# Patient Record
Sex: FEMALE | Race: BLACK OR AFRICAN AMERICAN | NOT HISPANIC OR LATINO | Employment: OTHER | URBAN - METROPOLITAN AREA
[De-identification: names, ages, dates, MRNs, and addresses within clinical notes are randomized per-mention and may not be internally consistent; named-entity substitution may affect disease eponyms.]

---

## 2017-06-23 ENCOUNTER — HOSPITAL ENCOUNTER (EMERGENCY)
Facility: HOSPITAL | Age: 47
Discharge: HOME | End: 2017-06-23
Attending: EMERGENCY MEDICINE | Admitting: EMERGENCY MEDICINE

## 2017-06-23 ENCOUNTER — APPOINTMENT (OUTPATIENT)
Dept: GENERAL RADIOLOGY | Facility: HOSPITAL | Age: 47
End: 2017-06-23

## 2017-06-23 ENCOUNTER — APPOINTMENT (OUTPATIENT)
Dept: CT IMAGING | Facility: HOSPITAL | Age: 47
End: 2017-06-23

## 2017-06-23 VITALS
BODY MASS INDEX: 39.86 KG/M2 | WEIGHT: 248 LBS | HEART RATE: 87 BPM | TEMPERATURE: 98.6 F | SYSTOLIC BLOOD PRESSURE: 162 MMHG | DIASTOLIC BLOOD PRESSURE: 112 MMHG | OXYGEN SATURATION: 100 % | RESPIRATION RATE: 18 BRPM | HEIGHT: 66 IN

## 2017-06-23 DIAGNOSIS — I10 UNCONTROLLED HYPERTENSION: ICD-10-CM

## 2017-06-23 DIAGNOSIS — R10.9 ABDOMINAL CRAMPING: ICD-10-CM

## 2017-06-23 DIAGNOSIS — R13.10 DYSPHAGIA, UNSPECIFIED TYPE: Primary | ICD-10-CM

## 2017-06-23 DIAGNOSIS — K21.00 GASTROESOPHAGEAL REFLUX DISEASE WITH ESOPHAGITIS: ICD-10-CM

## 2017-06-23 LAB
ALBUMIN SERPL-MCNC: 4.4 G/DL (ref 3.2–4.8)
ALBUMIN/GLOB SERPL: 1.2 G/DL (ref 1.5–2.5)
ALP SERPL-CCNC: 162 U/L (ref 25–100)
ALT SERPL W P-5'-P-CCNC: 42 U/L (ref 7–40)
ANION GAP SERPL CALCULATED.3IONS-SCNC: 7 MMOL/L (ref 3–11)
AST SERPL-CCNC: 41 U/L (ref 0–33)
BASOPHILS # BLD AUTO: 0.03 10*3/MM3 (ref 0–0.2)
BASOPHILS NFR BLD AUTO: 0.4 % (ref 0–1)
BILIRUB SERPL-MCNC: 0.4 MG/DL (ref 0.3–1.2)
BNP SERPL-MCNC: 40 PG/ML (ref 0–100)
BUN BLD-MCNC: 9 MG/DL (ref 9–23)
BUN/CREAT SERPL: 15 (ref 7–25)
CALCIUM SPEC-SCNC: 9.5 MG/DL (ref 8.7–10.4)
CHLORIDE SERPL-SCNC: 104 MMOL/L (ref 99–109)
CO2 SERPL-SCNC: 24 MMOL/L (ref 20–31)
CREAT BLD-MCNC: 0.6 MG/DL (ref 0.6–1.3)
DEPRECATED RDW RBC AUTO: 47.6 FL (ref 37–54)
EOSINOPHIL # BLD AUTO: 0.15 10*3/MM3 (ref 0.1–0.3)
EOSINOPHIL NFR BLD AUTO: 2 % (ref 0–3)
ERYTHROCYTE [DISTWIDTH] IN BLOOD BY AUTOMATED COUNT: 14.3 % (ref 11.3–14.5)
GFR SERPL CREATININE-BSD FRML MDRD: 130 ML/MIN/1.73
GLOBULIN UR ELPH-MCNC: 3.7 GM/DL
GLUCOSE BLD-MCNC: 266 MG/DL (ref 70–100)
HCT VFR BLD AUTO: 45.6 % (ref 34.5–44)
HGB BLD-MCNC: 14.9 G/DL (ref 11.5–15.5)
HOLD SPECIMEN: NORMAL
HOLD SPECIMEN: NORMAL
IMM GRANULOCYTES # BLD: 0.02 10*3/MM3 (ref 0–0.03)
IMM GRANULOCYTES NFR BLD: 0.3 % (ref 0–0.6)
LIPASE SERPL-CCNC: 34 U/L (ref 6–51)
LYMPHOCYTES # BLD AUTO: 3.13 10*3/MM3 (ref 0.6–4.8)
LYMPHOCYTES NFR BLD AUTO: 41.6 % (ref 24–44)
MCH RBC QN AUTO: 29.9 PG (ref 27–31)
MCHC RBC AUTO-ENTMCNC: 32.7 G/DL (ref 32–36)
MCV RBC AUTO: 91.4 FL (ref 80–99)
MONOCYTES # BLD AUTO: 0.51 10*3/MM3 (ref 0–1)
MONOCYTES NFR BLD AUTO: 6.8 % (ref 0–12)
NEUTROPHILS # BLD AUTO: 3.68 10*3/MM3 (ref 1.5–8.3)
NEUTROPHILS NFR BLD AUTO: 48.9 % (ref 41–71)
PLATELET # BLD AUTO: 407 10*3/MM3 (ref 150–450)
PMV BLD AUTO: 9.8 FL (ref 6–12)
POTASSIUM BLD-SCNC: 4.5 MMOL/L (ref 3.5–5.5)
PROT SERPL-MCNC: 8.1 G/DL (ref 5.7–8.2)
RBC # BLD AUTO: 4.99 10*6/MM3 (ref 3.89–5.14)
SODIUM BLD-SCNC: 135 MMOL/L (ref 132–146)
TROPONIN I SERPL-MCNC: <0.006 NG/ML
WBC NRBC COR # BLD: 7.52 10*3/MM3 (ref 3.5–10.8)
WHOLE BLOOD HOLD SPECIMEN: NORMAL
WHOLE BLOOD HOLD SPECIMEN: NORMAL

## 2017-06-23 PROCEDURE — 80053 COMPREHEN METABOLIC PANEL: CPT | Performed by: EMERGENCY MEDICINE

## 2017-06-23 PROCEDURE — 0 IOPAMIDOL 61 % SOLUTION: Performed by: EMERGENCY MEDICINE

## 2017-06-23 PROCEDURE — 84484 ASSAY OF TROPONIN QUANT: CPT | Performed by: EMERGENCY MEDICINE

## 2017-06-23 PROCEDURE — 85025 COMPLETE CBC W/AUTO DIFF WBC: CPT | Performed by: EMERGENCY MEDICINE

## 2017-06-23 PROCEDURE — 96375 TX/PRO/DX INJ NEW DRUG ADDON: CPT

## 2017-06-23 PROCEDURE — 25010000002 HYDROMORPHONE PER 4 MG: Performed by: EMERGENCY MEDICINE

## 2017-06-23 PROCEDURE — 96374 THER/PROPH/DIAG INJ IV PUSH: CPT

## 2017-06-23 PROCEDURE — 71020 HC CHEST PA AND LATERAL: CPT

## 2017-06-23 PROCEDURE — 83690 ASSAY OF LIPASE: CPT | Performed by: EMERGENCY MEDICINE

## 2017-06-23 PROCEDURE — 25010000002 METOCLOPRAMIDE PER 10 MG: Performed by: EMERGENCY MEDICINE

## 2017-06-23 PROCEDURE — 74177 CT ABD & PELVIS W/CONTRAST: CPT

## 2017-06-23 PROCEDURE — 83880 ASSAY OF NATRIURETIC PEPTIDE: CPT | Performed by: EMERGENCY MEDICINE

## 2017-06-23 PROCEDURE — 99284 EMERGENCY DEPT VISIT MOD MDM: CPT

## 2017-06-23 PROCEDURE — 93005 ELECTROCARDIOGRAM TRACING: CPT

## 2017-06-23 RX ORDER — METOCLOPRAMIDE 10 MG/1
10 TABLET ORAL
Qty: 30 TABLET | Refills: 0 | Status: SHIPPED | OUTPATIENT
Start: 2017-06-23 | End: 2022-04-11 | Stop reason: HOSPADM

## 2017-06-23 RX ORDER — METOCLOPRAMIDE HYDROCHLORIDE 5 MG/ML
5 INJECTION INTRAMUSCULAR; INTRAVENOUS ONCE
Status: COMPLETED | OUTPATIENT
Start: 2017-06-23 | End: 2017-06-23

## 2017-06-23 RX ORDER — HYDROXYZINE HYDROCHLORIDE 10 MG/1
10 TABLET, FILM COATED ORAL 3 TIMES DAILY PRN
COMMUNITY
End: 2022-04-11 | Stop reason: HOSPADM

## 2017-06-23 RX ORDER — ASPIRIN 81 MG/1
324 TABLET, CHEWABLE ORAL ONCE
Status: COMPLETED | OUTPATIENT
Start: 2017-06-23 | End: 2017-06-23

## 2017-06-23 RX ORDER — SODIUM CHLORIDE 0.9 % (FLUSH) 0.9 %
10 SYRINGE (ML) INJECTION AS NEEDED
Status: DISCONTINUED | OUTPATIENT
Start: 2017-06-23 | End: 2017-06-24 | Stop reason: HOSPADM

## 2017-06-23 RX ORDER — PANTOPRAZOLE SODIUM 40 MG/1
40 TABLET, DELAYED RELEASE ORAL ONCE
Status: COMPLETED | OUTPATIENT
Start: 2017-06-23 | End: 2017-06-23

## 2017-06-23 RX ORDER — OMEPRAZOLE 40 MG/1
40 CAPSULE, DELAYED RELEASE ORAL DAILY
Qty: 30 CAPSULE | Refills: 0 | Status: SHIPPED | OUTPATIENT
Start: 2017-06-23 | End: 2022-04-11 | Stop reason: HOSPADM

## 2017-06-23 RX ADMIN — PANTOPRAZOLE SODIUM 40 MG: 40 TABLET, DELAYED RELEASE ORAL at 23:28

## 2017-06-23 RX ADMIN — IOPAMIDOL 100 ML: 612 INJECTION, SOLUTION INTRAVENOUS at 21:50

## 2017-06-23 RX ADMIN — HYDROMORPHONE HYDROCHLORIDE 1 MG: 1 INJECTION, SOLUTION INTRAMUSCULAR; INTRAVENOUS; SUBCUTANEOUS at 21:22

## 2017-06-23 RX ADMIN — ASPIRIN 81 MG 324 MG: 81 TABLET ORAL at 19:47

## 2017-06-23 RX ADMIN — METOCLOPRAMIDE 5 MG: 5 INJECTION, SOLUTION INTRAMUSCULAR; INTRAVENOUS at 21:19

## 2017-06-23 NOTE — ED PROVIDER NOTES
Subjective   HPI Comments: Bernadette Paris is a 47 y.o.female with a history of a hysterectomy, cholecystectomy, DM, and HTN who presents to the ED with c/o nausea and vomiting. She reports that she has been experiencing intermittent episodes of nausea and mucousy emesis for the last week. She also c/o recent weight loss, diarrhea, cough, mid-sternal chest pain with swallowing, and lower abdominal cramping that is worse with sitting up. She was seen by her PCP for this and given medication for pain management. She denies any other acute complaints at this time. She had a GI scope performed about 4 months ago but is unable to recall the results. She does smoke 1/2 a pack of cigarettes a day but denies alcohol use.     Patient is a 47 y.o. female presenting with vomiting.   History provided by:  Patient  Vomiting   The primary symptoms include abdominal pain (lower abdominal cramping), nausea, vomiting and diarrhea. The illness began 6 to 7 days ago. The onset was sudden. The problem has not changed since onset.  The abdominal pain began more than 2 days ago. The abdominal pain has been unchanged since its onset. The abdominal pain is located in the LLQ and RLQ. The abdominal pain does not radiate.   Nausea began 6 to 7 days ago.   The vomiting began more than 2 days ago. Emesis appearance: mucus.       Review of Systems   Constitutional: Positive for unexpected weight change (recent weight loss).   Respiratory: Positive for cough.    Cardiovascular: Positive for chest pain (mid-sternal).   Gastrointestinal: Positive for abdominal pain (lower abdominal cramping), diarrhea, nausea and vomiting.   All other systems reviewed and are negative.      Past Medical History:   Diagnosis Date   • Diabetes mellitus    • Hypertension        No Known Allergies    Past Surgical History:   Procedure Laterality Date   • CHOLECYSTECTOMY     • HYSTERECTOMY     • KNEE SURGERY         History reviewed. No pertinent family history.    Social  History     Social History   • Marital status: Single     Spouse name: N/A   • Number of children: N/A   • Years of education: N/A     Social History Main Topics   • Smoking status: Current Every Day Smoker     Packs/day: 1.00   • Smokeless tobacco: None   • Alcohol use No   • Drug use: No   • Sexual activity: Not Asked     Other Topics Concern   • None     Social History Narrative         Objective   Physical Exam   Constitutional: She is oriented to person, place, and time. She appears well-developed and well-nourished.   HENT:   Head: Normocephalic and atraumatic.   Nose: Nose normal.   Mouth/Throat: Oropharynx is clear and moist.   Airway patent, pharynx benign   Eyes: Conjunctivae are normal. No scleral icterus.   Neck: Normal range of motion. Neck supple. No JVD present.   Cardiovascular: Normal rate, regular rhythm and normal heart sounds.    No murmur heard.  Pulmonary/Chest: Effort normal and breath sounds normal. No respiratory distress.   Abdominal: Soft. Bowel sounds are normal. She exhibits no mass. There is tenderness (Tenderness to palpation of the epigastrium, more tender in the lower region). There is no rebound and no guarding.   Musculoskeletal: Normal range of motion.   Lymphadenopathy:     She has no cervical adenopathy.   Neurological: She is alert and oriented to person, place, and time.   Skin: Skin is warm and dry. She is not diaphoretic. No erythema.   Psychiatric: She has a normal mood and affect. Her behavior is normal.   Nursing note and vitals reviewed.      Procedures         ED Course  ED Course   Comment By Time   Note from 04/09/17 CT scan of abdomen at  shows lymph nodes consistent with acute GI illness Roberto Wadsworth MD 06/23 2110   Ms. Paris is feeling better after her medication.   Reviewed several medical records from  Gabriella Lugo 06/23 3930     Recent Results (from the past 24 hour(s))   Comprehensive Metabolic Panel    Collection Time: 06/23/17  7:45 PM   Result  Value Ref Range    Glucose 266 (H) 70 - 100 mg/dL    BUN 9 9 - 23 mg/dL    Creatinine 0.60 0.60 - 1.30 mg/dL    Sodium 135 132 - 146 mmol/L    Potassium 4.5 3.5 - 5.5 mmol/L    Chloride 104 99 - 109 mmol/L    CO2 24.0 20.0 - 31.0 mmol/L    Calcium 9.5 8.7 - 10.4 mg/dL    Total Protein 8.1 5.7 - 8.2 g/dL    Albumin 4.40 3.20 - 4.80 g/dL    ALT (SGPT) 42 (H) 7 - 40 U/L    AST (SGOT) 41 (H) 0 - 33 U/L    Alkaline Phosphatase 162 (H) 25 - 100 U/L    Total Bilirubin 0.4 0.3 - 1.2 mg/dL    eGFR  African Amer 130 >60 mL/min/1.73    Globulin 3.7 gm/dL    A/G Ratio 1.2 (L) 1.5 - 2.5 g/dL    BUN/Creatinine Ratio 15.0 7.0 - 25.0    Anion Gap 7.0 3.0 - 11.0 mmol/L   Lipase    Collection Time: 06/23/17  7:45 PM   Result Value Ref Range    Lipase 34 6 - 51 U/L   BNP    Collection Time: 06/23/17  7:45 PM   Result Value Ref Range    BNP 40.0 0.0 - 100.0 pg/mL   Light Blue Top    Collection Time: 06/23/17  7:45 PM   Result Value Ref Range    Extra Tube hold for add-on    Green Top (Gel)    Collection Time: 06/23/17  7:45 PM   Result Value Ref Range    Extra Tube Hold for add-ons.    Lavender Top    Collection Time: 06/23/17  7:45 PM   Result Value Ref Range    Extra Tube hold for add-on    Gold Top - SST    Collection Time: 06/23/17  7:45 PM   Result Value Ref Range    Extra Tube Hold for add-ons.    CBC Auto Differential    Collection Time: 06/23/17  7:45 PM   Result Value Ref Range    WBC 7.52 3.50 - 10.80 10*3/mm3    RBC 4.99 3.89 - 5.14 10*6/mm3    Hemoglobin 14.9 11.5 - 15.5 g/dL    Hematocrit 45.6 (H) 34.5 - 44.0 %    MCV 91.4 80.0 - 99.0 fL    MCH 29.9 27.0 - 31.0 pg    MCHC 32.7 32.0 - 36.0 g/dL    RDW 14.3 11.3 - 14.5 %    RDW-SD 47.6 37.0 - 54.0 fl    MPV 9.8 6.0 - 12.0 fL    Platelets 407 150 - 450 10*3/mm3    Neutrophil % 48.9 41.0 - 71.0 %    Lymphocyte % 41.6 24.0 - 44.0 %    Monocyte % 6.8 0.0 - 12.0 %    Eosinophil % 2.0 0.0 - 3.0 %    Basophil % 0.4 0.0 - 1.0 %    Immature Grans % 0.3 0.0 - 0.6 %    Neutrophils,  Absolute 3.68 1.50 - 8.30 10*3/mm3    Lymphocytes, Absolute 3.13 0.60 - 4.80 10*3/mm3    Monocytes, Absolute 0.51 0.00 - 1.00 10*3/mm3    Eosinophils, Absolute 0.15 0.10 - 0.30 10*3/mm3    Basophils, Absolute 0.03 0.00 - 0.20 10*3/mm3    Immature Grans, Absolute 0.02 0.00 - 0.03 10*3/mm3   Troponin    Collection Time: 06/23/17  7:45 PM   Result Value Ref Range    Troponin I <0.006 <=0.039 ng/mL     Note: In addition to lab results from this visit, the labs listed above may include labs taken at another facility or during a different encounter within the last 24 hours. Please correlate lab times with ED admission and discharge times for further clarification of the services performed during this visit.    CT Abdomen Pelvis With Contrast   Final Result   Abnormal   1.  No acute abnormality demonstrated.   2.  Previous cholecystectomy and hysterectomy.      THIS DOCUMENT HAS BEEN ELECTRONICALLY SIGNED BY DAVID FOSS JR. MD      XR Chest 2 View   Final Result   Abnormal   No definite acute disease process is seen in the chest.       Possible atelectatic versus inflammatory changes toward the lower lungs.       Other findings as above.         THIS DOCUMENT HAS BEEN ELECTRONICALLY SIGNED BY MATT VAUGHAN MD        Vitals:    06/23/17 2115 06/23/17 2200 06/23/17 2230 06/23/17 2300   BP: (!) 160/116 (!) 164/113 (!) 166/116 (!) 162/112   BP Location:       Patient Position:       Pulse: 87      Resp:       Temp:       TempSrc:       SpO2: 100%  100% 100%   Weight:       Height:         Medications   sodium chloride 0.9 % flush 10 mL (not administered)   aspirin chewable tablet 324 mg (324 mg Oral Given 6/23/17 1947)   HYDROmorphone (DILAUDID) injection 1 mg (1 mg Intravenous Given 6/23/17 2122)   metoclopramide (REGLAN) injection 5 mg (5 mg Intravenous Given 6/23/17 2119)   iopamidol (ISOVUE-300) 61 % injection 100 mL (100 mL Intravenous Given 6/23/17 2150)   pantoprazole (PROTONIX) EC tablet 40 mg (40 mg Oral Given  6/23/17 5818)     ECG/EMG Results (last 24 hours)     Procedure Component Value Units Date/Time    ECG 12 Lead [[id number]] Collected:  06/23/17 1935     Updated:  06/23/17 1935                       MDM    Final diagnoses:   Dysphagia, unspecified type   Gastroesophageal reflux disease with esophagitis   Abdominal cramping   Uncontrolled hypertension       Documentation assistance provided by mario Lugo.  Information recorded by the scribe was done at my direction and has been verified and validated by me.     Gabriella Lugo  06/23/17 2106       Gabriella Lugo  06/23/17 2107       Roberto Wadsworth MD  06/24/17 0045

## 2017-11-07 ENCOUNTER — OFFICE VISIT (OUTPATIENT)
Dept: INTERNAL MEDICINE | Facility: CLINIC | Age: 47
End: 2017-11-07

## 2017-11-07 VITALS
HEIGHT: 66 IN | DIASTOLIC BLOOD PRESSURE: 82 MMHG | SYSTOLIC BLOOD PRESSURE: 120 MMHG | WEIGHT: 239 LBS | BODY MASS INDEX: 38.41 KG/M2 | HEART RATE: 91 BPM | RESPIRATION RATE: 16 BRPM | OXYGEN SATURATION: 96 %

## 2017-11-07 DIAGNOSIS — M25.552 PAIN IN LEFT HIP: Primary | ICD-10-CM

## 2017-11-07 PROCEDURE — 99202 OFFICE O/P NEW SF 15 MIN: CPT | Performed by: NURSE PRACTITIONER

## 2017-11-07 RX ORDER — PEN NEEDLE, DIABETIC 32GX 5/32"
NEEDLE, DISPOSABLE MISCELLANEOUS
Refills: 1 | COMMUNITY
Start: 2017-10-26 | End: 2022-05-13 | Stop reason: HOSPADM

## 2017-11-07 RX ORDER — PE/SHARK LIVER OIL/GLYC/WH.PET 0.25-3-12%
CREAM (GRAM) RECTAL
Refills: 0 | COMMUNITY
Start: 2017-08-14 | End: 2022-04-11 | Stop reason: HOSPADM

## 2017-11-07 RX ORDER — BUSPIRONE HYDROCHLORIDE 10 MG/1
10 TABLET ORAL 2 TIMES DAILY
Refills: 0 | Status: ON HOLD | COMMUNITY
Start: 2017-09-25 | End: 2022-05-13 | Stop reason: SDUPTHER

## 2017-11-07 RX ORDER — INSULIN LISPRO 100 [IU]/ML
INJECTION, SUSPENSION SUBCUTANEOUS
Refills: 0 | COMMUNITY
Start: 2017-10-15 | End: 2022-04-11 | Stop reason: HOSPADM

## 2017-11-07 RX ORDER — LEVOFLOXACIN 500 MG/1
TABLET, FILM COATED ORAL
Refills: 0 | COMMUNITY
Start: 2017-09-01 | End: 2018-08-08 | Stop reason: HOSPADM

## 2017-11-07 RX ORDER — PANTOPRAZOLE SODIUM 40 MG/1
TABLET, DELAYED RELEASE ORAL
Refills: 0 | COMMUNITY
Start: 2017-11-01 | End: 2018-08-08 | Stop reason: HOSPADM

## 2017-11-07 RX ORDER — LOSARTAN POTASSIUM 100 MG/1
TABLET ORAL
Refills: 0 | COMMUNITY
Start: 2017-10-06 | End: 2022-04-11 | Stop reason: HOSPADM

## 2017-11-07 RX ORDER — MONTELUKAST SODIUM 4 MG/1
1 TABLET, CHEWABLE ORAL DAILY
Refills: 0 | COMMUNITY
Start: 2017-09-25 | End: 2022-04-11 | Stop reason: HOSPADM

## 2017-11-07 RX ORDER — ESTRADIOL 1 MG/1
TABLET ORAL
Refills: 0 | COMMUNITY
Start: 2017-10-26 | End: 2022-04-11 | Stop reason: HOSPADM

## 2017-11-07 RX ORDER — PREDNISONE 20 MG/1
TABLET ORAL
Refills: 0 | COMMUNITY
Start: 2017-09-01 | End: 2018-08-08 | Stop reason: HOSPADM

## 2017-11-07 RX ORDER — LANCETS
EACH MISCELLANEOUS
Refills: 0 | COMMUNITY
Start: 2017-10-26 | End: 2022-05-13 | Stop reason: HOSPADM

## 2017-11-07 RX ORDER — ALBUTEROL SULFATE 90 UG/1
AEROSOL, METERED RESPIRATORY (INHALATION)
Refills: 0 | Status: ON HOLD | COMMUNITY
Start: 2017-08-24 | End: 2022-05-13 | Stop reason: SDUPTHER

## 2017-11-07 RX ORDER — HYDROXYZINE 50 MG/1
TABLET, FILM COATED ORAL
Refills: 0 | COMMUNITY
Start: 2017-10-16 | End: 2022-04-11 | Stop reason: HOSPADM

## 2017-11-07 RX ORDER — TRAMADOL HYDROCHLORIDE 50 MG/1
TABLET ORAL
Refills: 0 | COMMUNITY
Start: 2017-10-11 | End: 2018-07-23 | Stop reason: SDUPTHER

## 2017-11-07 RX ORDER — DICLOFENAC SODIUM 75 MG/1
TABLET, DELAYED RELEASE ORAL
Refills: 0 | Status: ON HOLD | COMMUNITY
Start: 2017-09-17 | End: 2022-04-09

## 2017-11-07 RX ORDER — METHOCARBAMOL 500 MG/1
TABLET, FILM COATED ORAL
Refills: 0 | COMMUNITY
Start: 2017-11-03 | End: 2022-04-11 | Stop reason: HOSPADM

## 2017-11-07 RX ORDER — DULOXETIN HYDROCHLORIDE 30 MG/1
CAPSULE, DELAYED RELEASE ORAL
Refills: 0 | COMMUNITY
Start: 2017-10-26 | End: 2017-12-15

## 2017-11-07 RX ORDER — BLOOD SUGAR DIAGNOSTIC
STRIP MISCELLANEOUS
Refills: 0 | COMMUNITY
Start: 2017-10-26 | End: 2022-04-11 | Stop reason: HOSPADM

## 2017-11-07 RX ORDER — DICYCLOMINE HYDROCHLORIDE 10 MG/1
CAPSULE ORAL
Refills: 0 | COMMUNITY
Start: 2017-10-26 | End: 2022-04-11 | Stop reason: HOSPADM

## 2017-11-07 RX ORDER — INSULIN GLARGINE 100 [IU]/ML
INJECTION, SOLUTION SUBCUTANEOUS
COMMUNITY
Start: 2017-10-15 | End: 2018-07-13

## 2017-11-07 RX ORDER — TRIAMCINOLONE ACETONIDE 1 MG/G
CREAM TOPICAL
Refills: 0 | COMMUNITY
Start: 2017-11-03 | End: 2022-04-11 | Stop reason: HOSPADM

## 2017-11-07 RX ORDER — LISINOPRIL 20 MG/1
20 TABLET ORAL DAILY
Refills: 0 | Status: ON HOLD | COMMUNITY
Start: 2017-10-26 | End: 2022-04-11 | Stop reason: SDUPTHER

## 2017-11-07 NOTE — PROGRESS NOTES
Chief Complaint   Patient presents with   • Back Pain     Hurts to stand for more than 10 min, hard time walking. Sx started a year ago   • Rash     Sx started 1 week ago       Bernadette Paris is a 47 y.o. female presenting for pain in left low back; she states she has pain with walking, standing for > 10 minutes at a time; she has had this pain for approximately 1 year.  She has had several xrays of her back which have all been negative.  Her PCP is Dr. Stanley in [place], but she presents to our office today in hopes of finding a resolution of her back pain.    Reports a fall down some steps last year after having left knee surgery; she landed on her left hip/buttock and states she has had this pain since then and it has worsened.    She is taking ibuprofen 600 mg TID as needed for her pain.      [facility]    Past Medical History:   Diagnosis Date   • Arthritis    • Bipolar disorder    • Chronic bronchitis    • Depression    • Diabetes mellitus    • History of blood transfusion    • Hyperlipidemia    • Hypertension    • Infectious viral hepatitis    • Migraine        Past Surgical History:   Procedure Laterality Date   • CHOLECYSTECTOMY     • HYSTERECTOMY     • KNEE SURGERY         No family history on file.    Social History     Social History   • Marital status: Single     Spouse name: N/A   • Number of children: N/A   • Years of education: N/A     Occupational History   • Not on file.     Social History Main Topics   • Smoking status: Current Every Day Smoker     Packs/day: 1.00   • Smokeless tobacco: Not on file   • Alcohol use No   • Drug use: No   • Sexual activity: Defer     Other Topics Concern   • Not on file     Social History Narrative   • No narrative on file       Health Maintenance Due   Topic Date Due   • PNEUMOCOCCAL VACCINE (19-64 MEDIUM RISK) (1 of 1 - PPSV23) 03/17/1990   • TDAP/TD VACCINES (1 - Tdap) 03/17/1990   • INFLUENZA VACCINE  08/01/2017   • LIPID PANEL  11/07/2017   • PAP SMEAR  06/23/2017          Current Outpatient Prescriptions:   •  ACCU-CHEK FASTCLIX LANCETS misc, , Disp: , Rfl: 0  •  ACCU-CHEK SMARTVIEW test strip, , Disp: , Rfl: 0  •  BD PEN NEEDLE KELLI U/F 32G X 4 MM misc, , Disp: , Rfl: 1  •  busPIRone (BUSPAR) 10 MG tablet, , Disp: , Rfl: 0  •  colestipol (COLESTID) 1 g tablet, , Disp: , Rfl: 0  •  cyclobenzaprine (FLEXERIL) 5 MG tablet, Take 1 tablet by mouth 3 (three) times a day as needed for muscle spasms., Disp: 12 tablet, Rfl: 0  •  diclofenac (VOLTAREN) 75 MG EC tablet, , Disp: , Rfl: 0  •  dicyclomine (BENTYL) 10 MG capsule, take 1 capsule by mouth three times a day if needed FOR STOMACH CRAMPS, Disp: , Rfl: 0  •  DULoxetine (CYMBALTA) 30 MG capsule, , Disp: , Rfl: 0  •  estradiol (ESTRACE) 1 MG tablet, take 1 tablet by mouth once daily for 3 weeks then 1 week OFF, Disp: , Rfl: 0  •  HUMALOG MIX 75/25 KWIKPEN (75-25) 100 UNIT/ML suspension pen-injector, INJECT 30 UNITS SUBCUTANEOUSLY TWICE A DAY WITH MEALS ADMINISTER ...  (REFER TO PRESCRIPTION NOTES)., Disp: , Rfl: 0  •  hydrOXYzine (ATARAX) 10 MG tablet, Take 10 mg by mouth 3 (Three) Times a Day As Needed for Itching., Disp: , Rfl:   •  hydrOXYzine (ATARAX) 50 MG tablet, take 1 tablet by mouth at bedtime, Disp: , Rfl: 0  •  ibuprofen (ADVIL,MOTRIN) 600 MG tablet, Take 1 tablet by mouth 3 (three) times a day., Disp: 30 tablet, Rfl: 0  •  Insulin Glargine (BASAGLAR KWIKPEN) 100 UNIT/ML injection pen, , Disp: , Rfl:   •  levoFLOXacin (LEVAQUIN) 500 MG tablet, take 1 tablet by mouth once daily, Disp: , Rfl: 0  •  lisinopril (PRINIVIL,ZESTRIL) 20 MG tablet, , Disp: , Rfl: 0  •  losartan (COZAAR) 100 MG tablet, take 1 tablet by mouth once daily, Disp: , Rfl: 0  •  metFORMIN (GLUCOPHAGE) 1000 MG tablet, Take 1,000 mg by mouth 2 (two) times a day with meals., Disp: , Rfl:   •  methocarbamol (ROBAXIN) 500 MG tablet, take 1 tablet by mouth BEFORE BED if needed, Disp: , Rfl: 0  •  metoclopramide (REGLAN) 10 MG tablet, Take 1 tablet by mouth 4  "(Four) Times a Day Before Meals & at Bedtime., Disp: 30 tablet, Rfl: 0  •  metoprolol tartrate (LOPRESSOR) 25 MG tablet, , Disp: , Rfl: 0  •  omeprazole (PRILOSEC) 40 MG capsule, Take 1 capsule by mouth Daily., Disp: 30 capsule, Rfl: 0  •  pantoprazole (PROTONIX) 40 MG EC tablet, take 1 tablet by mouth once daily, Disp: , Rfl: 0  •  predniSONE (DELTASONE) 20 MG tablet, take 2 tablets by mouth once daily, Disp: , Rfl: 0  •  RA ALLERGY RELIEF 10 MG tablet, take 1 tablet by mouth once daily, Disp: , Rfl: 0  •  RA CLOTRIMAZOLE 7 1 % vaginal cream, APPLY 1 APPLICATION AT BEDTIME ONCE DAILY VAGINALLY, Disp: , Rfl: 0  •  traMADol (ULTRAM) 50 MG tablet, take 1 tablet by mouth every 6 hours if needed, Disp: , Rfl: 0  •  triamcinolone (KENALOG) 0.1 % cream, apply to affected area twice a day, Disp: , Rfl: 0  •  VENTOLIN  (90 Base) MCG/ACT inhaler, inhale 2 puffs by mouth every 4 to 6 hours, Disp: , Rfl: 0    Review of Systems   Constitutional: Positive for activity change. Negative for appetite change.   Musculoskeletal: Positive for arthralgias and back pain.   All other systems reviewed and are negative.      /82  Pulse 91  Resp 16  Ht 66\" (167.6 cm)  Wt 239 lb (108 kg)  SpO2 96%  BMI 38.58 kg/m2    Physical Exam   Constitutional: She appears well-developed and well-nourished.   HENT:   Head: Normocephalic and atraumatic.   Cardiovascular: Normal rate, regular rhythm and normal heart sounds.    No murmur heard.  Pulmonary/Chest: Effort normal and breath sounds normal.   Musculoskeletal:        Back:    Vitals reviewed.      ASSESSMENT/PLAN    1. Pain in left hip  -continue ibuprofen as needed  -may also apply ice to site of pain 15-20 minutes 3-4 times/day.  - XR Hips Bilateral With or Without Pelvis 2 View; Future--will notify as soon as result is complete.    F/U after xray result is back.    Plan of care reviewed with patient at the conclusion of today's visit. Education was provided in regards to " diagnosis, management and any prescribed or recommended OTC medications.  Patient verbalizes Understanding of and agreement with management plan.      Radha Cody, APRN  11/07/2017

## 2017-11-10 ENCOUNTER — TELEPHONE (OUTPATIENT)
Dept: INTERNAL MEDICINE | Facility: CLINIC | Age: 47
End: 2017-11-10

## 2017-11-17 NOTE — TELEPHONE ENCOUNTER
Spoke with pt and explained that her results came in via fax from [facility] and that her results shown no fractures, no dislocations, no bony destructions. Advised per DMITRY Roman that pt should follow up with her PCP if she continues to have problems. Pt stated she understood and was thankful for the call.

## 2017-12-15 ENCOUNTER — OFFICE VISIT (OUTPATIENT)
Dept: NEUROSURGERY | Facility: CLINIC | Age: 47
End: 2017-12-15

## 2017-12-15 ENCOUNTER — PREP FOR SURGERY (OUTPATIENT)
Dept: OTHER | Facility: HOSPITAL | Age: 47
End: 2017-12-15

## 2017-12-15 VITALS — HEIGHT: 66 IN | WEIGHT: 258 LBS | TEMPERATURE: 97.1 F | BODY MASS INDEX: 41.46 KG/M2

## 2017-12-15 DIAGNOSIS — M47.816 FACET ARTHRITIS OF LUMBAR REGION: ICD-10-CM

## 2017-12-15 DIAGNOSIS — M48.062 SPINAL STENOSIS, LUMBAR REGION, WITH NEUROGENIC CLAUDICATION: Primary | ICD-10-CM

## 2017-12-15 DIAGNOSIS — M51.36 DEGENERATIVE DISC DISEASE, LUMBAR: ICD-10-CM

## 2017-12-15 PROCEDURE — 99243 OFF/OP CNSLTJ NEW/EST LOW 30: CPT | Performed by: NEUROLOGICAL SURGERY

## 2017-12-15 NOTE — PROGRESS NOTES
Patient: Bernadette Paris  : 1970    Primary Care Provider: Lilly Rose MD    Requesting Provider: As above        History    Chief Complaint: Low back and left leg pain with walking and standing intolerance.    History of Present Illness: Ms. Paris is a 47-year-old unemployed woman who describes a 1-1/2 year history of low back pain that will extend into her left thigh.  She has some numbness in her left thigh as well.  She has no right leg symptoms.  Her symptoms occur after she stands or walks for only about 2 minutes.  Her symptoms are improved with sitting down.  She is undergone 2 courses of physical therapy to no avail.  She denies any bowel or bladder dysfunction.    Review of Systems   Constitutional: Positive for activity change, appetite change, diaphoresis and fever. Negative for chills, fatigue and unexpected weight change.   HENT: Positive for ear pain, mouth sores and trouble swallowing. Negative for congestion, dental problem, drooling, ear discharge, facial swelling, hearing loss, nosebleeds, postnasal drip, rhinorrhea, sinus pressure, sneezing, sore throat, tinnitus and voice change.    Eyes: Positive for itching. Negative for photophobia, pain, discharge, redness and visual disturbance.   Respiratory: Positive for apnea, cough and wheezing. Negative for choking, chest tightness, shortness of breath and stridor.    Cardiovascular: Positive for leg swelling. Negative for chest pain and palpitations.   Gastrointestinal: Positive for abdominal distention, abdominal pain, constipation, diarrhea, nausea and vomiting. Negative for anal bleeding, blood in stool and rectal pain.   Endocrine: Positive for polydipsia and polyuria. Negative for cold intolerance, heat intolerance and polyphagia.   Genitourinary: Positive for dyspareunia, flank pain and pelvic pain. Negative for decreased urine volume, difficulty urinating, dysuria, enuresis, frequency, genital sores, hematuria and urgency.  "  Musculoskeletal: Positive for arthralgias, back pain, gait problem, joint swelling, myalgias, neck pain and neck stiffness.   Skin: Negative for color change, pallor, rash and wound.   Allergic/Immunologic: Negative for environmental allergies, food allergies and immunocompromised state.   Neurological: Positive for dizziness, weakness, light-headedness and numbness. Negative for tremors, seizures, syncope, facial asymmetry, speech difficulty and headaches.   Hematological: Negative for adenopathy. Does not bruise/bleed easily.   Psychiatric/Behavioral: Positive for dysphoric mood and sleep disturbance. Negative for agitation, behavioral problems, confusion, decreased concentration, hallucinations, self-injury and suicidal ideas. The patient is nervous/anxious and is hyperactive.        The patient's past medical history, past surgical history, family history, and social history have been reviewed at length in the electronic medical record.    Physical Exam:   Temp 97.1 °F (36.2 °C) (Temporal Artery )   Ht 167.6 cm (66\")  Wt 117 kg (258 lb)  BMI 41.64 kg/m2  CONSTITUTIONAL: Patient is well-nourished, pleasant and appears stated age.  CV: Heart regular rate and rhythm without murmur, rub, or gallop.  PULMONARY: Lungs are clear to ascultation.  MUSCULOSKELETAL:  Straight leg raising is negative.  Jed's Sign is negative.  ROM in back is limited in all directions.  Tenderness in the back to palpation is not observed.  Left knee is postsurgical.  NEUROLOGICAL:  Orientation, memory, attention span, language function, and cognition have been examined and are intact.  Strength is intact in the lower extremities to direct testing.  Muscle tone is normal throughout.  Station and gait are normal.  Sensation is intact to light touch testing throughout.  Deep tendon reflexes are 1+ and symmetrical.  Coordination is intact.      Medical Decision Making    Data Review:   MRI of the lumbar spine dated 11/18/17 demonstrates " a bit of a lumbarized first sacral segment.  There is a fair amount of epidural lipomatosis that fashions significant stenosis particularly at L4-5 and L5-S1.    Diagnosis:   1.  Lumbar stenosis with neurogenic claudication  2.  Epidural lipomatosis.    Treatment Options:   I'm going to set up a lumbar CT myelogram with upright images to try and determine the extent of her stenosis.  She may well require lumbar decompression.       Diagnosis Plan   1. Spinal stenosis, lumbar region, with neurogenic claudication     2. Degenerative disc disease, lumbar     3. Facet arthritis of lumbar region         Scribed for Chip Smith MD by Jessica Truong CMA on 12/15/2017 at 3:51 PM    I, Dr. Smith, personally performed the services described in the documentation, as scribed in my presence, and it is both accurate and complete.

## 2017-12-27 ENCOUNTER — TELEPHONE (OUTPATIENT)
Dept: NEUROSURGERY | Facility: CLINIC | Age: 47
End: 2017-12-27

## 2017-12-27 NOTE — TELEPHONE ENCOUNTER
Please let the patient know we generally only prescribe narcotics for postop pain.   It looks like she is taking advil and flexeril, which we advise her to continue to do. If she is out of these, we can provide a refill.

## 2017-12-27 NOTE — TELEPHONE ENCOUNTER
Provider:  Luis  Caller: Bernadette  Time of call:   10:11am  Phone #:  [phone]  Surgery/Procedure:  Myelo  Surgery/Procedure Date:  Not scheduled yet.  Last visit:   12/15/17  Next visit: not scheduled    JONNATHAN:       11/11/2017 Oxycodone/Acetaminophen 325MG/5MG 1970 10 1 Vickie Farris [id number] 1    11/15/2017 Guaifenesin/Codeine  10MG/5ML/100MG/5ML  1970 50 10 Lilly Rose Radha [id number] [place] 1    12/18/2017 Oxycodone/Acetaminophen 325MG/5MG 1970 30 5 Louie Haque [facility] 45 2      Reason for call:       Pt called and states she is having low back and left thigh pain.     She is requesting Percocet to help with the pain until she has her myelogram.

## 2017-12-28 NOTE — TELEPHONE ENCOUNTER
Called pt to let her know what Penelope said, before I said anything pt immediately said she had called the wrong doctor's office to ask for the Percocet, she didn't mean to call us. Adv pt of what Penelope said about continuing to take the advil and flexeril, she understood.     She asked to speak to Ayanna regarding her myelo scheduling, transfered

## 2018-05-21 ENCOUNTER — TELEPHONE (OUTPATIENT)
Dept: NEUROSURGERY | Facility: CLINIC | Age: 48
End: 2018-05-21

## 2018-05-21 NOTE — TELEPHONE ENCOUNTER
I called this patient to get more information on what had happened with her no shows.  For the Myelo she didn't have a ride in and she also had an appointment with her diabetes doctor.  For her May appointment she said that she needed a Thursday appointment to guarantee a ride into [place].    I informed her that Dr. Smith is never in the office on a Thursday.  She said that she could also do Tuesdays.  I told her that if we could schedule that we would shoot for the 12th or 19th.

## 2018-06-05 ENCOUNTER — OFFICE VISIT (OUTPATIENT)
Dept: NEUROSURGERY | Facility: CLINIC | Age: 48
End: 2018-06-05

## 2018-06-05 VITALS
RESPIRATION RATE: 18 BRPM | BODY MASS INDEX: 41.46 KG/M2 | WEIGHT: 258 LBS | OXYGEN SATURATION: 99 % | SYSTOLIC BLOOD PRESSURE: 120 MMHG | HEIGHT: 66 IN | DIASTOLIC BLOOD PRESSURE: 80 MMHG

## 2018-06-05 DIAGNOSIS — M48.062 SPINAL STENOSIS, LUMBAR REGION, WITH NEUROGENIC CLAUDICATION: Primary | ICD-10-CM

## 2018-06-05 DIAGNOSIS — M47.816 FACET ARTHRITIS OF LUMBAR REGION: ICD-10-CM

## 2018-06-05 DIAGNOSIS — M51.36 DEGENERATIVE DISC DISEASE, LUMBAR: ICD-10-CM

## 2018-06-05 PROCEDURE — 99213 OFFICE O/P EST LOW 20 MIN: CPT | Performed by: PHYSICIAN ASSISTANT

## 2018-06-05 NOTE — PROGRESS NOTES
Patient: Bernadette Paris  : 1970  GENDER: female    Primary Care Provider: Lilly Rose MD    Requesting Provider: As above      History    Chief Complaint: FU low back pain and left leg pain with standing and walking intolerance.     History of Present Illness: Mrs. Paris is a 48 y.o. Unemployed female who is well known to Dr. Smith's service.  She has a history of low back and left leg pain that now radiates into her left foot, with associated waking/standing intolerance for the past 1-2 year.  She states that she can only walk ~1/2 a block or less than 2 minutes before pain returns, and she needs to sit down.  She is in pain management and is taking Tylenol #3 for pain.     Pt. Was last seen in the office on 12/15/2017.  A Lumbar myelogram was ordered at the end of the office visit.  Mrs. Paris was unable to attend the myelogram due to a conflicting appointment with her diabetes specialist.  Pt. Is a poorly controlled type II diabetic.  Previously, her A1C was 12. Most recent A1C was 8.9.      Pt. Would like to reschedule her myelogram. No other complaints at this time.     Review of Systems   Constitutional: Positive for activity change, appetite change and diaphoresis. Negative for chills, fatigue, fever and unexpected weight change.   HENT: Positive for congestion, dental problem, ear pain, mouth sores, sinus pressure, sneezing, sore throat and trouble swallowing. Negative for drooling, ear discharge, facial swelling, hearing loss, nosebleeds, postnasal drip, rhinorrhea, tinnitus and voice change.    Eyes: Negative for photophobia, pain, discharge, redness, itching and visual disturbance.   Respiratory: Positive for apnea, cough, choking, chest tightness, shortness of breath, wheezing and stridor.    Cardiovascular: Positive for chest pain and leg swelling. Negative for palpitations.   Gastrointestinal: Positive for abdominal pain, constipation, diarrhea, nausea, rectal pain and vomiting.  "Negative for abdominal distention, anal bleeding and blood in stool.   Endocrine: Positive for heat intolerance and polydipsia. Negative for cold intolerance, polyphagia and polyuria.   Genitourinary: Positive for enuresis and frequency. Negative for decreased urine volume, difficulty urinating, dysuria, flank pain, genital sores, hematuria and urgency.   Musculoskeletal: Positive for arthralgias, back pain, gait problem, joint swelling, myalgias, neck pain and neck stiffness.   Skin: Positive for pallor. Negative for color change, rash and wound.   Allergic/Immunologic: Positive for environmental allergies. Negative for food allergies and immunocompromised state.   Neurological: Positive for dizziness, weakness, numbness and headaches. Negative for tremors, seizures, syncope, facial asymmetry, speech difficulty and light-headedness.   Hematological: Negative for adenopathy. Does not bruise/bleed easily.   Psychiatric/Behavioral: Positive for behavioral problems, dysphoric mood and sleep disturbance. Negative for agitation, confusion, decreased concentration, hallucinations, self-injury and suicidal ideas. The patient is nervous/anxious and is hyperactive.    All other systems reviewed and are negative.      The patient's past medical history, past surgical history, family history, and social history have been reviewed at length in the electronic medical record.    Physical Exam:   /80   Resp 18   Ht 167.6 cm (66\")   Wt 117 kg (258 lb) Comment: Pt refused weight (pt states weight is 165lbs)  SpO2 99%   BMI 41.64 kg/m²   CONSTITUTIONAL: Patient is well-nourished, pleasant and appears stated age.  MUSCULOSKELETAL:  Neck tenderness to palpation is not observed.   ROM in neck is normal  (+) pain elicited with straight leg raise   Physical exam is limited due to diffuse trigger points   ROM in back is limited in all directions   NEUROLOGICAL:  - A&Ox3  - Attention span, language function, and cognition are " intact.  - Strength is intact in the upper and lower extremities to direct testing.  - Muscle tone is normal throughout.  - Station and gait are limited, using ambulatory cane for assistance   - Sensation is intact to light touch testing throughout.  - Deep tendon reflexes are 1+ and symmetrical.    - Zuly's Sign is negative bilaterally.  - No clonus is elicited.  - Coordination is intact.    Medical Decision Making      Diagnosis/Treatment Options:  1. Spinal stenosis, lumbar region, with neurogenic claudication  2. Degenerative disc disease, lumbar  3. Facet arthritis of lumbar region  - IR Myelogram Lumbar Spine  - CT Lumbar Spine With Contrast  - Continue Activity as tolerated  - Continue Tylenol #3 as directed per pain management   - Continue to monitor glucose levels     Follow up:  With Dr. Smith after Lumbar Myelogram     Genet Tee PA-C  6/5/2018   4:22 PM

## 2018-06-19 ENCOUNTER — HOSPITAL ENCOUNTER (OUTPATIENT)
Dept: CT IMAGING | Facility: HOSPITAL | Age: 48
Discharge: HOME | End: 2018-06-19

## 2018-06-19 ENCOUNTER — HOSPITAL ENCOUNTER (OUTPATIENT)
Dept: GENERAL RADIOLOGY | Facility: HOSPITAL | Age: 48
Discharge: HOME | End: 2018-06-19
Admitting: NEUROLOGICAL SURGERY

## 2018-06-19 ENCOUNTER — HOSPITAL ENCOUNTER (OUTPATIENT)
Dept: GENERAL RADIOLOGY | Facility: HOSPITAL | Age: 48
Discharge: HOME | End: 2018-06-19

## 2018-06-19 VITALS
DIASTOLIC BLOOD PRESSURE: 101 MMHG | OXYGEN SATURATION: 97 % | TEMPERATURE: 97.8 F | RESPIRATION RATE: 18 BRPM | SYSTOLIC BLOOD PRESSURE: 134 MMHG | HEART RATE: 87 BPM | WEIGHT: 264 LBS | BODY MASS INDEX: 42.43 KG/M2 | HEIGHT: 66 IN

## 2018-06-19 DIAGNOSIS — M48.062 SPINAL STENOSIS, LUMBAR REGION WITH NEUROGENIC CLAUDICATION: ICD-10-CM

## 2018-06-19 DIAGNOSIS — M48.062 SPINAL STENOSIS, LUMBAR REGION, WITH NEUROGENIC CLAUDICATION: ICD-10-CM

## 2018-06-19 DIAGNOSIS — M51.36 DEGENERATIVE DISC DISEASE, LUMBAR: ICD-10-CM

## 2018-06-19 LAB — GLUCOSE BLDC GLUCOMTR-MCNC: 108 MG/DL (ref 70–130)

## 2018-06-19 PROCEDURE — 82962 GLUCOSE BLOOD TEST: CPT

## 2018-06-19 PROCEDURE — 0 IOPAMIDOL 41 % SOLUTION: Performed by: NEUROLOGICAL SURGERY

## 2018-06-19 PROCEDURE — 72132 CT LUMBAR SPINE W/DYE: CPT

## 2018-06-19 PROCEDURE — 72120 X-RAY BEND ONLY L-S SPINE: CPT

## 2018-06-19 PROCEDURE — 62304 MYELOGRAPHY LUMBAR INJECTION: CPT

## 2018-06-19 PROCEDURE — 72240 MYELOGRAPHY NECK SPINE: CPT

## 2018-06-19 RX ORDER — PROMETHAZINE HYDROCHLORIDE 25 MG/ML
25 INJECTION, SOLUTION INTRAMUSCULAR; INTRAVENOUS ONCE AS NEEDED
Status: DISCONTINUED | OUTPATIENT
Start: 2018-06-19 | End: 2018-06-20 | Stop reason: HOSPADM

## 2018-06-19 RX ORDER — CLONIDINE HYDROCHLORIDE 0.1 MG/1
0.1 TABLET ORAL EVERY 12 HOURS SCHEDULED
Status: DISCONTINUED | OUTPATIENT
Start: 2018-06-19 | End: 2018-06-20 | Stop reason: HOSPADM

## 2018-06-19 RX ORDER — LIDOCAINE HYDROCHLORIDE 10 MG/ML
10 INJECTION, SOLUTION INFILTRATION; PERINEURAL ONCE
Status: COMPLETED | OUTPATIENT
Start: 2018-06-19 | End: 2018-06-19

## 2018-06-19 RX ORDER — ACETAMINOPHEN 500 MG
500 TABLET ORAL EVERY 4 HOURS PRN
Status: DISCONTINUED | OUTPATIENT
Start: 2018-06-19 | End: 2018-06-20 | Stop reason: HOSPADM

## 2018-06-19 RX ORDER — ONDANSETRON 4 MG/1
4 TABLET, FILM COATED ORAL ONCE AS NEEDED
Status: DISCONTINUED | OUTPATIENT
Start: 2018-06-19 | End: 2018-06-20 | Stop reason: HOSPADM

## 2018-06-19 RX ORDER — PROMETHAZINE HYDROCHLORIDE 25 MG/1
25 TABLET ORAL ONCE AS NEEDED
Status: DISCONTINUED | OUTPATIENT
Start: 2018-06-19 | End: 2018-06-20 | Stop reason: HOSPADM

## 2018-06-19 RX ADMIN — LIDOCAINE HYDROCHLORIDE 10 ML: 10 INJECTION, SOLUTION INFILTRATION; PERINEURAL at 07:25

## 2018-06-19 RX ADMIN — IOPAMIDOL 20 ML: 408 INJECTION, SOLUTION INTRATHECAL at 07:30

## 2018-06-19 RX ADMIN — CLONIDINE HYDROCHLORIDE 0.1 MG: 0.1 TABLET ORAL at 09:09

## 2018-06-19 NOTE — DISCHARGE INSTRUCTIONS
Myelogram, Care After  These instructions give you information about caring for yourself after your procedure. Your doctor may also give you more specific instructions. Call your doctor if you have any problems or questions after your procedure.  Follow these instructions at home:  · Drink enough fluid to keep your pee (urine) clear or pale yellow.  · Rest as told by your doctor.  · Lie flat with your head slightly raised (elevated).  · Do not bend, lift, or do any hard activities for 24-48 hours or as told by your doctor.  · Take over-the-counter and prescription medicines only as told by your doctor.  · Take care of and remove your bandage (dressing) as told by your doctor.  · Bathe or shower as told by your doctor.  Contact a health care provider if:  · You have a fever.  · You have a headache that lasts longer than 24 hours.  · You feel sick to your stomach (nauseous).  · You throw up (vomit).  · Your neck is stiff.  · Your legs feel numb.  · You cannot pee.  · You cannot poop (have a bowel movement).  · You have a rash.  · You are itchy or sneezing.  Get help right away if:  · You have new symptoms or your symptoms get worse.  · You have a seizure.  · You have trouble breathing.  This information is not intended to replace advice given to you by your health care provider. Make sure you discuss any questions you have with your health care provider.  Document Released: 09/26/2009 Document Revised: 08/17/2017 Document Reviewed: 09/29/2016  [employer] Interactive Patient Education © 2018 [employer]

## 2018-06-19 NOTE — POST-PROCEDURE NOTE
MYELOGRAM PROCEDURE NOTE  Neurosurgery    Patient: Bernadette Paris  : 1970      PreOp Diagnosis: Lumbar radiculopathy    PostOp Diagnosis: Same    Procedure: Lumbar myelogram    Surgeon: Luis    Anesthesia: 1% lidocaine    Technique:   Spinal needle: 20 gauge   Contrast: Isovue 200 (20ml)    Injection site: R L3    EBL: Trace    Specimens removed: None    Complication: None        Chip Smith MD  18  7:41 AM

## 2018-06-20 ENCOUNTER — TELEPHONE (OUTPATIENT)
Dept: INFUSION THERAPY | Facility: HOSPITAL | Age: 48
End: 2018-06-20

## 2018-06-20 NOTE — TELEPHONE ENCOUNTER
I called the pt and left her a detailed VM advising her to use conservative measures after her myelogram including, ice, tyelenol, aleve and to drink plenty of fluids.

## 2018-06-20 NOTE — TELEPHONE ENCOUNTER
"Provider: Luis   Caller: self  Time of call: 227    Phone #: [phone]    Last visit: 6/5/18    Next visit: 6/22/18    JONNATHAN:         02/04/2018 Tramadol Hcl 50MG 1970 12 3 Luis ManuelDnoald [facility] 20 1  02/07/2018 Tramadol Hcl 50MG 1970 60 20 Louie Haque [facility] 15 1  02/26/2018 Oxycodone/Acetaminophen 325MG/5MG 1970 45 11 Oshikoya, [facility] [place] 31 2  03/08/2018 Tramadol Hcl 50MG 1970 50 13 Ino Cruz [facility] 19 1  03/23/2018 Tramadol Hcl 50MG 1970 50 12 Ino Cruz [facility] 21 1  03/24/2018 Guaifenesin/Codeine  10MG/5ML/100MG/5ML  1970 300 7 Flash Guzman [facility] 1  04/03/2018 Guaifenesin/Codeine  10MG/5ML/100MG/5ML  1970 118 23 Lilly Rose [facility] 1  04/04/2018 Tramadol Hcl 50MG 1970 50 13 Ino Cruz [facility] 19 1  04/19/2018 Acetaminophen/Codeine Jose Carlos  300MG/30MG  1970 50 12 Ino Cruz [facility] 19 1  05/01/2018 Tramadol Hcl 50MG 1970 50 13 Ino Cruz [facility] 19 1  05/08/2018 Acetaminophen/Codeine Jose Carlos  300MG/30MG  1970 90 30 Aravind Pratt [facility] 14 1  05/10/2018 Gabapentin 100MG 1970 120 30 Corey Bledsoe [facility] 1    Reason for call:         Pt left message requesting \"something for pain\" related to her recent myelogram. No further details.   "

## 2018-06-21 RX ORDER — NAPROXEN SODIUM 220 MG
220 TABLET ORAL
Qty: 20 TABLET | Refills: 0 | Status: SHIPPED | OUTPATIENT
Start: 2018-06-21 | End: 2018-08-08 | Stop reason: HOSPADM

## 2018-06-21 NOTE — TELEPHONE ENCOUNTER
Called patient: someone else in the house answered and said she was at a doctor's office.  I asked them to convey a message for her to call us back when she returns; I need to get more details about her pain. Is she having positional headaches? Back pain? Any weakness in her legs?

## 2018-06-21 NOTE — TELEPHONE ENCOUNTER
She has pain from her neck down to her left leg, bipedal weakness and vertigo. She states that she cannot take tylenol due to a pre-existing hepatitis C infection. Also she states that she has no source of income and cannot afford to purchase any medications OTC, she can only get Rx meds. She said that her insurance would pay for Aleve. (maybe we can prescribe her some Aleve at the maximum daily therapeutic limit, which I do not know)

## 2018-06-21 NOTE — TELEPHONE ENCOUNTER
"Pt left message at 103 today reiterating her previous issues stating that he back is causing her significant pain and she \"[needs] something for pain\". I assume she has already received the  Jessica trinh instructing her to use OTC NSAIDs. Please advise.   "

## 2018-06-21 NOTE — TELEPHONE ENCOUNTER
"The pain is in her back and neck. She does not describe headaches. she states the weakness in her legs comes and goes, but she has been up today to the store, bathroom, and doctor's office with no difficulty walking  \"II should not be up here with pain\". She said that she does not have any other medication at home and cannot take Tylenol. She was angry that she was \"stuck that many times and not given anything for pain\".   She apparently does not have access to normal OTC meds at home. A prescription for Aleve was sent to her pharmacy  "

## 2018-07-13 ENCOUNTER — OFFICE VISIT (OUTPATIENT)
Dept: NEUROSURGERY | Facility: CLINIC | Age: 48
End: 2018-07-13

## 2018-07-13 ENCOUNTER — PREP FOR SURGERY (OUTPATIENT)
Dept: OTHER | Facility: HOSPITAL | Age: 48
End: 2018-07-13

## 2018-07-13 VITALS
HEIGHT: 66 IN | SYSTOLIC BLOOD PRESSURE: 150 MMHG | TEMPERATURE: 96.7 F | BODY MASS INDEX: 42.43 KG/M2 | DIASTOLIC BLOOD PRESSURE: 100 MMHG | WEIGHT: 264 LBS

## 2018-07-13 DIAGNOSIS — M48.062 SPINAL STENOSIS, LUMBAR REGION, WITH NEUROGENIC CLAUDICATION: Primary | ICD-10-CM

## 2018-07-13 DIAGNOSIS — M47.816 FACET ARTHRITIS OF LUMBAR REGION: ICD-10-CM

## 2018-07-13 DIAGNOSIS — M48.062 LUMBAR STENOSIS WITH NEUROGENIC CLAUDICATION: Primary | ICD-10-CM

## 2018-07-13 DIAGNOSIS — M51.36 DEGENERATIVE DISC DISEASE, LUMBAR: ICD-10-CM

## 2018-07-13 DIAGNOSIS — Z71.6 ENCOUNTER FOR SMOKING CESSATION COUNSELING: ICD-10-CM

## 2018-07-13 PROCEDURE — 99406 BEHAV CHNG SMOKING 3-10 MIN: CPT | Performed by: PHYSICIAN ASSISTANT

## 2018-07-13 PROCEDURE — 99214 OFFICE O/P EST MOD 30 MIN: CPT | Performed by: PHYSICIAN ASSISTANT

## 2018-07-13 RX ORDER — OXYCODONE HCL 10 MG/1
10 TABLET, FILM COATED, EXTENDED RELEASE ORAL ONCE
Status: CANCELLED | OUTPATIENT
Start: 2018-07-13 | End: 2018-07-13

## 2018-07-13 RX ORDER — IBUPROFEN 800 MG/1
800 TABLET ORAL ONCE
Status: CANCELLED | OUTPATIENT
Start: 2018-07-13 | End: 2018-07-13

## 2018-07-13 RX ORDER — SODIUM CHLORIDE 0.9 % (FLUSH) 0.9 %
1-10 SYRINGE (ML) INJECTION AS NEEDED
Status: CANCELLED | OUTPATIENT
Start: 2018-07-13

## 2018-07-13 RX ORDER — CEFAZOLIN SODIUM 2 G/100ML
2 INJECTION, SOLUTION INTRAVENOUS ONCE
Status: CANCELLED | OUTPATIENT
Start: 2018-07-13 | End: 2018-07-13

## 2018-07-13 RX ORDER — ACETAMINOPHEN 500 MG
1000 TABLET ORAL ONCE
Status: CANCELLED | OUTPATIENT
Start: 2018-07-13 | End: 2018-07-13

## 2018-07-13 RX ORDER — FAMOTIDINE 20 MG/1
20 TABLET, FILM COATED ORAL
Status: CANCELLED | OUTPATIENT
Start: 2018-07-13

## 2018-07-13 NOTE — PROGRESS NOTES
Patient: Bernadette Paris  : 1970  Chart #: [id number]    Date of Service: 2018    CHIEF COMPLAINT: Low back and left leg pain with walking and standing intolerance    History of Present Illness Ms. Paris is a 48-year-old unemployed woman who has a history of low back and left leg pain with associated walking and standing intolerance for the past 1-2 years.  Symptoms have significantly limited her ability to walk within the last 6 months.  She can only stand for a few minutes before severe pain ensues.  Her symptoms are diminished when she sits or lies down.  Pain radiates from the low back into the left thigh and knee.  She denies right-sided symptoms.  No bowel or bladder difficulties.  She sees a pain specialist who prescribed Neurontin 400 mg 4 times a day and Tylenol #3          Past Medical History:   Diagnosis Date   • Arthritis    • Bipolar disorder (CMS/HCC)    • Chronic bronchitis (CMS/HCC)    • Depression    • Diabetes mellitus (CMS/HCC)    • History of blood transfusion    • Hyperlipidemia    • Hypertension    • Infectious viral hepatitis    • Migraine        Past Surgical History:   Procedure Laterality Date   • CHOLECYSTECTOMY     • HYSTERECTOMY     • KNEE SURGERY           Current Outpatient Prescriptions:   •  ACCU-CHEK FASTCLIX LANCETS misc, , Disp: , Rfl: 0  •  ACCU-CHEK SMARTVIEW test strip, , Disp: , Rfl: 0  •  BD PEN NEEDLE KELLI U/F 32G X 4 MM misc, , Disp: , Rfl: 1  •  busPIRone (BUSPAR) 10 MG tablet, , Disp: , Rfl: 0  •  colestipol (COLESTID) 1 g tablet, , Disp: , Rfl: 0  •  cyclobenzaprine (FLEXERIL) 5 MG tablet, Take 1 tablet by mouth 3 (three) times a day as needed for muscle spasms., Disp: 12 tablet, Rfl: 0  •  diclofenac (VOLTAREN) 75 MG EC tablet, , Disp: , Rfl: 0  •  dicyclomine (BENTYL) 10 MG capsule, take 1 capsule by mouth three times a day if needed FOR STOMACH CRAMPS, Disp: , Rfl: 0  •  estradiol (ESTRACE) 1 MG tablet, take 1 tablet by mouth once daily for 3 weeks then 1  week OFF, Disp: , Rfl: 0  •  HUMALOG MIX 75/25 KWIKPEN (75-25) 100 UNIT/ML suspension pen-injector, INJECT 30 UNITS SUBCUTANEOUSLY TWICE A DAY WITH MEALS ADMINISTER ...  (REFER TO PRESCRIPTION NOTES)., Disp: , Rfl: 0  •  hydrOXYzine (ATARAX) 10 MG tablet, Take 10 mg by mouth 3 (Three) Times a Day As Needed for Itching., Disp: , Rfl:   •  hydrOXYzine (ATARAX) 50 MG tablet, take 1 tablet by mouth at bedtime, Disp: , Rfl: 0  •  ibuprofen (ADVIL,MOTRIN) 600 MG tablet, Take 1 tablet by mouth 3 (three) times a day., Disp: 30 tablet, Rfl: 0  •  levoFLOXacin (LEVAQUIN) 500 MG tablet, take 1 tablet by mouth once daily, Disp: , Rfl: 0  •  lisinopril (PRINIVIL,ZESTRIL) 20 MG tablet, , Disp: , Rfl: 0  •  losartan (COZAAR) 100 MG tablet, take 1 tablet by mouth once daily, Disp: , Rfl: 0  •  metFORMIN (GLUCOPHAGE) 1000 MG tablet, Take 1,000 mg by mouth 2 (two) times a day with meals., Disp: , Rfl:   •  methocarbamol (ROBAXIN) 500 MG tablet, take 1 tablet by mouth BEFORE BED if needed, Disp: , Rfl: 0  •  metoclopramide (REGLAN) 10 MG tablet, Take 1 tablet by mouth 4 (Four) Times a Day Before Meals & at Bedtime., Disp: 30 tablet, Rfl: 0  •  metoprolol tartrate (LOPRESSOR) 25 MG tablet, , Disp: , Rfl: 0  •  naproxen sodium (ALEVE) 220 MG tablet, Take 1 tablet by mouth 3 (Three) Times a Day With Meals. For acute procedure-related pain, Disp: 20 tablet, Rfl: 0  •  omeprazole (PRILOSEC) 40 MG capsule, Take 1 capsule by mouth Daily., Disp: 30 capsule, Rfl: 0  •  pantoprazole (PROTONIX) 40 MG EC tablet, take 1 tablet by mouth once daily, Disp: , Rfl: 0  •  predniSONE (DELTASONE) 20 MG tablet, take 2 tablets by mouth once daily, Disp: , Rfl: 0  •  RA ALLERGY RELIEF 10 MG tablet, take 1 tablet by mouth once daily, Disp: , Rfl: 0  •  RA CLOTRIMAZOLE 7 1 % vaginal cream, APPLY 1 APPLICATION AT BEDTIME ONCE DAILY VAGINALLY, Disp: , Rfl: 0  •  traMADol (ULTRAM) 50 MG tablet, take 1 tablet by mouth every 6 hours if needed, Disp: , Rfl: 0  •   triamcinolone (KENALOG) 0.1 % cream, apply to affected area twice a day, Disp: , Rfl: 0  •  VENTOLIN  (90 Base) MCG/ACT inhaler, inhale 2 puffs by mouth every 4 to 6 hours, Disp: , Rfl: 0      Social History     Social History   • Marital status: Single     Spouse name: N/A   • Number of children: N/A   • Years of education: N/A     Occupational History   • Not on file.     Social History Main Topics   • Smoking status: Current Every Day Smoker     Packs/day: 1.00   • Smokeless tobacco: Not on file   • Alcohol use No   • Drug use: No   • Sexual activity: Defer     Other Topics Concern   • Not on file     Social History Narrative   • No narrative on file     I discussed >3m the need to STOP smoking, there is a direct correlation between smoking and wound healing and degenerative disc disease. Patient will take this under advisement and discuss with their primary provider.        Review of Systems   Constitutional: Positive for activity change and diaphoresis. Negative for appetite change, chills, fatigue, fever and unexpected weight change.   HENT: Positive for trouble swallowing. Negative for congestion, dental problem, drooling, ear discharge, ear pain, facial swelling, hearing loss, mouth sores, nosebleeds, postnasal drip, rhinorrhea, sinus pressure, sneezing, sore throat, tinnitus and voice change.    Eyes: Negative for photophobia, pain, discharge, redness, itching and visual disturbance.   Respiratory: Positive for apnea, shortness of breath and stridor. Negative for cough, choking, chest tightness and wheezing.    Cardiovascular: Negative for chest pain, palpitations and leg swelling.   Gastrointestinal: Positive for abdominal distention and abdominal pain. Negative for anal bleeding, blood in stool, constipation, diarrhea, nausea, rectal pain and vomiting.   Endocrine: Positive for heat intolerance and polydipsia. Negative for cold intolerance, polyphagia and polyuria.   Genitourinary: Positive for  "dyspareunia and flank pain. Negative for decreased urine volume, difficulty urinating, dysuria, enuresis, frequency, genital sores, hematuria and urgency.   Musculoskeletal: Positive for arthralgias, back pain, joint swelling, myalgias, neck pain and neck stiffness. Negative for gait problem.   Skin: Negative for color change, pallor, rash and wound.   Allergic/Immunologic: Negative for environmental allergies, food allergies and immunocompromised state.   Neurological: Positive for dizziness. Negative for tremors, seizures, syncope, facial asymmetry, speech difficulty, weakness, light-headedness, numbness and headaches.   Hematological: Positive for adenopathy. Does not bruise/bleed easily.   Psychiatric/Behavioral: Positive for dysphoric mood and sleep disturbance. Negative for agitation, behavioral problems, confusion, decreased concentration, hallucinations, self-injury and suicidal ideas. The patient is nervous/anxious and is hyperactive.        Objective   Vital Signs: Blood pressure 150/100, temperature 96.7 °F (35.9 °C), temperature source Temporal Artery , height 167.6 cm (65.98\"), weight 120 kg (264 lb).  Physical Exam   Constitutional: She appears well-developed and well-nourished. No distress.   Currently in a wheelchair.    HENT:   Head: Normocephalic and atraumatic.   Eyes: EOM are normal. Pupils are equal, round, and reactive to light.   Cardiovascular: Normal rate and regular rhythm.    Pulmonary/Chest: Effort normal and breath sounds normal.   Psychiatric: She has a normal mood and affect. Her behavior is normal. Thought content normal.   Nursing note and vitals reviewed.  Musculoskeletal:  Strength is intact in upper and lower extremities to direct testing.  Patient is able to ambulate. Gait is slow and slumped forward  Straight leg raising is negative.   Neurologic:  Muscle tone is normal throughout.  Coordination is intact.  Deep tendon reflexes: 1+ and symmetrical.  Sensation is intact to light " touch throughout.  Patient is oriented to person, place, and time.    Independent review of radiographic imaging: Patient has a lumbarized first sacral segment.  Counting up from this on the CT myelogram she has significant stenosis at L5-S1 and L4-5 with complete block of the contrast column on upright images    Assessment/Plan    Diagnosis:   1.  Lumbar stenosis L4-5 and L5-S1 with neurogenic claudication  2.  Epidural lipomatosis    Medical Decision Making: Ms. Paris has pain that significantly impairs her ability to walk related to lumbar stenosis.  Dr. Smith has recommended foraminotomies at L4 5 and L5-S1 to decompress her spine and diminish pain. There is no guarantee that this will take away all of her symptoms but will likely provide significant relief.  He has discussed the general nature of the procedure as well as the risks, benefits, and limitations and patient has agreed to proceed.  Risk factors affecting postoperative outcome include obesity, uncontrolled diabetes (recent reported A1C 8.9), and tobacco abuse.  She also has a history of hepatitis C.             Bernadette was seen today for back pain and leg pain.    Diagnoses and all orders for this visit:    Spinal stenosis, lumbar region, with neurogenic claudication    Degenerative disc disease, lumbar    Facet arthritis of lumbar region (CMS/formerly [facility])    Encounter for smoking cessation counseling    BMI 40.0-44.9, adult (CMS/formerly [facility])               Nery Valenzuela PA-C  Patient Care Team:  Lilly Rose MD as PCP - General (Family Medicine)

## 2018-07-13 NOTE — H&P
Patient: Bernadette Paris  : 1970  Chart #: [id number]     Date of Service: 2018     CHIEF COMPLAINT: Low back and left leg pain with walking and standing intolerance     History of Present Illness Ms. Paris is a 48-year-old unemployed woman who has a history of low back and left leg pain with associated walking and standing intolerance for the past 1-2 years.  Symptoms have significantly limited her ability to walk within the last 6 months.  She can only stand for a few minutes before severe pain ensues.  Her symptoms are diminished when she sits or lies down.  Pain radiates from the low back into the left thigh and knee.  She denies right-sided symptoms.  No bowel or bladder difficulties.  She sees a pain specialist who prescribed Neurontin 400 mg 4 times a day and Tylenol #3              Medical History        Past Medical History:   Diagnosis Date   • Arthritis     • Bipolar disorder (CMS/HCC)     • Chronic bronchitis (CMS/HCC)     • Depression     • Diabetes mellitus (CMS/HCC)     • History of blood transfusion     • Hyperlipidemia     • Hypertension     • Infectious viral hepatitis     • Migraine              Surgical History         Past Surgical History:   Procedure Laterality Date   • CHOLECYSTECTOMY       • HYSTERECTOMY       • KNEE SURGERY                   Current Outpatient Prescriptions:   •  ACCU-CHEK FASTCLIX LANCETS misc, , Disp: , Rfl: 0  •  ACCU-CHEK SMARTVIEW test strip, , Disp: , Rfl: 0  •  BD PEN NEEDLE KELLI U/F 32G X 4 MM misc, , Disp: , Rfl: 1  •  busPIRone (BUSPAR) 10 MG tablet, , Disp: , Rfl: 0  •  colestipol (COLESTID) 1 g tablet, , Disp: , Rfl: 0  •  cyclobenzaprine (FLEXERIL) 5 MG tablet, Take 1 tablet by mouth 3 (three) times a day as needed for muscle spasms., Disp: 12 tablet, Rfl: 0  •  diclofenac (VOLTAREN) 75 MG EC tablet, , Disp: , Rfl: 0  •  dicyclomine (BENTYL) 10 MG capsule, take 1 capsule by mouth three times a day if needed FOR STOMACH CRAMPS, Disp: , Rfl: 0  •   estradiol (ESTRACE) 1 MG tablet, take 1 tablet by mouth once daily for 3 weeks then 1 week OFF, Disp: , Rfl: 0  •  HUMALOG MIX 75/25 KWIKPEN (75-25) 100 UNIT/ML suspension pen-injector, INJECT 30 UNITS SUBCUTANEOUSLY TWICE A DAY WITH MEALS ADMINISTER ...  (REFER TO PRESCRIPTION NOTES)., Disp: , Rfl: 0  •  hydrOXYzine (ATARAX) 10 MG tablet, Take 10 mg by mouth 3 (Three) Times a Day As Needed for Itching., Disp: , Rfl:   •  hydrOXYzine (ATARAX) 50 MG tablet, take 1 tablet by mouth at bedtime, Disp: , Rfl: 0  •  ibuprofen (ADVIL,MOTRIN) 600 MG tablet, Take 1 tablet by mouth 3 (three) times a day., Disp: 30 tablet, Rfl: 0  •  levoFLOXacin (LEVAQUIN) 500 MG tablet, take 1 tablet by mouth once daily, Disp: , Rfl: 0  •  lisinopril (PRINIVIL,ZESTRIL) 20 MG tablet, , Disp: , Rfl: 0  •  losartan (COZAAR) 100 MG tablet, take 1 tablet by mouth once daily, Disp: , Rfl: 0  •  metFORMIN (GLUCOPHAGE) 1000 MG tablet, Take 1,000 mg by mouth 2 (two) times a day with meals., Disp: , Rfl:   •  methocarbamol (ROBAXIN) 500 MG tablet, take 1 tablet by mouth BEFORE BED if needed, Disp: , Rfl: 0  •  metoclopramide (REGLAN) 10 MG tablet, Take 1 tablet by mouth 4 (Four) Times a Day Before Meals & at Bedtime., Disp: 30 tablet, Rfl: 0  •  metoprolol tartrate (LOPRESSOR) 25 MG tablet, , Disp: , Rfl: 0  •  naproxen sodium (ALEVE) 220 MG tablet, Take 1 tablet by mouth 3 (Three) Times a Day With Meals. For acute procedure-related pain, Disp: 20 tablet, Rfl: 0  •  omeprazole (PRILOSEC) 40 MG capsule, Take 1 capsule by mouth Daily., Disp: 30 capsule, Rfl: 0  •  pantoprazole (PROTONIX) 40 MG EC tablet, take 1 tablet by mouth once daily, Disp: , Rfl: 0  •  predniSONE (DELTASONE) 20 MG tablet, take 2 tablets by mouth once daily, Disp: , Rfl: 0  •  RA ALLERGY RELIEF 10 MG tablet, take 1 tablet by mouth once daily, Disp: , Rfl: 0  •  RA CLOTRIMAZOLE 7 1 % vaginal cream, APPLY 1 APPLICATION AT BEDTIME ONCE DAILY VAGINALLY, Disp: , Rfl: 0  •  traMADol  (ULTRAM) 50 MG tablet, take 1 tablet by mouth every 6 hours if needed, Disp: , Rfl: 0  •  triamcinolone (KENALOG) 0.1 % cream, apply to affected area twice a day, Disp: , Rfl: 0  •  VENTOLIN  (90 Base) MCG/ACT inhaler, inhale 2 puffs by mouth every 4 to 6 hours, Disp: , Rfl: 0        Social History   Social History            Social History   • Marital status: Single       Spouse name: N/A   • Number of children: N/A   • Years of education: N/A          Occupational History   • Not on file.            Social History Main Topics   • Smoking status: Current Every Day Smoker       Packs/day: 1.00   • Smokeless tobacco: Not on file   • Alcohol use No   • Drug use: No   • Sexual activity: Defer           Other Topics Concern   • Not on file          Social History Narrative   • No narrative on file         I discussed >3m the need to STOP smoking, there is a direct correlation between smoking and wound healing and degenerative disc disease. Patient will take this under advisement and discuss with their primary provider.           Review of Systems   Constitutional: Positive for activity change and diaphoresis. Negative for appetite change, chills, fatigue, fever and unexpected weight change.   HENT: Positive for trouble swallowing. Negative for congestion, dental problem, drooling, ear discharge, ear pain, facial swelling, hearing loss, mouth sores, nosebleeds, postnasal drip, rhinorrhea, sinus pressure, sneezing, sore throat, tinnitus and voice change.    Eyes: Negative for photophobia, pain, discharge, redness, itching and visual disturbance.   Respiratory: Positive for apnea, shortness of breath and stridor. Negative for cough, choking, chest tightness and wheezing.    Cardiovascular: Negative for chest pain, palpitations and leg swelling.   Gastrointestinal: Positive for abdominal distention and abdominal pain. Negative for anal bleeding, blood in stool, constipation, diarrhea, nausea, rectal pain and  "vomiting.   Endocrine: Positive for heat intolerance and polydipsia. Negative for cold intolerance, polyphagia and polyuria.   Genitourinary: Positive for dyspareunia and flank pain. Negative for decreased urine volume, difficulty urinating, dysuria, enuresis, frequency, genital sores, hematuria and urgency.   Musculoskeletal: Positive for arthralgias, back pain, joint swelling, myalgias, neck pain and neck stiffness. Negative for gait problem.   Skin: Negative for color change, pallor, rash and wound.   Allergic/Immunologic: Negative for environmental allergies, food allergies and immunocompromised state.   Neurological: Positive for dizziness. Negative for tremors, seizures, syncope, facial asymmetry, speech difficulty, weakness, light-headedness, numbness and headaches.   Hematological: Positive for adenopathy. Does not bruise/bleed easily.   Psychiatric/Behavioral: Positive for dysphoric mood and sleep disturbance. Negative for agitation, behavioral problems, confusion, decreased concentration, hallucinations, self-injury and suicidal ideas. The patient is nervous/anxious and is hyperactive.             Objective      Vital Signs: Blood pressure 150/100, temperature 96.7 °F (35.9 °C), temperature source Temporal Artery , height 167.6 cm (65.98\"), weight 120 kg (264 lb).  Physical Exam   Constitutional: She appears well-developed and well-nourished. No distress.   Currently in a wheelchair.    HENT:   Head: Normocephalic and atraumatic.   Eyes: EOM are normal. Pupils are equal, round, and reactive to light.   Cardiovascular: Normal rate and regular rhythm.    Pulmonary/Chest: Effort normal and breath sounds normal.   Psychiatric: She has a normal mood and affect. Her behavior is normal. Thought content normal.   Nursing note and vitals reviewed.  Musculoskeletal:  Strength is intact in upper and lower extremities to direct testing.  Patient is able to ambulate. Gait is slow and slumped forward  Straight leg " raising is negative.   Neurologic:  Muscle tone is normal throughout.  Coordination is intact.  Deep tendon reflexes: 1+ and symmetrical.  Sensation is intact to light touch throughout.  Patient is oriented to person, place, and time.     Independent review of radiographic imaging: Patient has a lumbarized first sacral segment.  Counting up from this on the CT myelogram she has significant stenosis at L5-S1 and L4-5 with complete block of the contrast column on upright images        Assessment/Plan       Diagnosis:   1.  Lumbar stenosis L4-5 and L5-S1 with neurogenic claudication  2.  Epidural lipomatosis     Medical Decision Making: Ms. Paris has pain that significantly impairs her ability to walk related to lumbar stenosis.  Dr. Smith has recommended foraminotomies at L4-5 and L5-S1 to decompress her spine and diminish pain. There is no guarantee that this will take away all of her symptoms but will likely provide significant relief.  He has discussed the general nature of the procedure as well as the risks, benefits, and limitations and patient has agreed to proceed.  Risk factors affecting postoperative outcome include obesity, uncontrolled diabetes (recent reported A1C 8.9), and tobacco abuse.  She also has a history of hepatitis C.                            Bernadette was seen today for back pain and leg pain.     Diagnoses and all orders for this visit:     Spinal stenosis, lumbar region, with neurogenic claudication     Degenerative disc disease, lumbar     Facet arthritis of lumbar region (CMS/[facility])     Encounter for smoking cessation counseling     BMI 40.0-44.9, adult (CMS/[facility])                    Nery Valenzuela PA-C

## 2018-07-19 PROBLEM — M48.062 LUMBAR STENOSIS WITH NEUROGENIC CLAUDICATION: Status: ACTIVE | Noted: 2018-07-19

## 2018-07-23 RX ORDER — TRAMADOL HYDROCHLORIDE 50 MG/1
50 TABLET ORAL EVERY 6 HOURS PRN
Qty: 45 TABLET | Refills: 0 | OUTPATIENT
Start: 2018-07-23 | End: 2018-08-27 | Stop reason: SDUPTHER

## 2018-07-23 NOTE — TELEPHONE ENCOUNTER
Provider:  Luis  Caller: jey  Time of call:  10:00   Phone #: [phone]  Surgery: LUMBAR LAMINECTOMY WITH/WITHOUT FUSION   Surgery Date: 08/06/18    Last visit: 7/13/18     Next visit: mirian CAINPER: 07/13/2018 Hydrocodone/Acetaminophen  325MG/7.5MG  1970 30 10 MD Luis, [facility].        Reason for call:   Pt  Has requested a refill of hydrocodone to get her through until surgery

## 2018-07-23 NOTE — TELEPHONE ENCOUNTER
We would rather her take tramadol so that we can save the narcotics for surgery time. Ice to the back as well.

## 2018-07-23 NOTE — TELEPHONE ENCOUNTER
Called in Tramadol 50 mg Q6H PRN #[phone] to patient's pharmacy.     Called pt to let her know, left detailed vm explaining why we would rather she use Tramadol and also adv to use ice on her back as well.

## 2018-07-23 NOTE — TELEPHONE ENCOUNTER
Pt left message at [phone] requesting call back regarding her medication, left no additional details.

## 2018-07-24 NOTE — TELEPHONE ENCOUNTER
Patient called again this morning for a refill on her pain medication.   I called her back and told her to use the tramadol, rest, and ice for the pain.    She verbalized understanding.

## 2018-07-30 ENCOUNTER — HOSPITAL ENCOUNTER (OUTPATIENT)
Facility: HOSPITAL | Age: 48
Setting detail: SURGERY ADMIT
End: 2018-07-30
Attending: ORTHOPAEDIC SURGERY | Admitting: ORTHOPAEDIC SURGERY

## 2018-08-01 ENCOUNTER — APPOINTMENT (OUTPATIENT)
Dept: PREADMISSION TESTING | Facility: HOSPITAL | Age: 48
End: 2018-08-01

## 2018-08-01 VITALS — BODY MASS INDEX: 41.81 KG/M2 | WEIGHT: 260.14 LBS | HEIGHT: 66 IN

## 2018-08-01 DIAGNOSIS — M48.062 LUMBAR STENOSIS WITH NEUROGENIC CLAUDICATION: ICD-10-CM

## 2018-08-01 LAB
DEPRECATED RDW RBC AUTO: 50.5 FL (ref 37–54)
ERYTHROCYTE [DISTWIDTH] IN BLOOD BY AUTOMATED COUNT: 14.8 % (ref 11.3–14.5)
HBA1C MFR BLD: 8.7 % (ref 4.8–5.6)
HCT VFR BLD AUTO: 42.3 % (ref 34.5–44)
HGB BLD-MCNC: 13.7 G/DL (ref 11.5–15.5)
MCH RBC QN AUTO: 30.3 PG (ref 27–31)
MCHC RBC AUTO-ENTMCNC: 32.4 G/DL (ref 32–36)
MCV RBC AUTO: 93.6 FL (ref 80–99)
PLATELET # BLD AUTO: 334 10*3/MM3 (ref 150–450)
PMV BLD AUTO: 10.1 FL (ref 6–12)
RBC # BLD AUTO: 4.52 10*6/MM3 (ref 3.89–5.14)
WBC NRBC COR # BLD: 6.12 10*3/MM3 (ref 3.5–10.8)

## 2018-08-01 PROCEDURE — 87081 CULTURE SCREEN ONLY: CPT | Performed by: NEUROLOGICAL SURGERY

## 2018-08-01 PROCEDURE — 87147 CULTURE TYPE IMMUNOLOGIC: CPT | Performed by: NEUROLOGICAL SURGERY

## 2018-08-01 PROCEDURE — 36415 COLL VENOUS BLD VENIPUNCTURE: CPT

## 2018-08-01 PROCEDURE — 85027 COMPLETE CBC AUTOMATED: CPT | Performed by: ANESTHESIOLOGY

## 2018-08-01 PROCEDURE — 83036 HEMOGLOBIN GLYCOSYLATED A1C: CPT | Performed by: ANESTHESIOLOGY

## 2018-08-01 RX ORDER — OMEPRAZOLE 40 MG/1
40 CAPSULE, DELAYED RELEASE ORAL DAILY
COMMUNITY
End: 2018-08-08 | Stop reason: HOSPADM

## 2018-08-01 RX ORDER — SERTRALINE HYDROCHLORIDE 100 MG/1
100 TABLET, FILM COATED ORAL DAILY
Status: ON HOLD | COMMUNITY
End: 2022-05-13 | Stop reason: SDUPTHER

## 2018-08-01 RX ORDER — FUROSEMIDE 40 MG/1
40 TABLET ORAL 2 TIMES DAILY
COMMUNITY
End: 2022-05-13 | Stop reason: HOSPADM

## 2018-08-01 RX ORDER — PRAZOSIN HYDROCHLORIDE 1 MG/1
1 CAPSULE ORAL NIGHTLY
COMMUNITY
End: 2022-04-11 | Stop reason: HOSPADM

## 2018-08-01 RX ORDER — TRAZODONE HYDROCHLORIDE 50 MG/1
50 TABLET ORAL NIGHTLY
Status: ON HOLD | COMMUNITY
End: 2022-05-13 | Stop reason: SDUPTHER

## 2018-08-01 RX ORDER — CLONIDINE HYDROCHLORIDE 0.1 MG/1
0.1 TABLET ORAL EVERY 8 HOURS PRN
COMMUNITY
End: 2022-05-13 | Stop reason: HOSPADM

## 2018-08-01 RX ORDER — PANTOPRAZOLE SODIUM 40 MG/1
40 TABLET, DELAYED RELEASE ORAL DAILY
Status: ON HOLD | COMMUNITY
End: 2022-04-11 | Stop reason: SDUPTHER

## 2018-08-01 RX ORDER — ESTRADIOL 1 MG/1
1 TABLET ORAL DAILY
COMMUNITY
End: 2022-04-11 | Stop reason: HOSPADM

## 2018-08-01 RX ORDER — LOSARTAN POTASSIUM 100 MG/1
100 TABLET ORAL DAILY
COMMUNITY
End: 2022-04-11 | Stop reason: HOSPADM

## 2018-08-01 RX ORDER — HYDROXYZINE HYDROCHLORIDE 25 MG/1
25 TABLET, FILM COATED ORAL EVERY 8 HOURS PRN
Status: ON HOLD | COMMUNITY
End: 2022-05-13 | Stop reason: SDUPTHER

## 2018-08-01 NOTE — DISCHARGE INSTRUCTIONS

## 2018-08-02 DIAGNOSIS — Z22.322 MRSA (METHICILLIN RESISTANT STAPH AUREUS) CULTURE POSITIVE: Primary | ICD-10-CM

## 2018-08-02 LAB — MRSA SPEC QL CULT: ABNORMAL

## 2018-08-02 NOTE — PAT
Paged Kiley Posada from surgery called back for her. Kiley was notified of positive MRSA. No orders received.

## 2018-08-05 ENCOUNTER — ANESTHESIA EVENT (OUTPATIENT)
Dept: PERIOP | Facility: HOSPITAL | Age: 48
End: 2018-08-05

## 2018-08-05 RX ORDER — FAMOTIDINE 10 MG/ML
20 INJECTION, SOLUTION INTRAVENOUS ONCE
Status: CANCELLED | OUTPATIENT
Start: 2018-08-05 | End: 2018-08-05

## 2018-08-05 RX ORDER — FAMOTIDINE 20 MG/1
20 TABLET, FILM COATED ORAL ONCE
Status: CANCELLED | OUTPATIENT
Start: 2018-08-05 | End: 2018-08-05

## 2018-08-06 ENCOUNTER — ANESTHESIA (OUTPATIENT)
Dept: PERIOP | Facility: HOSPITAL | Age: 48
End: 2018-08-06

## 2018-08-06 ENCOUNTER — HOSPITAL ENCOUNTER (OUTPATIENT)
Facility: HOSPITAL | Age: 48
Discharge: HOME | End: 2018-08-08
Attending: NEUROLOGICAL SURGERY | Admitting: NEUROLOGICAL SURGERY

## 2018-08-06 ENCOUNTER — APPOINTMENT (OUTPATIENT)
Dept: GENERAL RADIOLOGY | Facility: HOSPITAL | Age: 48
End: 2018-08-06

## 2018-08-06 DIAGNOSIS — M48.062 LUMBAR STENOSIS WITH NEUROGENIC CLAUDICATION: ICD-10-CM

## 2018-08-06 DIAGNOSIS — Z74.09 IMPAIRED FUNCTIONAL MOBILITY, BALANCE, GAIT, AND ENDURANCE: ICD-10-CM

## 2018-08-06 DIAGNOSIS — Z22.322 MRSA (METHICILLIN RESISTANT STAPH AUREUS) CULTURE POSITIVE: ICD-10-CM

## 2018-08-06 LAB
GLUCOSE BLDC GLUCOMTR-MCNC: 143 MG/DL (ref 70–130)
GLUCOSE BLDC GLUCOMTR-MCNC: 197 MG/DL (ref 70–130)
GLUCOSE BLDC GLUCOMTR-MCNC: 243 MG/DL (ref 70–130)
GLUCOSE BLDC GLUCOMTR-MCNC: 99 MG/DL (ref 70–130)
POTASSIUM BLDA-SCNC: 3.94 MMOL/L (ref 3.5–5.3)

## 2018-08-06 PROCEDURE — 25010000002 PROMETHAZINE PER 50 MG: Performed by: NURSE ANESTHETIST, CERTIFIED REGISTERED

## 2018-08-06 PROCEDURE — 76001 HC FLUORO GREATER THAN 1 HOUR: CPT

## 2018-08-06 PROCEDURE — 63710000001 INSULIN REGULAR HUMAN PER 5 UNITS: Performed by: NEUROLOGICAL SURGERY

## 2018-08-06 PROCEDURE — 76000 FLUOROSCOPY <1 HR PHYS/QHP: CPT

## 2018-08-06 PROCEDURE — 63048 LAM FACETEC &FORAMOT EA ADDL: CPT | Performed by: NEUROLOGICAL SURGERY

## 2018-08-06 PROCEDURE — 63047 LAM FACETEC & FORAMOT LUMBAR: CPT | Performed by: NEUROLOGICAL SURGERY

## 2018-08-06 PROCEDURE — 82962 GLUCOSE BLOOD TEST: CPT

## 2018-08-06 PROCEDURE — 25010000002 FENTANYL CITRATE (PF) 100 MCG/2ML SOLUTION: Performed by: NURSE ANESTHETIST, CERTIFIED REGISTERED

## 2018-08-06 PROCEDURE — 84132 ASSAY OF SERUM POTASSIUM: CPT | Performed by: ANESTHESIOLOGY

## 2018-08-06 PROCEDURE — 63710000001 INSULIN LISPRO (HUMAN) PER 5 UNITS: Performed by: NEUROLOGICAL SURGERY

## 2018-08-06 PROCEDURE — 25010000002 VANCOMYCIN: Performed by: NEUROLOGICAL SURGERY

## 2018-08-06 PROCEDURE — 25010000002 VANCOMYCIN: Performed by: PHYSICIAN ASSISTANT

## 2018-08-06 PROCEDURE — 25010000002 PROPOFOL 10 MG/ML EMULSION: Performed by: NURSE ANESTHETIST, CERTIFIED REGISTERED

## 2018-08-06 DEVICE — WAX BONE HEMO AESCULAP 2.5GM: Type: IMPLANTABLE DEVICE | Site: SPINE LUMBAR | Status: FUNCTIONAL

## 2018-08-06 RX ORDER — HYDROMORPHONE HYDROCHLORIDE 1 MG/ML
0.5 INJECTION, SOLUTION INTRAMUSCULAR; INTRAVENOUS; SUBCUTANEOUS
Status: DISCONTINUED | OUTPATIENT
Start: 2018-08-06 | End: 2018-08-06 | Stop reason: HOSPADM

## 2018-08-06 RX ORDER — PROPOFOL 10 MG/ML
VIAL (ML) INTRAVENOUS AS NEEDED
Status: DISCONTINUED | OUTPATIENT
Start: 2018-08-06 | End: 2018-08-06 | Stop reason: SURG

## 2018-08-06 RX ORDER — FENTANYL CITRATE 50 UG/ML
50 INJECTION, SOLUTION INTRAMUSCULAR; INTRAVENOUS
Status: DISCONTINUED | OUTPATIENT
Start: 2018-08-06 | End: 2018-08-06 | Stop reason: HOSPADM

## 2018-08-06 RX ORDER — OXYCODONE HCL 10 MG/1
10 TABLET, FILM COATED, EXTENDED RELEASE ORAL ONCE
Status: COMPLETED | OUTPATIENT
Start: 2018-08-06 | End: 2018-08-06

## 2018-08-06 RX ORDER — MEPERIDINE HYDROCHLORIDE 25 MG/ML
12.5 INJECTION INTRAMUSCULAR; INTRAVENOUS; SUBCUTANEOUS
Status: DISCONTINUED | OUTPATIENT
Start: 2018-08-06 | End: 2018-08-06 | Stop reason: HOSPADM

## 2018-08-06 RX ORDER — NALOXONE HCL 0.4 MG/ML
0.4 VIAL (ML) INJECTION
Status: DISCONTINUED | OUTPATIENT
Start: 2018-08-06 | End: 2018-08-08 | Stop reason: HOSPADM

## 2018-08-06 RX ORDER — BUPIVACAINE HYDROCHLORIDE AND EPINEPHRINE 2.5; 5 MG/ML; UG/ML
INJECTION, SOLUTION EPIDURAL; INFILTRATION; INTRACAUDAL; PERINEURAL AS NEEDED
Status: DISCONTINUED | OUTPATIENT
Start: 2018-08-06 | End: 2018-08-06 | Stop reason: HOSPADM

## 2018-08-06 RX ORDER — SERTRALINE HYDROCHLORIDE 100 MG/1
100 TABLET, FILM COATED ORAL DAILY
Status: DISCONTINUED | OUTPATIENT
Start: 2018-08-07 | End: 2018-08-08 | Stop reason: HOSPADM

## 2018-08-06 RX ORDER — SODIUM CHLORIDE 0.9 % (FLUSH) 0.9 %
1-10 SYRINGE (ML) INJECTION AS NEEDED
Status: DISCONTINUED | OUTPATIENT
Start: 2018-08-06 | End: 2018-08-06 | Stop reason: HOSPADM

## 2018-08-06 RX ORDER — LABETALOL HYDROCHLORIDE 5 MG/ML
5 INJECTION, SOLUTION INTRAVENOUS
Status: DISCONTINUED | OUTPATIENT
Start: 2018-08-06 | End: 2018-08-06 | Stop reason: HOSPADM

## 2018-08-06 RX ORDER — IBUPROFEN 600 MG/1
600 TABLET ORAL EVERY 8 HOURS
Status: DISCONTINUED | OUTPATIENT
Start: 2018-08-06 | End: 2018-08-06 | Stop reason: SDUPTHER

## 2018-08-06 RX ORDER — SODIUM CHLORIDE 0.9 % (FLUSH) 0.9 %
1-10 SYRINGE (ML) INJECTION AS NEEDED
Status: DISCONTINUED | OUTPATIENT
Start: 2018-08-06 | End: 2018-08-06

## 2018-08-06 RX ORDER — ALBUTEROL SULFATE 90 UG/1
2 AEROSOL, METERED RESPIRATORY (INHALATION) EVERY 4 HOURS PRN
Status: DISCONTINUED | OUTPATIENT
Start: 2018-08-06 | End: 2018-08-08 | Stop reason: HOSPADM

## 2018-08-06 RX ORDER — TERAZOSIN 1 MG/1
1 CAPSULE ORAL NIGHTLY
Status: DISCONTINUED | OUTPATIENT
Start: 2018-08-06 | End: 2018-08-08 | Stop reason: HOSPADM

## 2018-08-06 RX ORDER — HYDROXYZINE HYDROCHLORIDE 10 MG/1
10 TABLET, FILM COATED ORAL 3 TIMES DAILY PRN
Status: DISCONTINUED | OUTPATIENT
Start: 2018-08-06 | End: 2018-08-08 | Stop reason: HOSPADM

## 2018-08-06 RX ORDER — FENTANYL CITRATE 50 UG/ML
INJECTION, SOLUTION INTRAMUSCULAR; INTRAVENOUS AS NEEDED
Status: DISCONTINUED | OUTPATIENT
Start: 2018-08-06 | End: 2018-08-06 | Stop reason: SURG

## 2018-08-06 RX ORDER — HYDROCODONE BITARTRATE AND ACETAMINOPHEN 7.5; 325 MG/1; MG/1
1 TABLET ORAL EVERY 4 HOURS PRN
Status: DISCONTINUED | OUTPATIENT
Start: 2018-08-06 | End: 2018-08-08 | Stop reason: HOSPADM

## 2018-08-06 RX ORDER — PROMETHAZINE HYDROCHLORIDE 25 MG/1
25 SUPPOSITORY RECTAL ONCE AS NEEDED
Status: COMPLETED | OUTPATIENT
Start: 2018-08-06 | End: 2018-08-06

## 2018-08-06 RX ORDER — ONDANSETRON 2 MG/ML
4 INJECTION INTRAMUSCULAR; INTRAVENOUS EVERY 6 HOURS PRN
Status: DISCONTINUED | OUTPATIENT
Start: 2018-08-06 | End: 2018-08-08 | Stop reason: HOSPADM

## 2018-08-06 RX ORDER — LOSARTAN POTASSIUM 50 MG/1
100 TABLET ORAL DAILY
Status: DISCONTINUED | OUTPATIENT
Start: 2018-08-06 | End: 2018-08-08 | Stop reason: HOSPADM

## 2018-08-06 RX ORDER — CYCLOBENZAPRINE HCL 10 MG
5 TABLET ORAL 3 TIMES DAILY PRN
Status: DISCONTINUED | OUTPATIENT
Start: 2018-08-06 | End: 2018-08-08 | Stop reason: HOSPADM

## 2018-08-06 RX ORDER — LISINOPRIL 20 MG/1
20 TABLET ORAL DAILY
Status: DISCONTINUED | OUTPATIENT
Start: 2018-08-07 | End: 2018-08-08 | Stop reason: HOSPADM

## 2018-08-06 RX ORDER — HYDROCODONE BITARTRATE AND ACETAMINOPHEN 5; 325 MG/1; MG/1
1 TABLET ORAL ONCE AS NEEDED
Status: DISCONTINUED | OUTPATIENT
Start: 2018-08-06 | End: 2018-08-06 | Stop reason: HOSPADM

## 2018-08-06 RX ORDER — FUROSEMIDE 40 MG/1
40 TABLET ORAL DAILY
Status: DISCONTINUED | OUTPATIENT
Start: 2018-08-07 | End: 2018-08-08 | Stop reason: HOSPADM

## 2018-08-06 RX ORDER — MAGNESIUM HYDROXIDE 1200 MG/15ML
LIQUID ORAL AS NEEDED
Status: DISCONTINUED | OUTPATIENT
Start: 2018-08-06 | End: 2018-08-06 | Stop reason: HOSPADM

## 2018-08-06 RX ORDER — HYDROCODONE BITARTRATE AND ACETAMINOPHEN 7.5; 325 MG/1; MG/1
1 TABLET ORAL ONCE AS NEEDED
Status: DISCONTINUED | OUTPATIENT
Start: 2018-08-06 | End: 2018-08-06 | Stop reason: HOSPADM

## 2018-08-06 RX ORDER — PROMETHAZINE HYDROCHLORIDE 12.5 MG/1
12.5 TABLET ORAL EVERY 6 HOURS PRN
Status: DISCONTINUED | OUTPATIENT
Start: 2018-08-06 | End: 2018-08-08 | Stop reason: HOSPADM

## 2018-08-06 RX ORDER — PROMETHAZINE HYDROCHLORIDE 25 MG/ML
6.25 INJECTION, SOLUTION INTRAMUSCULAR; INTRAVENOUS ONCE AS NEEDED
Status: COMPLETED | OUTPATIENT
Start: 2018-08-06 | End: 2018-08-06

## 2018-08-06 RX ORDER — SENNA AND DOCUSATE SODIUM 50; 8.6 MG/1; MG/1
1 TABLET, FILM COATED ORAL NIGHTLY PRN
Status: DISCONTINUED | OUTPATIENT
Start: 2018-08-06 | End: 2018-08-08 | Stop reason: HOSPADM

## 2018-08-06 RX ORDER — ONDANSETRON 2 MG/ML
4 INJECTION INTRAMUSCULAR; INTRAVENOUS ONCE AS NEEDED
Status: DISCONTINUED | OUTPATIENT
Start: 2018-08-06 | End: 2018-08-06 | Stop reason: HOSPADM

## 2018-08-06 RX ORDER — BISACODYL 5 MG/1
5 TABLET, DELAYED RELEASE ORAL DAILY PRN
Status: DISCONTINUED | OUTPATIENT
Start: 2018-08-06 | End: 2018-08-08 | Stop reason: HOSPADM

## 2018-08-06 RX ORDER — DIPHENHYDRAMINE HCL 25 MG
25 CAPSULE ORAL NIGHTLY PRN
Status: DISCONTINUED | OUTPATIENT
Start: 2018-08-06 | End: 2018-08-08 | Stop reason: HOSPADM

## 2018-08-06 RX ORDER — ACETAMINOPHEN 500 MG
1000 TABLET ORAL ONCE
Status: COMPLETED | OUTPATIENT
Start: 2018-08-06 | End: 2018-08-06

## 2018-08-06 RX ORDER — BISACODYL 10 MG
10 SUPPOSITORY, RECTAL RECTAL DAILY PRN
Status: DISCONTINUED | OUTPATIENT
Start: 2018-08-06 | End: 2018-08-08 | Stop reason: HOSPADM

## 2018-08-06 RX ORDER — TRAZODONE HYDROCHLORIDE 50 MG/1
50 TABLET ORAL NIGHTLY
Status: DISCONTINUED | OUTPATIENT
Start: 2018-08-06 | End: 2018-08-08 | Stop reason: HOSPADM

## 2018-08-06 RX ORDER — DOCUSATE SODIUM 100 MG/1
100 CAPSULE, LIQUID FILLED ORAL 2 TIMES DAILY PRN
Status: DISCONTINUED | OUTPATIENT
Start: 2018-08-06 | End: 2018-08-08 | Stop reason: HOSPADM

## 2018-08-06 RX ORDER — SODIUM CHLORIDE, SODIUM LACTATE, POTASSIUM CHLORIDE, CALCIUM CHLORIDE 600; 310; 30; 20 MG/100ML; MG/100ML; MG/100ML; MG/100ML
90 INJECTION, SOLUTION INTRAVENOUS CONTINUOUS
Status: DISCONTINUED | OUTPATIENT
Start: 2018-08-06 | End: 2018-08-07

## 2018-08-06 RX ORDER — LIDOCAINE HYDROCHLORIDE 10 MG/ML
INJECTION, SOLUTION EPIDURAL; INFILTRATION; INTRACAUDAL; PERINEURAL AS NEEDED
Status: DISCONTINUED | OUTPATIENT
Start: 2018-08-06 | End: 2018-08-06 | Stop reason: SURG

## 2018-08-06 RX ORDER — SODIUM CHLORIDE 9 MG/ML
INJECTION, SOLUTION INTRAVENOUS AS NEEDED
Status: DISCONTINUED | OUTPATIENT
Start: 2018-08-06 | End: 2018-08-06 | Stop reason: HOSPADM

## 2018-08-06 RX ORDER — ONDANSETRON 4 MG/1
4 TABLET, FILM COATED ORAL EVERY 6 HOURS PRN
Status: DISCONTINUED | OUTPATIENT
Start: 2018-08-06 | End: 2018-08-08 | Stop reason: HOSPADM

## 2018-08-06 RX ORDER — NICOTINE 21 MG/24HR
1 PATCH, TRANSDERMAL 24 HOURS TRANSDERMAL
Status: DISCONTINUED | OUTPATIENT
Start: 2018-08-06 | End: 2018-08-08 | Stop reason: HOSPADM

## 2018-08-06 RX ORDER — METOCLOPRAMIDE 10 MG/1
10 TABLET ORAL
Status: DISCONTINUED | OUTPATIENT
Start: 2018-08-06 | End: 2018-08-08 | Stop reason: HOSPADM

## 2018-08-06 RX ORDER — ACETAMINOPHEN 325 MG/1
650 TABLET ORAL EVERY 4 HOURS PRN
Status: DISCONTINUED | OUTPATIENT
Start: 2018-08-06 | End: 2018-08-08 | Stop reason: HOSPADM

## 2018-08-06 RX ORDER — PROMETHAZINE HYDROCHLORIDE 25 MG/1
25 TABLET ORAL ONCE AS NEEDED
Status: COMPLETED | OUTPATIENT
Start: 2018-08-06 | End: 2018-08-06

## 2018-08-06 RX ORDER — DEXTROSE MONOHYDRATE 25 G/50ML
25 INJECTION, SOLUTION INTRAVENOUS
Status: DISCONTINUED | OUTPATIENT
Start: 2018-08-06 | End: 2018-08-08 | Stop reason: HOSPADM

## 2018-08-06 RX ORDER — SODIUM CHLORIDE 0.9 % (FLUSH) 0.9 %
1-10 SYRINGE (ML) INJECTION AS NEEDED
Status: DISCONTINUED | OUTPATIENT
Start: 2018-08-06 | End: 2018-08-08 | Stop reason: HOSPADM

## 2018-08-06 RX ORDER — FAMOTIDINE 20 MG/1
20 TABLET, FILM COATED ORAL
Status: COMPLETED | OUTPATIENT
Start: 2018-08-06 | End: 2018-08-06

## 2018-08-06 RX ORDER — SODIUM CHLORIDE, SODIUM LACTATE, POTASSIUM CHLORIDE, CALCIUM CHLORIDE 600; 310; 30; 20 MG/100ML; MG/100ML; MG/100ML; MG/100ML
9 INJECTION, SOLUTION INTRAVENOUS CONTINUOUS
Status: DISCONTINUED | OUTPATIENT
Start: 2018-08-06 | End: 2018-08-08 | Stop reason: HOSPADM

## 2018-08-06 RX ORDER — MORPHINE SULFATE 4 MG/ML
2 INJECTION, SOLUTION INTRAMUSCULAR; INTRAVENOUS EVERY 4 HOURS PRN
Status: DISCONTINUED | OUTPATIENT
Start: 2018-08-06 | End: 2018-08-08 | Stop reason: HOSPADM

## 2018-08-06 RX ORDER — NALOXONE HCL 0.4 MG/ML
0.4 VIAL (ML) INJECTION AS NEEDED
Status: DISCONTINUED | OUTPATIENT
Start: 2018-08-06 | End: 2018-08-06 | Stop reason: HOSPADM

## 2018-08-06 RX ORDER — IBUPROFEN 800 MG/1
800 TABLET ORAL ONCE
Status: COMPLETED | OUTPATIENT
Start: 2018-08-06 | End: 2018-08-06

## 2018-08-06 RX ORDER — LIDOCAINE HYDROCHLORIDE 10 MG/ML
0.5 INJECTION, SOLUTION EPIDURAL; INFILTRATION; INTRACAUDAL; PERINEURAL ONCE AS NEEDED
Status: COMPLETED | OUTPATIENT
Start: 2018-08-06 | End: 2018-08-06

## 2018-08-06 RX ORDER — HYDRALAZINE HYDROCHLORIDE 20 MG/ML
5 INJECTION INTRAMUSCULAR; INTRAVENOUS
Status: DISCONTINUED | OUTPATIENT
Start: 2018-08-06 | End: 2018-08-06 | Stop reason: HOSPADM

## 2018-08-06 RX ORDER — NICOTINE POLACRILEX 4 MG
15 LOZENGE BUCCAL
Status: DISCONTINUED | OUTPATIENT
Start: 2018-08-06 | End: 2018-08-08 | Stop reason: HOSPADM

## 2018-08-06 RX ORDER — IBUPROFEN 600 MG/1
600 TABLET ORAL 3 TIMES DAILY
Status: DISCONTINUED | OUTPATIENT
Start: 2018-08-06 | End: 2018-08-08 | Stop reason: HOSPADM

## 2018-08-06 RX ORDER — ATRACURIUM BESYLATE 10 MG/ML
INJECTION, SOLUTION INTRAVENOUS AS NEEDED
Status: DISCONTINUED | OUTPATIENT
Start: 2018-08-06 | End: 2018-08-06 | Stop reason: SURG

## 2018-08-06 RX ORDER — MORPHINE SULFATE 4 MG/ML
4 INJECTION, SOLUTION INTRAMUSCULAR; INTRAVENOUS EVERY 4 HOURS PRN
Status: DISCONTINUED | OUTPATIENT
Start: 2018-08-06 | End: 2018-08-08 | Stop reason: HOSPADM

## 2018-08-06 RX ORDER — PROMETHAZINE HYDROCHLORIDE 25 MG/ML
12.5 INJECTION, SOLUTION INTRAMUSCULAR; INTRAVENOUS EVERY 6 HOURS PRN
Status: DISCONTINUED | OUTPATIENT
Start: 2018-08-06 | End: 2018-08-08 | Stop reason: HOSPADM

## 2018-08-06 RX ORDER — PANTOPRAZOLE SODIUM 40 MG/1
40 TABLET, DELAYED RELEASE ORAL EVERY MORNING
Status: DISCONTINUED | OUTPATIENT
Start: 2018-08-07 | End: 2018-08-08 | Stop reason: HOSPADM

## 2018-08-06 RX ORDER — IPRATROPIUM BROMIDE AND ALBUTEROL SULFATE 2.5; .5 MG/3ML; MG/3ML
3 SOLUTION RESPIRATORY (INHALATION) ONCE AS NEEDED
Status: DISCONTINUED | OUTPATIENT
Start: 2018-08-06 | End: 2018-08-06 | Stop reason: HOSPADM

## 2018-08-06 RX ORDER — PROMETHAZINE HYDROCHLORIDE 12.5 MG/1
12.5 SUPPOSITORY RECTAL EVERY 6 HOURS PRN
Status: DISCONTINUED | OUTPATIENT
Start: 2018-08-06 | End: 2018-08-08 | Stop reason: HOSPADM

## 2018-08-06 RX ORDER — TRAMADOL HYDROCHLORIDE 50 MG/1
50 TABLET ORAL EVERY 6 HOURS PRN
Status: DISCONTINUED | OUTPATIENT
Start: 2018-08-06 | End: 2018-08-08 | Stop reason: HOSPADM

## 2018-08-06 RX ORDER — PANTOPRAZOLE SODIUM 40 MG/1
40 TABLET, DELAYED RELEASE ORAL EVERY MORNING
Status: DISCONTINUED | OUTPATIENT
Start: 2018-08-07 | End: 2018-08-06 | Stop reason: SDUPTHER

## 2018-08-06 RX ORDER — CLONIDINE HYDROCHLORIDE 0.1 MG/1
0.1 TABLET ORAL EVERY 8 HOURS PRN
Status: DISCONTINUED | OUTPATIENT
Start: 2018-08-06 | End: 2018-08-08 | Stop reason: HOSPADM

## 2018-08-06 RX ORDER — BUSPIRONE HYDROCHLORIDE 10 MG/1
10 TABLET ORAL 2 TIMES DAILY
Status: DISCONTINUED | OUTPATIENT
Start: 2018-08-06 | End: 2018-08-08 | Stop reason: HOSPADM

## 2018-08-06 RX ADMIN — DOCUSATE SODIUM 100 MG: 100 CAPSULE, LIQUID FILLED ORAL at 21:11

## 2018-08-06 RX ADMIN — INSULIN HUMAN 2 UNITS: 100 INJECTION, SOLUTION PARENTERAL at 17:01

## 2018-08-06 RX ADMIN — FAMOTIDINE 20 MG: 20 TABLET ORAL at 10:15

## 2018-08-06 RX ADMIN — HYDROCODONE BITARTRATE AND ACETAMINOPHEN 1 TABLET: 7.5; 325 TABLET ORAL at 21:11

## 2018-08-06 RX ADMIN — EPHEDRINE SULFATE 5 MG: 50 INJECTION INTRAMUSCULAR; INTRAVENOUS; SUBCUTANEOUS at 12:35

## 2018-08-06 RX ADMIN — NICOTINE 1 PATCH: 21 PATCH, EXTENDED RELEASE TRANSDERMAL at 21:11

## 2018-08-06 RX ADMIN — SODIUM CHLORIDE, POTASSIUM CHLORIDE, SODIUM LACTATE AND CALCIUM CHLORIDE: 600; 310; 30; 20 INJECTION, SOLUTION INTRAVENOUS at 12:45

## 2018-08-06 RX ADMIN — EPHEDRINE SULFATE 5 MG: 50 INJECTION INTRAMUSCULAR; INTRAVENOUS; SUBCUTANEOUS at 12:39

## 2018-08-06 RX ADMIN — EPHEDRINE SULFATE 7.5 MG: 50 INJECTION INTRAMUSCULAR; INTRAVENOUS; SUBCUTANEOUS at 13:07

## 2018-08-06 RX ADMIN — TRAZODONE HYDROCHLORIDE 50 MG: 50 TABLET ORAL at 21:10

## 2018-08-06 RX ADMIN — SODIUM CHLORIDE, POTASSIUM CHLORIDE, SODIUM LACTATE AND CALCIUM CHLORIDE 90 ML/HR: 600; 310; 30; 20 INJECTION, SOLUTION INTRAVENOUS at 14:13

## 2018-08-06 RX ADMIN — PROMETHAZINE HYDROCHLORIDE 6.25 MG: 25 INJECTION INTRAMUSCULAR; INTRAVENOUS at 13:53

## 2018-08-06 RX ADMIN — METOPROLOL TARTRATE 25 MG: 25 TABLET ORAL at 21:11

## 2018-08-06 RX ADMIN — SODIUM CHLORIDE, POTASSIUM CHLORIDE, SODIUM LACTATE AND CALCIUM CHLORIDE 9 ML/HR: 600; 310; 30; 20 INJECTION, SOLUTION INTRAVENOUS at 10:15

## 2018-08-06 RX ADMIN — METFORMIN HYDROCHLORIDE 1000 MG: 1000 TABLET, FILM COATED ORAL at 17:02

## 2018-08-06 RX ADMIN — CYCLOBENZAPRINE HYDROCHLORIDE 5 MG: 10 TABLET, FILM COATED ORAL at 21:11

## 2018-08-06 RX ADMIN — HYDROCODONE BITARTRATE AND ACETAMINOPHEN 1 TABLET: 7.5; 325 TABLET ORAL at 16:37

## 2018-08-06 RX ADMIN — IBUPROFEN 600 MG: 600 TABLET ORAL at 16:37

## 2018-08-06 RX ADMIN — SENNOSIDES AND DOCUSATE SODIUM 1 TABLET: 8.6; 5 TABLET ORAL at 21:11

## 2018-08-06 RX ADMIN — METOCLOPRAMIDE HYDROCHLORIDE 10 MG: 10 TABLET ORAL at 17:08

## 2018-08-06 RX ADMIN — LIDOCAINE HYDROCHLORIDE 50 MG: 10 INJECTION, SOLUTION EPIDURAL; INFILTRATION; INTRACAUDAL; PERINEURAL at 11:49

## 2018-08-06 RX ADMIN — ATRACURIUM BESYLATE 50 MG: 10 INJECTION, SOLUTION INTRAVENOUS at 11:49

## 2018-08-06 RX ADMIN — VANCOMYCIN HYDROCHLORIDE 1750 MG: 1 INJECTION, POWDER, LYOPHILIZED, FOR SOLUTION INTRAVENOUS at 11:18

## 2018-08-06 RX ADMIN — FENTANYL CITRATE 50 MCG: 50 INJECTION, SOLUTION INTRAMUSCULAR; INTRAVENOUS at 11:49

## 2018-08-06 RX ADMIN — IBUPROFEN 600 MG: 600 TABLET ORAL at 21:10

## 2018-08-06 RX ADMIN — FENTANYL CITRATE 50 MCG: 50 INJECTION, SOLUTION INTRAMUSCULAR; INTRAVENOUS at 13:38

## 2018-08-06 RX ADMIN — OXYCODONE HYDROCHLORIDE 10 MG: 10 TABLET, FILM COATED, EXTENDED RELEASE ORAL at 10:15

## 2018-08-06 RX ADMIN — TERAZOSIN HYDROCHLORIDE 1 MG: 1 CAPSULE ORAL at 22:40

## 2018-08-06 RX ADMIN — PROPOFOL 75 MG: 10 INJECTION, EMULSION INTRAVENOUS at 13:05

## 2018-08-06 RX ADMIN — METOCLOPRAMIDE HYDROCHLORIDE 10 MG: 10 TABLET ORAL at 21:10

## 2018-08-06 RX ADMIN — IBUPROFEN 800 MG: 800 TABLET ORAL at 10:15

## 2018-08-06 RX ADMIN — ACETAMINOPHEN 1000 MG: 500 TABLET, FILM COATED ORAL at 10:15

## 2018-08-06 RX ADMIN — LIDOCAINE HYDROCHLORIDE 0.3 ML: 10 INJECTION, SOLUTION EPIDURAL; INFILTRATION; INTRACAUDAL; PERINEURAL at 10:15

## 2018-08-06 RX ADMIN — BUSPIRONE HYDROCHLORIDE 10 MG: 10 TABLET ORAL at 21:10

## 2018-08-06 RX ADMIN — VANCOMYCIN HYDROCHLORIDE 1750 MG: 10 INJECTION, POWDER, LYOPHILIZED, FOR SOLUTION INTRAVENOUS at 23:52

## 2018-08-06 RX ADMIN — INSULIN LISPRO 30 UNITS: 100 INJECTION, SOLUTION INTRAVENOUS; SUBCUTANEOUS at 21:11

## 2018-08-06 RX ADMIN — PROPOFOL 200 MG: 10 INJECTION, EMULSION INTRAVENOUS at 11:49

## 2018-08-06 NOTE — ANESTHESIA PREPROCEDURE EVALUATION
Anesthesia Evaluation     Patient summary reviewed and Nursing notes reviewed   NPO Solid Status: > 8 hours  NPO Liquid Status: > 8 hours           Airway   Mallampati: II  TM distance: >3 FB  Neck ROM: full  No difficulty expected  Dental - normal exam     Pulmonary    (+) COPD (Chronic bronchitis), sleep apnea on CPAP,   Cardiovascular     ECG reviewed    (+) hypertension,       Neuro/Psych  (+) psychiatric history Bipolar,     GI/Hepatic/Renal/Endo    (+)   hepatitis C, diabetes mellitus type 2 poorly controlled,     Musculoskeletal     Abdominal    Substance History      OB/GYN    (-)  Pregnant        Other                      Anesthesia Plan    ASA 2     general     intravenous induction   Anesthetic plan and risks discussed with patient.    Plan discussed with CRNA.

## 2018-08-06 NOTE — ANESTHESIA PROCEDURE NOTES
Airway  Urgency: elective    Date/Time: 8/6/2018 11:51 AM  Airway not difficult    General Information and Staff    Patient location during procedure: OR  CRNA: RUBINA BROWNING    Indications and Patient Condition  Indications for airway management: airway protection    Preoxygenated: yes  MILS not maintained throughout  Mask difficulty assessment: 1 - vent by mask    Final Airway Details  Final airway type: endotracheal airway      Successful airway: ETT  Cuffed: yes   Successful intubation technique: direct laryngoscopy  Endotracheal tube insertion site: oral  Blade: Augusto  Blade size: #3  ETT size: 7.0 mm  Cormack-Lehane Classification: grade I - full view of glottis  Placement verified by: chest auscultation and capnometry   Measured from: lips  ETT to lips (cm): 20  Number of attempts at approach: 1    Additional Comments  Negative epigastric sounds, Breath sound equal bilaterally with symmetric chest rise and fall

## 2018-08-06 NOTE — ANESTHESIA POSTPROCEDURE EVALUATION
Patient: Bernadette Paris    Procedure Summary     Date:  08/06/18 Room / [facility]:   [facility]    Anesthesia Start:  1146 Anesthesia Stop:  1341    Procedure:  LUMBAR LAMINECTOMIES L4-S1 (N/A Spine Lumbar) Diagnosis:       Lumbar stenosis with neurogenic claudication      (Lumbar stenosis with neurogenic claudication [M48.062])    Surgeon:  Chip Smith MD Provider:  Rahul Looney MD    Anesthesia Type:  general ASA Status:  2          Anesthesia Type: general  Last vitals  BP   147/93 (08/06/18 1006)   Temp   98 °F (36.7 °C) (08/06/18 1006)   Pulse   76 (08/06/18 1006)   Resp   20 (08/06/18 1006)     SpO2   99 % (08/06/18 1006)     Post Anesthesia Care and Evaluation    Patient location during evaluation: PACU  Patient participation: complete - patient participated  Level of consciousness: awake and alert and responsive to painful stimuli  Pain management: adequate  Airway patency: patent  Anesthetic complications: No anesthetic complications  PONV Status: none  Cardiovascular status: hemodynamically stable and acceptable  Respiratory status: nonlabored ventilation, acceptable and nasal cannula  Hydration status: acceptable

## 2018-08-07 LAB
GLUCOSE BLDC GLUCOMTR-MCNC: 160 MG/DL (ref 70–130)
GLUCOSE BLDC GLUCOMTR-MCNC: 205 MG/DL (ref 70–130)
GLUCOSE BLDC GLUCOMTR-MCNC: 212 MG/DL (ref 70–130)
GLUCOSE BLDC GLUCOMTR-MCNC: 227 MG/DL (ref 70–130)
GLUCOSE BLDC GLUCOMTR-MCNC: 268 MG/DL (ref 70–130)

## 2018-08-07 PROCEDURE — 82962 GLUCOSE BLOOD TEST: CPT

## 2018-08-07 PROCEDURE — 97161 PT EVAL LOW COMPLEX 20 MIN: CPT

## 2018-08-07 PROCEDURE — G8978 MOBILITY CURRENT STATUS: HCPCS

## 2018-08-07 PROCEDURE — G8979 MOBILITY GOAL STATUS: HCPCS

## 2018-08-07 PROCEDURE — 97116 GAIT TRAINING THERAPY: CPT

## 2018-08-07 RX ADMIN — IBUPROFEN 600 MG: 600 TABLET ORAL at 21:43

## 2018-08-07 RX ADMIN — TERAZOSIN HYDROCHLORIDE 1 MG: 1 CAPSULE ORAL at 21:43

## 2018-08-07 RX ADMIN — METOCLOPRAMIDE HYDROCHLORIDE 10 MG: 10 TABLET ORAL at 14:35

## 2018-08-07 RX ADMIN — PANTOPRAZOLE SODIUM 40 MG: 40 TABLET, DELAYED RELEASE ORAL at 06:38

## 2018-08-07 RX ADMIN — INSULIN LISPRO 30 UNITS: 100 INJECTION, SOLUTION INTRAVENOUS; SUBCUTANEOUS at 08:06

## 2018-08-07 RX ADMIN — HYDROCODONE BITARTRATE AND ACETAMINOPHEN 1 TABLET: 7.5; 325 TABLET ORAL at 15:56

## 2018-08-07 RX ADMIN — CYCLOBENZAPRINE HYDROCHLORIDE 5 MG: 10 TABLET, FILM COATED ORAL at 21:42

## 2018-08-07 RX ADMIN — DOCUSATE SODIUM 100 MG: 100 CAPSULE, LIQUID FILLED ORAL at 21:42

## 2018-08-07 RX ADMIN — METOPROLOL TARTRATE 25 MG: 25 TABLET ORAL at 21:43

## 2018-08-07 RX ADMIN — CYCLOBENZAPRINE HYDROCHLORIDE 5 MG: 10 TABLET, FILM COATED ORAL at 06:38

## 2018-08-07 RX ADMIN — TRAZODONE HYDROCHLORIDE 50 MG: 50 TABLET ORAL at 21:42

## 2018-08-07 RX ADMIN — NICOTINE 1 PATCH: 21 PATCH, EXTENDED RELEASE TRANSDERMAL at 08:08

## 2018-08-07 RX ADMIN — INSULIN LISPRO 30 UNITS: 100 INJECTION, SOLUTION INTRAVENOUS; SUBCUTANEOUS at 21:43

## 2018-08-07 RX ADMIN — INSULIN HUMAN 4 UNITS: 100 INJECTION, SOLUTION PARENTERAL at 17:53

## 2018-08-07 RX ADMIN — METOCLOPRAMIDE HYDROCHLORIDE 10 MG: 10 TABLET ORAL at 21:42

## 2018-08-07 RX ADMIN — METOCLOPRAMIDE HYDROCHLORIDE 10 MG: 10 TABLET ORAL at 17:53

## 2018-08-07 RX ADMIN — SENNOSIDES AND DOCUSATE SODIUM 1 TABLET: 8.6; 5 TABLET ORAL at 21:42

## 2018-08-07 RX ADMIN — HYDROCODONE BITARTRATE AND ACETAMINOPHEN 1 TABLET: 7.5; 325 TABLET ORAL at 08:32

## 2018-08-07 RX ADMIN — METFORMIN HYDROCHLORIDE 1000 MG: 1000 TABLET, FILM COATED ORAL at 17:53

## 2018-08-07 RX ADMIN — IBUPROFEN 600 MG: 600 TABLET ORAL at 16:06

## 2018-08-07 RX ADMIN — HYDROCODONE BITARTRATE AND ACETAMINOPHEN 1 TABLET: 7.5; 325 TABLET ORAL at 21:43

## 2018-08-07 RX ADMIN — HYDROCODONE BITARTRATE AND ACETAMINOPHEN 1 TABLET: 7.5; 325 TABLET ORAL at 03:46

## 2018-08-07 RX ADMIN — METFORMIN HYDROCHLORIDE 1000 MG: 1000 TABLET, FILM COATED ORAL at 08:03

## 2018-08-07 RX ADMIN — FUROSEMIDE 40 MG: 40 TABLET ORAL at 08:03

## 2018-08-07 RX ADMIN — INSULIN HUMAN 4 UNITS: 100 INJECTION, SOLUTION PARENTERAL at 06:38

## 2018-08-07 RX ADMIN — SERTRALINE HYDROCHLORIDE 100 MG: 100 TABLET ORAL at 08:04

## 2018-08-07 RX ADMIN — INSULIN HUMAN 2 UNITS: 100 INJECTION, SOLUTION PARENTERAL at 11:56

## 2018-08-07 RX ADMIN — LISINOPRIL 20 MG: 20 TABLET ORAL at 08:06

## 2018-08-07 RX ADMIN — METOCLOPRAMIDE HYDROCHLORIDE 10 MG: 10 TABLET ORAL at 06:38

## 2018-08-07 RX ADMIN — BUSPIRONE HYDROCHLORIDE 10 MG: 10 TABLET ORAL at 21:42

## 2018-08-07 RX ADMIN — METOPROLOL TARTRATE 25 MG: 25 TABLET ORAL at 08:06

## 2018-08-07 RX ADMIN — LOSARTAN POTASSIUM 100 MG: 50 TABLET ORAL at 08:06

## 2018-08-07 RX ADMIN — BUSPIRONE HYDROCHLORIDE 10 MG: 10 TABLET ORAL at 08:05

## 2018-08-07 RX ADMIN — IBUPROFEN 600 MG: 600 TABLET ORAL at 08:05

## 2018-08-08 VITALS
HEIGHT: 66 IN | HEART RATE: 102 BPM | WEIGHT: 260 LBS | OXYGEN SATURATION: 94 % | RESPIRATION RATE: 20 BRPM | BODY MASS INDEX: 41.78 KG/M2 | SYSTOLIC BLOOD PRESSURE: 157 MMHG | DIASTOLIC BLOOD PRESSURE: 106 MMHG | TEMPERATURE: 98.2 F

## 2018-08-08 LAB
GLUCOSE BLDC GLUCOMTR-MCNC: 140 MG/DL (ref 70–130)
GLUCOSE BLDC GLUCOMTR-MCNC: 154 MG/DL (ref 70–130)
GLUCOSE BLDC GLUCOMTR-MCNC: 266 MG/DL (ref 70–130)
GLUCOSE BLDC GLUCOMTR-MCNC: 285 MG/DL (ref 70–130)

## 2018-08-08 PROCEDURE — G8979 MOBILITY GOAL STATUS: HCPCS

## 2018-08-08 PROCEDURE — 82962 GLUCOSE BLOOD TEST: CPT

## 2018-08-08 PROCEDURE — 97116 GAIT TRAINING THERAPY: CPT

## 2018-08-08 PROCEDURE — G8980 MOBILITY D/C STATUS: HCPCS

## 2018-08-08 PROCEDURE — G8978 MOBILITY CURRENT STATUS: HCPCS

## 2018-08-08 RX ORDER — HYDROCODONE BITARTRATE AND ACETAMINOPHEN 7.5; 325 MG/1; MG/1
1 TABLET ORAL 3 TIMES DAILY PRN
Qty: 30 TABLET | Refills: 0 | Status: SHIPPED | OUTPATIENT
Start: 2018-08-08 | End: 2018-08-20

## 2018-08-08 RX ADMIN — MAGNESIUM HYDROXIDE 10 ML: 2400 SUSPENSION ORAL at 04:45

## 2018-08-08 RX ADMIN — PANTOPRAZOLE SODIUM 40 MG: 40 TABLET, DELAYED RELEASE ORAL at 06:07

## 2018-08-08 RX ADMIN — IBUPROFEN 600 MG: 600 TABLET ORAL at 07:59

## 2018-08-08 RX ADMIN — METOPROLOL TARTRATE 25 MG: 25 TABLET ORAL at 07:58

## 2018-08-08 RX ADMIN — LOSARTAN POTASSIUM 100 MG: 50 TABLET ORAL at 07:58

## 2018-08-08 RX ADMIN — DOCUSATE SODIUM 100 MG: 100 CAPSULE, LIQUID FILLED ORAL at 04:46

## 2018-08-08 RX ADMIN — BISACODYL 5 MG: 5 TABLET, COATED ORAL at 04:46

## 2018-08-08 RX ADMIN — LISINOPRIL 20 MG: 20 TABLET ORAL at 07:57

## 2018-08-08 RX ADMIN — FUROSEMIDE 40 MG: 40 TABLET ORAL at 07:58

## 2018-08-08 RX ADMIN — HYDROCODONE BITARTRATE AND ACETAMINOPHEN 1 TABLET: 7.5; 325 TABLET ORAL at 07:59

## 2018-08-08 RX ADMIN — SERTRALINE HYDROCHLORIDE 100 MG: 100 TABLET ORAL at 07:59

## 2018-08-08 RX ADMIN — BUSPIRONE HYDROCHLORIDE 10 MG: 10 TABLET ORAL at 07:59

## 2018-08-08 RX ADMIN — INSULIN HUMAN 6 UNITS: 100 INJECTION, SOLUTION PARENTERAL at 06:07

## 2018-08-08 RX ADMIN — INSULIN LISPRO 30 UNITS: 100 INJECTION, SOLUTION INTRAVENOUS; SUBCUTANEOUS at 08:00

## 2018-08-08 RX ADMIN — CYCLOBENZAPRINE HYDROCHLORIDE 5 MG: 10 TABLET, FILM COATED ORAL at 04:46

## 2018-08-08 RX ADMIN — TRAMADOL HYDROCHLORIDE 50 MG: 50 TABLET, COATED ORAL at 04:46

## 2018-08-08 RX ADMIN — METOCLOPRAMIDE HYDROCHLORIDE 10 MG: 10 TABLET ORAL at 06:07

## 2018-08-08 RX ADMIN — METFORMIN HYDROCHLORIDE 1000 MG: 1000 TABLET, FILM COATED ORAL at 07:59

## 2018-08-20 ENCOUNTER — HOSPITAL ENCOUNTER (EMERGENCY)
Facility: HOSPITAL | Age: 48
Discharge: HOME | End: 2018-08-20
Attending: EMERGENCY MEDICINE | Admitting: EMERGENCY MEDICINE

## 2018-08-20 VITALS
DIASTOLIC BLOOD PRESSURE: 91 MMHG | OXYGEN SATURATION: 95 % | WEIGHT: 264 LBS | TEMPERATURE: 98.3 F | RESPIRATION RATE: 20 BRPM | SYSTOLIC BLOOD PRESSURE: 168 MMHG | BODY MASS INDEX: 42.43 KG/M2 | HEART RATE: 92 BPM | HEIGHT: 66 IN

## 2018-08-20 DIAGNOSIS — G89.18 POST-OPERATIVE PAIN: Primary | ICD-10-CM

## 2018-08-20 DIAGNOSIS — E86.9 VOLUME DEPLETION: ICD-10-CM

## 2018-08-20 DIAGNOSIS — R73.9 HYPERGLYCEMIA: ICD-10-CM

## 2018-08-20 LAB
ALBUMIN SERPL-MCNC: 4 G/DL (ref 3.2–4.8)
ALBUMIN/GLOB SERPL: 1.3 G/DL (ref 1.5–2.5)
ALP SERPL-CCNC: 243 U/L (ref 25–100)
ALT SERPL W P-5'-P-CCNC: 100 U/L (ref 7–40)
ANION GAP SERPL CALCULATED.3IONS-SCNC: 8 MMOL/L (ref 3–11)
AST SERPL-CCNC: 127 U/L (ref 0–33)
BACTERIA UR QL AUTO: ABNORMAL /HPF
BASOPHILS # BLD AUTO: 0.04 10*3/MM3 (ref 0–0.2)
BASOPHILS NFR BLD AUTO: 0.8 % (ref 0–1)
BILIRUB SERPL-MCNC: 0.7 MG/DL (ref 0.3–1.2)
BILIRUB UR QL STRIP: NEGATIVE
BUN BLD-MCNC: 7 MG/DL (ref 9–23)
BUN/CREAT SERPL: 10.3 (ref 7–25)
CALCIUM SPEC-SCNC: 8.9 MG/DL (ref 8.7–10.4)
CHLORIDE SERPL-SCNC: 100 MMOL/L (ref 99–109)
CLARITY UR: ABNORMAL
CO2 SERPL-SCNC: 27 MMOL/L (ref 20–31)
COLOR UR: YELLOW
CREAT BLD-MCNC: 0.68 MG/DL (ref 0.6–1.3)
DEPRECATED RDW RBC AUTO: 52.2 FL (ref 37–54)
EOSINOPHIL # BLD AUTO: 0.23 10*3/MM3 (ref 0–0.3)
EOSINOPHIL NFR BLD AUTO: 4.4 % (ref 0–3)
ERYTHROCYTE [DISTWIDTH] IN BLOOD BY AUTOMATED COUNT: 15.1 % (ref 11.3–14.5)
GFR SERPL CREATININE-BSD FRML MDRD: 112 ML/MIN/1.73
GLOBULIN UR ELPH-MCNC: 3.1 GM/DL
GLUCOSE BLD-MCNC: 233 MG/DL (ref 70–100)
GLUCOSE UR STRIP-MCNC: ABNORMAL MG/DL
HBA1C MFR BLD: 9.2 % (ref 4.8–5.6)
HCT VFR BLD AUTO: 39.5 % (ref 34.5–44)
HGB BLD-MCNC: 13 G/DL (ref 11.5–15.5)
HGB UR QL STRIP.AUTO: NEGATIVE
HOLD SPECIMEN: NORMAL
HOLD SPECIMEN: NORMAL
HYALINE CASTS UR QL AUTO: ABNORMAL /LPF
IMM GRANULOCYTES # BLD: 0.02 10*3/MM3 (ref 0–0.03)
IMM GRANULOCYTES NFR BLD: 0.4 % (ref 0–0.6)
INR PPP: 1.04 (ref 0.91–1.09)
KETONES UR QL STRIP: ABNORMAL
LEUKOCYTE ESTERASE UR QL STRIP.AUTO: NEGATIVE
LYMPHOCYTES # BLD AUTO: 2.38 10*3/MM3 (ref 0.6–4.8)
LYMPHOCYTES NFR BLD AUTO: 45.5 % (ref 24–44)
MAGNESIUM SERPL-MCNC: 1.6 MG/DL (ref 1.3–2.7)
MCH RBC QN AUTO: 31 PG (ref 27–31)
MCHC RBC AUTO-ENTMCNC: 32.9 G/DL (ref 32–36)
MCV RBC AUTO: 94 FL (ref 80–99)
MONOCYTES # BLD AUTO: 0.55 10*3/MM3 (ref 0–1)
MONOCYTES NFR BLD AUTO: 10.5 % (ref 0–12)
NEUTROPHILS # BLD AUTO: 2.01 10*3/MM3 (ref 1.5–8.3)
NEUTROPHILS NFR BLD AUTO: 38.4 % (ref 41–71)
NITRITE UR QL STRIP: NEGATIVE
PH UR STRIP.AUTO: 6 [PH] (ref 5–8)
PLAT MORPH BLD: NORMAL
PLATELET # BLD AUTO: 421 10*3/MM3 (ref 150–450)
PMV BLD AUTO: 9.3 FL (ref 6–12)
POTASSIUM BLD-SCNC: 3.4 MMOL/L (ref 3.5–5.5)
PROT SERPL-MCNC: 7.1 G/DL (ref 5.7–8.2)
PROT UR QL STRIP: ABNORMAL
PROTHROMBIN TIME: 10.9 SECONDS (ref 9.6–11.5)
RBC # BLD AUTO: 4.2 10*6/MM3 (ref 3.89–5.14)
RBC # UR: ABNORMAL /HPF
RBC MORPH BLD: NORMAL
REF LAB TEST METHOD: ABNORMAL
SODIUM BLD-SCNC: 135 MMOL/L (ref 132–146)
SP GR UR STRIP: 1.04 (ref 1–1.03)
SQUAMOUS #/AREA URNS HPF: ABNORMAL /HPF
UROBILINOGEN UR QL STRIP: ABNORMAL
WBC MORPH BLD: NORMAL
WBC NRBC COR # BLD: 5.23 10*3/MM3 (ref 3.5–10.8)
WBC UR QL AUTO: ABNORMAL /HPF
WHOLE BLOOD HOLD SPECIMEN: NORMAL
WHOLE BLOOD HOLD SPECIMEN: NORMAL

## 2018-08-20 PROCEDURE — 85610 PROTHROMBIN TIME: CPT | Performed by: EMERGENCY MEDICINE

## 2018-08-20 PROCEDURE — 81001 URINALYSIS AUTO W/SCOPE: CPT | Performed by: EMERGENCY MEDICINE

## 2018-08-20 PROCEDURE — 85025 COMPLETE CBC W/AUTO DIFF WBC: CPT | Performed by: EMERGENCY MEDICINE

## 2018-08-20 PROCEDURE — 25010000002 ONDANSETRON PER 1 MG: Performed by: EMERGENCY MEDICINE

## 2018-08-20 PROCEDURE — 85007 BL SMEAR W/DIFF WBC COUNT: CPT | Performed by: EMERGENCY MEDICINE

## 2018-08-20 PROCEDURE — 96374 THER/PROPH/DIAG INJ IV PUSH: CPT

## 2018-08-20 PROCEDURE — 96375 TX/PRO/DX INJ NEW DRUG ADDON: CPT

## 2018-08-20 PROCEDURE — 99285 EMERGENCY DEPT VISIT HI MDM: CPT

## 2018-08-20 PROCEDURE — 80053 COMPREHEN METABOLIC PANEL: CPT | Performed by: EMERGENCY MEDICINE

## 2018-08-20 PROCEDURE — 83735 ASSAY OF MAGNESIUM: CPT | Performed by: EMERGENCY MEDICINE

## 2018-08-20 PROCEDURE — 96361 HYDRATE IV INFUSION ADD-ON: CPT

## 2018-08-20 PROCEDURE — 83036 HEMOGLOBIN GLYCOSYLATED A1C: CPT | Performed by: EMERGENCY MEDICINE

## 2018-08-20 PROCEDURE — 25010000002 HYDROMORPHONE PER 4 MG: Performed by: EMERGENCY MEDICINE

## 2018-08-20 RX ORDER — HYDROMORPHONE HYDROCHLORIDE 1 MG/ML
0.5 INJECTION, SOLUTION INTRAMUSCULAR; INTRAVENOUS; SUBCUTANEOUS ONCE
Status: COMPLETED | OUTPATIENT
Start: 2018-08-20 | End: 2018-08-20

## 2018-08-20 RX ORDER — HYDROCODONE BITARTRATE AND ACETAMINOPHEN 5; 325 MG/1; MG/1
1-2 TABLET ORAL EVERY 8 HOURS PRN
Qty: 15 TABLET | Refills: 0 | Status: SHIPPED | OUTPATIENT
Start: 2018-08-20 | End: 2018-08-27 | Stop reason: SDUPTHER

## 2018-08-20 RX ORDER — POTASSIUM CHLORIDE 750 MG/1
20 CAPSULE, EXTENDED RELEASE ORAL ONCE
Status: COMPLETED | OUTPATIENT
Start: 2018-08-20 | End: 2018-08-20

## 2018-08-20 RX ORDER — ONDANSETRON 2 MG/ML
4 INJECTION INTRAMUSCULAR; INTRAVENOUS ONCE
Status: COMPLETED | OUTPATIENT
Start: 2018-08-20 | End: 2018-08-20

## 2018-08-20 RX ORDER — ONDANSETRON 8 MG/1
8 TABLET, ORALLY DISINTEGRATING ORAL EVERY 8 HOURS PRN
Qty: 20 TABLET | Refills: 0 | Status: SHIPPED | OUTPATIENT
Start: 2018-08-20 | End: 2022-05-13 | Stop reason: HOSPADM

## 2018-08-20 RX ORDER — SODIUM CHLORIDE 0.9 % (FLUSH) 0.9 %
10 SYRINGE (ML) INJECTION AS NEEDED
Status: DISCONTINUED | OUTPATIENT
Start: 2018-08-20 | End: 2018-08-20 | Stop reason: HOSPADM

## 2018-08-20 RX ADMIN — SODIUM CHLORIDE 1000 ML: 9 INJECTION, SOLUTION INTRAVENOUS at 18:09

## 2018-08-20 RX ADMIN — ONDANSETRON 4 MG: 2 INJECTION INTRAMUSCULAR; INTRAVENOUS at 18:10

## 2018-08-20 RX ADMIN — HYDROMORPHONE HYDROCHLORIDE 0.5 MG: 1 INJECTION, SOLUTION INTRAMUSCULAR; INTRAVENOUS; SUBCUTANEOUS at 18:12

## 2018-08-20 RX ADMIN — POTASSIUM CHLORIDE 20 MEQ: 750 CAPSULE, EXTENDED RELEASE ORAL at 19:51

## 2018-08-20 NOTE — ED PROVIDER NOTES
Subjective   Ms. Bernadette Paris is a 48 year old female who presents to the ED with c/o back pain s/p laminectomy x 14 days. Patient had uncomplicated L4-5 and L5-S1 laminectomies with medial facetectomies and foraminotomies on 8/6 by Dr. Smith. She underwent inpatient physical therapy for several days and was discharged back home to her significant other on 8/8. Patient states she has had no contact with the surgeon/PT since then. Over the past 2 days, patient states her lower back has begun to hurt. Presents today as she is no longer able to ambulate, stand up, sit down, or perform any ADL secondary to the pain. She is unsure what may have caused this and denies any acute injuries or falls but does note that she ran out of her prescribed Lortab about 1 week ago. Endorses an accompanying tinging sensation radiating down her left leg and alternating subjective fevers/chills. Denies any shortness of breath, chest pain, cough, or headaches. No bladder/bowel incontinence. No other acute sx at this time.        History provided by:  Patient  Back Pain   Location:  Lumbar spine  Quality:  Shooting  Radiates to: L leg.  Pain severity:  Severe  Pain is:  Same all the time  Onset quality:  Sudden  Duration:  2 days  Timing:  Constant  Progression:  Unchanged  Chronicity:  New  Context comment:  Recent surgery  Relieved by:  Nothing  Worsened by:  Movement, ambulation, standing and sitting  Associated symptoms: fever, leg pain and tingling    Associated symptoms: no abdominal pain, no bladder incontinence, no bowel incontinence, no chest pain, no dysuria, no headaches, no numbness, no paresthesias, no perianal numbness, no weakness and no weight loss    Risk factors: obesity and recent surgery        Review of Systems   Constitutional: Positive for chills and fever. Negative for weight loss.   Cardiovascular: Negative for chest pain.   Gastrointestinal: Negative for abdominal pain and bowel incontinence.   Genitourinary:  Negative for bladder incontinence and dysuria.   Musculoskeletal: Positive for back pain.   Neurological: Positive for tingling. Negative for weakness, numbness, headaches and paresthesias.   All other systems reviewed and are negative.      Past Medical History:   Diagnosis Date   • Arm abrasion     PATIENT HAS MULTIPLE SORES ON RIGHT ARM THAT SHE STATES ARE FROM HER PICKING AT HER SKIN   • Arthritis    • Bipolar disorder (CMS/HCC)    • Chronic bronchitis (CMS/HCC)    • Depression    • Diabetes mellitus (CMS/HCC)     DIAGNOSED 2016   • GERD (gastroesophageal reflux disease)    • History of blood transfusion    • Hyperlipidemia    • Hypertension    • Infectious viral hepatitis     HEPATITIS C   • Migraine    • Sleep apnea     PATIENT UNSURE OF SETTINGS       No Known Allergies    Past Surgical History:   Procedure Laterality Date   • CHOLECYSTECTOMY     • HYSTERECTOMY     • KNEE SURGERY     • LUMBAR LAMINECTOMY DISCECTOMY DECOMPRESSION N/A 8/6/2018    Procedure: LUMBAR LAMINECTOMIES L4-S1;  Surgeon: Chip Smith MD;  Location: [facility];  Service: Neurosurgery       History reviewed. No pertinent family history.    Social History     Social History   • Marital status: Single     Social History Main Topics   • Smoking status: Current Every Day Smoker     Packs/day: 1.00   • Smokeless tobacco: Never Used   • Alcohol use No   • Drug use: No   • Sexual activity: Defer     Other Topics Concern   • Not on file         Objective   Physical Exam   Constitutional: She is oriented to person, place, and time. She appears well-developed and well-nourished. No distress.   HENT:   Head: Normocephalic and atraumatic.   Eyes: Conjunctivae are normal. No scleral icterus.   Neck: Normal range of motion. Neck supple.   Cardiovascular: Normal rate, regular rhythm, normal heart sounds and intact distal pulses.  Exam reveals no gallop and no friction rub.    No murmur heard.  Pulmonary/Chest: Effort normal and breath sounds normal.  "No respiratory distress. She has no wheezes. She has no rales.   Abdominal: Soft. Bowel sounds are normal. There is no tenderness. There is no guarding.   Musculoskeletal: Normal range of motion.   Neurological: She is alert and oriented to person, place, and time. She has normal strength and normal reflexes. She displays normal reflexes. No sensory deficit.   Great strength in bilateral lower extremities; she is able to lift both legs off the stretcher. Plantar and dorsiflexion intact. Normal saddle sensation. DTRs normal. Otherwise normal neurological exam.   Skin: Skin is warm and dry. She is not diaphoretic.   Back dressing in place. No signs of infection. No fluctuance.   Psychiatric: She has a normal mood and affect. Her behavior is normal.   Nursing note and vitals reviewed.      Procedures         ED Course  ED Course as of Aug 20 2047   Mon Aug 20, 2018   1914 I discussed findings at length with the patient and significant other.  I discussed the review of her old records with the ambulation 300 feet at the end of her previous stay postoperatively.  She reports she's had no therapy since that time and believes she is just \"sitting too much\".  She is treated with analgesics and I'll have the staff ambulate her as she's been able to get up to the bedside commode of actually stand and bend over without fall, neurologic impairment, or instability.  I was sure that she can continue to ambulate adequately as an outpatient.  Patient and significant other agree with the current plan of care.  [HH]   2043 JONNATHAN query complete. Treatment plan to include limited course of prescribed  controlled substance. Risks including addiction, benefits, and alternatives presented to patient.     [MU]   2045 Patient serially reevaluated throughout the ED course with last reevaluation now.  She was ambulated by our medic in the leal and did very well.  She ambulated greater than 300 feet.  She seemed to be much improved after the " "ambulation consistent with her reports that she's been sitting in bed too much\".I discussed treatment and will treat her with a limited supply of pain medications.  She seems have been on this for quite some time but it seems to be from Dr. Smith specifically.  On last her to call Dr. Smith's office tomorrow.  I discussed follow-up with her PCP as well but also with strict adherence to home therapy instructions for Dr. Smith, or institution of at home physical therapy if he feels this is preferable.We discussed indications for immediate return and needed follow-upVery pleasant and responsible patient and significant other verbalize understanding agreement with plan of care, need for follow-up, and indications for immediate return.  [HH]      ED Course User Index  [HH] Catracho Barlow MD  [MU] Nadir Chavez       Recent Results (from the past 24 hour(s))   Comprehensive Metabolic Panel    Collection Time: 08/20/18  5:57 PM   Result Value Ref Range    Glucose 233 (H) 70 - 100 mg/dL    BUN 7 (L) 9 - 23 mg/dL    Creatinine 0.68 0.60 - 1.30 mg/dL    Sodium 135 132 - 146 mmol/L    Potassium 3.4 (L) 3.5 - 5.5 mmol/L    Chloride 100 99 - 109 mmol/L    CO2 27.0 20.0 - 31.0 mmol/L    Calcium 8.9 8.7 - 10.4 mg/dL    Total Protein 7.1 5.7 - 8.2 g/dL    Albumin 4.00 3.20 - 4.80 g/dL    ALT (SGPT) 100 (H) 7 - 40 U/L    AST (SGOT) 127 (H) 0 - 33 U/L    Alkaline Phosphatase 243 (H) 25 - 100 U/L    Total Bilirubin 0.7 0.3 - 1.2 mg/dL    eGFR  African Amer 112 >60 mL/min/1.73    Globulin 3.1 gm/dL    A/G Ratio 1.3 (L) 1.5 - 2.5 g/dL    BUN/Creatinine Ratio 10.3 7.0 - 25.0    Anion Gap 8.0 3.0 - 11.0 mmol/L   Protime-INR    Collection Time: 08/20/18  5:57 PM   Result Value Ref Range    Protime 10.9 9.6 - 11.5 Seconds    INR 1.04 0.91 - 1.09   CBC Auto Differential    Collection Time: 08/20/18  5:57 PM   Result Value Ref Range    WBC 5.23 3.50 - 10.80 10*3/mm3    RBC 4.20 3.89 - 5.14 10*6/mm3    Hemoglobin 13.0 11.5 - 15.5 g/dL    " Hematocrit 39.5 34.5 - 44.0 %    MCV 94.0 80.0 - 99.0 fL    MCH 31.0 27.0 - 31.0 pg    MCHC 32.9 32.0 - 36.0 g/dL    RDW 15.1 (H) 11.3 - 14.5 %    RDW-SD 52.2 37.0 - 54.0 fl    MPV 9.3 6.0 - 12.0 fL    Platelets 421 150 - 450 10*3/mm3    Neutrophil % 38.4 (L) 41.0 - 71.0 %    Lymphocyte % 45.5 (H) 24.0 - 44.0 %    Monocyte % 10.5 0.0 - 12.0 %    Eosinophil % 4.4 (H) 0.0 - 3.0 %    Basophil % 0.8 0.0 - 1.0 %    Immature Grans % 0.4 0.0 - 0.6 %    Neutrophils, Absolute 2.01 1.50 - 8.30 10*3/mm3    Lymphocytes, Absolute 2.38 0.60 - 4.80 10*3/mm3    Monocytes, Absolute 0.55 0.00 - 1.00 10*3/mm3    Eosinophils, Absolute 0.23 0.00 - 0.30 10*3/mm3    Basophils, Absolute 0.04 0.00 - 0.20 10*3/mm3    Immature Grans, Absolute 0.02 0.00 - 0.03 10*3/mm3   Light Blue Top    Collection Time: 08/20/18  5:57 PM   Result Value Ref Range    Extra Tube hold for add-on    Green Top (Gel)    Collection Time: 08/20/18  5:57 PM   Result Value Ref Range    Extra Tube Hold for add-ons.    Lavender Top    Collection Time: 08/20/18  5:57 PM   Result Value Ref Range    Extra Tube hold for add-on    Gold Top - SST    Collection Time: 08/20/18  5:57 PM   Result Value Ref Range    Extra Tube Hold for add-ons.    Hemoglobin A1c    Collection Time: 08/20/18  5:57 PM   Result Value Ref Range    Hemoglobin A1C 9.20 (H) 4.80 - 5.60 %   Magnesium    Collection Time: 08/20/18  5:57 PM   Result Value Ref Range    Magnesium 1.6 1.3 - 2.7 mg/dL   Scan Slide    Collection Time: 08/20/18  5:57 PM   Result Value Ref Range    RBC Morphology Normal Normal    WBC Morphology Normal Normal    Platelet Morphology Normal Normal   Urinalysis With Microscopic If Indicated (No Culture) - Urine, Clean Catch    Collection Time: 08/20/18  7:58 PM   Result Value Ref Range    Color, UA Yellow Yellow, Straw    Appearance, UA Cloudy (A) Clear    pH, UA 6.0 5.0 - 8.0    Specific Gravity, UA 1.043 (H) 1.001 - 1.030    Glucose, UA >=1000 mg/dL (3+) (A) Negative    Ketones, UA 15  "mg/dL (1+) (A) Negative    Bilirubin, UA Negative Negative    Blood, UA Negative Negative    Protein, UA Trace (A) Negative    Leuk Esterase, UA Negative Negative    Nitrite, UA Negative Negative    Urobilinogen, UA 1.0 E.U./dL 0.2 - 1.0 E.U./dL   Urinalysis, Microscopic Only - Urine, Clean Catch    Collection Time: 08/20/18  7:58 PM   Result Value Ref Range    RBC, UA 13-20 (A) None Seen, 0-2 /HPF    WBC, UA 3-5 (A) None Seen, 0-2 /HPF    Bacteria, UA 2+ (A) None Seen, Trace /HPF    Squamous Epithelial Cells, UA 7-12 (A) None Seen, 0-2 /HPF    Hyaline Casts, UA 0-6 0 - 6 /LPF    Methodology Automated Microscopy      Note: In addition to lab results from this visit, the labs listed above may include labs taken at another facility or during a different encounter within the last 24 hours. Please correlate lab times with ED admission and discharge times for further clarification of the services performed during this visit.    No orders to display     Vitals:    08/20/18 1531 08/20/18 1801 08/20/18 1830 08/20/18 1956   BP: 168/94  168/91    BP Location: Left arm      Patient Position: Sitting      Pulse: 104 89 87 98   Resp: 22  20    Temp: 98.3 °F (36.8 °C)      TempSrc: Oral      SpO2: 97% 97% 97% 98%   Weight: 120 kg (264 lb)      Height: 167.6 cm (66\")        Medications   sodium chloride 0.9 % flush 10 mL (not administered)   sodium chloride 0.9 % bolus 1,000 mL (0 mL Intravenous Stopped 8/20/18 1950)   HYDROmorphone (DILAUDID) injection 0.5 mg (0.5 mg Intravenous Given 8/20/18 1812)   ondansetron (ZOFRAN) injection 4 mg (4 mg Intravenous Given 8/20/18 1810)   potassium chloride (MICRO-K) CR capsule 20 mEq (20 mEq Oral Given 8/20/18 1951)     ECG/EMG Results (last 24 hours)     ** No results found for the last 24 hours. **                      MDM    Final diagnoses:   Post-operative pain   Hyperglycemia   Volume depletion       Documentation assistance provided by mario Chavez.  Information recorded by the " scribe was done at my direction and has been verified and validated by me.     Nadir Chavez  08/20/18 1734       Catracho Barlow MD  08/20/18 2049

## 2018-08-21 ENCOUNTER — TELEPHONE (OUTPATIENT)
Dept: NEUROSURGERY | Facility: CLINIC | Age: 48
End: 2018-08-21

## 2018-08-21 NOTE — TELEPHONE ENCOUNTER
Provider:  Luis  Caller: carlos  Time of call: 1201pm    Phone #:  [phone]  Surgery:  Lami L4-S1   Surgery Date:  08/06/18  Last visit:     Next visit: 08/29/18     JONNATHAN:         Reason for call:   Pt LVM stating she was having increase in pain and is now having trouble ambulating.     Pt was seen in  ED yesterday no scans, instructed to f/u in clinic.       -please advise?

## 2018-08-21 NOTE — TELEPHONE ENCOUNTER
Called and left a  for her to call back  - I would like to speak with her directly please when she does call back    Also, per Dr. Smith, he would like her to come in and be seen by me next week please.     Can we have this scheduled.     - SO

## 2018-08-21 NOTE — DISCHARGE INSTRUCTIONS
Call Dr. Smith tomorrow morning for instructions on home rehabilitation. Follow up with Dr. Smith within the week. Follow up at once for any worsening of your symptoms.    Recommend following up with Dr. Smith or your primary care provider for continued care of your pain.  Take ibuprofen as your primary pain medication.  If you've a limited supply of additional pain medicine as needed.    Stay mobile as the ambulation and movement is seemed to significantly improve your symptoms in the ED today.  It's important for you to transition to home rehabilitation for home back exercises as per Dr. Smith, so call the office tomorrow to secure these instructions.    Push plenty of fluids.    Return to the ED immediately for any worsening or other concerning symptoms as discussed.

## 2018-08-21 NOTE — TELEPHONE ENCOUNTER
Patient is schedule with Genet for 9:30 am on Monday the 27th. I have advised her to take the pain medication every 8 hours as directed and she can take one extra strength tylenol in between doses for break thru pain. She also know to rest and use heat and ice to help relieve pain as well.  She will call again if she needs any thing else.

## 2018-08-27 ENCOUNTER — OFFICE VISIT (OUTPATIENT)
Dept: NEUROSURGERY | Facility: CLINIC | Age: 48
End: 2018-08-27

## 2018-08-27 ENCOUNTER — DOCUMENTATION (OUTPATIENT)
Dept: NEUROSURGERY | Facility: CLINIC | Age: 48
End: 2018-08-27

## 2018-08-27 VITALS
WEIGHT: 265 LBS | BODY MASS INDEX: 42.59 KG/M2 | SYSTOLIC BLOOD PRESSURE: 138 MMHG | HEIGHT: 66 IN | HEART RATE: 76 BPM | TEMPERATURE: 97.2 F | DIASTOLIC BLOOD PRESSURE: 78 MMHG

## 2018-08-27 DIAGNOSIS — Z71.6 ENCOUNTER FOR SMOKING CESSATION COUNSELING: ICD-10-CM

## 2018-08-27 DIAGNOSIS — Z98.890 S/P LUMBAR LAMINECTOMY: Primary | ICD-10-CM

## 2018-08-27 DIAGNOSIS — M48.062 SPINAL STENOSIS, LUMBAR REGION, WITH NEUROGENIC CLAUDICATION: ICD-10-CM

## 2018-08-27 PROCEDURE — 99024 POSTOP FOLLOW-UP VISIT: CPT | Performed by: PHYSICIAN ASSISTANT

## 2018-08-27 RX ORDER — HYDROCODONE BITARTRATE AND ACETAMINOPHEN 5; 325 MG/1; MG/1
1 TABLET ORAL 2 TIMES DAILY
Qty: 30 TABLET | Refills: 0 | Status: SHIPPED | OUTPATIENT
Start: 2018-08-27 | End: 2022-04-11 | Stop reason: HOSPADM

## 2018-08-27 NOTE — PROGRESS NOTES
Patient: Bernadette Paris  : 1970  GENDER: female    Primary Care Provider: Lilly Rose MD    Requesting Provider: As above      History    Chief Complaint: Back and left lower extremity pain with neurogenic claudication and radiculopathy    History of Present Illness: Mrs. Paris is a 48-year-old unemployed female with a past medical history significant for hepatitis C, poorly controlled diabetes 2, and tobacco abuse.  She presented to our office with a history of low back and left leg pain with associated walking and standing intolerance for the past 1-2 years.  Preoperative studies demonstrated generous grade stenosis at L4-5 and L5-S1.  As such, patient presented for decompressive laminectomies on 18.    Since her discharge home on the hospital on 18, patient has had an increase in her pain severity.  Patient states that when she wears in the hospital, she was given pain medication whenever she asked for it.  Therefore she independently decided to increase the frequency of her pain medication dosing to every 4+ hours as needed for her discomfort.  Due to the rapid use of her pain medication, she ran out of the 30 pills given to her upon her discharge.  Patient presented to a local emergency room, and was given an additional 15 pain pills.  Again, Mrs. Paris was quick to finished that prescription.      Presently, Mrs. Paris is in a wheelchair due to pain with walking.  She states that her symptoms are overall improved from surgery, however she still admits to low back pain and left leg radicular symptoms.  Symptoms are worse with standing and walking.  She also admits to associated constipation, most likely attributed to the high usage of narcotics.  She is also a known diabetic, and her current sugar levels were 217 this morning.  She states that her appetite is low, and she has been drinking a lot of soda.  Patient has other complaints at this time      Review of Systems  "  Constitutional: Positive for activity change, appetite change and fever. Negative for chills, diaphoresis, fatigue and unexpected weight change.   Eyes: Negative.    Gastrointestinal: Positive for abdominal pain and constipation.   Endocrine: Negative.    Musculoskeletal: Positive for back pain and myalgias.   Allergic/Immunologic: Negative.    Neurological: Positive for numbness and headaches.       The patient's past medical history, past surgical history, family history, and social history have been reviewed at length in the electronic medical record.    Physical Exam:   /78   Pulse 76   Temp 97.2 °F (36.2 °C) (Temporal Artery )   Ht 167.6 cm (66\")   Wt 120 kg (265 lb)   BMI 42.77 kg/m²   Consitutional: A&Ox3, pleasant, no acute distress  Skin:   - Well healed surgical incision   - No evidence of wound dehiscence  - NSOI   - Non-TTP    Medical Decision Making      Diagnosis/Treatment Options:  1. S/P lumbar laminectomy  2. Encounter for smoking cessation counseling  3. BMI 40.0-44.9, adult (CMS/HCC)  4. Spinal stenosis, lumbar region, with neurogenic claudication  5.  Hepatitis C     Follow up:    Mrs. Paris is a 48-year-old female who underwent L4-5, and L5-S1 laminectomies on 8/6/18.  Since her discharge from the hospital, she has had an increase in her low back pain/discomfort.  Due to her pain, she independently decided to increase her pain medication frequency to 4+ times a day and subsequently ran out.  She presented to the emergency room, and was given 15 additional pain pills.  Again, she quickly ran out of the medication, and is presenting today requesting a refill of her pain medication.     Patient is only 3 weeks postop, I agreed to refill her Norco fives.  Patient was informed of this was the last prescription she will receive her pain medication.  I also gave her gabapentin 300 mg 3 times a day for her associated radiculopathy.  Patient will attend physical therapy, and will be seen " back in the office in 3 weeks for reassessment.       Genet Tee PA-C   8/27/2018   4:48 PM

## 2018-08-27 NOTE — PROGRESS NOTES
"Patient: Bernadette Paris  : 1970  Chart #: [id number]    Date of Service: 2018    CHIEF COMPLAINT: []    History of Present Illness       Review of Systems   Constitutional: Positive for activity change, appetite change, fatigue and fever.   HENT: Positive for sneezing.    Respiratory: Positive for apnea, cough, choking, wheezing and stridor.    Cardiovascular: Positive for leg swelling.   Gastrointestinal: Positive for abdominal pain, diarrhea, nausea, rectal pain and vomiting.   Genitourinary: Positive for dyspareunia and flank pain.   Musculoskeletal: Positive for back pain and myalgias.   Neurological: Positive for numbness and headaches.   All other systems reviewed and are negative.      Objective   Vital Signs: Blood pressure 138/78, pulse 76, temperature 97.2 °F (36.2 °C), temperature source Temporal Artery , height 167.6 cm (66\"), weight 120 kg (265 lb).  Physical Exam        Independent review of radiographic imaging: []    Assessment/Plan   Medical Decision Making: []    Bernadette was seen today for back pain.    Diagnoses and all orders for this visit:    S/P lumbar laminectomy  -     Ambulatory Referral to Physical Therapy Evaluate and treat    Encounter for smoking cessation counseling    BMI 40.0-44.9, adult (CMS/[facility])    Spinal stenosis, lumbar region, with neurogenic claudication               Genet Tee PA-C  Patient Care Team:  Lilly Rose MD as PCP - General (Family Medicine)              "

## 2018-10-27 ENCOUNTER — HOSPITAL ENCOUNTER (EMERGENCY)
Facility: HOSPITAL | Age: 48
Discharge: HOME | End: 2018-10-27
Attending: EMERGENCY MEDICINE | Admitting: EMERGENCY MEDICINE

## 2018-10-27 ENCOUNTER — APPOINTMENT (OUTPATIENT)
Dept: CT IMAGING | Facility: HOSPITAL | Age: 48
End: 2018-10-27

## 2018-10-27 VITALS
BODY MASS INDEX: 35.36 KG/M2 | OXYGEN SATURATION: 98 % | HEART RATE: 104 BPM | RESPIRATION RATE: 18 BRPM | WEIGHT: 220 LBS | HEIGHT: 66 IN | DIASTOLIC BLOOD PRESSURE: 75 MMHG | SYSTOLIC BLOOD PRESSURE: 160 MMHG | TEMPERATURE: 98.3 F

## 2018-10-27 DIAGNOSIS — K86.1 CHRONIC PANCREATITIS, UNSPECIFIED PANCREATITIS TYPE (HCC): Primary | ICD-10-CM

## 2018-10-27 DIAGNOSIS — Z91.14 NONCOMPLIANCE WITH MEDICATION REGIMEN: ICD-10-CM

## 2018-10-27 DIAGNOSIS — E11.65 TYPE 2 DIABETES MELLITUS WITH HYPERGLYCEMIA, WITH LONG-TERM CURRENT USE OF INSULIN (HCC): ICD-10-CM

## 2018-10-27 DIAGNOSIS — Z79.4 TYPE 2 DIABETES MELLITUS WITH HYPERGLYCEMIA, WITH LONG-TERM CURRENT USE OF INSULIN (HCC): ICD-10-CM

## 2018-10-27 LAB
ALBUMIN SERPL-MCNC: 3.69 G/DL (ref 3.2–4.8)
ALBUMIN/GLOB SERPL: 1.4 G/DL (ref 1.5–2.5)
ALP SERPL-CCNC: 185 U/L (ref 25–100)
ALT SERPL W P-5'-P-CCNC: 40 U/L (ref 7–40)
AMPHET+METHAMPHET UR QL: NEGATIVE
AMPHETAMINES UR QL: NEGATIVE
ANION GAP SERPL CALCULATED.3IONS-SCNC: 14 MMOL/L (ref 3–11)
AST SERPL-CCNC: 50 U/L (ref 0–33)
B-OH-BUTYR SERPL-SCNC: 1.81 MMOL/L
BARBITURATES UR QL SCN: NEGATIVE
BASOPHILS # BLD AUTO: 0.05 10*3/MM3 (ref 0–0.2)
BASOPHILS NFR BLD AUTO: 0.7 % (ref 0–1)
BENZODIAZ UR QL SCN: NEGATIVE
BILIRUB SERPL-MCNC: 0.5 MG/DL (ref 0.3–1.2)
BILIRUB UR QL STRIP: NEGATIVE
BUN BLD-MCNC: <5 MG/DL (ref 9–23)
BUN/CREAT SERPL: ABNORMAL (ref 7–25)
BUPRENORPHINE SERPL-MCNC: NEGATIVE NG/ML
CALCIUM SPEC-SCNC: 9 MG/DL (ref 8.7–10.4)
CANNABINOIDS SERPL QL: NEGATIVE
CHLORIDE SERPL-SCNC: 98 MMOL/L (ref 99–109)
CLARITY UR: CLEAR
CO2 SERPL-SCNC: 27 MMOL/L (ref 20–31)
COCAINE UR QL: POSITIVE
COLOR UR: YELLOW
CREAT BLD-MCNC: 0.89 MG/DL (ref 0.6–1.3)
D-LACTATE SERPL-SCNC: 2.9 MMOL/L (ref 0.5–2)
D-LACTATE SERPL-SCNC: 4.7 MMOL/L (ref 0.5–2)
DEPRECATED RDW RBC AUTO: 53.4 FL (ref 37–54)
EOSINOPHIL # BLD AUTO: 0.06 10*3/MM3 (ref 0–0.3)
EOSINOPHIL NFR BLD AUTO: 0.8 % (ref 0–3)
ERYTHROCYTE [DISTWIDTH] IN BLOOD BY AUTOMATED COUNT: 15.2 % (ref 11.3–14.5)
ETHANOL BLD-MCNC: <10 MG/DL (ref 0–10)
GFR SERPL CREATININE-BSD FRML MDRD: 82 ML/MIN/1.73
GLOBULIN UR ELPH-MCNC: 2.6 GM/DL
GLUCOSE BLD-MCNC: 408 MG/DL (ref 70–100)
GLUCOSE BLDC GLUCOMTR-MCNC: 162 MG/DL (ref 70–130)
GLUCOSE BLDC GLUCOMTR-MCNC: 336 MG/DL (ref 70–130)
GLUCOSE BLDC GLUCOMTR-MCNC: 345 MG/DL (ref 70–130)
GLUCOSE UR STRIP-MCNC: ABNORMAL MG/DL
HCT VFR BLD AUTO: 37.3 % (ref 34.5–44)
HGB BLD-MCNC: 12.3 G/DL (ref 11.5–15.5)
HGB UR QL STRIP.AUTO: NEGATIVE
HOLD SPECIMEN: NORMAL
IMM GRANULOCYTES # BLD: 0.01 10*3/MM3 (ref 0–0.03)
IMM GRANULOCYTES NFR BLD: 0.1 % (ref 0–0.6)
KETONES UR QL STRIP: ABNORMAL
LEUKOCYTE ESTERASE UR QL STRIP.AUTO: NEGATIVE
LIPASE SERPL-CCNC: 196 U/L (ref 6–51)
LYMPHOCYTES # BLD AUTO: 2.95 10*3/MM3 (ref 0.6–4.8)
LYMPHOCYTES NFR BLD AUTO: 39.5 % (ref 24–44)
MCH RBC QN AUTO: 31.6 PG (ref 27–31)
MCHC RBC AUTO-ENTMCNC: 33 G/DL (ref 32–36)
MCV RBC AUTO: 95.9 FL (ref 80–99)
METHADONE UR QL SCN: NEGATIVE
MONOCYTES # BLD AUTO: 0.64 10*3/MM3 (ref 0–1)
MONOCYTES NFR BLD AUTO: 8.6 % (ref 0–12)
NEUTROPHILS # BLD AUTO: 3.77 10*3/MM3 (ref 1.5–8.3)
NEUTROPHILS NFR BLD AUTO: 50.4 % (ref 41–71)
NITRITE UR QL STRIP: NEGATIVE
OPIATES UR QL: NEGATIVE
OXYCODONE UR QL SCN: NEGATIVE
PCP UR QL SCN: NEGATIVE
PH UR STRIP.AUTO: 6.5 [PH] (ref 5–8)
PLAT MORPH BLD: NORMAL
PLATELET # BLD AUTO: 333 10*3/MM3 (ref 150–450)
PMV BLD AUTO: 10.8 FL (ref 6–12)
POTASSIUM BLD-SCNC: 3.4 MMOL/L (ref 3.5–5.5)
PROPOXYPH UR QL: NEGATIVE
PROT SERPL-MCNC: 6.3 G/DL (ref 5.7–8.2)
PROT UR QL STRIP: NEGATIVE
RBC # BLD AUTO: 3.89 10*6/MM3 (ref 3.89–5.14)
RBC MORPH BLD: NORMAL
SODIUM BLD-SCNC: 139 MMOL/L (ref 132–146)
SP GR UR STRIP: 1.03 (ref 1–1.03)
TRICYCLICS UR QL SCN: NEGATIVE
UROBILINOGEN UR QL STRIP: ABNORMAL
WBC MORPH BLD: NORMAL
WBC NRBC COR # BLD: 7.47 10*3/MM3 (ref 3.5–10.8)
WHOLE BLOOD HOLD SPECIMEN: NORMAL
WHOLE BLOOD HOLD SPECIMEN: NORMAL

## 2018-10-27 PROCEDURE — 81003 URINALYSIS AUTO W/O SCOPE: CPT | Performed by: EMERGENCY MEDICINE

## 2018-10-27 PROCEDURE — 85025 COMPLETE CBC W/AUTO DIFF WBC: CPT | Performed by: EMERGENCY MEDICINE

## 2018-10-27 PROCEDURE — 96361 HYDRATE IV INFUSION ADD-ON: CPT

## 2018-10-27 PROCEDURE — 25010000002 IOPAMIDOL 61 % SOLUTION: Performed by: EMERGENCY MEDICINE

## 2018-10-27 PROCEDURE — 63710000001 INSULIN REGULAR HUMAN PER 5 UNITS: Performed by: NURSE PRACTITIONER

## 2018-10-27 PROCEDURE — 25010000002 MORPHINE PER 10 MG: Performed by: EMERGENCY MEDICINE

## 2018-10-27 PROCEDURE — 83690 ASSAY OF LIPASE: CPT | Performed by: EMERGENCY MEDICINE

## 2018-10-27 PROCEDURE — 82962 GLUCOSE BLOOD TEST: CPT

## 2018-10-27 PROCEDURE — 85007 BL SMEAR W/DIFF WBC COUNT: CPT | Performed by: EMERGENCY MEDICINE

## 2018-10-27 PROCEDURE — 83605 ASSAY OF LACTIC ACID: CPT | Performed by: EMERGENCY MEDICINE

## 2018-10-27 PROCEDURE — 80306 DRUG TEST PRSMV INSTRMNT: CPT | Performed by: NURSE PRACTITIONER

## 2018-10-27 PROCEDURE — 96375 TX/PRO/DX INJ NEW DRUG ADDON: CPT

## 2018-10-27 PROCEDURE — 82010 KETONE BODYS QUAN: CPT | Performed by: NURSE PRACTITIONER

## 2018-10-27 PROCEDURE — 80053 COMPREHEN METABOLIC PANEL: CPT | Performed by: EMERGENCY MEDICINE

## 2018-10-27 PROCEDURE — 25010000002 ONDANSETRON PER 1 MG: Performed by: NURSE PRACTITIONER

## 2018-10-27 PROCEDURE — 80307 DRUG TEST PRSMV CHEM ANLYZR: CPT | Performed by: NURSE PRACTITIONER

## 2018-10-27 PROCEDURE — 96374 THER/PROPH/DIAG INJ IV PUSH: CPT

## 2018-10-27 PROCEDURE — 99284 EMERGENCY DEPT VISIT MOD MDM: CPT

## 2018-10-27 PROCEDURE — 74177 CT ABD & PELVIS W/CONTRAST: CPT

## 2018-10-27 RX ORDER — MORPHINE SULFATE 4 MG/ML
4 INJECTION, SOLUTION INTRAMUSCULAR; INTRAVENOUS ONCE
Status: COMPLETED | OUTPATIENT
Start: 2018-10-27 | End: 2018-10-27

## 2018-10-27 RX ORDER — ONDANSETRON 2 MG/ML
4 INJECTION INTRAMUSCULAR; INTRAVENOUS ONCE
Status: COMPLETED | OUTPATIENT
Start: 2018-10-27 | End: 2018-10-27

## 2018-10-27 RX ORDER — HYDROCODONE BITARTRATE AND ACETAMINOPHEN 5; 325 MG/1; MG/1
1 TABLET ORAL ONCE
Status: COMPLETED | OUTPATIENT
Start: 2018-10-27 | End: 2018-10-27

## 2018-10-27 RX ORDER — SODIUM CHLORIDE 0.9 % (FLUSH) 0.9 %
10 SYRINGE (ML) INJECTION AS NEEDED
Status: DISCONTINUED | OUTPATIENT
Start: 2018-10-27 | End: 2018-10-28 | Stop reason: HOSPADM

## 2018-10-27 RX ORDER — HYDROCODONE BITARTRATE AND ACETAMINOPHEN 5; 325 MG/1; MG/1
1 TABLET ORAL EVERY 6 HOURS PRN
Qty: 6 TABLET | Refills: 0 | Status: SHIPPED | OUTPATIENT
Start: 2018-10-27 | End: 2022-04-11 | Stop reason: HOSPADM

## 2018-10-27 RX ORDER — HYDROCODONE BITARTRATE AND ACETAMINOPHEN 5; 325 MG/1; MG/1
1 TABLET ORAL ONCE
Status: DISCONTINUED | OUTPATIENT
Start: 2018-10-27 | End: 2018-10-27

## 2018-10-27 RX ORDER — ONDANSETRON 4 MG/1
4 TABLET, ORALLY DISINTEGRATING ORAL EVERY 8 HOURS PRN
Qty: 14 TABLET | Refills: 0 | Status: SHIPPED | OUTPATIENT
Start: 2018-10-27 | End: 2022-04-11 | Stop reason: HOSPADM

## 2018-10-27 RX ADMIN — IOPAMIDOL 90 ML: 612 INJECTION, SOLUTION INTRAVENOUS at 17:32

## 2018-10-27 RX ADMIN — MORPHINE SULFATE 4 MG: 4 INJECTION, SOLUTION INTRAMUSCULAR; INTRAVENOUS at 20:10

## 2018-10-27 RX ADMIN — SODIUM CHLORIDE 1000 ML: 9 INJECTION, SOLUTION INTRAVENOUS at 20:02

## 2018-10-27 RX ADMIN — SODIUM CHLORIDE 1000 ML: 9 INJECTION, SOLUTION INTRAVENOUS at 17:33

## 2018-10-27 RX ADMIN — ONDANSETRON 4 MG: 2 INJECTION, SOLUTION INTRAMUSCULAR; INTRAVENOUS at 20:04

## 2018-10-27 RX ADMIN — SODIUM CHLORIDE 1000 ML: 9 INJECTION, SOLUTION INTRAVENOUS at 16:40

## 2018-10-27 RX ADMIN — INSULIN HUMAN 10 UNITS: 100 INJECTION, SOLUTION PARENTERAL at 20:16

## 2018-10-27 RX ADMIN — HYDROCODONE BITARTRATE AND ACETAMINOPHEN 1 TABLET: 5; 325 TABLET ORAL at 16:40

## 2018-12-23 ENCOUNTER — HOSPITAL ENCOUNTER (EMERGENCY)
Facility: HOSPITAL | Age: 48
Discharge: HOME | End: 2018-12-23
Attending: EMERGENCY MEDICINE | Admitting: EMERGENCY MEDICINE

## 2018-12-23 ENCOUNTER — APPOINTMENT (OUTPATIENT)
Dept: CT IMAGING | Facility: HOSPITAL | Age: 48
End: 2018-12-23

## 2018-12-23 VITALS
OXYGEN SATURATION: 96 % | SYSTOLIC BLOOD PRESSURE: 112 MMHG | BODY MASS INDEX: 39.86 KG/M2 | WEIGHT: 248 LBS | HEIGHT: 66 IN | TEMPERATURE: 98.1 F | RESPIRATION RATE: 20 BRPM | DIASTOLIC BLOOD PRESSURE: 70 MMHG | HEART RATE: 98 BPM
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
## 2018-12-23 DIAGNOSIS — S22.42XA CLOSED FRACTURE OF MULTIPLE RIBS OF LEFT SIDE, INITIAL ENCOUNTER: Primary | ICD-10-CM

## 2018-12-23 PROCEDURE — 25010000002 KETOROLAC TROMETHAMINE PER 15 MG: Performed by: EMERGENCY MEDICINE

## 2018-12-23 PROCEDURE — 71250 CT THORAX DX C-: CPT

## 2018-12-23 PROCEDURE — 70450 CT HEAD/BRAIN W/O DYE: CPT

## 2018-12-23 PROCEDURE — 99283 EMERGENCY DEPT VISIT LOW MDM: CPT

## 2018-12-23 PROCEDURE — 25010000002 HYDROMORPHONE PER 4 MG: Performed by: EMERGENCY MEDICINE

## 2018-12-23 PROCEDURE — 96372 THER/PROPH/DIAG INJ SC/IM: CPT

## 2018-12-23 RX ORDER — KETOROLAC TROMETHAMINE 15 MG/ML
15 INJECTION, SOLUTION INTRAMUSCULAR; INTRAVENOUS ONCE
Status: COMPLETED | OUTPATIENT
Start: 2018-12-23 | End: 2018-12-23

## 2018-12-23 RX ORDER — OXYCODONE AND ACETAMINOPHEN 7.5; 325 MG/1; MG/1
1 TABLET ORAL EVERY 6 HOURS PRN
Qty: 12 TABLET | Refills: 0 | Status: SHIPPED | OUTPATIENT
Start: 2018-12-23 | End: 2022-05-13 | Stop reason: HOSPADM

## 2018-12-23 RX ORDER — ONDANSETRON 2 MG/ML
4 INJECTION INTRAMUSCULAR; INTRAVENOUS ONCE
Status: DISCONTINUED | OUTPATIENT
Start: 2018-12-23 | End: 2018-12-23

## 2018-12-23 RX ORDER — HYDROMORPHONE HYDROCHLORIDE 1 MG/ML
0.5 INJECTION, SOLUTION INTRAMUSCULAR; INTRAVENOUS; SUBCUTANEOUS ONCE
Status: COMPLETED | OUTPATIENT
Start: 2018-12-23 | End: 2018-12-23

## 2018-12-23 RX ORDER — HYDROMORPHONE HYDROCHLORIDE 1 MG/ML
0.5 INJECTION, SOLUTION INTRAMUSCULAR; INTRAVENOUS; SUBCUTANEOUS ONCE
Status: DISCONTINUED | OUTPATIENT
Start: 2018-12-23 | End: 2018-12-23

## 2018-12-23 RX ORDER — KETOROLAC TROMETHAMINE 15 MG/ML
15 INJECTION, SOLUTION INTRAMUSCULAR; INTRAVENOUS ONCE
Status: DISCONTINUED | OUTPATIENT
Start: 2018-12-23 | End: 2018-12-23

## 2018-12-23 RX ORDER — OXYCODONE AND ACETAMINOPHEN 7.5; 325 MG/1; MG/1
1 TABLET ORAL ONCE
Status: COMPLETED | OUTPATIENT
Start: 2018-12-23 | End: 2018-12-23

## 2018-12-23 RX ORDER — LIDOCAINE 50 MG/G
1 PATCH TOPICAL
Status: DISCONTINUED | OUTPATIENT
Start: 2018-12-23 | End: 2018-12-23 | Stop reason: HOSPADM

## 2018-12-23 RX ADMIN — KETOROLAC TROMETHAMINE 15 MG: 15 INJECTION INTRAMUSCULAR; INTRAVENOUS at 16:15

## 2018-12-23 RX ADMIN — OXYCODONE HYDROCHLORIDE AND ACETAMINOPHEN 1 TABLET: 7.5; 325 TABLET ORAL at 18:00

## 2018-12-23 RX ADMIN — HYDROMORPHONE HYDROCHLORIDE 0.5 MG: 1 INJECTION, SOLUTION INTRAMUSCULAR; INTRAVENOUS; SUBCUTANEOUS at 16:14

## 2018-12-23 RX ADMIN — LIDOCAINE 1 PATCH: 50 PATCH CUTANEOUS at 17:59

## 2018-12-23 NOTE — DISCHARGE INSTRUCTIONS
Take 3 over-the-counter Ibuprofen tablets 3 times a day as needed for pain. Try to take the medication with food. If you develop upper abdominal discomfort, discontinue use.    Do your best to stop smoking.  Ensure that you are taking deep breaths several times an hour while awake.    Use over-the-counter Salonpas patches as per label instructions.  These are lidocaine patches.    Return if worse.    Please review the medications you are supposed to be taking according to prior physician recommendations. I have not changed your home medications during this visit. If your discharge instructions indicate that I have changed your home medications, this is not the case, and you should disregard. If you have any questions about the medication you should be taking at home, please call your physician.

## 2018-12-23 NOTE — ED PROVIDER NOTES
Subjective   Ms. Paris is a pleasant 48-year-old woman who presents to the emergency department complaining of left-sided chest wall pain which started yesterday evening after a fall.  At approximately 8 PM she suffered a slip and fall onto a tile floor.  She states she landed on her left flank.  She rates her pain severe.  She didn't strike her head and notes loss of consciousness.  Her significant other was there with her and reports that she lost consciousness for roughly 2 minutes per his estimation.  She awoke and seemed to be doing well so they did not present to the emergency department.  She complains of mild head pain but severe left chest wall pain.  She denies abdominal pain.  She denies urinary color change including urinary bleeding.  She did take ibuprofen last night without relief.  She denies dyspnea but notes increased pain in her left lateral rib cage with deep inspiration.            Review of Systems   Constitutional: Negative for fever.   Eyes: Negative for visual disturbance.   Respiratory: Negative for shortness of breath.    Cardiovascular: Positive for chest pain.   Gastrointestinal: Negative for abdominal pain, nausea and vomiting.   Genitourinary: Negative for hematuria.   Musculoskeletal: Negative for back pain and neck pain.   Skin: Negative for wound.   Neurological: Negative for syncope and headaches.   Psychiatric/Behavioral: Negative for agitation and confusion.   All other systems reviewed and are negative.      Past Medical History:   Diagnosis Date   • Arthritis    • Bipolar disorder (CMS/HCC)    • Chronic bronchitis (CMS/HCC)    • Depression    • Diabetes mellitus (CMS/HCC)     DIAGNOSED 2016   • GERD (gastroesophageal reflux disease)    • Hepatitis-C    • History of blood transfusion    • Hyperlipidemia    • Hypertension    • Infectious viral hepatitis     HEPATITIS C   • Migraine    • Sleep apnea     PATIENT UNSURE OF SETTINGS       No Known Allergies    Past Surgical  History:   Procedure Laterality Date   • CHOLECYSTECTOMY     • HYSTERECTOMY     • KNEE SURGERY     • LUMBAR LAMINECTOMY DISCECTOMY DECOMPRESSION N/A 8/6/2018    Procedure: LUMBAR LAMINECTOMIES L4-S1;  Surgeon: Chip Smith MD;  [facility]  Service: Neurosurgery       History reviewed. No pertinent family history.    Social History     Socioeconomic History   • Marital status: Single     Spouse name: Not on file   • Number of children: Not on file   • Years of education: Not on file   • Highest education level: Not on file   Tobacco Use   • Smoking status: Current Every Day Smoker     Packs/day: 1.00   • Smokeless tobacco: Never Used   Substance and Sexual Activity   • Alcohol use: No   • Drug use: No   • Sexual activity: Defer           Objective   Physical Exam   Constitutional: She is oriented to person, place, and time. She appears well-developed and well-nourished.   HENT:   Head: Normocephalic.   Tenderness in the left parieto-occipital region of the scalp.  No definite swelling/other abnormalities.  No evidence of skull fracture.   Eyes: Conjunctivae, EOM and lids are normal. Pupils are equal, round, and reactive to light.   Neck: Full passive range of motion without pain. Neck supple.   Cardiovascular: Normal rate, regular rhythm, normal heart sounds and normal pulses.   Pulmonary/Chest: Breath sounds normal. No respiratory distress. She exhibits tenderness (Left, lateral, inferior chest wall tenderness to palpation-severe.  No crepitus.  No asymmetry.).   Abdominal: Soft. Normal appearance. There is no tenderness. There is no guarding.   Neurological: She is alert and oriented to person, place, and time.   Skin: Skin is warm and dry.   Psychiatric: Judgment normal. Her mood appears anxious. Cognition and memory are normal.   Nursing note and vitals reviewed.      Procedures           ED Course  ED Course as of Dec 23 1747   Sun Dec 23, 2018   1652 I have reviewed the patient's CT scan of the  "chest and note a displaced left 10th rib fracture posterior lateral.  Also there is a nondisplaced 11th rib fracture.  I am awaiting radiologist confirmation as well as more detailed evaluation.  [MS]   1719 Patient is feeling improved after the Toradol and Dilaudid.  I have ordered Percocet and Lidoderm patch.  [MS]   1722 JONNATHAN query unsuccessful secondary to prolonged retrieval time/inoperable JONNATHAN system.     [MS]      ED Course User Index  [MS] Jaylon Booth MD      No results found for this or any previous visit (from the past 24 hour(s)).  Note: In addition to lab results from this visit, the labs listed above may include labs taken at another facility or during a different encounter within the last 24 hours. Please correlate lab times with ED admission and discharge times for further clarification of the services performed during this visit.    CT Head Without Contrast    (Results Pending)   CT Chest Without Contrast    (Results Pending)     Vitals:    12/23/18 1343   BP: 115/71   BP Location: Left arm   Patient Position: Sitting   Pulse: (!) 130   Resp: 18   Temp: 98.1 °F (36.7 °C)   TempSrc: Oral   SpO2: 96%   Weight: 112 kg (248 lb)   Height: 167.6 cm (66\")     Medications   sodium chloride 0.9 % bolus 1,000 mL (1,000 mL Intravenous Not Given 12/23/18 1619)   oxyCODONE-acetaminophen (PERCOCET) 7.5-325 MG per tablet 1 tablet (not administered)   lidocaine (LIDODERM) 5 % 1 patch (not administered)   HYDROmorphone (DILAUDID) injection 0.5 mg (0.5 mg Intramuscular Given 12/23/18 1614)   ketorolac (TORADOL) injection 15 mg (15 mg Intramuscular Given 12/23/18 1615)     ECG/EMG Results (last 24 hours)     ** No results found for the last 24 hours. **                    [facility]      Final diagnoses:   Closed fracture of multiple ribs of left side, initial encounter   Fall, initial encounter            Jaylon Booth MD  12/23/18 6989    "

## 2022-04-06 ENCOUNTER — APPOINTMENT (OUTPATIENT)
Dept: GENERAL RADIOLOGY | Facility: HOSPITAL | Age: 52
End: 2022-04-06

## 2022-04-06 ENCOUNTER — HOSPITAL ENCOUNTER (INPATIENT)
Facility: HOSPITAL | Age: 52
LOS: 5 days | Discharge: HOME | End: 2022-04-11
Attending: EMERGENCY MEDICINE | Admitting: INTERNAL MEDICINE

## 2022-04-06 ENCOUNTER — APPOINTMENT (OUTPATIENT)
Dept: CT IMAGING | Facility: HOSPITAL | Age: 52
End: 2022-04-06

## 2022-04-06 DIAGNOSIS — R73.9 HYPERGLYCEMIA: ICD-10-CM

## 2022-04-06 DIAGNOSIS — M47.816 FACET ARTHRITIS OF LUMBAR REGION: ICD-10-CM

## 2022-04-06 DIAGNOSIS — R10.84 GENERALIZED ABDOMINAL PAIN: ICD-10-CM

## 2022-04-06 DIAGNOSIS — B18.2 CHRONIC HEPATITIS C WITHOUT HEPATIC COMA: ICD-10-CM

## 2022-04-06 DIAGNOSIS — E08.10 DIABETIC KETOACIDOSIS WITHOUT COMA ASSOCIATED WITH DIABETES MELLITUS DUE TO UNDERLYING CONDITION: Primary | ICD-10-CM

## 2022-04-06 DIAGNOSIS — M51.36 DEGENERATIVE DISC DISEASE, LUMBAR: ICD-10-CM

## 2022-04-06 DIAGNOSIS — M48.062 SPINAL STENOSIS, LUMBAR REGION, WITH NEUROGENIC CLAUDICATION: ICD-10-CM

## 2022-04-06 DIAGNOSIS — M48.062 LUMBAR STENOSIS WITH NEUROGENIC CLAUDICATION: ICD-10-CM

## 2022-04-06 DIAGNOSIS — R11.2 NAUSEA AND VOMITING, UNSPECIFIED VOMITING TYPE: ICD-10-CM

## 2022-04-06 DIAGNOSIS — Z98.890 S/P LUMBAR LAMINECTOMY: ICD-10-CM

## 2022-04-06 DIAGNOSIS — R10.13 EPIGASTRIC PAIN: ICD-10-CM

## 2022-04-06 PROBLEM — E11.10 DKA (DIABETIC KETOACIDOSIS) (HCC): Status: ACTIVE | Noted: 2022-04-06

## 2022-04-06 LAB
ALBUMIN SERPL-MCNC: 4.5 G/DL (ref 3.5–5.2)
ALBUMIN/GLOB SERPL: 1.4 G/DL
ALP SERPL-CCNC: 240 U/L (ref 39–117)
ALT SERPL W P-5'-P-CCNC: 73 U/L (ref 1–33)
ANION GAP SERPL CALCULATED.3IONS-SCNC: 11 MMOL/L (ref 5–15)
ANION GAP SERPL CALCULATED.3IONS-SCNC: 12 MMOL/L (ref 5–15)
ANION GAP SERPL CALCULATED.3IONS-SCNC: 17 MMOL/L (ref 5–15)
ANION GAP SERPL CALCULATED.3IONS-SCNC: 28 MMOL/L (ref 5–15)
AST SERPL-CCNC: 67 U/L (ref 1–32)
B-HCG UR QL: NEGATIVE
BACTERIA UR QL AUTO: ABNORMAL /HPF
BASOPHILS # BLD AUTO: 0.03 10*3/MM3 (ref 0–0.2)
BASOPHILS NFR BLD AUTO: 0.4 % (ref 0–1.5)
BILIRUB SERPL-MCNC: 0.5 MG/DL (ref 0–1.2)
BILIRUB UR QL STRIP: NEGATIVE
BUN SERPL-MCNC: 4 MG/DL (ref 6–20)
BUN SERPL-MCNC: 5 MG/DL (ref 6–20)
BUN SERPL-MCNC: 6 MG/DL (ref 6–20)
BUN SERPL-MCNC: 6 MG/DL (ref 6–20)
BUN/CREAT SERPL: 5.9 (ref 7–25)
BUN/CREAT SERPL: 6.5 (ref 7–25)
BUN/CREAT SERPL: 6.7 (ref 7–25)
BUN/CREAT SERPL: 7.5 (ref 7–25)
CALCIUM SPEC-SCNC: 8.3 MG/DL (ref 8.6–10.5)
CALCIUM SPEC-SCNC: 8.5 MG/DL (ref 8.6–10.5)
CALCIUM SPEC-SCNC: 8.6 MG/DL (ref 8.6–10.5)
CALCIUM SPEC-SCNC: 9.6 MG/DL (ref 8.6–10.5)
CHLORIDE SERPL-SCNC: 107 MMOL/L (ref 98–107)
CHLORIDE SERPL-SCNC: 111 MMOL/L (ref 98–107)
CHLORIDE SERPL-SCNC: 112 MMOL/L (ref 98–107)
CHLORIDE SERPL-SCNC: 98 MMOL/L (ref 98–107)
CHOLEST SERPL-MCNC: 177 MG/DL (ref 0–200)
CLARITY UR: CLEAR
CO2 SERPL-SCNC: 10 MMOL/L (ref 22–29)
CO2 SERPL-SCNC: 14 MMOL/L (ref 22–29)
CO2 SERPL-SCNC: 15 MMOL/L (ref 22–29)
CO2 SERPL-SCNC: 16 MMOL/L (ref 22–29)
COLOR UR: YELLOW
CREAT SERPL-MCNC: 0.62 MG/DL (ref 0.57–1)
CREAT SERPL-MCNC: 0.67 MG/DL (ref 0.57–1)
CREAT SERPL-MCNC: 0.89 MG/DL (ref 0.57–1)
CREAT SERPL-MCNC: 1.02 MG/DL (ref 0.57–1)
D-LACTATE SERPL-SCNC: 2.1 MMOL/L (ref 0.5–2)
D-LACTATE SERPL-SCNC: 3.4 MMOL/L (ref 0.5–2)
DEPRECATED RDW RBC AUTO: 51.8 FL (ref 37–54)
EGFRCR SERPLBLD CKD-EPI 2021: 106 ML/MIN/1.73
EGFRCR SERPLBLD CKD-EPI 2021: 108 ML/MIN/1.73
EGFRCR SERPLBLD CKD-EPI 2021: 66.7 ML/MIN/1.73
EGFRCR SERPLBLD CKD-EPI 2021: 78.6 ML/MIN/1.73
EOSINOPHIL # BLD AUTO: 0 10*3/MM3 (ref 0–0.4)
EOSINOPHIL NFR BLD AUTO: 0 % (ref 0.3–6.2)
ERYTHROCYTE [DISTWIDTH] IN BLOOD BY AUTOMATED COUNT: 14.2 % (ref 12.3–15.4)
FLUAV RNA RESP QL NAA+PROBE: NOT DETECTED
FLUBV RNA RESP QL NAA+PROBE: NOT DETECTED
GLOBULIN UR ELPH-MCNC: 3.3 GM/DL
GLUCOSE BLDC GLUCOMTR-MCNC: 102 MG/DL (ref 70–130)
GLUCOSE BLDC GLUCOMTR-MCNC: 159 MG/DL (ref 70–130)
GLUCOSE BLDC GLUCOMTR-MCNC: 295 MG/DL (ref 70–130)
GLUCOSE BLDC GLUCOMTR-MCNC: 310 MG/DL (ref 70–130)
GLUCOSE BLDC GLUCOMTR-MCNC: 314 MG/DL (ref 70–130)
GLUCOSE BLDC GLUCOMTR-MCNC: 356 MG/DL (ref 70–130)
GLUCOSE BLDC GLUCOMTR-MCNC: 409 MG/DL (ref 70–130)
GLUCOSE BLDC GLUCOMTR-MCNC: 446 MG/DL (ref 70–130)
GLUCOSE BLDC GLUCOMTR-MCNC: 452 MG/DL (ref 70–130)
GLUCOSE BLDC GLUCOMTR-MCNC: 456 MG/DL (ref 70–130)
GLUCOSE BLDC GLUCOMTR-MCNC: 488 MG/DL (ref 70–130)
GLUCOSE BLDC GLUCOMTR-MCNC: 497 MG/DL (ref 70–130)
GLUCOSE BLDC GLUCOMTR-MCNC: 499 MG/DL (ref 70–130)
GLUCOSE BLDC GLUCOMTR-MCNC: 94 MG/DL (ref 70–130)
GLUCOSE SERPL-MCNC: 314 MG/DL (ref 65–99)
GLUCOSE SERPL-MCNC: 429 MG/DL (ref 65–99)
GLUCOSE SERPL-MCNC: 500 MG/DL (ref 65–99)
GLUCOSE SERPL-MCNC: 91 MG/DL (ref 65–99)
GLUCOSE UR STRIP-MCNC: ABNORMAL MG/DL
HBA1C MFR BLD: 11.8 % (ref 4.8–5.6)
HCT VFR BLD AUTO: 45.3 % (ref 34–46.6)
HDLC SERPL-MCNC: 77 MG/DL (ref 40–60)
HGB BLD-MCNC: 14.5 G/DL (ref 12–15.9)
HGB UR QL STRIP.AUTO: ABNORMAL
HOLD SPECIMEN: NORMAL
HYALINE CASTS UR QL AUTO: ABNORMAL /LPF
IMM GRANULOCYTES # BLD AUTO: 0.03 10*3/MM3 (ref 0–0.05)
IMM GRANULOCYTES NFR BLD AUTO: 0.4 % (ref 0–0.5)
KETONES UR QL STRIP: ABNORMAL
LDLC SERPL CALC-MCNC: 73 MG/DL (ref 0–100)
LDLC/HDLC SERPL: 0.88 {RATIO}
LEUKOCYTE ESTERASE UR QL STRIP.AUTO: NEGATIVE
LIPASE SERPL-CCNC: 98 U/L (ref 13–60)
LYMPHOCYTES # BLD AUTO: 2.58 10*3/MM3 (ref 0.7–3.1)
LYMPHOCYTES NFR BLD AUTO: 34.5 % (ref 19.6–45.3)
MAGNESIUM SERPL-MCNC: 1.7 MG/DL (ref 1.6–2.6)
MAGNESIUM SERPL-MCNC: 1.8 MG/DL (ref 1.6–2.6)
MAGNESIUM SERPL-MCNC: 1.9 MG/DL (ref 1.6–2.6)
MCH RBC QN AUTO: 32.2 PG (ref 26.6–33)
MCHC RBC AUTO-ENTMCNC: 32 G/DL (ref 31.5–35.7)
MCV RBC AUTO: 100.7 FL (ref 79–97)
MONOCYTES # BLD AUTO: 0.6 10*3/MM3 (ref 0.1–0.9)
MONOCYTES NFR BLD AUTO: 8 % (ref 5–12)
NEUTROPHILS NFR BLD AUTO: 4.23 10*3/MM3 (ref 1.7–7)
NEUTROPHILS NFR BLD AUTO: 56.7 % (ref 42.7–76)
NITRITE UR QL STRIP: NEGATIVE
NRBC BLD AUTO-RTO: 0 /100 WBC (ref 0–0.2)
OSMOLALITY SERPL: 318 MOSM/KG (ref 275–295)
PH UR STRIP.AUTO: 6 [PH] (ref 5–8)
PHOSPHATE SERPL-MCNC: 0.9 MG/DL (ref 2.5–4.5)
PHOSPHATE SERPL-MCNC: 1.7 MG/DL (ref 2.5–4.5)
PHOSPHATE SERPL-MCNC: 1.8 MG/DL (ref 2.5–4.5)
PHOSPHATE SERPL-MCNC: 3.7 MG/DL (ref 2.5–4.5)
PLATELET # BLD AUTO: 438 10*3/MM3 (ref 140–450)
PMV BLD AUTO: 10.1 FL (ref 6–12)
POTASSIUM SERPL-SCNC: 3.6 MMOL/L (ref 3.5–5.2)
POTASSIUM SERPL-SCNC: 3.6 MMOL/L (ref 3.5–5.2)
POTASSIUM SERPL-SCNC: 3.8 MMOL/L (ref 3.5–5.2)
POTASSIUM SERPL-SCNC: 4.1 MMOL/L (ref 3.5–5.2)
PROT SERPL-MCNC: 7.8 G/DL (ref 6–8.5)
PROT UR QL STRIP: ABNORMAL
QT INTERVAL: 342 MS
QTC INTERVAL: 454 MS
RBC # BLD AUTO: 4.5 10*6/MM3 (ref 3.77–5.28)
RBC # UR STRIP: ABNORMAL /HPF
REF LAB TEST METHOD: ABNORMAL
SARS-COV-2 RNA RESP QL NAA+PROBE: NOT DETECTED
SODIUM SERPL-SCNC: 136 MMOL/L (ref 136–145)
SODIUM SERPL-SCNC: 138 MMOL/L (ref 136–145)
SODIUM SERPL-SCNC: 138 MMOL/L (ref 136–145)
SODIUM SERPL-SCNC: 139 MMOL/L (ref 136–145)
SP GR UR STRIP: 1.03 (ref 1–1.03)
SQUAMOUS #/AREA URNS HPF: ABNORMAL /HPF
TRIGL SERPL-MCNC: 160 MG/DL (ref 0–150)
TROPONIN T SERPL-MCNC: <0.01 NG/ML (ref 0–0.03)
UROBILINOGEN UR QL STRIP: ABNORMAL
VLDLC SERPL-MCNC: 27 MG/DL (ref 5–40)
WBC # UR STRIP: ABNORMAL /HPF
WBC NRBC COR # BLD: 7.47 10*3/MM3 (ref 3.4–10.8)
WHOLE BLOOD HOLD SPECIMEN: NORMAL
WHOLE BLOOD HOLD SPECIMEN: NORMAL

## 2022-04-06 PROCEDURE — 25010000002 MORPHINE PER 10 MG: Performed by: INTERNAL MEDICINE

## 2022-04-06 PROCEDURE — 83605 ASSAY OF LACTIC ACID: CPT | Performed by: EMERGENCY MEDICINE

## 2022-04-06 PROCEDURE — 99284 EMERGENCY DEPT VISIT MOD MDM: CPT

## 2022-04-06 PROCEDURE — 25010000002 IOPAMIDOL 61 % SOLUTION: Performed by: INTERNAL MEDICINE

## 2022-04-06 PROCEDURE — 25010000002 HYDROMORPHONE PER 4 MG: Performed by: EMERGENCY MEDICINE

## 2022-04-06 PROCEDURE — 93005 ELECTROCARDIOGRAM TRACING: CPT | Performed by: EMERGENCY MEDICINE

## 2022-04-06 PROCEDURE — 0 POTASSIUM CHLORIDE PER 2 MEQ: Performed by: EMERGENCY MEDICINE

## 2022-04-06 PROCEDURE — 83036 HEMOGLOBIN GLYCOSYLATED A1C: CPT | Performed by: EMERGENCY MEDICINE

## 2022-04-06 PROCEDURE — 83930 ASSAY OF BLOOD OSMOLALITY: CPT | Performed by: EMERGENCY MEDICINE

## 2022-04-06 PROCEDURE — 87636 SARSCOV2 & INF A&B AMP PRB: CPT | Performed by: EMERGENCY MEDICINE

## 2022-04-06 PROCEDURE — 83690 ASSAY OF LIPASE: CPT | Performed by: EMERGENCY MEDICINE

## 2022-04-06 PROCEDURE — 25010000002 HEPARIN (PORCINE) PER 1000 UNITS: Performed by: INTERNAL MEDICINE

## 2022-04-06 PROCEDURE — 82962 GLUCOSE BLOOD TEST: CPT

## 2022-04-06 PROCEDURE — 84484 ASSAY OF TROPONIN QUANT: CPT | Performed by: EMERGENCY MEDICINE

## 2022-04-06 PROCEDURE — 25010000002 THIAMINE PER 100 MG: Performed by: INTERNAL MEDICINE

## 2022-04-06 PROCEDURE — 83735 ASSAY OF MAGNESIUM: CPT | Performed by: INTERNAL MEDICINE

## 2022-04-06 PROCEDURE — 80306 DRUG TEST PRSMV INSTRMNT: CPT | Performed by: PHYSICIAN ASSISTANT

## 2022-04-06 PROCEDURE — 84100 ASSAY OF PHOSPHORUS: CPT | Performed by: EMERGENCY MEDICINE

## 2022-04-06 PROCEDURE — 80061 LIPID PANEL: CPT | Performed by: INTERNAL MEDICINE

## 2022-04-06 PROCEDURE — 99223 1ST HOSP IP/OBS HIGH 75: CPT | Performed by: INTERNAL MEDICINE

## 2022-04-06 PROCEDURE — 85025 COMPLETE CBC W/AUTO DIFF WBC: CPT | Performed by: EMERGENCY MEDICINE

## 2022-04-06 PROCEDURE — 84100 ASSAY OF PHOSPHORUS: CPT | Performed by: INTERNAL MEDICINE

## 2022-04-06 PROCEDURE — 74177 CT ABD & PELVIS W/CONTRAST: CPT

## 2022-04-06 PROCEDURE — 36415 COLL VENOUS BLD VENIPUNCTURE: CPT

## 2022-04-06 PROCEDURE — 25010000002 METOCLOPRAMIDE PER 10 MG: Performed by: EMERGENCY MEDICINE

## 2022-04-06 PROCEDURE — 81025 URINE PREGNANCY TEST: CPT | Performed by: EMERGENCY MEDICINE

## 2022-04-06 PROCEDURE — 71045 X-RAY EXAM CHEST 1 VIEW: CPT

## 2022-04-06 PROCEDURE — 80053 COMPREHEN METABOLIC PANEL: CPT | Performed by: EMERGENCY MEDICINE

## 2022-04-06 PROCEDURE — 81001 URINALYSIS AUTO W/SCOPE: CPT | Performed by: EMERGENCY MEDICINE

## 2022-04-06 RX ORDER — DEXTROSE, SODIUM CHLORIDE, AND POTASSIUM CHLORIDE 5; .9; .15 G/100ML; G/100ML; G/100ML
150 INJECTION INTRAVENOUS CONTINUOUS PRN
Status: DISCONTINUED | OUTPATIENT
Start: 2022-04-06 | End: 2022-04-07

## 2022-04-06 RX ORDER — DEXTROSE, SODIUM CHLORIDE, AND POTASSIUM CHLORIDE 5; .45; .15 G/100ML; G/100ML; G/100ML
150 INJECTION INTRAVENOUS CONTINUOUS PRN
Status: DISCONTINUED | OUTPATIENT
Start: 2022-04-06 | End: 2022-04-08

## 2022-04-06 RX ORDER — SODIUM CHLORIDE 0.9 % (FLUSH) 0.9 %
10 SYRINGE (ML) INJECTION AS NEEDED
Status: DISCONTINUED | OUTPATIENT
Start: 2022-04-06 | End: 2022-04-11 | Stop reason: HOSPADM

## 2022-04-06 RX ORDER — DEXTROSE MONOHYDRATE 25 G/50ML
12.5 INJECTION, SOLUTION INTRAVENOUS AS NEEDED
Status: DISCONTINUED | OUTPATIENT
Start: 2022-04-06 | End: 2022-04-07

## 2022-04-06 RX ORDER — MORPHINE SULFATE 2 MG/ML
1 INJECTION, SOLUTION INTRAMUSCULAR; INTRAVENOUS EVERY 4 HOURS PRN
Status: DISCONTINUED | OUTPATIENT
Start: 2022-04-06 | End: 2022-04-10

## 2022-04-06 RX ORDER — DEXTROSE AND SODIUM CHLORIDE 5; .9 G/100ML; G/100ML
150 INJECTION, SOLUTION INTRAVENOUS CONTINUOUS PRN
Status: DISCONTINUED | OUTPATIENT
Start: 2022-04-06 | End: 2022-04-07

## 2022-04-06 RX ORDER — SODIUM CHLORIDE 9 MG/ML
10 INJECTION, SOLUTION INTRAVENOUS CONTINUOUS PRN
Status: DISCONTINUED | OUTPATIENT
Start: 2022-04-06 | End: 2022-04-07

## 2022-04-06 RX ORDER — SODIUM CHLORIDE 0.9 % (FLUSH) 0.9 %
10 SYRINGE (ML) INJECTION EVERY 12 HOURS SCHEDULED
Status: DISCONTINUED | OUTPATIENT
Start: 2022-04-06 | End: 2022-04-07

## 2022-04-06 RX ORDER — CLONIDINE HYDROCHLORIDE 0.1 MG/1
0.1 TABLET ORAL EVERY 8 HOURS PRN
Status: DISCONTINUED | OUTPATIENT
Start: 2022-04-06 | End: 2022-04-11 | Stop reason: HOSPADM

## 2022-04-06 RX ORDER — ONDANSETRON 4 MG/1
4 TABLET, FILM COATED ORAL EVERY 6 HOURS PRN
Status: DISCONTINUED | OUTPATIENT
Start: 2022-04-06 | End: 2022-04-09

## 2022-04-06 RX ORDER — DEXTROSE, SODIUM CHLORIDE, AND POTASSIUM CHLORIDE 5; .45; .3 G/100ML; G/100ML; G/100ML
150 INJECTION INTRAVENOUS CONTINUOUS PRN
Status: DISCONTINUED | OUTPATIENT
Start: 2022-04-06 | End: 2022-04-07

## 2022-04-06 RX ORDER — HYDROXYZINE HYDROCHLORIDE 25 MG/1
25 TABLET, FILM COATED ORAL 3 TIMES DAILY PRN
Status: DISCONTINUED | OUTPATIENT
Start: 2022-04-06 | End: 2022-04-11 | Stop reason: HOSPADM

## 2022-04-06 RX ORDER — IPRATROPIUM BROMIDE AND ALBUTEROL SULFATE 2.5; .5 MG/3ML; MG/3ML
3 SOLUTION RESPIRATORY (INHALATION) EVERY 4 HOURS PRN
Status: DISCONTINUED | OUTPATIENT
Start: 2022-04-06 | End: 2022-04-11 | Stop reason: HOSPADM

## 2022-04-06 RX ORDER — HEPARIN SODIUM 5000 [USP'U]/ML
5000 INJECTION, SOLUTION INTRAVENOUS; SUBCUTANEOUS EVERY 8 HOURS SCHEDULED
Status: DISCONTINUED | OUTPATIENT
Start: 2022-04-06 | End: 2022-04-11 | Stop reason: HOSPADM

## 2022-04-06 RX ORDER — NALOXONE HCL 0.4 MG/ML
0.4 VIAL (ML) INJECTION
Status: DISCONTINUED | OUTPATIENT
Start: 2022-04-06 | End: 2022-04-11 | Stop reason: HOSPADM

## 2022-04-06 RX ORDER — DEXTROSE MONOHYDRATE 25 G/50ML
25-50 INJECTION, SOLUTION INTRAVENOUS
Status: CANCELLED | OUTPATIENT
Start: 2022-04-06

## 2022-04-06 RX ORDER — ONDANSETRON 2 MG/ML
4 INJECTION INTRAMUSCULAR; INTRAVENOUS EVERY 6 HOURS PRN
Status: DISCONTINUED | OUTPATIENT
Start: 2022-04-06 | End: 2022-04-11 | Stop reason: HOSPADM

## 2022-04-06 RX ORDER — ACETAMINOPHEN 650 MG/1
650 SUPPOSITORY RECTAL EVERY 4 HOURS PRN
Status: DISCONTINUED | OUTPATIENT
Start: 2022-04-06 | End: 2022-04-11 | Stop reason: HOSPADM

## 2022-04-06 RX ORDER — SODIUM CHLORIDE AND POTASSIUM CHLORIDE 150; 900 MG/100ML; MG/100ML
250 INJECTION, SOLUTION INTRAVENOUS CONTINUOUS PRN
Status: DISCONTINUED | OUTPATIENT
Start: 2022-04-06 | End: 2022-04-07

## 2022-04-06 RX ORDER — DEXTROSE AND SODIUM CHLORIDE 5; .45 G/100ML; G/100ML
150 INJECTION, SOLUTION INTRAVENOUS CONTINUOUS PRN
Status: DISCONTINUED | OUTPATIENT
Start: 2022-04-06 | End: 2022-04-07

## 2022-04-06 RX ORDER — SERTRALINE HYDROCHLORIDE 100 MG/1
100 TABLET, FILM COATED ORAL DAILY
Status: DISCONTINUED | OUTPATIENT
Start: 2022-04-06 | End: 2022-04-11 | Stop reason: HOSPADM

## 2022-04-06 RX ORDER — SODIUM CHLORIDE AND POTASSIUM CHLORIDE 300; 900 MG/100ML; MG/100ML
250 INJECTION, SOLUTION INTRAVENOUS CONTINUOUS PRN
Status: DISCONTINUED | OUTPATIENT
Start: 2022-04-06 | End: 2022-04-07

## 2022-04-06 RX ORDER — ACETAMINOPHEN 325 MG/1
650 TABLET ORAL EVERY 4 HOURS PRN
Status: DISCONTINUED | OUTPATIENT
Start: 2022-04-06 | End: 2022-04-11 | Stop reason: HOSPADM

## 2022-04-06 RX ORDER — ACETAMINOPHEN 160 MG/5ML
650 SOLUTION ORAL EVERY 4 HOURS PRN
Status: DISCONTINUED | OUTPATIENT
Start: 2022-04-06 | End: 2022-04-11 | Stop reason: HOSPADM

## 2022-04-06 RX ORDER — SODIUM CHLORIDE 450 MG/100ML
250 INJECTION, SOLUTION INTRAVENOUS CONTINUOUS PRN
Status: DISCONTINUED | OUTPATIENT
Start: 2022-04-06 | End: 2022-04-07

## 2022-04-06 RX ORDER — BUSPIRONE HYDROCHLORIDE 10 MG/1
10 TABLET ORAL 2 TIMES DAILY
Status: DISCONTINUED | OUTPATIENT
Start: 2022-04-06 | End: 2022-04-11 | Stop reason: HOSPADM

## 2022-04-06 RX ORDER — SODIUM CHLORIDE 0.9 % (FLUSH) 0.9 %
10 SYRINGE (ML) INJECTION AS NEEDED
Status: DISCONTINUED | OUTPATIENT
Start: 2022-04-06 | End: 2022-04-07

## 2022-04-06 RX ORDER — HYDROMORPHONE HYDROCHLORIDE 1 MG/ML
0.5 INJECTION, SOLUTION INTRAMUSCULAR; INTRAVENOUS; SUBCUTANEOUS ONCE
Status: COMPLETED | OUTPATIENT
Start: 2022-04-06 | End: 2022-04-06

## 2022-04-06 RX ORDER — POTASSIUM CHLORIDE, DEXTROSE MONOHYDRATE AND SODIUM CHLORIDE 300; 5; 900 MG/100ML; G/100ML; MG/100ML
150 INJECTION, SOLUTION INTRAVENOUS CONTINUOUS PRN
Status: DISCONTINUED | OUTPATIENT
Start: 2022-04-06 | End: 2022-04-07

## 2022-04-06 RX ORDER — SODIUM CHLORIDE 9 MG/ML
250 INJECTION, SOLUTION INTRAVENOUS CONTINUOUS PRN
Status: DISCONTINUED | OUTPATIENT
Start: 2022-04-06 | End: 2022-04-07

## 2022-04-06 RX ORDER — SODIUM CHLORIDE 0.9 % (FLUSH) 0.9 %
10 SYRINGE (ML) INJECTION EVERY 12 HOURS SCHEDULED
Status: DISCONTINUED | OUTPATIENT
Start: 2022-04-06 | End: 2022-04-11 | Stop reason: HOSPADM

## 2022-04-06 RX ORDER — HYDROCODONE BITARTRATE AND ACETAMINOPHEN 5; 325 MG/1; MG/1
1 TABLET ORAL EVERY 6 HOURS PRN
Status: DISCONTINUED | OUTPATIENT
Start: 2022-04-06 | End: 2022-04-10

## 2022-04-06 RX ORDER — SODIUM CHLORIDE AND POTASSIUM CHLORIDE 150; 450 MG/100ML; MG/100ML
250 INJECTION, SOLUTION INTRAVENOUS CONTINUOUS PRN
Status: DISCONTINUED | OUTPATIENT
Start: 2022-04-06 | End: 2022-04-07

## 2022-04-06 RX ORDER — SODIUM CHLORIDE 0.9 % (FLUSH) 0.9 %
10 SYRINGE (ML) INJECTION ONCE AS NEEDED
Status: DISCONTINUED | OUTPATIENT
Start: 2022-04-06 | End: 2022-04-07

## 2022-04-06 RX ORDER — METOCLOPRAMIDE HYDROCHLORIDE 5 MG/ML
10 INJECTION INTRAMUSCULAR; INTRAVENOUS ONCE
Status: COMPLETED | OUTPATIENT
Start: 2022-04-06 | End: 2022-04-06

## 2022-04-06 RX ADMIN — SERTRALINE 100 MG: 100 TABLET, FILM COATED ORAL at 19:01

## 2022-04-06 RX ADMIN — HYDROCODONE BITARTRATE AND ACETAMINOPHEN 1 TABLET: 5; 325 TABLET ORAL at 22:07

## 2022-04-06 RX ADMIN — CLONIDINE HYDROCHLORIDE 0.1 MG: 0.1 TABLET ORAL at 22:12

## 2022-04-06 RX ADMIN — HYDROXYZINE HYDROCHLORIDE 25 MG: 25 TABLET, FILM COATED ORAL at 22:07

## 2022-04-06 RX ADMIN — INSULIN HUMAN 6 UNITS/HR: 1 INJECTION, SOLUTION INTRAVENOUS at 13:44

## 2022-04-06 RX ADMIN — POTASSIUM PHOSPHATE, MONOBASIC POTASSIUM PHOSPHATE, DIBASIC 45 MMOL: 224; 236 INJECTION, SOLUTION, CONCENTRATE INTRAVENOUS at 22:48

## 2022-04-06 RX ADMIN — MORPHINE SULFATE 1 MG: 2 INJECTION, SOLUTION INTRAMUSCULAR; INTRAVENOUS at 18:00

## 2022-04-06 RX ADMIN — IOPAMIDOL 75 ML: 612 INJECTION, SOLUTION INTRAVENOUS at 13:29

## 2022-04-06 RX ADMIN — HEPARIN SODIUM 5000 UNITS: 5000 INJECTION, SOLUTION INTRAVENOUS; SUBCUTANEOUS at 21:24

## 2022-04-06 RX ADMIN — THIAMINE HYDROCHLORIDE 100 MG: 100 INJECTION, SOLUTION INTRAMUSCULAR; INTRAVENOUS at 19:08

## 2022-04-06 RX ADMIN — POTASSIUM CHLORIDE AND SODIUM CHLORIDE 250 ML/HR: 450; 150 INJECTION, SOLUTION INTRAVENOUS at 22:07

## 2022-04-06 RX ADMIN — SODIUM CHLORIDE 1000 ML: 9 INJECTION, SOLUTION INTRAVENOUS at 13:38

## 2022-04-06 RX ADMIN — HYDROMORPHONE HYDROCHLORIDE 0.5 MG: 1 INJECTION, SOLUTION INTRAMUSCULAR; INTRAVENOUS; SUBCUTANEOUS at 13:42

## 2022-04-06 RX ADMIN — Medication 10 ML: at 13:52

## 2022-04-06 RX ADMIN — BUSPIRONE HYDROCHLORIDE 10 MG: 10 TABLET ORAL at 20:22

## 2022-04-06 RX ADMIN — METOPROLOL TARTRATE 25 MG: 25 TABLET, FILM COATED ORAL at 20:22

## 2022-04-06 RX ADMIN — METOCLOPRAMIDE 10 MG: 5 INJECTION, SOLUTION INTRAMUSCULAR; INTRAVENOUS at 13:40

## 2022-04-06 RX ADMIN — SODIUM CHLORIDE 250 ML/HR: 9 INJECTION, SOLUTION INTRAVENOUS at 16:22

## 2022-04-06 NOTE — PLAN OF CARE
Goal Outcome Evaluation:         PT admitted for HHS Diabetic ketoacidosis with glucose > Blood glucose checked on admission  to floor was 488 then hourly per protocol 488, 449 and at 1830 319. Pt2 liter normal saline bolus initiated in the ER completed then NS infused at 250cc/hr. Insulin drip was continuous at 6units but increased to 10units per Dr Costello. Drip was maintained at 10units then decreased to 6units at 1800hrs as ordered to follow DKA protocol. IV solution was changed to 1/2ns plus 20meq K at 250ml/hr. BMP drawn at 1700 values returned potassium at 3.6 . Sodium 138 corrected 142 ,mg 1.9 and phos 1.7 .. Night nurses are to continue to follow DKA protocol per Dr Costello.. PT was given morphine for pain which damian pain for awhile and she was able to sleep..

## 2022-04-06 NOTE — H&P
[facility] Medicine Services  HISTORY AND PHYSICAL    Patient Name: Bernadette Paris  : 1970  MRN: [id number]  Primary Care Physician: Lilly Rose MD  Date of admission: 2022      Subjective   Subjective     Chief Complaint:  N/V    HPI:  Bernadette Paris is a 52 y.o. female with past medical history significant for insulin-dependent diabetes mellitus, hypertension, hyperlipidemia, hepatitis, bipolar disorder, depression, chronic pain syndrome and she uses narcotics for pain control.  Patient comes to the emergency room complaining of nausea vomiting for several weeks that progressively has gotten worse.  As mentioned she is on insulin however compliance with insulin is highly questionable.  Labs at the emergency room show hyperglycemia, severe ketoacidosis with significantly low pH.  Patient was a started on insulin drip, IV fluid bolus, DKA protocol.  Patient denies any fever or chills.  No cough or coryza or any respiratory issues for the past several days.  No diarrhea although she does have abdominal pain.      Review of Systems   Complains of some weight loss recently, no night sweats, no lumps or bumps, no unusual headaches, complains of blurry vision sometimes, no chest pain or shortness of breath, nausea vomiting as mentioned above, abdominal pain as mentioned above, no rashes or hives.  All other systems reviewed and are negative.     Personal History     Past Medical History:   Diagnosis Date   • Arthritis    • Bipolar disorder (HCC)    • Chronic bronchitis (HCC)    • Depression    • Diabetes mellitus (HCC)     DIAGNOSED    • GERD (gastroesophageal reflux disease)    • Hepatitis-C    • History of blood transfusion    • Hyperlipidemia    • Hypertension    • Infectious viral hepatitis     HEPATITIS C   • Migraine    • Sleep apnea     PATIENT UNSURE OF SETTINGS             Past Surgical History:   Procedure Laterality Date   • CHOLECYSTECTOMY     • HYSTERECTOMY      • KNEE SURGERY     • LUMBAR LAMINECTOMY DISCECTOMY DECOMPRESSION N/A 8/6/2018    Procedure: LUMBAR LAMINECTOMIES L4-S1;  Surgeon: Chip Smith MD;  Location: [facility];  Service: Neurosurgery       Family History:  family history is not on file. Otherwise pertinent FHx was reviewed and unremarkable.     Social History:  reports that she has been smoking. She has been smoking about 1.00 pack per day. She has never used smokeless tobacco. She reports that she does not drink alcohol and does not use drugs.  Social History     Social History Narrative   • Not on file       Medications:  Available home medication information reviewed.  Medications Prior to Admission   Medication Sig Dispense Refill Last Dose   • ACCU-CHEK FASTCLIX LANCETS misc   0    • ACCU-CHEK SMARTVIEW test strip   0    • BD PEN NEEDLE KELLI U/F 32G X 4 MM misc   1    • busPIRone (BUSPAR) 10 MG tablet Take 10 mg by mouth 2 (Two) Times a Day.  0    • Canagliflozin (INVOKANA PO) Take 1 tablet by mouth Every Morning.      • CloNIDine (CATAPRES) 0.1 MG tablet Take 0.1 mg by mouth Every 8 (Eight) Hours As Needed for High Blood Pressure (FOR SYSTOLIC> 170).      • colestipol (COLESTID) 1 g tablet Take 1 g by mouth Daily.  0    • cyclobenzaprine (FLEXERIL) 5 MG tablet Take 1 tablet by mouth 3 (three) times a day as needed for muscle spasms. 12 tablet 0    • diclofenac (VOLTAREN) 75 MG EC tablet   0    • dicyclomine (BENTYL) 10 MG capsule take 1 capsule by mouth three times a day if needed FOR STOMACH CRAMPS  0    • estradiol (ESTRACE) 1 MG tablet take 1 tablet by mouth once daily for 3 weeks then 1 week OFF  0    • estradiol (ESTRACE) 1 MG tablet Take 1 mg by mouth Daily.      • furosemide (LASIX) 40 MG tablet Take 40 mg by mouth 2 (Two) Times a Day.      • HUMALOG MIX 75/25 KWIKPEN (75-25) 100 UNIT/ML suspension pen-injector INJECT 30 UNITS SUBCUTANEOUSLY TWICE A DAY WITH MEALS ADMINISTER ...  (REFER TO PRESCRIPTION NOTES).  0    •  HYDROcodone-acetaminophen (NORCO) 5-325 MG per tablet Take 1 tablet by mouth 2 (Two) Times a Day. 30 tablet 0    • HYDROcodone-acetaminophen (NORCO) 5-325 MG per tablet Take 1 tablet by mouth Every 6 (Six) Hours As Needed for Moderate Pain . 6 tablet 0    • hydrOXYzine (ATARAX) 10 MG tablet Take 10 mg by mouth 3 (Three) Times a Day As Needed for Itching.      • hydrOXYzine (ATARAX) 25 MG tablet Take 25 mg by mouth Every 8 (Eight) Hours As Needed for Itching.      • hydrOXYzine (ATARAX) 50 MG tablet take 1 tablet by mouth at bedtime  0    • insulin lispro (humaLOG) 100 UNIT/ML injection Inject 60 Units under the skin into the appropriate area as directed 2 (Two) Times a Day.      • lisinopril (PRINIVIL,ZESTRIL) 20 MG tablet Take 20 mg by mouth Daily.  0    • losartan (COZAAR) 100 MG tablet take 1 tablet by mouth once daily  0    • losartan (COZAAR) 100 MG tablet Take 100 mg by mouth Daily.      • metFORMIN (GLUCOPHAGE) 1000 MG tablet Take 1,000 mg by mouth 2 (two) times a day with meals.      • methocarbamol (ROBAXIN) 500 MG tablet take 1 tablet by mouth BEFORE BED if needed  0    • metoclopramide (REGLAN) 10 MG tablet Take 1 tablet by mouth 4 (Four) Times a Day Before Meals & at Bedtime. 30 tablet 0    • metoprolol tartrate (LOPRESSOR) 25 MG tablet Take 25 mg by mouth Every 12 (Twelve) Hours.  0    • omeprazole (PRILOSEC) 40 MG capsule Take 1 capsule by mouth Daily. 30 capsule 0    • ondansetron ODT (ZOFRAN-ODT) 4 MG disintegrating tablet Take 1 tablet by mouth Every 8 (Eight) Hours As Needed for Nausea or Vomiting. 14 tablet 0    • ondansetron ODT (ZOFRAN-ODT) 8 MG disintegrating tablet Take 1 tablet by mouth Every 8 (Eight) Hours As Needed for Nausea or Vomiting. 20 tablet 0    • oxyCODONE-acetaminophen (PERCOCET) 7.5-325 MG per tablet Take 1 tablet by mouth Every 6 (Six) Hours As Needed for Severe Pain . 12 tablet 0    • pantoprazole (PROTONIX) 40 MG EC tablet Take 40 mg by mouth Daily.      • prazosin  (MINIPRESS) 1 MG capsule Take 1 mg by mouth Every Night.      • RA ALLERGY RELIEF 10 MG tablet take 1 tablet by mouth once daily  0    • RA CLOTRIMAZOLE 7 1 % vaginal cream APPLY 1 APPLICATION AT BEDTIME ONCE DAILY VAGINALLY  0    • sertraline (ZOLOFT) 100 MG tablet Take 100 mg by mouth Daily.      • traZODone (DESYREL) 50 MG tablet Take 50 mg by mouth Every Night.      • triamcinolone (KENALOG) 0.1 % cream apply to affected area twice a day  0    • VENTOLIN  (90 Base) MCG/ACT inhaler inhale 2 puffs by mouth every 4 to 6 hours  0        No Known Allergies    Objective   Objective     Vital Signs:   Temp:  [98.3 °F (36.8 °C)-99.6 °F (37.6 °C)] 98.3 °F (36.8 °C)  Heart Rate:  [] 107  Resp:  [16-20] 20  BP: (142-206)/() 142/90       Physical Exam   Constitutional: Awake, alert  Eyes: PERRLA, sclerae anicteric, no conjunctival injection  HENT: NCAT, mucous membranes moist  Neck: Supple, no thyromegaly, no lymphadenopathy, trachea midline  Respiratory: Clear to auscultation bilaterally, nonlabored respirations   Cardiovascular: RRR, no murmurs, rubs, or gallops, palpable pedal pulses bilaterally  Gastrointestinal: Positive bowel sounds, soft, mild diffuse tenderness, nondistended  Musculoskeletal: No bilateral ankle edema, no clubbing or cyanosis to extremities  Psychiatric: Appropriate affect, cooperative  Neurologic: Awake, alert, oriented x3, no focality appreciated, speech clear  Skin: No rashes, tenting    Result Review:  I have personally reviewed the results from the time of this admission to 4/6/2022 17:46 EDT and agree with these findings:  [x]  Laboratory  []  Microbiology  [x]  Radiology  []  EKG/Telemetry   []  Cardiology/Vascular   []  Pathology  [x]  Old records  []  Other:  Most notable findings include: Severe ketoacidosis and hyperglycemia      LAB RESULTS:      Lab 04/06/22  1627 04/06/22  1052   WBC  --  7.47   HEMOGLOBIN  --  14.5   HEMATOCRIT  --  45.3   PLATELETS  --  438    NEUTROS ABS  --  4.23   IMMATURE GRANS (ABS)  --  0.03   LYMPHS ABS  --  2.58   MONOS ABS  --  0.60   EOS ABS  --  0.00   MCV  --  100.7*   LACTATE 3.4* 2.1*         Lab 04/06/22  1627 04/06/22  1052   SODIUM 138 136   POTASSIUM 3.6 4.1   CHLORIDE 107 98   CO2 14.0* 10.0*   ANION GAP 17.0* 28.0*   BUN 6 6   CREATININE 0.89 1.02*   EGFR 78.6 66.7   GLUCOSE 429* 500*   CALCIUM 8.6 9.6   MAGNESIUM 1.9  --    PHOSPHORUS 1.7* 3.7   HEMOGLOBIN A1C  --  11.80*         Lab 04/06/22  1052   TOTAL PROTEIN 7.8   ALBUMIN 4.50   GLOBULIN 3.3   ALT (SGPT) 73*   AST (SGOT) 67*   BILIRUBIN 0.5   ALK PHOS 240*   LIPASE 98*         Lab 04/06/22  1052   TROPONIN T <0.010         Lab 04/06/22  1052   CHOLESTEROL 177   LDL CHOL 73   HDL CHOL 77*   TRIGLYCERIDES 160*             UA    Urinalysis 7/8/21 7/8/21 4/6/22 4/6/22    1414 1414 1103 1103   Squamous Epithelial Cells, UA  0-5  0-2   Specific Gravity, UA 1.026 1.025 1.028    Ketones, UA   >=160 mg/dL (4+) (A)    Blood, UA  Negative Trace (A)    Leukocytes, UA  Trace (A) Negative    Nitrite, UA   Negative    RBC, UA  <1  0-2   WBC, UA    3-5 (A)   Bacteria, UA  Negative  None Seen   (A) Abnormal value              Microbiology Results (last 10 days)     Procedure Component Value - Date/Time    COVID-19 and FLU A/B PCR - Swab, Nasopharynx [[id number]]  (Normal) Collected: 04/06/22 1122    Lab Status: Final result Specimen: Swab from Nasopharynx Updated: 04/06/22 1204     COVID19 Not Detected     Influenza A PCR Not Detected     Influenza B PCR Not Detected    Narrative:      Fact sheet for providers: https://www.fda.gov/media/391959/download    Fact sheet for patients: https://www.fda.gov/media/387858/download    Test performed by PCR.          CT Abdomen Pelvis With Contrast    Result Date: 4/6/2022  DATE OF EXAM: 4/6/2022 1:23 PM  PROCEDURE: CT ABDOMEN PELVIS W CONTRAST-  INDICATIONS: abd pain and N/V  COMPARISON: CT abdomen and pelvis 10/27/2018 and 6/23/2017. CT chest 12/23/2018.  Chest radiograph 04/06/2022.  TECHNIQUE: Routine transaxial slices were obtained through the abdomen and pelvis after the intravenous administration of 75 mL of Isovue 300. Reconstructed coronal and sagittal images were also obtained. Automated exposure control and iterative construction methods were used.  The radiation dose reduction device was turned on for each scan per the ALARA (As Low as Reasonably Achievable) protocol.  FINDINGS: The included lung bases are clear.  Status post cholecystectomy. The liver, spleen, pancreas, and adrenal glands are unremarkable. Multifocal bilateral renal parenchymal scarring. Simple appearing right renal cysts. The urinary bladder is unremarkable. Status post hysterectomy. The adnexa are not definitively identified.  Mild fluid and air distention of the stomach with mild gastric wall thickening, possible nonspecific gastritis. No significant colonic stool burden. No bowel obstruction or significant bowel wall thickening. The appendix is not definitively identified but no pericecal inflammatory changes are seen.  No free fluid in the abdomen or pelvis. No free intraperitoneal air. No abdominal or pelvic lymphadenopathy is identified.  Transitional lumbosacral vertebral anatomy with similar-appearing lumbosacral degenerative changes and postoperative changes. Old healed posterior left 10th and 11th rib fracture deformities. No acute osseous abnormality or concerning osseous lesion.      Impression:  1. Mild fluid and air distention of the stomach with mild gastric wall thickening, possible nonspecific gastritis. 2. Multifocal bilateral renal parenchymal scarring with simple appearing right renal cysts.  This report was finalized on 4/6/2022 1:40 PM by Lazaro Au MD.      XR Chest 1 View    Result Date: 4/6/2022  DATE OF EXAM: 4/6/2022 11:31 AM  PROCEDURE: XR CHEST 1 VW-  INDICATIONS: Upper Abdominal Pain Triage Protocol  COMPARISON: Chest x-ray 6/23/2017  TECHNIQUE: Single  radiographic AP view of the chest was obtained.  FINDINGS: Normal cardiomediastinal silhouette. The lungs are clear without focal consolidation. No pleural effusion. No pneumothorax. Partially imaged upper abdomen appears unremarkable. No acute osseous findings.      Impression: No acute cardiopulmonary findings.  This report was finalized on 4/6/2022 11:47 AM by Amor Espinoza MD.            Assessment/Plan   Assessment & Plan     Active Hospital Problems    Diagnosis  POA   • DKA (diabetic ketoacidosis) (HCC) [E11.10]  Yes       Patient is a 52-year-old -American female with past medical history of hypertension, hyperlipidemia, diabetes mellitus on insulin, bipolar disorder.  Patient comes to the emergency room for progressively worsening nausea vomiting for a few weeks.  Labs revealed severe ketoacidosis and blood glucose around 500.    PLAN:  -Admit to telemetry  -DKA protocol  -Check labs including electrolytes as per protocol  -Continue insulin drip  -N.p.o. except sips with medication  -Continue home medications  -Monitor blood pressure and heart rate closely.    DVT prophylaxis: Heparin      CODE STATUS:   Code Status and Medical Interventions:   Ordered at: 04/06/22 1733     Code Status (Patient has no pulse and is not breathing):    CPR (Attempt to Resuscitate)     Medical Interventions (Patient has pulse or is breathing):    Full Support         Bruce Costello MD  04/06/22

## 2022-04-07 LAB
ANION GAP SERPL CALCULATED.3IONS-SCNC: 11 MMOL/L (ref 5–15)
ANION GAP SERPL CALCULATED.3IONS-SCNC: 11 MMOL/L (ref 5–15)
ANION GAP SERPL CALCULATED.3IONS-SCNC: 12 MMOL/L (ref 5–15)
ANION GAP SERPL CALCULATED.3IONS-SCNC: 8 MMOL/L (ref 5–15)
ANION GAP SERPL CALCULATED.3IONS-SCNC: 9 MMOL/L (ref 5–15)
ARTERIAL PATENCY WRIST A: ABNORMAL
ATMOSPHERIC PRESS: ABNORMAL MM[HG]
BASE EXCESS BLDA CALC-SCNC: -6.4 MMOL/L (ref 0–2)
BDY SITE: ABNORMAL
BODY TEMPERATURE: 37 C
BUN SERPL-MCNC: 5 MG/DL (ref 6–20)
BUN SERPL-MCNC: 5 MG/DL (ref 6–20)
BUN SERPL-MCNC: 6 MG/DL (ref 6–20)
BUN/CREAT SERPL: 10.5 (ref 7–25)
BUN/CREAT SERPL: 11.4 (ref 7–25)
BUN/CREAT SERPL: 11.5 (ref 7–25)
BUN/CREAT SERPL: 12 (ref 7–25)
BUN/CREAT SERPL: 8.6 (ref 7–25)
CALCIUM SPEC-SCNC: 8.3 MG/DL (ref 8.6–10.5)
CALCIUM SPEC-SCNC: 8.9 MG/DL (ref 8.6–10.5)
CALCIUM SPEC-SCNC: 9 MG/DL (ref 8.6–10.5)
CHLORIDE SERPL-SCNC: 110 MMOL/L (ref 98–107)
CHLORIDE SERPL-SCNC: 110 MMOL/L (ref 98–107)
CHLORIDE SERPL-SCNC: 111 MMOL/L (ref 98–107)
CHLORIDE SERPL-SCNC: 111 MMOL/L (ref 98–107)
CHLORIDE SERPL-SCNC: 114 MMOL/L (ref 98–107)
CO2 BLDA-SCNC: 19.2 MMOL/L (ref 22–33)
CO2 SERPL-SCNC: 13 MMOL/L (ref 22–29)
CO2 SERPL-SCNC: 15 MMOL/L (ref 22–29)
CO2 SERPL-SCNC: 18 MMOL/L (ref 22–29)
COHGB MFR BLD: 1.2 % (ref 0–2)
CREAT SERPL-MCNC: 0.44 MG/DL (ref 0.57–1)
CREAT SERPL-MCNC: 0.5 MG/DL (ref 0.57–1)
CREAT SERPL-MCNC: 0.52 MG/DL (ref 0.57–1)
CREAT SERPL-MCNC: 0.57 MG/DL (ref 0.57–1)
CREAT SERPL-MCNC: 0.58 MG/DL (ref 0.57–1)
D-LACTATE SERPL-SCNC: 1.7 MMOL/L (ref 0.5–2)
EGFRCR SERPLBLD CKD-EPI 2021: 109.7 ML/MIN/1.73
EGFRCR SERPLBLD CKD-EPI 2021: 110.2 ML/MIN/1.73
EGFRCR SERPLBLD CKD-EPI 2021: 112.6 ML/MIN/1.73
EGFRCR SERPLBLD CKD-EPI 2021: 113.7 ML/MIN/1.73
EGFRCR SERPLBLD CKD-EPI 2021: 117.3 ML/MIN/1.73
EPAP: 0
GLUCOSE BLDC GLUCOMTR-MCNC: 131 MG/DL (ref 70–130)
GLUCOSE BLDC GLUCOMTR-MCNC: 131 MG/DL (ref 70–130)
GLUCOSE BLDC GLUCOMTR-MCNC: 133 MG/DL (ref 70–130)
GLUCOSE BLDC GLUCOMTR-MCNC: 148 MG/DL (ref 70–130)
GLUCOSE BLDC GLUCOMTR-MCNC: 155 MG/DL (ref 70–130)
GLUCOSE BLDC GLUCOMTR-MCNC: 179 MG/DL (ref 70–130)
GLUCOSE BLDC GLUCOMTR-MCNC: 188 MG/DL (ref 70–130)
GLUCOSE BLDC GLUCOMTR-MCNC: 200 MG/DL (ref 70–130)
GLUCOSE BLDC GLUCOMTR-MCNC: 226 MG/DL (ref 70–130)
GLUCOSE BLDC GLUCOMTR-MCNC: 353 MG/DL (ref 70–130)
GLUCOSE BLDC GLUCOMTR-MCNC: 374 MG/DL (ref 70–130)
GLUCOSE BLDC GLUCOMTR-MCNC: 81 MG/DL (ref 70–130)
GLUCOSE SERPL-MCNC: 104 MG/DL (ref 65–99)
GLUCOSE SERPL-MCNC: 130 MG/DL (ref 65–99)
GLUCOSE SERPL-MCNC: 207 MG/DL (ref 65–99)
GLUCOSE SERPL-MCNC: 259 MG/DL (ref 65–99)
GLUCOSE SERPL-MCNC: 83 MG/DL (ref 65–99)
HCO3 BLDA-SCNC: 18.2 MMOL/L (ref 20–26)
HCT VFR BLD CALC: 36.4 % (ref 38–51)
HGB BLDA-MCNC: 11.9 G/DL (ref 14–18)
INHALED O2 CONCENTRATION: 21 %
IPAP: 0
LIPASE SERPL-CCNC: 68 U/L (ref 13–60)
MAGNESIUM SERPL-MCNC: 1.6 MG/DL (ref 1.6–2.6)
MAGNESIUM SERPL-MCNC: 1.7 MG/DL (ref 1.6–2.6)
MAGNESIUM SERPL-MCNC: 1.7 MG/DL (ref 1.6–2.6)
MAGNESIUM SERPL-MCNC: 1.8 MG/DL (ref 1.6–2.6)
MAGNESIUM SERPL-MCNC: 2.4 MG/DL (ref 1.6–2.6)
METHGB BLD QL: ABNORMAL
MODALITY: ABNORMAL
NOTE: ABNORMAL
OXYHGB MFR BLDV: 97.6 % (ref 94–99)
PAW @ PEAK INSP FLOW SETTING VENT: 0 CMH2O
PCO2 BLDA: 32.4 MM HG (ref 35–45)
PCO2 TEMP ADJ BLD: 32.4 MM HG (ref 35–45)
PH BLDA: 7.36 PH UNITS (ref 7.35–7.45)
PH, TEMP CORRECTED: 7.36 PH UNITS
PHOSPHATE SERPL-MCNC: 2.4 MG/DL (ref 2.5–4.5)
PHOSPHATE SERPL-MCNC: 2.5 MG/DL (ref 2.5–4.5)
PHOSPHATE SERPL-MCNC: 3.8 MG/DL (ref 2.5–4.5)
PO2 BLDA: 96.6 MM HG (ref 83–108)
PO2 TEMP ADJ BLD: 96.6 MM HG (ref 83–108)
POTASSIUM SERPL-SCNC: 3.1 MMOL/L (ref 3.5–5.2)
POTASSIUM SERPL-SCNC: 3.3 MMOL/L (ref 3.5–5.2)
POTASSIUM SERPL-SCNC: 3.5 MMOL/L (ref 3.5–5.2)
POTASSIUM SERPL-SCNC: 4 MMOL/L (ref 3.5–5.2)
POTASSIUM SERPL-SCNC: 4.3 MMOL/L (ref 3.5–5.2)
SODIUM SERPL-SCNC: 135 MMOL/L (ref 136–145)
SODIUM SERPL-SCNC: 137 MMOL/L (ref 136–145)
SODIUM SERPL-SCNC: 137 MMOL/L (ref 136–145)
SODIUM SERPL-SCNC: 140 MMOL/L (ref 136–145)
SODIUM SERPL-SCNC: 140 MMOL/L (ref 136–145)
TOTAL RATE: 0 BREATHS/MINUTE

## 2022-04-07 PROCEDURE — 99233 SBSQ HOSP IP/OBS HIGH 50: CPT | Performed by: INTERNAL MEDICINE

## 2022-04-07 PROCEDURE — 83050 HGB METHEMOGLOBIN QUAN: CPT

## 2022-04-07 PROCEDURE — 63710000001 INSULIN DETEMIR PER 5 UNITS: Performed by: NURSE PRACTITIONER

## 2022-04-07 PROCEDURE — 25010000002 HEPARIN (PORCINE) PER 1000 UNITS: Performed by: INTERNAL MEDICINE

## 2022-04-07 PROCEDURE — 36600 WITHDRAWAL OF ARTERIAL BLOOD: CPT

## 2022-04-07 PROCEDURE — 63710000001 INSULIN LISPRO (HUMAN) PER 5 UNITS: Performed by: NURSE PRACTITIONER

## 2022-04-07 PROCEDURE — 82375 ASSAY CARBOXYHB QUANT: CPT

## 2022-04-07 PROCEDURE — 84100 ASSAY OF PHOSPHORUS: CPT | Performed by: INTERNAL MEDICINE

## 2022-04-07 PROCEDURE — 63710000001 INSULIN LISPRO (HUMAN) PER 5 UNITS: Performed by: INTERNAL MEDICINE

## 2022-04-07 PROCEDURE — 80048 BASIC METABOLIC PNL TOTAL CA: CPT | Performed by: INTERNAL MEDICINE

## 2022-04-07 PROCEDURE — 25010000002 THIAMINE PER 100 MG: Performed by: INTERNAL MEDICINE

## 2022-04-07 PROCEDURE — G0108 DIAB MANAGE TRN  PER INDIV: HCPCS

## 2022-04-07 PROCEDURE — 82805 BLOOD GASES W/O2 SATURATION: CPT

## 2022-04-07 PROCEDURE — 82962 GLUCOSE BLOOD TEST: CPT

## 2022-04-07 PROCEDURE — 83735 ASSAY OF MAGNESIUM: CPT | Performed by: INTERNAL MEDICINE

## 2022-04-07 PROCEDURE — 25010000002 MORPHINE PER 10 MG: Performed by: INTERNAL MEDICINE

## 2022-04-07 PROCEDURE — 25010000002 ONDANSETRON PER 1 MG: Performed by: INTERNAL MEDICINE

## 2022-04-07 PROCEDURE — 83690 ASSAY OF LIPASE: CPT | Performed by: INTERNAL MEDICINE

## 2022-04-07 PROCEDURE — 83605 ASSAY OF LACTIC ACID: CPT | Performed by: INTERNAL MEDICINE

## 2022-04-07 PROCEDURE — 97162 PT EVAL MOD COMPLEX 30 MIN: CPT

## 2022-04-07 RX ORDER — NICOTINE POLACRILEX 4 MG
15 LOZENGE BUCCAL
Status: DISCONTINUED | OUTPATIENT
Start: 2022-04-07 | End: 2022-04-11 | Stop reason: HOSPADM

## 2022-04-07 RX ORDER — DEXTROSE MONOHYDRATE 25 G/50ML
25 INJECTION, SOLUTION INTRAVENOUS
Status: DISCONTINUED | OUTPATIENT
Start: 2022-04-07 | End: 2022-04-11 | Stop reason: HOSPADM

## 2022-04-07 RX ADMIN — THIAMINE HYDROCHLORIDE 100 MG: 100 INJECTION, SOLUTION INTRAMUSCULAR; INTRAVENOUS at 18:00

## 2022-04-07 RX ADMIN — HYDROXYZINE HYDROCHLORIDE 25 MG: 25 TABLET, FILM COATED ORAL at 15:57

## 2022-04-07 RX ADMIN — INSULIN DETEMIR 40 UNITS: 100 INJECTION, SOLUTION SUBCUTANEOUS at 01:18

## 2022-04-07 RX ADMIN — HYDROXYZINE HYDROCHLORIDE 25 MG: 25 TABLET, FILM COATED ORAL at 21:29

## 2022-04-07 RX ADMIN — INSULIN DETEMIR 40 UNITS: 100 INJECTION, SOLUTION SUBCUTANEOUS at 09:15

## 2022-04-07 RX ADMIN — HYDROCODONE BITARTRATE AND ACETAMINOPHEN 1 TABLET: 5; 325 TABLET ORAL at 21:29

## 2022-04-07 RX ADMIN — METOPROLOL TARTRATE 25 MG: 25 TABLET, FILM COATED ORAL at 09:05

## 2022-04-07 RX ADMIN — Medication 10 ML: at 20:29

## 2022-04-07 RX ADMIN — SERTRALINE 100 MG: 100 TABLET, FILM COATED ORAL at 09:09

## 2022-04-07 RX ADMIN — INSULIN HUMAN 1.5 UNITS/HR: 1 INJECTION, SOLUTION INTRAVENOUS at 00:26

## 2022-04-07 RX ADMIN — HEPARIN SODIUM 5000 UNITS: 5000 INJECTION, SOLUTION INTRAVENOUS; SUBCUTANEOUS at 20:28

## 2022-04-07 RX ADMIN — DEXTROSE MONOHYDRATE 12.5 G: 25 INJECTION, SOLUTION INTRAVENOUS at 00:27

## 2022-04-07 RX ADMIN — BUSPIRONE HYDROCHLORIDE 10 MG: 10 TABLET ORAL at 20:28

## 2022-04-07 RX ADMIN — INSULIN LISPRO 8 UNITS: 100 INJECTION, SOLUTION INTRAVENOUS; SUBCUTANEOUS at 09:13

## 2022-04-07 RX ADMIN — INSULIN LISPRO 4 UNITS: 100 INJECTION, SOLUTION INTRAVENOUS; SUBCUTANEOUS at 18:02

## 2022-04-07 RX ADMIN — METOPROLOL TARTRATE 25 MG: 25 TABLET, FILM COATED ORAL at 20:28

## 2022-04-07 RX ADMIN — INSULIN LISPRO 5 UNITS: 100 INJECTION, SOLUTION INTRAVENOUS; SUBCUTANEOUS at 09:13

## 2022-04-07 RX ADMIN — HYDROCODONE BITARTRATE AND ACETAMINOPHEN 1 TABLET: 5; 325 TABLET ORAL at 04:03

## 2022-04-07 RX ADMIN — Medication 10 ML: at 09:14

## 2022-04-07 RX ADMIN — HEPARIN SODIUM 5000 UNITS: 5000 INJECTION, SOLUTION INTRAVENOUS; SUBCUTANEOUS at 15:12

## 2022-04-07 RX ADMIN — MORPHINE SULFATE 1 MG: 2 INJECTION, SOLUTION INTRAMUSCULAR; INTRAVENOUS at 09:14

## 2022-04-07 RX ADMIN — INSULIN LISPRO 5 UNITS: 100 INJECTION, SOLUTION INTRAVENOUS; SUBCUTANEOUS at 18:02

## 2022-04-07 RX ADMIN — HEPARIN SODIUM 5000 UNITS: 5000 INJECTION, SOLUTION INTRAVENOUS; SUBCUTANEOUS at 05:55

## 2022-04-07 RX ADMIN — ONDANSETRON 4 MG: 2 INJECTION INTRAMUSCULAR; INTRAVENOUS at 11:37

## 2022-04-07 RX ADMIN — BUSPIRONE HYDROCHLORIDE 10 MG: 10 TABLET ORAL at 09:05

## 2022-04-07 RX ADMIN — INSULIN LISPRO 8 UNITS: 100 INJECTION, SOLUTION INTRAVENOUS; SUBCUTANEOUS at 05:54

## 2022-04-07 RX ADMIN — POTASSIUM CHLORIDE, DEXTROSE MONOHYDRATE AND SODIUM CHLORIDE 150 ML/HR: 150; 5; 450 INJECTION, SOLUTION INTRAVENOUS at 00:28

## 2022-04-07 NOTE — PLAN OF CARE
Goal Outcome Evaluation:  Insulin drip stopped this shift patient now on SSI and Levemir. Pt has had no complaints of nausea this shift.     Pt complains of pain this shift PRN pain medication administered with relief.     Sinus rhythm per telemetry.

## 2022-04-07 NOTE — ED PROVIDER NOTES
"Subjective   52-year-old female with a long past medical history presents for evaluation of nausea and vomiting.  Of note, the patient has a history of noncompliance.  She states that she has not felt well for the past month or so.  Over that span, she estimates an approximately 20 to 30 pound unintentional weight loss.  She states that she has been experiencing vomiting intermittently now for nearly a month and feels that she is \"dehydrated\" as a result.  She endorses upper abdominal pain.  She currently rates her pain at 9 out of 10 in severity.  She has a history of pancreatitis and states that her current symptoms feel similar.          Review of Systems   Constitutional: Positive for unexpected weight change.   Gastrointestinal: Positive for abdominal pain, nausea and vomiting.   All other systems reviewed and are negative.      Past Medical History:   Diagnosis Date   • Arthritis    • Bipolar disorder (HCC)    • Chronic bronchitis (HCC)    • Depression    • Diabetes mellitus (HCC)     DIAGNOSED 2016   • GERD (gastroesophageal reflux disease)    • Hepatitis-C    • History of blood transfusion    • Hyperlipidemia    • Hypertension    • Infectious viral hepatitis     HEPATITIS C   • Migraine    • Sleep apnea     PATIENT UNSURE OF SETTINGS       No Known Allergies    Past Surgical History:   Procedure Laterality Date   • CHOLECYSTECTOMY     • HYSTERECTOMY     • KNEE SURGERY     • LUMBAR LAMINECTOMY DISCECTOMY DECOMPRESSION N/A 8/6/2018    Procedure: LUMBAR LAMINECTOMIES L4-S1;  Surgeon: Chip Smith MD;  Location: [facility];  Service: Neurosurgery       History reviewed. No pertinent family history.    Social History     Socioeconomic History   • Marital status: Single   Tobacco Use   • Smoking status: Current Every Day Smoker     Packs/day: 1.00   • Smokeless tobacco: Never Used   Substance and Sexual Activity   • Alcohol use: No   • Drug use: No   • Sexual activity: Defer           Objective   Physical " Exam  Vitals and nursing note reviewed.   Constitutional:       Appearance: She is well-developed. She is not diaphoretic.   HENT:      Head: Normocephalic and atraumatic.      Comments: Dry tongue and mucous membranes  Eyes:      Pupils: Pupils are equal, round, and reactive to light.   Neck:      Comments: No meningeal signs or nuchal rigidity  Cardiovascular:      Rate and Rhythm: Regular rhythm. Tachycardia present.      Heart sounds: Normal heart sounds. No murmur heard.    No friction rub. No gallop.   Pulmonary:      Effort: Pulmonary effort is normal. No respiratory distress.      Breath sounds: Normal breath sounds. No wheezing or rales.   Abdominal:      General: Bowel sounds are normal. There is no distension.      Palpations: Abdomen is soft. There is no mass.      Tenderness: There is abdominal tenderness. There is guarding. There is no rebound.      Comments: Mild, diffuse generalized abdominal tenderness, no pain out of proportion to exam   Musculoskeletal:         General: Normal range of motion.      Cervical back: Neck supple.   Skin:     General: Skin is warm and dry.      Findings: No erythema or rash.   Neurological:      General: No focal deficit present.      Mental Status: She is alert and oriented to person, place, and time.   Psychiatric:         Mood and Affect: Mood normal.         Thought Content: Thought content normal.         Judgment: Judgment normal.         Critical Care  Performed by: Ino Alegria MD  Authorized by: Ino Alegria MD     Critical care provider statement:     Critical care time (minutes):  35    Critical care was necessary to treat or prevent imminent or life-threatening deterioration of the following conditions:  Endocrine crisis    Critical care was time spent personally by me on the following activities:  Development of treatment plan with patient or surrogate, evaluation of patient's response to treatment, examination of patient, obtaining history  from patient or surrogate, ordering and performing treatments and interventions, ordering and review of radiographic studies, ordering and review of laboratory studies, pulse oximetry, re-evaluation of patient's condition and review of old charts               ED Course  ED Course as of 04/06/22 2030 Wed Apr 06, 2022   1129 52-year-old female presents for evaluation of abdominal pain, dehydration, and vomiting.  She states that she has been symptomatic now for nearly a month and estimates an approximately 20 pound weight loss over that span.  On arrival to the ED, the patient is chronically ill-appearing.  She has diffuse abdominal tenderness with guarding noted.  She has dry mucous membranes and tongue.  We will obtain labs and imaging, and we will reassess following initial interventions. [DD]   1210 I personally viewed the patient's x-ray images myself, and I am in agreement with the radiologist's reading for final interpretation.     [DD]   1308 Labs consistent with DKA.  IV fluids given.  Insulin drip initiated.  I discussed the patient's case with Dr. Ocasio who agreed that the patient was stable for the floor at this point.  CT of abdomen/pelvis is pending at this time and will be followed up by our inpatient team.  The patient is hemodynamically stable at this time. [DD]      ED Course User Index  [DD] Ino Alegria MD                                               Recent Results (from the past 24 hour(s))   POC Glucose Once    Collection Time: 04/06/22 10:51 AM    Specimen: Blood   Result Value Ref Range    Glucose 456 (C) 70 - 130 mg/dL   Comprehensive Metabolic Panel    Collection Time: 04/06/22 10:52 AM    Specimen: Blood   Result Value Ref Range    Glucose 500 (C) 65 - 99 mg/dL    BUN 6 6 - 20 mg/dL    Creatinine 1.02 (H) 0.57 - 1.00 mg/dL    Sodium 136 136 - 145 mmol/L    Potassium 4.1 3.5 - 5.2 mmol/L    Chloride 98 98 - 107 mmol/L    CO2 10.0 (L) 22.0 - 29.0 mmol/L    Calcium 9.6 8.6 - 10.5  mg/dL    Total Protein 7.8 6.0 - 8.5 g/dL    Albumin 4.50 3.50 - 5.20 g/dL    ALT (SGPT) 73 (H) 1 - 33 U/L    AST (SGOT) 67 (H) 1 - 32 U/L    Alkaline Phosphatase 240 (H) 39 - 117 U/L    Total Bilirubin 0.5 0.0 - 1.2 mg/dL    Globulin 3.3 gm/dL    A/G Ratio 1.4 g/dL    BUN/Creatinine Ratio 5.9 (L) 7.0 - 25.0    Anion Gap 28.0 (H) 5.0 - 15.0 mmol/L    eGFR 66.7 >60.0 mL/min/1.73   Lipase    Collection Time: 04/06/22 10:52 AM    Specimen: Blood   Result Value Ref Range    Lipase 98 (H) 13 - 60 U/L   Troponin    Collection Time: 04/06/22 10:52 AM    Specimen: Blood   Result Value Ref Range    Troponin T <0.010 0.000 - 0.030 ng/mL   Green Top (Gel)    Collection Time: 04/06/22 10:52 AM   Result Value Ref Range    Extra Tube Hold for add-ons.    Lavender Top    Collection Time: 04/06/22 10:52 AM   Result Value Ref Range    Extra Tube hold for add-on    Gold Top - SST    Collection Time: 04/06/22 10:52 AM   Result Value Ref Range    Extra Tube Hold for add-ons.    Gray Top    Collection Time: 04/06/22 10:52 AM   Result Value Ref Range    Extra Tube Hold for add-ons.    Light Blue Top    Collection Time: 04/06/22 10:52 AM   Result Value Ref Range    Extra Tube hold for add-on    CBC Auto Differential    Collection Time: 04/06/22 10:52 AM    Specimen: Blood   Result Value Ref Range    WBC 7.47 3.40 - 10.80 10*3/mm3    RBC 4.50 3.77 - 5.28 10*6/mm3    Hemoglobin 14.5 12.0 - 15.9 g/dL    Hematocrit 45.3 34.0 - 46.6 %    .7 (H) 79.0 - 97.0 fL    MCH 32.2 26.6 - 33.0 pg    MCHC 32.0 31.5 - 35.7 g/dL    RDW 14.2 12.3 - 15.4 %    RDW-SD 51.8 37.0 - 54.0 fl    MPV 10.1 6.0 - 12.0 fL    Platelets 438 140 - 450 10*3/mm3    Neutrophil % 56.7 42.7 - 76.0 %    Lymphocyte % 34.5 19.6 - 45.3 %    Monocyte % 8.0 5.0 - 12.0 %    Eosinophil % 0.0 (L) 0.3 - 6.2 %    Basophil % 0.4 0.0 - 1.5 %    Immature Grans % 0.4 0.0 - 0.5 %    Neutrophils, Absolute 4.23 1.70 - 7.00 10*3/mm3    Lymphocytes, Absolute 2.58 0.70 - 3.10 10*3/mm3     Monocytes, Absolute 0.60 0.10 - 0.90 10*3/mm3    Eosinophils, Absolute 0.00 0.00 - 0.40 10*3/mm3    Basophils, Absolute 0.03 0.00 - 0.20 10*3/mm3    Immature Grans, Absolute 0.03 0.00 - 0.05 10*3/mm3    nRBC 0.0 0.0 - 0.2 /100 WBC   Lactic Acid, Plasma    Collection Time: 04/06/22 10:52 AM    Specimen: Blood   Result Value Ref Range    Lactate 2.1 (C) 0.5 - 2.0 mmol/L   Phosphorus    Collection Time: 04/06/22 10:52 AM    Specimen: Blood   Result Value Ref Range    Phosphorus 3.7 2.5 - 4.5 mg/dL   Osmolality, Serum    Collection Time: 04/06/22 10:52 AM    Specimen: Blood   Result Value Ref Range    Osmolality 318 (H) 275 - 295 mOsm/kg   Hemoglobin A1c    Collection Time: 04/06/22 10:52 AM    Specimen: Blood   Result Value Ref Range    Hemoglobin A1C 11.80 (H) 4.80 - 5.60 %   Lipid Panel    Collection Time: 04/06/22 10:52 AM    Specimen: Blood   Result Value Ref Range    Total Cholesterol 177 0 - 200 mg/dL    Triglycerides 160 (H) 0 - 150 mg/dL    HDL Cholesterol 77 (H) 40 - 60 mg/dL    LDL Cholesterol  73 0 - 100 mg/dL    VLDL Cholesterol 27 5 - 40 mg/dL    LDL/HDL Ratio 0.88    ECG 12 Lead    Collection Time: 04/06/22 10:54 AM   Result Value Ref Range    QT Interval 342 ms    QTC Interval 454 ms   Urinalysis With Microscopic If Indicated (No Culture) - Urine, Clean Catch    Collection Time: 04/06/22 11:03 AM    Specimen: Urine, Clean Catch   Result Value Ref Range    Color, UA Yellow Yellow, Straw    Appearance, UA Clear Clear    pH, UA 6.0 5.0 - 8.0    Specific Gravity, UA 1.028 1.001 - 1.030    Glucose, UA >=1000 mg/dL (3+) (A) Negative    Ketones, UA >=160 mg/dL (4+) (A) Negative    Bilirubin, UA Negative Negative    Blood, UA Trace (A) Negative    Protein, UA 30 mg/dL (1+) (A) Negative    Leuk Esterase, UA Negative Negative    Nitrite, UA Negative Negative    Urobilinogen, UA 0.2 E.U./dL 0.2 - 1.0 E.U./dL   Urinalysis, Microscopic Only - Urine, Clean Catch    Collection Time: 04/06/22 11:03 AM    Specimen:  Urine, Clean Catch   Result Value Ref Range    RBC, UA 0-2 None Seen, 0-2 /HPF    WBC, UA 3-5 (A) None Seen, 0-2 /HPF    Bacteria, UA None Seen None Seen, Trace /HPF    Squamous Epithelial Cells, UA 0-2 None Seen, 0-2 /HPF    Hyaline Casts, UA 0-6 0 - 6 /LPF    Methodology Automated Microscopy    Pregnancy, Urine - Urine, Clean Catch    Collection Time: 04/06/22 11:03 AM    Specimen: Urine, Clean Catch   Result Value Ref Range    HCG, Urine QL Negative Negative   COVID-19 and FLU A/B PCR - Swab, Nasopharynx    Collection Time: 04/06/22 11:22 AM    Specimen: Nasopharynx; Swab   Result Value Ref Range    COVID19 Not Detected Not Detected - Ref. Range    Influenza A PCR Not Detected Not Detected    Influenza B PCR Not Detected Not Detected   POC Glucose Once    Collection Time: 04/06/22  1:44 PM    Specimen: Blood   Result Value Ref Range    Glucose 499 (C) 70 - 130 mg/dL   POC Glucose Once    Collection Time: 04/06/22  2:48 PM    Specimen: Blood   Result Value Ref Range    Glucose 452 (C) 70 - 130 mg/dL   POC Glucose Once    Collection Time: 04/06/22  2:49 PM    Specimen: Blood   Result Value Ref Range    Glucose 409 (C) 70 - 130 mg/dL   POC Glucose Once    Collection Time: 04/06/22  3:40 PM    Specimen: Blood   Result Value Ref Range    Glucose 488 (C) 70 - 130 mg/dL   STAT Lactic Acid, Reflex    Collection Time: 04/06/22  4:27 PM    Specimen: Blood   Result Value Ref Range    Lactate 3.4 (C) 0.5 - 2.0 mmol/L   Basic Metabolic Panel    Collection Time: 04/06/22  4:27 PM    Specimen: Blood   Result Value Ref Range    Glucose 429 (C) 65 - 99 mg/dL    BUN 6 6 - 20 mg/dL    Creatinine 0.89 0.57 - 1.00 mg/dL    Sodium 138 136 - 145 mmol/L    Potassium 3.6 3.5 - 5.2 mmol/L    Chloride 107 98 - 107 mmol/L    CO2 14.0 (L) 22.0 - 29.0 mmol/L    Calcium 8.6 8.6 - 10.5 mg/dL    BUN/Creatinine Ratio 6.7 (L) 7.0 - 25.0    Anion Gap 17.0 (H) 5.0 - 15.0 mmol/L    eGFR 78.6 >60.0 mL/min/1.73   Magnesium    Collection Time: 04/06/22   4:27 PM    Specimen: Blood   Result Value Ref Range    Magnesium 1.9 1.6 - 2.6 mg/dL   Phosphorus    Collection Time: 04/06/22  4:27 PM    Specimen: Blood   Result Value Ref Range    Phosphorus 1.7 (C) 2.5 - 4.5 mg/dL   POC Glucose Once    Collection Time: 04/06/22  4:34 PM    Specimen: Blood   Result Value Ref Range    Glucose 446 (C) 70 - 130 mg/dL   POC Glucose Once    Collection Time: 04/06/22  5:46 PM    Specimen: Blood   Result Value Ref Range    Glucose 497 (C) 70 - 130 mg/dL   POC Glucose Once    Collection Time: 04/06/22  7:02 PM    Specimen: Blood   Result Value Ref Range    Glucose 314 (H) 70 - 130 mg/dL   Basic Metabolic Panel    Collection Time: 04/06/22  7:46 PM    Specimen: Blood   Result Value Ref Range    Glucose 314 (H) 65 - 99 mg/dL    BUN 5 (L) 6 - 20 mg/dL    Creatinine 0.67 0.57 - 1.00 mg/dL    Sodium 138 136 - 145 mmol/L    Potassium 3.6 3.5 - 5.2 mmol/L    Chloride 111 (H) 98 - 107 mmol/L    CO2 15.0 (L) 22.0 - 29.0 mmol/L    Calcium 8.5 (L) 8.6 - 10.5 mg/dL    BUN/Creatinine Ratio 7.5 7.0 - 25.0    Anion Gap 12.0 5.0 - 15.0 mmol/L    eGFR 106.0 >60.0 mL/min/1.73   Magnesium    Collection Time: 04/06/22  7:46 PM    Specimen: Blood   Result Value Ref Range    Magnesium 1.8 1.6 - 2.6 mg/dL   Phosphorus    Collection Time: 04/06/22  7:46 PM    Specimen: Blood   Result Value Ref Range    Phosphorus 0.9 (C) 2.5 - 4.5 mg/dL   POC Glucose Once    Collection Time: 04/06/22  7:58 PM    Specimen: Blood   Result Value Ref Range    Glucose 356 (H) 70 - 130 mg/dL   POC Glucose Once    Collection Time: 04/06/22  8:06 PM    Specimen: Blood   Result Value Ref Range    Glucose 295 (H) 70 - 130 mg/dL     Note: In addition to lab results from this visit, the labs listed above may include labs taken at another facility or during a different encounter within the last 24 hours. Please correlate lab times with ED admission and discharge times for further clarification of the services performed during this  visit.    CT Abdomen Pelvis With Contrast   Final Result       1. Mild fluid and air distention of the stomach with mild gastric wall   thickening, possible nonspecific gastritis.   2. Multifocal bilateral renal parenchymal scarring with simple appearing   right renal cysts.       This report was finalized on 4/6/2022 1:40 PM by Lazaro Au MD.          XR Chest 1 View   Final Result   No acute cardiopulmonary findings.       This report was finalized on 4/6/2022 11:47 AM by Amor Espinoza MD.            Vitals:    04/06/22 1700 04/06/22 1730 04/06/22 1800 04/06/22 2022   BP: (!) 167/107 142/90 150/99 (!) 157/109   BP Location:       Patient Position:       Pulse: 103 107 111 100   Resp:       Temp:       TempSrc:       SpO2: (!) 84% 100% 99%    Weight:       Height:         Medications   sodium chloride 0.9 % flush 10 mL (has no administration in time range)   sodium chloride 0.9 % flush 10 mL (10 mL Intravenous Given 4/6/22 1352)   sodium chloride 0.9 % flush 10 mL (has no administration in time range)   sodium chloride 0.9 % infusion (250 mL/hr Intravenous New Bag 4/6/22 1622)   sodium chloride 0.9 % with KCl 20 mEq/L infusion (has no administration in time range)   sodium chloride 0.9 % with KCl 40 mEq/L infusion (has no administration in time range)   dextrose 5 % and sodium chloride 0.9 % infusion (has no administration in time range)   dextrose 5 % and sodium chloride 0.9 % with KCl 40 mEq/L infusion (has no administration in time range)   dextrose 5 % and sodium chloride 0.9 % with KCl 20 mEq/L infusion (has no administration in time range)   sodium chloride 0.45 % infusion (has no administration in time range)   sodium chloride 0.45 % with KCl 20 mEq/L infusion (250 mL/hr Intravenous Restarted 4/6/22 1759)   sodium chloride 0.45 % 1,000 mL with potassium chloride 40 mEq infusion (has no administration in time range)   dextrose 5 % and sodium chloride 0.45 % infusion (has no administration in time range)    dextrose 5 % and sodium chloride 0.45 % with KCl 20 mEq/L infusion (has no administration in time range)   dextrose 5 % and sodium chloride 0.45 % with KCl 40 mEq/L infusion (has no administration in time range)   insulin regular 1 unit/mL in 0.9% sodium chloride (6 Units/hr Intravenous Rate/Dose Verify 4/6/22 1900)   dextrose (D50W) (25 g/50 mL) IV injection 12.5 g (has no administration in time range)   sodium chloride 0.9 % flush 10 mL (has no administration in time range)   sodium chloride 0.9 % infusion (has no administration in time range)   thiamine (B-1) 100 mg in sodium chloride 0.9 % 100 mL IVPB (100 mg Intravenous New Bag 4/6/22 1908)   sodium chloride 0.9 % flush 10 mL (has no administration in time range)   sodium chloride 0.9 % flush 10 mL (has no administration in time range)   heparin (porcine) 5000 UNIT/ML injection 5,000 Units (has no administration in time range)   acetaminophen (TYLENOL) tablet 650 mg (has no administration in time range)     Or   acetaminophen (TYLENOL) 160 MG/5ML solution 650 mg (has no administration in time range)     Or   acetaminophen (TYLENOL) suppository 650 mg (has no administration in time range)   morphine injection 1 mg (1 mg Intravenous Given 4/6/22 1800)     And   naloxone (NARCAN) injection 0.4 mg (has no administration in time range)   ondansetron (ZOFRAN) tablet 4 mg (has no administration in time range)     Or   ondansetron (ZOFRAN) injection 4 mg (has no administration in time range)   busPIRone (BUSPAR) tablet 10 mg (10 mg Oral Given 4/6/22 2022)   cloNIDine (CATAPRES) tablet 0.1 mg (has no administration in time range)   metoprolol tartrate (LOPRESSOR) tablet 25 mg (25 mg Oral Given 4/6/22 2022)   sertraline (ZOLOFT) tablet 100 mg (100 mg Oral Given 4/6/22 1901)   ipratropium-albuterol (DUO-NEB) nebulizer solution 3 mL (has no administration in time range)   HYDROcodone-acetaminophen (NORCO) 5-325 MG per tablet 1 tablet (has no administration in time range)    sodium chloride 0.9 % bolus 1,000 mL (1,000 mL Intravenous New Bag 4/6/22 1338)   metoclopramide (REGLAN) injection 10 mg (10 mg Intravenous Given 4/6/22 1340)   HYDROmorphone (DILAUDID) injection 0.5 mg (0.5 mg Intravenous Given 4/6/22 1342)   sodium chloride 0.9 % bolus 1,000 mL (1,000 mL Intravenous New Bag 4/6/22 1338)   iopamidol (ISOVUE-300) 61 % injection 100 mL (75 mL Intravenous Given 4/6/22 1329)     ECG/EMG Results (last 24 hours)     Procedure Component Value Units Date/Time    ECG 12 Lead [[id number]] Collected: 04/06/22 1054     Updated: 04/06/22 1941     QT Interval 342 ms      QTC Interval 454 ms     Narrative:      Test Reason : Upper Abdominal Pain Triage Protocol  Blood Pressure :   */*   mmHG  Vent. Rate : 106 BPM     Atrial Rate : 106 BPM     P-R Int : 154 ms          QRS Dur :  80 ms      QT Int : 342 ms       P-R-T Axes :  85  59 102 degrees     QTc Int : 454 ms    Sinus tachycardia  Biatrial enlargement  Abnormal ECG  When compared with ECG of 23-JUN-2017 19:35,  T wave inversion now evident in Lateral leads  Confirmed by MD Alegria Michael ([id number]) on 4/6/2022 7:40:49 PM    Referred By: EDGARDO CONNER           Confirmed By: Yoshi Alegria MD        ECG 12 Lead   Final Result   Test Reason : Upper Abdominal Pain Triage Protocol   Blood Pressure :   */*   mmHG   Vent. Rate : 106 BPM     Atrial Rate : 106 BPM      P-R Int : 154 ms          QRS Dur :  80 ms       QT Int : 342 ms       P-R-T Axes :  85  59 102 degrees      QTc Int : 454 ms      Sinus tachycardia   Biatrial enlargement   Abnormal ECG   When compared with ECG of 23-JUN-2017 19:35,   T wave inversion now evident in Lateral leads   Confirmed by MD Alegria Michael [id number]) on 4/6/2022 7:40:49 PM      Referred By: EDGARDO CONNER           Confirmed By: Yoshi Alegria MD              [facility]    Final diagnoses:   Diabetic ketoacidosis without coma associated with diabetes mellitus due to underlying condition (HCC)   Hyperglycemia   Generalized abdominal pain    Nausea and vomiting, unspecified vomiting type       ED Disposition  ED Disposition     ED Disposition   Decision to Admit    Condition   --    Comment   Level of Care: Telemetry [5]   Diagnosis: DKA (diabetic ketoacidosis) ([facility]) [[id number]]               No follow-up provider specified.       Medication List      No changes were made to your prescriptions during this visit.          Ino Alegria MD  04/06/22 1893

## 2022-04-07 NOTE — THERAPY EVALUATION
Patient Name: Bernadette Paris  : 1970    MRN: [id number]                              Today's Date: 2022       Admit Date: 2022    Visit Dx:     ICD-10-CM ICD-9-CM   1. Diabetic ketoacidosis without coma associated with diabetes mellitus due to underlying condition ([facility])  E08.10 249.11   2. Hyperglycemia  R73.9 790.29   3. Generalized abdominal pain  R10.84 789.07   4. Nausea and vomiting, unspecified vomiting type  R11.2 787.01     Patient Active Problem List   Diagnosis   • Spinal stenosis, lumbar region, with neurogenic claudication   • Degenerative disc disease, lumbar   • Facet arthritis of lumbar region   • Lumbar stenosis with neurogenic claudication   • BMI 40.0-44.9, adult ([facility])   • Encounter for smoking cessation counseling   • S/P lumbar laminectomy   • DKA (diabetic ketoacidosis) ([facility])     Past Medical History:   Diagnosis Date   • Arthritis    • Bipolar disorder ([facility])    • Chronic bronchitis ([facility])    • Depression    • Diabetes mellitus (HCC)     DIAGNOSED    • GERD (gastroesophageal reflux disease)    • Hepatitis-C    • History of blood transfusion    • Hyperlipidemia    • Hypertension    • Infectious viral hepatitis     HEPATITIS C   • Migraine    • Sleep apnea     PATIENT UNSURE OF SETTINGS     Past Surgical History:   Procedure Laterality Date   • CHOLECYSTECTOMY     • HYSTERECTOMY     • KNEE SURGERY     • LUMBAR LAMINECTOMY DISCECTOMY DECOMPRESSION N/A 2018    Procedure: LUMBAR LAMINECTOMIES L4-S1;  Surgeon: Chip Smith MD;  Location: [facility];  Service: Neurosurgery      General Information     Row Name 22 1543          Physical Therapy Time and Intention    Document Type evaluation  -SS     Mode of Treatment physical therapy  -SS     Row Name 22 1543          General Information    Patient Profile Reviewed yes  -SS     Prior Level of Function independent:;all household mobility;gait;transfer;bed mobility  use of cane  -SS     Existing  Precautions/Restrictions fall  -     Barriers to Rehab medically complex  -     Row Name 04/07/22 1543          Living Environment    People in Home friend(s)  -     Row Name 04/07/22 1543          Home Main Entrance    Number of Stairs, Main Entrance none  -SS     Row Name 04/07/22 1543          Stairs Within Home, Primary    Number of Stairs, Within Home, Primary none  -     Row Name 04/07/22 1543          Cognition    Orientation Status (Cognition) oriented to;person;verbal cues/prompts needed for orientation;place;disoriented to;time  -     Row Name 04/07/22 1543          Safety Issues, Functional Mobility    Safety Issues Affecting Function (Mobility) awareness of need for assistance;insight into deficits/self-awareness;problem-solving;safety precaution awareness;safety precautions follow-through/compliance;sequencing abilities  -     Impairments Affecting Function (Mobility) balance;endurance/activity tolerance;pain;postural/trunk control;range of motion (ROM);strength  -           User Key  (r) = Recorded By, (t) = Taken By, (c) = Cosigned By    Initials Name Provider Type     Genet Holley, PT Physical Therapist               Mobility     Row Name 04/07/22 1545          Bed Mobility    Bed Mobility scooting/bridging;supine-sit;sit-supine  -SS     Scooting/Bridging [place] (Bed Mobility) contact guard;verbal cues  -     Supine-Sit [place] (Bed Mobility) contact guard;verbal cues  -     Sit-Supine [place] (Bed Mobility) contact guard;verbal cues  -     Assistive Device (Bed Mobility) bed rails;head of bed elevated  -     Comment, (Bed Mobility) VC for sequencing  -     Row Name 04/07/22 1545          Sit-Stand Transfer    Sit-Stand [place] (Transfers) contact guard;verbal cues  -     Comment, (Sit-Stand Transfer) VC for hand placement, upright posture  -     Row Name 04/07/22 1545          Gait/Stairs (Locomotion)    [place] Level (Gait) minimum assist  (75% patient effort);verbal cues  -     Assistive Device (Gait) other (see comments)  UUE A  -     Distance in Feet (Gait) 20  -SS     Deviations/Abnormal Patterns (Gait) bilateral deviations;base of support, narrow;scissoring;stride length decreased;weight shifting decreased;padma decreased;gait speed decreased  -     Bilateral Gait Deviations forward flexed posture;heel strike decreased;knee buckling bilaterally  -     Comment, (Gait/Stairs) Pt. ambulated with a step through but near scissoring gait pattern. VC for increased step width. Pt. demonstrated B knee buckling but able to self correct. Gait limited by fatigue, weakness.  -           User Key  (r) = Recorded By, (t) = Taken By, (c) = Cosigned By    Initials Name Provider Type     Genet Holley, PT Physical Therapist               Obj/Interventions     Row Name 04/07/22 1548          Range of Motion Comprehensive    General Range of Motion bilateral lower extremity ROM WFL  -     Row Name 04/07/22 1548          Strength Comprehensive (MMT)    Comment, General Manual Muscle Testing (MMT) Assessment per MMT pt. 3+/5; per functional mobility 4/5  -     Row Name 04/07/22 1548          Balance    Balance Assessment sitting static balance;sitting dynamic balance;sit to stand dynamic balance;standing static balance;standing dynamic balance  -     Static Sitting Balance standby assist  -     Dynamic Sitting Balance standby assist  -     Position, Sitting Balance unsupported;sitting edge of bed  -     Sit to Stand Dynamic Balance contact guard  -     Static Standing Balance contact guard  -SS     Dynamic Standing Balance minimal assist  -     Position/Device Used, Standing Balance supported;other (see comments)  [facility]     Balance Interventions sitting;standing;sit to stand;supported;dynamic;static  -     Row Name 04/07/22 1548          Sensory Assessment (Somatosensory)    Sensory Assessment (Somatosensory) unable/difficult  to assess  pt. does not respond appropriately during testing  -SS           User Key  (r) = Recorded By, (t) = Taken By, (c) = Cosigned By    Initials Name Provider Type    SS Genet Holley PT Physical Therapist               Goals/Plan     Row Name 04/07/22 1551          Bed Mobility Goal 1 (PT)    Activity/Assistive Device (Bed Mobility Goal 1, PT) bed mobility activities, all  -SS     [place] Level/Cues Needed (Bed Mobility Goal 1, PT) independent  -SS     Time Frame (Bed Mobility Goal 1, PT) long term goal (LTG);10 days  -SS     Row Name 04/07/22 1551          Transfer Goal 1 (PT)    Activity/Assistive Device (Transfer Goal 1, PT) sit-to-stand/stand-to-sit;bed-to-chair/chair-to-bed  -SS     [place] Level/Cues Needed (Transfer Goal 1, PT) independent  -SS     Time Frame (Transfer Goal 1, PT) long term goal (LTG);10 days  -     Row Name 04/07/22 1551          Gait Training Goal 1 (PT)    Activity/Assistive Device (Gait Training Goal 1, PT) gait (walking locomotion);assistive device use;walker, rolling  -SS     [place] Level (Gait Training Goal 1, PT) modified independence  -SS     Distance (Gait Training Goal 1, PT) 150  -SS     Time Frame (Gait Training Goal 1, PT) long term goal (LTG);10 days  -SS           User Key  (r) = Recorded By, (t) = Taken By, (c) = Cosigned By    Initials Name Provider Type    Genet Ramirez PT Physical Therapist               Clinical Impression     Row Name 04/07/22 1546          Pain    Pretreatment Pain Rating 10/10  -SS     Posttreatment Pain Rating 10/10  -SS     Pain Location generalized  -     Pain Location - abdomen;back  -SS     Pain Intervention(s) Repositioned;Ambulation/increased activity  -     Additional Documentation Pain Scale: Numbers Pre/Post-Treatment (Group)  -     Row Name 04/07/22 4012          Plan of Care Review    Plan of Care Reviewed With patient  -SS     Outcome Evaluation Pt. presents with generalized weakness and balance  impairments affecting her ability to safely participate in functional mobility. She performed bed mobility and sit to stand transfer w/contact guard assist. She ambulated 20' w/UUE HHA, min assist. Gait limited by fatigue, weakness. Recommend SNF upon discharge.  -     Row Name 04/07/22 1549          Therapy Assessment/Plan (PT)    Rehab Potential (PT) good, to achieve stated therapy goals  -     Criteria for Skilled Interventions Met (PT) yes;meets criteria;skilled treatment is necessary  -     Row Name 04/07/22 1549          Vital Signs    Pre Systolic BP Rehab --  pt. unable to keep arm still for accurate reading; previous readings on monitor stable  -     Pretreatment Heart Rate (beats/min) 78  -SS     Pre SpO2 (%) 96  -SS     O2 Delivery Pre Treatment room air  -     Row Name 04/07/22 1549          Positioning and Restraints    Pre-Treatment Position in bed  -SS     Post Treatment Position bed  -SS     In Bed notified nsg;fowlers;call light within reach;encouraged to call for assist;exit alarm on  -           User Key  (r) = Recorded By, (t) = Taken By, (c) = Cosigned By    Initials Name Provider Type    SS Genet Holley, PT Physical Therapist               Outcome Measures     Row Name 04/07/22 1552 04/07/22 0915       How much help from another person do you currently need...    Turning from your back to your side while in flat bed without using bedrails? 3  -SS 4  -MR    Moving from lying on back to sitting on the side of a flat bed without bedrails? 3  -SS 4  -MR    Moving to and from a bed to a chair (including a wheelchair)? 3  -SS 4  -MR    Standing up from a chair using your arms (e.g., wheelchair, bedside chair)? 3  -SS 3  -MR    Climbing 3-5 steps with a railing? 2  -SS 3  -MR    To walk in hospital room? 3  -SS 3  -MR    AM-PAC 6 Clicks Score (PT) 17  -SS 21  -MR    Row Name 04/07/22 1552          Functional Assessment    Outcome Measure Options AM-PAC 6 Clicks Basic Mobility (PT)  -            User Key  (r) = Recorded By, (t) = Taken By, (c) = Cosigned By    Initials Name Provider Type    Luis Manuel Alfaro, RN Registered Nurse     Genet Holley, PT Physical Therapist                             Physical Therapy Education                 Title: PT OT SLP Therapies (In Progress)     Topic: Physical Therapy (In Progress)     Point: Mobility training (Done)     Learning Progress Summary           Patient Eager, E, VU,NR by  at 4/7/2022 1552    Comment: Educated pt. safety/technique w/bed mobility, transfers, ambulation, PT POC                   Point: Home exercise program (Not Started)     Learner Progress:  Not documented in this visit.          Point: Body mechanics (Done)     Learning Progress Summary           Patient Eager, E, VU,NR by  at 4/7/2022 1552    Comment: Educated pt. safety/technique w/bed mobility, transfers, ambulation, PT POC                   Point: Precautions (Done)     Learning Progress Summary           Patient Eager, E, VU,NR by  at 4/7/2022 1552    Comment: Educated pt. safety/technique w/bed mobility, transfers, ambulation, PT POC                               User Key     Initials Effective Dates Name Provider Type Discipline     06/01/21 -  Genet Holley, HAROLDO Physical Therapist PT              PT Recommendation and Plan  Planned Therapy Interventions (PT): balance training, bed mobility training, gait training, home exercise program, neuromuscular re-education, patient/family education, postural re-education, ROM (range of motion), strengthening, stretching, transfer training  Plan of Care Reviewed With: patient  Outcome Evaluation: Pt. presents with generalized weakness and balance impairments affecting her ability to safely participate in functional mobility. She performed bed mobility and sit to stand transfer w/contact guard assist. She ambulated 20' w/UUE HHA, min assist. Gait limited by fatigue, weakness. Recommend SNF upon discharge.     Time Calculation:     PT Charges     Row Name 04/07/22 1555             Time Calculation    Start Time 1528  -SS      Stop Time 1541  -SS      Time Calculation (min) 13 min  -SS      PT Received On 04/07/22  -      PT Goal Re-Cert Due Date 04/17/22  -              Time Calculation- PT    Total Timed Code Minutes- PT 13 minute(s)  -SS              Untimed Charges    PT Eval/Re-eval Minutes 50  -SS              Total Minutes    Untimed Charges Total Minutes 50  -SS       Total Minutes 50  -SS            User Key  (r) = Recorded By, (t) = Taken By, (c) = Cosigned By    Initials Name Provider Type    SS Genet Holley, PT Physical Therapist              Therapy Charges for Today     Code Description Service Date Service Provider Modifiers Qty    [id number] HC PT EVAL MOD COMPLEXITY 4 4/7/2022 Genet Holley, PT GP 1          PT G-Codes  Outcome Measure Options: AM-PAC 6 Clicks Basic Mobility (PT)  AM-PAC 6 Clicks Score (PT): 17    Genet Holley PT  4/7/2022

## 2022-04-07 NOTE — CASE MANAGEMENT/SOCIAL WORK
Discharge Planning Assessment  [facility]     Patient Name: Bernadette Paris  MRN: [id number]  Today's Date: 4/7/2022    Admit Date: 4/6/2022     Discharge Needs Assessment     Row Name 04/07/22 1435       Living Environment    People in Home significant other    Living Arrangement Comments States she lives with someone but would not give other detail.       Discharge Needs Assessment    Equipment Currently Used at Home none    Equipment Needed After Discharge none    Discharge Coordination/Progress denies use of any DME, HH or outpt services.               Discharge Plan     Row Name 04/07/22 1437       Plan    Plan Home at [place]    Patient/Family in Agreement with Plan yes    Plan Comments I briefly spoke with the pt. She kept falling asleep during my questions. She did state she would like a walker at [place]. I will arrange at [place]. I will ask the SW to see.    Final Discharge Disposition Code 01 - home or self-care              Continued Care and Services - Admitted Since 4/6/2022    Coordination has not been started for this encounter.       Expected Discharge Date and Time     Expected Discharge Date Expected Discharge Time    Apr 9, 2022          Demographic Summary    No documentation.                Functional Status     Row Name 04/07/22 1435       Functional Status    Usual Activity Tolerance good       Functional Status, IADL    Medications independent    Meal Preparation independent    Housekeeping independent    Laundry independent    Shopping independent               Psychosocial    No documentation.                Abuse/Neglect    No documentation.                Legal    No documentation.                Substance Abuse    No documentation.                Patient Forms    No documentation.                   Federica Hutton RN

## 2022-04-07 NOTE — CONSULTS
"Patient seen at bedside. Spoke to patient about DKA diagnosis. Patient stated she \" deals with high blood sugars all the time.\" Patient stated \" I check my blood sugar three times a day but they've been running 500 or my meter says \"too high\" when I check my sugar. Patient explained that she has been using her friends insulin because she ran out and hadn't seen a doctor in a few months. Patient stated \" I have been using a Humalog pen and just giving my self insulin three times a day when my blood sugar is high.\" Patient could not tell me how much insulin she administers herself, stated \" it varies.\" Patient also stated she 'stopped taking her Invokana and that she currently has no PCP.\"    Patient was educated and instructed to administer her insulin as prescribed by her doctor. Patient also educated on DKA along with how to prevent DKA again. Educated patient on signs and symptoms of hypoglycemia and hyperglycemia.  Patient stated \" I do not eat a lot. Maybe 2 meals a day.\" Educate patient on the MyPlate method. Patient was able to verbalize how to use insulin pen but denied need for hands on teaching with a demonstration pen. Patient stated \" I do not need a new meter or help getting supplies.\" Discussed with patient in detail about A1c of 11.8 and the significance of the value, blood sugar goals, and keeping a blood sugar log.     Encouraged patient to please see a PCP after discharge and in the next few months attempt to see an endocrinologist that specializes in diabetes management. Patient stated \" I will.\" Patient provided written material which includes; \" ADCES DKA\" education pamphlet, [facility] \"What is Diabetes\" handout, \"Blood Glucose Goals\" handout, and \"What is A1c\" handout.     Contacted case management about patient not having a PCP and using a \"friends insulin\" to see if there could be further assistance for the patient. Thank for the consult, please call [phone] for any further needs.       "

## 2022-04-07 NOTE — CONSULTS
Clinical Nutrition   Reason For Visit: Identified at risk by screening criteria, MST score 2+, DKA protocol    Patient Name: Bernadette Paris  YOB: 1970  MRN: [id number]  Date of Encounter: 04/07/22 10:51 EDT  Admission date: 4/6/2022      Nutrition Assessment     Admission Problem List:  DKA (diabetic ketoacidosis)  N/V/Abdominal pain      Applicable PMH:  HTN, HLD  T2DM - insulin  Hepatitis  Bipolar  Chronic pain syndrome with chronic narcotic use  GERD  S/p CCY      Applicable medical tests/procedures since admission:       Reported/Observed/Food/Nutrition Related History   Patient resting in bed. Reports N/V/abdominal pain with resulting poor PO intake (<50% of usual) x 4 weeks and resulting unintentional weight loss of from 180 lbs. Reports diet has consisted of mostly liquid items, some solids (small portions). All she has tolerated today was a spoonful of scrambled eggs. Requests strawberry Boost Glucose Control. Denies food allergies and difficulty chewing/swallowing. RD informed patient about her elevated HgbA1c, inquired about patient interested in DM nutrition education. Patient interested in outpatient education/counseling via video-call. RD will send electronic referral. Patient reports she takes insulin as prescribed at home.    Anthropometrics   Height: 66 in  Weight: 166 lbs (4/7 no method specific)  BMI: 26.8  BMI classification: Overweight: 25.0-29.9kg/m2    IBW: 130 lbs    UBW: ~180 lbs per patient; no wt hx in EMR    Last 15 Recorded Weights  Weight Weight (kg) Weight (lbs) Weight Method VISIT REPORT   4/7/2022 75.433 kg 166 lb 4.8 oz - -   4/6/2022 68.176 kg 150 lb 4.8 oz Bed scale -   4/6/2022 61.236 kg 135 lb Stated -   12/23/2018 112.492 kg 248 lb Stated -   10/27/2018 99.791 kg 220 lb - -   8/27/2018 120.203 kg 265 lb - Report   8/20/2018 119.75 kg 264 lb Stated -   8/6/2018 117.935 kg 260 lb (No Data)  -   8/1/2018 118 kg 260 lb 2.3 oz - -   7/13/2018 119.75 kg 264 lb - Report    6/19/2018 119.75 kg 264 lb Standing scale -   6/5/2018 117.028 kg  258 lb  - Report   12/15/2017 117.028 kg 258 lb - Report   11/7/2017 108.41 kg 239 lb - Report   6/23/2017 112.492 kg 248 lb - -       Weight change: Unable to determine as patient has 2 weights since admission (150 versus 166 lbs). Need standing scale weight.    Labs reviewed   Labs reviewed: Yes    Lab Results   Lab Value Date/Time    HGBA1C 11.80 (H) 04/06/2022 1052    HGBA1C 12.2 (H) 07/09/2021 0219    HGBA1C 9.20 (H) 08/20/2018 1757     Medications reviewed   Medications reviewed: Yes  Scheduled: insulin, thiamine (100 mg daily)  GTT:  PRN: potassium phosphate, sodium phosphate    Current Nutrition Prescription   PO: Diet Regular; Cardiac, Consistent Carbohydrate  Oral Nutrition Supplement:      Average PO intake:     Nutrition Diagnosis     Problem Inadequate oral intake   Etiology N/V/abdominal pain   Signs/Symptoms Pt reports <50% of usual PO intake x 4 weeks       Problem Altered nutrition related laboratory values   Etiology Uncontrolled T2DM   Signs/Symptoms HgbA1c = 11.8 (4/6/22)       Goal:   General: Nutrition to support treatment  PO: Tolerate PO, Increase intake    Intervention   Intervention: Follow treatment progress, Care plan reviewed, Interview for preferences, Encourage intake, Supplement provided    -Sent electronic referral for outpatient DM nutrition education.  -Ordered standing weight.  -Ordered Boost Glucose Control with meals.    Monitoring/Evaluation:   Monitoring/Evaluation: Per protocol, I&O, PO intake, Supplement intake, Pertinent labs, Weight, Symptoms    Kathy Dickey RD  Time Spent: 45 min

## 2022-04-07 NOTE — PROGRESS NOTES
[facility] Medicine Services  PROGRESS NOTE    Patient Name: Bernadette Paris  : 1970  MRN: [id number]    Date of Admission: 2022  Primary Care Physician: Lilly Rose MD    Subjective   Subjective     CC:  N/V    HPI:  Resting in bed in no acute distress but complains of epigastric pain that radiates to her back.  She still is nauseated but much improved.  No vomiting.  Patient has some oral intake.  Denies any fever or chills.  No chest pain or palpitation or shortness of breath.    ROS:  As above    Objective   Objective     Vital Signs:   Temp:  [97.6 °F (36.4 °C)-98.3 °F (36.8 °C)] 98 °F (36.7 °C)  Heart Rate:  [] 78  Resp:  [16-20] 16  BP: ()/() 145/94     Physical Exam:  Constitutional: No acute distress  HENT: NCAT, mucous membranes moist  Respiratory: Clear to auscultation bilaterally, respiratory effort normal   Cardiovascular: RRR, no murmurs, rubs, or gallops  Gastrointestinal: Positive bowel sounds, soft, mild epigastric tenderness, nondistended  Musculoskeletal: No bilateral ankle edema  Psychiatric: Appropriate affect, cooperative  Neurologic: Awake, alert, oriented x3, no focality appreciated, speech clear  Skin: No rashes    Results Reviewed:  LAB RESULTS:      Lab 22  0947 22  1627 22  1052   WBC  --   --  7.47   HEMOGLOBIN  --   --  14.5   HEMATOCRIT  --   --  45.3   PLATELETS  --   --  438   NEUTROS ABS  --   --  4.23   IMMATURE GRANS (ABS)  --   --  0.03   LYMPHS ABS  --   --  2.58   MONOS ABS  --   --  0.60   EOS ABS  --   --  0.00   MCV  --   --  100.7*   LACTATE 1.7 3.4* 2.1*         Lab 22  0947 22  0414 22  2322 22  19422  1627 22  1052   SODIUM 135* 137 139 138 138 136   POTASSIUM 4.0 4.3 3.8 3.6 3.6 4.1   CHLORIDE 111* 110* 112* 111* 107 98   CO2 13.0* 15.0* 16.0* 15.0* 14.0* 10.0*   ANION GAP 11.0 12.0 11.0 12.0 17.0* 28.0*   BUN 6 5* 4* 5* 6 6   CREATININE 0.57 0.58 0.62  0.67 0.89 1.02*   EGFR 110.2 109.7 108.0 106.0 78.6 66.7   GLUCOSE 259* 207* 91 314* 429* 500*   CALCIUM 8.9 8.3* 8.3* 8.5* 8.6 9.6   MAGNESIUM 1.7 2.4 1.7 1.8 1.9  --    PHOSPHORUS 2.5 3.8 1.8* 0.9* 1.7* 3.7   HEMOGLOBIN A1C  --   --   --   --   --  11.80*         Lab 04/07/22  0947 04/06/22  1052   TOTAL PROTEIN  --  7.8   ALBUMIN  --  4.50   GLOBULIN  --  3.3   ALT (SGPT)  --  73*   AST (SGOT)  --  67*   BILIRUBIN  --  0.5   ALK PHOS  --  240*   LIPASE 68* 98*         Lab 04/06/22  1052   TROPONIN T <0.010         Lab 04/06/22  1052   CHOLESTEROL 177   LDL CHOL 73   HDL CHOL 77*   TRIGLYCERIDES 160*             Lab 04/07/22  1217   PH, ARTERIAL 7.358   PCO2, ARTERIAL 32.4*   PO2 ART 96.6   FIO2 21   HCO3 ART 18.2*   BASE EXCESS ART -6.4*   CARBOXYHEMOGLOBIN 1.2     Brief Urine Lab Results  (Last result in the past 365 days)      Color   Clarity   Blood   Leuk Est   Nitrite   Protein   CREAT   Urine HCG        04/06/22 1103               Negative       04/06/22 1103 Yellow   Clear   Trace   Negative   Negative   30 mg/dL (1+)                 Microbiology Results Abnormal     Procedure Component Value - Date/Time    COVID-19 and FLU A/B PCR - Swab, Nasopharynx [[id number]]  (Normal) Collected: 04/06/22 1122    Lab Status: Final result Specimen: Swab from Nasopharynx Updated: 04/06/22 1204     COVID19 Not Detected     Influenza A PCR Not Detected     Influenza B PCR Not Detected    Narrative:      Fact sheet for providers: https://www.fda.gov/media/632235/download    Fact sheet for patients: https://www.fda.gov/media/578398/download    Test performed by PCR.          CT Abdomen Pelvis With Contrast    Result Date: 4/6/2022  DATE OF EXAM: 4/6/2022 1:23 PM  PROCEDURE: CT ABDOMEN PELVIS W CONTRAST-  INDICATIONS: abd pain and N/V  COMPARISON: CT abdomen and pelvis 10/27/2018 and 6/23/2017. CT chest 12/23/2018. Chest radiograph 04/06/2022.  TECHNIQUE: Routine transaxial slices were obtained through the abdomen and pelvis  after the intravenous administration of 75 mL of Isovue 300. Reconstructed coronal and sagittal images were also obtained. Automated exposure control and iterative construction methods were used.  The radiation dose reduction device was turned on for each scan per the ALARA (As Low as Reasonably Achievable) protocol.  FINDINGS: The included lung bases are clear.  Status post cholecystectomy. The liver, spleen, pancreas, and adrenal glands are unremarkable. Multifocal bilateral renal parenchymal scarring. Simple appearing right renal cysts. The urinary bladder is unremarkable. Status post hysterectomy. The adnexa are not definitively identified.  Mild fluid and air distention of the stomach with mild gastric wall thickening, possible nonspecific gastritis. No significant colonic stool burden. No bowel obstruction or significant bowel wall thickening. The appendix is not definitively identified but no pericecal inflammatory changes are seen.  No free fluid in the abdomen or pelvis. No free intraperitoneal air. No abdominal or pelvic lymphadenopathy is identified.  Transitional lumbosacral vertebral anatomy with similar-appearing lumbosacral degenerative changes and postoperative changes. Old healed posterior left 10th and 11th rib fracture deformities. No acute osseous abnormality or concerning osseous lesion.      Impression:  1. Mild fluid and air distention of the stomach with mild gastric wall thickening, possible nonspecific gastritis. 2. Multifocal bilateral renal parenchymal scarring with simple appearing right renal cysts.  This report was finalized on 4/6/2022 1:40 PM by Lazaro Au MD.      XR Chest 1 View    Result Date: 4/6/2022  DATE OF EXAM: 4/6/2022 11:31 AM  PROCEDURE: XR CHEST 1 VW-  INDICATIONS: Upper Abdominal Pain Triage Protocol  COMPARISON: Chest x-ray 6/23/2017  TECHNIQUE: Single radiographic AP view of the chest was obtained.  FINDINGS: Normal cardiomediastinal silhouette. The lungs are clear  without focal consolidation. No pleural effusion. No pneumothorax. Partially imaged upper abdomen appears unremarkable. No acute osseous findings.      Impression: No acute cardiopulmonary findings.  This report was finalized on 4/6/2022 11:47 AM by Amor Espinoza MD.            I have reviewed the medications:  Scheduled Meds:busPIRone, 10 mg, Oral, BID  heparin (porcine), 5,000 Units, Subcutaneous, Q8H  insulin detemir, 40 Units, Subcutaneous, BID  insulin lispro, 0-9 Units, Subcutaneous, 4x Daily With Meals & Nightly  insulin lispro, 10 Units, Subcutaneous, TID With Meals  metoprolol tartrate, 25 mg, Oral, Q12H  sertraline, 100 mg, Oral, Daily  sodium chloride, 10 mL, Intravenous, Q12H  thiamine (VITAMIN B1) IVPB, 100 mg, Intravenous, Daily      Continuous Infusions:dextrose 5 % and sodium chloride 0.45 % with KCl 20 mEq/L, 150 mL/hr, Last Rate: 150 mL/hr (04/07/22 0028)      PRN Meds:.•  acetaminophen **OR** acetaminophen **OR** acetaminophen  •  cloNIDine  •  dextrose  •  dextrose  •  dextrose 5 % and sodium chloride 0.45 % with KCl 20 mEq/L  •  glucagon (human recombinant)  •  HYDROcodone-acetaminophen  •  hydrOXYzine  •  ipratropium-albuterol  •  Morphine **AND** naloxone  •  ondansetron **OR** ondansetron  •  potassium & sodium phosphates **OR** potassium & sodium phosphates  •  potassium phosphate infusion greater than 15 mMoles **OR** potassium phosphate infusion greater than 15 mMoles **OR** potassium phosphate **OR** sodium phosphate IVPB **OR** sodium phosphate IVPB **OR** sodium phosphate IVPB  •  sodium chloride  •  sodium chloride    Assessment/Plan   Assessment & Plan     Active Hospital Problems    Diagnosis  POA   • DKA (diabetic ketoacidosis) (HCC) [E11.10]  Yes      Resolved Hospital Problems   No resolved problems to display.        Brief Hospital Course to date:  Bernadette Paris is a 52 y.o. female with past medical history of hypertension, hyperlipidemia, diabetes mellitus on insulin, bipolar  disorder.  Patient comes to the emergency room for progressively worsening nausea vomiting for a few weeks.  Labs revealed severe ketoacidosis and blood glucose around 500.     *Nausea vomiting secondary to ketoacidosis.  Patient was treated with insulin drip according to protocol.  Symptoms have improved.    *Uncontrolled diabetes mellitus.  Patient is on insulin and compliance is very much in question.  Patient's hemoglobin A1c is 11.8.  On admission blood glucose was 500 but is better controlled now.  Currently on Levemir and Humalog.    *Electrolyte derangement secondary to ketoacidosis, replaced and will continue to monitor.    *Hypertension, currently on home medication and reasonably controlled.  Will monitor and if necessary we will change regimen.    *Epigastric pain.  Lipase is very slightly elevated but does not indicate pancreatitis.  If epigastric pain continues need to do imaging and consider consulting GI.    *Bipolar disorder.    *Chronic pain, uses narcotics chronically.        DVT prophylaxis:  Medical DVT prophylaxis orders are present.       AM-PAC 6 Clicks Score (PT): 21 (04/07/22 6538)    Disposition: Home when symptoms resolve and blood sugar and electrolytes are stable.    CODE STATUS:   Code Status and Medical Interventions:   Ordered at: 04/06/22 1733     Code Status (Patient has no pulse and is not breathing):    CPR (Attempt to Resuscitate)     Medical Interventions (Patient has pulse or is breathing):    Full Support       Bruce Costello MD  04/07/22

## 2022-04-07 NOTE — PLAN OF CARE
Goal Outcome Evaluation:  Plan of Care Reviewed With: patient           Outcome Evaluation: Pt VSS on RA. No c/o pain this evening. Pt asking for nausea medicine. No vomiting noted. Will continue to monitor.

## 2022-04-07 NOTE — PAYOR COMM NOTE
"Bernadette Mcqueen (52 y.o. Female)             Date of Birth   1970    Social Security Number       Address   [address] [place] [address] [address] [place] [address]    Home Phone   [phone]    MRN   [id number]       [facility]    Marital Status   Single                            Admission Date   22    Admission Type   Emergency    Admitting Provider   Bruce Costello MD    Attending Provider   Bruce Costello MD    Department, Room/Bed   [address], S506/1       Discharge Date       Discharge Disposition       Discharge Destination                               Attending Provider: Bruce Costello MD    Allergies: No Known Allergies    Isolation: None   Infection: MRSA (18)   Code Status: CPR   Advance Care Planning Activity    Ht: 167.6 cm (66\")   Wt: 75.4 kg (166 lb 4.8 oz)    Admission Cmt: None   Principal Problem: None                Active Insurance as of 2022     Primary Coverage     Payor Plan Insurance Group Employer/Plan Group    [employer] BY [employer] PAS[employer] BY LAKISHA [id number]     Payor Plan Address Payor Plan Phone Number Payor Plan Fax Number Effective Dates    [address]   2021 - None Entered    [facility] [id number]       Subscriber Name Subscriber Birth Date Member ID       BERNADETTE MCQUEEN 1970 [id number]                 Emergency Contacts      (Rel.) Home Phone Work Phone Mobile Phone    Wellington Layne (Significant Other) -- -- [phone]            [place]: [facility] [id number] Tax ID [id number]  Insurance Information                [employer] BY LAKISHA/PASSR[employer]oxx BY LAKISHA Phone: --    Subscriber: [place] Bernadette BEL Subscriber#: [id number]    Group#: [id number] Precert#: --             History & Physical      Bruce Costello MD at 22 1744              [facility] Medicine Services  HISTORY AND PHYSICAL    Patient Name: Bernadette Mcqueen  : 1970  MRN: [id number]  Primary Care Physician: Lilly Rose " MD Iman  Date of admission: 4/6/2022      Subjective   Subjective     Chief Complaint:  N/V    HPI:  Bernadette Paris is a 52 y.o. female with past medical history significant for insulin-dependent diabetes mellitus, hypertension, hyperlipidemia, hepatitis, bipolar disorder, depression, chronic pain syndrome and she uses narcotics for pain control.  Patient comes to the emergency room complaining of nausea vomiting for several weeks that progressively has gotten worse.  As mentioned she is on insulin however compliance with insulin is highly questionable.  Labs at the emergency room show hyperglycemia, severe ketoacidosis with significantly low pH.  Patient was a started on insulin drip, IV fluid bolus, DKA protocol.  Patient denies any fever or chills.  No cough or coryza or any respiratory issues for the past several days.  No diarrhea although she does have abdominal pain.      Review of Systems   Complains of some weight loss recently, no night sweats, no lumps or bumps, no unusual headaches, complains of blurry vision sometimes, no chest pain or shortness of breath, nausea vomiting as mentioned above, abdominal pain as mentioned above, no rashes or hives.  All other systems reviewed and are negative.     Personal History     Past Medical History:   Diagnosis Date   • Arthritis    • Bipolar disorder (HCC)    • Chronic bronchitis (HCC)    • Depression    • Diabetes mellitus (HCC)     DIAGNOSED 2016   • GERD (gastroesophageal reflux disease)    • Hepatitis-C    • History of blood transfusion    • Hyperlipidemia    • Hypertension    • Infectious viral hepatitis     HEPATITIS C   • Migraine    • Sleep apnea     PATIENT UNSURE OF SETTINGS             Past Surgical History:   Procedure Laterality Date   • CHOLECYSTECTOMY     • HYSTERECTOMY     • KNEE SURGERY     • LUMBAR LAMINECTOMY DISCECTOMY DECOMPRESSION N/A 8/6/2018    Procedure: LUMBAR LAMINECTOMIES L4-S1;  Surgeon: Chip Smith MD;  Location: [facility];   Service: Neurosurgery       Family History:  family history is not on file. Otherwise pertinent FHx was reviewed and unremarkable.     Social History:  reports that she has been smoking. She has been smoking about 1.00 pack per day. She has never used smokeless tobacco. She reports that she does not drink alcohol and does not use drugs.  Social History     Social History Narrative   • Not on file       Medications:  Available home medication information reviewed.  Medications Prior to Admission   Medication Sig Dispense Refill Last Dose   • ACCU-CHEK FASTCLIX LANCETS misc   0    • ACCU-CHEK SMARTVIEW test strip   0    • BD PEN NEEDLE KELLI U/F 32G X 4 MM misc   1    • busPIRone (BUSPAR) 10 MG tablet Take 10 mg by mouth 2 (Two) Times a Day.  0    • Canagliflozin (INVOKANA PO) Take 1 tablet by mouth Every Morning.      • CloNIDine (CATAPRES) 0.1 MG tablet Take 0.1 mg by mouth Every 8 (Eight) Hours As Needed for High Blood Pressure (FOR SYSTOLIC> 170).      • colestipol (COLESTID) 1 g tablet Take 1 g by mouth Daily.  0    • cyclobenzaprine (FLEXERIL) 5 MG tablet Take 1 tablet by mouth 3 (three) times a day as needed for muscle spasms. 12 tablet 0    • diclofenac (VOLTAREN) 75 MG EC tablet   0    • dicyclomine (BENTYL) 10 MG capsule take 1 capsule by mouth three times a day if needed FOR STOMACH CRAMPS  0    • estradiol (ESTRACE) 1 MG tablet take 1 tablet by mouth once daily for 3 weeks then 1 week OFF  0    • estradiol (ESTRACE) 1 MG tablet Take 1 mg by mouth Daily.      • furosemide (LASIX) 40 MG tablet Take 40 mg by mouth 2 (Two) Times a Day.      • HUMALOG MIX 75/25 KWIKPEN (75-25) 100 UNIT/ML suspension pen-injector INJECT 30 UNITS SUBCUTANEOUSLY TWICE A DAY WITH MEALS ADMINISTER ...  (REFER TO PRESCRIPTION NOTES).  0    • HYDROcodone-acetaminophen (NORCO) 5-325 MG per tablet Take 1 tablet by mouth 2 (Two) Times a Day. 30 tablet 0    • HYDROcodone-acetaminophen (NORCO) 5-325 MG per tablet Take 1 tablet by mouth  Every 6 (Six) Hours As Needed for Moderate Pain . 6 tablet 0    • hydrOXYzine (ATARAX) 10 MG tablet Take 10 mg by mouth 3 (Three) Times a Day As Needed for Itching.      • hydrOXYzine (ATARAX) 25 MG tablet Take 25 mg by mouth Every 8 (Eight) Hours As Needed for Itching.      • hydrOXYzine (ATARAX) 50 MG tablet take 1 tablet by mouth at bedtime  0    • insulin lispro (humaLOG) 100 UNIT/ML injection Inject 60 Units under the skin into the appropriate area as directed 2 (Two) Times a Day.      • lisinopril (PRINIVIL,ZESTRIL) 20 MG tablet Take 20 mg by mouth Daily.  0    • losartan (COZAAR) 100 MG tablet take 1 tablet by mouth once daily  0    • losartan (COZAAR) 100 MG tablet Take 100 mg by mouth Daily.      • metFORMIN (GLUCOPHAGE) 1000 MG tablet Take 1,000 mg by mouth 2 (two) times a day with meals.      • methocarbamol (ROBAXIN) 500 MG tablet take 1 tablet by mouth BEFORE BED if needed  0    • metoclopramide (REGLAN) 10 MG tablet Take 1 tablet by mouth 4 (Four) Times a Day Before Meals & at Bedtime. 30 tablet 0    • metoprolol tartrate (LOPRESSOR) 25 MG tablet Take 25 mg by mouth Every 12 (Twelve) Hours.  0    • omeprazole (PRILOSEC) 40 MG capsule Take 1 capsule by mouth Daily. 30 capsule 0    • ondansetron ODT (ZOFRAN-ODT) 4 MG disintegrating tablet Take 1 tablet by mouth Every 8 (Eight) Hours As Needed for Nausea or Vomiting. 14 tablet 0    • ondansetron ODT (ZOFRAN-ODT) 8 MG disintegrating tablet Take 1 tablet by mouth Every 8 (Eight) Hours As Needed for Nausea or Vomiting. 20 tablet 0    • oxyCODONE-acetaminophen (PERCOCET) 7.5-325 MG per tablet Take 1 tablet by mouth Every 6 (Six) Hours As Needed for Severe Pain . 12 tablet 0    • pantoprazole (PROTONIX) 40 MG EC tablet Take 40 mg by mouth Daily.      • prazosin (MINIPRESS) 1 MG capsule Take 1 mg by mouth Every Night.      • RA ALLERGY RELIEF 10 MG tablet take 1 tablet by mouth once daily  0    • RA CLOTRIMAZOLE 7 1 % vaginal cream APPLY 1 APPLICATION AT  BEDTIME ONCE DAILY VAGINALLY  0    • sertraline (ZOLOFT) 100 MG tablet Take 100 mg by mouth Daily.      • traZODone (DESYREL) 50 MG tablet Take 50 mg by mouth Every Night.      • triamcinolone (KENALOG) 0.1 % cream apply to affected area twice a day  0    • VENTOLIN  (90 Base) MCG/ACT inhaler inhale 2 puffs by mouth every 4 to 6 hours  0        No Known Allergies    Objective   Objective     Vital Signs:   Temp:  [98.3 °F (36.8 °C)-99.6 °F (37.6 °C)] 98.3 °F (36.8 °C)  Heart Rate:  [] 107  Resp:  [16-20] 20  BP: (142-206)/() 142/90       Physical Exam   Constitutional: Awake, alert  Eyes: PERRLA, sclerae anicteric, no conjunctival injection  HENT: NCAT, mucous membranes moist  Neck: Supple, no thyromegaly, no lymphadenopathy, trachea midline  Respiratory: Clear to auscultation bilaterally, nonlabored respirations   Cardiovascular: RRR, no murmurs, rubs, or gallops, palpable pedal pulses bilaterally  Gastrointestinal: Positive bowel sounds, soft, mild diffuse tenderness, nondistended  Musculoskeletal: No bilateral ankle edema, no clubbing or cyanosis to extremities  Psychiatric: Appropriate affect, cooperative  Neurologic: Awake, alert, oriented x3, no focality appreciated, speech clear  Skin: No rashes, tenting    Result Review:  I have personally reviewed the results from the time of this admission to 4/6/2022 17:46 EDT and agree with these findings:  [x]  Laboratory  []  Microbiology  [x]  Radiology  []  EKG/Telemetry   []  Cardiology/Vascular   []  Pathology  [x]  Old records  []  Other:  Most notable findings include: Severe ketoacidosis and hyperglycemia      LAB RESULTS:      Lab 04/06/22 1627 04/06/22  1052   WBC  --  7.47   HEMOGLOBIN  --  14.5   HEMATOCRIT  --  45.3   PLATELETS  --  438   NEUTROS ABS  --  4.23   IMMATURE GRANS (ABS)  --  0.03   LYMPHS ABS  --  2.58   MONOS ABS  --  0.60   EOS ABS  --  0.00   MCV  --  100.7*   LACTATE 3.4* 2.1*         Lab 04/06/22  1627 04/06/22  1052    SODIUM 138 136   POTASSIUM 3.6 4.1   CHLORIDE 107 98   CO2 14.0* 10.0*   ANION GAP 17.0* 28.0*   BUN 6 6   CREATININE 0.89 1.02*   EGFR 78.6 66.7   GLUCOSE 429* 500*   CALCIUM 8.6 9.6   MAGNESIUM 1.9  --    PHOSPHORUS 1.7* 3.7   HEMOGLOBIN A1C  --  11.80*         Lab 04/06/22  1052   TOTAL PROTEIN 7.8   ALBUMIN 4.50   GLOBULIN 3.3   ALT (SGPT) 73*   AST (SGOT) 67*   BILIRUBIN 0.5   ALK PHOS 240*   LIPASE 98*         Lab 04/06/22  1052   TROPONIN T <0.010         Lab 04/06/22  1052   CHOLESTEROL 177   LDL CHOL 73   HDL CHOL 77*   TRIGLYCERIDES 160*             UA    Urinalysis 7/8/21 7/8/21 4/6/22 4/6/22    1414 1414 1103 1103   Squamous Epithelial Cells, UA  0-5  0-2   Specific Gravity, UA 1.026 1.025 1.028    Ketones, UA   >=160 mg/dL (4+) (A)    Blood, UA  Negative Trace (A)    Leukocytes, UA  Trace (A) Negative    Nitrite, UA   Negative    RBC, UA  <1  0-2   WBC, UA    3-5 (A)   Bacteria, UA  Negative  None Seen   (A) Abnormal value              Microbiology Results (last 10 days)     Procedure Component Value - Date/Time    COVID-19 and FLU A/B PCR - Swab, Nasopharynx [[id number]]  (Normal) Collected: 04/06/22 1122    Lab Status: Final result Specimen: Swab from Nasopharynx Updated: 04/06/22 1204     COVID19 Not Detected     Influenza A PCR Not Detected     Influenza B PCR Not Detected    Narrative:      Fact sheet for providers: https://www.fda.gov/media/542119/download    Fact sheet for patients: https://www.fda.gov/media/084367/download    Test performed by PCR.          CT Abdomen Pelvis With Contrast    Result Date: 4/6/2022  DATE OF EXAM: 4/6/2022 1:23 PM  PROCEDURE: CT ABDOMEN PELVIS W CONTRAST-  INDICATIONS: abd pain and N/V  COMPARISON: CT abdomen and pelvis 10/27/2018 and 6/23/2017. CT chest 12/23/2018. Chest radiograph 04/06/2022.  TECHNIQUE: Routine transaxial slices were obtained through the abdomen and pelvis after the intravenous administration of 75 mL of Isovue 300. Reconstructed coronal and  sagittal images were also obtained. Automated exposure control and iterative construction methods were used.  The radiation dose reduction device was turned on for each scan per the ALARA (As Low as Reasonably Achievable) protocol.  FINDINGS: The included lung bases are clear.  Status post cholecystectomy. The liver, spleen, pancreas, and adrenal glands are unremarkable. Multifocal bilateral renal parenchymal scarring. Simple appearing right renal cysts. The urinary bladder is unremarkable. Status post hysterectomy. The adnexa are not definitively identified.  Mild fluid and air distention of the stomach with mild gastric wall thickening, possible nonspecific gastritis. No significant colonic stool burden. No bowel obstruction or significant bowel wall thickening. The appendix is not definitively identified but no pericecal inflammatory changes are seen.  No free fluid in the abdomen or pelvis. No free intraperitoneal air. No abdominal or pelvic lymphadenopathy is identified.  Transitional lumbosacral vertebral anatomy with similar-appearing lumbosacral degenerative changes and postoperative changes. Old healed posterior left 10th and 11th rib fracture deformities. No acute osseous abnormality or concerning osseous lesion.      Impression:  1. Mild fluid and air distention of the stomach with mild gastric wall thickening, possible nonspecific gastritis. 2. Multifocal bilateral renal parenchymal scarring with simple appearing right renal cysts.  This report was finalized on 4/6/2022 1:40 PM by Lazaro Au MD.      XR Chest 1 View    Result Date: 4/6/2022  DATE OF EXAM: 4/6/2022 11:31 AM  PROCEDURE: XR CHEST 1 VW-  INDICATIONS: Upper Abdominal Pain Triage Protocol  COMPARISON: Chest x-ray 6/23/2017  TECHNIQUE: Single radiographic AP view of the chest was obtained.  FINDINGS: Normal cardiomediastinal silhouette. The lungs are clear without focal consolidation. No pleural effusion. No pneumothorax. Partially imaged  upper abdomen appears unremarkable. No acute osseous findings.      Impression: No acute cardiopulmonary findings.  This report was finalized on 4/6/2022 11:47 AM by Amor Espinoza MD.            Assessment/Plan   Assessment & Plan     Active Hospital Problems    Diagnosis  POA   • DKA (diabetic ketoacidosis) (HCC) [E11.10]  Yes       Patient is a 52-year-old -American female with past medical history of hypertension, hyperlipidemia, diabetes mellitus on insulin, bipolar disorder.  Patient comes to the emergency room for progressively worsening nausea vomiting for a few weeks.  Labs revealed severe ketoacidosis and blood glucose around 500.    PLAN:  -Admit to telemetry  -DKA protocol  -Check labs including electrolytes as per protocol  -Continue insulin drip  -N.p.o. except sips with medication  -Continue home medications  -Monitor blood pressure and heart rate closely.    DVT prophylaxis: Heparin      CODE STATUS:   Code Status and Medical Interventions:   Ordered at: 04/06/22 9747     Code Status (Patient has no pulse and is not breathing):    CPR (Attempt to Resuscitate)     Medical Interventions (Patient has pulse or is breathing):    Full Support         Bruce Costello MD  04/06/22    Electronically signed by Bruce Costello MD at 04/06/22 6470

## 2022-04-07 NOTE — PLAN OF CARE
Goal Outcome Evaluation:  Plan of Care Reviewed With: patient           Outcome Evaluation: Pt. presents with generalized weakness and balance impairments affecting her ability to safely participate in functional mobility. She performed bed mobility and sit to stand transfer w/contact guard assist. She ambulated 20' w/UUE HHA, min assist. Gait limited by fatigue, weakness. Recommend SNF upon discharge.

## 2022-04-08 LAB
AMPHET+METHAMPHET UR QL: NEGATIVE
AMPHETAMINES UR QL: NEGATIVE
ANION GAP SERPL CALCULATED.3IONS-SCNC: 10 MMOL/L (ref 5–15)
ANION GAP SERPL CALCULATED.3IONS-SCNC: 8 MMOL/L (ref 5–15)
ANION GAP SERPL CALCULATED.3IONS-SCNC: 9 MMOL/L (ref 5–15)
BARBITURATES UR QL SCN: NEGATIVE
BENZODIAZ UR QL SCN: POSITIVE
BUN SERPL-MCNC: 5 MG/DL (ref 6–20)
BUN SERPL-MCNC: 5 MG/DL (ref 6–20)
BUN SERPL-MCNC: 6 MG/DL (ref 6–20)
BUN/CREAT SERPL: 12.8 (ref 7–25)
BUN/CREAT SERPL: 9.3 (ref 7–25)
BUN/CREAT SERPL: 9.6 (ref 7–25)
BUPRENORPHINE SERPL-MCNC: NEGATIVE NG/ML
CALCIUM SPEC-SCNC: 8.6 MG/DL (ref 8.6–10.5)
CALCIUM SPEC-SCNC: 8.8 MG/DL (ref 8.6–10.5)
CALCIUM SPEC-SCNC: 8.9 MG/DL (ref 8.6–10.5)
CANNABINOIDS SERPL QL: NEGATIVE
CHLORIDE SERPL-SCNC: 101 MMOL/L (ref 98–107)
CHLORIDE SERPL-SCNC: 107 MMOL/L (ref 98–107)
CHLORIDE SERPL-SCNC: 108 MMOL/L (ref 98–107)
CO2 SERPL-SCNC: 20 MMOL/L (ref 22–29)
COCAINE UR QL: POSITIVE
CREAT SERPL-MCNC: 0.47 MG/DL (ref 0.57–1)
CREAT SERPL-MCNC: 0.52 MG/DL (ref 0.57–1)
CREAT SERPL-MCNC: 0.54 MG/DL (ref 0.57–1)
EGFRCR SERPLBLD CKD-EPI 2021: 111.6 ML/MIN/1.73
EGFRCR SERPLBLD CKD-EPI 2021: 112.6 ML/MIN/1.73
EGFRCR SERPLBLD CKD-EPI 2021: 115.4 ML/MIN/1.73
GLUCOSE BLDC GLUCOMTR-MCNC: 144 MG/DL (ref 70–130)
GLUCOSE BLDC GLUCOMTR-MCNC: 301 MG/DL (ref 70–130)
GLUCOSE BLDC GLUCOMTR-MCNC: 400 MG/DL (ref 70–130)
GLUCOSE BLDC GLUCOMTR-MCNC: 424 MG/DL (ref 70–130)
GLUCOSE BLDC GLUCOMTR-MCNC: 427 MG/DL (ref 70–130)
GLUCOSE BLDC GLUCOMTR-MCNC: 437 MG/DL (ref 70–130)
GLUCOSE BLDC GLUCOMTR-MCNC: 450 MG/DL (ref 70–130)
GLUCOSE SERPL-MCNC: 178 MG/DL (ref 65–99)
GLUCOSE SERPL-MCNC: 353 MG/DL (ref 65–99)
GLUCOSE SERPL-MCNC: 462 MG/DL (ref 65–99)
MAGNESIUM SERPL-MCNC: 1.5 MG/DL (ref 1.6–2.6)
MAGNESIUM SERPL-MCNC: 1.6 MG/DL (ref 1.6–2.6)
MAGNESIUM SERPL-MCNC: 1.7 MG/DL (ref 1.6–2.6)
METHADONE UR QL SCN: NEGATIVE
OPIATES UR QL: NEGATIVE
OXYCODONE UR QL SCN: NEGATIVE
PCP UR QL SCN: NEGATIVE
PHOSPHATE SERPL-MCNC: 2.8 MG/DL (ref 2.5–4.5)
PHOSPHATE SERPL-MCNC: 3.2 MG/DL (ref 2.5–4.5)
PHOSPHATE SERPL-MCNC: 3.3 MG/DL (ref 2.5–4.5)
POTASSIUM SERPL-SCNC: 3.5 MMOL/L (ref 3.5–5.2)
POTASSIUM SERPL-SCNC: 3.5 MMOL/L (ref 3.5–5.2)
POTASSIUM SERPL-SCNC: 3.6 MMOL/L (ref 3.5–5.2)
PROPOXYPH UR QL: NEGATIVE
SODIUM SERPL-SCNC: 131 MMOL/L (ref 136–145)
SODIUM SERPL-SCNC: 135 MMOL/L (ref 136–145)
SODIUM SERPL-SCNC: 137 MMOL/L (ref 136–145)
TRICYCLICS UR QL SCN: NEGATIVE

## 2022-04-08 PROCEDURE — 63710000001 INSULIN DETEMIR PER 5 UNITS: Performed by: NURSE PRACTITIONER

## 2022-04-08 PROCEDURE — 25010000002 HEPARIN (PORCINE) PER 1000 UNITS: Performed by: INTERNAL MEDICINE

## 2022-04-08 PROCEDURE — 80048 BASIC METABOLIC PNL TOTAL CA: CPT | Performed by: INTERNAL MEDICINE

## 2022-04-08 PROCEDURE — 83735 ASSAY OF MAGNESIUM: CPT | Performed by: INTERNAL MEDICINE

## 2022-04-08 PROCEDURE — 99222 1ST HOSP IP/OBS MODERATE 55: CPT | Performed by: PHYSICIAN ASSISTANT

## 2022-04-08 PROCEDURE — 84100 ASSAY OF PHOSPHORUS: CPT | Performed by: INTERNAL MEDICINE

## 2022-04-08 PROCEDURE — 25010000002 METOCLOPRAMIDE PER 10 MG: Performed by: PHYSICIAN ASSISTANT

## 2022-04-08 PROCEDURE — 82962 GLUCOSE BLOOD TEST: CPT

## 2022-04-08 PROCEDURE — 63710000001 INSULIN LISPRO (HUMAN) PER 5 UNITS: Performed by: NURSE PRACTITIONER

## 2022-04-08 PROCEDURE — 25010000002 MORPHINE PER 10 MG: Performed by: INTERNAL MEDICINE

## 2022-04-08 PROCEDURE — 25010000002 MAGNESIUM SULFATE 2 GM/50ML SOLUTION: Performed by: INTERNAL MEDICINE

## 2022-04-08 PROCEDURE — 99232 SBSQ HOSP IP/OBS MODERATE 35: CPT | Performed by: INTERNAL MEDICINE

## 2022-04-08 PROCEDURE — 25010000002 ONDANSETRON PER 1 MG: Performed by: INTERNAL MEDICINE

## 2022-04-08 PROCEDURE — 63710000001 INSULIN LISPRO (HUMAN) PER 5 UNITS: Performed by: INTERNAL MEDICINE

## 2022-04-08 RX ORDER — MAGNESIUM SULFATE HEPTAHYDRATE 40 MG/ML
2 INJECTION, SOLUTION INTRAVENOUS AS NEEDED
Status: DISCONTINUED | OUTPATIENT
Start: 2022-04-08 | End: 2022-04-11 | Stop reason: HOSPADM

## 2022-04-08 RX ORDER — MAGNESIUM SULFATE HEPTAHYDRATE 40 MG/ML
4 INJECTION, SOLUTION INTRAVENOUS AS NEEDED
Status: DISCONTINUED | OUTPATIENT
Start: 2022-04-08 | End: 2022-04-11 | Stop reason: HOSPADM

## 2022-04-08 RX ORDER — POTASSIUM CHLORIDE 1.5 G/1.77G
40 POWDER, FOR SOLUTION ORAL AS NEEDED
Status: DISCONTINUED | OUTPATIENT
Start: 2022-04-08 | End: 2022-04-11 | Stop reason: HOSPADM

## 2022-04-08 RX ORDER — CHOLECALCIFEROL (VITAMIN D3) 125 MCG
5 CAPSULE ORAL NIGHTLY PRN
Status: DISCONTINUED | OUTPATIENT
Start: 2022-04-08 | End: 2022-04-11 | Stop reason: HOSPADM

## 2022-04-08 RX ORDER — PANTOPRAZOLE SODIUM 40 MG/1
40 TABLET, DELAYED RELEASE ORAL
Status: DISCONTINUED | OUTPATIENT
Start: 2022-04-08 | End: 2022-04-11

## 2022-04-08 RX ORDER — METOCLOPRAMIDE HYDROCHLORIDE 5 MG/ML
10 INJECTION INTRAMUSCULAR; INTRAVENOUS EVERY 8 HOURS SCHEDULED
Status: DISCONTINUED | OUTPATIENT
Start: 2022-04-08 | End: 2022-04-10

## 2022-04-08 RX ORDER — POTASSIUM CHLORIDE 750 MG/1
40 CAPSULE, EXTENDED RELEASE ORAL AS NEEDED
Status: DISCONTINUED | OUTPATIENT
Start: 2022-04-08 | End: 2022-04-11 | Stop reason: HOSPADM

## 2022-04-08 RX ORDER — POTASSIUM CHLORIDE 7.45 MG/ML
10 INJECTION INTRAVENOUS
Status: DISCONTINUED | OUTPATIENT
Start: 2022-04-08 | End: 2022-04-11 | Stop reason: HOSPADM

## 2022-04-08 RX ADMIN — Medication 10 ML: at 20:49

## 2022-04-08 RX ADMIN — HYDROXYZINE HYDROCHLORIDE 25 MG: 25 TABLET, FILM COATED ORAL at 10:08

## 2022-04-08 RX ADMIN — INSULIN DETEMIR 40 UNITS: 100 INJECTION, SOLUTION SUBCUTANEOUS at 08:51

## 2022-04-08 RX ADMIN — METOPROLOL TARTRATE 25 MG: 25 TABLET, FILM COATED ORAL at 20:40

## 2022-04-08 RX ADMIN — POTASSIUM CHLORIDE 40 MEQ: 750 CAPSULE, EXTENDED RELEASE ORAL at 14:33

## 2022-04-08 RX ADMIN — MAGNESIUM SULFATE HEPTAHYDRATE 2 G: 2 INJECTION, SOLUTION INTRAVENOUS at 14:32

## 2022-04-08 RX ADMIN — PANTOPRAZOLE SODIUM 40 MG: 40 TABLET, DELAYED RELEASE ORAL at 13:05

## 2022-04-08 RX ADMIN — MORPHINE SULFATE 1 MG: 2 INJECTION, SOLUTION INTRAMUSCULAR; INTRAVENOUS at 02:37

## 2022-04-08 RX ADMIN — ONDANSETRON 4 MG: 2 INJECTION INTRAMUSCULAR; INTRAVENOUS at 05:38

## 2022-04-08 RX ADMIN — BUSPIRONE HYDROCHLORIDE 10 MG: 10 TABLET ORAL at 20:40

## 2022-04-08 RX ADMIN — Medication 10 ML: at 08:54

## 2022-04-08 RX ADMIN — POTASSIUM CHLORIDE 40 MEQ: 750 CAPSULE, EXTENDED RELEASE ORAL at 18:56

## 2022-04-08 RX ADMIN — INSULIN DETEMIR 40 UNITS: 100 INJECTION, SOLUTION SUBCUTANEOUS at 20:40

## 2022-04-08 RX ADMIN — METOCLOPRAMIDE 10 MG: 5 INJECTION, SOLUTION INTRAMUSCULAR; INTRAVENOUS at 20:39

## 2022-04-08 RX ADMIN — MORPHINE SULFATE 1 MG: 2 INJECTION, SOLUTION INTRAMUSCULAR; INTRAVENOUS at 10:08

## 2022-04-08 RX ADMIN — MAGNESIUM SULFATE HEPTAHYDRATE 2 G: 2 INJECTION, SOLUTION INTRAVENOUS at 16:37

## 2022-04-08 RX ADMIN — HYDROCODONE BITARTRATE AND ACETAMINOPHEN 1 TABLET: 5; 325 TABLET ORAL at 17:11

## 2022-04-08 RX ADMIN — INSULIN LISPRO 7 UNITS: 100 INJECTION, SOLUTION INTRAVENOUS; SUBCUTANEOUS at 20:41

## 2022-04-08 RX ADMIN — METOPROLOL TARTRATE 25 MG: 25 TABLET, FILM COATED ORAL at 08:51

## 2022-04-08 RX ADMIN — HEPARIN SODIUM 5000 UNITS: 5000 INJECTION, SOLUTION INTRAVENOUS; SUBCUTANEOUS at 20:40

## 2022-04-08 RX ADMIN — MAGNESIUM SULFATE HEPTAHYDRATE 2 G: 2 INJECTION, SOLUTION INTRAVENOUS at 18:56

## 2022-04-08 RX ADMIN — SERTRALINE 100 MG: 100 TABLET, FILM COATED ORAL at 08:51

## 2022-04-08 RX ADMIN — ONDANSETRON 4 MG: 2 INJECTION INTRAMUSCULAR; INTRAVENOUS at 20:39

## 2022-04-08 RX ADMIN — INSULIN LISPRO 9 UNITS: 100 INJECTION, SOLUTION INTRAVENOUS; SUBCUTANEOUS at 11:12

## 2022-04-08 RX ADMIN — HYDROXYZINE HYDROCHLORIDE 25 MG: 25 TABLET, FILM COATED ORAL at 20:40

## 2022-04-08 RX ADMIN — HEPARIN SODIUM 5000 UNITS: 5000 INJECTION, SOLUTION INTRAVENOUS; SUBCUTANEOUS at 13:05

## 2022-04-08 RX ADMIN — BUSPIRONE HYDROCHLORIDE 10 MG: 10 TABLET ORAL at 08:51

## 2022-04-08 RX ADMIN — HEPARIN SODIUM 5000 UNITS: 5000 INJECTION, SOLUTION INTRAVENOUS; SUBCUTANEOUS at 05:38

## 2022-04-08 RX ADMIN — METOCLOPRAMIDE 10 MG: 5 INJECTION, SOLUTION INTRAMUSCULAR; INTRAVENOUS at 13:05

## 2022-04-08 RX ADMIN — INSULIN LISPRO 5 UNITS: 100 INJECTION, SOLUTION INTRAVENOUS; SUBCUTANEOUS at 11:11

## 2022-04-08 NOTE — CONSULTS
Diabetes education met with patient yesterday for full education. Please see note on 4/7. Attempted to follow up with patient this afternoon, patient has not been available.

## 2022-04-08 NOTE — CONSULTS
[facility] Gastroenterology Consult    Referring Provider: Genet Eason MD   PCP: Lilly Rose MD    Reason for Consultation: Epigastric pain     Chief complaint: Abdominal pain, nausea and vomiting     History of present illness:    Bernadette Paris is a 52 y.o. female who is admitted with diabetic ketoacidosis.   She complains of a three week history of persistent epigastric pain.   Pain is described as sharp.  Pain was preceded by nausea and vomiting on a daily basis that has been occurring the last six weeks.  She often vomits undigested food after a small meal.   She has uncontrolled type II diabetes mellitus.    She additionally has history of chronic hepatitis C, treatment naive.   She was hospitalized last month at [facility] for hyperglycemia and abdominal pain; she was positive for Cocaine use at that time.       She has never seen a gastroenterologist nor had an EGD.      Allergies:  Patient has no known allergies.    Scheduled Meds:  busPIRone, 10 mg, Oral, BID  heparin (porcine), 5,000 Units, Subcutaneous, Q8H  insulin detemir, 40 Units, Subcutaneous, BID  insulin lispro, 0-9 Units, Subcutaneous, 4x Daily With Meals & Nightly  insulin lispro, 5 Units, Subcutaneous, TID With Meals  metoprolol tartrate, 25 mg, Oral, Q12H  sertraline, 100 mg, Oral, Daily  sodium chloride, 10 mL, Intravenous, Q12H         Infusions:  dextrose 5 % and sodium chloride 0.45 % with KCl 20 mEq/L, 150 mL/hr, Last Rate: 150 mL/hr (04/07/22 0028)        PRN Meds:  •  acetaminophen **OR** acetaminophen **OR** acetaminophen  •  cloNIDine  •  dextrose  •  dextrose  •  dextrose 5 % and sodium chloride 0.45 % with KCl 20 mEq/L  •  glucagon (human recombinant)  •  HYDROcodone-acetaminophen  •  hydrOXYzine  •  ipratropium-albuterol  •  melatonin  •  Morphine **AND** naloxone  •  ondansetron **OR** ondansetron  •  potassium & sodium phosphates **OR** potassium & sodium phosphates  •  potassium phosphate infusion greater  than 15 mMoles **OR** potassium phosphate infusion greater than 15 mMoles **OR** potassium phosphate **OR** sodium phosphate IVPB **OR** sodium phosphate IVPB **OR** sodium phosphate IVPB  •  sodium chloride  •  sodium chloride    Home Meds:  Medications Prior to Admission   Medication Sig Dispense Refill Last Dose   • busPIRone (BUSPAR) 10 MG tablet Take 10 mg by mouth 2 (Two) Times a Day.  0 4/5/2022 at Unknown time   • Canagliflozin (INVOKANA PO) Take 1 tablet by mouth Every Morning.   4/5/2022 at Unknown time   • CloNIDine (CATAPRES) 0.1 MG tablet Take 0.1 mg by mouth Every 8 (Eight) Hours As Needed for High Blood Pressure (FOR SYSTOLIC> 170).   4/5/2022 at Unknown time   • colestipol (COLESTID) 1 g tablet Take 1 g by mouth Daily.  0 4/5/2022 at Unknown time   • cyclobenzaprine (FLEXERIL) 5 MG tablet Take 1 tablet by mouth 3 (three) times a day as needed for muscle spasms. 12 tablet 0 4/5/2022 at Unknown time   • diclofenac (VOLTAREN) 75 MG EC tablet   0 4/5/2022 at Unknown time   • dicyclomine (BENTYL) 10 MG capsule take 1 capsule by mouth three times a day if needed FOR STOMACH CRAMPS  0 4/5/2022 at Unknown time   • furosemide (LASIX) 40 MG tablet Take 40 mg by mouth 2 (Two) Times a Day.   4/5/2022 at Unknown time   • hydrOXYzine (ATARAX) 50 MG tablet take 1 tablet by mouth at bedtime  0 4/5/2022 at Unknown time   • lisinopril (PRINIVIL,ZESTRIL) 20 MG tablet Take 20 mg by mouth Daily.  0 4/5/2022 at Unknown time   • losartan (COZAAR) 100 MG tablet take 1 tablet by mouth once daily  0 4/5/2022 at Unknown time   • metFORMIN (GLUCOPHAGE) 1000 MG tablet Take 1,000 mg by mouth 2 (two) times a day with meals.   4/5/2022 at Unknown time   • methocarbamol (ROBAXIN) 500 MG tablet take 1 tablet by mouth BEFORE BED if needed  0 4/5/2022 at Unknown time   • metoclopramide (REGLAN) 10 MG tablet Take 1 tablet by mouth 4 (Four) Times a Day Before Meals & at Bedtime. 30 tablet 0 Past Week at Unknown time   • metoprolol  tartrate (LOPRESSOR) 25 MG tablet Take 25 mg by mouth Every 12 (Twelve) Hours.  0 4/5/2022 at Unknown time   • omeprazole (PRILOSEC) 40 MG capsule Take 1 capsule by mouth Daily. 30 capsule 0 4/5/2022 at Unknown time   • ondansetron ODT (ZOFRAN-ODT) 8 MG disintegrating tablet Take 1 tablet by mouth Every 8 (Eight) Hours As Needed for Nausea or Vomiting. 20 tablet 0 4/5/2022 at Unknown time   • pantoprazole (PROTONIX) 40 MG EC tablet Take 40 mg by mouth Daily.   4/5/2022 at Unknown time   • prazosin (MINIPRESS) 1 MG capsule Take 1 mg by mouth Every Night.   4/5/2022 at Unknown time   • sertraline (ZOLOFT) 100 MG tablet Take 100 mg by mouth Daily.   4/5/2022 at Unknown time   • traZODone (DESYREL) 50 MG tablet Take 50 mg by mouth Every Night.   4/5/2022 at Unknown time   • ACCU-CHEK FASTCLIX LANCETS misc   0    • ACCU-CHEK SMARTVIEW test strip   0    • BD PEN NEEDLE KELLI U/F 32G X 4 MM misc   1    • estradiol (ESTRACE) 1 MG tablet take 1 tablet by mouth once daily for 3 weeks then 1 week OFF  0 Unknown at Unknown time   • estradiol (ESTRACE) 1 MG tablet Take 1 mg by mouth Daily.   Unknown at Unknown time   • HUMALOG MIX 75/25 KWIKPEN (75-25) 100 UNIT/ML suspension pen-injector INJECT 30 UNITS SUBCUTANEOUSLY TWICE A DAY WITH MEALS ADMINISTER ...  (REFER TO PRESCRIPTION NOTES).  0    • HYDROcodone-acetaminophen (NORCO) 5-325 MG per tablet Take 1 tablet by mouth 2 (Two) Times a Day. 30 tablet 0    • HYDROcodone-acetaminophen (NORCO) 5-325 MG per tablet Take 1 tablet by mouth Every 6 (Six) Hours As Needed for Moderate Pain . 6 tablet 0    • hydrOXYzine (ATARAX) 10 MG tablet Take 10 mg by mouth 3 (Three) Times a Day As Needed for Itching.      • hydrOXYzine (ATARAX) 25 MG tablet Take 25 mg by mouth Every 8 (Eight) Hours As Needed for Itching.      • insulin lispro (humaLOG) 100 UNIT/ML injection Inject 60 Units under the skin into the appropriate area as directed 2 (Two) Times a Day.      • losartan (COZAAR) 100 MG tablet  Take 100 mg by mouth Daily.      • ondansetron ODT (ZOFRAN-ODT) 4 MG disintegrating tablet Take 1 tablet by mouth Every 8 (Eight) Hours As Needed for Nausea or Vomiting. 14 tablet 0    • oxyCODONE-acetaminophen (PERCOCET) 7.5-325 MG per tablet Take 1 tablet by mouth Every 6 (Six) Hours As Needed for Severe Pain . 12 tablet 0    • RA ALLERGY RELIEF 10 MG tablet take 1 tablet by mouth once daily  0    • RA CLOTRIMAZOLE 7 1 % vaginal cream APPLY 1 APPLICATION AT BEDTIME ONCE DAILY VAGINALLY  0    • triamcinolone (KENALOG) 0.1 % cream apply to affected area twice a day  0    • VENTOLIN  (90 Base) MCG/ACT inhaler inhale 2 puffs by mouth every 4 to 6 hours  0        ROS: Review of Systems   Constitutional: Negative.    HENT: Negative.    Eyes: Negative.    Respiratory: Negative.    Cardiovascular: Negative.    Gastrointestinal: Positive for abdominal pain, nausea and vomiting.   Endocrine: Negative.    Genitourinary: Negative.    Musculoskeletal: Negative.    Skin: Negative.    Allergic/Immunologic: Negative.    Neurological: Negative.    Hematological: Negative.    Psychiatric/Behavioral: Negative.        PAST MED HX:  Past Medical History:   Diagnosis Date   • Arthritis    • Bipolar disorder (HCC)    • Chronic bronchitis (HCC)    • Depression    • Diabetes mellitus (HCC)     DIAGNOSED 2016   • GERD (gastroesophageal reflux disease)    • Hepatitis-C    • History of blood transfusion    • Hyperlipidemia    • Hypertension    • Infectious viral hepatitis     HEPATITIS C   • Migraine    • Sleep apnea     PATIENT UNSURE OF SETTINGS       PAST SURG HX:  Past Surgical History:   Procedure Laterality Date   • CHOLECYSTECTOMY     • HYSTERECTOMY     • KNEE SURGERY     • LUMBAR LAMINECTOMY DISCECTOMY DECOMPRESSION N/A 8/6/2018    Procedure: LUMBAR LAMINECTOMIES L4-S1;  Surgeon: Chip Smith MD;  [facility];  Service: Neurosurgery       FAM HX:  History reviewed. No pertinent family history.    SOC HX:  Social  "History     Socioeconomic History   • Marital status: Single   Tobacco Use   • Smoking status: Current Every Day Smoker     Packs/day: 1.00   • Smokeless tobacco: Never Used   Substance and Sexual Activity   • Alcohol use: No   • Drug use: No   • Sexual activity: Defer       PHYSICAL EXAM  /93 (BP Location: Left arm, Patient Position: Lying)   Pulse 81   Temp 98 °F (36.7 °C) (Oral)   Resp 16   Ht 167.6 cm (66\")   Wt 74 kg (163 lb 3.2 oz)   SpO2 100%   BMI 26.34 kg/m²   Wt Readings from Last 3 Encounters:   04/08/22 74 kg (163 lb 3.2 oz)   12/23/18 112 kg (248 lb)   10/27/18 99.8 kg (220 lb)   ,body mass index is 26.34 kg/m².  Physical Exam  Constitutional:       General: She is not in acute distress.  HENT:      Head: Normocephalic and atraumatic.      Mouth/Throat:      Mouth: Mucous membranes are moist.   Eyes:      General: No scleral icterus.  Cardiovascular:      Rate and Rhythm: Normal rate and regular rhythm.   Pulmonary:      Effort: Pulmonary effort is normal.      Breath sounds: Normal breath sounds.   Abdominal:      General: Bowel sounds are normal. There is no distension.      Palpations: Abdomen is soft.      Tenderness: There is abdominal tenderness in the epigastric area. There is no guarding or rebound.      Comments: No peritoneal signs   Skin:     General: Skin is warm and dry.   Neurological:      Mental Status: She is alert and oriented to person, place, and time.      Comments: Slightly slurred speech which by her report is not acute   Psychiatric:         Behavior: Behavior normal.       Results Review:   I reviewed the patient's new clinical results.    Lab Results   Component Value Date    WBC 7.47 04/06/2022    HGB 14.5 04/06/2022    HGB 13.1 07/10/2021    HGB 13.3 07/09/2021    HCT 45.3 04/06/2022    .7 (H) 04/06/2022     04/06/2022     Lab Results   Component Value Date    INR 1.04 08/20/2018    INR 0.98 06/19/2014     Lab Results   Component Value Date    " GLUCOSE 353 (H) 04/08/2022    BUN 6 04/08/2022    CREATININE 0.47 (L) 04/08/2022    EGFRIFNONA >60 07/10/2021    EGFRIFAFRI >60 07/10/2021    BCR 12.8 04/08/2022     (L) 04/08/2022    K 3.5 04/08/2022    CO2 20.0 (L) 04/08/2022    CALCIUM 8.9 04/08/2022    ALBUMIN 4.50 04/06/2022    ALKPHOS 240 (H) 04/06/2022    BILITOT 0.5 04/06/2022    ALT 73 (H) 04/06/2022    AST 67 (H) 04/06/2022     CT Abdomen/Pelvis with contrast (as interpreted by radiologist)  Status post cholecystectomy. The liver, spleen, pancreas, and adrenal  glands are unremarkable. Multifocal bilateral renal parenchymal  scarring. Simple appearing right renal cysts. The urinary bladder is  unremarkable. Status post hysterectomy. The adnexa are not definitively  identified.   Mild fluid and air distention of the stomach with mild gastric wall  thickening, possible nonspecific gastritis. No significant colonic stool  burden. No bowel obstruction or significant bowel wall thickening. The  appendix is not definitively identified but no pericecal inflammatory  changes are seen.   No free fluid in the abdomen or pelvis. No free intraperitoneal air. No  abdominal or pelvic lymphadenopathy is identified.   Transitional lumbosacral vertebral anatomy with similar-appearing  lumbosacral degenerative changes and postoperative changes. Old healed  posterior left 10th and 11th rib fracture deformities. No acute osseous  abnormality or concerning osseous lesion.    IMPRESSION:   1. Mild fluid and air distention of the stomach with mild gastric wall  thickening, possible nonspecific gastritis.  2. Multifocal bilateral renal parenchymal scarring with simple appearing  right renal cysts.    I reviewed her pertinent previous medical records including summary from most recent admission at [facility] for hyperglycemia.  Her urine drug screen was positive for Cocaine.  LFTS were elevated similar to this admission.       ASSESSMENTS/PLANS    1.  Epigastric pain  2. Nausea and vomiting  3. Uncontrolled type II diabetes mellitus  4. Illicit drug use, Cocaine last month  5. Chronic hepatitis C     Ms. Paris is a 52 year old female with uncontrolled diabetes mellitus (A1c is 11) that is admitted with DKA.   She complains of persistent epigastric pain as well as recurrent post prandial nausea and vomiting.   Suspect gastroparesis.  Her CT shows mild gastric distention with mild wall thickening.      >>> Recommend EGD tomorrow, 4/9/2022  >>> Once daily PPI  >>> Begin inpatient IV Reglan 10 mg q8h  >>> Will check a urine drug screen here and encourage discontinuation of all illicit drugs.  Her urine drug screen last month at the OSH (available on Care Everywhere) was positive for Cocaine.  Cocaine use can cause arterial vasospasm or vasoconstriction leading to intestinal ischemia.   >>> Obtain HCV RNA.   Recommend outpatient follow up for treatment of Hepatitis C.     >>> Patient desperately needs tighter glycemic control.  Glucose this morning is 427.     I discussed the patient's findings and my recommendations with patient    CHASE Corbett  04/08/22  10:52 EDT

## 2022-04-08 NOTE — PROGRESS NOTES
[facility] Medicine Services  PROGRESS NOTE    Patient Name: Bernadette Paris  : 1970  MRN: [id number]    Date of Admission: 2022  Primary Care Physician: Lilly Rose MD    Subjective   Subjective     CC:  N/V    HPI:  Resting in bed in no acute distress but complains of epigastric pain that radiates to her back.  She still is nauseated but much improved.  No vomiting.  Patient has some oral intake.  Denies any fever or chills.  No chest pain or palpitation or shortness of breath.    ROS:  As above    Objective   Objective     Vital Signs:   Temp:  [97.7 °F (36.5 °C)-98.2 °F (36.8 °C)] 98.1 °F (36.7 °C)  Heart Rate:  [69-91] 81  Resp:  [16-18] 16  BP: (140-160)/() 148/100     Physical Exam:  Constitutional: No acute distress  HENT: NCAT, mucous membranes moist  Respiratory: Clear to auscultation bilaterally, respiratory effort normal   Cardiovascular: RRR, no murmurs, rubs, or gallops  Gastrointestinal: Positive bowel sounds, soft, mild epigastric tenderness, nondistended  Musculoskeletal: No bilateral ankle edema  Psychiatric: Appropriate affect, cooperative  Neurologic: Awake, alert, oriented x3, no focality appreciated, speech clear  Skin: No rashes    Results Reviewed:  LAB RESULTS:      Lab 22  0947 22  1627 22  1052   WBC  --   --  7.47   HEMOGLOBIN  --   --  14.5   HEMATOCRIT  --   --  45.3   PLATELETS  --   --  438   NEUTROS ABS  --   --  4.23   IMMATURE GRANS (ABS)  --   --  0.03   LYMPHS ABS  --   --  2.58   MONOS ABS  --   --  0.60   EOS ABS  --   --  0.00   MCV  --   --  100.7*   LACTATE 1.7 3.4* 2.1*         Lab 22  0446 22  2357 22  1616 22  1209 22  1627 22  1052   SODIUM 135* 137 140 137 140   < > 136   POTASSIUM 3.5 3.6 3.1* 3.3* 3.5   < > 4.1   CHLORIDE 107 108* 111* 110* 114*   < > 98   CO2 20.0* 20.0* 18.0* 18.0* 18.0*   < > 10.0*   ANION GAP 8.0 9.0 11.0 9.0 8.0   < > 28.0*    BUN 6 5* 5* 6 6   < > 6   CREATININE 0.47* 0.54* 0.44* 0.50* 0.52*   < > 1.02*   EGFR 115.4 111.6 117.3 113.7 112.6   < > 66.7   GLUCOSE 353* 178* 83 130* 104*   < > 500*   CALCIUM 8.9 8.8 9.0 8.9 8.9   < > 9.6   MAGNESIUM 1.6 1.7 1.8 1.7 1.6   < >  --    PHOSPHORUS 3.3 2.8 2.4* 2.5 2.5   < > 3.7   HEMOGLOBIN A1C  --   --   --   --   --   --  11.80*    < > = values in this interval not displayed.         Lab 04/07/22  0947 04/06/22  1052   TOTAL PROTEIN  --  7.8   ALBUMIN  --  4.50   GLOBULIN  --  3.3   ALT (SGPT)  --  73*   AST (SGOT)  --  67*   BILIRUBIN  --  0.5   ALK PHOS  --  240*   LIPASE 68* 98*         Lab 04/06/22  1052   TROPONIN T <0.010         Lab 04/06/22  1052   CHOLESTEROL 177   LDL CHOL 73   HDL CHOL 77*   TRIGLYCERIDES 160*             Lab 04/07/22  1217   PH, ARTERIAL 7.358   PCO2, ARTERIAL 32.4*   PO2 ART 96.6   FIO2 21   HCO3 ART 18.2*   BASE EXCESS ART -6.4*   CARBOXYHEMOGLOBIN 1.2     Brief Urine Lab Results  (Last result in the past 365 days)      Color   Clarity   Blood   Leuk Est   Nitrite   Protein   CREAT   Urine HCG        04/06/22 1103               Negative       04/06/22 1103 Yellow   Clear   Trace   Negative   Negative   30 mg/dL (1+)                 Microbiology Results Abnormal     Procedure Component Value - Date/Time    COVID-19 and FLU A/B PCR - Swab, Nasopharynx [[id number]]  (Normal) Collected: 04/06/22 1122    Lab Status: Final result Specimen: Swab from Nasopharynx Updated: 04/06/22 1204     COVID19 Not Detected     Influenza A PCR Not Detected     Influenza B PCR Not Detected    Narrative:      Fact sheet for providers: https://www.fda.gov/media/552050/download    Fact sheet for patients: https://www.fda.gov/media/943987/download    Test performed by PCR.          CT Abdomen Pelvis With Contrast    Result Date: 4/6/2022  DATE OF EXAM: 4/6/2022 1:23 PM  PROCEDURE: CT ABDOMEN PELVIS W CONTRAST-  INDICATIONS: abd pain and N/V  COMPARISON: CT abdomen and pelvis 10/27/2018 and  6/23/2017. CT chest 12/23/2018. Chest radiograph 04/06/2022.  TECHNIQUE: Routine transaxial slices were obtained through the abdomen and pelvis after the intravenous administration of 75 mL of Isovue 300. Reconstructed coronal and sagittal images were also obtained. Automated exposure control and iterative construction methods were used.  The radiation dose reduction device was turned on for each scan per the ALARA (As Low as Reasonably Achievable) protocol.  FINDINGS: The included lung bases are clear.  Status post cholecystectomy. The liver, spleen, pancreas, and adrenal glands are unremarkable. Multifocal bilateral renal parenchymal scarring. Simple appearing right renal cysts. The urinary bladder is unremarkable. Status post hysterectomy. The adnexa are not definitively identified.  Mild fluid and air distention of the stomach with mild gastric wall thickening, possible nonspecific gastritis. No significant colonic stool burden. No bowel obstruction or significant bowel wall thickening. The appendix is not definitively identified but no pericecal inflammatory changes are seen.  No free fluid in the abdomen or pelvis. No free intraperitoneal air. No abdominal or pelvic lymphadenopathy is identified.  Transitional lumbosacral vertebral anatomy with similar-appearing lumbosacral degenerative changes and postoperative changes. Old healed posterior left 10th and 11th rib fracture deformities. No acute osseous abnormality or concerning osseous lesion.      Impression:  1. Mild fluid and air distention of the stomach with mild gastric wall thickening, possible nonspecific gastritis. 2. Multifocal bilateral renal parenchymal scarring with simple appearing right renal cysts.  This report was finalized on 4/6/2022 1:40 PM by Lazaro Au MD.      XR Chest 1 View    Result Date: 4/6/2022  DATE OF EXAM: 4/6/2022 11:31 AM  PROCEDURE: XR CHEST 1 VW-  INDICATIONS: Upper Abdominal Pain Triage Protocol  COMPARISON: Chest x-ray  6/23/2017  TECHNIQUE: Single radiographic AP view of the chest was obtained.  FINDINGS: Normal cardiomediastinal silhouette. The lungs are clear without focal consolidation. No pleural effusion. No pneumothorax. Partially imaged upper abdomen appears unremarkable. No acute osseous findings.      Impression: No acute cardiopulmonary findings.  This report was finalized on 4/6/2022 11:47 AM by Amor Espinoza MD.            I have reviewed the medications:  Scheduled Meds:busPIRone, 10 mg, Oral, BID  heparin (porcine), 5,000 Units, Subcutaneous, Q8H  insulin detemir, 40 Units, Subcutaneous, BID  insulin lispro, 0-9 Units, Subcutaneous, 4x Daily With Meals & Nightly  insulin lispro, 5 Units, Subcutaneous, TID With Meals  metoprolol tartrate, 25 mg, Oral, Q12H  sertraline, 100 mg, Oral, Daily  sodium chloride, 10 mL, Intravenous, Q12H      Continuous Infusions:dextrose 5 % and sodium chloride 0.45 % with KCl 20 mEq/L, 150 mL/hr, Last Rate: 150 mL/hr (04/07/22 0028)      PRN Meds:.•  acetaminophen **OR** acetaminophen **OR** acetaminophen  •  cloNIDine  •  dextrose  •  dextrose  •  dextrose 5 % and sodium chloride 0.45 % with KCl 20 mEq/L  •  glucagon (human recombinant)  •  HYDROcodone-acetaminophen  •  hydrOXYzine  •  ipratropium-albuterol  •  melatonin  •  Morphine **AND** naloxone  •  ondansetron **OR** ondansetron  •  potassium & sodium phosphates **OR** potassium & sodium phosphates  •  potassium phosphate infusion greater than 15 mMoles **OR** potassium phosphate infusion greater than 15 mMoles **OR** potassium phosphate **OR** sodium phosphate IVPB **OR** sodium phosphate IVPB **OR** sodium phosphate IVPB  •  sodium chloride  •  sodium chloride    Assessment/Plan   Assessment & Plan     Active Hospital Problems    Diagnosis  POA   • DKA (diabetic ketoacidosis) ([facility]) [E11.10]  Yes      Resolved Hospital Problems   No resolved problems to display.        Brief Hospital Course to date:  Bernadette BEL Paris is a 52 y.o.  female with past medical history of hypertension, hyperlipidemia, diabetes mellitus on insulin, bipolar disorder.  Patient comes to the emergency room for progressively worsening nausea vomiting for a few weeks.  Labs revealed severe ketoacidosis and blood glucose around 500 on presentation, since then BS have been very labile with ongoing abdominal pain, progressive.    This patient's problems and plans were partially entered by my partner and updated as appropriate by me 04/08/22.          N/V  Epigastric abdominal pain  --suspect in part related to uncontrolled diabetes, DKA on presentation. Persistent despite improvement and closing gap  --patient reports diffuse pain, will ask GI to weigh in . CT imaging from 4/6 reviewed with nonspecific gastritis. Will add PPI today    Uncontrolled diabetes mellitus.    --Patient is on insulin and compliance is very much in question.  Patient's hemoglobin A1c is 11.8.  Blood sugars have been labile, so will be cautious with too many insulin adjustments (fell to 83 last night but up to 450 this AM)    HypoK/HypoMg  --related to above, monitor and replace as needed    Hypertension,   --currently on home medication and reasonably controlled.  Will monitor and if necessary we will change regimen.      Bipolar disorder.    Chronic pain, uses narcotics chronically.  --monitor        DVT prophylaxis:  Medical DVT prophylaxis orders are present.       AM-PAC 6 Clicks Score (PT): 17 (04/07/22 2000)    Disposition: Home pending above, GI to see, NPO until eval    CODE STATUS:   Code Status and Medical Interventions:   Ordered at: 04/06/22 1733     Code Status (Patient has no pulse and is not breathing):    CPR (Attempt to Resuscitate)     Medical Interventions (Patient has pulse or is breathing):    Full Support       Genet Eason MD  04/08/22

## 2022-04-08 NOTE — PLAN OF CARE
Goal Outcome Evaluation:   Patient resting in bed. RA NSR. No complaints at this time. Rn will continue to monitor.     Problem: Fall Injury Risk  Goal: Absence of Fall and Fall-Related Injury  Outcome: Ongoing, Progressing  Intervention: Identify and Manage Contributors  Recent Flowsheet Documentation  Taken 4/8/2022 1800 by Joslyn Conner RN  Medication Review/Management: medications reviewed  Taken 4/8/2022 1600 by Joslyn Conner RN  Medication Review/Management: medications reviewed  Taken 4/8/2022 1400 by Joslyn Conner RN  Medication Review/Management: medications reviewed  Taken 4/8/2022 1200 by Joslyn Conner RN  Medication Review/Management: medications reviewed  Taken 4/8/2022 1000 by Joslyn Conner RN  Medication Review/Management: medications reviewed  Taken 4/8/2022 0800 by Joslyn Conner RN  Medication Review/Management: medications reviewed  Intervention: Promote Injury-Free Environment  Recent Flowsheet Documentation  Taken 4/8/2022 1800 by Joslyn Conner RN  Safety Promotion/Fall Prevention:   activity supervised   assistive device/personal items within reach   elopement precautions   clutter free environment maintained   fall prevention program maintained  Taken 4/8/2022 1600 by Joslyn Conner RN  Safety Promotion/Fall Prevention:   activity supervised   assistive device/personal items within reach   clutter free environment maintained   elopement precautions   fall prevention program maintained  Taken 4/8/2022 1400 by Joslyn Conner RN  Safety Promotion/Fall Prevention:   activity supervised   assistive device/personal items within reach   clutter free environment maintained   fall prevention program maintained   elopement precautions  Taken 4/8/2022 1200 by Joslyn Conner RN  Safety Promotion/Fall Prevention:   activity supervised   assistive device/personal items within reach   clutter free environment maintained   elopement precautions   fall prevention  program maintained  Taken 4/8/2022 1000 by Joslyn Conner RN  Safety Promotion/Fall Prevention:   activity supervised   assistive device/personal items within reach   clutter free environment maintained   elopement precautions   fall prevention program maintained  Taken 4/8/2022 0800 by Joslyn Conner RN  Safety Promotion/Fall Prevention:   activity supervised   assistive device/personal items within reach   clutter free environment maintained   fall prevention program maintained   elopement precautions     Problem: Skin Injury Risk Increased  Goal: Skin Health and Integrity  Outcome: Ongoing, Progressing  Intervention: Optimize Skin Protection  Recent Flowsheet Documentation  Taken 4/8/2022 1800 by Joslyn Conner RN  Head of Bed (HOB) Positioning: HOB elevated  Taken 4/8/2022 1600 by Joslyn Conner RN  Head of Bed (HOB) Positioning: HOB elevated  Taken 4/8/2022 1200 by Joslyn Conner RN  Head of Bed (HOB) Positioning: HOB elevated  Taken 4/8/2022 0800 by Joslyn Conner RN  Pressure Reduction Techniques: frequent weight shift encouraged  Head of Bed (HOB) Positioning: HOB elevated  Pressure Reduction Devices: pressure-redistributing mattress utilized  Skin Protection:   tubing/devices free from skin contact   adhesive use limited     Problem: Diabetic Ketoacidosis  Goal: Fluid and Electrolyte Balance with Absence of Ketosis  Outcome: Ongoing, Progressing     Problem: Adult Inpatient Plan of Care  Goal: Plan of Care Review  Outcome: Ongoing, Progressing  Goal: Patient-Specific Goal (Individualized)  Outcome: Ongoing, Progressing  Goal: Absence of Hospital-Acquired Illness or Injury  Outcome: Ongoing, Progressing  Intervention: Identify and Manage Fall Risk  Recent Flowsheet Documentation  Taken 4/8/2022 1800 by Joslyn Conner RN  Safety Promotion/Fall Prevention:   activity supervised   assistive device/personal items within reach   elopement precautions   clutter free environment  maintained   fall prevention program maintained  Taken 4/8/2022 1600 by Joslyn Conner RN  Safety Promotion/Fall Prevention:   activity supervised   assistive device/personal items within reach   clutter free environment maintained   elopement precautions   fall prevention program maintained  Taken 4/8/2022 1400 by Joslyn Conner RN  Safety Promotion/Fall Prevention:   activity supervised   assistive device/personal items within reach   clutter free environment maintained   fall prevention program maintained   elopement precautions  Taken 4/8/2022 1200 by Joslyn Conner RN  Safety Promotion/Fall Prevention:   activity supervised   assistive device/personal items within reach   clutter free environment maintained   elopement precautions   fall prevention program maintained  Taken 4/8/2022 1000 by Joslyn Conner RN  Safety Promotion/Fall Prevention:   activity supervised   assistive device/personal items within reach   clutter free environment maintained   elopement precautions   fall prevention program maintained  Taken 4/8/2022 0800 by Joslyn Conner RN  Safety Promotion/Fall Prevention:   activity supervised   assistive device/personal items within reach   clutter free environment maintained   fall prevention program maintained   elopement precautions  Intervention: Prevent Skin Injury  Recent Flowsheet Documentation  Taken 4/8/2022 1800 by Joslyn Conner RN  Body Position: position changed independently  Taken 4/8/2022 1600 by Joslyn Conner RN  Body Position: position changed independently  Taken 4/8/2022 1400 by Joslyn Conner RN  Body Position: position changed independently  Taken 4/8/2022 1200 by Joslyn Conner RN  Body Position: position changed independently  Taken 4/8/2022 0800 by Joslyn Conner RN  Body Position: position changed independently  Skin Protection:   tubing/devices free from skin contact   adhesive use limited  Intervention: Prevent and Manage VTE  (Venous Thromboembolism) Risk  Recent Flowsheet Documentation  Taken 4/8/2022 1800 by Joslyn Conner RN  Activity Management: activity adjusted per tolerance  Taken 4/8/2022 1600 by Joslyn Conner RN  Activity Management: activity adjusted per tolerance  Taken 4/8/2022 1400 by Joslyn Conner RN  Activity Management: activity adjusted per tolerance  Taken 4/8/2022 1200 by Joslyn Conner RN  Activity Management: activity adjusted per tolerance  Taken 4/8/2022 1000 by Joslyn Conner RN  Activity Management: activity adjusted per tolerance  Taken 4/8/2022 0800 by Joslyn Conner RN  VTE Prevention/Management:   bilateral   dorsiflexion/plantar flexion performed  Range of Motion: active ROM (range of motion) encouraged  Intervention: Prevent Infection  Recent Flowsheet Documentation  Taken 4/8/2022 1800 by Joslyn Conner RN  Infection Prevention:   equipment surfaces disinfected   environmental surveillance performed   hand hygiene promoted   rest/sleep promoted  Taken 4/8/2022 1600 by Joslyn Conner RN  Infection Prevention:   environmental surveillance performed   equipment surfaces disinfected   hand hygiene promoted   rest/sleep promoted  Taken 4/8/2022 1400 by Joslyn Conner RN  Infection Prevention:   environmental surveillance performed   equipment surfaces disinfected   hand hygiene promoted   rest/sleep promoted  Taken 4/8/2022 1200 by Joslyn Conner RN  Infection Prevention:   environmental surveillance performed   equipment surfaces disinfected   hand hygiene promoted   rest/sleep promoted  Taken 4/8/2022 1000 by Joslyn Conner RN  Infection Prevention:   environmental surveillance performed   equipment surfaces disinfected   hand hygiene promoted   rest/sleep promoted  Taken 4/8/2022 0800 by Joslyn Conner RN  Infection Prevention:   equipment surfaces disinfected   environmental surveillance performed   hand hygiene promoted   rest/sleep promoted  Goal: Optimal  Comfort and Wellbeing  Outcome: Ongoing, Progressing  Intervention: Provide Person-Centered Care  Recent Flowsheet Documentation  Taken 4/8/2022 0800 by Joslyn Conner RN  Trust Relationship/Rapport:   care explained   choices provided   questions answered   questions encouraged  Goal: Readiness for Transition of Care  Outcome: Ongoing, Progressing     Problem: Behavioral Health Comorbidity  Goal: Maintenance of Behavioral Health Symptom Control  Outcome: Ongoing, Progressing  Intervention: Maintain Behavioral Health Symptom Control  Recent Flowsheet Documentation  Taken 4/8/2022 1800 by Joslyn Conner RN  Medication Review/Management: medications reviewed  Taken 4/8/2022 1600 by Joslyn Conner RN  Medication Review/Management: medications reviewed  Taken 4/8/2022 1400 by Joslyn Conner RN  Medication Review/Management: medications reviewed  Taken 4/8/2022 1200 by Joslyn Conner RN  Medication Review/Management: medications reviewed  Taken 4/8/2022 1000 by Joslyn Conner RN  Medication Review/Management: medications reviewed  Taken 4/8/2022 0800 by Joslyn Conner RN  Medication Review/Management: medications reviewed     Problem: Diabetes Comorbidity  Goal: Blood Glucose Level Within Targeted Range  Outcome: Ongoing, Progressing

## 2022-04-08 NOTE — CASE MANAGEMENT/SOCIAL WORK
Continued Stay Note  [facility]     Patient Name: Bernadette Paris  MRN: [id number]  Today's Date: 4/8/2022    Admit Date: 4/6/2022     Discharge Plan     Row Name 04/08/22 1216       Plan    Plan SW    Plan Comments SW’er met with patient at bedside. SW’er inquired about social needs. Patient explains she has a place to go to when d/c. Patient reports she is interested in [facility]. ’er spoke with Sheyla [phone] to make referral. [facility] reviewing patient. Patient reports that she uses [employer] transportation. SW’er called [employer] [phone] and confirmed she is eligible for transportation.               Discharge Codes    No documentation.               Expected Discharge Date and Time     Expected Discharge Date Expected Discharge Time    Apr 9, 2022             VINCENZO James (Kay)

## 2022-04-08 NOTE — PLAN OF CARE
"Goal Outcome Evaluation:  VSS overnight. Patient still complaining about abdominal pain and stating that \"the pills don't do anything for me, I need the injection\". Patient's blood sugar dropped from 240s to 80s at the beginning of shift so insulin was held; blood sugar levels have come back up overnight. Patient did not want to get up to the bedside commode and continuously would use purewick and throw it on the floor after she used it.   "

## 2022-04-09 ENCOUNTER — ANESTHESIA (OUTPATIENT)
Dept: GASTROENTEROLOGY | Facility: HOSPITAL | Age: 52
End: 2022-04-09

## 2022-04-09 ENCOUNTER — ANESTHESIA EVENT (OUTPATIENT)
Dept: GASTROENTEROLOGY | Facility: HOSPITAL | Age: 52
End: 2022-04-09

## 2022-04-09 PROBLEM — R10.13 EPIGASTRIC PAIN: Status: ACTIVE | Noted: 2022-04-06

## 2022-04-09 PROBLEM — R11.2 NAUSEA AND VOMITING: Status: ACTIVE | Noted: 2022-04-06

## 2022-04-09 LAB
ALBUMIN SERPL-MCNC: 3.3 G/DL (ref 3.5–5.2)
ALBUMIN/GLOB SERPL: 1.2 G/DL
ALP SERPL-CCNC: 155 U/L (ref 39–117)
ALT SERPL W P-5'-P-CCNC: 75 U/L (ref 1–33)
ANION GAP SERPL CALCULATED.3IONS-SCNC: 10 MMOL/L (ref 5–15)
AST SERPL-CCNC: 51 U/L (ref 1–32)
BILIRUB SERPL-MCNC: 0.3 MG/DL (ref 0–1.2)
BUN SERPL-MCNC: 7 MG/DL (ref 6–20)
BUN/CREAT SERPL: 11.5 (ref 7–25)
CALCIUM SPEC-SCNC: 9 MG/DL (ref 8.6–10.5)
CHLORIDE SERPL-SCNC: 104 MMOL/L (ref 98–107)
CO2 SERPL-SCNC: 22 MMOL/L (ref 22–29)
CREAT SERPL-MCNC: 0.61 MG/DL (ref 0.57–1)
EGFRCR SERPLBLD CKD-EPI 2021: 108.4 ML/MIN/1.73
GLOBULIN UR ELPH-MCNC: 2.7 GM/DL
GLUCOSE BLDC GLUCOMTR-MCNC: 200 MG/DL (ref 70–130)
GLUCOSE BLDC GLUCOMTR-MCNC: 264 MG/DL (ref 70–130)
GLUCOSE BLDC GLUCOMTR-MCNC: 309 MG/DL (ref 70–130)
GLUCOSE BLDC GLUCOMTR-MCNC: 433 MG/DL (ref 70–130)
GLUCOSE BLDC GLUCOMTR-MCNC: 547 MG/DL (ref 70–130)
GLUCOSE BLDC GLUCOMTR-MCNC: 557 MG/DL (ref 70–130)
GLUCOSE SERPL-MCNC: 255 MG/DL (ref 65–99)
MAGNESIUM SERPL-MCNC: 2.1 MG/DL (ref 1.6–2.6)
PHOSPHATE SERPL-MCNC: 2.9 MG/DL (ref 2.5–4.5)
POTASSIUM SERPL-SCNC: 4.2 MMOL/L (ref 3.5–5.2)
PROT SERPL-MCNC: 6 G/DL (ref 6–8.5)
SODIUM SERPL-SCNC: 136 MMOL/L (ref 136–145)

## 2022-04-09 PROCEDURE — 25010000002 HEPARIN (PORCINE) PER 1000 UNITS: Performed by: INTERNAL MEDICINE

## 2022-04-09 PROCEDURE — 88305 TISSUE EXAM BY PATHOLOGIST: CPT | Performed by: INTERNAL MEDICINE

## 2022-04-09 PROCEDURE — 82962 GLUCOSE BLOOD TEST: CPT

## 2022-04-09 PROCEDURE — 25010000002 MORPHINE PER 10 MG: Performed by: INTERNAL MEDICINE

## 2022-04-09 PROCEDURE — 87522 HEPATITIS C REVRS TRNSCRPJ: CPT | Performed by: PHYSICIAN ASSISTANT

## 2022-04-09 PROCEDURE — 25010000002 METOCLOPRAMIDE PER 10 MG: Performed by: INTERNAL MEDICINE

## 2022-04-09 PROCEDURE — 63710000001 INSULIN LISPRO (HUMAN) PER 5 UNITS: Performed by: NURSE PRACTITIONER

## 2022-04-09 PROCEDURE — 25010000002 PROPOFOL 10 MG/ML EMULSION: Performed by: NURSE ANESTHETIST, CERTIFIED REGISTERED

## 2022-04-09 PROCEDURE — 99233 SBSQ HOSP IP/OBS HIGH 50: CPT | Performed by: NURSE PRACTITIONER

## 2022-04-09 PROCEDURE — 84100 ASSAY OF PHOSPHORUS: CPT | Performed by: INTERNAL MEDICINE

## 2022-04-09 PROCEDURE — 25010000002 METOCLOPRAMIDE PER 10 MG: Performed by: PHYSICIAN ASSISTANT

## 2022-04-09 PROCEDURE — 63710000001 INSULIN DETEMIR PER 5 UNITS: Performed by: INTERNAL MEDICINE

## 2022-04-09 PROCEDURE — 80053 COMPREHEN METABOLIC PANEL: CPT | Performed by: INTERNAL MEDICINE

## 2022-04-09 PROCEDURE — 0DB78ZX EXCISION OF STOMACH, PYLORUS, VIA NATURAL OR ARTIFICIAL OPENING ENDOSCOPIC, DIAGNOSTIC: ICD-10-PCS | Performed by: INTERNAL MEDICINE

## 2022-04-09 PROCEDURE — 83735 ASSAY OF MAGNESIUM: CPT | Performed by: INTERNAL MEDICINE

## 2022-04-09 PROCEDURE — 63710000001 INSULIN LISPRO (HUMAN) PER 5 UNITS: Performed by: INTERNAL MEDICINE

## 2022-04-09 PROCEDURE — 43239 EGD BIOPSY SINGLE/MULTIPLE: CPT | Performed by: INTERNAL MEDICINE

## 2022-04-09 RX ORDER — MIDAZOLAM HYDROCHLORIDE 1 MG/ML
1 INJECTION INTRAMUSCULAR; INTRAVENOUS
Status: DISCONTINUED | OUTPATIENT
Start: 2022-04-09 | End: 2022-04-09 | Stop reason: HOSPADM

## 2022-04-09 RX ORDER — LABETALOL HYDROCHLORIDE 5 MG/ML
5 INJECTION, SOLUTION INTRAVENOUS ONCE
Status: DISCONTINUED | OUTPATIENT
Start: 2022-04-09 | End: 2022-04-11 | Stop reason: HOSPADM

## 2022-04-09 RX ORDER — PROPOFOL 10 MG/ML
VIAL (ML) INTRAVENOUS AS NEEDED
Status: DISCONTINUED | OUTPATIENT
Start: 2022-04-09 | End: 2022-04-09 | Stop reason: SURG

## 2022-04-09 RX ORDER — FAMOTIDINE 10 MG/ML
20 INJECTION, SOLUTION INTRAVENOUS ONCE
Status: DISCONTINUED | OUTPATIENT
Start: 2022-04-09 | End: 2022-04-09 | Stop reason: HOSPADM

## 2022-04-09 RX ORDER — HYDROMORPHONE HYDROCHLORIDE 1 MG/ML
0.5 INJECTION, SOLUTION INTRAMUSCULAR; INTRAVENOUS; SUBCUTANEOUS
Status: DISCONTINUED | OUTPATIENT
Start: 2022-04-09 | End: 2022-04-09 | Stop reason: HOSPADM

## 2022-04-09 RX ORDER — SODIUM CHLORIDE 0.9 % (FLUSH) 0.9 %
10 SYRINGE (ML) INJECTION EVERY 12 HOURS SCHEDULED
Status: DISCONTINUED | OUTPATIENT
Start: 2022-04-09 | End: 2022-04-09 | Stop reason: HOSPADM

## 2022-04-09 RX ORDER — BISACODYL 5 MG/1
10 TABLET, DELAYED RELEASE ORAL DAILY PRN
Status: DISCONTINUED | OUTPATIENT
Start: 2022-04-09 | End: 2022-04-10

## 2022-04-09 RX ORDER — AMOXICILLIN 250 MG
2 CAPSULE ORAL 2 TIMES DAILY
Status: DISCONTINUED | OUTPATIENT
Start: 2022-04-09 | End: 2022-04-11 | Stop reason: HOSPADM

## 2022-04-09 RX ORDER — FENTANYL CITRATE 50 UG/ML
50 INJECTION, SOLUTION INTRAMUSCULAR; INTRAVENOUS
Status: DISCONTINUED | OUTPATIENT
Start: 2022-04-09 | End: 2022-04-09 | Stop reason: HOSPADM

## 2022-04-09 RX ORDER — LABETALOL HYDROCHLORIDE 5 MG/ML
INJECTION, SOLUTION INTRAVENOUS
Status: COMPLETED
Start: 2022-04-09 | End: 2022-04-09

## 2022-04-09 RX ORDER — LIDOCAINE HYDROCHLORIDE 10 MG/ML
0.5 INJECTION, SOLUTION EPIDURAL; INFILTRATION; INTRACAUDAL; PERINEURAL ONCE AS NEEDED
Status: DISCONTINUED | OUTPATIENT
Start: 2022-04-09 | End: 2022-04-09 | Stop reason: HOSPADM

## 2022-04-09 RX ORDER — LISINOPRIL 20 MG/1
20 TABLET ORAL DAILY
Status: DISCONTINUED | OUTPATIENT
Start: 2022-04-09 | End: 2022-04-11

## 2022-04-09 RX ORDER — BISACODYL 10 MG
10 SUPPOSITORY, RECTAL RECTAL DAILY
Status: DISCONTINUED | OUTPATIENT
Start: 2022-04-09 | End: 2022-04-10

## 2022-04-09 RX ORDER — FAMOTIDINE 20 MG/1
20 TABLET, FILM COATED ORAL ONCE
Status: DISCONTINUED | OUTPATIENT
Start: 2022-04-09 | End: 2022-04-09 | Stop reason: HOSPADM

## 2022-04-09 RX ORDER — SODIUM CHLORIDE, SODIUM LACTATE, POTASSIUM CHLORIDE, CALCIUM CHLORIDE 600; 310; 30; 20 MG/100ML; MG/100ML; MG/100ML; MG/100ML
9 INJECTION, SOLUTION INTRAVENOUS CONTINUOUS
Status: DISCONTINUED | OUTPATIENT
Start: 2022-04-09 | End: 2022-04-11 | Stop reason: HOSPADM

## 2022-04-09 RX ORDER — FLUCONAZOLE 200 MG/1
200 TABLET ORAL EVERY 24 HOURS
Status: DISCONTINUED | OUTPATIENT
Start: 2022-04-09 | End: 2022-04-11 | Stop reason: HOSPADM

## 2022-04-09 RX ORDER — SODIUM CHLORIDE 0.9 % (FLUSH) 0.9 %
10 SYRINGE (ML) INJECTION AS NEEDED
Status: DISCONTINUED | OUTPATIENT
Start: 2022-04-09 | End: 2022-04-09 | Stop reason: HOSPADM

## 2022-04-09 RX ADMIN — INSULIN LISPRO 5 UNITS: 100 INJECTION, SOLUTION INTRAVENOUS; SUBCUTANEOUS at 16:39

## 2022-04-09 RX ADMIN — Medication 10 ML: at 08:26

## 2022-04-09 RX ADMIN — LISINOPRIL 20 MG: 20 TABLET ORAL at 14:43

## 2022-04-09 RX ADMIN — HYDROCODONE BITARTRATE AND ACETAMINOPHEN 1 TABLET: 5; 325 TABLET ORAL at 14:43

## 2022-04-09 RX ADMIN — INSULIN LISPRO 9 UNITS: 100 INJECTION, SOLUTION INTRAVENOUS; SUBCUTANEOUS at 16:39

## 2022-04-09 RX ADMIN — HYDROXYZINE HYDROCHLORIDE 25 MG: 25 TABLET, FILM COATED ORAL at 22:16

## 2022-04-09 RX ADMIN — HYDROCODONE BITARTRATE AND ACETAMINOPHEN 1 TABLET: 5; 325 TABLET ORAL at 22:16

## 2022-04-09 RX ADMIN — HYDROCODONE BITARTRATE AND ACETAMINOPHEN 1 TABLET: 5; 325 TABLET ORAL at 08:25

## 2022-04-09 RX ADMIN — INSULIN LISPRO 6 UNITS: 100 INJECTION, SOLUTION INTRAVENOUS; SUBCUTANEOUS at 14:44

## 2022-04-09 RX ADMIN — BUSPIRONE HYDROCHLORIDE 10 MG: 10 TABLET ORAL at 22:06

## 2022-04-09 RX ADMIN — HYDROXYZINE HYDROCHLORIDE 25 MG: 25 TABLET, FILM COATED ORAL at 08:26

## 2022-04-09 RX ADMIN — METOPROLOL TARTRATE 25 MG: 25 TABLET, FILM COATED ORAL at 08:26

## 2022-04-09 RX ADMIN — MORPHINE SULFATE 1 MG: 2 INJECTION, SOLUTION INTRAMUSCULAR; INTRAVENOUS at 05:21

## 2022-04-09 RX ADMIN — METOPROLOL TARTRATE 25 MG: 25 TABLET, FILM COATED ORAL at 22:06

## 2022-04-09 RX ADMIN — METOCLOPRAMIDE 10 MG: 5 INJECTION, SOLUTION INTRAMUSCULAR; INTRAVENOUS at 05:21

## 2022-04-09 RX ADMIN — HYDROCODONE BITARTRATE AND ACETAMINOPHEN 1 TABLET: 5; 325 TABLET ORAL at 00:42

## 2022-04-09 RX ADMIN — SENNOSIDES AND DOCUSATE SODIUM 2 TABLET: 50; 8.6 TABLET ORAL at 22:06

## 2022-04-09 RX ADMIN — HEPARIN SODIUM 5000 UNITS: 5000 INJECTION, SOLUTION INTRAVENOUS; SUBCUTANEOUS at 05:21

## 2022-04-09 RX ADMIN — PROPOFOL 100 MG: 10 INJECTION, EMULSION INTRAVENOUS at 12:41

## 2022-04-09 RX ADMIN — SENNOSIDES AND DOCUSATE SODIUM 2 TABLET: 50; 8.6 TABLET ORAL at 15:26

## 2022-04-09 RX ADMIN — INSULIN DETEMIR 43 UNITS: 100 INJECTION, SOLUTION SUBCUTANEOUS at 16:40

## 2022-04-09 RX ADMIN — METOCLOPRAMIDE 10 MG: 5 INJECTION, SOLUTION INTRAMUSCULAR; INTRAVENOUS at 22:08

## 2022-04-09 RX ADMIN — BUSPIRONE HYDROCHLORIDE 10 MG: 10 TABLET ORAL at 08:26

## 2022-04-09 RX ADMIN — INSULIN DETEMIR 43 UNITS: 100 INJECTION, SOLUTION SUBCUTANEOUS at 22:11

## 2022-04-09 RX ADMIN — HEPARIN SODIUM 5000 UNITS: 5000 INJECTION, SOLUTION INTRAVENOUS; SUBCUTANEOUS at 22:04

## 2022-04-09 RX ADMIN — Medication 5 MG: at 22:16

## 2022-04-09 RX ADMIN — FLUCONAZOLE 200 MG: 200 TABLET ORAL at 14:43

## 2022-04-09 RX ADMIN — HEPARIN SODIUM 5000 UNITS: 5000 INJECTION, SOLUTION INTRAVENOUS; SUBCUTANEOUS at 15:25

## 2022-04-09 RX ADMIN — METOCLOPRAMIDE 10 MG: 5 INJECTION, SOLUTION INTRAMUSCULAR; INTRAVENOUS at 15:25

## 2022-04-09 RX ADMIN — SERTRALINE 100 MG: 100 TABLET, FILM COATED ORAL at 08:26

## 2022-04-09 RX ADMIN — LABETALOL 20 MG/4 ML (5 MG/ML) INTRAVENOUS SYRINGE 5 MG: at 13:09

## 2022-04-09 RX ADMIN — PROPOFOL 100 MG: 10 INJECTION, EMULSION INTRAVENOUS at 12:38

## 2022-04-09 RX ADMIN — Medication 10 ML: at 22:06

## 2022-04-09 NOTE — BRIEF OP NOTE
ESOPHAGOGASTRODUODENOSCOPY  Progress Note    Bernadette Paris  4/9/2022    EGD reveals moderate diffuse gastritis. There is no dilation of the gastric lumen as seen on CT scan. Candida esophagitis is noted. Duodenum normal.    >> BID PPI for 1 month, then q Daily.  >> Diflucan for 1 week.  >> Await H pylori biopsy  >> From GI standpoint, may be discharged when tolerating diet.    Mark I. Brunner, MD     Date: 4/9/2022  Time: 13:08 EDT

## 2022-04-09 NOTE — PLAN OF CARE
Goal Outcome Evaluation:  Patient resting in bed. Went for egd today. NSR RA. No complaints at this time, .RN will continue to monitor.      Problem: Fall Injury Risk  Goal: Absence of Fall and Fall-Related Injury  Outcome: Ongoing, Progressing  Intervention: Identify and Manage Contributors  Recent Flowsheet Documentation  Taken 4/9/2022 1800 by Joslyn Conner RN  Medication Review/Management: medications reviewed  Taken 4/9/2022 1600 by Joslyn Conner RN  Medication Review/Management: medications reviewed  Taken 4/9/2022 1000 by Joslyn Conner RN  Medication Review/Management: medications reviewed  Taken 4/9/2022 0800 by Joslyn Conner RN  Medication Review/Management: medications reviewed  Intervention: Promote Injury-Free Environment  Recent Flowsheet Documentation  Taken 4/9/2022 1800 by Jsolyn Conner RN  Safety Promotion/Fall Prevention:   activity supervised   assistive device/personal items within reach   clutter free environment maintained   elopement precautions   fall prevention program maintained  Taken 4/9/2022 1600 by Joslyn Conner RN  Safety Promotion/Fall Prevention:   activity supervised   assistive device/personal items within reach   fall prevention program maintained   clutter free environment maintained   elopement precautions  Taken 4/9/2022 1400 by Joslyn Conner RN  Safety Promotion/Fall Prevention: patient off unit  Taken 4/9/2022 1200 by Joslyn Conner RN  Safety Promotion/Fall Prevention: patient off unit  Taken 4/9/2022 1000 by Joslyn Conner RN  Safety Promotion/Fall Prevention:   activity supervised   assistive device/personal items within reach   elopement precautions   clutter free environment maintained   fall prevention program maintained  Taken 4/9/2022 0800 by Joslyn Conner RN  Safety Promotion/Fall Prevention:   activity supervised   assistive device/personal items within reach   clutter free environment maintained   elopement  precautions   fall prevention program maintained     Problem: Skin Injury Risk Increased  Goal: Skin Health and Integrity  Outcome: Ongoing, Progressing  Intervention: Optimize Skin Protection  Recent Flowsheet Documentation  Taken 4/9/2022 1800 by Joslyn Conner RN  Head of Bed (HOB) Positioning: HOB elevated  Taken 4/9/2022 1600 by Joslyn Conner RN  Head of Bed (HOB) Positioning: HOB elevated  Taken 4/9/2022 1000 by Joslyn Conner RN  Head of Bed (HOB) Positioning: HOB elevated  Taken 4/9/2022 0800 by Joslyn Conner RN  Pressure Reduction Techniques: frequent weight shift encouraged  Pressure Reduction Devices: pressure-redistributing mattress utilized  Skin Protection:   adhesive use limited   tubing/devices free from skin contact     Problem: Diabetic Ketoacidosis  Goal: Fluid and Electrolyte Balance with Absence of Ketosis  Outcome: Ongoing, Progressing  Intervention: Monitor and Manage Ketoacidosis  Recent Flowsheet Documentation  Taken 4/9/2022 0800 by Joslyn Conner RN  Glycemic Management: blood glucose monitored     Problem: Adult Inpatient Plan of Care  Goal: Plan of Care Review  Outcome: Ongoing, Progressing  Goal: Patient-Specific Goal (Individualized)  Outcome: Ongoing, Progressing  Goal: Absence of Hospital-Acquired Illness or Injury  Outcome: Ongoing, Progressing  Intervention: Identify and Manage Fall Risk  Recent Flowsheet Documentation  Taken 4/9/2022 1800 by Joslyn Conner RN  Safety Promotion/Fall Prevention:   activity supervised   assistive device/personal items within reach   clutter free environment maintained   elopement precautions   fall prevention program maintained  Taken 4/9/2022 1600 by Joslyn Conner RN  Safety Promotion/Fall Prevention:   activity supervised   assistive device/personal items within reach   fall prevention program maintained   clutter free environment maintained   elopement precautions  Taken 4/9/2022 1400 by Joslyn Conner RN  Safety  Promotion/Fall Prevention: patient off unit  Taken 4/9/2022 1200 by Joslyn Conner RN  Safety Promotion/Fall Prevention: patient off unit  Taken 4/9/2022 1000 by Joslyn Conner RN  Safety Promotion/Fall Prevention:   activity supervised   assistive device/personal items within reach   elopement precautions   clutter free environment maintained   fall prevention program maintained  Taken 4/9/2022 0800 by Joslyn Conner RN  Safety Promotion/Fall Prevention:   activity supervised   assistive device/personal items within reach   clutter free environment maintained   elopement precautions   fall prevention program maintained  Intervention: Prevent Skin Injury  Recent Flowsheet Documentation  Taken 4/9/2022 1800 by Joslyn Conner RN  Body Position: position changed independently  Taken 4/9/2022 1600 by Joslyn Conner RN  Body Position: position changed independently  Taken 4/9/2022 1000 by Joslyn Conner RN  Body Position: position changed independently  Taken 4/9/2022 0800 by Joslyn Conner RN  Body Position: position changed independently  Skin Protection:   adhesive use limited   tubing/devices free from skin contact  Intervention: Prevent and Manage VTE (Venous Thromboembolism) Risk  Recent Flowsheet Documentation  Taken 4/9/2022 1800 by Joslyn Conner RN  Activity Management: activity adjusted per tolerance  Taken 4/9/2022 1600 by Joslyn Conner RN  Activity Management: activity adjusted per tolerance  Taken 4/9/2022 1000 by Joslyn Conner RN  Activity Management: activity adjusted per tolerance  Taken 4/9/2022 0800 by Joslyn Conner RN  Activity Management: activity adjusted per tolerance  VTE Prevention/Management:   bilateral   dorsiflexion/plantar flexion performed  Range of Motion: active ROM (range of motion) encouraged  Intervention: Prevent Infection  Recent Flowsheet Documentation  Taken 4/9/2022 1800 by Joslyn Conner RN  Infection Prevention:   environmental  surveillance performed   equipment surfaces disinfected   hand hygiene promoted   rest/sleep promoted  Taken 4/9/2022 1600 by Joslyn Conner RN  Infection Prevention:   environmental surveillance performed   equipment surfaces disinfected   hand hygiene promoted   rest/sleep promoted  Taken 4/9/2022 1000 by Joslyn Conner RN  Infection Prevention:   environmental surveillance performed   equipment surfaces disinfected   hand hygiene promoted   rest/sleep promoted  Taken 4/9/2022 0800 by Joslyn Conner RN  Infection Prevention:   environmental surveillance performed   equipment surfaces disinfected   hand hygiene promoted   rest/sleep promoted  Goal: Optimal Comfort and Wellbeing  Outcome: Ongoing, Progressing  Intervention: Monitor Pain and Promote Comfort  Recent Flowsheet Documentation  Taken 4/9/2022 0800 by Joslyn Conner RN  Pain Management Interventions: see MAR  Intervention: Provide Person-Centered Care  Recent Flowsheet Documentation  Taken 4/9/2022 0800 by Joslyn Conner RN  Trust Relationship/Rapport:   care explained   choices provided   questions answered   questions encouraged  Goal: Readiness for Transition of Care  Outcome: Ongoing, Progressing     Problem: Behavioral Health Comorbidity  Goal: Maintenance of Behavioral Health Symptom Control  Outcome: Ongoing, Progressing  Intervention: Maintain Behavioral Health Symptom Control  Recent Flowsheet Documentation  Taken 4/9/2022 1800 by Joslyn Conner RN  Medication Review/Management: medications reviewed  Taken 4/9/2022 1600 by Joslyn Conner RN  Medication Review/Management: medications reviewed  Taken 4/9/2022 1000 by Joslyn Conner RN  Medication Review/Management: medications reviewed  Taken 4/9/2022 0800 by Joslyn Conner RN  Medication Review/Management: medications reviewed     Problem: Diabetes Comorbidity  Goal: Blood Glucose Level Within Targeted Range  Outcome: Ongoing, Progressing  Intervention: Monitor  and Manage Glycemia  Recent Flowsheet Documentation  Taken 4/9/2022 0800 by Joslyn Conner, RN  Glycemic Management: blood glucose monitored

## 2022-04-09 NOTE — PROGRESS NOTES
[facility] Medicine Services  PROGRESS NOTE    Patient Name: Bernadette Paris  : 1970  MRN: [id number]    Date of Admission: 2022  Primary Care Physician: Lilly Rose MD    Subjective   Subjective     CC:  N/V    HPI:  Continues to complain of abdominal/ back pain left side and epigastric region.  No vomiting, but npo  Reports trouble with oral meds and nausea  + constipation    ROS:  As above    Objective   Objective     Vital Signs:   Temp:  [97.2 °F (36.2 °C)-98.4 °F (36.9 °C)] 98.2 °F (36.8 °C)  Heart Rate:  [68-93] 77  Resp:  [16-18] 18  BP: (148-170)/(100-111) 170/111     Physical Exam:  Constitutional: No acute distress  HENT: NCAT, mucous membranes moist  Respiratory: Clear to auscultation bilaterally, respiratory effort normal   Cardiovascular: RRR, no murmurs, rubs, or gallops  Gastrointestinal: Positive bowel sounds, soft, mild epigastric tenderness/ LUQ tenderness, nondistended  Musculoskeletal: No bilateral ankle edema  Psychiatric: Appropriate affect, cooperative  Neurologic: Awake, alert, oriented x3, no focality appreciated  Skin: No rashes    Results Reviewed:  LAB RESULTS:      Lab 22  0947 22  1627 22  1052   WBC  --   --  7.47   HEMOGLOBIN  --   --  14.5   HEMATOCRIT  --   --  45.3   PLATELETS  --   --  438   NEUTROS ABS  --   --  4.23   IMMATURE GRANS (ABS)  --   --  0.03   LYMPHS ABS  --   --  2.58   MONOS ABS  --   --  0.60   EOS ABS  --   --  0.00   MCV  --   --  100.7*   LACTATE 1.7 3.4* 2.1*         Lab 22  0528 22  1114 22  0446 22  2357 22  1627 22  1052   SODIUM 136 131* 135* 137 140   < > 136   POTASSIUM 4.2 3.5 3.5 3.6 3.1*   < > 4.1   CHLORIDE 104 101 107 108* 111*   < > 98   CO2 22.0 20.0* 20.0* 20.0* 18.0*   < > 10.0*   ANION GAP 10.0 10.0 8.0 9.0 11.0   < > 28.0*   BUN 7 5* 6 5* 5*   < > 6   CREATININE 0.61 0.52* 0.47* 0.54* 0.44*   < > 1.02*   EGFR 108.4 112.6 115.4  111.6 117.3   < > 66.7   GLUCOSE 255* 462* 353* 178* 83   < > 500*   CALCIUM 9.0 8.6 8.9 8.8 9.0   < > 9.6   MAGNESIUM 2.1 1.5* 1.6 1.7 1.8   < >  --    PHOSPHORUS 2.9 3.2 3.3 2.8 2.4*   < > 3.7   HEMOGLOBIN A1C  --   --   --   --   --   --  11.80*    < > = values in this interval not displayed.         Lab 04/09/22  0528 04/07/22  0947 04/06/22  1052   TOTAL PROTEIN 6.0  --  7.8   ALBUMIN 3.30*  --  4.50   GLOBULIN 2.7  --  3.3   ALT (SGPT) 75*  --  73*   AST (SGOT) 51*  --  67*   BILIRUBIN 0.3  --  0.5   ALK PHOS 155*  --  240*   LIPASE  --  68* 98*         Lab 04/06/22  1052   TROPONIN T <0.010         Lab 04/06/22  1052   CHOLESTEROL 177   LDL CHOL 73   HDL CHOL 77*   TRIGLYCERIDES 160*             Lab 04/07/22  1217   PH, ARTERIAL 7.358   PCO2, ARTERIAL 32.4*   PO2 ART 96.6   FIO2 21   HCO3 ART 18.2*   BASE EXCESS ART -6.4*   CARBOXYHEMOGLOBIN 1.2     Brief Urine Lab Results  (Last result in the past 365 days)      Color   Clarity   Blood   Leuk Est   Nitrite   Protein   CREAT   Urine HCG        04/06/22 1103               Negative       04/06/22 1103 Yellow   Clear   Trace   Negative   Negative   30 mg/dL (1+)                 Microbiology Results Abnormal     Procedure Component Value - Date/Time    COVID-19 and FLU A/B PCR - Swab, Nasopharynx [[id number]]  (Normal) Collected: 04/06/22 1122    Lab Status: Final result Specimen: Swab from Nasopharynx Updated: 04/06/22 1204     COVID19 Not Detected     Influenza A PCR Not Detected     Influenza B PCR Not Detected    Narrative:      Fact sheet for providers: https://www.fda.gov/media/849988/download    Fact sheet for patients: https://www.fda.gov/media/545487/download    Test performed by PCR.          No radiology results from the last 24 hrs        I have reviewed the medications:  Scheduled Meds:bisacodyl, 10 mg, Rectal, Daily  busPIRone, 10 mg, Oral, BID  heparin (porcine), 5,000 Units, Subcutaneous, Q8H  insulin detemir, 43 Units, Subcutaneous, BID  insulin  lispro, 0-9 Units, Subcutaneous, 4x Daily With Meals & Nightly  insulin lispro, 5 Units, Subcutaneous, TID With Meals  lisinopril, 20 mg, Oral, Daily  metoclopramide, 10 mg, Intravenous, Q8H  metoprolol tartrate, 25 mg, Oral, Q12H  pantoprazole, 40 mg, Oral, Q AM  senna-docusate sodium, 2 tablet, Oral, BID  sertraline, 100 mg, Oral, Daily  sodium chloride, 10 mL, Intravenous, Q12H      Continuous Infusions:   PRN Meds:.•  acetaminophen **OR** acetaminophen **OR** acetaminophen  •  bisacodyl  •  cloNIDine  •  dextrose  •  dextrose  •  glucagon (human recombinant)  •  HYDROcodone-acetaminophen  •  hydrOXYzine  •  ipratropium-albuterol  •  magnesium sulfate **OR** magnesium sulfate **OR** magnesium sulfate  •  melatonin  •  Morphine **AND** naloxone  •  [DISCONTINUED] ondansetron **OR** ondansetron  •  potassium & sodium phosphates **OR** potassium & sodium phosphates  •  potassium chloride **OR** potassium chloride **OR** potassium chloride  •  potassium phosphate infusion greater than 15 mMoles **OR** potassium phosphate infusion greater than 15 mMoles **OR** potassium phosphate **OR** sodium phosphate IVPB **OR** sodium phosphate IVPB **OR** sodium phosphate IVPB  •  sodium chloride  •  sodium chloride    Assessment/Plan   Assessment & Plan     Active Hospital Problems    Diagnosis  POA   • DKA (diabetic ketoacidosis) ([facility]) [E11.10]  Yes   • Nausea and vomiting [R11.2]  Unknown   • Epigastric pain [R10.13]  Unknown      Resolved Hospital Problems   No resolved problems to display.        Brief Hospital Course to date:  Bernadette Paris is a 52 y.o. female with past medical history of hypertension, hyperlipidemia, diabetes mellitus on insulin, bipolar disorder.  Patient comes to the emergency room for progressively worsening nausea vomiting for a few weeks.  Labs revealed severe ketoacidosis and blood glucose around 500 on presentation, since then BS have been very labile with ongoing abdominal pain,  progressive.    This patient's problems and plans were partially entered by my partner and updated as appropriate by me 04/09/22.      N/V  Epigastric abdominal pain  --suspect in part related to uncontrolled diabetes, DKA on presentation. Persistent despite improvement and closing gap  --PPI started  --GI eval. Plan EGD today  -- reglan IV q 8hr  -morphine and norco ordered prn- no relief  -add bowel regimen    Uncontrolled diabetes mellitus.    --Patient is on insulin and compliance is very much in question.  Patient's hemoglobin A1c is 11.8.  Blood sugars have been labile, 80s-400s. Seems sensitive to short acting insulin. Will stop prandial and increase levemir up    HypoK/HypoMg  --related to above, monitor and replace as needed    Hypertension,   --currently on BB, will restart home lisinopril with rising BP      Bipolar disorder.    Chronic pain, uses narcotics chronically.  --monitor        DVT prophylaxis:  Medical DVT prophylaxis orders are present.       AM-PAC 6 Clicks Score (PT): 20 (04/08/22 2000)    Disposition: Home TBD    CODE STATUS:   Code Status and Medical Interventions:   Ordered at: 04/06/22 1731     Code Status (Patient has no pulse and is not breathing):    CPR (Attempt to Resuscitate)     Medical Interventions (Patient has pulse or is breathing):    Full Support       Claire Sanders, APRN  04/09/22

## 2022-04-09 NOTE — ANESTHESIA PREPROCEDURE EVALUATION
Anesthesia Evaluation     Patient summary reviewed and Nursing notes reviewed   NPO Solid Status: > 8 hours  NPO Liquid Status: > 8 hours           Airway   Mallampati: I  TM distance: >3 FB  Neck ROM: full  No difficulty expected  Dental    (+) edentulous    Pulmonary     breath sounds clear to auscultation  Cardiovascular   Exercise tolerance: good (4-7 METS)    Rhythm: regular  Rate: normal        Neuro/Psych  GI/Hepatic/Renal/Endo      Musculoskeletal     Abdominal    Substance History      OB/GYN          Other                        Anesthesia Plan    ASA 3     MAC     intravenous induction     Anesthetic plan, all risks, benefits, and alternatives have been provided, discussed and informed consent has been obtained with: patient.        CODE STATUS:    Code Status (Patient has no pulse and is not breathing): CPR (Attempt to Resuscitate)  Medical Interventions (Patient has pulse or is breathing): Full Support

## 2022-04-09 NOTE — ANESTHESIA POSTPROCEDURE EVALUATION
Patient: Bernadette Paris    Procedure Summary     Date: 04/09/22 Room / Location:  [facility]    Anesthesia Start: 1233 Anesthesia Stop:     Procedure: ESOPHAGOGASTRODUODENOSCOPY (N/A ) Diagnosis:       Nausea and vomiting, unspecified vomiting type      Epigastric pain      (Nausea and vomiting, unspecified vomiting type [R11.2])      (Epigastric pain [R10.13])    Surgeons: Brunner, Mark I, MD Provider: Mu Monge MD    Anesthesia Type: MAC ASA Status: 3          Anesthesia Type: MAC    Vitals  No vitals data found for the desired time range.          Post Anesthesia Care and Evaluation    Patient location during evaluation: PACU  Patient participation: complete - patient participated  Level of consciousness: awake and responsive to verbal stimuli  Pain score: 2  Pain management: adequate  Airway patency: patent  Anesthetic complications: No anesthetic complications    Cardiovascular status: acceptable  Respiratory status: acceptable  Hydration status: acceptable    Comments: Pt awake and responsive. SV. VSS. Report to RN. Patient Vitals in the past 24 hrs:  04/09/22 1212, BP:(!) 161/105, Temp:97.1 °F (36.2 °C), Temp src:Temporal, Pulse:70, Resp:18, SpO2:99 %  04/09/22 0700, BP:(!) 170/111, Temp:98.2 °F (36.8 °C), Temp src:Oral, Pulse:77, Resp:18  04/09/22 0519, BP:(!) 159/105, Temp:98.4 °F (36.9 °C), Temp src:Oral, Pulse:80, Resp:18  04/08/22 2300, Pulse:73  04/08/22 2200, Pulse:68  04/08/22 2100, Pulse:78  04/08/22 2005, BP:(!) 158/103, Temp:97.2 °F (36.2 °C), Temp src:Axillary, Pulse:93, Resp:18  04/08/22 2000, Pulse:85  04/08/22 1500, Temp src:Oral, Resp:17  133/78. p 72. r 16. t 98.1

## 2022-04-10 LAB
ANION GAP SERPL CALCULATED.3IONS-SCNC: 8 MMOL/L (ref 5–15)
BASOPHILS # BLD MANUAL: 0 10*3/MM3 (ref 0–0.2)
BASOPHILS NFR BLD MANUAL: 0 % (ref 0–1.5)
BUN SERPL-MCNC: 6 MG/DL (ref 6–20)
BUN/CREAT SERPL: 13 (ref 7–25)
CALCIUM SPEC-SCNC: 9 MG/DL (ref 8.6–10.5)
CHLORIDE SERPL-SCNC: 107 MMOL/L (ref 98–107)
CO2 SERPL-SCNC: 26 MMOL/L (ref 22–29)
CREAT SERPL-MCNC: 0.46 MG/DL (ref 0.57–1)
DEPRECATED RDW RBC AUTO: 50.5 FL (ref 37–54)
EGFRCR SERPLBLD CKD-EPI 2021: 116 ML/MIN/1.73
EOSINOPHIL # BLD MANUAL: 0.1 10*3/MM3 (ref 0–0.4)
EOSINOPHIL NFR BLD MANUAL: 2 % (ref 0.3–6.2)
ERYTHROCYTE [DISTWIDTH] IN BLOOD BY AUTOMATED COUNT: 14.2 % (ref 12.3–15.4)
GLUCOSE BLDC GLUCOMTR-MCNC: 118 MG/DL (ref 70–130)
GLUCOSE BLDC GLUCOMTR-MCNC: 164 MG/DL (ref 70–130)
GLUCOSE BLDC GLUCOMTR-MCNC: 238 MG/DL (ref 70–130)
GLUCOSE BLDC GLUCOMTR-MCNC: 239 MG/DL (ref 70–130)
GLUCOSE SERPL-MCNC: 79 MG/DL (ref 65–99)
HCT VFR BLD AUTO: 36.6 % (ref 34–46.6)
HGB BLD-MCNC: 12.2 G/DL (ref 12–15.9)
LYMPHOCYTES # BLD MANUAL: 3.39 10*3/MM3 (ref 0.7–3.1)
LYMPHOCYTES NFR BLD MANUAL: 7 % (ref 5–12)
MAGNESIUM SERPL-MCNC: 1.9 MG/DL (ref 1.6–2.6)
MCH RBC QN AUTO: 32.7 PG (ref 26.6–33)
MCHC RBC AUTO-ENTMCNC: 33.3 G/DL (ref 31.5–35.7)
MCV RBC AUTO: 98.1 FL (ref 79–97)
MONOCYTES # BLD: 0.34 10*3/MM3 (ref 0.1–0.9)
NEUTROPHILS # BLD AUTO: 1.08 10*3/MM3 (ref 1.7–7)
NEUTROPHILS NFR BLD MANUAL: 22 % (ref 42.7–76)
PHOSPHATE SERPL-MCNC: 4.1 MG/DL (ref 2.5–4.5)
PLAT MORPH BLD: NORMAL
PLATELET # BLD AUTO: 301 10*3/MM3 (ref 140–450)
PMV BLD AUTO: 10 FL (ref 6–12)
POTASSIUM SERPL-SCNC: 3.7 MMOL/L (ref 3.5–5.2)
RBC # BLD AUTO: 3.73 10*6/MM3 (ref 3.77–5.28)
RBC MORPH BLD: NORMAL
SCAN SLIDE: NORMAL
SODIUM SERPL-SCNC: 141 MMOL/L (ref 136–145)
VARIANT LYMPHS NFR BLD MANUAL: 69 % (ref 19.6–45.3)
WBC MORPH BLD: NORMAL
WBC NRBC COR # BLD: 4.92 10*3/MM3 (ref 3.4–10.8)

## 2022-04-10 PROCEDURE — 99233 SBSQ HOSP IP/OBS HIGH 50: CPT | Performed by: NURSE PRACTITIONER

## 2022-04-10 PROCEDURE — 63710000001 INSULIN DETEMIR PER 5 UNITS: Performed by: INTERNAL MEDICINE

## 2022-04-10 PROCEDURE — 25010000002 METOCLOPRAMIDE PER 10 MG: Performed by: INTERNAL MEDICINE

## 2022-04-10 PROCEDURE — 84100 ASSAY OF PHOSPHORUS: CPT | Performed by: INTERNAL MEDICINE

## 2022-04-10 PROCEDURE — 85025 COMPLETE CBC W/AUTO DIFF WBC: CPT | Performed by: INTERNAL MEDICINE

## 2022-04-10 PROCEDURE — 63710000001 INSULIN LISPRO (HUMAN) PER 5 UNITS: Performed by: INTERNAL MEDICINE

## 2022-04-10 PROCEDURE — 25010000002 HEPARIN (PORCINE) PER 1000 UNITS: Performed by: INTERNAL MEDICINE

## 2022-04-10 PROCEDURE — 83735 ASSAY OF MAGNESIUM: CPT | Performed by: INTERNAL MEDICINE

## 2022-04-10 PROCEDURE — 63710000001 INSULIN LISPRO (HUMAN) PER 5 UNITS

## 2022-04-10 PROCEDURE — 97110 THERAPEUTIC EXERCISES: CPT | Performed by: PHYSICAL THERAPIST

## 2022-04-10 PROCEDURE — 85007 BL SMEAR W/DIFF WBC COUNT: CPT | Performed by: INTERNAL MEDICINE

## 2022-04-10 PROCEDURE — 82962 GLUCOSE BLOOD TEST: CPT

## 2022-04-10 PROCEDURE — 80048 BASIC METABOLIC PNL TOTAL CA: CPT | Performed by: INTERNAL MEDICINE

## 2022-04-10 PROCEDURE — 97116 GAIT TRAINING THERAPY: CPT | Performed by: PHYSICAL THERAPIST

## 2022-04-10 RX ORDER — BISACODYL 10 MG
10 SUPPOSITORY, RECTAL RECTAL DAILY PRN
Status: DISCONTINUED | OUTPATIENT
Start: 2022-04-10 | End: 2022-04-11 | Stop reason: HOSPADM

## 2022-04-10 RX ORDER — METOCLOPRAMIDE HYDROCHLORIDE 5 MG/ML
10 INJECTION INTRAMUSCULAR; INTRAVENOUS
Status: DISCONTINUED | OUTPATIENT
Start: 2022-04-10 | End: 2022-04-10

## 2022-04-10 RX ORDER — BISACODYL 5 MG/1
10 TABLET, DELAYED RELEASE ORAL DAILY
Status: DISCONTINUED | OUTPATIENT
Start: 2022-04-10 | End: 2022-04-11 | Stop reason: HOSPADM

## 2022-04-10 RX ORDER — OXYCODONE HYDROCHLORIDE AND ACETAMINOPHEN 5; 325 MG/1; MG/1
1 TABLET ORAL EVERY 6 HOURS PRN
Status: DISCONTINUED | OUTPATIENT
Start: 2022-04-10 | End: 2022-04-11 | Stop reason: HOSPADM

## 2022-04-10 RX ORDER — METOCLOPRAMIDE 10 MG/1
10 TABLET ORAL
Status: DISCONTINUED | OUTPATIENT
Start: 2022-04-10 | End: 2022-04-11

## 2022-04-10 RX ADMIN — INSULIN LISPRO 4 UNITS: 100 INJECTION, SOLUTION INTRAVENOUS; SUBCUTANEOUS at 20:35

## 2022-04-10 RX ADMIN — HEPARIN SODIUM 5000 UNITS: 5000 INJECTION, SOLUTION INTRAVENOUS; SUBCUTANEOUS at 21:10

## 2022-04-10 RX ADMIN — BUSPIRONE HYDROCHLORIDE 10 MG: 10 TABLET ORAL at 20:27

## 2022-04-10 RX ADMIN — BUSPIRONE HYDROCHLORIDE 10 MG: 10 TABLET ORAL at 08:28

## 2022-04-10 RX ADMIN — OXYCODONE HYDROCHLORIDE AND ACETAMINOPHEN 1 TABLET: 5; 325 TABLET ORAL at 12:39

## 2022-04-10 RX ADMIN — HYDROCODONE BITARTRATE AND ACETAMINOPHEN 1 TABLET: 5; 325 TABLET ORAL at 06:35

## 2022-04-10 RX ADMIN — SERTRALINE 100 MG: 100 TABLET, FILM COATED ORAL at 08:28

## 2022-04-10 RX ADMIN — SENNOSIDES AND DOCUSATE SODIUM 2 TABLET: 50; 8.6 TABLET ORAL at 20:27

## 2022-04-10 RX ADMIN — METOCLOPRAMIDE 10 MG: 5 INJECTION, SOLUTION INTRAMUSCULAR; INTRAVENOUS at 06:34

## 2022-04-10 RX ADMIN — FLUCONAZOLE 200 MG: 200 TABLET ORAL at 16:54

## 2022-04-10 RX ADMIN — HEPARIN SODIUM 5000 UNITS: 5000 INJECTION, SOLUTION INTRAVENOUS; SUBCUTANEOUS at 16:55

## 2022-04-10 RX ADMIN — INSULIN LISPRO 4 UNITS: 100 INJECTION, SOLUTION INTRAVENOUS; SUBCUTANEOUS at 12:39

## 2022-04-10 RX ADMIN — Medication 10 ML: at 08:28

## 2022-04-10 RX ADMIN — Medication 10 ML: at 20:28

## 2022-04-10 RX ADMIN — METOPROLOL TARTRATE 25 MG: 25 TABLET, FILM COATED ORAL at 20:27

## 2022-04-10 RX ADMIN — HYDROXYZINE HYDROCHLORIDE 25 MG: 25 TABLET, FILM COATED ORAL at 16:55

## 2022-04-10 RX ADMIN — METOCLOPRAMIDE 10 MG: 10 TABLET ORAL at 20:27

## 2022-04-10 RX ADMIN — METOCLOPRAMIDE 10 MG: 10 TABLET ORAL at 16:54

## 2022-04-10 RX ADMIN — SENNOSIDES AND DOCUSATE SODIUM 2 TABLET: 50; 8.6 TABLET ORAL at 08:27

## 2022-04-10 RX ADMIN — INSULIN DETEMIR 43 UNITS: 100 INJECTION, SOLUTION SUBCUTANEOUS at 08:28

## 2022-04-10 RX ADMIN — INSULIN DETEMIR 43 UNITS: 100 INJECTION, SOLUTION SUBCUTANEOUS at 20:30

## 2022-04-10 RX ADMIN — PANTOPRAZOLE SODIUM 40 MG: 40 TABLET, DELAYED RELEASE ORAL at 06:35

## 2022-04-10 RX ADMIN — HEPARIN SODIUM 5000 UNITS: 5000 INJECTION, SOLUTION INTRAVENOUS; SUBCUTANEOUS at 06:35

## 2022-04-10 RX ADMIN — METOPROLOL TARTRATE 25 MG: 25 TABLET, FILM COATED ORAL at 08:28

## 2022-04-10 RX ADMIN — Medication 10 MG: at 08:28

## 2022-04-10 RX ADMIN — OXYCODONE HYDROCHLORIDE AND ACETAMINOPHEN 1 TABLET: 5; 325 TABLET ORAL at 20:27

## 2022-04-10 NOTE — THERAPY TREATMENT NOTE
Patient Name: Bernadette Paris  : 1970    MRN: [id number]                              Today's Date: 4/10/2022       Admit Date: 2022    Visit Dx:     ICD-10-CM ICD-9-CM   1. Diabetic ketoacidosis without coma associated with diabetes mellitus due to underlying condition ([facility])  E08.10 249.11   2. Hyperglycemia  R73.9 790.29   3. Generalized abdominal pain  R10.84 789.07   4. Nausea and vomiting, unspecified vomiting type  R11.2 787.01   5. Epigastric pain  R10.13 789.06     Patient Active Problem List   Diagnosis   • Spinal stenosis, lumbar region, with neurogenic claudication   • Degenerative disc disease, lumbar   • Facet arthritis of lumbar region   • Lumbar stenosis with neurogenic claudication   • BMI 40.0-44.9, adult ([facility])   • Encounter for smoking cessation counseling   • S/P lumbar laminectomy   • DKA (diabetic ketoacidosis) ([facility])   • Nausea and vomiting   • Epigastric pain     Past Medical History:   Diagnosis Date   • Arthritis    • Bipolar disorder ([facility])    • Chronic bronchitis ([facility])    • Depression    • Diabetes mellitus ([facility])     DIAGNOSED    • GERD (gastroesophageal reflux disease)    • Hepatitis-C    • History of blood transfusion    • Hyperlipidemia    • Hypertension    • Infectious viral hepatitis     HEPATITIS C   • Migraine    • Sleep apnea     PATIENT UNSURE OF SETTINGS     Past Surgical History:   Procedure Laterality Date   • CHOLECYSTECTOMY     • HYSTERECTOMY     • KNEE SURGERY     • LUMBAR LAMINECTOMY DISCECTOMY DECOMPRESSION N/A 2018    Procedure: LUMBAR LAMINECTOMIES L4-S1;  Surgeon: Chip Smith MD;  Location: [facility];  Service: Neurosurgery      General Information     Row Name 04/10/22 1533          Physical Therapy Time and Intention    Document Type therapy note (daily note)  -LM     Mode of Treatment individual therapy;physical therapy  -LM     Row Name 04/10/22 1533          General Information    Patient Profile Reviewed yes  -LM     Existing  Precautions/Restrictions fall  -LM     Row Name 04/10/22 1533          Cognition    Orientation Status (Cognition) oriented x 3  -LM     Row Name 04/10/22 1533          Safety Issues, Functional Mobility    Safety Issues Affecting Function (Mobility) insight into deficits/self-awareness;safety precaution awareness;safety precautions follow-through/compliance  -LM     Impairments Affecting Function (Mobility) balance;endurance/activity tolerance;strength  -LM           User Key  (r) = Recorded By, (t) = Taken By, (c) = Cosigned By    Initials Name Provider Type    LM Radha Brown, PT Physical Therapist               Mobility     Row Name 04/10/22 1540          Bed Mobility    Supine-Sit [place] (Bed Mobility) standby assist  -LM     Sit-Supine [place] (Bed Mobility) standby assist  -LM     Assistive Device (Bed Mobility) bed rails;head of bed elevated  -LM     Row Name 04/10/22 1540          Sit-Stand Transfer    Sit-Stand [place] (Transfers) contact guard;1 person assist;verbal cues  -LM     Assistive Device (Sit-Stand Transfers) walker, front-wheeled  -LM     Comment, (Sit-Stand Transfer) Stood x 2 - first from EOB; second from low toilet.  Pt independent with pericare.  -LM     Row Name 04/10/22 1540          Gait/Stairs (Locomotion)    [place] Level (Gait) contact guard;1 person assist;verbal cues  -LM     Assistive Device (Gait) walker, front-wheeled  -LM     Distance in Feet (Gait) 10 + 120  -LM     Deviations/Abnormal Patterns (Gait) bilateral deviations;base of support, narrow;stride length decreased;padma decreased;gait speed decreased  -LM     Bilateral Gait Deviations heel strike decreased  -LM     Comment, (Gait/Stairs) Pt first ambulated to the restroom.  Then ambulated in hallway.  Pt fatigues quickly and started demonstrating mild shaking with fatigue.  This resolved with rest.  -LM           User Key  (r) = Recorded By, (t) = Taken By, (c) = Cosigned By    Initials Name Provider  Type    LM Radha Brown, PT Physical Therapist               Obj/Interventions     Row Name 04/10/22 1545          Hip (Therapeutic Exercise)    Hip (Therapeutic Exercise) AROM (active range of motion);isometric exercises  -     Hip AROM (Therapeutic Exercise) bilateral;aBduction;aDduction;supine;10 repetitions  -LM     Hip Isometrics (Therapeutic Exercise) bilateral;gluteal sets;supine;10 repetitions  -LM     Row Name 04/10/22 1545          Knee (Therapeutic Exercise)    Knee (Therapeutic Exercise) AROM (active range of motion);isometric exercises  -     Knee AROM (Therapeutic Exercise) bilateral;SLR (straight leg raise);heel slides;supine;10 repetitions  -LM     Knee Isometrics (Therapeutic Exercise) bilateral;quad sets;supine;10 repetitions  -     Row Name 04/10/22 1545          Ankle (Therapeutic Exercise)    Ankle (Therapeutic Exercise) AROM (active range of motion)  -     Ankle AROM (Therapeutic Exercise) bilateral;dorsiflexion;plantarflexion;supine;15 repititions  -           User Key  (r) = Recorded By, (t) = Taken By, (c) = Cosigned By    Initials Name Provider Type     Radha Brown, PT Physical Therapist               Goals/Plan    No documentation.                Clinical Impression     Row Name 04/10/22 1545          Pain    Pretreatment Pain Rating 0/10 - no pain  -     Posttreatment Pain Rating 0/10 - no pain  -     Row Name 04/10/22 1545          Plan of Care Review    Plan of Care Reviewed With patient  -     Progress improving  -     Outcome Evaluation Pt progressing well towards skilled PT goals and motivated to work with therapy.  Pt transferred supine<-->sit with SBA, stood with CGA, and increased gait distance to 10' + 120' using rw with CGAx1.  BLE ther ex completed with only complaint being fatigue.  Continue to recommend inpt rehab at d/c.  -     Row Name 04/10/22 1545          Vital Signs    Pretreatment Heart Rate (beats/min) 83  -LM     Posttreatment Heart Rate  (beats/min) 79  -LM     Pre SpO2 (%) 96  -LM     O2 Delivery Pre Treatment room air  -LM     Post SpO2 (%) 95  -LM     O2 Delivery Post Treatment room air  -LM     Pre Patient Position Supine  -LM     Post Patient Position Supine  -LM     Row Name 04/10/22 1545          Positioning and Restraints    Pre-Treatment Position in bed  -LM     Post Treatment Position bed  -LM     In Bed fowlers;call light within reach;encouraged to call for assist;notified nsg  -LM           User Key  (r) = Recorded By, (t) = Taken By, (c) = Cosigned By    Initials Name Provider Type    Radha Lira, HAROLDO Physical Therapist               Outcome Measures     Row Name 04/10/22 1550          How much help from another person do you currently need...    Turning from your back to your side while in flat bed without using bedrails? 4  -LM     Moving from lying on back to sitting on the side of a flat bed without bedrails? 3  -LM     Moving to and from a bed to a chair (including a wheelchair)? 3  -LM     Standing up from a chair using your arms (e.g., wheelchair, bedside chair)? 3  -LM     Climbing 3-5 steps with a railing? 3  -LM     To walk in hospital room? 3  -LM     AM-PAC 6 Clicks Score (PT) 19  -LM     Row Name 04/10/22 1550          Functional Assessment    Outcome Measure Options AM-PAC 6 Clicks Basic Mobility (PT)  -LM           User Key  (r) = Recorded By, (t) = Taken By, (c) = Cosigned By    Initials Name Provider Type    Radha Lira PT Physical Therapist                             Physical Therapy Education                 Title: PT OT SLP Therapies (Done)     Topic: Physical Therapy (Done)     Point: Mobility training (Done)     Learning Progress Summary           Patient Acceptance, E,TB, VU by SB at 4/9/2022 0148    Acceptance, E,TB, VU by SB at 4/8/2022 0206    Eager, E, VU,NR by  at 4/7/2022 1552    Comment: Educated pt. safety/technique w/bed mobility, transfers, ambulation, PT POC                   Point: Home  exercise program (Done)     Learning Progress Summary           Patient Acceptance, E,TB, VU by SB at 4/9/2022 0148    Acceptance, E,TB, VU by SB at 4/8/2022 0206                   Point: Body mechanics (Done)     Learning Progress Summary           Patient Acceptance, E,TB, VU by SB at 4/9/2022 0148    Acceptance, E,TB, VU by SB at 4/8/2022 0206    Eager, E, VU,NR by SS at 4/7/2022 1552    Comment: Educated pt. safety/technique w/bed mobility, transfers, ambulation, PT POC                   Point: Precautions (Done)     Learning Progress Summary           Patient Acceptance, E,TB, VU by SB at 4/9/2022 0148    Acceptance, E,TB, VU by SB at 4/8/2022 0206    Eager, E, VU,NR by SS at 4/7/2022 1552    Comment: Educated pt. safety/technique w/bed mobility, transfers, ambulation, PT POC                               User Key     Initials Effective Dates Name Provider Type Discipline     06/01/21 -  Genet Holley, HAROLDO Physical Therapist PT     03/07/22 -  Biju Acevedo, RN Registered Nurse Nurse              PT Recommendation and Plan     Plan of Care Reviewed With: patient  Progress: improving  Outcome Evaluation: Pt progressing well towards skilled PT goals and motivated to work with therapy.  Pt transferred supine<-->sit with SBA, stood with CGA, and increased gait distance to 10' + 120' using rw with CGAx1.  BLE ther ex completed with only complaint being fatigue.  Continue to recommend inpt rehab at d/c.     Time Calculation:    PT Charges     Row Name 04/10/22 9271             Time Calculation    Start Time 1454  -LM      PT Received On 04/10/22  -LM      PT Goal Re-Cert Due Date 04/17/22  -LM              Timed Charges    63215 - PT Therapeutic Exercise Minutes 8  -LM      80214 - Gait Training Minutes  16  -LM              Total Minutes    Timed Charges Total Minutes 24  -LM       Total Minutes 24  -LM            User Key  (r) = Recorded By, (t) = Taken By, (c) = Cosigned By    Initials Name Provider Type     LM Radha Brown, PT Physical Therapist              Therapy Charges for Today     Code Description Service Date Service Provider Modifiers Qty    21176700461 HC GAIT TRAINING EA 15 MIN 4/10/2022 Radha Brown, PT GP 1    77813126896 HC PT THER PROC EA 15 MIN 4/10/2022 Radha Brown, PT GP 1          PT G-Codes  Outcome Measure Options: AM-PAC 6 Clicks Basic Mobility (PT)  AM-PAC 6 Clicks Score (PT): 19    Radha Brown, PT  4/10/2022

## 2022-04-10 NOTE — PLAN OF CARE
Goal Outcome Evaluation:  Plan of Care Reviewed With: patient        Progress: improving  Outcome Evaluation: Pt progressing well towards skilled PT goals and motivated to work with therapy.  Pt transferred supine<-->sit with SBA, stood with CGA, and increased gait distance to 10' + 120' using rw with CGAx1.  BLE ther ex completed with only complaint being fatigue.  Continue to recommend inpt rehab at d/c.

## 2022-04-10 NOTE — PROGRESS NOTES
[facility] Medicine Services  PROGRESS NOTE    Patient Name: Bernadette Paris  : 1970  MRN: [id number]    Date of Admission: 2022  Primary Care Physician: Lilly Rose MD    Subjective   Subjective     CC:  N/V    HPI:  Patient sitting up in bed. Feeling better today. Able to eat some breakfast, but still with nausea. No vomiting. No BM yet  Feels like norco not helping at all  Having left abdominal pain, but slept better last night    ROS:  As above    Objective   Objective     Vital Signs:   Temp:  [97.1 °F (36.2 °C)-98.5 °F (36.9 °C)] 98.5 °F (36.9 °C)  Heart Rate:  [70-82] 81  Resp:  [16-20] 16  BP: (124-172)/() 160/112     Physical Exam:  Constitutional: No acute distress- sitting up in bed  HENT: NCAT, mucous membranes moist  Respiratory: Clear to auscultation bilaterally, respiratory effort normal   Cardiovascular: RRR, no murmurs, rubs, or gallops  Gastrointestinal: Positive bowel sounds, soft, mild epigastric tenderness/ LUQ tenderness, nondistended  Musculoskeletal: No bilateral ankle edema  Psychiatric: Appropriate affect, cooperative  Neurologic: Awake, alert, oriented x3, no focality appreciated  Skin: No rashes  No change in exam from     Results Reviewed:  LAB RESULTS:      Lab 04/10/22  0406 22  0947 22  1627 22  1052   WBC 4.92  --   --  7.47   HEMOGLOBIN 12.2  --   --  14.5   HEMATOCRIT 36.6  --   --  45.3   PLATELETS 301  --   --  438   NEUTROS ABS 1.08*  --   --  4.23   IMMATURE GRANS (ABS)  --   --   --  0.03   LYMPHS ABS  --   --   --  2.58   MONOS ABS  --   --   --  0.60   EOS ABS 0.10  --   --  0.00   MCV 98.1*  --   --  100.7*   LACTATE  --  1.7 3.4* 2.1*         Lab 04/10/22  0406 22  0528 22  1114 22  0446 22  2357 22  1627 22  1052   SODIUM 141 136 131* 135* 137   < > 136   POTASSIUM 3.7 4.2 3.5 3.5 3.6   < > 4.1   CHLORIDE 107 104 101 107 108*   < > 98   CO2 26.0 22.0 20.0* 20.0*  20.0*   < > 10.0*   ANION GAP 8.0 10.0 10.0 8.0 9.0   < > 28.0*   BUN 6 7 5* 6 5*   < > 6   CREATININE 0.46* 0.61 0.52* 0.47* 0.54*   < > 1.02*   EGFR 116.0 108.4 112.6 115.4 111.6   < > 66.7   GLUCOSE 79 255* 462* 353* 178*   < > 500*   CALCIUM 9.0 9.0 8.6 8.9 8.8   < > 9.6   MAGNESIUM 1.9 2.1 1.5* 1.6 1.7   < >  --    PHOSPHORUS 4.1 2.9 3.2 3.3 2.8   < > 3.7   HEMOGLOBIN A1C  --   --   --   --   --   --  11.80*    < > = values in this interval not displayed.         Lab 04/09/22  0528 04/07/22  0947 04/06/22  1052   TOTAL PROTEIN 6.0  --  7.8   ALBUMIN 3.30*  --  4.50   GLOBULIN 2.7  --  3.3   ALT (SGPT) 75*  --  73*   AST (SGOT) 51*  --  67*   BILIRUBIN 0.3  --  0.5   ALK PHOS 155*  --  240*   LIPASE  --  68* 98*         Lab 04/06/22  1052   TROPONIN T <0.010         Lab 04/06/22  1052   CHOLESTEROL 177   LDL CHOL 73   HDL CHOL 77*   TRIGLYCERIDES 160*             Lab 04/07/22  1217   PH, ARTERIAL 7.358   PCO2, ARTERIAL 32.4*   PO2 ART 96.6   FIO2 21   HCO3 ART 18.2*   BASE EXCESS ART -6.4*   CARBOXYHEMOGLOBIN 1.2     Brief Urine Lab Results  (Last result in the past 365 days)      Color   Clarity   Blood   Leuk Est   Nitrite   Protein   CREAT   Urine HCG        04/06/22 1103               Negative       04/06/22 1103 Yellow   Clear   Trace   Negative   Negative   30 mg/dL (1+)                 Microbiology Results Abnormal     Procedure Component Value - Date/Time    COVID-19 and FLU A/B PCR - Swab, Nasopharynx [[id number]]  (Normal) Collected: 04/06/22 1122    Lab Status: Final result Specimen: Swab from Nasopharynx Updated: 04/06/22 1204     COVID19 Not Detected     Influenza A PCR Not Detected     Influenza B PCR Not Detected    Narrative:      Fact sheet for providers: https://www.fda.gov/media/476368/download    Fact sheet for patients: https://www.fda.gov/media/335887/download    Test performed by PCR.          No radiology results from the last 24 hrs        I have reviewed the medications:  Scheduled  Meds:bisacodyl, 10 mg, Oral, Daily  busPIRone, 10 mg, Oral, BID  fluconazole, 200 mg, Oral, Q24H  heparin (porcine), 5,000 Units, Subcutaneous, Q8H  insulin detemir, 43 Units, Subcutaneous, BID  insulin lispro, 0-9 Units, Subcutaneous, 4x Daily With Meals & Nightly  labetalol, 5 mg, Intravenous, Once  lisinopril, 20 mg, Oral, Daily  metoclopramide, 10 mg, Oral, 4x Daily AC & at Bedtime  metoprolol tartrate, 25 mg, Oral, Q12H  pantoprazole, 40 mg, Oral, Q AM  senna-docusate sodium, 2 tablet, Oral, BID  sertraline, 100 mg, Oral, Daily  sodium chloride, 10 mL, Intravenous, Q12H      Continuous Infusions:lactated ringers, 9 mL/hr      PRN Meds:.•  acetaminophen **OR** acetaminophen **OR** acetaminophen  •  bisacodyl  •  cloNIDine  •  dextrose  •  dextrose  •  glucagon (human recombinant)  •  hydrOXYzine  •  ipratropium-albuterol  •  magnesium sulfate **OR** magnesium sulfate **OR** magnesium sulfate  •  melatonin  •  [DISCONTINUED] Morphine **AND** naloxone  •  [DISCONTINUED] ondansetron **OR** ondansetron  •  oxyCODONE-acetaminophen  •  potassium & sodium phosphates **OR** potassium & sodium phosphates  •  potassium chloride **OR** potassium chloride **OR** potassium chloride  •  potassium phosphate infusion greater than 15 mMoles **OR** potassium phosphate infusion greater than 15 mMoles **OR** potassium phosphate **OR** sodium phosphate IVPB **OR** sodium phosphate IVPB **OR** sodium phosphate IVPB  •  sodium chloride  •  sodium chloride    Assessment/Plan   Assessment & Plan     Active Hospital Problems    Diagnosis  POA   • DKA (diabetic ketoacidosis) (formerly [facility]) [E11.10]  Yes   • Nausea and vomiting [R11.2]  Unknown   • Epigastric pain [R10.13]  Unknown      Resolved Hospital Problems   No resolved problems to display.        Brief Hospital Course to date:  Bernadette Paris is a 52 y.o. female with past medical history of hypertension, hyperlipidemia, diabetes mellitus on insulin, bipolar disorder.  Patient comes to the  emergency room for progressively worsening nausea vomiting for a few weeks.  Labs revealed severe ketoacidosis and blood glucose around 500 on presentation, since then BS have been very labile with ongoing abdominal pain, progressive.    This patient's problems and plans were partially entered by my partner and updated as appropriate by me 04/10/22.      N/V, probable gastroparesis  Epigastric abdominal pain  --suspect in part related to uncontrolled diabetes, DKA on presentation. Persistent despite closing gap  --GI eval. EGD with moderate diffuse gastritis and candida. Plan BID PPI x 1 month then daily. Diflucan daily x 1 week. Ok for DC once intake improves  -- reglan IV q 8hr changed to home oral dosing   -morphine and norco ordered prn- no relief. Will discontinue both. Change to percocet prn  -continue bowel regimen    Uncontrolled diabetes mellitus.    --Patient is on insulin and compliance is very much in question.  Patient's hemoglobin A1c is 11.8.  Blood sugars have been labile, 80s-500s.  --missed levemir yesterday morning with spike in glucose for EGD.  --continue with basal and ssi as she seems to be sensitive to short acting prandial bolus. Will monitor and adjust    HypoK/HypoMg  --related to above, monitor and replace as needed    Hypertension,   --currently on BB, restarted home lisinopril with improvement in BP      Bipolar disorder.    Chronic pain, uses narcotics chronically.  --monitor        DVT prophylaxis:  Medical DVT prophylaxis orders are present.       AM-PAC 6 Clicks Score (PT): 20 (04/09/22 0800)    Disposition: Plan rehab when bed available    CODE STATUS:   Code Status and Medical Interventions:   Ordered at: 04/06/22 1730     Code Status (Patient has no pulse and is not breathing):    CPR (Attempt to Resuscitate)     Medical Interventions (Patient has pulse or is breathing):    Full Support       Claire Sanders, APRN  04/10/22

## 2022-04-11 VITALS
HEART RATE: 75 BPM | HEIGHT: 66 IN | RESPIRATION RATE: 16 BRPM | OXYGEN SATURATION: 100 % | TEMPERATURE: 98.3 F | WEIGHT: 158 LBS | SYSTOLIC BLOOD PRESSURE: 160 MMHG | DIASTOLIC BLOOD PRESSURE: 104 MMHG | BODY MASS INDEX: 25.39 KG/M2

## 2022-04-11 PROBLEM — R11.2 NAUSEA AND VOMITING: Status: RESOLVED | Noted: 2022-04-06 | Resolved: 2022-04-11

## 2022-04-11 PROBLEM — R10.13 EPIGASTRIC PAIN: Status: RESOLVED | Noted: 2022-04-06 | Resolved: 2022-04-11

## 2022-04-11 LAB
ANION GAP SERPL CALCULATED.3IONS-SCNC: 9 MMOL/L (ref 5–15)
BASOPHILS # BLD AUTO: 0.02 10*3/MM3 (ref 0–0.2)
BASOPHILS NFR BLD AUTO: 0.4 % (ref 0–1.5)
BUN SERPL-MCNC: 9 MG/DL (ref 6–20)
BUN/CREAT SERPL: 14.8 (ref 7–25)
CALCIUM SPEC-SCNC: 9.5 MG/DL (ref 8.6–10.5)
CHLORIDE SERPL-SCNC: 101 MMOL/L (ref 98–107)
CO2 SERPL-SCNC: 25 MMOL/L (ref 22–29)
CREAT SERPL-MCNC: 0.61 MG/DL (ref 0.57–1)
DEPRECATED RDW RBC AUTO: 50.3 FL (ref 37–54)
EGFRCR SERPLBLD CKD-EPI 2021: 108.4 ML/MIN/1.73
EOSINOPHIL # BLD AUTO: 0.12 10*3/MM3 (ref 0–0.4)
EOSINOPHIL NFR BLD AUTO: 2.3 % (ref 0.3–6.2)
ERYTHROCYTE [DISTWIDTH] IN BLOOD BY AUTOMATED COUNT: 14.1 % (ref 12.3–15.4)
GLUCOSE BLDC GLUCOMTR-MCNC: 161 MG/DL (ref 70–130)
GLUCOSE BLDC GLUCOMTR-MCNC: 267 MG/DL (ref 70–130)
GLUCOSE BLDC GLUCOMTR-MCNC: 312 MG/DL (ref 70–130)
GLUCOSE SERPL-MCNC: 249 MG/DL (ref 65–99)
HCT VFR BLD AUTO: 38.1 % (ref 34–46.6)
HGB BLD-MCNC: 12.7 G/DL (ref 12–15.9)
IMM GRANULOCYTES # BLD AUTO: 0.01 10*3/MM3 (ref 0–0.05)
IMM GRANULOCYTES NFR BLD AUTO: 0.2 % (ref 0–0.5)
LYMPHOCYTES # BLD AUTO: 2.82 10*3/MM3 (ref 0.7–3.1)
LYMPHOCYTES NFR BLD AUTO: 54.5 % (ref 19.6–45.3)
MAGNESIUM SERPL-MCNC: 1.7 MG/DL (ref 1.6–2.6)
MCH RBC QN AUTO: 32.2 PG (ref 26.6–33)
MCHC RBC AUTO-ENTMCNC: 33.3 G/DL (ref 31.5–35.7)
MCV RBC AUTO: 96.7 FL (ref 79–97)
MONOCYTES # BLD AUTO: 0.55 10*3/MM3 (ref 0.1–0.9)
MONOCYTES NFR BLD AUTO: 10.6 % (ref 5–12)
NEUTROPHILS NFR BLD AUTO: 1.65 10*3/MM3 (ref 1.7–7)
NEUTROPHILS NFR BLD AUTO: 32 % (ref 42.7–76)
NRBC BLD AUTO-RTO: 0 /100 WBC (ref 0–0.2)
PHOSPHATE SERPL-MCNC: 4.5 MG/DL (ref 2.5–4.5)
PLATELET # BLD AUTO: 309 10*3/MM3 (ref 140–450)
PMV BLD AUTO: 9.9 FL (ref 6–12)
POTASSIUM SERPL-SCNC: 4.1 MMOL/L (ref 3.5–5.2)
RBC # BLD AUTO: 3.94 10*6/MM3 (ref 3.77–5.28)
SODIUM SERPL-SCNC: 135 MMOL/L (ref 136–145)
WBC NRBC COR # BLD: 5.17 10*3/MM3 (ref 3.4–10.8)

## 2022-04-11 PROCEDURE — 25010000002 HEPARIN (PORCINE) PER 1000 UNITS: Performed by: INTERNAL MEDICINE

## 2022-04-11 PROCEDURE — 99239 HOSP IP/OBS DSCHRG MGMT >30: CPT | Performed by: NURSE PRACTITIONER

## 2022-04-11 PROCEDURE — 25010000002 ONDANSETRON PER 1 MG: Performed by: INTERNAL MEDICINE

## 2022-04-11 PROCEDURE — 80048 BASIC METABOLIC PNL TOTAL CA: CPT | Performed by: INTERNAL MEDICINE

## 2022-04-11 PROCEDURE — 82962 GLUCOSE BLOOD TEST: CPT

## 2022-04-11 PROCEDURE — 0 MAGNESIUM SULFATE 4 GM/100ML SOLUTION: Performed by: INTERNAL MEDICINE

## 2022-04-11 PROCEDURE — 63710000001 INSULIN LISPRO (HUMAN) PER 5 UNITS: Performed by: INTERNAL MEDICINE

## 2022-04-11 PROCEDURE — 99232 SBSQ HOSP IP/OBS MODERATE 35: CPT | Performed by: PHYSICIAN ASSISTANT

## 2022-04-11 PROCEDURE — 84100 ASSAY OF PHOSPHORUS: CPT | Performed by: INTERNAL MEDICINE

## 2022-04-11 PROCEDURE — 83735 ASSAY OF MAGNESIUM: CPT | Performed by: INTERNAL MEDICINE

## 2022-04-11 PROCEDURE — 85025 COMPLETE CBC W/AUTO DIFF WBC: CPT | Performed by: INTERNAL MEDICINE

## 2022-04-11 PROCEDURE — 63710000001 INSULIN DETEMIR PER 5 UNITS: Performed by: NURSE PRACTITIONER

## 2022-04-11 RX ORDER — QUETIAPINE FUMARATE 100 MG/1
100 TABLET, FILM COATED ORAL 2 TIMES DAILY
Status: DISCONTINUED | OUTPATIENT
Start: 2022-04-11 | End: 2022-04-11 | Stop reason: HOSPADM

## 2022-04-11 RX ORDER — LISINOPRIL 40 MG/1
40 TABLET ORAL DAILY
Qty: 30 TABLET | Refills: 0 | Status: SHIPPED | OUTPATIENT
Start: 2022-04-11 | End: 2022-04-11 | Stop reason: SDUPTHER

## 2022-04-11 RX ORDER — AMLODIPINE BESYLATE 5 MG/1
5 TABLET ORAL
Status: DISCONTINUED | OUTPATIENT
Start: 2022-04-11 | End: 2022-04-11 | Stop reason: HOSPADM

## 2022-04-11 RX ORDER — PEN NEEDLE, DIABETIC 30 GX3/16"
1 NEEDLE, DISPOSABLE MISCELLANEOUS
Qty: 200 EACH | Refills: 0 | Status: SHIPPED | OUTPATIENT
Start: 2022-04-11 | End: 2022-04-11 | Stop reason: SDUPTHER

## 2022-04-11 RX ORDER — BLOOD SUGAR DIAGNOSTIC
1 STRIP MISCELLANEOUS 4 TIMES DAILY
Qty: 100 EACH | Refills: 0 | Status: SHIPPED | OUTPATIENT
Start: 2022-04-11 | End: 2022-05-13 | Stop reason: HOSPADM

## 2022-04-11 RX ORDER — LISINOPRIL 40 MG/1
40 TABLET ORAL DAILY
Qty: 30 TABLET | Refills: 0 | Status: ON HOLD | OUTPATIENT
Start: 2022-04-11 | End: 2022-05-13

## 2022-04-11 RX ORDER — AMLODIPINE BESYLATE 5 MG/1
5 TABLET ORAL
Qty: 30 TABLET | Refills: 0 | Status: SHIPPED | OUTPATIENT
Start: 2022-04-11 | End: 2022-04-11 | Stop reason: SDUPTHER

## 2022-04-11 RX ORDER — BLOOD SUGAR DIAGNOSTIC
1 STRIP MISCELLANEOUS 4 TIMES DAILY
Qty: 100 EACH | Refills: 0 | Status: SHIPPED | OUTPATIENT
Start: 2022-04-11 | End: 2022-04-11 | Stop reason: SDUPTHER

## 2022-04-11 RX ORDER — FLUCONAZOLE 200 MG/1
200 TABLET ORAL EVERY 24 HOURS
Qty: 4 TABLET | Refills: 0 | Status: SHIPPED | OUTPATIENT
Start: 2022-04-11 | End: 2022-04-15

## 2022-04-11 RX ORDER — PANTOPRAZOLE SODIUM 40 MG/1
40 TABLET, DELAYED RELEASE ORAL 2 TIMES DAILY
Qty: 60 TABLET | Refills: 0 | Status: SHIPPED | OUTPATIENT
Start: 2022-04-11 | End: 2022-04-11 | Stop reason: SDUPTHER

## 2022-04-11 RX ORDER — FLUCONAZOLE 200 MG/1
200 TABLET ORAL EVERY 24 HOURS
Qty: 4 TABLET | Refills: 0 | Status: SHIPPED | OUTPATIENT
Start: 2022-04-11 | End: 2022-04-11 | Stop reason: SDUPTHER

## 2022-04-11 RX ORDER — QUETIAPINE FUMARATE 100 MG/1
100 TABLET, FILM COATED ORAL 2 TIMES DAILY
Status: ON HOLD | COMMUNITY
End: 2022-05-13 | Stop reason: SDUPTHER

## 2022-04-11 RX ORDER — PANTOPRAZOLE SODIUM 40 MG/1
40 TABLET, DELAYED RELEASE ORAL 2 TIMES DAILY
Qty: 60 TABLET | Refills: 0 | Status: ON HOLD | OUTPATIENT
Start: 2022-04-11 | End: 2022-05-13 | Stop reason: SDUPTHER

## 2022-04-11 RX ORDER — PANTOPRAZOLE SODIUM 40 MG/1
40 TABLET, DELAYED RELEASE ORAL
Status: DISCONTINUED | OUTPATIENT
Start: 2022-04-11 | End: 2022-04-11 | Stop reason: HOSPADM

## 2022-04-11 RX ORDER — AMLODIPINE BESYLATE 5 MG/1
5 TABLET ORAL
Qty: 30 TABLET | Refills: 0 | Status: ON HOLD | OUTPATIENT
Start: 2022-04-11 | End: 2022-05-13 | Stop reason: SDUPTHER

## 2022-04-11 RX ORDER — LISINOPRIL 40 MG/1
40 TABLET ORAL DAILY
Status: DISCONTINUED | OUTPATIENT
Start: 2022-04-11 | End: 2022-04-11 | Stop reason: HOSPADM

## 2022-04-11 RX ORDER — PEN NEEDLE, DIABETIC 30 GX3/16"
1 NEEDLE, DISPOSABLE MISCELLANEOUS
Qty: 200 EACH | Refills: 0 | Status: SHIPPED | OUTPATIENT
Start: 2022-04-11 | End: 2022-05-13 | Stop reason: HOSPADM

## 2022-04-11 RX ADMIN — PANTOPRAZOLE SODIUM 40 MG: 40 TABLET, DELAYED RELEASE ORAL at 06:21

## 2022-04-11 RX ADMIN — INSULIN LISPRO 2 UNITS: 100 INJECTION, SOLUTION INTRAVENOUS; SUBCUTANEOUS at 12:34

## 2022-04-11 RX ADMIN — FLUCONAZOLE 200 MG: 200 TABLET ORAL at 13:55

## 2022-04-11 RX ADMIN — HEPARIN SODIUM 5000 UNITS: 5000 INJECTION, SOLUTION INTRAVENOUS; SUBCUTANEOUS at 13:54

## 2022-04-11 RX ADMIN — Medication 10 ML: at 08:04

## 2022-04-11 RX ADMIN — METOCLOPRAMIDE 10 MG: 10 TABLET ORAL at 06:20

## 2022-04-11 RX ADMIN — LISINOPRIL 40 MG: 20 TABLET ORAL at 08:05

## 2022-04-11 RX ADMIN — INSULIN LISPRO 6 UNITS: 100 INJECTION, SOLUTION INTRAVENOUS; SUBCUTANEOUS at 18:26

## 2022-04-11 RX ADMIN — OXYCODONE HYDROCHLORIDE AND ACETAMINOPHEN 1 TABLET: 5; 325 TABLET ORAL at 18:18

## 2022-04-11 RX ADMIN — HYDROXYZINE HYDROCHLORIDE 25 MG: 25 TABLET, FILM COATED ORAL at 00:40

## 2022-04-11 RX ADMIN — QUETIAPINE FUMARATE 100 MG: 100 TABLET ORAL at 13:55

## 2022-04-11 RX ADMIN — INSULIN LISPRO 7 UNITS: 100 INJECTION, SOLUTION INTRAVENOUS; SUBCUTANEOUS at 08:07

## 2022-04-11 RX ADMIN — AMLODIPINE BESYLATE 5 MG: 5 TABLET ORAL at 16:42

## 2022-04-11 RX ADMIN — INSULIN DETEMIR 45 UNITS: 100 INJECTION, SOLUTION SUBCUTANEOUS at 09:21

## 2022-04-11 RX ADMIN — ONDANSETRON 4 MG: 2 INJECTION INTRAMUSCULAR; INTRAVENOUS at 08:44

## 2022-04-11 RX ADMIN — HYDROXYZINE HYDROCHLORIDE 25 MG: 25 TABLET, FILM COATED ORAL at 11:19

## 2022-04-11 RX ADMIN — MAGNESIUM SULFATE HEPTAHYDRATE 4 G: 40 INJECTION, SOLUTION INTRAVENOUS at 11:08

## 2022-04-11 RX ADMIN — BISACODYL 10 MG: 5 TABLET, COATED ORAL at 08:05

## 2022-04-11 RX ADMIN — SERTRALINE 100 MG: 100 TABLET, FILM COATED ORAL at 08:05

## 2022-04-11 RX ADMIN — OXYCODONE HYDROCHLORIDE AND ACETAMINOPHEN 1 TABLET: 5; 325 TABLET ORAL at 08:23

## 2022-04-11 RX ADMIN — METOPROLOL TARTRATE 25 MG: 25 TABLET, FILM COATED ORAL at 08:05

## 2022-04-11 RX ADMIN — SENNOSIDES AND DOCUSATE SODIUM 2 TABLET: 50; 8.6 TABLET ORAL at 08:04

## 2022-04-11 RX ADMIN — PANTOPRAZOLE SODIUM 40 MG: 40 TABLET, DELAYED RELEASE ORAL at 16:42

## 2022-04-11 RX ADMIN — BUSPIRONE HYDROCHLORIDE 10 MG: 10 TABLET ORAL at 08:05

## 2022-04-11 RX ADMIN — HEPARIN SODIUM 5000 UNITS: 5000 INJECTION, SOLUTION INTRAVENOUS; SUBCUTANEOUS at 06:21

## 2022-04-11 NOTE — PLAN OF CARE
Goal Outcome Evaluation:   Pt remains stable. Blood sugars managed at this time with sliding scale and panchito acting insulin as scheduled. Plan to discharge to rehab. New IV this shift in left FA. Will monitor.

## 2022-04-11 NOTE — PROGRESS NOTES
"GI Daily Progress Note  Subjective:    Chief Complaint:  Follow up epigastric pain    Feeling better.  Denies any recurrence in emesis.   Mild nausea but tolerating solid foods. Had a bowel movement this morning.      Objective:    BP (!) 181/108 (BP Location: Right arm, Patient Position: Lying)   Pulse 83   Temp 97.6 °F (36.4 °C) (Oral)   Resp 16   Ht 167.6 cm (66\")   Wt 71.7 kg (158 lb)   SpO2 100%   BMI 25.50 kg/m²     Physical Exam  Constitutional:       General: She is not in acute distress.  Cardiovascular:      Rate and Rhythm: Normal rate and regular rhythm.   Pulmonary:      Effort: Pulmonary effort is normal. No respiratory distress.   Abdominal:      General: Bowel sounds are normal. There is no distension.      Palpations: Abdomen is soft.      Tenderness: There is no abdominal tenderness.   Neurological:      Mental Status: She is alert and oriented to person, place, and time.   Psychiatric:         Behavior: Behavior normal.         Lab  Lab Results   Component Value Date    WBC 5.17 04/11/2022    HGB 12.7 04/11/2022    HGB 12.2 04/10/2022    HGB 14.5 04/06/2022    MCV 96.7 04/11/2022     04/11/2022    INR 1.04 08/20/2018    INR 0.98 06/19/2014       Lab Results   Component Value Date    GLUCOSE 249 (H) 04/11/2022    BUN 9 04/11/2022    CREATININE 0.61 04/11/2022    EGFRIFNONA >60 07/10/2021    EGFRIFAFRI >60 07/10/2021    BCR 14.8 04/11/2022     (L) 04/11/2022    K 4.1 04/11/2022    CO2 25.0 04/11/2022    CALCIUM 9.5 04/11/2022    ALBUMIN 3.30 (L) 04/09/2022    ALKPHOS 155 (H) 04/09/2022    BILITOT 0.3 04/09/2022    ALT 75 (H) 04/09/2022    AST 51 (H) 04/09/2022       Assessment:    Candida esophagitis  Gastritis, moderate.   Nausea and vomiting   Uncontrolled type II diabetes mellitus, A1c is 11  Chronic hepatitis C   Illicit drug use, Cocaine     Plan:    >>> Discontinue IV Reglan   >>> Begin OTC Charo Root 500 mg TID at discharge.  I wrote this down for her.     >>> Recommend " BID Protonix for 1 month, then q daily  >>> Complete Fluconazole 200 mg daily for 7 days total   >>> Follow up pathology results from antrum biopsy (pending)    Follow up outpatient with [facility] in 3-4 weeks.  If nausea persists, would consider gastric emptying study.   She will also need treatment for chronic hepatitis C.  HCV RNA is currently pending.       Will sign off.  Please call for any questions or concerns.      CHASE Corbett  04/11/22  10:12 EDT

## 2022-04-11 NOTE — PLAN OF CARE
Goal Outcome Evaluation:      PT for discharge today following treatment for DKA. PT is prescibed levemir at discharge and will continue to monitor blood glucose levels once discharge. She will continue to follow a diabetic diet as well. PT has had some issues with hypertension and will take amlodipine at home, as well as, previous prescribed meds. Instructional sheets included with discharge papers explaining DKA and preventive measures. PT verbalizes an understanding of our discussion. Pt will be taking the [employer] home which the ER  will arrange tonight.       Pts blood glucose 267 time of discharge so she was covered with 6units regular insulin.. [employer] was called by mne and pt was transported to [address] [place] [address] by transport..

## 2022-04-11 NOTE — DISCHARGE SUMMARY
[facility] Medicine Services  DISCHARGE SUMMARY    Patient Name: Bernadette Paris  : 1970  MRN: [id number]    Date of Admission: 2022 10:42 AM  Date of Discharge:  2022  Primary Care Physician: Lilly Rose MD    Consults     Date and Time Order Name Status Description    2022  9:10 AM Inpatient Gastroenterology Consult Completed           Hospital Course     Presenting Problem:   DKA (diabetic ketoacidosis) (HCC) [E11.10]    Active Hospital Problems    Diagnosis  POA   • DKA (diabetic ketoacidosis) (HCC) [E11.10]  Yes      Resolved Hospital Problems    Diagnosis Date Resolved POA   • Nausea and vomiting [R11.2] 2022 Unknown   • Epigastric pain [R10.13] 2022 Unknown          Hospital Course:  Bernadette Paris is a 52 y.o. female with past medical history of hypertension, hyperlipidemia, diabetes mellitus on insulin, bipolar disorder.  Patient comes to the emergency room for progressively worsening nausea/ vomiting for a few weeks.  Labs revealed severe ketoacidosis and blood glucose around 500 on presentation, since then BS have been very labile with ongoing abdominal pain, progressive.      N/V, probable gastroparesis  Epigastric abdominal pain  Chronic Hepatitis C  --suspect in part related to uncontrolled diabetes, DKA on presentation. Improving on new regimen  --GI eval. EGD with moderate diffuse gastritis and candida. Pathology pending. Plan BID PPI x 1 month then daily. Diflucan daily x 1 week.   -- GI dc'd home reglan. Recommends Charo root 500mg TID at DC  -continue percocet prn  -continue bowel regimen  - follow up with [facility] 3-4 weeks. Will need eval for treatment of chronic Hep C- will defer to GI follow up outpatient to arrange     Uncontrolled diabetes mellitus.    --Patient is on insulin and compliance is very much in question.  Patient's hemoglobin A1c is 11.8.  Blood sugars better without missed insulin doses  --continue with basal  (increased) and ssi as she seems to be sensitive to short acting prandial bolus. Will continue BID basal with SSI at meals. Resume home metformin     HypoK/HypoMg  --related to above, replaced as needed     Hypertension,   --currently on BB, restarted home lisinopril and increase lisinopril to 40mg daily  --amlodipine added 5mg daily      Bipolar disorder.  - restart home seroquel bid  -buspar  -prn atarax      Chronic pain, uses narcotics chronically.  -- continue per home dosing       Discharge Follow Up Recommendations for outpatient labs/diagnostics:  PCP to be established. Follow up 1 week- for DM, HTN check. Will need home health arranged once PCP established    Day of Discharge     HPI:   Patient sitting up in bed. States she still has some abdominal pain, but much better. Tolerating solid diet now. Worked with therapy yesterday. + BM    Review of Systems  Gen- No fevers, chills  CV- No chest pain, palpitations  Resp- No cough, dyspnea  GI- No N/V/D, +abd pain- improving        Vital Signs:   Temp:  [97.6 °F (36.4 °C)-98.7 °F (37.1 °C)] 98.3 °F (36.8 °C)  Heart Rate:  [73-91] 75  Resp:  [16-17] 16  BP: (143-194)/() 160/104      Physical Exam:    Constitutional: No acute distress- sitting up in bed eating crackers  HENT: NCAT, mucous membranes moist  Respiratory: Clear to auscultation bilaterally, respiratory effort normal   Cardiovascular: RRR, no murmurs, rubs, or gallops  Gastrointestinal: Positive bowel sounds, soft, no significant epigastric tenderness/ LUQ tenderness, nondistended  Musculoskeletal: No bilateral ankle edema  Psychiatric: Appropriate affect, cooperative  Neurologic: Awake, alert, oriented x3, no focality appreciated  Skin: No rashes    Pertinent  and/or Most Recent Results     LAB RESULTS:      Lab 04/11/22  0349 04/10/22  0406 04/07/22  0947 04/06/22  1627 04/06/22  1052   WBC 5.17 4.92  --   --  7.47   HEMOGLOBIN 12.7 12.2  --   --  14.5   HEMATOCRIT 38.1 36.6  --   --  45.3    PLATELETS 309 301  --   --  438   NEUTROS ABS 1.65* 1.08*  --   --  4.23   IMMATURE GRANS (ABS) 0.01  --   --   --  0.03   LYMPHS ABS 2.82  --   --   --  2.58   MONOS ABS 0.55  --   --   --  0.60   EOS ABS 0.12 0.10  --   --  0.00   MCV 96.7 98.1*  --   --  100.7*   LACTATE  --   --  1.7 3.4* 2.1*         Lab 04/11/22  0349 04/10/22  0406 04/09/22  0528 04/08/22  1114 04/08/22  0446 04/06/22  1627 04/06/22  1052   SODIUM 135* 141 136 131* 135*   < > 136   POTASSIUM 4.1 3.7 4.2 3.5 3.5   < > 4.1   CHLORIDE 101 107 104 101 107   < > 98   CO2 25.0 26.0 22.0 20.0* 20.0*   < > 10.0*   ANION GAP 9.0 8.0 10.0 10.0 8.0   < > 28.0*   BUN 9 6 7 5* 6   < > 6   CREATININE 0.61 0.46* 0.61 0.52* 0.47*   < > 1.02*   EGFR 108.4 116.0 108.4 112.6 115.4   < > 66.7   GLUCOSE 249* 79 255* 462* 353*   < > 500*   CALCIUM 9.5 9.0 9.0 8.6 8.9   < > 9.6   MAGNESIUM 1.7 1.9 2.1 1.5* 1.6   < >  --    PHOSPHORUS 4.5 4.1 2.9 3.2 3.3   < > 3.7   HEMOGLOBIN A1C  --   --   --   --   --   --  11.80*    < > = values in this interval not displayed.         Lab 04/09/22  0528 04/07/22  0947 04/06/22  1052   TOTAL PROTEIN 6.0  --  7.8   ALBUMIN 3.30*  --  4.50   GLOBULIN 2.7  --  3.3   ALT (SGPT) 75*  --  73*   AST (SGOT) 51*  --  67*   BILIRUBIN 0.3  --  0.5   ALK PHOS 155*  --  240*   LIPASE  --  68* 98*         Lab 04/06/22  1052   TROPONIN T <0.010         Lab 04/06/22  1052   CHOLESTEROL 177   LDL CHOL 73   HDL CHOL 77*   TRIGLYCERIDES 160*             Lab 04/07/22  1217   PH, ARTERIAL 7.358   PCO2, ARTERIAL 32.4*   PO2 ART 96.6   FIO2 21   HCO3 ART 18.2*   BASE EXCESS ART -6.4*   CARBOXYHEMOGLOBIN 1.2     Brief Urine Lab Results  (Last result in the past 365 days)      Color   Clarity   Blood   Leuk Est   Nitrite   Protein   CREAT   Urine HCG        04/06/22 1103               Negative       04/06/22 1103 Yellow   Clear   Trace   Negative   Negative   30 mg/dL (1+)               Microbiology Results (last 10 days)     Procedure Component Value  - Date/Time    COVID-19 and FLU A/B PCR - Swab, Nasopharynx [[id number]]  (Normal) Collected: 04/06/22 1122    Lab Status: Final result Specimen: Swab from Nasopharynx Updated: 04/06/22 1204     COVID19 Not Detected     Influenza A PCR Not Detected     Influenza B PCR Not Detected    Narrative:      Fact sheet for providers: https://www.fda.gov/media/569132/download    Fact sheet for patients: https://www.fda.gov/media/316731/download    Test performed by PCR.          CT Abdomen Pelvis With Contrast    Result Date: 4/6/2022  DATE OF EXAM: 4/6/2022 1:23 PM  PROCEDURE: CT ABDOMEN PELVIS W CONTRAST-  INDICATIONS: abd pain and N/V  COMPARISON: CT abdomen and pelvis 10/27/2018 and 6/23/2017. CT chest 12/23/2018. Chest radiograph 04/06/2022.  TECHNIQUE: Routine transaxial slices were obtained through the abdomen and pelvis after the intravenous administration of 75 mL of Isovue 300. Reconstructed coronal and sagittal images were also obtained. Automated exposure control and iterative construction methods were used.  The radiation dose reduction device was turned on for each scan per the ALARA (As Low as Reasonably Achievable) protocol.  FINDINGS: The included lung bases are clear.  Status post cholecystectomy. The liver, spleen, pancreas, and adrenal glands are unremarkable. Multifocal bilateral renal parenchymal scarring. Simple appearing right renal cysts. The urinary bladder is unremarkable. Status post hysterectomy. The adnexa are not definitively identified.  Mild fluid and air distention of the stomach with mild gastric wall thickening, possible nonspecific gastritis. No significant colonic stool burden. No bowel obstruction or significant bowel wall thickening. The appendix is not definitively identified but no pericecal inflammatory changes are seen.  No free fluid in the abdomen or pelvis. No free intraperitoneal air. No abdominal or pelvic lymphadenopathy is identified.  Transitional lumbosacral vertebral  anatomy with similar-appearing lumbosacral degenerative changes and postoperative changes. Old healed posterior left 10th and 11th rib fracture deformities. No acute osseous abnormality or concerning osseous lesion.       1. Mild fluid and air distention of the stomach with mild gastric wall thickening, possible nonspecific gastritis. 2. Multifocal bilateral renal parenchymal scarring with simple appearing right renal cysts.  This report was finalized on 4/6/2022 1:40 PM by Lazaro Au MD.      XR Chest 1 View    Result Date: 4/6/2022  DATE OF EXAM: 4/6/2022 11:31 AM  PROCEDURE: XR CHEST 1 VW-  INDICATIONS: Upper Abdominal Pain Triage Protocol  COMPARISON: Chest x-ray 6/23/2017  TECHNIQUE: Single radiographic AP view of the chest was obtained.  FINDINGS: Normal cardiomediastinal silhouette. The lungs are clear without focal consolidation. No pleural effusion. No pneumothorax. Partially imaged upper abdomen appears unremarkable. No acute osseous findings.      No acute cardiopulmonary findings.  This report was finalized on 4/6/2022 11:47 AM by Amor Espinoza MD.                    Plan for Follow-up of Pending Labs/Results:   Pending Labs     Order Current Status    HCV RNA By PCR, Qn Rfx Danielle In process    Tissue Pathology Exam In process        Discharge Details        Discharge Medications      New Medications      Instructions Start Date   Alcohol Pads 70 % pads   1 each, Does not apply, 4 Times Daily      amLODIPine 5 MG tablet  Commonly known as: NORVASC   5 mg, Oral, Every 24 Hours Scheduled      fluconazole 200 MG tablet  Commonly known as: DIFLUCAN   200 mg, Oral, Every 24 Hours      insulin detemir 100 UNIT/ML injection  Commonly known as: LEVEMIR   45 Units, Subcutaneous, 2 Times Daily         Changes to Medications      Instructions Start Date   BD Pen Needle Victoria U/F 32G X 4 MM misc  Generic drug: Insulin Pen Needle  What changed: Another medication with the same name was added. Make sure you  understand how and when to take each.   No dose, route, or frequency recorded.      Pen Needles 32G X 4 MM misc  What changed: You were already taking a medication with the same name, and this prescription was added. Make sure you understand how and when to take each.   1 each, Subcutaneous, 6 Times Daily      hydrOXYzine 25 MG tablet  Commonly known as: ATARAX  What changed: Another medication with the same name was removed. Continue taking this medication, and follow the directions you see here.   25 mg, Oral, Every 8 Hours PRN      insulin lispro 100 UNIT/ML injection  Commonly known as: humaLOG  What changed:   how much to take  when to take this   0-9 Units, Subcutaneous, 4 Times Daily With Meals & Nightly      lisinopril 40 MG tablet  Commonly known as: PRINIVILZESTRIL  What changed:   medication strength  how much to take   40 mg, Oral, Daily      ondansetron ODT 8 MG disintegrating tablet  Commonly known as: ZOFRAN-ODT  What changed: Another medication with the same name was removed. Continue taking this medication, and follow the directions you see here.   8 mg, Oral, Every 8 Hours PRN      pantoprazole 40 MG EC tablet  Commonly known as: PROTONIX  What changed: when to take this   40 mg, Oral, 2 Times Daily         Continue These Medications      Instructions Start Date   Accu-Chek FastClix Lancets misc   No dose, route, or frequency recorded.      busPIRone 10 MG tablet  Commonly known as: BUSPAR   10 mg, Oral, 2 Times Daily      cloNIDine 0.1 MG tablet  Commonly known as: CATAPRES   0.1 mg, Oral, Every 8 Hours PRN      furosemide 40 MG tablet  Commonly known as: LASIX   40 mg, Oral, 2 Times Daily      metFORMIN 1000 MG tablet  Commonly known as: GLUCOPHAGE   1,000 mg, Oral, 2 Times Daily With Meals      metoprolol tartrate 25 MG tablet  Commonly known as: LOPRESSOR   25 mg, Oral, Every 12 Hours Scheduled      oxyCODONE-acetaminophen 7.5-325 MG per tablet  Commonly known as: PERCOCET   1 tablet, Oral,  Every 6 Hours PRN      QUEtiapine 100 MG tablet  Commonly known as: SEROquel   100 mg, Oral, 2 Times Daily, PT states she was on this at home..      RA Allergy Relief 10 MG tablet  Generic drug: cetirizine   take 1 tablet by mouth once daily      sertraline 100 MG tablet  Commonly known as: ZOLOFT   100 mg, Oral, Daily      traZODone 50 MG tablet  Commonly known as: DESYREL   50 mg, Oral, Nightly      Ventolin  (90 Base) MCG/ACT inhaler  Generic drug: albuterol sulfate HFA   inhale 2 puffs by mouth every 4 to 6 hours         Stop These Medications    Accu-Chek SmartView test strip  Generic drug: glucose blood     colestipol 1 g tablet  Commonly known as: COLESTID     cyclobenzaprine 5 MG tablet  Commonly known as: FLEXERIL     dicyclomine 10 MG capsule  Commonly known as: BENTYL     estradiol 1 MG tablet  Commonly known as: ESTRACE     HumaLOG Mix 75/25 KwikPen (75-25) 100 UNIT/ML suspension pen-injector pen  Generic drug: Insulin Lispro Prot & Lispro     HYDROcodone-acetaminophen 5-325 MG per tablet  Commonly known as: NORCO     INVOKANA PO     losartan 100 MG tablet  Commonly known as: COZAAR     methocarbamol 500 MG tablet  Commonly known as: ROBAXIN     metoclopramide 10 MG tablet  Commonly known as: REGLAN     omeprazole 40 MG capsule  Commonly known as: PrilOSEC     prazosin 1 MG capsule  Commonly known as: MINIPRESS     RA Clotrimazole 7 1 % vaginal cream  Generic drug: clotrimazole     triamcinolone 0.1 % cream  Commonly known as: KENALOG            Allergies   Allergen Reactions   • Tylenol [Acetaminophen] Other (See Comments)     SEVERE LIVER DISEASE-CONTRAINDICATED         Discharge Disposition:  Home or Self Care    Diet:  Hospital:  Diet Order   Procedures   • Diet Regular; Consistent Carbohydrate, GI Soft, Cardiac, Low Sodium       Activity:  Activity Instructions     Activity as Tolerated            Restrictions or Other Recommendations:         CODE STATUS:    Code Status and Medical  Interventions:   Ordered at: 04/06/22 1733     Code Status (Patient has no pulse and is not breathing):    CPR (Attempt to Resuscitate)     Medical Interventions (Patient has pulse or is breathing):    Full Support       Future Appointments   Date Time Provider Department Center   4/18/2022  8:30 AM Chevy Perry APRN [facility]       Additional Instructions for the Follow-ups that You Need to Schedule     Discharge Follow-up with PCP   As directed       Currently Documented PCP:    Lilly Rose MD    PCP Phone Number:    [phone]     Follow Up Details: with in the week                     DMITRY Novoa  04/11/22      Time Spent on Discharge:  I spent  50  minutes on this discharge activity which included: face-to-face encounter with the patient, reviewing the data in the system, coordination of the care with the nursing staff as well as consultants, documentation, and entering orders.        Electronically signed by DMITRY Novoa, 04/11/22, 3:06 PM EDT.

## 2022-04-11 NOTE — PROGRESS NOTES
[facility] Medicine Services  PROGRESS NOTE    Patient Name: Bernadette Paris  : 1970  MRN: [id number]    Date of Admission: 2022  Primary Care Physician: Lilly Rose MD    Subjective   Subjective     CC:  N/V    HPI:  Patient sitting up in bed. States she still has some abdominal pain, but much better. Tolerating solid diet now. Worked with therapy yesterday. + BM    ROS:  Gen- No fevers, chills  CV- No chest pain, palpitations  Resp- No cough, dyspnea  GI- No N/V/D, + abd pain        Objective   Objective     Vital Signs:   Temp:  [97.6 °F (36.4 °C)-98.7 °F (37.1 °C)] 98.3 °F (36.8 °C)  Heart Rate:  [73-91] 74  Resp:  [16-17] 16  BP: (143-194)/() 146/100     Physical Exam:  Constitutional: No acute distress- sitting up in bed eating crackers  HENT: NCAT, mucous membranes moist  Respiratory: Clear to auscultation bilaterally, respiratory effort normal   Cardiovascular: RRR, no murmurs, rubs, or gallops  Gastrointestinal: Positive bowel sounds, soft, no significant epigastric tenderness/ LUQ tenderness, nondistended  Musculoskeletal: No bilateral ankle edema  Psychiatric: Appropriate affect, cooperative  Neurologic: Awake, alert, oriented x3, no focality appreciated  Skin: No rashes  No change in exam from     Results Reviewed:  LAB RESULTS:      Lab 22  0349 04/10/22  0406 22  0947 22  1627 22  1052   WBC 5.17 4.92  --   --  7.47   HEMOGLOBIN 12.7 12.2  --   --  14.5   HEMATOCRIT 38.1 36.6  --   --  45.3   PLATELETS 309 301  --   --  438   NEUTROS ABS 1.65* 1.08*  --   --  4.23   IMMATURE GRANS (ABS) 0.01  --   --   --  0.03   LYMPHS ABS 2.82  --   --   --  2.58   MONOS ABS 0.55  --   --   --  0.60   EOS ABS 0.12 0.10  --   --  0.00   MCV 96.7 98.1*  --   --  100.7*   LACTATE  --   --  1.7 3.4* 2.1*         Lab 22  0349 04/10/22  0406 22  0528 22  1114 22  0446 22  1627 22  1052   SODIUM 135* 141 136  131* 135*   < > 136   POTASSIUM 4.1 3.7 4.2 3.5 3.5   < > 4.1   CHLORIDE 101 107 104 101 107   < > 98   CO2 25.0 26.0 22.0 20.0* 20.0*   < > 10.0*   ANION GAP 9.0 8.0 10.0 10.0 8.0   < > 28.0*   BUN 9 6 7 5* 6   < > 6   CREATININE 0.61 0.46* 0.61 0.52* 0.47*   < > 1.02*   EGFR 108.4 116.0 108.4 112.6 115.4   < > 66.7   GLUCOSE 249* 79 255* 462* 353*   < > 500*   CALCIUM 9.5 9.0 9.0 8.6 8.9   < > 9.6   MAGNESIUM 1.7 1.9 2.1 1.5* 1.6   < >  --    PHOSPHORUS 4.5 4.1 2.9 3.2 3.3   < > 3.7   HEMOGLOBIN A1C  --   --   --   --   --   --  11.80*    < > = values in this interval not displayed.         Lab 04/09/22  0528 04/07/22  0947 04/06/22  1052   TOTAL PROTEIN 6.0  --  7.8   ALBUMIN 3.30*  --  4.50   GLOBULIN 2.7  --  3.3   ALT (SGPT) 75*  --  73*   AST (SGOT) 51*  --  67*   BILIRUBIN 0.3  --  0.5   ALK PHOS 155*  --  240*   LIPASE  --  68* 98*         Lab 04/06/22  1052   TROPONIN T <0.010         Lab 04/06/22  1052   CHOLESTEROL 177   LDL CHOL 73   HDL CHOL 77*   TRIGLYCERIDES 160*             Lab 04/07/22  1217   PH, ARTERIAL 7.358   PCO2, ARTERIAL 32.4*   PO2 ART 96.6   FIO2 21   HCO3 ART 18.2*   BASE EXCESS ART -6.4*   CARBOXYHEMOGLOBIN 1.2     Brief Urine Lab Results  (Last result in the past 365 days)      Color   Clarity   Blood   Leuk Est   Nitrite   Protein   CREAT   Urine HCG        04/06/22 1103               Negative       04/06/22 1103 Yellow   Clear   Trace   Negative   Negative   30 mg/dL (1+)                 Microbiology Results Abnormal     Procedure Component Value - Date/Time    COVID-19 and FLU A/B PCR - Swab, Nasopharynx [[id number]]  (Normal) Collected: 04/06/22 1122    Lab Status: Final result Specimen: Swab from Nasopharynx Updated: 04/06/22 1204     COVID19 Not Detected     Influenza A PCR Not Detected     Influenza B PCR Not Detected    Narrative:      Fact sheet for providers: https://www.fda.gov/media/076795/download    Fact sheet for patients:  https://www.fda.gov/media/071147/download    Test performed by PCR.          No radiology results from the last 24 hrs        I have reviewed the medications:  Scheduled Meds:bisacodyl, 10 mg, Oral, Daily  busPIRone, 10 mg, Oral, BID  fluconazole, 200 mg, Oral, Q24H  heparin (porcine), 5,000 Units, Subcutaneous, Q8H  insulin detemir, 45 Units, Subcutaneous, BID  insulin lispro, 0-9 Units, Subcutaneous, 4x Daily With Meals & Nightly  labetalol, 5 mg, Intravenous, Once  lisinopril, 40 mg, Oral, Daily  metoprolol tartrate, 25 mg, Oral, Q12H  pantoprazole, 40 mg, Oral, BID AC  senna-docusate sodium, 2 tablet, Oral, BID  sertraline, 100 mg, Oral, Daily  sodium chloride, 10 mL, Intravenous, Q12H      Continuous Infusions:lactated ringers, 9 mL/hr      PRN Meds:.•  acetaminophen **OR** acetaminophen **OR** acetaminophen  •  bisacodyl  •  cloNIDine  •  dextrose  •  dextrose  •  glucagon (human recombinant)  •  hydrOXYzine  •  ipratropium-albuterol  •  magnesium sulfate **OR** magnesium sulfate **OR** magnesium sulfate  •  melatonin  •  [DISCONTINUED] Morphine **AND** naloxone  •  [DISCONTINUED] ondansetron **OR** ondansetron  •  oxyCODONE-acetaminophen  •  potassium & sodium phosphates **OR** potassium & sodium phosphates  •  potassium chloride **OR** potassium chloride **OR** potassium chloride  •  potassium phosphate infusion greater than 15 mMoles **OR** potassium phosphate infusion greater than 15 mMoles **OR** potassium phosphate **OR** sodium phosphate IVPB **OR** sodium phosphate IVPB **OR** sodium phosphate IVPB  •  sodium chloride  •  sodium chloride    Assessment/Plan   Assessment & Plan     Active Hospital Problems    Diagnosis  POA   • DKA (diabetic ketoacidosis) ([facility]) [E11.10]  Yes   • Nausea and vomiting [R11.2]  Unknown   • Epigastric pain [R10.13]  Unknown      Resolved Hospital Problems   No resolved problems to display.        Brief Hospital Course to date:  Bernadette Paris is a 52 y.o. female with past  medical history of hypertension, hyperlipidemia, diabetes mellitus on insulin, bipolar disorder.  Patient comes to the emergency room for progressively worsening nausea vomiting for a few weeks.  Labs revealed severe ketoacidosis and blood glucose around 500 on presentation, since then BS have been very labile with ongoing abdominal pain, progressive.    This patient's problems and plans were partially entered by my partner and updated as appropriate by me 04/11/22.      N/V, probable gastroparesis  Epigastric abdominal pain  Chronic Hepatitis C  --suspect in part related to uncontrolled diabetes, DKA on presentation. Persistent despite closing gap  --GI eval. EGD with moderate diffuse gastritis and candida. Pathology pending. Plan BID PPI x 1 month then daily. Diflucan daily x 1 week.   -- GI dc'd home reglan. Recommends Ginger root 500mg TID at DC  -continue percocet prn  -continue bowel regimen  - follow up with [facility] 3-4 weeks. Will need eval for treatment of chronic Hep C    Uncontrolled diabetes mellitus.    --Patient is on insulin and compliance is very much in question.  Patient's hemoglobin A1c is 11.8.  Blood sugars  Better without missed insulin doses  --continue with basal (increased) and ssi as she seems to be sensitive to short acting prandial bolus. Will monitor and adjust    HypoK/HypoMg  --related to above, monitor and replace as needed    Hypertension,   --currently on BB, restarted home lisinopril. Will increase lisinopril to 40mg daily      Bipolar disorder.  - restart home seroquel bid     Chronic pain, uses narcotics chronically.  --monitor        DVT prophylaxis:  Medical DVT prophylaxis orders are present.       AM-PAC 6 Clicks Score (PT): 21 (04/11/22 0800)    Disposition: Plan rehab when bed available- pending OT eval    CODE STATUS:   Code Status and Medical Interventions:   Ordered at: 04/06/22 8734     Code Status (Patient has no pulse and is not breathing):    CPR (Attempt to  Resuscitate)     Medical Interventions (Patient has pulse or is breathing):    Full Support       Claire Sanders, APRN  04/11/22

## 2022-04-11 NOTE — CASE MANAGEMENT/SOCIAL WORK
Continued Stay Note  [facility]     Patient Name: Bernadette Paris  MRN: [id number]  Today's Date: 4/11/2022    Admit Date: 4/6/2022     Discharge Plan     Row Name 04/11/22 1316       Plan    Plan Update    Plan Comments Order for OT placed as this is needed for [facility]. [facility] rep will f/u after OT sees the pt. If the pt is declined by [facility], I will arrange a walker at [place].    Final Discharge Disposition Code 62 - inpatient rehab facility               Discharge Codes    No documentation.               Expected Discharge Date and Time     Expected Discharge Date Expected Discharge Time    Apr 12, 2022             Federica Hutton RN

## 2022-04-11 NOTE — CASE MANAGEMENT/SOCIAL WORK
Case Management Discharge Note      Final Note: I spoke with the pt. She wants to go home and not go to [facility]. States she lives with her spouse and wants to go home today. I confirmed her adress is correct on the face sheet. She tells me she has does not have a PCP. She has not been able to see a Dr Crenshaw she was to follow up with. She request being set up with a  PCP. A  PCP was arranged and in the AVS. She can change this if she wants. She will be unable to have HH services until after she establishes with the PCP. She uses [facility]. The  at [phone] can arrange when the pt is ready. I did order a r walker thru [place] to be delivered to her room before DC. She denies other DC needs at this time.         Selected Continued Care - Admitted Since 4/6/2022     Destination    No services have been selected for the patient.              Durable Medical Equipment Coordination complete.    Service Provider Selected Services Address Phone Fax Patient Preferred    [facility]  Durable Medical Equipment [address] [place] [id number] [phone] --          Dialysis/Infusion    No services have been selected for the patient.              Home Medical Care    No services have been selected for the patient.              Therapy    No services have been selected for the patient.              Community Resources    No services have been selected for the patient.              Community & DME    No services have been selected for the patient.                       Final Discharge Disposition Code: 01 - home or self-care

## 2022-04-12 ENCOUNTER — READMISSION MANAGEMENT (OUTPATIENT)
Dept: CALL CENTER | Facility: HOSPITAL | Age: 52
End: 2022-04-12

## 2022-04-12 ENCOUNTER — TRANSITIONAL CARE MANAGEMENT TELEPHONE ENCOUNTER (OUTPATIENT)
Dept: CALL CENTER | Facility: HOSPITAL | Age: 52
End: 2022-04-12

## 2022-04-12 LAB
CYTO UR: NORMAL
LAB AP CASE REPORT: NORMAL
LAB AP CLINICAL INFORMATION: NORMAL
PATH REPORT.FINAL DX SPEC: NORMAL
PATH REPORT.GROSS SPEC: NORMAL

## 2022-04-12 NOTE — OUTREACH NOTE
Prep Survey    Flowsheet Row Responses   [facility] patient discharged from? [place]   Is LACE score < 7 ? No   Emergency Room discharge w/ pulse ox? No   Eligibility [facility]   Date of Admission 04/06/22   Date of Discharge 04/11/22   Discharge diagnosis N/V, probable gastroparesis   Does the patient have one of the following disease processes/diagnoses(primary or secondary)? Other   Does the patient have Home health ordered? No   Is there a DME ordered? Yes   What DME was ordered? Walker per [facility]    Prep survey completed? Yes          MARCK LUNA - Registered Nurse

## 2022-04-12 NOTE — OUTREACH NOTE
Call Center TCM Note    Flowsheet Row Responses   [facility] patient discharged from? [place]   Does the patient have one of the following disease processes/diagnoses(primary or secondary)? Other   TCM attempt successful? No   Unsuccessful attempts Attempt 1          Ayanna Castellanos RN    4/12/2022, 15:13 EDT

## 2022-04-12 NOTE — OUTREACH NOTE
Call Center TCM Note    Flowsheet Row Responses   [facility] patient discharged from? [place]   Does the patient have one of the following disease processes/diagnoses(primary or secondary)? Other   TCM attempt successful? No   Unsuccessful attempts Attempt 2          Ayanna Castellanos RN    4/12/2022, 16:01 EDT

## 2022-04-13 ENCOUNTER — TRANSITIONAL CARE MANAGEMENT TELEPHONE ENCOUNTER (OUTPATIENT)
Dept: CALL CENTER | Facility: HOSPITAL | Age: 52
End: 2022-04-13

## 2022-04-13 LAB — HCV RNA SERPL NAA+PROBE-ACNC: NORMAL K[IU]/ML

## 2022-04-13 NOTE — OUTREACH NOTE
Call Center TCM Note    Flowsheet Row Responses   [facility] patient discharged from? [place]   Does the patient have one of the following disease processes/diagnoses(primary or secondary)? Other   TCM attempt successful? No   Unsuccessful attempts Attempt 3  [A FEMALE ANSWERED THE PHONE. THIS NURSE ASKED FOR ED MCQUEEN, THIS FEMALE THEN HUNG UP THE PHONE.]          Joi Michael LPN    4/13/2022, 16:39 EDT

## 2022-04-18 ENCOUNTER — TELEPHONE (OUTPATIENT)
Dept: GASTROENTEROLOGY | Facility: CLINIC | Age: 52
End: 2022-04-18

## 2022-04-18 NOTE — TELEPHONE ENCOUNTER
Returned patient phone message at [phone] some man answer ask him tto have micheal call this number.

## 2022-04-21 ENCOUNTER — READMISSION MANAGEMENT (OUTPATIENT)
Dept: CALL CENTER | Facility: HOSPITAL | Age: 52
End: 2022-04-21

## 2022-04-21 NOTE — OUTREACH NOTE
Medical Week 2 Survey    Flowsheet Row Responses   [facility] patient discharged from? [place]   Does the patient have one of the following disease processes/diagnoses(primary or secondary)? Other   Week 2 attempt successful? No   Unsuccessful attempts Attempt 1   Revoke Decline to participate          RITA ESTRADA - Registered Nurse

## 2022-05-09 ENCOUNTER — HOSPITAL ENCOUNTER (INPATIENT)
Facility: HOSPITAL | Age: 52
LOS: 4 days | Discharge: HOME | End: 2022-05-13
Attending: EMERGENCY MEDICINE | Admitting: INTERNAL MEDICINE

## 2022-05-09 ENCOUNTER — APPOINTMENT (OUTPATIENT)
Dept: GENERAL RADIOLOGY | Facility: HOSPITAL | Age: 52
End: 2022-05-09

## 2022-05-09 DIAGNOSIS — E11.10 DIABETIC KETOACIDOSIS WITHOUT COMA ASSOCIATED WITH TYPE 2 DIABETES MELLITUS: ICD-10-CM

## 2022-05-09 DIAGNOSIS — R73.9 HYPERGLYCEMIA: ICD-10-CM

## 2022-05-09 DIAGNOSIS — R11.2 NAUSEA AND VOMITING, UNSPECIFIED VOMITING TYPE: ICD-10-CM

## 2022-05-09 DIAGNOSIS — E87.20 ACIDOSIS: Primary | ICD-10-CM

## 2022-05-09 DIAGNOSIS — E86.0 MODERATE DEHYDRATION: ICD-10-CM

## 2022-05-09 PROBLEM — B19.20 HEPATITIS C: Status: ACTIVE | Noted: 2022-05-09

## 2022-05-09 PROBLEM — E78.5 HYPERLIPIDEMIA: Status: ACTIVE | Noted: 2022-05-09

## 2022-05-09 PROBLEM — F31.9 BIPOLAR AFFECTIVE DISORDER (HCC): Status: ACTIVE | Noted: 2022-05-09

## 2022-05-09 PROBLEM — Z91.199 MEDICALLY NONCOMPLIANT: Status: ACTIVE | Noted: 2022-05-09

## 2022-05-09 PROBLEM — K29.70 GASTRITIS: Status: ACTIVE | Noted: 2022-05-09

## 2022-05-09 PROBLEM — I10 ACCELERATED HYPERTENSION: Status: ACTIVE | Noted: 2022-05-09

## 2022-05-09 LAB
ALBUMIN SERPL-MCNC: 4.1 G/DL (ref 3.5–5.2)
ALBUMIN SERPL-MCNC: 4.9 G/DL (ref 3.5–5.2)
ALBUMIN/GLOB SERPL: 1.7 G/DL
ALBUMIN/GLOB SERPL: 1.9 G/DL
ALP SERPL-CCNC: 152 U/L (ref 39–117)
ALP SERPL-CCNC: 197 U/L (ref 39–117)
ALT SERPL W P-5'-P-CCNC: 49 U/L (ref 1–33)
ALT SERPL W P-5'-P-CCNC: 63 U/L (ref 1–33)
ANION GAP SERPL CALCULATED.3IONS-SCNC: 25 MMOL/L (ref 5–15)
ANION GAP SERPL CALCULATED.3IONS-SCNC: 28 MMOL/L (ref 5–15)
AST SERPL-CCNC: 41 U/L (ref 1–32)
AST SERPL-CCNC: 60 U/L (ref 1–32)
B-OH-BUTYR SERPL-SCNC: 5.57 MMOL/L (ref 0.02–0.27)
BASOPHILS # BLD AUTO: 0.01 10*3/MM3 (ref 0–0.2)
BASOPHILS # BLD AUTO: 0.02 10*3/MM3 (ref 0–0.2)
BASOPHILS NFR BLD AUTO: 0.1 % (ref 0–1.5)
BASOPHILS NFR BLD AUTO: 0.2 % (ref 0–1.5)
BILIRUB SERPL-MCNC: 0.3 MG/DL (ref 0–1.2)
BILIRUB SERPL-MCNC: 0.5 MG/DL (ref 0–1.2)
BUN SERPL-MCNC: 13 MG/DL (ref 6–20)
BUN SERPL-MCNC: 15 MG/DL (ref 6–20)
BUN/CREAT SERPL: 15.5 (ref 7–25)
BUN/CREAT SERPL: 19.2 (ref 7–25)
CALCIUM SPEC-SCNC: 10.1 MG/DL (ref 8.6–10.5)
CALCIUM SPEC-SCNC: 9.1 MG/DL (ref 8.6–10.5)
CHLORIDE SERPL-SCNC: 100 MMOL/L (ref 98–107)
CHLORIDE SERPL-SCNC: 91 MMOL/L (ref 98–107)
CO2 SERPL-SCNC: 11 MMOL/L (ref 22–29)
CO2 SERPL-SCNC: 16 MMOL/L (ref 22–29)
CREAT SERPL-MCNC: 0.78 MG/DL (ref 0.57–1)
CREAT SERPL-MCNC: 0.84 MG/DL (ref 0.57–1)
DEPRECATED RDW RBC AUTO: 45.9 FL (ref 37–54)
DEPRECATED RDW RBC AUTO: 46.7 FL (ref 37–54)
EGFRCR SERPLBLD CKD-EPI 2021: 84.3 ML/MIN/1.73
EGFRCR SERPLBLD CKD-EPI 2021: 92.1 ML/MIN/1.73
EOSINOPHIL # BLD AUTO: 0 10*3/MM3 (ref 0–0.4)
EOSINOPHIL # BLD AUTO: 0 10*3/MM3 (ref 0–0.4)
EOSINOPHIL NFR BLD AUTO: 0 % (ref 0.3–6.2)
EOSINOPHIL NFR BLD AUTO: 0 % (ref 0.3–6.2)
ERYTHROCYTE [DISTWIDTH] IN BLOOD BY AUTOMATED COUNT: 13.6 % (ref 12.3–15.4)
ERYTHROCYTE [DISTWIDTH] IN BLOOD BY AUTOMATED COUNT: 13.8 % (ref 12.3–15.4)
GLOBULIN UR ELPH-MCNC: 2.2 GM/DL
GLOBULIN UR ELPH-MCNC: 2.9 GM/DL
GLUCOSE BLDC GLUCOMTR-MCNC: 288 MG/DL (ref 70–130)
GLUCOSE BLDC GLUCOMTR-MCNC: 342 MG/DL (ref 70–130)
GLUCOSE BLDC GLUCOMTR-MCNC: 391 MG/DL (ref 70–130)
GLUCOSE BLDC GLUCOMTR-MCNC: 402 MG/DL (ref 70–130)
GLUCOSE BLDC GLUCOMTR-MCNC: 431 MG/DL (ref 70–130)
GLUCOSE BLDC GLUCOMTR-MCNC: 441 MG/DL (ref 70–130)
GLUCOSE BLDC GLUCOMTR-MCNC: 464 MG/DL (ref 70–130)
GLUCOSE BLDC GLUCOMTR-MCNC: 501 MG/DL (ref 70–130)
GLUCOSE SERPL-MCNC: 428 MG/DL (ref 65–99)
GLUCOSE SERPL-MCNC: 529 MG/DL (ref 65–99)
HBA1C MFR BLD: 11.9 % (ref 4.8–5.6)
HCT VFR BLD AUTO: 36.9 % (ref 34–46.6)
HCT VFR BLD AUTO: 47.9 % (ref 34–46.6)
HGB BLD-MCNC: 13.2 G/DL (ref 12–15.9)
HGB BLD-MCNC: 16.6 G/DL (ref 12–15.9)
HOLD SPECIMEN: NORMAL
IMM GRANULOCYTES # BLD AUTO: 0.01 10*3/MM3 (ref 0–0.05)
IMM GRANULOCYTES # BLD AUTO: 0.03 10*3/MM3 (ref 0–0.05)
IMM GRANULOCYTES NFR BLD AUTO: 0.1 % (ref 0–0.5)
IMM GRANULOCYTES NFR BLD AUTO: 0.3 % (ref 0–0.5)
LYMPHOCYTES # BLD AUTO: 1.35 10*3/MM3 (ref 0.7–3.1)
LYMPHOCYTES # BLD AUTO: 1.64 10*3/MM3 (ref 0.7–3.1)
LYMPHOCYTES NFR BLD AUTO: 15.7 % (ref 19.6–45.3)
LYMPHOCYTES NFR BLD AUTO: 16.4 % (ref 19.6–45.3)
MAGNESIUM SERPL-MCNC: 2.1 MG/DL (ref 1.6–2.6)
MAGNESIUM SERPL-MCNC: 2.3 MG/DL (ref 1.6–2.6)
MCH RBC QN AUTO: 32.4 PG (ref 26.6–33)
MCH RBC QN AUTO: 32.4 PG (ref 26.6–33)
MCHC RBC AUTO-ENTMCNC: 34.7 G/DL (ref 31.5–35.7)
MCHC RBC AUTO-ENTMCNC: 35.8 G/DL (ref 31.5–35.7)
MCV RBC AUTO: 90.7 FL (ref 79–97)
MCV RBC AUTO: 93.4 FL (ref 79–97)
MONOCYTES # BLD AUTO: 0.11 10*3/MM3 (ref 0.1–0.9)
MONOCYTES # BLD AUTO: 0.44 10*3/MM3 (ref 0.1–0.9)
MONOCYTES NFR BLD AUTO: 1.3 % (ref 5–12)
MONOCYTES NFR BLD AUTO: 4.4 % (ref 5–12)
NEUTROPHILS NFR BLD AUTO: 7.13 10*3/MM3 (ref 1.7–7)
NEUTROPHILS NFR BLD AUTO: 7.9 10*3/MM3 (ref 1.7–7)
NEUTROPHILS NFR BLD AUTO: 78.8 % (ref 42.7–76)
NEUTROPHILS NFR BLD AUTO: 82.7 % (ref 42.7–76)
NRBC BLD AUTO-RTO: 0 /100 WBC (ref 0–0.2)
NRBC BLD AUTO-RTO: 0 /100 WBC (ref 0–0.2)
OSMOLALITY SERPL: 305 MOSM/KG (ref 275–295)
PHOSPHATE SERPL-MCNC: 4 MG/DL (ref 2.5–4.5)
PHOSPHATE SERPL-MCNC: 5.7 MG/DL (ref 2.5–4.5)
PLATELET # BLD AUTO: 283 10*3/MM3 (ref 140–450)
PLATELET # BLD AUTO: 326 10*3/MM3 (ref 140–450)
PMV BLD AUTO: 9.8 FL (ref 6–12)
PMV BLD AUTO: 9.8 FL (ref 6–12)
POTASSIUM SERPL-SCNC: 3.9 MMOL/L (ref 3.5–5.2)
POTASSIUM SERPL-SCNC: 4.2 MMOL/L (ref 3.5–5.2)
PROT SERPL-MCNC: 6.3 G/DL (ref 6–8.5)
PROT SERPL-MCNC: 7.8 G/DL (ref 6–8.5)
RBC # BLD AUTO: 4.07 10*6/MM3 (ref 3.77–5.28)
RBC # BLD AUTO: 5.13 10*6/MM3 (ref 3.77–5.28)
SODIUM SERPL-SCNC: 135 MMOL/L (ref 136–145)
SODIUM SERPL-SCNC: 136 MMOL/L (ref 136–145)
WBC NRBC COR # BLD: 10.02 10*3/MM3 (ref 3.4–10.8)
WBC NRBC COR # BLD: 8.62 10*3/MM3 (ref 3.4–10.8)
WHOLE BLOOD HOLD SPECIMEN: NORMAL
WHOLE BLOOD HOLD SPECIMEN: NORMAL

## 2022-05-09 PROCEDURE — 80053 COMPREHEN METABOLIC PANEL: CPT | Performed by: EMERGENCY MEDICINE

## 2022-05-09 PROCEDURE — 82962 GLUCOSE BLOOD TEST: CPT

## 2022-05-09 PROCEDURE — 25010000002 HYDRALAZINE PER 20 MG: Performed by: EMERGENCY MEDICINE

## 2022-05-09 PROCEDURE — 71045 X-RAY EXAM CHEST 1 VIEW: CPT

## 2022-05-09 PROCEDURE — 85025 COMPLETE CBC W/AUTO DIFF WBC: CPT | Performed by: STUDENT IN AN ORGANIZED HEALTH CARE EDUCATION/TRAINING PROGRAM

## 2022-05-09 PROCEDURE — 99222 1ST HOSP IP/OBS MODERATE 55: CPT | Performed by: STUDENT IN AN ORGANIZED HEALTH CARE EDUCATION/TRAINING PROGRAM

## 2022-05-09 PROCEDURE — 25010000002 METOCLOPRAMIDE PER 10 MG: Performed by: NURSE PRACTITIONER

## 2022-05-09 PROCEDURE — 85025 COMPLETE CBC W/AUTO DIFF WBC: CPT

## 2022-05-09 PROCEDURE — 82010 KETONE BODYS QUAN: CPT | Performed by: EMERGENCY MEDICINE

## 2022-05-09 PROCEDURE — 93010 ELECTROCARDIOGRAM REPORT: CPT | Performed by: INTERNAL MEDICINE

## 2022-05-09 PROCEDURE — 93005 ELECTROCARDIOGRAM TRACING: CPT | Performed by: STUDENT IN AN ORGANIZED HEALTH CARE EDUCATION/TRAINING PROGRAM

## 2022-05-09 PROCEDURE — 83036 HEMOGLOBIN GLYCOSYLATED A1C: CPT | Performed by: STUDENT IN AN ORGANIZED HEALTH CARE EDUCATION/TRAINING PROGRAM

## 2022-05-09 PROCEDURE — 83930 ASSAY OF BLOOD OSMOLALITY: CPT | Performed by: STUDENT IN AN ORGANIZED HEALTH CARE EDUCATION/TRAINING PROGRAM

## 2022-05-09 PROCEDURE — 84100 ASSAY OF PHOSPHORUS: CPT | Performed by: STUDENT IN AN ORGANIZED HEALTH CARE EDUCATION/TRAINING PROGRAM

## 2022-05-09 PROCEDURE — 25010000002 ONDANSETRON PER 1 MG

## 2022-05-09 PROCEDURE — 99284 EMERGENCY DEPT VISIT MOD MDM: CPT

## 2022-05-09 PROCEDURE — U0004 COV-19 TEST NON-CDC HGH THRU: HCPCS | Performed by: NURSE PRACTITIONER

## 2022-05-09 PROCEDURE — 25010000002 ONDANSETRON PER 1 MG: Performed by: EMERGENCY MEDICINE

## 2022-05-09 PROCEDURE — 25010000002 MORPHINE PER 10 MG: Performed by: NURSE PRACTITIONER

## 2022-05-09 PROCEDURE — 80053 COMPREHEN METABOLIC PANEL: CPT | Performed by: STUDENT IN AN ORGANIZED HEALTH CARE EDUCATION/TRAINING PROGRAM

## 2022-05-09 PROCEDURE — 83735 ASSAY OF MAGNESIUM: CPT | Performed by: STUDENT IN AN ORGANIZED HEALTH CARE EDUCATION/TRAINING PROGRAM

## 2022-05-09 PROCEDURE — 0 POTASSIUM CHLORIDE PER 2 MEQ: Performed by: STUDENT IN AN ORGANIZED HEALTH CARE EDUCATION/TRAINING PROGRAM

## 2022-05-09 PROCEDURE — 25010000002 HEPARIN (PORCINE) PER 1000 UNITS: Performed by: NURSE PRACTITIONER

## 2022-05-09 PROCEDURE — 36415 COLL VENOUS BLD VENIPUNCTURE: CPT

## 2022-05-09 RX ORDER — SODIUM CHLORIDE 0.9 % (FLUSH) 0.9 %
10 SYRINGE (ML) INJECTION EVERY 12 HOURS SCHEDULED
Status: DISCONTINUED | OUTPATIENT
Start: 2022-05-09 | End: 2022-05-11

## 2022-05-09 RX ORDER — SODIUM CHLORIDE 0.9 % (FLUSH) 0.9 %
10 SYRINGE (ML) INJECTION AS NEEDED
Status: DISCONTINUED | OUTPATIENT
Start: 2022-05-09 | End: 2022-05-13 | Stop reason: HOSPADM

## 2022-05-09 RX ORDER — SODIUM CHLORIDE 0.9 % (FLUSH) 0.9 %
10 SYRINGE (ML) INJECTION AS NEEDED
Status: DISCONTINUED | OUTPATIENT
Start: 2022-05-09 | End: 2022-05-10

## 2022-05-09 RX ORDER — DEXTROSE AND SODIUM CHLORIDE 5; .45 G/100ML; G/100ML
150 INJECTION, SOLUTION INTRAVENOUS CONTINUOUS PRN
Status: DISCONTINUED | OUTPATIENT
Start: 2022-05-09 | End: 2022-05-10

## 2022-05-09 RX ORDER — SODIUM CHLORIDE AND POTASSIUM CHLORIDE 300; 900 MG/100ML; MG/100ML
250 INJECTION, SOLUTION INTRAVENOUS CONTINUOUS PRN
Status: DISCONTINUED | OUTPATIENT
Start: 2022-05-09 | End: 2022-05-10

## 2022-05-09 RX ORDER — NICOTINE POLACRILEX 4 MG
15 LOZENGE BUCCAL
Status: DISCONTINUED | OUTPATIENT
Start: 2022-05-09 | End: 2022-05-10

## 2022-05-09 RX ORDER — ONDANSETRON 2 MG/ML
4 INJECTION INTRAMUSCULAR; INTRAVENOUS EVERY 6 HOURS PRN
Status: DISCONTINUED | OUTPATIENT
Start: 2022-05-09 | End: 2022-05-13 | Stop reason: HOSPADM

## 2022-05-09 RX ORDER — SODIUM CHLORIDE AND POTASSIUM CHLORIDE 150; 450 MG/100ML; MG/100ML
250 INJECTION, SOLUTION INTRAVENOUS CONTINUOUS PRN
Status: DISCONTINUED | OUTPATIENT
Start: 2022-05-09 | End: 2022-05-10

## 2022-05-09 RX ORDER — SODIUM CHLORIDE 9 MG/ML
250 INJECTION, SOLUTION INTRAVENOUS CONTINUOUS PRN
Status: DISCONTINUED | OUTPATIENT
Start: 2022-05-09 | End: 2022-05-10

## 2022-05-09 RX ORDER — CLONIDINE HYDROCHLORIDE 0.1 MG/1
0.1 TABLET ORAL EVERY 8 HOURS PRN
Status: DISCONTINUED | OUTPATIENT
Start: 2022-05-09 | End: 2022-05-13 | Stop reason: HOSPADM

## 2022-05-09 RX ORDER — DEXTROSE, SODIUM CHLORIDE, AND POTASSIUM CHLORIDE 5; .45; .3 G/100ML; G/100ML; G/100ML
150 INJECTION INTRAVENOUS CONTINUOUS PRN
Status: DISCONTINUED | OUTPATIENT
Start: 2022-05-09 | End: 2022-05-10

## 2022-05-09 RX ORDER — AMLODIPINE BESYLATE 5 MG/1
5 TABLET ORAL
Status: DISCONTINUED | OUTPATIENT
Start: 2022-05-09 | End: 2022-05-13 | Stop reason: HOSPADM

## 2022-05-09 RX ORDER — LISINOPRIL 40 MG/1
40 TABLET ORAL
Status: DISCONTINUED | OUTPATIENT
Start: 2022-05-09 | End: 2022-05-13 | Stop reason: HOSPADM

## 2022-05-09 RX ORDER — TRAZODONE HYDROCHLORIDE 50 MG/1
50 TABLET ORAL NIGHTLY
Status: DISCONTINUED | OUTPATIENT
Start: 2022-05-09 | End: 2022-05-13 | Stop reason: HOSPADM

## 2022-05-09 RX ORDER — ONDANSETRON 2 MG/ML
4 INJECTION INTRAMUSCULAR; INTRAVENOUS ONCE
Status: COMPLETED | OUTPATIENT
Start: 2022-05-09 | End: 2022-05-09

## 2022-05-09 RX ORDER — HYDRALAZINE HYDROCHLORIDE 20 MG/ML
10 INJECTION INTRAMUSCULAR; INTRAVENOUS ONCE AS NEEDED
Status: COMPLETED | OUTPATIENT
Start: 2022-05-09 | End: 2022-05-09

## 2022-05-09 RX ORDER — ACETAMINOPHEN 325 MG/1
650 TABLET ORAL EVERY 4 HOURS PRN
Status: DISCONTINUED | OUTPATIENT
Start: 2022-05-09 | End: 2022-05-10

## 2022-05-09 RX ORDER — POLYETHYLENE GLYCOL 3350 17 G/17G
17 POWDER, FOR SOLUTION ORAL DAILY PRN
Status: DISCONTINUED | OUTPATIENT
Start: 2022-05-09 | End: 2022-05-13 | Stop reason: HOSPADM

## 2022-05-09 RX ORDER — MORPHINE SULFATE 2 MG/ML
1 INJECTION, SOLUTION INTRAMUSCULAR; INTRAVENOUS EVERY 4 HOURS PRN
Status: DISCONTINUED | OUTPATIENT
Start: 2022-05-09 | End: 2022-05-10

## 2022-05-09 RX ORDER — SODIUM CHLORIDE 0.9 % (FLUSH) 0.9 %
10 SYRINGE (ML) INJECTION EVERY 12 HOURS SCHEDULED
Status: DISCONTINUED | OUTPATIENT
Start: 2022-05-09 | End: 2022-05-13 | Stop reason: HOSPADM

## 2022-05-09 RX ORDER — NALOXONE HCL 0.4 MG/ML
0.4 VIAL (ML) INJECTION
Status: DISCONTINUED | OUTPATIENT
Start: 2022-05-09 | End: 2022-05-10

## 2022-05-09 RX ORDER — DEXTROSE, SODIUM CHLORIDE, AND POTASSIUM CHLORIDE 5; .9; .15 G/100ML; G/100ML; G/100ML
150 INJECTION INTRAVENOUS CONTINUOUS PRN
Status: DISCONTINUED | OUTPATIENT
Start: 2022-05-09 | End: 2022-05-10

## 2022-05-09 RX ORDER — DEXTROSE, SODIUM CHLORIDE, AND POTASSIUM CHLORIDE 5; .45; .15 G/100ML; G/100ML; G/100ML
150 INJECTION INTRAVENOUS CONTINUOUS PRN
Status: DISCONTINUED | OUTPATIENT
Start: 2022-05-09 | End: 2022-05-10

## 2022-05-09 RX ORDER — SERTRALINE HYDROCHLORIDE 100 MG/1
100 TABLET, FILM COATED ORAL DAILY
Status: DISCONTINUED | OUTPATIENT
Start: 2022-05-09 | End: 2022-05-13 | Stop reason: HOSPADM

## 2022-05-09 RX ORDER — BUSPIRONE HYDROCHLORIDE 10 MG/1
10 TABLET ORAL 2 TIMES DAILY
Status: DISCONTINUED | OUTPATIENT
Start: 2022-05-09 | End: 2022-05-13 | Stop reason: HOSPADM

## 2022-05-09 RX ORDER — PANTOPRAZOLE SODIUM 40 MG/1
40 TABLET, DELAYED RELEASE ORAL 2 TIMES DAILY
Status: DISCONTINUED | OUTPATIENT
Start: 2022-05-09 | End: 2022-05-11

## 2022-05-09 RX ORDER — SODIUM CHLORIDE 450 MG/100ML
250 INJECTION, SOLUTION INTRAVENOUS CONTINUOUS PRN
Status: DISCONTINUED | OUTPATIENT
Start: 2022-05-09 | End: 2022-05-10

## 2022-05-09 RX ORDER — QUETIAPINE FUMARATE 100 MG/1
100 TABLET, FILM COATED ORAL 2 TIMES DAILY
Status: DISCONTINUED | OUTPATIENT
Start: 2022-05-09 | End: 2022-05-13 | Stop reason: HOSPADM

## 2022-05-09 RX ORDER — LABETALOL HYDROCHLORIDE 5 MG/ML
10 INJECTION, SOLUTION INTRAVENOUS EVERY 6 HOURS PRN
Status: DISCONTINUED | OUTPATIENT
Start: 2022-05-09 | End: 2022-05-13 | Stop reason: HOSPADM

## 2022-05-09 RX ORDER — HEPARIN SODIUM 5000 [USP'U]/ML
5000 INJECTION, SOLUTION INTRAVENOUS; SUBCUTANEOUS EVERY 12 HOURS SCHEDULED
Status: DISCONTINUED | OUTPATIENT
Start: 2022-05-09 | End: 2022-05-13 | Stop reason: HOSPADM

## 2022-05-09 RX ORDER — BISACODYL 10 MG
10 SUPPOSITORY, RECTAL RECTAL DAILY PRN
Status: DISCONTINUED | OUTPATIENT
Start: 2022-05-09 | End: 2022-05-13 | Stop reason: HOSPADM

## 2022-05-09 RX ORDER — AMOXICILLIN 250 MG
2 CAPSULE ORAL 2 TIMES DAILY
Status: DISCONTINUED | OUTPATIENT
Start: 2022-05-09 | End: 2022-05-13 | Stop reason: HOSPADM

## 2022-05-09 RX ORDER — DEXTROSE AND SODIUM CHLORIDE 5; .9 G/100ML; G/100ML
150 INJECTION, SOLUTION INTRAVENOUS CONTINUOUS PRN
Status: DISCONTINUED | OUTPATIENT
Start: 2022-05-09 | End: 2022-05-10

## 2022-05-09 RX ORDER — ALBUTEROL SULFATE 2.5 MG/3ML
2.5 SOLUTION RESPIRATORY (INHALATION) EVERY 6 HOURS PRN
Status: DISCONTINUED | OUTPATIENT
Start: 2022-05-09 | End: 2022-05-13 | Stop reason: HOSPADM

## 2022-05-09 RX ORDER — SODIUM CHLORIDE AND POTASSIUM CHLORIDE 150; 900 MG/100ML; MG/100ML
250 INJECTION, SOLUTION INTRAVENOUS CONTINUOUS PRN
Status: DISCONTINUED | OUTPATIENT
Start: 2022-05-09 | End: 2022-05-10

## 2022-05-09 RX ORDER — HYDROXYZINE HYDROCHLORIDE 25 MG/1
25 TABLET, FILM COATED ORAL EVERY 8 HOURS PRN
Status: DISCONTINUED | OUTPATIENT
Start: 2022-05-09 | End: 2022-05-13 | Stop reason: HOSPADM

## 2022-05-09 RX ORDER — DEXTROSE MONOHYDRATE 25 G/50ML
10-50 INJECTION, SOLUTION INTRAVENOUS
Status: DISCONTINUED | OUTPATIENT
Start: 2022-05-09 | End: 2022-05-10

## 2022-05-09 RX ORDER — METOCLOPRAMIDE HYDROCHLORIDE 5 MG/ML
10 INJECTION INTRAMUSCULAR; INTRAVENOUS
Status: COMPLETED | OUTPATIENT
Start: 2022-05-09 | End: 2022-05-11

## 2022-05-09 RX ORDER — BISACODYL 5 MG/1
5 TABLET, DELAYED RELEASE ORAL DAILY PRN
Status: DISCONTINUED | OUTPATIENT
Start: 2022-05-09 | End: 2022-05-13 | Stop reason: HOSPADM

## 2022-05-09 RX ORDER — POTASSIUM CHLORIDE, DEXTROSE MONOHYDRATE AND SODIUM CHLORIDE 300; 5; 900 MG/100ML; G/100ML; MG/100ML
150 INJECTION, SOLUTION INTRAVENOUS CONTINUOUS PRN
Status: DISCONTINUED | OUTPATIENT
Start: 2022-05-09 | End: 2022-05-10

## 2022-05-09 RX ADMIN — QUETIAPINE FUMARATE 100 MG: 100 TABLET ORAL at 22:14

## 2022-05-09 RX ADMIN — PANTOPRAZOLE SODIUM 40 MG: 40 TABLET, DELAYED RELEASE ORAL at 22:13

## 2022-05-09 RX ADMIN — SODIUM CHLORIDE 1000 ML: 9 INJECTION, SOLUTION INTRAVENOUS at 15:18

## 2022-05-09 RX ADMIN — MORPHINE SULFATE 1 MG: 2 INJECTION, SOLUTION INTRAMUSCULAR; INTRAVENOUS at 22:51

## 2022-05-09 RX ADMIN — ONDANSETRON 4 MG: 2 INJECTION INTRAMUSCULAR; INTRAVENOUS at 15:06

## 2022-05-09 RX ADMIN — SERTRALINE 100 MG: 100 TABLET, FILM COATED ORAL at 22:13

## 2022-05-09 RX ADMIN — LISINOPRIL 40 MG: 40 TABLET ORAL at 16:56

## 2022-05-09 RX ADMIN — SODIUM CHLORIDE 1000 ML: 9 INJECTION, SOLUTION INTRAVENOUS at 15:02

## 2022-05-09 RX ADMIN — METOPROLOL TARTRATE 25 MG: 25 TABLET, FILM COATED ORAL at 22:21

## 2022-05-09 RX ADMIN — POTASSIUM CHLORIDE AND SODIUM CHLORIDE 250 ML/HR: 450; 150 INJECTION, SOLUTION INTRAVENOUS at 18:58

## 2022-05-09 RX ADMIN — MORPHINE SULFATE 1 MG: 2 INJECTION, SOLUTION INTRAMUSCULAR; INTRAVENOUS at 16:55

## 2022-05-09 RX ADMIN — BUSPIRONE HYDROCHLORIDE 10 MG: 10 TABLET ORAL at 22:13

## 2022-05-09 RX ADMIN — TRAZODONE HYDROCHLORIDE 50 MG: 50 TABLET ORAL at 22:14

## 2022-05-09 RX ADMIN — ONDANSETRON 4 MG: 2 INJECTION INTRAMUSCULAR; INTRAVENOUS at 13:40

## 2022-05-09 RX ADMIN — HEPARIN SODIUM 5000 UNITS: 5000 INJECTION, SOLUTION INTRAVENOUS; SUBCUTANEOUS at 22:14

## 2022-05-09 RX ADMIN — SENNOSIDES AND DOCUSATE SODIUM 2 TABLET: 50; 8.6 TABLET ORAL at 22:14

## 2022-05-09 RX ADMIN — METOCLOPRAMIDE 10 MG: 5 INJECTION, SOLUTION INTRAMUSCULAR; INTRAVENOUS at 22:14

## 2022-05-09 RX ADMIN — INSULIN HUMAN 4.4 UNITS/HR: 1 INJECTION, SOLUTION INTRAVENOUS at 16:49

## 2022-05-09 RX ADMIN — POTASSIUM CHLORIDE AND SODIUM CHLORIDE 250 ML/HR: 450; 150 INJECTION, SOLUTION INTRAVENOUS at 23:16

## 2022-05-09 RX ADMIN — HYDRALAZINE HYDROCHLORIDE 10 MG: 20 INJECTION INTRAMUSCULAR; INTRAVENOUS at 15:06

## 2022-05-09 NOTE — H&P
[facility]  HISTORY AND PHYSICAL    Patient Name: Bernadette Paris  : 1970  MRN: [id number]  Primary Care Physician: Provider, No Known  Date of admission: 2022    Subjective   Subjective     Chief Complaint:  Nausea and vomiting    HPI:  Bernadette Paris is a 52 y.o. female with pmh significant for HTN, HLP, GERD, Hep C, bipolar do, insulin dependent diabetes and gastroparesis presents with 3 days of nausea and vomiting.  Patient reports since recent admission -22, has not followed up with new PCP or GI due to loss of medical card and ID. Has been out of GI medication x 2 weeks and all others over the past week. Started vomiting 3 days ago and progressive with worsening epigastric pain. No hematemesis, unable to keep down food/ fluids and urinating less.   Found to have glucose 400-500, pH 7.17 and AG 28. Patient to be admitted for further management.         Review of Systems   Constitutional: Positive for activity change and fatigue.   HENT: Negative.    Respiratory: Negative.    Cardiovascular: Negative.    Gastrointestinal: Positive for abdominal pain, nausea and vomiting.   Genitourinary: Positive for decreased urine volume.   Musculoskeletal: Negative.    Skin: Negative.    Neurological: Positive for weakness.   Psychiatric/Behavioral: Negative.         All other systems reviewed and are negative.     Personal History     Past Medical History:   Diagnosis Date   • Arthritis    • Bipolar disorder (HCC)    • Chronic bronchitis (HCC)    • Depression    • Diabetes mellitus (HCC)     DIAGNOSED    • GERD (gastroesophageal reflux disease)    • Hepatitis-C    • History of blood transfusion    • Hyperlipidemia    • Hypertension    • Infectious viral hepatitis     HEPATITIS C   • Migraine    • Sleep apnea     PATIENT UNSURE OF SETTINGS             Past Surgical History:   Procedure Laterality Date   • CHOLECYSTECTOMY     • ENDOSCOPY N/A 2022     Procedure: ESOPHAGOGASTRODUODENOSCOPY;  Surgeon: Brunner, Mark I, MD;  Location: [facility] ENDOSCOPY;  Service: Gastroenterology;  Laterality: N/A;  with antrum biopsy   • HYSTERECTOMY     • KNEE SURGERY     • LUMBAR LAMINECTOMY DISCECTOMY DECOMPRESSION N/A 8/6/2018    Procedure: LUMBAR LAMINECTOMIES L4-S1;  Surgeon: Chip Smith MD;  Location: [facility] OR;  Service: Neurosurgery       Family History:  family history is not on file.   Social History:  reports that she has been smoking. She has been smoking about 1.00 pack per day. She has never used smokeless tobacco. She reports that she does not drink alcohol and does not use drugs.  Social History     Social History Narrative   • Not on file       Medications:  Accu-Chek FastClix Lancets, Alcohol Pads, Charo, Insulin Pen Needle, QUEtiapine, acetaminophen, albuterol sulfate HFA, amLODIPine, bisacodyl, busPIRone, cetirizine, dicyclomine, glucose blood, hydrOXYzine, ibuprofen, insulin detemir, lisinopril, metFORMIN, metoprolol tartrate, ondansetron ODT, pantoprazole, polyethylene glycol, sennosides-docusate, sertraline, and traZODone    Allergies   Allergen Reactions   • Tylenol [Acetaminophen] Other (See Comments)     SEVERE LIVER DISEASE-CONTRAINDICATED       Objective   Objective     Vital Signs:        Physical Exam   Constitutional: No acute distress, awake, alert,appears ill  HENT: NCAT, mucous membranes dry  Respiratory: Clear to auscultation bilaterally, respiratory effort normal   Cardiovascular: RRR- tachycardic, no murmurs, rubs, or gallops  Gastrointestinal: Positive bowel sounds, soft, nontender, nondistended  Musculoskeletal: No bilateral ankle edema  Psychiatric: Appropriate affect, cooperative  Neurologic: Oriented x 3, strength symmetric in all extremities, Cranial Nerves grossly intact to confrontation, speech mildly dysarthric at baseline  Skin: No rashes      Results Reviewed:  I have personally reviewed most recent indicated data and agree with  findings including:  [x]  Laboratory  [x]  Radiology  [x]  EKG/Telemetry  []  Pathology  []  Cardiac/Vascular Studies  [x]  Old records  []  Other:  Most pertinent findings include:      LAB RESULTS:      Lab 05/29/22 1816 05/29/22 1808   WBC  --  4.64   HEMOGLOBIN  --  13.6   HEMOGLOBIN, POC 15.0  --    HEMATOCRIT  --  39.5   HEMATOCRIT POC 44  --    PLATELETS  --  306   NEUTROS ABS  --  2.52   IMMATURE GRANS (ABS)  --  0.01   LYMPHS ABS  --  1.59   MONOS ABS  --  0.46   EOS ABS  --  0.04   MCV  --  93.8         Lab 05/29/22 1816 05/29/22 1808   SODIUM  --  135*   POTASSIUM  --  4.8   CHLORIDE  --  97*   CO2  --  17.0*   ANION GAP  --  21.0*   BUN  --  6   CREATININE 0.40* 0.71   EGFR 120.0 103.1   GLUCOSE  --  360*   CALCIUM  --  9.7   MAGNESIUM  --  1.8   PHOSPHORUS  --  3.9         Lab 05/29/22 1808   TOTAL PROTEIN 6.8   ALBUMIN 4.20   GLOBULIN 2.6   ALT (SGPT) 79*   AST (SGOT) 87*   BILIRUBIN 0.6   ALK PHOS 139*   LIPASE 47         Lab 05/29/22 1808   TROPONIN T <0.010                 Lab 05/29/22 2003   FIO2 21   CARBOXYHEMOGLOBIN (VENOUS) 1.4     Brief Urine Lab Results  (Last result in the past 365 days)      Color   Clarity   Blood   Leuk Est   Nitrite   Protein   CREAT   Urine HCG        05/29/22 1911 Yellow   Clear   Negative   Negative   Negative   30 mg/dL (1+)               Microbiology Results (last 10 days)     Procedure Component Value - Date/Time    COVID-19 and FLU A/B PCR - Swab, Nasopharynx [[id number]]  (Normal) Collected: 05/29/22 1755    Lab Status: Final result Specimen: Swab from Nasopharynx Updated: 05/29/22 1935     COVID19 Not Detected     Influenza A PCR Not Detected     Influenza B PCR Not Detected    Narrative:      Fact sheet for providers: https://www.fda.gov/media/702506/download    Fact sheet for patients: https://www.fda.gov/media/730055/download    Test performed by PCR.          CT Abdomen Pelvis Without Contrast    Result Date: 5/29/2022  DATE OF EXAM: 5/29/2022 6:37 PM   PROCEDURE: CT ABDOMEN PELVIS WO CONTRAST-  INDICATIONS: eipgastric pain, vomiting, diabetic, suspect pancreatitis  COMPARISON: 4/6/2022  TECHNIQUE: Routine transaxial slices were obtained through the abdomen and pelvis without the administration of intravenous contrast. Reconstructed coronal and sagittal images were also obtained. Automated exposure control and iterative construction methods were used.  The radiation dose reduction device was turned on for each scan per the ALARA (As Low as Reasonably Achievable) protocol.  FINDINGS: The lung bases are grossly clear. Evaluation of the body wall soft tissues demonstrates mild diffuse anasarca. The osseous structures demonstrate no evidence of acute fracture or aggressive osseous lesion. The liver, spleen, pancreas and bilateral adrenal glands demonstrate no specific evidence of acute finding or suspicious lesion on limited noncontrast evaluation. Prior cholecystectomy. The kidneys demonstrate no evidence of stones or hydronephrosis. Small and large bowel loops are nondilated. There is no suspicious focal bowel wall thickening. No free fluid or pneumoperitoneum. Mildly atherosclerotic nonaneurysmal abdominal aorta. No bulky retroperitoneal lymphadenopathy. The pelvic viscera are unremarkable.      Impression: There is mild diffuse anasarca present, new since comparison, suggesting component of volume overload. No acute findings are otherwise present in the abdomen and pelvis.  This report was finalized on 5/29/2022 7:30 PM by Panda Laird.            Assessment & Plan   Assessment & Plan       Nausea & vomiting    Acidosis    Bipolar affective disorder (HCC)    Hepatitis C    Accelerated hypertension    Medically noncompliant    Gastritis    Hyperlipidemia    Severe malnutrition (HCC)      DKA  Uncontrolled T2DM, insulin dependent  Nausea and vomiting/ Gastroparesis  -- venous ph 7.17, AG 28, beta hydroxybutyrate pending  -- Glucommander activated. Insulin gtt/  fluids ordered. Current fsbs 529  -- s/p 2L NS in ED  -- serial lab work and fsbs  -- start reglan qac/hs x 2 days  -- prn zofran  --PT/OT/ DM educator/ RD consult    Accelerated HTN  -- given hydralazine in ED, no significant change  -- restart home lisinopril, norvasc, prn clonidine    Gastritis   Hep C  -- patient seen by GI last admission. Recommend bid PPI x 1 month, then daily. Has been out of medication x 2 weeks. Will restart bid for now  -- jian root recommended 500mg TID (not taking)  -- s/p diflucan x 1 week for candida. Missed follo up wit GI for eval/ treatment of Hep C    Bipolar  -- restart home seroquel  -- prn atarax    Medical noncompliance  -- has not followed up with established pcp or GI due to lost medical card and ID  -- Has been out of medications for 1-2 weeks    DVT prophylaxis:  [facility]    CODE STATUS:   Code Status (Patient has no pulse and is not breathing): CPR (Attempt to Resuscitate)  Medical Interventions (Patient has pulse or is breathing): Full Support      This note has been completed as part of a split-shared workflow.   Electronically signed by DMITRY Novoa, 05/09/22, 4:11 PM EDT.          Attending   Admission Attestation       I have seen and examined the patient, performing an independent face-to-face diagnostic evaluation with plan of care reviewed and developed with the advanced practice clinician (APC).      Brief Summary Statement:   Bernadette Paris is a 52 y.o. female with a history of bipolar disorder, insulin-dependent diabetes, gastroparesis, hypertension who is presenting with worsening abdominal pain, nausea vomiting and has been out of her medications.  She was noted to have a high fingerstick blood glucose in the ED and was acidotic, treatment was initiated for DKA.  With fluids and insulin she reports subjective improvement.  She has slight chest discomfort but this is not new for her.  She currently does not endorse any nausea.  She has had some  difficulty in obtaining her medications.    Remainder of detailed HPI is as noted by APC and has been reviewed and/or edited by me for completeness.    Attending Physical Exam:  Constitutional: No acute distress, awake, alert, nontoxic, chronically ill-appearing  HENT: NCAT, mucous membranes moist  Respiratory: Clear to auscultation bilaterally, respiratory effort normal   Cardiovascular: Tachycardic, no murmurs, rubs, or gallops  Gastrointestinal: Positive bowel sounds, soft, nontender, nondistended  Musculoskeletal: No bilateral ankle edema  Psychiatric: Appropriate affect, cooperative  Neurologic: Oriented x 3, strength symmetric in all extremities, Cranial Nerves grossly intact to confrontation, speech clear  Skin: No rashes    Brief Assessment/Plan :  See detailed assessment and plan developed with APC which I have reviewed and/or edited for completeness.    In summary 52-year-old female with history of bipolar disorder, insulin-dependent diabetes, gastroparesis who is admitted for DKA secondary to medication noncompliance and running out of medications.  DKA protocol has been initiated.  I have counseled the patient on the importance of obtaining her medications.  Diabetes educator to see in a.m., social work and case management to follow-up as well.    Admission Status: I believe that this patient meets inpatient criteria      DMITRY Novoa  05/31/22          Electronically signed by DMITRY Novoa, 05/22/22, 2:07 PM EDT.

## 2022-05-09 NOTE — ED PROVIDER NOTES
Subjective   This patient is a very nice 52-year-old -American female comes in today with nausea and vomiting over the last couple of days.  She tells me that this started in earnest yesterday but has been ongoing for several months.  She tells me she was most recently seen in [facility] but also has had a visit here proximally 1 month ago.  She tells me she does not like going to the outside hospital anymore and is started coming here instead.  There is some report from EMS that the patient was unresponsive but responded immediately to a sternal rub when they arrived.  When I saw the patient placed in bed 17, she was actively vomiting and was pleasant, communicative, and in no acute distress.  Blood glucose was 399 upon arrival and it should be noted that the patient received Phenergan before arrival with certainly could contribute to somnolence.  Patient denies any headaches.  She tells me she has a history of diabetes and has been without her insulin over the last couple of days.  She has a history of hepatitis C.  She is not currently receiving therapy for hepatitis C.  She tells me that she generally just gets her insulin at [facility] and at the pharmacy and does not have a prescription.  She tells me she does not have a current primary care physician.  She denies any chest pain, vision changes, fevers chills or cough.  Denies any shortness of breath.  Denies any neck pain, rash or new medications.  In summary, we have a 52-year-old female who came in primarily for nausea and vomiting and had an episode of somnolence requiring sternal rub before arrival.  She denies alcohol or drugs to me here, is oriented x4 and pleasant.    Past medical history  Insulin-dependent diabetes, GERD, sleep apnea, hepatitis C, bipolar disorder, depression, dyslipidemia, hypertension    Family history  Diabetes in father.  Diabetes in brother, recently  10 days ago secondary to diabetes per patient          Review  of Systems   Constitutional: Negative.  Negative for chills, fatigue, fever and unexpected weight change.   HENT: Negative for dental problem, ear pain, hearing loss and sinus pressure.    Eyes: Negative for pain and visual disturbance.   Respiratory: Negative for chest tightness and shortness of breath.    Cardiovascular: Negative for chest pain, palpitations and leg swelling.   Gastrointestinal: Positive for nausea and vomiting. Negative for blood in stool and diarrhea.   Genitourinary: Negative for difficulty urinating, dysuria, frequency, hematuria and urgency.   Musculoskeletal: Negative for myalgias, neck pain and neck stiffness.   Neurological: Negative for seizures, syncope, speech difficulty, light-headedness and headaches.   Psychiatric/Behavioral: Negative for confusion.   All other systems reviewed and are negative.      Past Medical History:   Diagnosis Date   • Arthritis    • Bipolar disorder (HCC)    • Chronic bronchitis (HCC)    • Depression    • Diabetes mellitus (HCC)     DIAGNOSED 2016   • GERD (gastroesophageal reflux disease)    • Hepatitis-C    • History of blood transfusion    • Hyperlipidemia    • Hypertension    • Infectious viral hepatitis     HEPATITIS C   • Migraine    • Sleep apnea     PATIENT UNSURE OF SETTINGS       Allergies   Allergen Reactions   • Tylenol [Acetaminophen] Other (See Comments)     SEVERE LIVER DISEASE-CONTRAINDICATED       Past Surgical History:   Procedure Laterality Date   • CHOLECYSTECTOMY     • ENDOSCOPY N/A 4/9/2022    Procedure: ESOPHAGOGASTRODUODENOSCOPY;  Surgeon: Brunner, Mark I, MD;  Location: [facility] ENDOSCOPY;  Service: Gastroenterology;  Laterality: N/A;  with antrum biopsy   • HYSTERECTOMY     • KNEE SURGERY     • LUMBAR LAMINECTOMY DISCECTOMY DECOMPRESSION N/A 8/6/2018    Procedure: LUMBAR LAMINECTOMIES L4-S1;  Surgeon: Chip Smith MD;  Location: [facility] OR;  Service: Neurosurgery       History reviewed. No pertinent family history.    Social  History     Socioeconomic History   • Marital status: Single   Tobacco Use   • Smoking status: Current Every Day Smoker     Packs/day: 1.00   • Smokeless tobacco: Never Used   Substance and Sexual Activity   • Alcohol use: No   • Drug use: No   • Sexual activity: Defer           Objective   Physical Exam  Vitals and nursing note reviewed.   Constitutional:       General: She is not in acute distress.     Appearance: Normal appearance. She is not toxic-appearing.   HENT:      Head: Normocephalic and atraumatic.      Right Ear: Tympanic membrane, ear canal and external ear normal.      Left Ear: Tympanic membrane, ear canal and external ear normal.      Nose: Nose normal.      Mouth/Throat:      Mouth: Mucous membranes are dry.      Pharynx: Oropharynx is clear.   Eyes:      General:         Right eye: No discharge.         Left eye: No discharge.      Extraocular Movements: Extraocular movements intact.      Conjunctiva/sclera: Conjunctivae normal.      Pupils: Pupils are equal, round, and reactive to light.   Cardiovascular:      Rate and Rhythm: Regular rhythm. Tachycardia present.      Pulses: Normal pulses.      Heart sounds: Normal heart sounds.   Pulmonary:      Effort: Pulmonary effort is normal. No respiratory distress.      Breath sounds: No stridor. No wheezing or rhonchi.   Abdominal:      General: Abdomen is flat. Bowel sounds are normal. There is no distension.      Palpations: Abdomen is soft. There is no mass.      Tenderness: There is no abdominal tenderness. There is no guarding.      Hernia: No hernia is present.   Musculoskeletal:         General: No swelling, deformity or signs of injury. Normal range of motion.      Cervical back: Normal range of motion.   Skin:     General: Skin is warm and dry.      Capillary Refill: Capillary refill takes less than 2 seconds.      Coloration: Skin is not jaundiced or pale.      Findings: No bruising or erythema.   Neurological:      General: No focal deficit  "present.      Mental Status: She is alert.      Motor: No weakness.      Comments: Patient oriented x3.  Patient without asymmetric weakness.  Patient with 5/5 strength bilaterally with flexion and extension of fingers wrist and elbows.  No facial asymmetry   Psychiatric:         Mood and Affect: Mood normal.         Behavior: Behavior normal.         Thought Content: Thought content normal.         Critical Care  Performed by: Marlon Cruz MD  Authorized by: Marlon Cruz MD     Critical care provider statement:     Critical care time (minutes):  75    Critical care start time:  5/9/2022 2:00 PM    Critical care end time:  5/9/2022 3:15 PM    Critical care was necessary to treat or prevent imminent or life-threatening deterioration of the following conditions:  Metabolic crisis    Critical care was time spent personally by me on the following activities:  Development of treatment plan with patient or surrogate, discussions with consultants, evaluation of patient's response to treatment, examination of patient, ordering and performing treatments and interventions, ordering and review of radiographic studies, ordering and review of laboratory studies, re-evaluation of patient's condition and review of old charts    I assumed direction of critical care for this patient from another provider in my specialty: no      Care discussed with: admitting provider                 ED Course  ED Course as of 05/09/22 1615   Mon May 09, 2022   7725 I had a nice conversation with the patient at the bedside.  She feels much better and looks much better after ministration of IV fluids and Zofran.  We had a good conversation and she shared with me that she has been to numerous hospitals over the last couple months for \"the same thing.\"  The patient's white count is back and is unremarkable.  Urinalysis, CMP, venous blood gas and beta hydroxybutyrate pending.  IV fluids and Zofran ordered and provided.  At this point, the " patient is resting comfortably.  Heart rate is much improved and is now approximate 108.  Blood pressure 141/110 with respiratory rate of 21 and oxygen saturations of 100% on room air.  Ultimate disposition will depend on how the patient responds to treatment and what her studies look like.  I have ordered a chest x-ray in addition to the patient's previously mentioned lab test.  Final impression and plan following completion of work-up. [DAYNA]   1500 CBC shows a white count of 8.62 with hemoglobin and hematocrit of 16.6 and 47.9 with a platelet count of 326.  Venous blood gas, CMP, urinalysis and beta hydroxy butyrate pending. [DAYNA]   1501 Have ordered another liter of fluid, Zofran and have added hydralazine.  The patient's blood pressure bounced up I wheeled him to get it down .  Patient continues to do well and states that her nausea has come back but she is no longer vomiting which is encouraging.  Reviewing old records and waiting for labs to come back. [DAYNA]   1511 I rechecked the patient's blood glucose via point-of-care methodology and found her blood glucose to be 464 despite initial blood glucose of 399.  VBG shows a pH of 7.175.  CBC essentially unremarkable.  CMP, beta hydroxybutyrate pending.  Urinalysis pending.  We initially got a urine sample but the patient defecated in the bedside commode and rendered the sample unusable.  I have started a second liter of fluid on the patient will start insulin as well.  I contacted the hospitalist, Dr. Nguyen for admission.  Patient is aware of the plan and appreciative for care.  Final impression and plan will include DKA, acidosis, nausea vomiting.  Plan to include continued IV fluid rehydration, we will initiate insulin therapy and consultation with the hospitalist, in light of change in recent insulin order set.  Critical care time 75 minutes on this patient.  Patient will be admitted to the hospitalist accordingly. [DAYNA]   1528 Many labs are still pending  including CMP.  We will certainly assess the patient sodium and potassium when they come back.  I discussed the case with the hospitalist personally.  She is placing the insulin order set and is aware of the pending labs.  Chest x-ray is back and is unremarkable which is encouraging. [DAYNA]   1543 CMP and other labs still pending. [DAYNA]   1547 Labs continue to be pending.  Critical care time including management the patient's hyperglycemia and DKA approximately 75 minutes. [DAYNA]      ED Course User Index  [DAYNA] Marlon Cruz MD      Recent Results (from the past 24 hour(s))   Lavender Top    Collection Time: 05/09/22  1:01 PM   Result Value Ref Range    Extra Tube hold for add-on    Light Blue Top    Collection Time: 05/09/22  1:01 PM   Result Value Ref Range    Extra Tube hold for add-on    CBC Auto Differential    Collection Time: 05/09/22  1:01 PM    Specimen: Blood   Result Value Ref Range    WBC 8.62 3.40 - 10.80 10*3/mm3    RBC 5.13 3.77 - 5.28 10*6/mm3    Hemoglobin 16.6 (H) 12.0 - 15.9 g/dL    Hematocrit 47.9 (H) 34.0 - 46.6 %    MCV 93.4 79.0 - 97.0 fL    MCH 32.4 26.6 - 33.0 pg    MCHC 34.7 31.5 - 35.7 g/dL    RDW 13.6 12.3 - 15.4 %    RDW-SD 46.7 37.0 - 54.0 fl    MPV 9.8 6.0 - 12.0 fL    Platelets 326 140 - 450 10*3/mm3    Neutrophil % 82.7 (H) 42.7 - 76.0 %    Lymphocyte % 15.7 (L) 19.6 - 45.3 %    Monocyte % 1.3 (L) 5.0 - 12.0 %    Eosinophil % 0.0 (L) 0.3 - 6.2 %    Basophil % 0.2 0.0 - 1.5 %    Immature Grans % 0.1 0.0 - 0.5 %    Neutrophils, Absolute 7.13 (H) 1.70 - 7.00 10*3/mm3    Lymphocytes, Absolute 1.35 0.70 - 3.10 10*3/mm3    Monocytes, Absolute 0.11 0.10 - 0.90 10*3/mm3    Eosinophils, Absolute 0.00 0.00 - 0.40 10*3/mm3    Basophils, Absolute 0.02 0.00 - 0.20 10*3/mm3    Immature Grans, Absolute 0.01 0.00 - 0.05 10*3/mm3    nRBC 0.0 0.0 - 0.2 /100 WBC   Hemoglobin A1c    Collection Time: 05/09/22  1:01 PM    Specimen: Blood   Result Value Ref Range    Hemoglobin A1C 11.90 (H) 4.80 - 5.60 %    Comprehensive Metabolic Panel    Collection Time: 05/09/22  2:50 PM    Specimen: Blood   Result Value Ref Range    Glucose 529 (C) 65 - 99 mg/dL    BUN 13 6 - 20 mg/dL    Creatinine 0.84 0.57 - 1.00 mg/dL    Sodium 135 (L) 136 - 145 mmol/L    Potassium 4.2 3.5 - 5.2 mmol/L    Chloride 91 (L) 98 - 107 mmol/L    CO2 16.0 (L) 22.0 - 29.0 mmol/L    Calcium 10.1 8.6 - 10.5 mg/dL    Total Protein 7.8 6.0 - 8.5 g/dL    Albumin 4.90 3.50 - 5.20 g/dL    ALT (SGPT) 63 (H) 1 - 33 U/L    AST (SGOT) 60 (H) 1 - 32 U/L    Alkaline Phosphatase 197 (H) 39 - 117 U/L    Total Bilirubin 0.5 0.0 - 1.2 mg/dL    Globulin 2.9 gm/dL    A/G Ratio 1.7 g/dL    BUN/Creatinine Ratio 15.5 7.0 - 25.0    Anion Gap 28.0 (H) 5.0 - 15.0 mmol/L    eGFR 84.3 >60.0 mL/min/1.73   Green Top (Gel)    Collection Time: 05/09/22  2:50 PM   Result Value Ref Range    Extra Tube Hold for add-ons.    Gold Top - SST    Collection Time: 05/09/22  2:50 PM   Result Value Ref Range    Extra Tube Hold for add-ons.    Blood Gas, Venous With Co-Ox    Collection Time: 05/09/22  3:06 PM    Specimen: Venous Blood   Result Value Ref Range    Site Nurse/Dr Draw     pH, Venous 7.175 (C) 7.310 - 7.410 pH Units    pCO2, Venous 47.6 41.0 - 51.0 mm Hg    pO2, Venous 33.4 27.0 - 53.0 mm Hg    HCO3, Venous 17.6 (L) 22.0 - 28.0 mmol/L    Base Excess, Venous -11.0 (L) -2.0 - 2.0 mmol/L    Hemoglobin, Blood Gas      Oxyhemoglobin Venous      Methemoglobin Venous      Carboxyhemoglobin Venous      CO2 Content 19.0 (L) 22 - 33 mmol/L    Temperature 37.0 C    Barometric Pressure for Blood Gas      Modality Room Air     FIO2 21 %    Ventilator Mode       Note      Notified Who DARYA CONNER     Notified By [id number]     Notified Time 05/09/2022 15:08    POC Glucose Once    Collection Time: 05/09/22  3:07 PM    Specimen: Blood   Result Value Ref Range    Glucose 464 (C) 70 - 130 mg/dL     Note: In addition to lab results from this visit, the labs listed above may include labs taken at another  "facility or during a different encounter within the last 24 hours. Please correlate lab times with ED admission and discharge times for further clarification of the services performed during this visit.    XR Chest 1 View   Final Result   No acute cardiopulmonary findings.       This report was finalized on 5/9/2022 2:54 PM by Amor Espinoza MD.            Vitals:    05/09/22 1222   BP: (!) 179/132   BP Location: Left arm   Patient Position: Sitting   Pulse: 119   Resp: 18   Temp: 98.2 °F (36.8 °C)   TempSrc: Oral   SpO2: 96%   Weight: 63.5 kg (140 lb)   Height: 167.6 cm (66\")     Medications   sodium chloride 0.9 % flush 10 mL (has no administration in time range)   sodium chloride 0.9 % flush 10 mL (has no administration in time range)   sodium chloride 0.9 % flush 10 mL (has no administration in time range)   dextrose (GLUTOSE) oral gel 15 g (has no administration in time range)   dextrose (D50W) (25 g/50 mL) IV injection 10-50 mL (has no administration in time range)   glucagon (human recombinant) (GLUCAGEN DIAGNOSTIC) injection 1 mg (has no administration in time range)   sodium chloride 0.9 % infusion (has no administration in time range)   sodium chloride 0.9 % with KCl 20 mEq/L infusion (has no administration in time range)   sodium chloride 0.9 % with KCl 40 mEq/L infusion (has no administration in time range)   dextrose 5 % and sodium chloride 0.9 % infusion (has no administration in time range)   dextrose 5 % and sodium chloride 0.9 % with KCl 20 mEq/L infusion (has no administration in time range)   dextrose 5 % and sodium chloride 0.9 % with KCl 40 mEq/L infusion (has no administration in time range)   sodium chloride 0.45 % infusion (has no administration in time range)   sodium chloride 0.45 % with KCl 20 mEq/L infusion (has no administration in time range)   sodium chloride 0.45 % 1,000 mL with potassium chloride 40 mEq infusion (has no administration in time range)   dextrose 5 % and sodium " chloride 0.45 % infusion (has no administration in time range)   dextrose 5 % and sodium chloride 0.45 % with KCl 20 mEq/L infusion (has no administration in time range)   dextrose 5 % and sodium chloride 0.45 % with KCl 40 mEq/L infusion (has no administration in time range)   insulin regular 1 unit/mL in 0.9% sodium chloride (has no administration in time range)   lisinopril (PRINIVIL,ZESTRIL) tablet 40 mg (has no administration in time range)   ondansetron (ZOFRAN) injection 4 mg (4 mg Intravenous Given 5/9/22 1340)   sodium chloride 0.9 % bolus 1,000 mL (1,000 mL Intravenous New Bag 5/9/22 1502)   ondansetron (ZOFRAN) injection 4 mg (4 mg Intravenous Given 5/9/22 1506)   hydrALAZINE (APRESOLINE) injection 10 mg (10 mg Intravenous Given 5/9/22 1506)   sodium chloride 0.9 % bolus 1,000 mL (1,000 mL Intravenous New Bag 5/9/22 1518)     ECG/EMG Results (last 24 hours)     ** No results found for the last 24 hours. **        ECG 12 Lead    (Results Pending)                                                MDM    Final diagnoses:   Acidosis   Hyperglycemia   Moderate dehydration   Diabetic ketoacidosis without coma associated with type 2 diabetes mellitus (HCC)   Nausea and vomiting, unspecified vomiting type       ED Disposition  ED Disposition     ED Disposition   Decision to Admit    Condition   --    Comment   Level of Care: Telemetry [5]   Diagnosis: Acidosis [276.2.ICD-9-CM]               No follow-up provider specified.       Medication List      No changes were made to your prescriptions during this visit.          Marlon Cruz MD  05/09/22 0186

## 2022-05-10 LAB
ANION GAP SERPL CALCULATED.3IONS-SCNC: 10 MMOL/L (ref 5–15)
ANION GAP SERPL CALCULATED.3IONS-SCNC: 12 MMOL/L (ref 5–15)
ANION GAP SERPL CALCULATED.3IONS-SCNC: 13 MMOL/L (ref 5–15)
ANION GAP SERPL CALCULATED.3IONS-SCNC: 14 MMOL/L (ref 5–15)
ATMOSPHERIC PRESS: NORMAL MM[HG]
BASE EXCESS BLDV CALC-SCNC: NORMAL MMOL/L
BDY SITE: NORMAL
BILIRUB UR QL STRIP: NEGATIVE
BODY TEMPERATURE: NORMAL
BUN SERPL-MCNC: 11 MG/DL (ref 6–20)
BUN SERPL-MCNC: 12 MG/DL (ref 6–20)
BUN SERPL-MCNC: 15 MG/DL (ref 6–20)
BUN SERPL-MCNC: 9 MG/DL (ref 6–20)
BUN/CREAT SERPL: 14.5 (ref 7–25)
BUN/CREAT SERPL: 16.4 (ref 7–25)
BUN/CREAT SERPL: 17.6 (ref 7–25)
BUN/CREAT SERPL: 19.7 (ref 7–25)
CALCIUM SPEC-SCNC: 8.6 MG/DL (ref 8.6–10.5)
CALCIUM SPEC-SCNC: 8.9 MG/DL (ref 8.6–10.5)
CALCIUM SPEC-SCNC: 8.9 MG/DL (ref 8.6–10.5)
CALCIUM SPEC-SCNC: 9.2 MG/DL (ref 8.6–10.5)
CHLORIDE SERPL-SCNC: 101 MMOL/L (ref 98–107)
CHLORIDE SERPL-SCNC: 103 MMOL/L (ref 98–107)
CHLORIDE SERPL-SCNC: 107 MMOL/L (ref 98–107)
CHLORIDE SERPL-SCNC: 110 MMOL/L (ref 98–107)
CLARITY UR: CLEAR
CO2 BLDA-SCNC: NORMAL MMOL/L
CO2 SERPL-SCNC: 15 MMOL/L (ref 22–29)
CO2 SERPL-SCNC: 16 MMOL/L (ref 22–29)
CO2 SERPL-SCNC: 18 MMOL/L (ref 22–29)
CO2 SERPL-SCNC: 19 MMOL/L (ref 22–29)
COHGB MFR BLD: NORMAL %
COLOR UR: YELLOW
CREAT SERPL-MCNC: 0.62 MG/DL (ref 0.57–1)
CREAT SERPL-MCNC: 0.67 MG/DL (ref 0.57–1)
CREAT SERPL-MCNC: 0.68 MG/DL (ref 0.57–1)
CREAT SERPL-MCNC: 0.76 MG/DL (ref 0.57–1)
EGFRCR SERPLBLD CKD-EPI 2021: 105.6 ML/MIN/1.73
EGFRCR SERPLBLD CKD-EPI 2021: 106 ML/MIN/1.73
EGFRCR SERPLBLD CKD-EPI 2021: 108 ML/MIN/1.73
EGFRCR SERPLBLD CKD-EPI 2021: 95 ML/MIN/1.73
GLUCOSE BLDC GLUCOMTR-MCNC: 141 MG/DL (ref 70–130)
GLUCOSE BLDC GLUCOMTR-MCNC: 145 MG/DL (ref 70–130)
GLUCOSE BLDC GLUCOMTR-MCNC: 150 MG/DL (ref 70–130)
GLUCOSE BLDC GLUCOMTR-MCNC: 151 MG/DL (ref 70–130)
GLUCOSE BLDC GLUCOMTR-MCNC: 153 MG/DL (ref 70–130)
GLUCOSE BLDC GLUCOMTR-MCNC: 154 MG/DL (ref 70–130)
GLUCOSE BLDC GLUCOMTR-MCNC: 157 MG/DL (ref 70–130)
GLUCOSE BLDC GLUCOMTR-MCNC: 187 MG/DL (ref 70–130)
GLUCOSE BLDC GLUCOMTR-MCNC: 195 MG/DL (ref 70–130)
GLUCOSE BLDC GLUCOMTR-MCNC: 199 MG/DL (ref 70–130)
GLUCOSE BLDC GLUCOMTR-MCNC: 226 MG/DL (ref 70–130)
GLUCOSE BLDC GLUCOMTR-MCNC: 275 MG/DL (ref 70–130)
GLUCOSE BLDC GLUCOMTR-MCNC: 278 MG/DL (ref 70–130)
GLUCOSE BLDC GLUCOMTR-MCNC: 288 MG/DL (ref 70–130)
GLUCOSE BLDC GLUCOMTR-MCNC: 298 MG/DL (ref 70–130)
GLUCOSE SERPL-MCNC: 191 MG/DL (ref 65–99)
GLUCOSE SERPL-MCNC: 284 MG/DL (ref 65–99)
GLUCOSE SERPL-MCNC: 318 MG/DL (ref 65–99)
GLUCOSE SERPL-MCNC: 323 MG/DL (ref 65–99)
GLUCOSE UR STRIP-MCNC: ABNORMAL MG/DL
HCO3 BLDV-SCNC: NORMAL MMOL/L
HGB BLDA-MCNC: NORMAL G/DL
HGB UR QL STRIP.AUTO: NEGATIVE
INHALED O2 CONCENTRATION: NORMAL %
KETONES UR QL STRIP: ABNORMAL
LEUKOCYTE ESTERASE UR QL STRIP.AUTO: NEGATIVE
Lab: NORMAL
MAGNESIUM SERPL-MCNC: 2 MG/DL (ref 1.6–2.6)
MAGNESIUM SERPL-MCNC: 2.1 MG/DL (ref 1.6–2.6)
METHGB BLD QL: NORMAL
MODALITY: NORMAL
NITRITE UR QL STRIP: NEGATIVE
NOTE: NORMAL
NOTIFIED BY: NORMAL
NOTIFIED WHO: NORMAL
OXYHGB MFR BLDV: NORMAL %
PCO2 BLDV: NORMAL MM[HG]
PH BLDV: NORMAL [PH]
PH UR STRIP.AUTO: 6.5 [PH] (ref 5–8)
PHOSPHATE SERPL-MCNC: 1.3 MG/DL (ref 2.5–4.5)
PHOSPHATE SERPL-MCNC: 1.8 MG/DL (ref 2.5–4.5)
PHOSPHATE SERPL-MCNC: 2.2 MG/DL (ref 2.5–4.5)
PHOSPHATE SERPL-MCNC: 2.4 MG/DL (ref 2.5–4.5)
PO2 BLDV: NORMAL MM[HG]
POTASSIUM SERPL-SCNC: 3.4 MMOL/L (ref 3.5–5.2)
POTASSIUM SERPL-SCNC: 4.1 MMOL/L (ref 3.5–5.2)
POTASSIUM SERPL-SCNC: 4.4 MMOL/L (ref 3.5–5.2)
POTASSIUM SERPL-SCNC: 4.6 MMOL/L (ref 3.5–5.2)
PROT UR QL STRIP: ABNORMAL
SARS-COV-2 RNA PNL SPEC NAA+PROBE: NOT DETECTED
SODIUM SERPL-SCNC: 133 MMOL/L (ref 136–145)
SODIUM SERPL-SCNC: 134 MMOL/L (ref 136–145)
SODIUM SERPL-SCNC: 135 MMOL/L (ref 136–145)
SODIUM SERPL-SCNC: 136 MMOL/L (ref 136–145)
SP GR UR STRIP: 1.02 (ref 1–1.03)
TROPONIN T SERPL-MCNC: <0.01 NG/ML (ref 0–0.03)
UROBILINOGEN UR QL STRIP: ABNORMAL
VENTILATOR MODE: NORMAL

## 2022-05-10 PROCEDURE — 97166 OT EVAL MOD COMPLEX 45 MIN: CPT

## 2022-05-10 PROCEDURE — 25010000002 HEPARIN (PORCINE) PER 1000 UNITS: Performed by: NURSE PRACTITIONER

## 2022-05-10 PROCEDURE — 84100 ASSAY OF PHOSPHORUS: CPT | Performed by: STUDENT IN AN ORGANIZED HEALTH CARE EDUCATION/TRAINING PROGRAM

## 2022-05-10 PROCEDURE — 82962 GLUCOSE BLOOD TEST: CPT

## 2022-05-10 PROCEDURE — 84100 ASSAY OF PHOSPHORUS: CPT | Performed by: INTERNAL MEDICINE

## 2022-05-10 PROCEDURE — 84484 ASSAY OF TROPONIN QUANT: CPT | Performed by: STUDENT IN AN ORGANIZED HEALTH CARE EDUCATION/TRAINING PROGRAM

## 2022-05-10 PROCEDURE — 83735 ASSAY OF MAGNESIUM: CPT | Performed by: STUDENT IN AN ORGANIZED HEALTH CARE EDUCATION/TRAINING PROGRAM

## 2022-05-10 PROCEDURE — 83735 ASSAY OF MAGNESIUM: CPT | Performed by: INTERNAL MEDICINE

## 2022-05-10 PROCEDURE — 25010000002 METOCLOPRAMIDE PER 10 MG: Performed by: NURSE PRACTITIONER

## 2022-05-10 PROCEDURE — 81003 URINALYSIS AUTO W/O SCOPE: CPT | Performed by: EMERGENCY MEDICINE

## 2022-05-10 PROCEDURE — 99233 SBSQ HOSP IP/OBS HIGH 50: CPT | Performed by: INTERNAL MEDICINE

## 2022-05-10 PROCEDURE — 97162 PT EVAL MOD COMPLEX 30 MIN: CPT

## 2022-05-10 PROCEDURE — 63710000001 INSULIN DETEMIR PER 5 UNITS: Performed by: INTERNAL MEDICINE

## 2022-05-10 PROCEDURE — 80048 BASIC METABOLIC PNL TOTAL CA: CPT | Performed by: STUDENT IN AN ORGANIZED HEALTH CARE EDUCATION/TRAINING PROGRAM

## 2022-05-10 PROCEDURE — 80048 BASIC METABOLIC PNL TOTAL CA: CPT | Performed by: INTERNAL MEDICINE

## 2022-05-10 RX ORDER — DEXTROSE MONOHYDRATE 25 G/50ML
10-50 INJECTION, SOLUTION INTRAVENOUS
Status: DISCONTINUED | OUTPATIENT
Start: 2022-05-10 | End: 2022-05-13 | Stop reason: HOSPADM

## 2022-05-10 RX ORDER — SODIUM CHLORIDE 450 MG/100ML
250 INJECTION, SOLUTION INTRAVENOUS CONTINUOUS PRN
Status: DISCONTINUED | OUTPATIENT
Start: 2022-05-10 | End: 2022-05-10

## 2022-05-10 RX ORDER — NICOTINE POLACRILEX 4 MG
15 LOZENGE BUCCAL
Status: DISCONTINUED | OUTPATIENT
Start: 2022-05-10 | End: 2022-05-10 | Stop reason: SDUPTHER

## 2022-05-10 RX ORDER — MAGNESIUM SULFATE HEPTAHYDRATE 40 MG/ML
2 INJECTION, SOLUTION INTRAVENOUS AS NEEDED
Status: DISCONTINUED | OUTPATIENT
Start: 2022-05-10 | End: 2022-05-13 | Stop reason: HOSPADM

## 2022-05-10 RX ORDER — DEXTROSE, SODIUM CHLORIDE, AND POTASSIUM CHLORIDE 5; .9; .15 G/100ML; G/100ML; G/100ML
150 INJECTION INTRAVENOUS CONTINUOUS PRN
Status: DISCONTINUED | OUTPATIENT
Start: 2022-05-10 | End: 2022-05-10

## 2022-05-10 RX ORDER — INSULIN LISPRO 100 [IU]/ML
0-9 INJECTION, SOLUTION INTRAVENOUS; SUBCUTANEOUS
Status: DISCONTINUED | OUTPATIENT
Start: 2022-05-10 | End: 2022-05-10

## 2022-05-10 RX ORDER — SODIUM CHLORIDE 0.9 % (FLUSH) 0.9 %
10 SYRINGE (ML) INJECTION AS NEEDED
Status: DISCONTINUED | OUTPATIENT
Start: 2022-05-10 | End: 2022-05-11

## 2022-05-10 RX ORDER — DEXTROSE MONOHYDRATE 25 G/50ML
25 INJECTION, SOLUTION INTRAVENOUS
Status: DISCONTINUED | OUTPATIENT
Start: 2022-05-10 | End: 2022-05-10 | Stop reason: SDUPTHER

## 2022-05-10 RX ORDER — SODIUM CHLORIDE 0.9 % (FLUSH) 0.9 %
10 SYRINGE (ML) INJECTION EVERY 12 HOURS SCHEDULED
Status: DISCONTINUED | OUTPATIENT
Start: 2022-05-10 | End: 2022-05-11

## 2022-05-10 RX ORDER — DEXTROSE, SODIUM CHLORIDE, AND POTASSIUM CHLORIDE 5; .45; .15 G/100ML; G/100ML; G/100ML
150 INJECTION INTRAVENOUS CONTINUOUS PRN
Status: DISCONTINUED | OUTPATIENT
Start: 2022-05-10 | End: 2022-05-10

## 2022-05-10 RX ORDER — POTASSIUM CHLORIDE 750 MG/1
40 CAPSULE, EXTENDED RELEASE ORAL AS NEEDED
Status: DISCONTINUED | OUTPATIENT
Start: 2022-05-10 | End: 2022-05-13 | Stop reason: HOSPADM

## 2022-05-10 RX ORDER — POTASSIUM CHLORIDE, DEXTROSE MONOHYDRATE AND SODIUM CHLORIDE 300; 5; 900 MG/100ML; G/100ML; MG/100ML
150 INJECTION, SOLUTION INTRAVENOUS CONTINUOUS PRN
Status: DISCONTINUED | OUTPATIENT
Start: 2022-05-10 | End: 2022-05-10

## 2022-05-10 RX ORDER — INSULIN LISPRO 100 [IU]/ML
0-9 INJECTION, SOLUTION INTRAVENOUS; SUBCUTANEOUS
Status: DISCONTINUED | OUTPATIENT
Start: 2022-05-10 | End: 2022-05-11

## 2022-05-10 RX ORDER — NICOTINE POLACRILEX 4 MG
15 LOZENGE BUCCAL
Status: DISCONTINUED | OUTPATIENT
Start: 2022-05-10 | End: 2022-05-10

## 2022-05-10 RX ORDER — FENTANYL/ROPIVACAINE/NS/PF 2-625MCG/1
15 PLASTIC BAG, INJECTION (ML) EPIDURAL AS NEEDED
Status: DISCONTINUED | OUTPATIENT
Start: 2022-05-10 | End: 2022-05-13 | Stop reason: HOSPADM

## 2022-05-10 RX ORDER — DEXTROSE MONOHYDRATE 25 G/50ML
25 INJECTION, SOLUTION INTRAVENOUS
Status: DISCONTINUED | OUTPATIENT
Start: 2022-05-10 | End: 2022-05-10

## 2022-05-10 RX ORDER — SODIUM CHLORIDE AND POTASSIUM CHLORIDE 300; 900 MG/100ML; MG/100ML
250 INJECTION, SOLUTION INTRAVENOUS CONTINUOUS PRN
Status: DISCONTINUED | OUTPATIENT
Start: 2022-05-10 | End: 2022-05-10

## 2022-05-10 RX ORDER — MAGNESIUM SULFATE HEPTAHYDRATE 40 MG/ML
4 INJECTION, SOLUTION INTRAVENOUS AS NEEDED
Status: DISCONTINUED | OUTPATIENT
Start: 2022-05-10 | End: 2022-05-13 | Stop reason: HOSPADM

## 2022-05-10 RX ORDER — SODIUM CHLORIDE AND POTASSIUM CHLORIDE 150; 450 MG/100ML; MG/100ML
250 INJECTION, SOLUTION INTRAVENOUS CONTINUOUS PRN
Status: DISCONTINUED | OUTPATIENT
Start: 2022-05-10 | End: 2022-05-10

## 2022-05-10 RX ORDER — NICOTINE POLACRILEX 4 MG
15 LOZENGE BUCCAL
Status: DISCONTINUED | OUTPATIENT
Start: 2022-05-10 | End: 2022-05-13 | Stop reason: HOSPADM

## 2022-05-10 RX ORDER — DEXTROSE AND SODIUM CHLORIDE 5; .45 G/100ML; G/100ML
150 INJECTION, SOLUTION INTRAVENOUS CONTINUOUS PRN
Status: DISCONTINUED | OUTPATIENT
Start: 2022-05-10 | End: 2022-05-10

## 2022-05-10 RX ORDER — SODIUM CHLORIDE AND POTASSIUM CHLORIDE 150; 900 MG/100ML; MG/100ML
250 INJECTION, SOLUTION INTRAVENOUS CONTINUOUS PRN
Status: DISCONTINUED | OUTPATIENT
Start: 2022-05-10 | End: 2022-05-10

## 2022-05-10 RX ORDER — POTASSIUM CHLORIDE 1.5 G/1.77G
40 POWDER, FOR SOLUTION ORAL AS NEEDED
Status: DISCONTINUED | OUTPATIENT
Start: 2022-05-10 | End: 2022-05-13 | Stop reason: HOSPADM

## 2022-05-10 RX ORDER — SODIUM CHLORIDE 9 MG/ML
250 INJECTION, SOLUTION INTRAVENOUS CONTINUOUS PRN
Status: DISCONTINUED | OUTPATIENT
Start: 2022-05-10 | End: 2022-05-10

## 2022-05-10 RX ORDER — DEXTROSE, SODIUM CHLORIDE, AND POTASSIUM CHLORIDE 5; .45; .3 G/100ML; G/100ML; G/100ML
150 INJECTION INTRAVENOUS CONTINUOUS PRN
Status: DISCONTINUED | OUTPATIENT
Start: 2022-05-10 | End: 2022-05-10

## 2022-05-10 RX ORDER — DEXTROSE AND SODIUM CHLORIDE 5; .9 G/100ML; G/100ML
150 INJECTION, SOLUTION INTRAVENOUS CONTINUOUS PRN
Status: DISCONTINUED | OUTPATIENT
Start: 2022-05-10 | End: 2022-05-10

## 2022-05-10 RX ORDER — ACETAMINOPHEN 325 MG/1
325 TABLET ORAL EVERY 4 HOURS PRN
Status: DISCONTINUED | OUTPATIENT
Start: 2022-05-10 | End: 2022-05-13 | Stop reason: HOSPADM

## 2022-05-10 RX ADMIN — Medication 10 ML: at 21:57

## 2022-05-10 RX ADMIN — METOPROLOL TARTRATE 25 MG: 25 TABLET, FILM COATED ORAL at 09:35

## 2022-05-10 RX ADMIN — LABETALOL 20 MG/4 ML (5 MG/ML) INTRAVENOUS SYRINGE 10 MG: at 17:15

## 2022-05-10 RX ADMIN — Medication 10 ML: at 09:35

## 2022-05-10 RX ADMIN — TRAZODONE HYDROCHLORIDE 50 MG: 50 TABLET ORAL at 21:54

## 2022-05-10 RX ADMIN — QUETIAPINE FUMARATE 100 MG: 100 TABLET ORAL at 09:35

## 2022-05-10 RX ADMIN — QUETIAPINE FUMARATE 100 MG: 100 TABLET ORAL at 21:54

## 2022-05-10 RX ADMIN — HEPARIN SODIUM 5000 UNITS: 5000 INJECTION, SOLUTION INTRAVENOUS; SUBCUTANEOUS at 09:34

## 2022-05-10 RX ADMIN — BUSPIRONE HYDROCHLORIDE 10 MG: 10 TABLET ORAL at 09:35

## 2022-05-10 RX ADMIN — SENNOSIDES AND DOCUSATE SODIUM 2 TABLET: 50; 8.6 TABLET ORAL at 21:54

## 2022-05-10 RX ADMIN — AMLODIPINE BESYLATE 5 MG: 5 TABLET ORAL at 09:35

## 2022-05-10 RX ADMIN — ACETAMINOPHEN 325 MG: 325 TABLET ORAL at 16:53

## 2022-05-10 RX ADMIN — BUSPIRONE HYDROCHLORIDE 10 MG: 10 TABLET ORAL at 21:54

## 2022-05-10 RX ADMIN — METOCLOPRAMIDE 10 MG: 5 INJECTION, SOLUTION INTRAMUSCULAR; INTRAVENOUS at 16:53

## 2022-05-10 RX ADMIN — METOCLOPRAMIDE 10 MG: 5 INJECTION, SOLUTION INTRAMUSCULAR; INTRAVENOUS at 09:35

## 2022-05-10 RX ADMIN — DEXTROSE MONOHYDRATE, SODIUM CHLORIDE, AND POTASSIUM CHLORIDE 150 ML/HR: 50; 9; 1.49 INJECTION, SOLUTION INTRAVENOUS at 08:16

## 2022-05-10 RX ADMIN — POTASSIUM CHLORIDE, DEXTROSE MONOHYDRATE AND SODIUM CHLORIDE 150 ML/HR: 150; 5; 450 INJECTION, SOLUTION INTRAVENOUS at 14:51

## 2022-05-10 RX ADMIN — SERTRALINE 100 MG: 100 TABLET, FILM COATED ORAL at 09:35

## 2022-05-10 RX ADMIN — HEPARIN SODIUM 5000 UNITS: 5000 INJECTION, SOLUTION INTRAVENOUS; SUBCUTANEOUS at 21:53

## 2022-05-10 RX ADMIN — LISINOPRIL 40 MG: 40 TABLET ORAL at 09:35

## 2022-05-10 RX ADMIN — POTASSIUM PHOSPHATE, MONOBASIC POTASSIUM PHOSPHATE, DIBASIC 30 MMOL: 224; 236 INJECTION, SOLUTION, CONCENTRATE INTRAVENOUS at 16:42

## 2022-05-10 RX ADMIN — INSULIN HUMAN 5.4 UNITS/HR: 1 INJECTION, SOLUTION INTRAVENOUS at 06:41

## 2022-05-10 RX ADMIN — METOPROLOL TARTRATE 25 MG: 25 TABLET, FILM COATED ORAL at 21:54

## 2022-05-10 RX ADMIN — PANTOPRAZOLE SODIUM 40 MG: 40 TABLET, DELAYED RELEASE ORAL at 09:35

## 2022-05-10 RX ADMIN — METOCLOPRAMIDE 10 MG: 5 INJECTION, SOLUTION INTRAMUSCULAR; INTRAVENOUS at 21:53

## 2022-05-10 RX ADMIN — METOCLOPRAMIDE 10 MG: 5 INJECTION, SOLUTION INTRAMUSCULAR; INTRAVENOUS at 13:21

## 2022-05-10 RX ADMIN — DEXTROSE MONOHYDRATE, SODIUM CHLORIDE, AND POTASSIUM CHLORIDE 150 ML/HR: 50; 9; 1.49 INJECTION, SOLUTION INTRAVENOUS at 01:36

## 2022-05-10 RX ADMIN — INSULIN DETEMIR 45 UNITS: 100 INJECTION, SOLUTION SUBCUTANEOUS at 18:01

## 2022-05-10 RX ADMIN — PANTOPRAZOLE SODIUM 40 MG: 40 TABLET, DELAYED RELEASE ORAL at 21:54

## 2022-05-10 RX ADMIN — SENNOSIDES AND DOCUSATE SODIUM 2 TABLET: 50; 8.6 TABLET ORAL at 09:35

## 2022-05-10 NOTE — CONSULTS
"Clinical Nutrition   Reason For Visit: DKA protocol    Patient Name: Bernadette Paris  YOB: 1970  MRN: [id number]  Date of Encounter: 05/10/22 11:14 EDT  Admission date: 5/9/2022      Nutrition Assessment     Admission Problem List:    Diabetic ketoacidosis without coma associated with type 2 diabetes mellitus (HCC)    Nausea & vomiting    Acidosis    Bipolar affective disorder (HCC)    Hepatitis C    Accelerated hypertension    Medically noncompliant    Gastritis    Hyperlipidemia     Applicable PMH:  Recent admission 4/7-4/11  Medical noncompliance X 2 weeks  Gastroparesis    Applicable Nutrition-Related Information:    Applicable medical tests/procedures since admission:      PMH: She  has a past medical history of Arthritis, Bipolar disorder (HCC), Chronic bronchitis (HCC), Depression, Diabetes mellitus (HCC), GERD (gastroesophageal reflux disease), Hepatitis-C, History of blood transfusion, Hyperlipidemia, Hypertension, Infectious viral hepatitis, Migraine, and Sleep apnea.   PSxH: She  has a past surgical history that includes Hysterectomy; Cholecystectomy; Knee surgery; lumbar laminectomy discectomy decompression (N/A, 8/6/2018); and Esophagogastroduodenoscopy (N/A, 4/9/2022).        Reported/Observed/Food/Nutrition Related History     Pt sleeping soundly at time RD visit and does not awaken to name. Per EMR, she has lost significant weight in the past month. Unable to tolerate PO for 3 days r/t N/V/pain. She presents with significant electrolyte disturbances. Hx gastroparesis with recent admission 4/7-4/11. Apparently has not followed up with GI or new PCP and has not taken medications in 2 weeks due to \"loss med card\". This includes insulin and GI medications, was prescribed jian root at d/c recent admission. A1C is 11.9.     Per RD note recent admission on 4/7 pt reported PO intakes <50% of usual for the past month and weight loss from 180 lb. She had reported following a mostly liquid diet " at that time. She was given electronic referral for outpatient nutrition counseling which she did not attend. Will f/u for full assessment/verbal education.    Anthropometrics   Height: 66 in  Weight: 141 lb per bed scale 5/10/22  BMI: 22.79  BMI classification: Normal: 18.5-24.9kg/m2   IBW: 130 lb    UBW: ?180 lb per pt only past wt hx avail from 2018 of ~260 lb and recently:  (4/6/22) 150 lb - POA  (4/11/22) 158 lb - day of d/c    Weight change: ?loss 17 lb 10.8% body weight past month  *RD asked nsg to document zeroed bed/standing wt to clarify    Nutrition Focused Physical Exam     Unable to perform exam due to: pt sleeping    Labs reviewed   Labs reviewed: Yes    Results from last 7 days   Lab Units 05/10/22  0430 05/10/22  0015 05/09/22 2009   SODIUM mmol/L 134* 133* 136   POTASSIUM mmol/L 3.4* 4.1 3.9   CHLORIDE mmol/L 103 101 100   CO2 mmol/L 19.0* 18.0* 11.0*   BUN mg/dL 12 15 15   CREATININE mg/dL 0.68 0.76 0.78   GLUCOSE mg/dL 191* 284* 428*   CALCIUM mg/dL 8.9 9.2 9.1   PHOSPHORUS mg/dL 1.3* 2.2* 4.0   MAGNESIUM mg/dL 2.0 2.1 2.1     Results from last 7 days   Lab Units 05/09/22 2009 05/09/22  1450 05/09/22  1301   WBC 10*3/mm3 10.02  --  8.62   ALBUMIN g/dL 4.10 4.90  --      Results from last 7 days   Lab Units 05/10/22  0952 05/10/22  0746 05/10/22  0639 05/10/22  0536 05/10/22  0432 05/10/22  0324   GLUCOSE mg/dL 141* 150* 151* 153* 157* 226*     Lab Results   Lab Value Date/Time    HGBA1C 11.90 (H) 05/09/2022 1301    HGBA1C 11.80 (H) 04/06/2022 1052    HGBA1C 12.2 (H) 07/09/2021 0219     Medications reviewed   Medications reviewed: Yes  Scheduled: insulin, buspar, heparin, protonix, reglan  GTT: insulin drip  PRN:      Current Nutrition Prescription   PO: Diet Clear Liquid; Consistent Carbohydrate  Oral Nutrition Supplement: No active supplement orders       Average PO intake: 0% x 1 meal documented    Nutrition Diagnosis     Problem Inadequate oral intake   Etiology DKA in setting of  gastroparesis   Signs/Symptoms N/V/abdominal pain with inability to tolerate PO X 3 days       Problem Altered nutrition related laboratory values   Etiology DKA   Signs/Symptoms BG>400, A1C>11       Goal:   General: Nutrition to support treatment, Improve nutrition related labs  PO: Tolerate PO, Increase intake     Intervention   Intervention: Follow treatment progress, Care plan reviewed, Menu provided, Supplement provided, Education provided    Boost GC 2x/day upon advancement appropriate PO diet  Ample written diet education provided for review  Will f/u for full assessment/verbal education  Asked nsg to document zeroed bed/standing wt to clarify wt trends    Monitoring/Evaluation:   Monitoring/Evaluation: Per protocol, I&O, PO intake, Supplement intake, Pertinent labs, Weight, Skin status, GI status, Symptoms, POC/GOC, Swallow function, Hemodynamic stability    Julisa Alvarado RD  Time Spent: 35

## 2022-05-10 NOTE — PROGRESS NOTES
[facility] Medicine Services  PROGRESS NOTE    Patient Name: Bernadette Paris  : 1970  MRN: [id number]    Date of Admission: 2022  Primary Care Physician: Lilly Rose MD    Subjective   Subjective     CC:  F/U DKA    HPI:  She has been sleepy this morning.  She has been up to the bathroom with PT.  Says she wants to eat but then falls back to sleep.    ROS:  RENEA due to mental status    Objective   Objective     Vital Signs:   Temp:  [98.2 °F (36.8 °C)-98.4 °F (36.9 °C)] 98.4 °F (36.9 °C)  Heart Rate:  [] 97  Resp:  [18-20] 18  BP: ()/() 170/103     Physical Exam:  Constitutional: No acute distress, sleeping  HENT: NCAT, mucous membranes moist  Respiratory: Clear to auscultation bilaterally, respiratory effort normal on room air  Cardiovascular: RRR, no murmurs, rubs, or gallops  Gastrointestinal: Positive bowel sounds, soft, nondistended  Musculoskeletal: No bilateral ankle edema  Psychiatric: Lethargic  Neurologic: Moving all extremities equally, follows simple commands  Skin: No rashes on exposed surfaces    Results Reviewed:  LAB RESULTS:      Lab 22  1301   WBC 10.02 8.62   HEMOGLOBIN 13.2 16.6*   HEMATOCRIT 36.9 47.9*   PLATELETS 283 326   NEUTROS ABS 7.90* 7.13*   IMMATURE GRANS (ABS) 0.03 0.01   LYMPHS ABS 1.64 1.35   MONOS ABS 0.44 0.11   EOS ABS 0.00 0.00   MCV 90.7 93.4         Lab 05/10/22  0430 05/10/22  0015 22  1450 22  1301   SODIUM 134* 133* 136 135*  --    POTASSIUM 3.4* 4.1 3.9 4.2  --    CHLORIDE 103 101 100 91*  --    CO2 19.0* 18.0* 11.0* 16.0*  --    ANION GAP 12.0 14.0 25.0* 28.0*  --    BUN 12 15 15 13  --    CREATININE 0.68 0.76 0.78 0.84  --    EGFR 105.6 95.0 92.1 84.3  --    GLUCOSE 191* 284* 428* 529*  --    CALCIUM 8.9 9.2 9.1 10.1  --    MAGNESIUM 2.0 2.1 2.1 2.3  --    PHOSPHORUS 1.3* 2.2* 4.0 5.7*  --    HEMOGLOBIN A1C  --   --   --   --  11.90*         Lab 22  05/09/22  1450   TOTAL PROTEIN 6.3 7.8   ALBUMIN 4.10 4.90   GLOBULIN 2.2 2.9   ALT (SGPT) 49* 63*   AST (SGOT) 41* 60*   BILIRUBIN 0.3 0.5   ALK PHOS 152* 197*         Lab 05/10/22  0015   TROPONIN T <0.010                 Brief Urine Lab Results  (Last result in the past 365 days)      Color   Clarity   Blood   Leuk Est   Nitrite   Protein   CREAT   Urine HCG        04/06/22 1103               Negative       04/06/22 1103 Yellow   Clear   Trace   Negative   Negative   30 mg/dL (1+)                 Microbiology Results Abnormal     None          XR Chest 1 View    Result Date: 5/9/2022  DATE OF EXAM: 5/9/2022 2:46 PM  PROCEDURE: XR CHEST 1 VW-  INDICATIONS: Nausea vomiting  COMPARISON: Chest x-ray 4/6/2022  TECHNIQUE: Single radiographic AP view of the chest was obtained.  FINDINGS: Normal cardiomediastinal silhouette. The lungs are clear without focal consolidation. No pleural effusion or pneumothorax. No acute osseous findings. The partially imaged upper abdomen appears unremarkable.      Impression: No acute cardiopulmonary findings.  This report was finalized on 5/9/2022 2:54 PM by Amor Espinoza MD.            I have reviewed the medications:  Scheduled Meds:amLODIPine, 5 mg, Oral, Q24H  busPIRone, 10 mg, Oral, BID  heparin (porcine), 5,000 Units, Subcutaneous, Q12H  lisinopril, 40 mg, Oral, Q24H  metoclopramide, 10 mg, Intravenous, 4x Daily AC & at Bedtime  metoprolol tartrate, 25 mg, Oral, Q12H  pantoprazole, 40 mg, Oral, BID  QUEtiapine, 100 mg, Oral, BID  senna-docusate sodium, 2 tablet, Oral, BID  sertraline, 100 mg, Oral, Daily  sodium chloride, 10 mL, Intravenous, Q12H  sodium chloride, 10 mL, Intravenous, Q12H  traZODone, 50 mg, Oral, Nightly      Continuous Infusions:dextrose 5 % and sodium chloride 0.45 %, 150 mL/hr  dextrose 5 % and sodium chloride 0.45 % with KCl 20 mEq/L, 150 mL/hr  dextrose 5 % and sodium chloride 0.45 % with KCl 40 mEq/L, 150 mL/hr  dextrose 5 % and sodium chloride 0.9 %, 150  mL/hr  dextrose 5 % and sodium chloride 0.9 % with KCl 20 mEq, 150 mL/hr, Last Rate: 150 mL/hr (05/10/22 0816)  dextrose 5% and sodium chloride 0.9% with KCl 40 mEq/L, 150 mL/hr  insulin, 0-100 Units/hr, Last Rate: 4.8 Units/hr (05/10/22 0953)      PRN Meds:.•  acetaminophen  •  albuterol  •  senna-docusate sodium **AND** polyethylene glycol **AND** bisacodyl **AND** bisacodyl  •  cloNIDine  •  dextrose  •  dextrose  •  dextrose 5 % and sodium chloride 0.45 %  •  dextrose 5 % and sodium chloride 0.45 % with KCl 20 mEq/L  •  dextrose 5 % and sodium chloride 0.45 % with KCl 40 mEq/L  •  dextrose 5 % and sodium chloride 0.9 %  •  dextrose 5 % and sodium chloride 0.9 % with KCl 20 mEq  •  dextrose 5% and sodium chloride 0.9% with KCl 40 mEq/L  •  glucagon (human recombinant)  •  hydrOXYzine  •  labetalol  •  Morphine **AND** naloxone  •  ondansetron  •  potassium phosphate infusion greater than 15 mMoles **OR** potassium phosphate infusion greater than 15 mMoles **OR** potassium phosphate **OR** sodium phosphate IVPB **OR** sodium phosphate IVPB **OR** sodium phosphate IVPB  •  sodium chloride  •  sodium chloride    [unfilled]  Assessment & Plan     Active Hospital Problems    Diagnosis  POA   • **Diabetic ketoacidosis without coma associated with type 2 diabetes mellitus (HCC) [E11.10]  Yes   • Acidosis [E87.2]  Yes   • Bipolar affective disorder (HCC) [F31.9]  Yes   • Hepatitis C [B19.20]  Yes   • Accelerated hypertension [I10]  Yes   • Medically noncompliant [Z91.19]  Not Applicable   • Gastritis [K29.70]  Yes   • Hyperlipidemia [E78.5]  Yes   • Nausea & vomiting [R11.2]  Yes      Resolved Hospital Problems   No resolved problems to display.        Brief Hospital Course to date:  Bernadette Paris is a 52 y.o. female with uncontrolled insulin dependent type 2 diabetes mellitus, gastroparesis, HTN, hepatitis C, bipolar disorder and medical noncompliance who presented with nausea/vomiting after running out of  her GI medication and was found to have DKA.    This patient's problems and plans were partially entered by my partner and updated as appropriate by me 05/10/22.    DKA  Uncontrolled T2DM, insulin dependent  Nausea and vomiting/ Gastroparesis  -Continue DKA protocol.  Transition to SQ insulin levemir 45 units BID plus moderate dose SSI whenever she is tolerating a diet  -On scheduled reglan for now until tolerating a diet.  Should resume charo root 500mg TID at discharge  -PT/OT/ DM educator/ nutrition consult     HTN  -Continue home lisinopril, norvasc, PRN clonidine     Gastritis   Hep C  -Seen by GI last admission. Recommend bid PPI x 1 month, then daily. Has been out of medication x 2 weeks. Continue BID PPI for now.  -Charo root recommended 500mg TID (not taking)  -S/P diflucan x 1 week for candida  -Missed follow up with GI for eval/treatment of Hep C     Bipolar disorder  -Resumed home seroquel, zoloft, trazodone  -PRN atarax  -May need to decrease medications if drowsiness does not improve     Medical noncompliance  -Has not followed up with established PCP or GI due to lost medical card and ID  -Has been out of medications for 1-2 weeks.  She did fill insulin in April for $0 copay.    DVT prophylaxis:  Medical DVT prophylaxis orders are present.        Disposition: I expect the patient to be discharged 1-2 days if blood glucose is stable.    CODE STATUS:   Code Status and Medical Interventions:   Ordered at: 05/09/22 1607     Code Status (Patient has no pulse and is not breathing):    CPR (Attempt to Resuscitate)     Medical Interventions (Patient has pulse or is breathing):    Full Support       Светлана Navarro MD  05/10/22

## 2022-05-10 NOTE — PAYOR COMM NOTE
"Bernadette Mcqueen (52 y.o. Female)     Katherine RN -[phone], fax [phone]            Date of Birth   1970    Social Security Number       Address   [address] [place] [address] [place] [address]    Home Phone   [phone]    MRN   [id number]       [facility]    Marital Status   Single                            Admission Date   22    Admission Type   Emergency    Admitting Provider   Светлана Navarro MD    Attending Provider   Светлана Navarro MD    Department, Room/Bed   [facility] 3E, S339/1       Discharge Date       Discharge Disposition       Discharge Destination                               Attending Provider: Светлана Navarro MD    Allergies: Tylenol [Acetaminophen]    Isolation: None   Infection: MRSA (18), COVID Screen (preop/placement) (22)   Code Status: CPR   Advance Care Planning Activity    Ht: 167.6 cm (66\")   Wt: 64 kg (141 lb 3.2 oz)    Admission Cmt: None   Principal Problem: None                Active Insurance as of 2022     Primary Coverage     Payor Plan Insurance Group Employer/Plan Group    [facility] [place] [place] BY BANUELOS [id number]     Payor Plan Address Payor Plan Phone Number Payor Plan Fax Number Effective Dates    [address]   2021 - None Entered    [place] [id number]       Subscriber Name Subscriber Birth Date Member ID       BERNADETTE MCQUEEN 1970 [id number]                 Emergency Contacts      (Rel.) Home Phone Work Phone Mobile Phone    Wellington Layne (Significant Other) -- -- [phone]               History & Physical      Ssi Nguyen MD at 22 [address] [place] [address] Medicine Services  HISTORY AND PHYSICAL    Patient Name: Bernadette Mcqueen  : 1970  MRN: [id number]  Primary Care Physician: Lilly Rose MD  Date of admission: 2022    Subjective   Subjective     Chief Complaint:  Nausea and vomiting    HPI:  Bernadette Mcqueen is a 52 y.o. female with pmh " significant for HTN, HLP, GERD, Hep C, bipolar do, insulin dependent diabetes and gastroparesis presents with 3 days of nausea and vomiting.  Patient reports since recent admission 4/7-4/11/22, has not followed up with new PCP or GI due to loss of medical card and ID. Has been out of GI medication x 2 weeks and all others over the past week. Started vomiting 3 days ago and progressive with worsening epigastric pain. No hematemesis, unable to keep down food/ fluids and urinating less.   Found to have glucose 400-500, pH 7.17 and AG 28. Patient to be admitted for further management.         Review of Systems   Constitutional: Positive for activity change and fatigue.   HENT: Negative.    Respiratory: Negative.    Cardiovascular: Negative.    Gastrointestinal: Positive for abdominal pain, nausea and vomiting.   Genitourinary: Positive for decreased urine volume.   Musculoskeletal: Negative.    Skin: Negative.    Neurological: Positive for weakness.   Psychiatric/Behavioral: Negative.         All other systems reviewed and are negative.     Personal History     Past Medical History:   Diagnosis Date   • Arthritis    • Bipolar disorder (HCC)    • Chronic bronchitis (HCC)    • Depression    • Diabetes mellitus (HCC)     DIAGNOSED 2016   • GERD (gastroesophageal reflux disease)    • Hepatitis-C    • History of blood transfusion    • Hyperlipidemia    • Hypertension    • Infectious viral hepatitis     HEPATITIS C   • Migraine    • Sleep apnea     PATIENT UNSURE OF SETTINGS             Past Surgical History:   Procedure Laterality Date   • CHOLECYSTECTOMY     • ENDOSCOPY N/A 4/9/2022    Procedure: ESOPHAGOGASTRODUODENOSCOPY;  Surgeon: Brunner, Mark I, MD;  [facility];  Service: Gastroenterology;  Laterality: N/A;  with antrum biopsy   • HYSTERECTOMY     • KNEE SURGERY     • LUMBAR LAMINECTOMY DISCECTOMY DECOMPRESSION N/A 8/6/2018    Procedure: LUMBAR LAMINECTOMIES L4-S1;  Surgeon: Chip Smith MD;   Location: [facility] OR;  Service: Neurosurgery       Family History:  family history is not on file. Otherwise pertinent FHx was reviewed and unremarkable.     Social History:  reports that she has been smoking. She has been smoking about 1.00 pack per day. She has never used smokeless tobacco. She reports that she does not drink alcohol and does not use drugs.  Social History     Social History Narrative   • Not on file       Medications:  Accu-Chek FastClix Lancets, Alcohol Pads, Insulin Pen Needle, Pen Needles, QUEtiapine, albuterol sulfate HFA, amLODIPine, busPIRone, cetirizine, cloNIDine, furosemide, hydrOXYzine, insulin detemir, insulin lispro, lisinopril, metFORMIN, metoprolol tartrate, ondansetron ODT, oxyCODONE-acetaminophen, pantoprazole, sertraline, and traZODone    Allergies   Allergen Reactions   • Tylenol [Acetaminophen] Other (See Comments)     SEVERE LIVER DISEASE-CONTRAINDICATED       Objective   Objective     Vital Signs:   Temp:  [98.2 °F (36.8 °C)] 98.2 °F (36.8 °C)  Heart Rate:  [119] 119  Resp:  [18] 18  BP: (179)/(132) 179/132    Physical Exam   Constitutional: No acute distress, awake, alert,appears ill  HENT: NCAT, mucous membranes dry  Respiratory: Clear to auscultation bilaterally, respiratory effort normal   Cardiovascular: RRR- tachycardic, no murmurs, rubs, or gallops  Gastrointestinal: Positive bowel sounds, soft, nontender, nondistended  Musculoskeletal: No bilateral ankle edema  Psychiatric: Appropriate affect, cooperative  Neurologic: Oriented x 3, strength symmetric in all extremities, Cranial Nerves grossly intact to confrontation, speech mildly dysarthric at baseline  Skin: No rashes      Results Reviewed:  I have personally reviewed most recent indicated data and agree with findings including:  [x]  Laboratory  [x]  Radiology  [x]  EKG/Telemetry  []  Pathology  []  Cardiac/Vascular Studies  [x]  Old records  []  Other:  Most pertinent findings include:      LAB RESULTS:      Lab  05/09/22  1301   WBC 8.62   HEMOGLOBIN 16.6*   HEMATOCRIT 47.9*   PLATELETS 326   NEUTROS ABS 7.13*   IMMATURE GRANS (ABS) 0.01   LYMPHS ABS 1.35   MONOS ABS 0.11   EOS ABS 0.00   MCV 93.4         Lab 05/09/22  1450 05/09/22  1301   SODIUM 135*  --    POTASSIUM 4.2  --    CHLORIDE 91*  --    CO2 16.0*  --    ANION GAP 28.0*  --    BUN 13  --    CREATININE 0.84  --    EGFR 84.3  --    GLUCOSE 529*  --    CALCIUM 10.1  --    HEMOGLOBIN A1C  --  11.90*         Lab 05/09/22  1450   TOTAL PROTEIN 7.8   ALBUMIN 4.90   GLOBULIN 2.9   ALT (SGPT) 63*   AST (SGOT) 60*   BILIRUBIN 0.5   ALK PHOS 197*                     Lab 05/09/22  1506   FIO2 21     Brief Urine Lab Results  (Last result in the past 365 days)      Color   Clarity   Blood   Leuk Est   Nitrite   Protein   CREAT   Urine HCG        04/06/22 1103               Negative       04/06/22 1103 Yellow   Clear   Trace   Negative   Negative   30 mg/dL (1+)               Microbiology Results (last 10 days)     ** No results found for the last 240 hours. **          XR Chest 1 View    Result Date: 5/9/2022  DATE OF EXAM: 5/9/2022 2:46 PM  PROCEDURE: XR CHEST 1 VW-  INDICATIONS: Nausea vomiting  COMPARISON: Chest x-ray 4/6/2022  TECHNIQUE: Single radiographic AP view of the chest was obtained.  FINDINGS: Normal cardiomediastinal silhouette. The lungs are clear without focal consolidation. No pleural effusion or pneumothorax. No acute osseous findings. The partially imaged upper abdomen appears unremarkable.      Impression: No acute cardiopulmonary findings.  This report was finalized on 5/9/2022 2:54 PM by Amor Espinoza MD.            Assessment/Plan   Assessment & Plan       Diabetic ketoacidosis without coma associated with type 2 diabetes mellitus (HCC)    Nausea & vomiting    Acidosis    Bipolar affective disorder (HCC)    Hepatitis C    Accelerated hypertension    Medically noncompliant    Gastritis    Hyperlipidemia      DKA  Uncontrolled T2DM, insulin  dependent  Nausea and vomiting/ Gastroparesis  -- venous ph 7.17, AG 28, beta hydroxybutyrate pending  -- Glucommander activated. Insulin gtt/ fluids ordered. Current fsbs 529  -- s/p 2L NS in ED  -- serial lab work and fsbs  -- start reglan qac/hs x 2 days  -- prn zofran  --PT/OT/ DM educator/ RD consult    Accelerated HTN  -- given hydralazine in ED, no significant change  -- restart home lisinopril, norvasc, prn clonidine    Gastritis   Hep C  -- patient seen by GI last admission. Recommend bid PPI x 1 month, then daily. Has been out of medication x 2 weeks. Will restart bid for now  -- jian root recommended 500mg TID (not taking)  -- s/p diflucan x 1 week for candida. Missed follo up wit GI for eval/ treatment of Hep C    Bipolar  -- restart home seroquel  -- prn atarax    Medical noncompliance  -- has not followed up with established pcp or GI due to lost medical card and ID  -- Has been out of medications for 1-2 weeks    DVT prophylaxis:  [facility]    CODE STATUS:   Code Status (Patient has no pulse and is not breathing): CPR (Attempt to Resuscitate)  Medical Interventions (Patient has pulse or is breathing): Full Support      This note has been completed as part of a split-shared workflow.   Electronically signed by DMITRY Novoa, 05/09/22, 4:11 PM EDT.          Attending   Admission Attestation       I have seen and examined the patient, performing an independent face-to-face diagnostic evaluation with plan of care reviewed and developed with the advanced practice clinician (APC).      Brief Summary Statement:   Bernadette Paris is a 52 y.o. female with a history of bipolar disorder, insulin-dependent diabetes, gastroparesis, hypertension who is presenting with worsening abdominal pain, nausea vomiting and has been out of her medications.  She was noted to have a high fingerstick blood glucose in the ED and was acidotic, treatment was initiated for DKA.  With fluids and insulin she reports subjective  improvement.  She has slight chest discomfort but this is not new for her.  She currently does not endorse any nausea.  She has had some difficulty in obtaining her medications.    Remainder of detailed HPI is as noted by APC and has been reviewed and/or edited by me for completeness.    Attending Physical Exam:  Constitutional: No acute distress, awake, alert, nontoxic, chronically ill-appearing  HENT: NCAT, mucous membranes moist  Respiratory: Clear to auscultation bilaterally, respiratory effort normal   Cardiovascular: Tachycardic, no murmurs, rubs, or gallops  Gastrointestinal: Positive bowel sounds, soft, nontender, nondistended  Musculoskeletal: No bilateral ankle edema  Psychiatric: Appropriate affect, cooperative  Neurologic: Oriented x 3, strength symmetric in all extremities, Cranial Nerves grossly intact to confrontation, speech clear  Skin: No rashes    Brief Assessment/Plan :  See detailed assessment and plan developed with APC which I have reviewed and/or edited for completeness.    In summary 52-year-old female with history of bipolar disorder, insulin-dependent diabetes, gastroparesis who is admitted for DKA secondary to medication noncompliance and running out of medications.  DKA protocol has been initiated.  I have counseled the patient on the importance of obtaining her medications.  Diabetes educator to see in a.m., social work and case management to follow-up as well.    Admission Status: I believe that this patient meets inpatient criteria      Sis Nguyen MD  05/09/22                      Electronically signed by Sis Nguyen MD at 05/09/22 7785         Lab Results (last 24 hours)     Procedure Component Value Units Date/Time    POC Glucose Once [[id number]]  (Abnormal) Collected: 05/10/22 0952    Specimen: Blood Updated: 05/10/22 0957     Glucose 141 mg/dL      Comment: Meter: [id number] : [id number] Cornel Turner       POC Glucose Once [[id number]]  (Abnormal) Collected: 05/10/22 0742     Specimen: Blood Updated: 05/10/22 0748     Glucose 150 mg/dL      Comment: Meter: [id number] : [id number] Barber Bey       Blood Gas, Venous With Co-Ox [[id number]] Collected: 05/09/22 1506    Specimen: Venous Blood Updated: 05/10/22 0745     Site --     Comment: Corrected result. Previous result was Nurse/Dr Draw on 5/9/2022 at 1508 EDT.        pH, Venous --     Comment: 85 Value below critical limit  Corrected result. Previous result was 7.175 pH Units on 5/9/2022 at 1508 EDT.        pCO2, Venous --     Comment: Corrected result. Previous result was 47.6 mm Hg on 5/9/2022 at 1508 EDT.        pO2, Venous --     Comment: Corrected result. Previous result was 33.4 mm Hg on 5/9/2022 at 1508 EDT.        HCO3, Venous --     Comment: Corrected result. Previous result was 17.6 mmol/L on 5/9/2022 at 1508 EDT.        Base Excess, Venous --     Comment:  The parameter value has a question anne  Corrected result. Previous result was -11.0 mmol/L on 5/9/2022 at 1508 EDT.        Hemoglobin, Blood Gas --     Comment:  The parameter value has a question [id number] Inhomogeneous sample        Oxyhemoglobin Venous --     Comment:  The parameter value has a question [id number] Inhomogeneous sample        Methemoglobin Venous --     Comment:  The parameter value has a question [id number] Inhomogeneous sample        Carboxyhemoglobin Venous --     Comment:  The parameter value has a question [id number] Inhomogeneous sample        CO2 Content --     Comment: Corrected result. Previous result was 19.0 mmol/L on 5/9/2022 at 1508 EDT.        Temperature --     Comment: Corrected result. Previous result was 37.0 C on 5/9/2022 at 1508 EDT.        Barometric Pressure for Blood Gas --     Comment: N/A        Modality --     Comment: Corrected result. Previous result was Room Air on 5/9/2022 at 1508 EDT.        FIO2 --     Comment: Corrected result. Previous result was 21 % on 5/9/2022 at 1508 EDT.        Ventilator Mode       Comment: Meter:  [id number]     :  [id number]        Note --     Notified Who --     Comment: Corrected result. Previous result was DARYA CONNER on 5/9/2022 at 1508 EDT.        Notified By --     Comment: Corrected result. Previous result was [id number] on 5/9/2022 at 1508 EDT.        Notified Time --     Comment: Corrected result. Previous result was 05/09/2022 15:08 on 5/9/2022 at 1508 EDT.       POC Glucose Once [[id number]]  (Abnormal) Collected: 05/10/22 0639    Specimen: Blood Updated: 05/10/22 0643     Glucose 151 mg/dL      Comment: Meter: [id number] : [id number] Ravinder Cami       Phosphorus [[id number]]  (Abnormal) Collected: 05/10/22 0430    Specimen: Blood Updated: 05/10/22 0550     Phosphorus 1.3 mg/dL     Basic Metabolic Panel [[id number]]  (Abnormal) Collected: 05/10/22 0430    Specimen: Blood Updated: 05/10/22 0541     Glucose 191 mg/dL      BUN 12 mg/dL      Creatinine 0.68 mg/dL      Sodium 134 mmol/L      Potassium 3.4 mmol/L      Chloride 103 mmol/L      CO2 19.0 mmol/L      Calcium 8.9 mg/dL      BUN/Creatinine Ratio 17.6     Anion Gap 12.0 mmol/L      eGFR 105.6 mL/min/1.73      Comment: National Kidney Foundation and American Society of Nephrology (ASN) Task Force recommended calculation based on the Chronic Kidney Disease Epidemiology Collaboration (CKD-EPI) equation refit without adjustment for race.       Narrative:      GFR Normal >60  Chronic Kidney Disease <60  Kidney Failure <15      Magnesium [[id number]]  (Normal) Collected: 05/10/22 0430    Specimen: Blood Updated: 05/10/22 0541     Magnesium 2.0 mg/dL     POC Glucose Once [[id number]]  (Abnormal) Collected: 05/10/22 0536    Specimen: Blood Updated: 05/10/22 0538     Glucose 153 mg/dL      Comment: Meter: [id number] : [id number] Ravinder Cami       POC Glucose Once [[id number]]  (Abnormal) Collected: 05/10/22 0432    Specimen: Blood Updated: 05/10/22 0435     Glucose 157 mg/dL      Comment: Meter: [id number] : [id number] Ravinder  Cami       POC Glucose Once [[id number]]  (Abnormal) Collected: 05/10/22 0324    Specimen: Blood Updated: 05/10/22 0327     Glucose 226 mg/dL      Comment: Meter: [id number] : [id number] Luis Manuel Luke       POC Glucose Once [[id number]]  (Abnormal) Collected: 05/10/22 0222    Specimen: Blood Updated: 05/10/22 0225     Glucose 195 mg/dL      Comment: Meter: [id number] : [id number] Ravinder Cami       POC Glucose Once [[id number]]  (Abnormal) Collected: 05/10/22 0119    Specimen: Blood Updated: 05/10/22 0121     Glucose 187 mg/dL      Comment: Meter: [id number] : [id number] Ravinder Cami       Phosphorus [[id number]]  (Abnormal) Collected: 05/10/22 0015    Specimen: Blood Updated: 05/10/22 0104     Phosphorus 2.2 mg/dL     Basic Metabolic Panel [[id number]]  (Abnormal) Collected: 05/10/22 0015    Specimen: Blood Updated: 05/10/22 0104     Glucose 284 mg/dL      BUN 15 mg/dL      Creatinine 0.76 mg/dL      Sodium 133 mmol/L      Potassium 4.1 mmol/L      Comment: Slight hemolysis detected by analyzer. Results may be affected.        Chloride 101 mmol/L      CO2 18.0 mmol/L      Calcium 9.2 mg/dL      BUN/Creatinine Ratio 19.7     Anion Gap 14.0 mmol/L      eGFR 95.0 mL/min/1.73      Comment: National Kidney Foundation and American Society of Nephrology (ASN) Task Force recommended calculation based on the Chronic Kidney Disease Epidemiology Collaboration (CKD-EPI) equation refit without adjustment for race.       Narrative:      GFR Normal >60  Chronic Kidney Disease <60  Kidney Failure <15      Magnesium [[id number]]  (Normal) Collected: 05/10/22 0015    Specimen: Blood Updated: 05/10/22 0104     Magnesium 2.1 mg/dL     Troponin [[id number]]  (Normal) Collected: 05/10/22 0015    Specimen: Blood Updated: 05/10/22 0102     Troponin T <0.010 ng/mL     Narrative:      Troponin T Reference Range:  <= 0.03 ng/mL-   Negative for AMI  >0.03 ng/mL-     Abnormal for myocardial necrosis.  Clinicians would have to  utilize clinical acumen, EKG, Troponin and serial changes to determine if it is an Acute Myocardial Infarction or myocardial injury due to an underlying chronic condition.       Results may be falsely decreased if patient taking Biotin.      POC Glucose Once [[id number]]  (Abnormal) Collected: 05/10/22 0016    Specimen: Blood Updated: 05/10/22 0018     Glucose 288 mg/dL      Comment: Meter: [id number] : [id number] Ravinder Almanza       COVID PRE-OP / PRE-PROCEDURE SCREENING ORDER (NO ISOLATION) - Swab, Nasopharynx [[id number]] Collected: 05/09/22 2113    Specimen: Swab from Nasopharynx Updated: 05/09/22 2327    Narrative:      The following orders were created for panel order COVID PRE-OP / PRE-PROCEDURE SCREENING ORDER (NO ISOLATION) - Swab, Nasopharynx.  Procedure                               Abnormality         Status                     ---------                               -----------         ------                     COVID-19, APTIMA PANTHER...[[id number]]                      In process                   Please view results for these tests on the individual orders.    COVID-19, APTIMA PANTHER AYDEE IN-HOUSE NP/OP SWAB IN UTM/VTM/SALINE TRANSPORT MEDIA 24HR TAT - Swab, Nasopharynx [[id number]] Collected: 05/09/22 2113    Specimen: Swab from Nasopharynx Updated: 05/09/22 2327    POC Glucose Once [[id number]]  (Abnormal) Collected: 05/09/22 2313    Specimen: Blood Updated: 05/09/22 2317     Glucose 288 mg/dL      Comment: Meter: [id number] : [id number] Luis Manuel Luke       POC Glucose Once [[id number]]  (Abnormal) Collected: 05/09/22 2210    Specimen: Blood Updated: 05/09/22 2213     Glucose 342 mg/dL      Comment: Meter: [id number] : [id number] Ravinder Almanza       Comprehensive Metabolic Panel [[id number]]  (Abnormal) Collected: 05/09/22 2009    Specimen: Blood Updated: 05/09/22 2141     Glucose 428 mg/dL      BUN 15 mg/dL      Creatinine 0.78 mg/dL      Sodium 136 mmol/L      Potassium 3.9 mmol/L       Chloride 100 mmol/L      CO2 11.0 mmol/L      Calcium 9.1 mg/dL      Total Protein 6.3 g/dL      Albumin 4.10 g/dL      ALT (SGPT) 49 U/L      AST (SGOT) 41 U/L      Alkaline Phosphatase 152 U/L      Total Bilirubin 0.3 mg/dL      Globulin 2.2 gm/dL      Comment: Calculated Result        A/G Ratio 1.9 g/dL      BUN/Creatinine Ratio 19.2     Anion Gap 25.0 mmol/L      eGFR 92.1 mL/min/1.73      Comment: National Kidney Foundation and American Society of Nephrology (ASN) Task Force recommended calculation based on the Chronic Kidney Disease Epidemiology Collaboration (CKD-EPI) equation refit without adjustment for race.       Narrative:      GFR Normal >60  Chronic Kidney Disease <60  Kidney Failure <15      Phosphorus [[id number]]  (Normal) Collected: 05/09/22 2009    Specimen: Blood Updated: 05/09/22 2137     Phosphorus 4.0 mg/dL     Magnesium [[id number]]  (Normal) Collected: 05/09/22 2009    Specimen: Blood Updated: 05/09/22 2137     Magnesium 2.1 mg/dL     Osmolality, Serum [[id number]]  (Abnormal) Collected: 05/09/22 2009    Specimen: Blood Updated: 05/09/22 2127     Osmolality 305 mOsm/kg     CBC & Differential [[id number]]  (Abnormal) Collected: 05/09/22 2009    Specimen: Blood Updated: 05/09/22 2110    Narrative:      The following orders were created for panel order CBC & Differential.  Procedure                               Abnormality         Status                     ---------                               -----------         ------                     CBC Auto Differential[[id number]]        Abnormal            Final result                 Please view results for these tests on the individual orders.    CBC Auto Differential [[id number]]  (Abnormal) Collected: 05/09/22 2009    Specimen: Blood Updated: 05/09/22 2110     WBC 10.02 10*3/mm3      RBC 4.07 10*6/mm3      Hemoglobin 13.2 g/dL      Hematocrit 36.9 %      MCV 90.7 fL      MCH 32.4 pg      MCHC 35.8 g/dL      RDW 13.8 %      RDW-SD 45.9 fl      MPV  9.8 fL      Platelets 283 10*3/mm3      Neutrophil % 78.8 %      Lymphocyte % 16.4 %      Monocyte % 4.4 %      Eosinophil % 0.0 %      Basophil % 0.1 %      Immature Grans % 0.3 %      Neutrophils, Absolute 7.90 10*3/mm3      Lymphocytes, Absolute 1.64 10*3/mm3      Monocytes, Absolute 0.44 10*3/mm3      Eosinophils, Absolute 0.00 10*3/mm3      Basophils, Absolute 0.01 10*3/mm3      Immature Grans, Absolute 0.03 10*3/mm3      nRBC 0.0 /100 WBC     POC Glucose Once [[id number]]  (Abnormal) Collected: 05/09/22 2103    Specimen: Blood Updated: 05/09/22 2105     Glucose 391 mg/dL      Comment: Meter: [id number] : [id number] Luis Manuel Ti       POC Glucose Once [[id number]]  (Abnormal) Collected: 05/09/22 1957    Specimen: Blood Updated: 05/09/22 2001     Glucose 402 mg/dL      Comment: Treated Patient Meter: [id number] : [id number] Ravinder Almanza       POC Glucose Once [[id number]]  (Abnormal) Collected: 05/09/22 1855    Specimen: Blood Updated: 05/09/22 1905     Glucose 431 mg/dL      Comment: Result Not Confirmed Meter: [id number] : [id number] Estradaoral Carbajal       POC Glucose Once [[id number]]  (Abnormal) Collected: 05/09/22 1750    Specimen: Blood Updated: 05/09/22 1751     Glucose 441 mg/dL      Comment: Physician Notified Meter: [id number] : [id number] Millie Светлана       Pandora Draw [[id number]] Collected: 05/09/22 1301    Specimen: Blood Updated: 05/09/22 1704    Narrative:      The following orders were created for panel order Pandora Draw.  Procedure                               Abnormality         Status                     ---------                               -----------         ------                     Green Top (Gel)[[id number]]                                  Final result               Lavender Top[[id number]]                                     Final result               Gold Top - SST[[id number]]                                   Final result               Mckoy Top[[id number]]                                          Final result               Light Blue Top[[id number]]                                   Final result                 Please view results for these tests on the individual orders.    Mckoy Top [[id number]] Collected: 05/09/22 1301    Specimen: Blood Updated: 05/09/22 1704     Extra Tube Hold for add-ons.     Comment: Auto resulted.       POC Glucose Once [[id number]]  (Abnormal) Collected: 05/09/22 1648    Specimen: Blood Updated: 05/09/22 1658     Glucose 501 mg/dL      Comment: Result Not Confirmed Meter: [id number] : [id number] [facility]       Ketone Bodies, Serum (Not performed at [facility]) [[id number]]  (Abnormal) Collected: 05/09/22 1450    Specimen: Blood Updated: 05/09/22 1637    Narrative:      The following orders were created for panel order Ketone Bodies, Serum (Not performed at [facility]).  Procedure                               Abnormality         Status                     ---------                               -----------         ------                     Beta Hydroxybutyrate Martin...[[id number]]  Abnormal            Final result                 Please view results for these tests on the individual orders.    Beta Hydroxybutyrate Quantitative [[id number]]  (Abnormal) Collected: 05/09/22 1450    Specimen: Blood Updated: 05/09/22 1637     Beta-Hydroxybutyrate Quant 5.570 mmol/L     Narrative:      In the assessment of possible diabetic ketoacidosis, the test should be interpreted along with other clinical and laboratory findings.  A level greater than 1 mmol/L should require further evaluation and levels of more than 3 mmol/L require immediate medical review.    Phosphorus [[id number]]  (Abnormal) Collected: 05/09/22 1450    Specimen: Blood Updated: 05/09/22 1626     Phosphorus 5.7 mg/dL     Magnesium [[id number]]  (Normal) Collected: 05/09/22 1450    Specimen: Blood Updated: 05/09/22 1626     Magnesium 2.3 mg/dL     Comprehensive Metabolic Panel [[id number]]  (Abnormal)  Collected: 05/09/22 1450    Specimen: Blood Updated: 05/09/22 1605     Glucose 529 mg/dL      BUN 13 mg/dL      Creatinine 0.84 mg/dL      Sodium 135 mmol/L      Potassium 4.2 mmol/L      Chloride 91 mmol/L      CO2 16.0 mmol/L      Calcium 10.1 mg/dL      Total Protein 7.8 g/dL      Albumin 4.90 g/dL      ALT (SGPT) 63 U/L      AST (SGOT) 60 U/L      Alkaline Phosphatase 197 U/L      Total Bilirubin 0.5 mg/dL      Globulin 2.9 gm/dL      Comment: Calculated Result        A/G Ratio 1.7 g/dL      BUN/Creatinine Ratio 15.5     Anion Gap 28.0 mmol/L      eGFR 84.3 mL/min/1.73      Comment: National Kidney Foundation and American Society of Nephrology (ASN) Task Force recommended calculation based on the Chronic Kidney Disease Epidemiology Collaboration (CKD-EPI) equation refit without adjustment for race.       Narrative:      GFR Normal >60  Chronic Kidney Disease <60  Kidney Failure <15      Green Top (Gel) [[id number]] Collected: 05/09/22 1450    Specimen: Blood Updated: 05/09/22 1604     Extra Tube Hold for add-ons.     Comment: Auto resulted.       Gold Top - SST [[id number]] Collected: 05/09/22 1450    Specimen: Blood Updated: 05/09/22 1604     Extra Tube Hold for add-ons.     Comment: Auto resulted.       Hemoglobin A1c [[id number]]  (Abnormal) Collected: 05/09/22 1301    Specimen: Blood Updated: 05/09/22 1604     Hemoglobin A1C 11.90 %     Narrative:      Hemoglobin A1C Ranges:    Increased Risk for Diabetes  5.7% to 6.4%  Diabetes                     >= 6.5%  Diabetic Goal                < 7.0%    POC Glucose Once [[id number]]  (Abnormal) Collected: 05/09/22 1507    Specimen: Blood Updated: 05/09/22 1508     Glucose 464 mg/dL      Comment: Physician Notified Meter: [id number] : [id number] Millie Sotomayor       Lavender Top [[id number]] Collected: 05/09/22 1301    Specimen: Blood Updated: 05/09/22 1404     Extra Tube hold for add-on     Comment: Auto resulted       Light Blue Top [[id number]] Collected: 05/09/22  1301    Specimen: Blood Updated: 05/09/22 1404     Extra Tube hold for add-on     Comment: Auto resulted       CBC & Differential [[id number]]  (Abnormal) Collected: 05/09/22 1301    Specimen: Blood Updated: 05/09/22 1315    Narrative:      The following orders were created for panel order CBC & Differential.  Procedure                               Abnormality         Status                     ---------                               -----------         ------                     CBC Auto Differential[[id number]]        Abnormal            Final result                 Please view results for these tests on the individual orders.    CBC Auto Differential [[id number]]  (Abnormal) Collected: 05/09/22 1301    Specimen: Blood Updated: 05/09/22 1315     WBC 8.62 10*3/mm3      RBC 5.13 10*6/mm3      Hemoglobin 16.6 g/dL      Hematocrit 47.9 %      MCV 93.4 fL      MCH 32.4 pg      MCHC 34.7 g/dL      RDW 13.6 %      RDW-SD 46.7 fl      MPV 9.8 fL      Platelets 326 10*3/mm3      Neutrophil % 82.7 %      Lymphocyte % 15.7 %      Monocyte % 1.3 %      Eosinophil % 0.0 %      Basophil % 0.2 %      Immature Grans % 0.1 %      Neutrophils, Absolute 7.13 10*3/mm3      Lymphocytes, Absolute 1.35 10*3/mm3      Monocytes, Absolute 0.11 10*3/mm3      Eosinophils, Absolute 0.00 10*3/mm3      Basophils, Absolute 0.02 10*3/mm3      Immature Grans, Absolute 0.01 10*3/mm3      nRBC 0.0 /100 WBC         Imaging Results (Last 24 Hours)     Procedure Component Value Units Date/Time    XR Chest 1 View [[id number]] Collected: 05/09/22 1453     Updated: 05/09/22 1457    Narrative:      DATE OF EXAM: 5/9/2022 2:46 PM     PROCEDURE: XR CHEST 1 VW-     INDICATIONS: Nausea vomiting     COMPARISON: Chest x-ray 4/6/2022     TECHNIQUE: Single radiographic AP view of the chest was obtained.     FINDINGS:  Normal cardiomediastinal silhouette. The lungs are clear without focal  consolidation. No pleural effusion or pneumothorax. No acute osseous  findings.  The partially imaged upper abdomen appears unremarkable.        Impression:      No acute cardiopulmonary findings.     This report was finalized on 5/9/2022 2:54 PM by Amor Espinoza MD.           Physician Progress Notes (last 24 hours)  Notes from 05/09/22 1039 through 05/10/22 1039   No notes of this type exist for this encounter.         Consult Notes (last 24 hours)  Notes from 05/09/22 1039 through 05/10/22 1039   No notes of this type exist for this encounter.

## 2022-05-10 NOTE — THERAPY EVALUATION
Patient Name: Bernadette Paris  : 1970    MRN: [id number]                              Today's Date: 5/10/2022       Admit Date: 2022    Visit Dx:     ICD-10-CM ICD-9-CM   1. Acidosis  E87.2 276.2   2. Hyperglycemia  R73.9 790.29   3. Moderate dehydration  E86.0 276.51   4. Diabetic ketoacidosis without coma associated with type 2 diabetes mellitus (HCC)  E11.10 250.12   5. Nausea and vomiting, unspecified vomiting type  R11.2 787.01     Patient Active Problem List   Diagnosis   • Spinal stenosis, lumbar region, with neurogenic claudication   • Degenerative disc disease, lumbar   • Facet arthritis of lumbar region   • Lumbar stenosis with neurogenic claudication   • BMI 40.0-44.9, adult (HCC)   • Encounter for smoking cessation counseling   • S/P lumbar laminectomy   • Diabetic ketoacidosis without coma associated with type 2 diabetes mellitus (HCC)   • Nausea & vomiting   • Acidosis   • Bipolar affective disorder (HCC)   • Hepatitis C   • Accelerated hypertension   • Medically noncompliant   • Gastritis   • Hyperlipidemia     Past Medical History:   Diagnosis Date   • Arthritis    • Bipolar disorder (HCC)    • Chronic bronchitis (HCC)    • Depression    • Diabetes mellitus (HCC)     DIAGNOSED    • GERD (gastroesophageal reflux disease)    • Hepatitis-C    • History of blood transfusion    • Hyperlipidemia    • Hypertension    • Infectious viral hepatitis     HEPATITIS C   • Migraine    • Sleep apnea     PATIENT UNSURE OF SETTINGS     Past Surgical History:   Procedure Laterality Date   • CHOLECYSTECTOMY     • ENDOSCOPY N/A 2022    Procedure: ESOPHAGOGASTRODUODENOSCOPY;  Surgeon: Brunner, Mark I, MD;  Location: [facility] ENDOSCOPY;  Service: Gastroenterology;  Laterality: N/A;  with antrum biopsy   • HYSTERECTOMY     • KNEE SURGERY     • LUMBAR LAMINECTOMY DISCECTOMY DECOMPRESSION N/A 2018    Procedure: LUMBAR LAMINECTOMIES L4-S1;  Surgeon: Chip Smith MD;  Location: [facility] OR;  Service:  Neurosurgery      General Information     Row Name 05/10/22 1329          OT Time and Intention    Document Type evaluation  -AN     Mode of Treatment occupational therapy  -AN     Row Name 05/10/22 1329          General Information    Patient Profile Reviewed yes  -AN     Prior Level of Function independent:;all household mobility;gait;ADL's  ambulates with cane at baseline  -AN     Existing Precautions/Restrictions fall  -AN     Barriers to Rehab medically complex;previous functional deficit;cognitive status  -AN     Row Name 05/10/22 1329          Living Environment    People in Home significant other  -AN     Row Name 05/10/22 [facility]          Home Main Entrance    Number of Stairs, Main Entrance two  -AN     Stair Railings, Main Entrance none  -AN     Row Name 05/10/22 1329          Stairs Within Home, Primary    Number of Stairs, Within Home, Primary none  -AN     Row Name 05/10/22 [facility]9          Cognition    Orientation Status (Cognition) oriented x 3;other (see comments)  requires increased times and repeated questions due to lethargy  -AN     Row Name 05/10/22 1329          Safety Issues, Functional Mobility    Safety Issues Affecting Function (Mobility) awareness of need for assistance;insight into deficits/self-awareness;judgment;problem-solving;safety precaution awareness;safety precautions follow-through/compliance;sequencing abilities  -AN     Impairments Affecting Function (Mobility) balance;cognition;endurance/activity tolerance;postural/trunk control;strength;sensation/sensory awareness;range of motion (ROM);pain  -AN     Cognitive Impairments, Mobility Safety/Performance awareness, need for assistance;insight into deficits/self-awareness;judgment;problem-solving/reasoning;safety precaution awareness;safety precaution follow-through;sequencing abilities;attention  -AN           User Key  (r) = Recorded By, (t) = Taken By, (c) = Cosigned By    Initials Name Provider Type    Ivana Pace OT  Occupational Therapist                 Mobility/ADL's     Row Name 05/10/22 1330          Bed Mobility    Bed Mobility supine-sit  -AN     Supine-Sit [place] (Bed Mobility) supervision;verbal cues  -AN     Bed Mobility, Safety Issues decreased use of arms for pushing/pulling;decreased use of legs for bridging/pushing;impaired trunk control for bed mobility;cognitive deficits limit understanding  -AN     Assistive Device (Bed Mobility) head of bed elevated;bed rails  -AN     Comment, (Bed Mobility) increased time and encouragement required to sit EOB  -AN     Row Name 05/10/22 1330          Transfers    Transfers sit-stand transfer;stand-sit transfer  -AN     Comment, (Transfers) cue for hand placement and transfer technique using RW  -AN     Sit-Stand [place] (Transfers) minimum assist (75% patient effort);1 person assist;verbal cues  -AN     Stand-Sit [place] (Transfers) verbal cues;minimum assist (75% patient effort);1 person assist  -AN     Row Name 05/10/22 1330          Sit-Stand Transfer    Assistive Device (Sit-Stand Transfers) walker, front-wheeled  -AN     Row Name 05/10/22 1330          Stand-Sit Transfer    Assistive Device (Stand-Sit Transfers) walker, front-wheeled  -AN     Row Name 05/10/22 1330          Functional Mobility    Functional Mobility- Ind. Level minimum assist (75% patient effort);1 person;verbal cues required;nonverbal cues required (demo/gesture)  -AN     Functional Mobility- Device walker, front-wheeled  -AN     Functional Mobility-Distance (Feet) --  <household distance  -AN     Functional Mobility- Safety Issues balance decreased during turns;step length decreased  -AN     Functional Mobility- Comment ambulates at a slow pace with cues to keep eyes open and for safety  -AN     Row Name 05/10/22 1330          Activities of Daily Living    BADL Assessment/Intervention lower body dressing;toileting;grooming  -AN     Row Name 05/10/22 1330          Lower Body Dressing  Assessment/Training    [place] Level (Lower Body Dressing) don;socks;doff;set up  -AN     Position (Lower Body Dressing) edge of bed sitting  -AN     Row Name 05/10/22 1330          Toileting Assessment/Training    [place] Level (Toileting) adjust/manage clothing;perform perineal hygiene;minimum assist (75% patient effort);verbal cues;nonverbal cues (demo/gesture)  -AN     Assistive Devices (Toileting) commode  -AN     Position (Toileting) supported standing;unsupported sitting  -AN     Row Name 05/10/22 1330          Grooming Assessment/Training    [place] Level (Grooming) wash face, hands;set up  -AN     Position (Grooming) supported sitting  -AN           User Key  (r) = Recorded By, (t) = Taken By, (c) = Cosigned By    Initials Name Provider Type    Ivana Pace OT Occupational Therapist               Obj/Interventions     Row Name 05/10/22 1333          Sensory Assessment (Somatosensory)    Sensory Assessment (Somatosensory) left UE  -AN     Left UE Sensory Assessment light touch awareness  -AN     Sensory Subjective Reports numbness  -AN     Row Name 05/10/22 1333          Vision Assessment/Intervention    Visual Impairment/Limitations other (see comments)  difficult to assess due to lethargy and poor attention; pt appeared tired as she keep her eyes closed most of the session  -AN     Row Name 05/10/22 1333          Range of Motion Comprehensive    General Range of Motion bilateral upper extremity ROM WFL  -AN     Row Name 05/10/22 1333          Strength Comprehensive (MMT)    General Manual Muscle Testing (MMT) Assessment upper extremity strength deficits identified;lower extremity strength deficits identified  -AN     Comment, General Manual Muscle Testing (MMT) Assessment RUE 3+/5, LUE 3/5; observed BLE weakness with functional mobility  -AN     Row Name 05/10/22 1333          Motor Skills    Motor Skills coordination;functional endurance;motor control/coordination interventions   -AN     Coordination fine motor deficit;gross motor deficit;bilateral;upper extremity;minimal impairment  -AN     Functional Endurance decreased activity tolerance  -AN     Motor Control/Coordination Interventions gross motor coordination activities;functional task specific training;fine motor manipulation/dexterity activities;occupation/activity based treatment  -AN     Row Name 05/10/22 1333          Balance    Balance Assessment sitting static balance;sitting dynamic balance;sit to stand dynamic balance;standing static balance;standing dynamic balance  -AN     Static Sitting Balance standby assist  -AN     Dynamic Sitting Balance standby assist  -AN     Position, Sitting Balance sitting edge of bed  -AN     Sit to Stand Dynamic Balance verbal cues;minimal assist;1-person assist  -AN     Static Standing Balance verbal cues;non-verbal cues (demo/gesture);minimal assist;1-person assist  -AN     Dynamic Standing Balance verbal cues;non-verbal cues (demo/gesture);minimal assist;1-person assist  -AN     Position/Device Used, Standing Balance walker, rolling  -AN     Balance Interventions standing;sit to stand;supported;static;dynamic;minimal challenge;occupation based/functional task  -AN           User Key  (r) = Recorded By, (t) = Taken By, (c) = Cosigned By    Initials Name Provider Type    AN Ivana Hunt OT Occupational Therapist               Goals/Plan     Row Name 05/10/22 5530          Transfer Goal 1 (OT)    Activity/Assistive Device (Transfer Goal 1, OT) sit-to-stand/stand-to-sit;commode;walker, rolling  -AN     [place] Level/Cues Needed (Transfer Goal 1, OT) supervision required  -AN     Time Frame (Transfer Goal 1, OT) long term goal (LTG);10 days  -AN     Row Name 05/10/22 8791          Toileting Goal 1 (OT)    Activity/Device (Toileting Goal 1, OT) perform perineal hygiene;adjust/manage clothing;commode  -AN     [place] Level/Cues Needed (Toileting Goal 1, OT) supervision required  -AN      Time Frame (Toileting Goal 1, OT) long term goal (LTG);10 days  -AN     Row Name 05/10/22 3655          Therapy Assessment/Plan (OT)    Planned Therapy Interventions (OT) activity tolerance training;adaptive equipment training;BADL retraining;cognitive/visual perception retraining;functional balance retraining;occupation/activity based interventions;patient/caregiver education/training;transfer/mobility retraining;strengthening exercise  -AN           User Key  (r) = Recorded By, (t) = Taken By, (c) = Cosigned By    Initials Name Provider Type    Ivana Pace, MELY Occupational Therapist               Clinical Impression     Row Name 05/10/22 [facility] 05/10/22 1335       Pain Assessment    Additional Documentation Pain Scale: FACES Pre/Post-Treatment (Group)  -AN Pain Scale: FACES Pre/Post-Treatment (Group)  -AN    [facility] Name 05/10/22 [facility] 05/10/22 1335       Pain Scale: FACES Pre/Post-Treatment    Pain: FACES Scale, Pretreatment 2-->hurts little bit  -AN 2-->hurts little bit  -AN    Posttreatment Pain Rating 2-->hurts little bit  -AN 2-->hurts little bit  -AN    Pain Location generalized  -AN generalized  -AN    Pain Location - back  -AN back  -AN    Pre/Posttreatment Pain Comment tolerated  -AN tolerated  -AN    [facility] Name 05/10/22 [facility] 05/10/22 1335       Plan of Care Review    Plan of Care Reviewed With patient  -AN --    Progress no change  OT evaluation  -AN no change  OT evaluation  -AN    Outcome Evaluation OT evaluation completed. Pt presents with generalized weakness, impaired cognition, impaired balance, and decreased activity tolerance limiting independence with ADLs. Pt lethargic and difficult to understand throughout. Bed mobility performed with supervision, STS using RW with Min A, and ambulation from bed to bathroom for toileting using RW with Min A. Min A for toileting. OT rec IP OT and SNF at [place].  -AN --    Row Name 05/10/22 [facility] 05/10/22 1335       Therapy Assessment/Plan (OT)    Patient/Family  Therapy Goal Statement (OT) Return to PLOF  -AN Return to PLOF  -AN    Rehab Potential (OT) good, to achieve stated therapy goals  -AN good, to achieve stated therapy goals  -AN    Criteria for Skilled Therapeutic Interventions Met (OT) yes;skilled treatment is necessary  -AN yes;skilled treatment is necessary  -AN    Therapy Frequency (OT) daily  -AN daily  -AN    Row Name 05/10/22 1352 05/10/22 1335       Therapy Plan Review/Discharge Plan (OT)    Equipment Needs Upon Discharge (OT) -- walker, rolling  -AN    Anticipated Discharge Disposition (OT) skilled nursing facility  -AN skilled nursing facility  -AN    Row Name 05/10/22 1352 05/10/22 1335       Vital Signs    Pre Systolic BP Rehab --  VSS  -AN --  VSS  -AN    O2 Delivery Pre Treatment room air  -AN room air  -AN    O2 Delivery Intra Treatment room air  -AN room air  -AN    O2 Delivery Post Treatment room air  -AN --    Pre Patient Position Supine  -AN Supine  -AN    Intra Patient Position Standing  -AN Standing  -AN    Post Patient Position Sitting  -AN --    Row Name 05/10/22 1352 05/10/22 1335       Positioning and Restraints    Pre-Treatment Position in bed  -AN --    Post Treatment Position bathroom  -AN --    Bathroom notified nsg;sitting;with PT  -AN notified nsg;sitting;with PT  -AN          User Key  (r) = Recorded By, (t) = Taken By, (c) = Cosigned By    Initials Name Provider Type    Ivana Pace, MELY Occupational Therapist               Outcome Measures     Row Name 05/10/22 1357          How much help from another is currently needed...    Putting on and taking off regular lower body clothing? 3  -AN     Bathing (including washing, rinsing, and drying) 3  -AN     Toileting (which includes using toilet bed pan or urinal) 3  -AN     Putting on and taking off regular upper body clothing 3  -AN     Taking care of personal grooming (such as brushing teeth) 3  -AN     Eating meals 3  -AN     AM-PAC 6 Clicks Score (OT) 18  -AN     Row Name  05/10/22 1339          How much help from another person do you currently need...    Turning from your back to your side while in flat bed without using bedrails? 3  -SS     Moving from lying on back to sitting on the side of a flat bed without bedrails? 3  -SS     Moving to and from a bed to a chair (including a wheelchair)? 3  -SS     Standing up from a chair using your arms (e.g., wheelchair, bedside chair)? 3  -SS     Climbing 3-5 steps with a railing? 2  -SS     To walk in hospital room? 3  -SS     AM-PAC 6 Clicks Score (PT) 17  -SS     Highest level of mobility 5 --> Static standing  -SS     Row Name 05/10/22 1357 05/10/22 1339       Functional Assessment    Outcome Measure Options AM-PAC 6 Clicks Daily Activity (OT)  -AN AM-PAC 6 Clicks Basic Mobility (PT)  -SS          User Key  (r) = Recorded By, (t) = Taken By, (c) = Cosigned By    Initials Name Provider Type    SS Genet Holley, PT Physical Therapist    Ivana Pace, OT Occupational Therapist                Occupational Therapy Education                 Title: PT OT SLP Therapies (In Progress)     Topic: Occupational Therapy (In Progress)     Point: ADL training (Done)     Description:   Instruct learner(s) on proper safety adaptation and remediation techniques during self care or transfers.   Instruct in proper use of assistive devices.              Learning Progress Summary           Patient Acceptance, E, VU,NR by AN at 5/10/2022 4105                   Point: Home exercise program (Not Started)     Description:   Instruct learner(s) on appropriate technique for monitoring, assisting and/or progressing therapeutic exercises/activities.              Learner Progress:  Not documented in this visit.          Point: Precautions (Done)     Description:   Instruct learner(s) on prescribed precautions during self-care and functional transfers.              Learning Progress Summary           Patient Acceptance, E, VU,NR by AN at 5/10/2022 2582                    Point: Body mechanics (Done)     Description:   Instruct learner(s) on proper positioning and spine alignment during self-care, functional mobility activities and/or exercises.              Learning Progress Summary           Patient Acceptance, E, VU,NR by MATTHEW at 5/10/2022 1358                               User Key     Initials Effective Dates Name Provider Type Discipline    MATTHEW 09/21/21 -  Ivana Hunt OT Occupational Therapist OT              OT Recommendation and Plan  Planned Therapy Interventions (OT): activity tolerance training, adaptive equipment training, BADL retraining, cognitive/visual perception retraining, functional balance retraining, occupation/activity based interventions, patient/caregiver education/training, transfer/mobility retraining, strengthening exercise  Therapy Frequency (OT): daily  Plan of Care Review  Plan of Care Reviewed With: patient  Progress: no change (OT evaluation)  Outcome Evaluation: OT evaluation completed. Pt presents with generalized weakness, impaired cognition, impaired balance, and decreased activity tolerance limiting independence with ADLs. Pt lethargic and difficult to understand throughout. Bed mobility performed with supervision, STS using RW with Min A, and ambulation from bed to bathroom for toileting using RW with Min A. Min A for toileting. OT rec IP OT and SNF at [place].     Time Calculation:    Time Calculation- OT     Row Name 05/10/22 1358             Time Calculation- OT    OT Start Time 1112  -AN      OT Received On 05/10/22  -AN      OT Goal Re-Cert Due Date 05/20/22  -AN              Untimed Charges    OT Eval/Re-eval Minutes 46  -AN              Total Minutes    Untimed Charges Total Minutes 46  -AN       Total Minutes 46  -AN            User Key  (r) = Recorded By, (t) = Taken By, (c) = Cosigned By    Initials Name Provider Type    Ivana Pace OT Occupational Therapist              Therapy Charges for Today     Code  Description Service Date Service Provider Modifiers Qty    [id number] HC OT EVAL MOD COMPLEXITY 4 5/10/2022 Ivana Hunt OT GO 1               Ivana Hunt OT  5/10/2022

## 2022-05-10 NOTE — CONSULTS
Attempted to see Ms. Paris this afternoon for diabetes education consult.  She was sleeping.  She roused to her name, but she could not stay awake.  Nurse stated that she has been sleepy all day.  We will follow for a more appropriate time to educate.  Thank you for this referral.

## 2022-05-10 NOTE — PLAN OF CARE
Goal Outcome Evaluation:  Plan of Care Reviewed With: patient        Progress: no change (OT evaluation)  Outcome Evaluation: OT evaluation completed. Pt presents with generalized weakness, impaired cognition, impaired balance, and decreased activity tolerance limiting independence with ADLs. Pt lethargic and difficult to understand throughout. Bed mobility performed with supervision, STS using RW with Min A, and ambulation from bed to bathroom for toileting using RW with Min A. Min A for toileting. OT rec IP OT and SNF at NM.

## 2022-05-10 NOTE — THERAPY EVALUATION
Patient Name: Bernadette Paris  : 1970    MRN: [id number]                              Today's Date: 5/10/2022       Admit Date: 2022    Visit Dx:     ICD-10-CM ICD-9-CM   1. Acidosis  E87.2 276.2   2. Hyperglycemia  R73.9 790.29   3. Moderate dehydration  E86.0 276.51   4. Diabetic ketoacidosis without coma associated with type 2 diabetes mellitus (HCC)  E11.10 250.12   5. Nausea and vomiting, unspecified vomiting type  R11.2 787.01     Patient Active Problem List   Diagnosis   • Spinal stenosis, lumbar region, with neurogenic claudication   • Degenerative disc disease, lumbar   • Facet arthritis of lumbar region   • Lumbar stenosis with neurogenic claudication   • BMI 40.0-44.9, adult (HCC)   • Encounter for smoking cessation counseling   • S/P lumbar laminectomy   • Diabetic ketoacidosis without coma associated with type 2 diabetes mellitus (HCC)   • Nausea & vomiting   • Acidosis   • Bipolar affective disorder (HCC)   • Hepatitis C   • Accelerated hypertension   • Medically noncompliant   • Gastritis   • Hyperlipidemia     Past Medical History:   Diagnosis Date   • Arthritis    • Bipolar disorder (HCC)    • Chronic bronchitis (HCC)    • Depression    • Diabetes mellitus (HCC)     DIAGNOSED    • GERD (gastroesophageal reflux disease)    • Hepatitis-C    • History of blood transfusion    • Hyperlipidemia    • Hypertension    • Infectious viral hepatitis     HEPATITIS C   • Migraine    • Sleep apnea     PATIENT UNSURE OF SETTINGS     Past Surgical History:   Procedure Laterality Date   • CHOLECYSTECTOMY     • ENDOSCOPY N/A 2022    Procedure: ESOPHAGOGASTRODUODENOSCOPY;  Surgeon: Brunner, Mark I, MD;  Location: [facility] ENDOSCOPY;  Service: Gastroenterology;  Laterality: N/A;  with antrum biopsy   • HYSTERECTOMY     • KNEE SURGERY     • LUMBAR LAMINECTOMY DISCECTOMY DECOMPRESSION N/A 2018    Procedure: LUMBAR LAMINECTOMIES L4-S1;  Surgeon: Chip Smith MD;  Location: [facility] OR;  Service:  Neurosurgery      General Information     Row Name 05/10/22 1324          Physical Therapy Time and Intention    Document Type evaluation  -SS     Mode of Treatment physical therapy  -SS     Row Name 05/10/22 1324          General Information    Patient Profile Reviewed yes  -SS     Prior Level of Function independent:;all household mobility;gait;transfer;bed mobility;using stairs  -SS     Existing Precautions/Restrictions fall  -SS     Barriers to Rehab medically complex  -SS     Row Name 05/10/22 1324          Living Environment    People in Home significant other  -SS     Row Name 05/10/22 132          Home Main Entrance    Number of Stairs, Main Entrance two  -SS     Stair Railings, Main Entrance none  -SS     Row Name 05/10/22 1324          Stairs Within Home, Primary    Number of Stairs, Within Home, Primary none  -SS     Row Name 05/10/22 1324          Cognition    Orientation Status (Cognition) oriented x 3;other (see comments)  pt. requires repeated attempts and demonstrates delayed responses; pt. also requires arousal several times during eval due to falling asleep - notified RN  -SS     Row Name 05/10/22 1324          Safety Issues, Functional Mobility    Safety Issues Affecting Function (Mobility) awareness of need for assistance;insight into deficits/self-awareness;judgment;positioning of assistive device;problem-solving;safety precaution awareness;sequencing abilities;safety precautions follow-through/compliance  -SS     Impairments Affecting Function (Mobility) balance;cognition;endurance/activity tolerance;postural/trunk control;strength;sensation/sensory awareness  -SS     Cognitive Impairments, Mobility Safety/Performance attention;awareness, need for assistance;insight into deficits/self-awareness;judgment;problem-solving/reasoning;safety precaution awareness;safety precaution follow-through;sequencing abilities  -           User Key  (r) = Recorded By, (t) = Taken By, (c) = Cosigned By     Initials Name Provider Type     Genet Holley PT Physical Therapist               Mobility     Row Name 05/10/22 1329          Bed Mobility    Bed Mobility sit-supine;scooting/bridging  -     Scooting/Bridging [place] (Bed Mobility) supervision;verbal cues  -     Sit-Supine [place] (Bed Mobility) minimum assist (75% patient effort);verbal cues  -     Assistive Device (Bed Mobility) bed rails;head of bed elevated  -     Comment, (Bed Mobility) VC for sequencing  -     Row Name 05/10/22 1329          Sit-Stand Transfer    Sit-Stand [place] (Transfers) verbal cues;minimum assist (75% patient effort)  -     Assistive Device (Sit-Stand Transfers) walker, front-wheeled  -     Comment, (Sit-Stand Transfer) VC for hand placement, lowering with eccentric control  -     Row Name 05/10/22 1329          Gait/Stairs (Locomotion)    [place] Level (Gait) minimum assist (75% patient effort);verbal cues  -     Assistive Device (Gait) walker, front-wheeled  -     Distance in Feet (Gait) 15  -SS     Deviations/Abnormal Patterns (Gait) bilateral deviations;padma decreased;gait speed decreased;stride length decreased;weight shifting decreased  -     Bilateral Gait Deviations forward flexed posture;heel strike decreased  -     Comment, (Gait/Stairs) Pt. ambulated with a step through gait pattern. VC for upright posture, AD management, safety awareness. Gait limited by fatigue.  -           User Key  (r) = Recorded By, (t) = Taken By, (c) = Cosigned By    Initials Name Provider Type     Genet Holley PT Physical Therapist               Obj/Interventions     Row Name 05/10/22 1332          Range of Motion Comprehensive    General Range of Motion bilateral lower extremity ROM WFL  -     Row Name 05/10/22 1332          Strength Comprehensive (MMT)    Comment, General Manual Muscle Testing (MMT) Assessment BLE gross 3/5; symmetrical  -     Row Name 05/10/22 1332          Motor Skills     Motor Skills coordination  -     Coordination WFL;bilateral;lower extremity  -SS     Row Name 05/10/22 1332          Balance    Balance Assessment sitting static balance;sitting dynamic balance;sit to stand dynamic balance;standing static balance;standing dynamic balance  -SS     Static Sitting Balance standby assist  -SS     Dynamic Sitting Balance standby assist  -SS     Position, Sitting Balance unsupported;sitting edge of bed  -     Sit to Stand Dynamic Balance minimal assist  -SS     Static Standing Balance minimal assist  -SS     Dynamic Standing Balance minimal assist  -SS     Position/Device Used, Standing Balance supported;walker, front-wheeled  -     Balance Interventions sitting;standing;sit to stand;supported;static;dynamic  -SS     Row Name 05/10/22 1332          Sensory Assessment (Somatosensory)    Sensory Assessment (Somatosensory) right-sided sensation intact;left LE  -SS     Left LE Sensory Assessment general sensation;impaired;other (see comments)  pt. c/o numbness; notified RN  -SS           User Key  (r) = Recorded By, (t) = Taken By, (c) = Cosigned By    Initials Name Provider Type    SS Genet Holley, PT Physical Therapist               Goals/Plan     Row Name 05/10/22 1338          Bed Mobility Goal 1 (PT)    Activity/Assistive Device (Bed Mobility Goal 1, PT) bed mobility activities, all  -SS     [place] Level/Cues Needed (Bed Mobility Goal 1, PT) independent  -SS     Time Frame (Bed Mobility Goal 1, PT) long term goal (LTG);10 days  -     Row Name 05/10/22 1338          Transfer Goal 1 (PT)    Activity/Assistive Device (Transfer Goal 1, PT) sit-to-stand/stand-to-sit;bed-to-chair/chair-to-bed  -SS     [place] Level/Cues Needed (Transfer Goal 1, PT) independent  -SS     Time Frame (Transfer Goal 1, PT) long term goal (LTG);10 days  -     Row Name 05/10/22 1338          Gait Training Goal 1 (PT)    Activity/Assistive Device (Gait Training Goal 1, PT) gait (walking  locomotion);assistive device use;walker, rolling  -     [place] Level (Gait Training Goal 1, PT) modified independence  -     Distance (Gait Training Goal 1, PT) 150  -SS     Time Frame (Gait Training Goal 1, PT) long term goal (LTG);10 days  -     Row Name 05/10/22 8610          Therapy Assessment/Plan (PT)    Planned Therapy Interventions (PT) balance training;bed mobility training;home exercise program;gait training;neuromuscular re-education;patient/family education;postural re-education;ROM (range of motion);strengthening;stretching;transfer training  -           User Key  (r) = Recorded By, (t) = Taken By, (c) = Cosigned By    Initials Name Provider Type     Genet Holley, PT Physical Therapist               Clinical Impression     Row Name 05/10/22 2310          Pain    Pretreatment Pain Rating 10/10  -     Posttreatment Pain Rating 10/10  -     Pain Location generalized  -     Pain Location - back  -     Pain Intervention(s) Repositioned;Ambulation/increased activity  -     Additional Documentation Pain Scale: Numbers Pre/Post-Treatment (Group)  -     Row Name 05/10/22 2343          Plan of Care Review    Plan of Care Reviewed With patient  Simultaneous filing. User may be unaware of other data.  -     Outcome Evaluation Pt. presents with increased lethargy, balance impairments, generalized weakness and limited safety awareness affecting her ability to safely participate in functional mobility. She performed bed mobility and sit to stand with min assist. She ambulated 15' w/front wheeled walker, min assist. Gait limited by fatigue. Recommend SNF upon discharge.  Simultaneous filing. User may be unaware of other data.  -     Row Name 05/10/22 7940          Therapy Assessment/Plan (PT)    Rehab Potential (PT) good, to achieve stated therapy goals  -     Criteria for Skilled Interventions Met (PT) yes;meets criteria;skilled treatment is necessary  -     Therapy Frequency (PT)  daily  -SS     Row Name 05/10/22 1335          Vital Signs    Post Systolic BP Rehab 106  recieved in bathroom; unable to assess pre systolic BP  -SS     Post Treatment Diastolic BP 76  -SS     Posttreatment Heart Rate (beats/min) 89  -SS     Post SpO2 (%) 98  -SS     O2 Delivery Post Treatment room air  Simultaneous filing. User may be unaware of other data.  -SS     Post Patient Position Supine  Simultaneous filing. User may be unaware of other data.  -SS     Row Name 05/10/22 1335          Positioning and Restraints    Pre-Treatment Position bathroom  Simultaneous filing. User may be unaware of other data.  -SS     Post Treatment Position bed  Simultaneous filing. User may be unaware of other data.  -SS     In Bed notified nsg;supine;fowlers;call light within reach;encouraged to call for assist;exit alarm on  RN aware of pt. lethargy, numbness  -SS           User Key  (r) = Recorded By, (t) = Taken By, (c) = Cosigned By    Initials Name Provider Type    Genet Ramirez PT Physical Therapist               Outcome Measures     Row Name 05/10/22 0681          How much help from another person do you currently need...    Turning from your back to your side while in flat bed without using bedrails? 3  -SS     Moving from lying on back to sitting on the side of a flat bed without bedrails? 3  -SS     Moving to and from a bed to a chair (including a wheelchair)? 3  -SS     Standing up from a chair using your arms (e.g., wheelchair, bedside chair)? 3  -SS     Climbing 3-5 steps with a railing? 2  -SS     To walk in hospital room? 3  -SS     AM-PAC 6 Clicks Score (PT) 17  -SS     Highest level of mobility 5 --> Static standing  -SS     Row Name 05/10/22 3057          Functional Assessment    Outcome Measure Options AM-PAC 6 Clicks Basic Mobility (PT)  -SS           User Key  (r) = Recorded By, (t) = Taken By, (c) = Cosigned By    Initials Name Provider Type    Genet Ramirez PT Physical Therapist                              Physical Therapy Education                 Title: PT OT SLP Therapies (In Progress)     Topic: Physical Therapy (In Progress)     Point: Mobility training (Done)     Learning Progress Summary           Patient Nohelia, BONI, VU,NR by  at 5/10/2022 1340    Comment: Educated pt. safety/technique with bed mobility, transfers, ambulation, PT POC                   Point: Home exercise program (Not Started)     Learner Progress:  Not documented in this visit.          Point: Body mechanics (Done)     Learning Progress Summary           Patient Eager, E, VU,NR by  at 5/10/2022 1340    Comment: Educated pt. safety/technique with bed mobility, transfers, ambulation, PT POC                   Point: Precautions (Done)     Learning Progress Summary           Patient Eager, E, VU,NR by  at 5/10/2022 1340    Comment: Educated pt. safety/technique with bed mobility, transfers, ambulation, PT POC                               User Key     Initials Effective Dates Name Provider Type Discipline     06/01/21 -  Genet Holley, PT Physical Therapist PT              PT Recommendation and Plan  Planned Therapy Interventions (PT): balance training, bed mobility training, home exercise program, gait training, neuromuscular re-education, patient/family education, postural re-education, ROM (range of motion), strengthening, stretching, transfer training  Plan of Care Reviewed With: patient (Simultaneous filing. User may be unaware of other data.)  Outcome Evaluation: Pt. presents with increased lethargy, balance impairments, generalized weakness and limited safety awareness affecting her ability to safely participate in functional mobility. She performed bed mobility and sit to stand with min assist. She ambulated 15' w/front wheeled walker, min assist. Gait limited by fatigue. Recommend SNF upon discharge. (Simultaneous filing. User may be unaware of other data.)     Time Calculation:    PT Charges     Row Name 05/10/22 8144              Time Calculation    Start Time 1124  -SS      Stop Time 1134  -SS      Time Calculation (min) 10 min  -SS      PT Received On 05/10/22  -SS      PT Goal Re-Cert Due Date 05/20/22  -SS              Time Calculation- PT    Total Timed Code Minutes- PT 10 minute(s)  -SS              Untimed Charges    PT Eval/Re-eval Minutes 50  -SS              Total Minutes    Untimed Charges Total Minutes 50  -SS       Total Minutes 50  -SS            User Key  (r) = Recorded By, (t) = Taken By, (c) = Cosigned By    Initials Name Provider Type    SS Genet Holley, PT Physical Therapist              Therapy Charges for Today     Code Description Service Date Service Provider Modifiers Qty    [id number] HC PT EVAL MOD COMPLEXITY 4 5/10/2022 Genet Holley, PT GP 1          PT G-Codes  Outcome Measure Options: AM-PAC 6 Clicks Basic Mobility (PT)  AM-PAC 6 Clicks Score (PT): 17    Genet Holley PT  5/10/2022

## 2022-05-10 NOTE — CASE MANAGEMENT/SOCIAL WORK
Discharge Planning Assessment  [facility]     Patient Name: Bernadette Paris  MRN: [id number]  Today's Date: 5/10/2022    Admit Date: 5/9/2022     Discharge Needs Assessment     Row Name 05/10/22 0924       Living Environment    People in Home significant other    Unique Family Situation [place]    Current Living Arrangements home    Primary Care Provided by self       Discharge Needs Assessment    Readmission Within the Last 30 Days previous discharge plan unsuccessful    Current Discharge Risk chronically ill;non-[facility]               Discharge Plan     Row Name 05/10/22 0926       Plan    Plan Comments I met with Ms. Paris at the bedside to discuss discharge planning. She lives in [facility] with her significant other. She would not answer any additional questions that I had for her. She was recently hospitalized at [facility] and was discharged home. An appointment was made for her to establish care with a PCP. A follow up GI appointment was also made. She was a no call no show for both appointments. She is signed up for medicaid transportation through [employer]. She is admitted for DKA. Her [facility] plan should cover her insulin with a 0$ copay. Case management can attempt to reschedule an appointment to follow up with a PCP at discharge.    15:23 EDT I met with Ms. Paris again at the bedside. Therapy is recommending that she go to rehab after this admission. Her insurance will not cover skilled rehab, but will cover acute. Ms. Paris verbalizes agreement that she would like to go to rehab. I have called a referral to April in admissions at [facility].    Final Discharge Disposition Code 01 - home or self-care              Continued Care and Services - Admitted Since 5/9/2022    Coordination has not been started for this encounter.     Selected Continued Care - Prior Encounters Includes selections from prior encounters from 2/8/2022 to 5/10/2022    Discharged on  4/11/2022 Admission date: 4/6/2022 - Discharge disposition: Home or Self Care    Durable Medical Equipment     Service Provider Selected Services Address Phone Fax Patient Preferred    [facility] [employer]  [employer] [address] [place] [id number] [phone] --                       Demographic Summary    No documentation.                Functional Status    No documentation.                Psychosocial    No documentation.                Abuse/Neglect    No documentation.                Legal    No documentation.                Substance Abuse    No documentation.                Patient Forms    No documentation.                   Raúl Miles RN

## 2022-05-10 NOTE — PLAN OF CARE
Goal Outcome Evaluation:  Plan of Care Reviewed With: patient         Outcome Evaluation: Pt. presents with increased lethargy, balance impairments, generalized weakness and limited safety awareness affecting her ability to safely participate in functional mobility. She performed bed mobility and sit to stand with min assist. She ambulated 15' w/front wheeled walker, min assist. Gait limited by fatigue. Recommend SNF upon discharge.

## 2022-05-11 ENCOUNTER — APPOINTMENT (OUTPATIENT)
Dept: GENERAL RADIOLOGY | Facility: HOSPITAL | Age: 52
End: 2022-05-11

## 2022-05-11 LAB
ANION GAP SERPL CALCULATED.3IONS-SCNC: 10 MMOL/L (ref 5–15)
ANION GAP SERPL CALCULATED.3IONS-SCNC: 12 MMOL/L (ref 5–15)
ANION GAP SERPL CALCULATED.3IONS-SCNC: 13 MMOL/L (ref 5–15)
ANION GAP SERPL CALCULATED.3IONS-SCNC: 9 MMOL/L (ref 5–15)
BUN SERPL-MCNC: 8 MG/DL (ref 6–20)
BUN SERPL-MCNC: 9 MG/DL (ref 6–20)
BUN/CREAT SERPL: 14.8 (ref 7–25)
BUN/CREAT SERPL: 15.5 (ref 7–25)
BUN/CREAT SERPL: 16.4 (ref 7–25)
BUN/CREAT SERPL: 17.6 (ref 7–25)
CALCIUM SPEC-SCNC: 8.5 MG/DL (ref 8.6–10.5)
CALCIUM SPEC-SCNC: 8.9 MG/DL (ref 8.6–10.5)
CALCIUM SPEC-SCNC: 9 MG/DL (ref 8.6–10.5)
CALCIUM SPEC-SCNC: 9.2 MG/DL (ref 8.6–10.5)
CHLORIDE SERPL-SCNC: 106 MMOL/L (ref 98–107)
CHLORIDE SERPL-SCNC: 107 MMOL/L (ref 98–107)
CHLORIDE SERPL-SCNC: 109 MMOL/L (ref 98–107)
CHLORIDE SERPL-SCNC: 110 MMOL/L (ref 98–107)
CO2 SERPL-SCNC: 16 MMOL/L (ref 22–29)
CO2 SERPL-SCNC: 17 MMOL/L (ref 22–29)
CO2 SERPL-SCNC: 18 MMOL/L (ref 22–29)
CO2 SERPL-SCNC: 19 MMOL/L (ref 22–29)
CREAT SERPL-MCNC: 0.51 MG/DL (ref 0.57–1)
CREAT SERPL-MCNC: 0.54 MG/DL (ref 0.57–1)
CREAT SERPL-MCNC: 0.55 MG/DL (ref 0.57–1)
CREAT SERPL-MCNC: 0.58 MG/DL (ref 0.57–1)
EGFRCR SERPLBLD CKD-EPI 2021: 109.7 ML/MIN/1.73
EGFRCR SERPLBLD CKD-EPI 2021: 111.1 ML/MIN/1.73
EGFRCR SERPLBLD CKD-EPI 2021: 111.6 ML/MIN/1.73
EGFRCR SERPLBLD CKD-EPI 2021: 113.2 ML/MIN/1.73
GLUCOSE BLDC GLUCOMTR-MCNC: 154 MG/DL (ref 70–130)
GLUCOSE BLDC GLUCOMTR-MCNC: 229 MG/DL (ref 70–130)
GLUCOSE BLDC GLUCOMTR-MCNC: 291 MG/DL (ref 70–130)
GLUCOSE BLDC GLUCOMTR-MCNC: 324 MG/DL (ref 70–130)
GLUCOSE BLDC GLUCOMTR-MCNC: 384 MG/DL (ref 70–130)
GLUCOSE BLDC GLUCOMTR-MCNC: 579 MG/DL (ref 70–130)
GLUCOSE SERPL-MCNC: 161 MG/DL (ref 65–99)
GLUCOSE SERPL-MCNC: 256 MG/DL (ref 65–99)
GLUCOSE SERPL-MCNC: 343 MG/DL (ref 65–99)
GLUCOSE SERPL-MCNC: 444 MG/DL (ref 65–99)
MAGNESIUM SERPL-MCNC: 1.9 MG/DL (ref 1.6–2.6)
MAGNESIUM SERPL-MCNC: 1.9 MG/DL (ref 1.6–2.6)
MAGNESIUM SERPL-MCNC: 2.1 MG/DL (ref 1.6–2.6)
MAGNESIUM SERPL-MCNC: 2.2 MG/DL (ref 1.6–2.6)
PHOSPHATE SERPL-MCNC: 2.7 MG/DL (ref 2.5–4.5)
PHOSPHATE SERPL-MCNC: 3.2 MG/DL (ref 2.5–4.5)
PHOSPHATE SERPL-MCNC: 3.5 MG/DL (ref 2.5–4.5)
PHOSPHATE SERPL-MCNC: 3.5 MG/DL (ref 2.5–4.5)
POTASSIUM SERPL-SCNC: 3.4 MMOL/L (ref 3.5–5.2)
POTASSIUM SERPL-SCNC: 3.7 MMOL/L (ref 3.5–5.2)
POTASSIUM SERPL-SCNC: 4 MMOL/L (ref 3.5–5.2)
POTASSIUM SERPL-SCNC: 4.2 MMOL/L (ref 3.5–5.2)
QT INTERVAL: 344 MS
QTC INTERVAL: 488 MS
SODIUM SERPL-SCNC: 132 MMOL/L (ref 136–145)
SODIUM SERPL-SCNC: 136 MMOL/L (ref 136–145)
SODIUM SERPL-SCNC: 138 MMOL/L (ref 136–145)
SODIUM SERPL-SCNC: 140 MMOL/L (ref 136–145)

## 2022-05-11 PROCEDURE — 63710000001 INSULIN LISPRO (HUMAN) PER 5 UNITS: Performed by: INTERNAL MEDICINE

## 2022-05-11 PROCEDURE — 83735 ASSAY OF MAGNESIUM: CPT | Performed by: INTERNAL MEDICINE

## 2022-05-11 PROCEDURE — 25010000002 METOCLOPRAMIDE PER 10 MG: Performed by: NURSE PRACTITIONER

## 2022-05-11 PROCEDURE — 63710000001 INSULIN LISPRO (HUMAN) PER 5 UNITS: Performed by: NURSE PRACTITIONER

## 2022-05-11 PROCEDURE — 82962 GLUCOSE BLOOD TEST: CPT

## 2022-05-11 PROCEDURE — 25010000002 HEPARIN (PORCINE) PER 1000 UNITS: Performed by: NURSE PRACTITIONER

## 2022-05-11 PROCEDURE — 74018 RADEX ABDOMEN 1 VIEW: CPT

## 2022-05-11 PROCEDURE — 0 MAGNESIUM SULFATE 4 GM/100ML SOLUTION: Performed by: INTERNAL MEDICINE

## 2022-05-11 PROCEDURE — 80048 BASIC METABOLIC PNL TOTAL CA: CPT | Performed by: INTERNAL MEDICINE

## 2022-05-11 PROCEDURE — 63710000001 INSULIN DETEMIR PER 5 UNITS: Performed by: INTERNAL MEDICINE

## 2022-05-11 PROCEDURE — 25010000002 MORPHINE PER 10 MG: Performed by: INTERNAL MEDICINE

## 2022-05-11 PROCEDURE — 84100 ASSAY OF PHOSPHORUS: CPT | Performed by: INTERNAL MEDICINE

## 2022-05-11 PROCEDURE — 99232 SBSQ HOSP IP/OBS MODERATE 35: CPT | Performed by: INTERNAL MEDICINE

## 2022-05-11 PROCEDURE — 25010000002 ONDANSETRON PER 1 MG: Performed by: NURSE PRACTITIONER

## 2022-05-11 RX ORDER — INSULIN LISPRO 100 [IU]/ML
0-9 INJECTION, SOLUTION INTRAVENOUS; SUBCUTANEOUS
Status: DISCONTINUED | OUTPATIENT
Start: 2022-05-11 | End: 2022-05-13 | Stop reason: HOSPADM

## 2022-05-11 RX ORDER — OXYCODONE AND ACETAMINOPHEN 7.5; 325 MG/1; MG/1
1 TABLET ORAL EVERY 6 HOURS PRN
Status: DISCONTINUED | OUTPATIENT
Start: 2022-05-11 | End: 2022-05-13 | Stop reason: HOSPADM

## 2022-05-11 RX ORDER — MORPHINE SULFATE 2 MG/ML
1 INJECTION, SOLUTION INTRAMUSCULAR; INTRAVENOUS ONCE AS NEEDED
Status: COMPLETED | OUTPATIENT
Start: 2022-05-11 | End: 2022-05-11

## 2022-05-11 RX ORDER — INSULIN LISPRO 100 [IU]/ML
8 INJECTION, SOLUTION INTRAVENOUS; SUBCUTANEOUS ONCE
Status: COMPLETED | OUTPATIENT
Start: 2022-05-11 | End: 2022-05-11

## 2022-05-11 RX ORDER — PANTOPRAZOLE SODIUM 40 MG/10ML
40 INJECTION, POWDER, LYOPHILIZED, FOR SOLUTION INTRAVENOUS
Status: DISCONTINUED | OUTPATIENT
Start: 2022-05-11 | End: 2022-05-13 | Stop reason: HOSPADM

## 2022-05-11 RX ADMIN — AMLODIPINE BESYLATE 5 MG: 5 TABLET ORAL at 10:54

## 2022-05-11 RX ADMIN — ACETAMINOPHEN 325 MG: 325 TABLET ORAL at 16:08

## 2022-05-11 RX ADMIN — SODIUM CHLORIDE 1000 ML: 9 INJECTION, SOLUTION INTRAVENOUS at 22:11

## 2022-05-11 RX ADMIN — SERTRALINE 100 MG: 100 TABLET, FILM COATED ORAL at 10:53

## 2022-05-11 RX ADMIN — PANTOPRAZOLE SODIUM 40 MG: 40 INJECTION, POWDER, FOR SOLUTION INTRAVENOUS at 17:31

## 2022-05-11 RX ADMIN — MAGNESIUM SULFATE HEPTAHYDRATE 4 G: 40 INJECTION, SOLUTION INTRAVENOUS at 15:22

## 2022-05-11 RX ADMIN — HEPARIN SODIUM 5000 UNITS: 5000 INJECTION, SOLUTION INTRAVENOUS; SUBCUTANEOUS at 09:16

## 2022-05-11 RX ADMIN — PANTOPRAZOLE SODIUM 40 MG: 40 TABLET, DELAYED RELEASE ORAL at 10:53

## 2022-05-11 RX ADMIN — OXYCODONE HYDROCHLORIDE AND ACETAMINOPHEN 1 TABLET: 7.5; 325 TABLET ORAL at 23:20

## 2022-05-11 RX ADMIN — Medication 10 ML: at 21:57

## 2022-05-11 RX ADMIN — INSULIN DETEMIR 45 UNITS: 100 INJECTION, SOLUTION SUBCUTANEOUS at 22:10

## 2022-05-11 RX ADMIN — SENNOSIDES AND DOCUSATE SODIUM 2 TABLET: 50; 8.6 TABLET ORAL at 21:51

## 2022-05-11 RX ADMIN — INSULIN LISPRO 8 UNITS: 100 INJECTION, SOLUTION INTRAVENOUS; SUBCUTANEOUS at 02:39

## 2022-05-11 RX ADMIN — ACETAMINOPHEN 325 MG: 325 TABLET ORAL at 21:56

## 2022-05-11 RX ADMIN — LISINOPRIL 40 MG: 40 TABLET ORAL at 10:54

## 2022-05-11 RX ADMIN — SENNOSIDES AND DOCUSATE SODIUM 2 TABLET: 50; 8.6 TABLET ORAL at 10:54

## 2022-05-11 RX ADMIN — INSULIN LISPRO 9 UNITS: 100 INJECTION, SOLUTION INTRAVENOUS; SUBCUTANEOUS at 22:11

## 2022-05-11 RX ADMIN — INSULIN DETEMIR 45 UNITS: 100 INJECTION, SOLUTION SUBCUTANEOUS at 12:49

## 2022-05-11 RX ADMIN — TRAZODONE HYDROCHLORIDE 50 MG: 50 TABLET ORAL at 21:52

## 2022-05-11 RX ADMIN — METOCLOPRAMIDE 10 MG: 5 INJECTION, SOLUTION INTRAMUSCULAR; INTRAVENOUS at 12:46

## 2022-05-11 RX ADMIN — QUETIAPINE FUMARATE 100 MG: 100 TABLET ORAL at 21:52

## 2022-05-11 RX ADMIN — BUSPIRONE HYDROCHLORIDE 10 MG: 10 TABLET ORAL at 21:52

## 2022-05-11 RX ADMIN — HEPARIN SODIUM 5000 UNITS: 5000 INJECTION, SOLUTION INTRAVENOUS; SUBCUTANEOUS at 21:52

## 2022-05-11 RX ADMIN — METOPROLOL TARTRATE 25 MG: 25 TABLET, FILM COATED ORAL at 10:54

## 2022-05-11 RX ADMIN — Medication 10 ML: at 10:55

## 2022-05-11 RX ADMIN — POLYETHYLENE GLYCOL 3350 17 G: 17 POWDER, FOR SOLUTION ORAL at 11:13

## 2022-05-11 RX ADMIN — MORPHINE SULFATE 1 MG: 2 INJECTION, SOLUTION INTRAMUSCULAR; INTRAVENOUS at 09:27

## 2022-05-11 RX ADMIN — LABETALOL 20 MG/4 ML (5 MG/ML) INTRAVENOUS SYRINGE 10 MG: at 09:15

## 2022-05-11 RX ADMIN — INSULIN LISPRO 6 UNITS: 100 INJECTION, SOLUTION INTRAVENOUS; SUBCUTANEOUS at 12:47

## 2022-05-11 RX ADMIN — METOCLOPRAMIDE 10 MG: 5 INJECTION, SOLUTION INTRAMUSCULAR; INTRAVENOUS at 17:31

## 2022-05-11 RX ADMIN — BUSPIRONE HYDROCHLORIDE 10 MG: 10 TABLET ORAL at 10:54

## 2022-05-11 RX ADMIN — QUETIAPINE FUMARATE 100 MG: 100 TABLET ORAL at 10:54

## 2022-05-11 RX ADMIN — METOPROLOL TARTRATE 25 MG: 25 TABLET, FILM COATED ORAL at 21:52

## 2022-05-11 RX ADMIN — ONDANSETRON 4 MG: 2 INJECTION INTRAMUSCULAR; INTRAVENOUS at 21:56

## 2022-05-11 RX ADMIN — POTASSIUM CHLORIDE 40 MEQ: 10 CAPSULE, COATED, EXTENDED RELEASE ORAL at 10:53

## 2022-05-11 RX ADMIN — CLONIDINE HYDROCHLORIDE 0.1 MG: 0.1 TABLET ORAL at 16:07

## 2022-05-11 RX ADMIN — METOCLOPRAMIDE 10 MG: 5 INJECTION, SOLUTION INTRAMUSCULAR; INTRAVENOUS at 09:15

## 2022-05-11 RX ADMIN — INSULIN LISPRO 4 UNITS: 100 INJECTION, SOLUTION INTRAVENOUS; SUBCUTANEOUS at 17:31

## 2022-05-11 RX ADMIN — Medication 10 MG: at 15:22

## 2022-05-11 NOTE — CASE MANAGEMENT/SOCIAL WORK
Continued Stay Note  [facility]     Patient Name: Bernadette Paris  MRN: [id number]  Today's Date: 5/11/2022    Admit Date: 5/9/2022     Discharge Plan     Row Name 05/11/22 1516       Plan    Plan rehab    Patient/Family in Agreement with Plan yes    Plan Comments Per April in admissions, they cannot offer Ms. Paris an acute rehab bed. I met with Ms. Paris at the bedside to discuss additional discharge options. She reports that she does want to go to rehab prior to returning home. She does not have skilled rehab benefits, therefore acute rehab is her only option. We discussed additional acute rehab options including [facility], [facility] in [place], and [facility] in [place]. Ms. Paris is agreeable to referrals being sent to these facilities. I have made these referrals.    Final Discharge Disposition Code 30 - still a patient               Discharge Codes    No documentation.                     Raúl Miles RN

## 2022-05-11 NOTE — PLAN OF CARE
"Goal Outcome Evaluation:           Progress: improving  Outcome Evaluation: A & O x 4, NSR, RA, c/o abdominal discomfort but refused Tylenol stating she cannot take it due to \"liver disease\" although hospitalist stated low dose Tylenol is okay/specifically asking for Morphine which was discontinued, insulin drip stopped 2 hrs post-Levemir per protocol, phosphorus replaced, Labetalol given once during shift for HTN - last BP improved, slept most of the shift/only waking to ask for pain meds, Ismael 19, Falls score 15/bed alarm active, will continue to monitor  "

## 2022-05-11 NOTE — DISCHARGE PLACEMENT REQUEST
"Case management [phone]  Bernadette Mcqueen (52 y.o. Female)             Date of Birth   1970    Social Security Number       Address   [address] [place] [id number]    Home Phone   [phone]    MRN   [id number]       [facility]    Marital Status   Single                            Admission Date   22    Admission Type   Emergency    Admitting Provider   Светлана Navarro MD    Attending Provider   Светлана Navarro MD    Department, Room/Bed   [facility] 3E, S339/1       Discharge Date       Discharge Disposition       Discharge Destination                               Attending Provider: Светлана Navarro MD    Allergies: Tylenol [Acetaminophen]    Isolation: None   Infection: MRSA (18)   Code Status: CPR   Advance Care Planning Activity    Ht: 167.6 cm (66\")   Wt: 64 kg (141 lb 3.2 oz)    Admission Cmt: None   Principal Problem: Diabetic ketoacidosis without coma associated with type 2 diabetes mellitus (HCC) [E11.10]                 Active Insurance as of 2022     Primary Coverage     Payor Plan Insurance Group Employer/Plan Group    [facility] BY BANUELOS [facility] BY LAKISHA [id number]     Payor Plan Address Payor Plan Phone Number Payor Plan Fax Number Effective Dates    [address]   2021 - None Entered    [place] [address]       Subscriber Name Subscriber Birth Date Member ID       BERNADETTE MCQUEEN 1970 [id number]                 Emergency Contacts      (Rel.) Home Phone Work Phone Mobile Phone    Wellington Layne (Significant Other) -- -- [phone]               History & Physical      Sis Nguyen MD at 22 [address] [place] [address] Medicine Services  HISTORY AND PHYSICAL    Patient Name: Bernadette Mcqueen  : 1970  MRN: [id number]  Primary Care Physician: Lilly Rose MD  Date of admission: 2022    Subjective   Subjective     Chief Complaint:  Nausea and vomiting    HPI:  Bernadette Mcqueen is " a 52 y.o. female with pmh significant for HTN, HLP, GERD, Hep C, bipolar do, insulin dependent diabetes and gastroparesis presents with 3 days of nausea and vomiting.  Patient reports since recent admission 4/7-4/11/22, has not followed up with new PCP or GI due to loss of medical card and ID. Has been out of GI medication x 2 weeks and all others over the past week. Started vomiting 3 days ago and progressive with worsening epigastric pain. No hematemesis, unable to keep down food/ fluids and urinating less.   Found to have glucose 400-500, pH 7.17 and AG 28. Patient to be admitted for further management.         Review of Systems   Constitutional: Positive for activity change and fatigue.   HENT: Negative.    Respiratory: Negative.    Cardiovascular: Negative.    Gastrointestinal: Positive for abdominal pain, nausea and vomiting.   Genitourinary: Positive for decreased urine volume.   Musculoskeletal: Negative.    Skin: Negative.    Neurological: Positive for weakness.   Psychiatric/Behavioral: Negative.         All other systems reviewed and are negative.     Personal History     Past Medical History:   Diagnosis Date   • Arthritis    • Bipolar disorder (HCC)    • Chronic bronchitis (HCC)    • Depression    • Diabetes mellitus (HCC)     DIAGNOSED 2016   • GERD (gastroesophageal reflux disease)    • Hepatitis-C    • History of blood transfusion    • Hyperlipidemia    • Hypertension    • Infectious viral hepatitis     HEPATITIS C   • Migraine    • Sleep apnea     PATIENT UNSURE OF SETTINGS             Past Surgical History:   Procedure Laterality Date   • CHOLECYSTECTOMY     • ENDOSCOPY N/A 4/9/2022    Procedure: ESOPHAGOGASTRODUODENOSCOPY;  Surgeon: Brunner, Mark I, MD;  Location: [facility] ENDOSCOPY;  Service: Gastroenterology;  Laterality: N/A;  with antrum biopsy   • HYSTERECTOMY     • KNEE SURGERY     • LUMBAR LAMINECTOMY DISCECTOMY DECOMPRESSION N/A 8/6/2018    Procedure: LUMBAR LAMINECTOMIES L4-S1;  Surgeon:  Chip Smith MD;  [facility];  Service: Neurosurgery       Family History:  family history is not on file. Otherwise pertinent FHx was reviewed and unremarkable.     Social History:  reports that she has been smoking. She has been smoking about 1.00 pack per day. She has never used smokeless tobacco. She reports that she does not drink alcohol and does not use drugs.  Social History     Social History Narrative   • Not on file       Medications:  Accu-Chek FastClix Lancets, Alcohol Pads, Insulin Pen Needle, Pen Needles, QUEtiapine, albuterol sulfate HFA, amLODIPine, busPIRone, cetirizine, cloNIDine, furosemide, hydrOXYzine, insulin detemir, insulin lispro, lisinopril, metFORMIN, metoprolol tartrate, ondansetron ODT, oxyCODONE-acetaminophen, pantoprazole, sertraline, and traZODone    Allergies   Allergen Reactions   • Tylenol [Acetaminophen] Other (See Comments)     SEVERE LIVER DISEASE-CONTRAINDICATED       Objective   Objective     Vital Signs:   Temp:  [98.2 °F (36.8 °C)] 98.2 °F (36.8 °C)  Heart Rate:  [119] 119  Resp:  [18] 18  BP: (179)/(132) 179/132    Physical Exam   Constitutional: No acute distress, awake, alert,appears ill  HENT: NCAT, mucous membranes dry  Respiratory: Clear to auscultation bilaterally, respiratory effort normal   Cardiovascular: RRR- tachycardic, no murmurs, rubs, or gallops  Gastrointestinal: Positive bowel sounds, soft, nontender, nondistended  Musculoskeletal: No bilateral ankle edema  Psychiatric: Appropriate affect, cooperative  Neurologic: Oriented x 3, strength symmetric in all extremities, Cranial Nerves grossly intact to confrontation, speech mildly dysarthric at baseline  Skin: No rashes      Results Reviewed:  I have personally reviewed most recent indicated data and agree with findings including:  [x]  Laboratory  [x]  Radiology  [x]  EKG/Telemetry  []  Pathology  []  Cardiac/Vascular Studies  [x]  Old records  []  Other:  Most pertinent findings  include:      LAB RESULTS:      Lab 05/09/22  1301   WBC 8.62   HEMOGLOBIN 16.6*   HEMATOCRIT 47.9*   PLATELETS 326   NEUTROS ABS 7.13*   IMMATURE GRANS (ABS) 0.01   LYMPHS ABS 1.35   MONOS ABS 0.11   EOS ABS 0.00   MCV 93.4         Lab 05/09/22  1450 05/09/22  1301   SODIUM 135*  --    POTASSIUM 4.2  --    CHLORIDE 91*  --    CO2 16.0*  --    ANION GAP 28.0*  --    BUN 13  --    CREATININE 0.84  --    EGFR 84.3  --    GLUCOSE 529*  --    CALCIUM 10.1  --    HEMOGLOBIN A1C  --  11.90*         Lab 05/09/22  1450   TOTAL PROTEIN 7.8   ALBUMIN 4.90   GLOBULIN 2.9   ALT (SGPT) 63*   AST (SGOT) 60*   BILIRUBIN 0.5   ALK PHOS 197*                     Lab 05/09/22  1506   FIO2 21     Brief Urine Lab Results  (Last result in the past 365 days)      Color   Clarity   Blood   Leuk Est   Nitrite   Protein   CREAT   Urine HCG        04/06/22 1103               Negative       04/06/22 1103 Yellow   Clear   Trace   Negative   Negative   30 mg/dL (1+)               Microbiology Results (last 10 days)     ** No results found for the last 240 hours. **          XR Chest 1 View    Result Date: 5/9/2022  DATE OF EXAM: 5/9/2022 2:46 PM  PROCEDURE: XR CHEST 1 VW-  INDICATIONS: Nausea vomiting  COMPARISON: Chest x-ray 4/6/2022  TECHNIQUE: Single radiographic AP view of the chest was obtained.  FINDINGS: Normal cardiomediastinal silhouette. The lungs are clear without focal consolidation. No pleural effusion or pneumothorax. No acute osseous findings. The partially imaged upper abdomen appears unremarkable.      Impression: No acute cardiopulmonary findings.  This report was finalized on 5/9/2022 2:54 PM by Amor Espinoza MD.            Assessment/Plan   Assessment & Plan       Diabetic ketoacidosis without coma associated with type 2 diabetes mellitus (HCC)    Nausea & vomiting    Acidosis    Bipolar affective disorder (HCC)    Hepatitis C    Accelerated hypertension    Medically noncompliant    Gastritis     Hyperlipidemia      DKA  Uncontrolled T2DM, insulin dependent  Nausea and vomiting/ Gastroparesis  -- venous ph 7.17, AG 28, beta hydroxybutyrate pending  -- Glucommander activated. Insulin gtt/ fluids ordered. Current fsbs 529  -- s/p 2L NS in ED  -- serial lab work and fsbs  -- start reglan qac/hs x 2 days  -- prn zofran  --PT/OT/ DM educator/ RD consult    Accelerated HTN  -- given hydralazine in ED, no significant change  -- restart home lisinopril, norvasc, prn clonidine    Gastritis   Hep C  -- patient seen by GI last admission. Recommend bid PPI x 1 month, then daily. Has been out of medication x 2 weeks. Will restart bid for now  -- jian root recommended 500mg TID (not taking)  -- s/p diflucan x 1 week for candida. Missed follo up wit GI for eval/ treatment of Hep C    Bipolar  -- restart home seroquel  -- prn atarax    Medical noncompliance  -- has not followed up with established pcp or GI due to lost medical card and ID  -- Has been out of medications for 1-2 weeks    DVT prophylaxis:  [facility]    CODE STATUS:   Code Status (Patient has no pulse and is not breathing): CPR (Attempt to Resuscitate)  Medical Interventions (Patient has pulse or is breathing): Full Support      This note has been completed as part of a split-shared workflow.   Electronically signed by DMITRY Novoa, 05/09/22, 4:11 PM EDT.          Attending   Admission Attestation       I have seen and examined the patient, performing an independent face-to-face diagnostic evaluation with plan of care reviewed and developed with the advanced practice clinician (APC).      Brief Summary Statement:   Bernadette Paris is a 52 y.o. female with a history of bipolar disorder, insulin-dependent diabetes, gastroparesis, hypertension who is presenting with worsening abdominal pain, nausea vomiting and has been out of her medications.  She was noted to have a high fingerstick blood glucose in the ED and was acidotic, treatment was initiated for DKA.   With fluids and insulin she reports subjective improvement.  She has slight chest discomfort but this is not new for her.  She currently does not endorse any nausea.  She has had some difficulty in obtaining her medications.    Remainder of detailed HPI is as noted by APC and has been reviewed and/or edited by me for completeness.    Attending Physical Exam:  Constitutional: No acute distress, awake, alert, nontoxic, chronically ill-appearing  HENT: NCAT, mucous membranes moist  Respiratory: Clear to auscultation bilaterally, respiratory effort normal   Cardiovascular: Tachycardic, no murmurs, rubs, or gallops  Gastrointestinal: Positive bowel sounds, soft, nontender, nondistended  Musculoskeletal: No bilateral ankle edema  Psychiatric: Appropriate affect, cooperative  Neurologic: Oriented x 3, strength symmetric in all extremities, Cranial Nerves grossly intact to confrontation, speech clear  Skin: No rashes    Brief Assessment/Plan :  See detailed assessment and plan developed with APC which I have reviewed and/or edited for completeness.    In summary 52-year-old female with history of bipolar disorder, insulin-dependent diabetes, gastroparesis who is admitted for DKA secondary to medication noncompliance and running out of medications.  DKA protocol has been initiated.  I have counseled the patient on the importance of obtaining her medications.  Diabetes educator to see in a.m., social work and case management to follow-up as well.    Admission Status: I believe that this patient meets inpatient criteria      Sis Nguyen MD  22                      Electronically signed by Sis Nguyen MD at 22 [phone]          Physician Progress Notes (most recent note)      Светлана Navarro MD at 22 1138              [facility] Medicine Services  PROGRESS NOTE    Patient Name: Bernadette Paris  : 1970  MRN: [id number]    Date of Admission: 2022  Primary Care Physician: Milton  Lilly Valerio MD    Subjective   Subjective     CC:  F/U DKA    HPI:  Complains of some abdominal pain today.  Had nausea/vomiting this am.  Nursing staff reports that patient ate a lot of food and ice cream overnight but patient says she was not able to eat much.  Has not had a bowel movement in several days.    ROS:  Gen- No fevers, chills  GI- As above      Objective   Objective     Vital Signs:   Temp:  [98.1 °F (36.7 °C)-98.5 °F (36.9 °C)] 98.1 °F (36.7 °C)  Heart Rate:  [] 90  Resp:  [18] 18  BP: (101-217)/() 143/92     Physical Exam:  Constitutional: No acute distress, sleeping but awakens easily  HENT: NCAT, mucous membranes moist  Respiratory: Clear to auscultation bilaterally, respiratory effort normal on room air  Cardiovascular: RRR, no murmurs, rubs, or gallops  Gastrointestinal: Positive bowel sounds, soft, nondistended, mild epigastric tenderness  Musculoskeletal: No bilateral ankle edema  Psychiatric: Appropriate mood and affect  Neurologic: Speech is fluent  Skin: No rashes on exposed surfaces    Results Reviewed:  LAB RESULTS:      Lab 05/09/22 2009 05/09/22  1301   WBC 10.02 8.62   HEMOGLOBIN 13.2 16.6*   HEMATOCRIT 36.9 47.9*   PLATELETS 283 326   NEUTROS ABS 7.90* 7.13*   IMMATURE GRANS (ABS) 0.03 0.01   LYMPHS ABS 1.64 1.35   MONOS ABS 0.44 0.11   EOS ABS 0.00 0.00   MCV 90.7 93.4         Lab 05/11/22  0755 05/11/22  0430 05/10/22  2356 05/10/22  1948 05/10/22  1707 05/09/22  1450 05/09/22  1301   SODIUM 140 138 132* 135* 136   < >  --    POTASSIUM 3.7 3.4* 4.0 4.6 4.4   < >  --    CHLORIDE 110* 109* 106 110* 107   < >  --    CO2 18.0* 19.0* 17.0* 15.0* 16.0*   < >  --    ANION GAP 12.0 10.0 9.0 10.0 13.0   < >  --    BUN 9 9 9 9 11   < >  --    CREATININE 0.51* 0.55* 0.58 0.62 0.67   < >  --    EGFR 113.2 111.1 109.7 108.0 106.0   < >  --    GLUCOSE 161* 256* 444* 318* 323*   < >  --    CALCIUM 9.0 8.9 8.5* 8.6 8.9   < >  --    MAGNESIUM 2.2 2.1 1.9 2.1 2.1   < >  --    PHOSPHORUS  3.2 2.7 3.5 2.4* 1.8*   < >  --    HEMOGLOBIN A1C  --   --   --   --   --   --  11.90*    < > = values in this interval not displayed.         Lab 05/09/22 2009 05/09/22  1450   TOTAL PROTEIN 6.3 7.8   ALBUMIN 4.10 4.90   GLOBULIN 2.2 2.9   ALT (SGPT) 49* 63*   AST (SGOT) 41* 60*   BILIRUBIN 0.3 0.5   ALK PHOS 152* 197*         Lab 05/10/22  0015   TROPONIN T <0.010                 Brief Urine Lab Results  (Last result in the past 365 days)      Color   Clarity   Blood   Leuk Est   Nitrite   Protein   CREAT   Urine HCG        05/10/22 2051 Yellow   Clear   Negative   Negative   Negative   Trace                 Microbiology Results Abnormal     Procedure Component Value - Date/Time    COVID PRE-OP / PRE-PROCEDURE SCREENING ORDER (NO ISOLATION) - Swab, Nasopharynx [[id number]]  (Normal) Collected: 05/09/22 2113    Lab Status: Final result Specimen: Swab from Nasopharynx Updated: 05/10/22 1430    Narrative:      The following orders were created for panel order COVID PRE-OP / PRE-PROCEDURE SCREENING ORDER (NO ISOLATION) - Swab, Nasopharynx.  Procedure                               Abnormality         Status                     ---------                               -----------         ------                     COVID-19, APTIMA PANTHER...[[id number]]  Normal              Final result                 Please view results for these tests on the individual orders.    COVID-19, APTIMA PANTHER AYDEE IN-HOUSE NP/OP SWAB IN UTM/VTM/SALINE TRANSPORT MEDIA 24HR TAT - Swab, Nasopharynx [[id number]]  (Normal) Collected: 05/09/22 2113    Lab Status: Final result Specimen: Swab from Nasopharynx Updated: 05/10/22 1430     COVID19 Not Detected    Narrative:      Fact sheet for providers: https://www.fda.gov/media/466181/download     Fact sheet for patients: https://www.fda.gov/media/444898/download    Test performed by RT PCR.          XR Chest 1 View    Result Date: 5/9/2022  DATE OF EXAM: 5/9/2022 2:46 PM  PROCEDURE: XR CHEST 1 VW-   INDICATIONS: Nausea vomiting  COMPARISON: Chest x-ray 4/6/2022  TECHNIQUE: Single radiographic AP view of the chest was obtained.  FINDINGS: Normal cardiomediastinal silhouette. The lungs are clear without focal consolidation. No pleural effusion or pneumothorax. No acute osseous findings. The partially imaged upper abdomen appears unremarkable.      Impression: No acute cardiopulmonary findings.  This report was finalized on 5/9/2022 2:54 PM by Amor Espinoza MD.      XR Abdomen KUB    Result Date: 5/11/2022  XR ABDOMEN KUB-  Date of Exam: 5/11/2022 9:20 AM  Indication: abdominal pain; E87.2-Acidosis; R73.9-Hyperglycemia, unspecified; E86.0-Dehydration; E11.10-Type 2 diabetes mellitus with ketoacidosis without coma; R11.2-Nausea with vomiting, unspecified.  Comparison Exams: CT head from April 6, 2022  Technique: Single AP radiograph of the abdomen  FINDINGS: The lung bases are clear. There is a nonobstructive bowel gas pattern. Moderate stool is present throughout the colon. No pathological calcifications are identified. The osseous structures appear intact.      Impression: No acute process identified.  This report was finalized on 5/11/2022 9:46 AM by Ino Oliva MD.            I have reviewed the medications:  Scheduled Meds:amLODIPine, 5 mg, Oral, Q24H  busPIRone, 10 mg, Oral, BID  heparin (porcine), 5,000 Units, Subcutaneous, Q12H  insulin detemir, 45 Units, Subcutaneous, Q12H  insulin lispro, 0-9 Units, Subcutaneous, TID AC  lisinopril, 40 mg, Oral, Q24H  metoclopramide, 10 mg, Intravenous, 4x Daily AC & at Bedtime  metoprolol tartrate, 25 mg, Oral, Q12H  pantoprazole, 40 mg, Oral, BID  QUEtiapine, 100 mg, Oral, BID  senna-docusate sodium, 2 tablet, Oral, BID  sertraline, 100 mg, Oral, Daily  sodium chloride, 10 mL, Intravenous, Q12H  traZODone, 50 mg, Oral, Nightly      Continuous Infusions:   PRN Meds:.•  acetaminophen  •  albuterol  •  senna-docusate sodium **AND** polyethylene glycol **AND**  bisacodyl **AND** bisacodyl  •  cloNIDine  •  dextrose  •  dextrose  •  glucagon (human recombinant)  •  hydrOXYzine  •  labetalol  •  magnesium sulfate **OR** magnesium sulfate **OR** magnesium sulfate  •  ondansetron  •  potassium chloride  •  potassium chloride  •  potassium phosphate infusion greater than 15 mMoles **OR** potassium phosphate infusion greater than 15 mMoles **OR** potassium phosphate **OR** sodium phosphate IVPB **OR** sodium phosphate IVPB **OR** sodium phosphate IVPB  •  sodium chloride    [unfilled]  Assessment & Plan     Active Hospital Problems    Diagnosis  POA   • **Diabetic ketoacidosis without coma associated with type 2 diabetes mellitus (HCC) [E11.10]  Yes   • Acidosis [E87.2]  Yes   • Bipolar affective disorder (HCC) [F31.9]  Yes   • Hepatitis C [B19.20]  Yes   • Accelerated hypertension [I10]  Yes   • Medically noncompliant [Z91.19]  Not Applicable   • Gastritis [K29.70]  Yes   • Hyperlipidemia [E78.5]  Yes   • Nausea & vomiting [R11.2]  Yes      Resolved Hospital Problems   No resolved problems to display.        Brief Hospital Course to date:  Bernadette Paris is a 52 y.o. female with uncontrolled insulin dependent type 2 diabetes mellitus, gastroparesis, HTN, hepatitis C, bipolar disorder and medical noncompliance who presented with nausea/vomiting after running out of her GI medication and was found to have DKA.    This patient's problems and plans were partially entered by my partner and updated as appropriate by me 05/11/22.    DKA  Uncontrolled T2DM, insulin dependent  Nausea and vomiting/ Gastroparesis  Constipation  -Transitioned to SQ insulin levemir 45 units BID plus moderate dose SSI  -On scheduled reglan for now.  Should resume jian root 500mg TID at discharge-will need prescription sent to our outpatient pharmacy as she said she never got this medication before.  -KUB with stool burden so will continue with bowel regimen, suppository today  -We discussed  small, frequent meals  -PT/OT/ DM educator/ nutrition consult     HTN  -Continue home lisinopril, norvasc, PRN clonidine     Gastritis   Hep C  -Seen by GI last admission. Recommend bid PPI x 1 month, then daily. Has been out of medication x 2 weeks. Continue BID PPI for now (change to IV for a few doses).  -Charo root recommended 500mg TID (doesn't have this at home)  -S/P diflucan x 1 week for candida  -Missed follow up with GI for eval/treatment of Hep C     Bipolar disorder  -Resumed home seroquel, zoloft, trazodone  -PRN atarax     Medical noncompliance  -Has not followed up with established PCP or GI due to lost medical card and ID  -Has been out of medications for 1-2 weeks.  She did fill insulin in April for $0 copay.    DVT prophylaxis:  Medical DVT prophylaxis orders are present.   AM-PAC 6 Clicks Score (PT): 18 (22 0800)    Disposition: I expect the patient to be discharged 1-2 days if blood glucose is stable and she is tolerating a diet.    CODE STATUS:   Code Status and Medical Interventions:   Ordered at: 22 1607     Code Status (Patient has no pulse and is not breathing):    CPR (Attempt to Resuscitate)     Medical Interventions (Patient has pulse or is breathing):    Full Support       Светлана Navarro MD  22              Electronically signed by Светлана Navarro MD at 22 1403          Physical Therapy Notes (most recent note)      Genet Holley, PT at 05/10/22 1124  Version 1 of 1         Patient Name: Bernadette Paris  : 1970    MRN: [id number]                              Today's Date: 5/10/2022       Admit Date: 2022    Visit Dx:     ICD-10-CM ICD-9-CM   1. Acidosis  E87.2 276.2   2. Hyperglycemia  R73.9 790.29   3. Moderate dehydration  E86.0 276.51   4. Diabetic ketoacidosis without coma associated with type 2 diabetes mellitus (HCC)  E11.10 250.12   5. Nausea and vomiting, unspecified vomiting type  R11.2 787.01     Patient Active Problem List   Diagnosis   •  Spinal stenosis, lumbar region, with neurogenic claudication   • Degenerative disc disease, lumbar   • Facet arthritis of lumbar region   • Lumbar stenosis with neurogenic claudication   • BMI 40.0-44.9, adult (HCC)   • Encounter for smoking cessation counseling   • S/P lumbar laminectomy   • Diabetic ketoacidosis without coma associated with type 2 diabetes mellitus (HCC)   • Nausea & vomiting   • Acidosis   • Bipolar affective disorder (HCC)   • Hepatitis C   • Accelerated hypertension   • Medically noncompliant   • Gastritis   • Hyperlipidemia     Past Medical History:   Diagnosis Date   • Arthritis    • Bipolar disorder (HCC)    • Chronic bronchitis (HCC)    • Depression    • Diabetes mellitus (HCC)     DIAGNOSED 2016   • GERD (gastroesophageal reflux disease)    • Hepatitis-C    • History of blood transfusion    • Hyperlipidemia    • Hypertension    • Infectious viral hepatitis     HEPATITIS C   • Migraine    • Sleep apnea     PATIENT UNSURE OF SETTINGS     Past Surgical History:   Procedure Laterality Date   • CHOLECYSTECTOMY     • ENDOSCOPY N/A 4/9/2022    Procedure: ESOPHAGOGASTRODUODENOSCOPY;  Surgeon: Brunner, Mark I, MD;  Location: [facility] ENDOSCOPY;  Service: Gastroenterology;  Laterality: N/A;  with antrum biopsy   • HYSTERECTOMY     • KNEE SURGERY     • LUMBAR LAMINECTOMY DISCECTOMY DECOMPRESSION N/A 8/6/2018    Procedure: LUMBAR LAMINECTOMIES L4-S1;  Surgeon: Chip Smith MD;  Location: [facility] OR;  Service: Neurosurgery      General Information     Row Name 05/10/22 1324          Physical Therapy Time and Intention    Document Type evaluation  -SS     Mode of Treatment physical therapy  -SS     Row Name 05/10/22 1324          General Information    Patient Profile Reviewed yes  -SS     Prior Level of Function independent:;all household mobility;gait;transfer;bed mobility;using stairs  -     Existing Precautions/Restrictions fall  -SS     Barriers to Rehab medically complex  -SS     Row Name  05/10/22 1324          Living Environment    People in Home significant other  -SS     Row Name 05/10/22 1324          Home Main Entrance    Number of Stairs, Main Entrance two  -SS     Stair Railings, Main Entrance none  -SS     Row Name 05/10/22 1324          Stairs Within Home, Primary    Number of Stairs, Within Home, Primary none  -SS     Row Name 05/10/22 1324          Cognition    Orientation Status (Cognition) oriented x 3;other (see comments)  pt. requires repeated attempts and demonstrates delayed responses; pt. also requires arousal several times during eval due to falling asleep - notified RN  -SS     Row Name 05/10/22 1324          Safety Issues, Functional Mobility    Safety Issues Affecting Function (Mobility) awareness of need for assistance;insight into deficits/self-awareness;judgment;positioning of assistive device;problem-solving;safety precaution awareness;sequencing abilities;safety precautions follow-through/compliance  -     Impairments Affecting Function (Mobility) balance;cognition;endurance/activity tolerance;postural/trunk control;strength;sensation/sensory awareness  -     Cognitive Impairments, Mobility Safety/Performance attention;awareness, need for assistance;insight into deficits/self-awareness;judgment;problem-solving/reasoning;safety precaution awareness;safety precaution follow-through;sequencing abilities  -SS           User Key  (r) = Recorded By, (t) = Taken By, (c) = Cosigned By    Initials Name Provider Type    SS Genet Holley, PT Physical Therapist               Mobility     Row Name 05/10/22 1329          Bed Mobility    Bed Mobility sit-supine;scooting/bridging  -     Scooting/Bridging [place] (Bed Mobility) supervision;verbal cues  -     Sit-Supine [place] (Bed Mobility) minimum assist (75% patient effort);verbal cues  -     Assistive Device (Bed Mobility) bed rails;head of bed elevated  -     Comment, (Bed Mobility) VC for sequencing  -     Row  Name 05/10/22 1329          Sit-Stand Transfer    Sit-Stand [place] (Transfers) verbal cues;minimum assist (75% patient effort)  -     Assistive Device (Sit-Stand Transfers) walker, front-wheeled  -SS     Comment, (Sit-Stand Transfer) VC for hand placement, lowering with eccentric control  -SS     Row Name 05/10/22 1329          Gait/Stairs (Locomotion)    [place] Level (Gait) minimum assist (75% patient effort);verbal cues  -     Assistive Device (Gait) walker, front-wheeled  -SS     Distance in Feet (Gait) 15  -SS     Deviations/Abnormal Patterns (Gait) bilateral deviations;padma decreased;gait speed decreased;stride length decreased;weight shifting decreased  -     Bilateral Gait Deviations forward flexed posture;heel strike decreased  -SS     Comment, (Gait/Stairs) Pt. ambulated with a step through gait pattern. VC for upright posture, AD management, safety awareness. Gait limited by fatigue.  -           User Key  (r) = Recorded By, (t) = Taken By, (c) = Cosigned By    Initials Name Provider Type     Genet Holley, PT Physical Therapist               Obj/Interventions     Row Name 05/10/22 1332          Range of Motion Comprehensive    General Range of Motion bilateral lower extremity ROM WFL  -SS     Row Name 05/10/22 1332          Strength Comprehensive (MMT)    Comment, General Manual Muscle Testing (MMT) Assessment BLE gross 3/5; symmetrical  -SS     Row Name 05/10/22 1332          Motor Skills    Motor Skills coordination  -     Coordination WFL;bilateral;lower extremity  -     Row Name 05/10/22 1332          Balance    Balance Assessment sitting static balance;sitting dynamic balance;sit to stand dynamic balance;standing static balance;standing dynamic balance  -     Static Sitting Balance standby assist  -     Dynamic Sitting Balance standby assist  -SS     Position, Sitting Balance unsupported;sitting edge of bed  -     Sit to Stand Dynamic Balance minimal assist  -SS      Static Standing Balance minimal assist  -SS     Dynamic Standing Balance minimal assist  -SS     Position/Device Used, Standing Balance supported;walker, front-wheeled  -SS     Balance Interventions sitting;standing;sit to stand;supported;static;dynamic  -SS     Row Name 05/10/22 1332          Sensory Assessment (Somatosensory)    Sensory Assessment (Somatosensory) right-sided sensation intact;left LE  -SS     Left LE Sensory Assessment general sensation;impaired;other (see comments)  pt. c/o numbness; notified RN  -SS           User Key  (r) = Recorded By, (t) = Taken By, (c) = Cosigned By    Initials Name Provider Type    SS Genet Holley, PT Physical Therapist               Goals/Plan     Row Name 05/10/22 1338          Bed Mobility Goal 1 (PT)    Activity/Assistive Device (Bed Mobility Goal 1, PT) bed mobility activities, all  -SS     [place] Level/Cues Needed (Bed Mobility Goal 1, PT) independent  -SS     Time Frame (Bed Mobility Goal 1, PT) long term goal (LTG);10 days  -     Row Name 05/10/22 1338          Transfer Goal 1 (PT)    Activity/Assistive Device (Transfer Goal 1, PT) sit-to-stand/stand-to-sit;bed-to-chair/chair-to-bed  -SS     [place] Level/Cues Needed (Transfer Goal 1, PT) independent  -SS     Time Frame (Transfer Goal 1, PT) long term goal (LTG);10 days  -     Row Name 05/10/22 1338          Gait Training Goal 1 (PT)    Activity/Assistive Device (Gait Training Goal 1, PT) gait (walking locomotion);assistive device use;walker, rolling  -SS     [place] Level (Gait Training Goal 1, PT) modified independence  -SS     Distance (Gait Training Goal 1, PT) 150  -SS     Time Frame (Gait Training Goal 1, PT) long term goal (LTG);10 days  -     Row Name 05/10/22 1338          Therapy Assessment/Plan (PT)    Planned Therapy Interventions (PT) balance training;bed mobility training;home exercise program;gait training;neuromuscular re-education;patient/family education;postural  re-education;ROM (range of motion);strengthening;stretching;transfer training  -           User Key  (r) = Recorded By, (t) = Taken By, (c) = Cosigned By    Initials Name Provider Type    SS Genet Holley, PT Physical Therapist               Clinical Impression     Row Name 05/10/22 5758          Pain    Pretreatment Pain Rating 10/10  -SS     Posttreatment Pain Rating 10/10  -SS     Pain Location generalized  -     Pain Location - back  -     Pain Intervention(s) Repositioned;Ambulation/increased activity  -     Additional Documentation Pain Scale: Numbers Pre/Post-Treatment (Group)  -     Row Name 05/10/22 5038          Plan of Care Review    Plan of Care Reviewed With patient  Simultaneous filing. User may be unaware of other data.  -     Outcome Evaluation Pt. presents with increased lethargy, balance impairments, generalized weakness and limited safety awareness affecting her ability to safely participate in functional mobility. She performed bed mobility and sit to stand with min assist. She ambulated 15' w/front wheeled walker, min assist. Gait limited by fatigue. Recommend SNF upon discharge.  Simultaneous filing. User may be unaware of other data.  -     Row Name 05/10/22 4500          Therapy Assessment/Plan (PT)    Rehab Potential (PT) good, to achieve stated therapy goals  -     Criteria for Skilled Interventions Met (PT) yes;meets criteria;skilled treatment is necessary  -     Therapy Frequency (PT) daily  -     Row Name 05/10/22 6902          Vital Signs    Post Systolic BP Rehab 106  recieved in bathroom; unable to assess pre systolic BP  -SS     Post Treatment Diastolic BP 76  -SS     Posttreatment Heart Rate (beats/min) 89  -SS     Post SpO2 (%) 98  -SS     O2 Delivery Post Treatment room air  Simultaneous filing. User may be unaware of other data.  -SS     Post Patient Position Supine  Simultaneous filing. User may be unaware of other data.  -     Row Name 05/10/22 7223           Positioning and Restraints    Pre-Treatment Position bathroom  Simultaneous filing. User may be unaware of other data.  -SS     Post Treatment Position bed  Simultaneous filing. User may be unaware of other data.  -SS     In Bed notified nsg;supine;fowlers;call light within reach;encouraged to call for assist;exit alarm on  RN aware of pt. lethargy, numbness  -           User Key  (r) = Recorded By, (t) = Taken By, (c) = Cosigned By    Initials Name Provider Type     Genet Holley PT Physical Therapist               Outcome Measures     Row Name 05/10/22 1339          How much help from another person do you currently need...    Turning from your back to your side while in flat bed without using bedrails? 3  -SS     Moving from lying on back to sitting on the side of a flat bed without bedrails? 3  -SS     Moving to and from a bed to a chair (including a wheelchair)? 3  -SS     Standing up from a chair using your arms (e.g., wheelchair, bedside chair)? 3  -SS     Climbing 3-5 steps with a railing? 2  -SS     To walk in hospital room? 3  -SS     AM-PAC 6 Clicks Score (PT) 17  -SS     Highest level of mobility 5 --> Static standing  -     Row Name 05/10/22 1339          Functional Assessment    Outcome Measure Options AM-PAC 6 Clicks Basic Mobility (PT)  -           User Key  (r) = Recorded By, (t) = Taken By, (c) = Cosigned By    Initials Name Provider Type    Genet Ramirez PT Physical Therapist                             Physical Therapy Education                 Title: PT OT SLP Therapies (In Progress)     Topic: Physical Therapy (In Progress)     Point: Mobility training (Done)     Learning Progress Summary           Patient BONI Pozo, SAI,NR by  at 5/10/2022 1340    Comment: Educated pt. safety/technique with bed mobility, transfers, ambulation, PT POC                   Point: Home exercise program (Not Started)     Learner Progress:  Not documented in this visit.          Point: Body mechanics  (Done)     Learning Progress Summary           Patient Nohelia, BONI, VU,NR by  at 5/10/2022 1340    Comment: Educated pt. safety/technique with bed mobility, transfers, ambulation, PT POC                   Point: Precautions (Done)     Learning Progress Summary           Patient Nohelia, BONI, VU,NR by  at 5/10/2022 1340    Comment: Educated pt. safety/technique with bed mobility, transfers, ambulation, PT POC                               User Key     Initials Effective Dates Name Provider Type Discipline     06/01/21 -  Genet Holley, PT Physical Therapist PT              PT Recommendation and Plan  Planned Therapy Interventions (PT): balance training, bed mobility training, home exercise program, gait training, neuromuscular re-education, patient/family education, postural re-education, ROM (range of motion), strengthening, stretching, transfer training  Plan of Care Reviewed With: patient (Simultaneous filing. User may be unaware of other data.)  Outcome Evaluation: Pt. presents with increased lethargy, balance impairments, generalized weakness and limited safety awareness affecting her ability to safely participate in functional mobility. She performed bed mobility and sit to stand with min assist. She ambulated 15' w/front wheeled walker, min assist. Gait limited by fatigue. Recommend SNF upon discharge. (Simultaneous filing. User may be unaware of other data.)     Time Calculation:    PT Charges     Row Name 05/10/22 1342             Time Calculation    Start Time 1124  -SS      Stop Time 1134  -SS      Time Calculation (min) 10 min  -SS      PT Received On 05/10/22  -      PT Goal Re-Cert Due Date 05/20/22  -SS              Time Calculation- PT    Total Timed Code Minutes- PT 10 minute(s)  -SS              Untimed Charges    PT Eval/Re-eval Minutes 50  -SS              Total Minutes    Untimed Charges Total Minutes 50  -SS       Total Minutes 50  -SS            User Key  (r) = Recorded By, (t) = Taken By,  (c) = Cosigned By    Initials Name Provider Type    SS Genet Holley, PT Physical Therapist              Therapy Charges for Today     Code Description Service Date Service Provider Modifiers Qty    [id number] HC PT EVAL MOD COMPLEXITY 4 5/10/2022 Genet Holley, PT GP 1          PT G-Codes  Outcome Measure Options: AM-PAC 6 Clicks Basic Mobility (PT)  AM-PAC 6 Clicks Score (PT): 17    Genet Holley PT  5/10/2022      Electronically signed by Genet Holley PT at 05/10/22 1344          Occupational Therapy Notes (most recent note)      Ivana Hunt, OT at 05/10/22 1112          Patient Name: Bernadette Paris  : 1970    MRN: [id number]                              Today's Date: 5/10/2022       Admit Date: 2022    Visit Dx:     ICD-10-CM ICD-9-CM   1. Acidosis  E87.2 276.2   2. Hyperglycemia  R73.9 790.29   3. Moderate dehydration  E86.0 276.51   4. Diabetic ketoacidosis without coma associated with type 2 diabetes mellitus (HCC)  E11.10 250.12   5. Nausea and vomiting, unspecified vomiting type  R11.2 787.01     Patient Active Problem List   Diagnosis   • Spinal stenosis, lumbar region, with neurogenic claudication   • Degenerative disc disease, lumbar   • Facet arthritis of lumbar region   • Lumbar stenosis with neurogenic claudication   • BMI 40.0-44.9, adult (HCC)   • Encounter for smoking cessation counseling   • S/P lumbar laminectomy   • Diabetic ketoacidosis without coma associated with type 2 diabetes mellitus (HCC)   • Nausea & vomiting   • Acidosis   • Bipolar affective disorder (HCC)   • Hepatitis C   • Accelerated hypertension   • Medically noncompliant   • Gastritis   • Hyperlipidemia     Past Medical History:   Diagnosis Date   • Arthritis    • Bipolar disorder (HCC)    • Chronic bronchitis (HCC)    • Depression    • Diabetes mellitus (HCC)     DIAGNOSED 2016   • GERD (gastroesophageal reflux disease)    • Hepatitis-C    • History of blood transfusion    • Hyperlipidemia    •  Hypertension    • Infectious viral hepatitis     HEPATITIS C   • Migraine    • Sleep apnea     PATIENT UNSURE OF SETTINGS     Past Surgical History:   Procedure Laterality Date   • CHOLECYSTECTOMY     • ENDOSCOPY N/A 4/9/2022    Procedure: ESOPHAGOGASTRODUODENOSCOPY;  Surgeon: Brunner, Mark I, MD;  Location: [facility] ENDOSCOPY;  Service: Gastroenterology;  Laterality: N/A;  with antrum biopsy   • HYSTERECTOMY     • KNEE SURGERY     • LUMBAR LAMINECTOMY DISCECTOMY DECOMPRESSION N/A 8/6/2018    Procedure: LUMBAR LAMINECTOMIES L4-S1;  Surgeon: Chip Smith MD;  Location: [facility] OR;  Service: Neurosurgery      General Information     Row Name 05/10/22 1329          OT Time and Intention    Document Type evaluation  -AN     Mode of Treatment occupational therapy  -AN     Row Name 05/10/22 [facility]9          General Information    Patient Profile Reviewed yes  -AN     Prior Level of Function independent:;all household mobility;gait;ADL's  ambulates with cane at baseline  -AN     Existing Precautions/Restrictions fall  -AN     Barriers to Rehab medically complex;previous functional deficit;cognitive status  -AN     Row Name 05/10/22 1329          Living Environment    People in Home significant other  -AN     Row Name 05/10/22 [facility]9          Home Main Entrance    Number of Stairs, Main Entrance two  -AN     Stair Railings, Main Entrance none  -AN     Row Name 05/10/22 1329          Stairs Within Home, Primary    Number of Stairs, Within Home, Primary none  -AN     Row Name 05/10/22 1329          Cognition    Orientation Status (Cognition) oriented x 3;other (see comments)  requires increased times and repeated questions due to lethargy  -AN     Row Name 05/10/22 1329          Safety Issues, Functional Mobility    Safety Issues Affecting Function (Mobility) awareness of need for assistance;insight into deficits/self-awareness;judgment;problem-solving;safety precaution awareness;safety precautions  follow-through/compliance;sequencing abilities  -AN     Impairments Affecting Function (Mobility) balance;cognition;endurance/activity tolerance;postural/trunk control;strength;sensation/sensory awareness;range of motion (ROM);pain  -AN     Cognitive Impairments, Mobility Safety/Performance awareness, need for assistance;insight into deficits/self-awareness;judgment;problem-solving/reasoning;safety precaution awareness;safety precaution follow-through;sequencing abilities;attention  -AN           User Key  (r) = Recorded By, (t) = Taken By, (c) = Cosigned By    Initials Name Provider Type    AN Ivana Hunt OT Occupational Therapist                 Mobility/ADL's     Row Name 05/10/22 1330          Bed Mobility    Bed Mobility supine-sit  -AN     Supine-Sit [place] (Bed Mobility) supervision;verbal cues  -AN     Bed Mobility, Safety Issues decreased use of arms for pushing/pulling;decreased use of legs for bridging/pushing;impaired trunk control for bed mobility;cognitive deficits limit understanding  -AN     Assistive Device (Bed Mobility) head of bed elevated;bed rails  -AN     Comment, (Bed Mobility) increased time and encouragement required to sit EOB  -AN     Row Name 05/10/22 1330          Transfers    Transfers sit-stand transfer;stand-sit transfer  -AN     Comment, (Transfers) cue for hand placement and transfer technique using RW  -AN     Sit-Stand [place] (Transfers) minimum assist (75% patient effort);1 person assist;verbal cues  -AN     Stand-Sit [place] (Transfers) verbal cues;minimum assist (75% patient effort);1 person assist  -AN     Row Name 05/10/22 1330          Sit-Stand Transfer    Assistive Device (Sit-Stand Transfers) walker, front-wheeled  -AN     Row Name 05/10/22 1330          Stand-Sit Transfer    Assistive Device (Stand-Sit Transfers) walker, front-wheeled  -AN     Row Name 05/10/22 1330          Functional Mobility    Functional Mobility- Ind. Level minimum assist  (75% patient effort);1 person;verbal cues required;nonverbal cues required (demo/gesture)  -AN     Functional Mobility- Device walker, front-wheeled  -AN     Functional Mobility-Distance (Feet) --  <household distance  -AN     Functional Mobility- Safety Issues balance decreased during turns;step length decreased  -AN     Functional Mobility- Comment ambulates at a slow pace with cues to keep eyes open and for safety  -AN     Row Name 05/10/22 1330          Activities of Daily Living    BADL Assessment/Intervention lower body dressing;toileting;grooming  -AN     Row Name 05/10/22 1330          Lower Body Dressing Assessment/Training    [place] Level (Lower Body Dressing) don;socks;doff;set up  -AN     Position (Lower Body Dressing) edge of bed sitting  -AN     Row Name 05/10/22 1330          Toileting Assessment/Training    [place] Level (Toileting) adjust/manage clothing;perform perineal hygiene;minimum assist (75% patient effort);verbal cues;nonverbal cues (demo/gesture)  -AN     Assistive Devices (Toileting) commode  -AN     Position (Toileting) supported standing;unsupported sitting  -AN     Row Name 05/10/22 1330          Grooming Assessment/Training    [place] Level (Grooming) wash face, hands;set up  -AN     Position (Grooming) supported sitting  -AN           User Key  (r) = Recorded By, (t) = Taken By, (c) = Cosigned By    Initials Name Provider Type    Ivana Pace OT Occupational Therapist               Obj/Interventions     Row Name 05/10/22 1333          Sensory Assessment (Somatosensory)    Sensory Assessment (Somatosensory) left UE  -AN     Left UE Sensory Assessment light touch awareness  -AN     Sensory Subjective Reports numbness  -AN     Row Name 05/10/22 1333          Vision Assessment/Intervention    Visual Impairment/Limitations other (see comments)  difficult to assess due to lethargy and poor attention; pt appeared tired as she keep her eyes closed most of the session   -AN     Row Name 05/10/22 1333          Range of Motion Comprehensive    General Range of Motion bilateral upper extremity ROM WFL  -AN     Row Name 05/10/22 1333          Strength Comprehensive (MMT)    General Manual Muscle Testing (MMT) Assessment upper extremity strength deficits identified;lower extremity strength deficits identified  -AN     Comment, General Manual Muscle Testing (MMT) Assessment RUE 3+/5, LUE 3/5; observed BLE weakness with functional mobility  -AN     Row Name 05/10/22 1333          Motor Skills    Motor Skills coordination;functional endurance;motor control/coordination interventions  -AN     Coordination fine motor deficit;gross motor deficit;bilateral;upper extremity;minimal impairment  -AN     Functional Endurance decreased activity tolerance  -AN     Motor Control/Coordination Interventions gross motor coordination activities;functional task specific training;fine motor manipulation/dexterity activities;occupation/activity based treatment  -AN     Row Name 05/10/22 1333          Balance    Balance Assessment sitting static balance;sitting dynamic balance;sit to stand dynamic balance;standing static balance;standing dynamic balance  -AN     Static Sitting Balance standby assist  -AN     Dynamic Sitting Balance standby assist  -AN     Position, Sitting Balance sitting edge of bed  -AN     Sit to Stand Dynamic Balance verbal cues;minimal assist;1-person assist  -AN     Static Standing Balance verbal cues;non-verbal cues (demo/gesture);minimal assist;1-person assist  -AN     Dynamic Standing Balance verbal cues;non-verbal cues (demo/gesture);minimal assist;1-person assist  -AN     Position/Device Used, Standing Balance walker, rolling  -AN     Balance Interventions standing;sit to stand;supported;static;dynamic;minimal challenge;occupation based/functional task  -AN           User Key  (r) = Recorded By, (t) = Taken By, (c) = Cosigned By    Initials Name Provider Type    MATTHEW Hunt  MELY Heath Occupational Therapist               Goals/Plan     [facility] Name 05/10/22 [facility]5          Transfer Goal 1 (OT)    Activity/Assistive Device (Transfer Goal 1, OT) sit-to-stand/stand-to-sit;commode;walker, rolling  -AN     [place] Level/Cues Needed (Transfer Goal 1, OT) supervision required  -AN     Time Frame (Transfer Goal 1, OT) long term goal (LTG);10 days  -AN     [facility] Name 05/10/22 [facility]5          Toileting Goal 1 (OT)    Activity/Device (Toileting Goal 1, OT) perform perineal hygiene;adjust/manage clothing;commode  -AN     [place] Level/Cues Needed (Toileting Goal 1, OT) supervision required  -AN     Time Frame (Toileting Goal 1, OT) long term goal (LTG);10 days  -AN     [facility] Name 05/10/22 [facility]5          Therapy Assessment/Plan (OT)    Planned Therapy Interventions (OT) activity tolerance training;adaptive equipment training;BADL retraining;cognitive/visual perception retraining;functional balance retraining;occupation/activity based interventions;patient/caregiver education/training;transfer/mobility retraining;strengthening exercise  -AN           User Key  (r) = Recorded By, (t) = Taken By, (c) = Cosigned By    Initials Name Provider Type    AN Ivana Hunt OT Occupational Therapist               Clinical Impression     Row Name 05/10/22 [facility] 05/10/22 1335       Pain Assessment    Additional Documentation Pain Scale: FACES Pre/Post-Treatment (Group)  -AN Pain Scale: FACES Pre/Post-Treatment (Group)  -AN    Row Name 05/10/22 [facility] 05/10/22 8660       Pain Scale: FACES Pre/Post-Treatment    Pain: FACES Scale, Pretreatment 2-->hurts little bit  -AN 2-->hurts little bit  -AN    Posttreatment Pain Rating 2-->hurts little bit  -AN 2-->hurts little bit  -AN    Pain Location generalized  -AN generalized  -AN    Pain Location - back  -AN back  -AN    Pre/Posttreatment Pain Comment tolerated  -AN tolerated  -AN    Row Name 05/10/22 [facility] 05/10/22 1335       Plan of Care Review    Plan of Care Reviewed  With patient  -AN --    Progress no change  OT evaluation  -AN no change  OT evaluation  -AN    Outcome Evaluation OT evaluation completed. Pt presents with generalized weakness, impaired cognition, impaired balance, and decreased activity tolerance limiting independence with ADLs. Pt lethargic and difficult to understand throughout. Bed mobility performed with supervision, STS using RW with Min A, and ambulation from bed to bathroom for toileting using RW with Min A. Min A for toileting. OT rec IP OT and SNF at [place].  -AN --    Row Name 05/10/22 1352 05/10/22 1336       Therapy Assessment/Plan (OT)    Patient/Family Therapy Goal Statement (OT) Return to PLOF  -AN Return to PLOF  -AN    Rehab Potential (OT) good, to achieve stated therapy goals  -AN good, to achieve stated therapy goals  -AN    Criteria for Skilled Therapeutic Interventions Met (OT) yes;skilled treatment is necessary  -AN yes;skilled treatment is necessary  -AN    Therapy Frequency (OT) daily  -AN daily  -AN    Row Name 05/10/22 1352 05/10/22 9161       Therapy Plan Review/Discharge Plan (OT)    Equipment Needs Upon Discharge (OT) -- walker, rolling  -AN    Anticipated Discharge Disposition (OT) skilled nursing facility  -AN skilled nursing facility  -AN    Row Name 05/10/22 1352 05/10/22 1332       Vital Signs    Pre Systolic BP Rehab --  VSS  -AN --  VSS  -AN    O2 Delivery Pre Treatment room air  -AN room air  -AN    O2 Delivery Intra Treatment room air  -AN room air  -AN    O2 Delivery Post Treatment room air  -AN --    Pre Patient Position Supine  -AN Supine  -AN    Intra Patient Position Standing  -AN Standing  -AN    Post Patient Position Sitting  -AN --    Row Name 05/10/22 1352 05/10/22 1335       Positioning and Restraints    Pre-Treatment Position in bed  -AN --    Post Treatment Position bathroom  -AN --    Bathroom notified nsg;sitting;with PT  -AN notified nsg;sitting;with PT  -AN          User Key  (r) = Recorded By, (t) = Taken By, (c)  = Cosigned By    Initials Name Provider Type    Ivana Pace OT Occupational Therapist               Outcome Measures     Row Name 05/10/22 1357          How much help from another is currently needed...    Putting on and taking off regular lower body clothing? 3  -AN     Bathing (including washing, rinsing, and drying) 3  -AN     Toileting (which includes using toilet bed pan or urinal) 3  -AN     Putting on and taking off regular upper body clothing 3  -AN     Taking care of personal grooming (such as brushing teeth) 3  -AN     Eating meals 3  -AN     AM-PAC 6 Clicks Score (OT) 18  -AN     Row Name 05/10/22 1339          How much help from another person do you currently need...    Turning from your back to your side while in flat bed without using bedrails? 3  -SS     Moving from lying on back to sitting on the side of a flat bed without bedrails? 3  -SS     Moving to and from a bed to a chair (including a wheelchair)? 3  -SS     Standing up from a chair using your arms (e.g., wheelchair, bedside chair)? 3  -SS     Climbing 3-5 steps with a railing? 2  -SS     To walk in hospital room? 3  -SS     AM-PAC 6 Clicks Score (PT) 17  -SS     Highest level of mobility 5 --> Static standing  -SS     Row Name 05/10/22 1357 05/10/22 7578       Functional Assessment    Outcome Measure Options AM-PAC 6 Clicks Daily Activity (OT)  -AN AM-PAC 6 Clicks Basic Mobility (PT)  -SS          User Key  (r) = Recorded By, (t) = Taken By, (c) = Cosigned By    Initials Name Provider Type    Genet Ramirez, PT Physical Therapist    Ivana Pace OT Occupational Therapist                Occupational Therapy Education                 Title: PT OT SLP Therapies (In Progress)     Topic: Occupational Therapy (In Progress)     Point: ADL training (Done)     Description:   Instruct learner(s) on proper safety adaptation and remediation techniques during self care or transfers.   Instruct in proper use of assistive devices.               Learning Progress Summary           Patient Acceptance, E, VU,NR by AN at 5/10/2022 135                   Point: Home exercise program (Not Started)     Description:   Instruct learner(s) on appropriate technique for monitoring, assisting and/or progressing therapeutic exercises/activities.              Learner Progress:  Not documented in this visit.          Point: Precautions (Done)     Description:   Instruct learner(s) on prescribed precautions during self-care and functional transfers.              Learning Progress Summary           Patient Acceptance, E, VU,NR by AN at 5/10/2022 1356                   Point: Body mechanics (Done)     Description:   Instruct learner(s) on proper positioning and spine alignment during self-care, functional mobility activities and/or exercises.              Learning Progress Summary           Patient Acceptance, E, VU,NR by AN at 5/10/2022 1226                               User Key     Initials Effective Dates Name Provider Type Discipline    AN 09/21/21 -  Ivana Hunt OT Occupational Therapist OT              OT Recommendation and Plan  Planned Therapy Interventions (OT): activity tolerance training, adaptive equipment training, BADL retraining, cognitive/visual perception retraining, functional balance retraining, occupation/activity based interventions, patient/caregiver education/training, transfer/mobility retraining, strengthening exercise  Therapy Frequency (OT): daily  Plan of Care Review  Plan of Care Reviewed With: patient  Progress: no change (OT evaluation)  Outcome Evaluation: OT evaluation completed. Pt presents with generalized weakness, impaired cognition, impaired balance, and decreased activity tolerance limiting independence with ADLs. Pt lethargic and difficult to understand throughout. Bed mobility performed with supervision, STS using RW with Min A, and ambulation from bed to bathroom for toileting using RW with Min A. Min A for  toileting. OT rec IP OT and SNF at [place].     Time Calculation:    Time Calculation- OT     Row Name 05/10/22 1358             Time Calculation- OT    OT Start Time 1112  -AN      OT Received On 05/10/22  -AN      OT Goal Re-Cert Due Date 05/20/22  -AN              Untimed Charges    OT Eval/Re-eval Minutes 46  -AN              Total Minutes    Untimed Charges Total Minutes 46  -AN       Total Minutes 46  -AN            User Key  (r) = Recorded By, (t) = Taken By, (c) = Cosigned By    Initials Name Provider Type    AN Ivana Hunt OT Occupational Therapist              Therapy Charges for Today     Code Description Service Date Service Provider Modifiers Qty    77791947744 HC OT EVAL MOD COMPLEXITY 4 5/10/2022 Ivana Hunt OT GO 1               Ivana Hunt OT  5/10/2022    Electronically signed by Ivana Hunt OT at 05/10/22 8368

## 2022-05-11 NOTE — PROGRESS NOTES
[facility] Medicine Services  PROGRESS NOTE    Patient Name: Bernadette Paris  : 1970  MRN: [id number]    Date of Admission: 2022  Primary Care Physician: Lilly Rose MD    Subjective   Subjective     CC:  F/U DKA    HPI:  Complains of some abdominal pain today.  Had nausea/vomiting this am.  Nursing staff reports that patient ate a lot of food and ice cream overnight but patient says she was not able to eat much.  Has not had a bowel movement in several days.    ROS:  Gen- No fevers, chills  GI- As above      Objective   Objective     Vital Signs:   Temp:  [98.1 °F (36.7 °C)-98.5 °F (36.9 °C)] 98.1 °F (36.7 °C)  Heart Rate:  [] 90  Resp:  [18] 18  BP: (101-217)/() 143/92     Physical Exam:  Constitutional: No acute distress, sleeping but awakens easily  HENT: NCAT, mucous membranes moist  Respiratory: Clear to auscultation bilaterally, respiratory effort normal on room air  Cardiovascular: RRR, no murmurs, rubs, or gallops  Gastrointestinal: Positive bowel sounds, soft, nondistended, mild epigastric tenderness  Musculoskeletal: No bilateral ankle edema  Psychiatric: Appropriate mood and affect  Neurologic: Speech is fluent  Skin: No rashes on exposed surfaces    Results Reviewed:  LAB RESULTS:      Lab 22  1301   WBC 10.02 8.62   HEMOGLOBIN 13.2 16.6*   HEMATOCRIT 36.9 47.9*   PLATELETS 283 326   NEUTROS ABS 7.90* 7.13*   IMMATURE GRANS (ABS) 0.03 0.01   LYMPHS ABS 1.64 1.35   MONOS ABS 0.44 0.11   EOS ABS 0.00 0.00   MCV 90.7 93.4         Lab 22  0755 22  0430 05/10/22  2356 05/10/22  1948 05/10/22  1707 22  1450 22  1301   SODIUM 140 138 132* 135* 136   < >  --    POTASSIUM 3.7 3.4* 4.0 4.6 4.4   < >  --    CHLORIDE 110* 109* 106 110* 107   < >  --    CO2 18.0* 19.0* 17.0* 15.0* 16.0*   < >  --    ANION GAP 12.0 10.0 9.0 10.0 13.0   < >  --    BUN 9 9 9 9 11   < >  --    CREATININE 0.51* 0.55* 0.58 0.62 0.67   <  >  --    EGFR 113.2 111.1 109.7 108.0 106.0   < >  --    GLUCOSE 161* 256* 444* 318* 323*   < >  --    CALCIUM 9.0 8.9 8.5* 8.6 8.9   < >  --    MAGNESIUM 2.2 2.1 1.9 2.1 2.1   < >  --    PHOSPHORUS 3.2 2.7 3.5 2.4* 1.8*   < >  --    HEMOGLOBIN A1C  --   --   --   --   --   --  11.90*    < > = values in this interval not displayed.         Lab 05/09/22 2009 05/09/22  1450   TOTAL PROTEIN 6.3 7.8   ALBUMIN 4.10 4.90   GLOBULIN 2.2 2.9   ALT (SGPT) 49* 63*   AST (SGOT) 41* 60*   BILIRUBIN 0.3 0.5   ALK PHOS 152* 197*         Lab 05/10/22  0015   TROPONIN T <0.010                 Brief Urine Lab Results  (Last result in the past 365 days)      Color   Clarity   Blood   Leuk Est   Nitrite   Protein   CREAT   Urine HCG        05/10/22 2051 Yellow   Clear   Negative   Negative   Negative   Trace                 Microbiology Results Abnormal     Procedure Component Value - Date/Time    COVID PRE-OP / PRE-PROCEDURE SCREENING ORDER (NO ISOLATION) - Swab, Nasopharynx [[id number]]  (Normal) Collected: 05/09/22 2113    Lab Status: Final result Specimen: Swab from Nasopharynx Updated: 05/10/22 1430    Narrative:      The following orders were created for panel order COVID PRE-OP / PRE-PROCEDURE SCREENING ORDER (NO ISOLATION) - Swab, Nasopharynx.  Procedure                               Abnormality         Status                     ---------                               -----------         ------                     COVID-19, APTIMA PANTHER...[[id number]]  Normal              Final result                 Please view results for these tests on the individual orders.    COVID-19, APTIMA PANTHER AYDEE IN-HOUSE NP/OP SWAB IN UTM/VTM/SALINE TRANSPORT MEDIA 24HR TAT - Swab, Nasopharynx [[id number]]  (Normal) Collected: 05/09/22 2113    Lab Status: Final result Specimen: Swab from Nasopharynx Updated: 05/10/22 1430     COVID19 Not Detected    Narrative:      Fact sheet for providers: https://www.fda.gov/media/829819/download     Fact  sheet for patients: https://www.fda.gov/media/015656/download    Test performed by RT PCR.          XR Chest 1 View    Result Date: 5/9/2022  DATE OF EXAM: 5/9/2022 2:46 PM  PROCEDURE: XR CHEST 1 VW-  INDICATIONS: Nausea vomiting  COMPARISON: Chest x-ray 4/6/2022  TECHNIQUE: Single radiographic AP view of the chest was obtained.  FINDINGS: Normal cardiomediastinal silhouette. The lungs are clear without focal consolidation. No pleural effusion or pneumothorax. No acute osseous findings. The partially imaged upper abdomen appears unremarkable.      Impression: No acute cardiopulmonary findings.  This report was finalized on 5/9/2022 2:54 PM by Amor Espinoza MD.      XR Abdomen KUB    Result Date: 5/11/2022  XR ABDOMEN KUB-  Date of Exam: 5/11/2022 9:20 AM  Indication: abdominal pain; E87.2-Acidosis; R73.9-Hyperglycemia, unspecified; E86.0-Dehydration; E11.10-Type 2 diabetes mellitus with ketoacidosis without coma; R11.2-Nausea with vomiting, unspecified.  Comparison Exams: CT head from April 6, 2022  Technique: Single AP radiograph of the abdomen  FINDINGS: The lung bases are clear. There is a nonobstructive bowel gas pattern. Moderate stool is present throughout the colon. No pathological calcifications are identified. The osseous structures appear intact.      Impression: No acute process identified.  This report was finalized on 5/11/2022 9:46 AM by Ino Oliva MD.            I have reviewed the medications:  Scheduled Meds:amLODIPine, 5 mg, Oral, Q24H  busPIRone, 10 mg, Oral, BID  heparin (porcine), 5,000 Units, Subcutaneous, Q12H  insulin detemir, 45 Units, Subcutaneous, Q12H  insulin lispro, 0-9 Units, Subcutaneous, TID AC  lisinopril, 40 mg, Oral, Q24H  metoclopramide, 10 mg, Intravenous, 4x Daily AC & at Bedtime  metoprolol tartrate, 25 mg, Oral, Q12H  pantoprazole, 40 mg, Oral, BID  QUEtiapine, 100 mg, Oral, BID  senna-docusate sodium, 2 tablet, Oral, BID  sertraline, 100 mg, Oral, Daily  sodium  chloride, 10 mL, Intravenous, Q12H  traZODone, 50 mg, Oral, Nightly      Continuous Infusions:   PRN Meds:.•  acetaminophen  •  albuterol  •  senna-docusate sodium **AND** polyethylene glycol **AND** bisacodyl **AND** bisacodyl  •  cloNIDine  •  dextrose  •  dextrose  •  glucagon (human recombinant)  •  hydrOXYzine  •  labetalol  •  magnesium sulfate **OR** magnesium sulfate **OR** magnesium sulfate  •  ondansetron  •  potassium chloride  •  potassium chloride  •  potassium phosphate infusion greater than 15 mMoles **OR** potassium phosphate infusion greater than 15 mMoles **OR** potassium phosphate **OR** sodium phosphate IVPB **OR** sodium phosphate IVPB **OR** sodium phosphate IVPB  •  sodium chloride    [unfilled]  Assessment & Plan     Active Hospital Problems    Diagnosis  POA   • **Diabetic ketoacidosis without coma associated with type 2 diabetes mellitus (HCC) [E11.10]  Yes   • Acidosis [E87.2]  Yes   • Bipolar affective disorder (HCC) [F31.9]  Yes   • Hepatitis C [B19.20]  Yes   • Accelerated hypertension [I10]  Yes   • Medically noncompliant [Z91.19]  Not Applicable   • Gastritis [K29.70]  Yes   • Hyperlipidemia [E78.5]  Yes   • Nausea & vomiting [R11.2]  Yes      Resolved Hospital Problems   No resolved problems to display.        Brief Hospital Course to date:  Bernadette Paris is a 52 y.o. female with uncontrolled insulin dependent type 2 diabetes mellitus, gastroparesis, HTN, hepatitis C, bipolar disorder and medical noncompliance who presented with nausea/vomiting after running out of her GI medication and was found to have DKA.    This patient's problems and plans were partially entered by my partner and updated as appropriate by me 05/11/22.    DKA  Uncontrolled T2DM, insulin dependent  Nausea and vomiting/ Gastroparesis  Constipation  -Transitioned to SQ insulin levemir 45 units BID plus moderate dose SSI  -On scheduled reglan for now.  Should resume jian root 500mg TID at discharge-will  need prescription sent to our outpatient pharmacy as she said she never got this medication before.  -KUB with stool burden so will continue with bowel regimen, suppository today  -We discussed small, frequent meals  -PT/OT/ DM educator/ nutrition consult     HTN  -Continue home lisinopril, norvasc, PRN clonidine     Gastritis   Hep C  -Seen by GI last admission. Recommend bid PPI x 1 month, then daily. Has been out of medication x 2 weeks. Continue BID PPI for now (change to IV for a few doses).  -Charo root recommended 500mg TID (doesn't have this at home)  -S/P diflucan x 1 week for candida  -Missed follow up with GI for eval/treatment of Hep C     Bipolar disorder  -Resumed home seroquel, zoloft, trazodone  -PRN atarax     Medical noncompliance  -Has not followed up with established PCP or GI due to lost medical card and ID  -Has been out of medications for 1-2 weeks.  She did fill insulin in April for $0 copay.    DVT prophylaxis:  Medical DVT prophylaxis orders are present.   AM-PAC 6 Clicks Score (PT): 18 (05/11/22 0800)    Disposition: I expect the patient to be discharged 1-2 days if blood glucose is stable and she is tolerating a diet.    CODE STATUS:   Code Status and Medical Interventions:   Ordered at: 05/09/22 1604     Code Status (Patient has no pulse and is not breathing):    CPR (Attempt to Resuscitate)     Medical Interventions (Patient has pulse or is breathing):    Full Support       Светлана Navarro MD  05/11/22

## 2022-05-11 NOTE — PLAN OF CARE
Goal Outcome Evaluation:           Progress: improving  Outcome Evaluation: A & O x 4, NSR, RA, c/o severe epigastric pain and nausea - see MAR, KUB showed stool burden/suppository given/had large BM, Mag replaced, remains hypertensive - PRN labetalol and clonidine given, plan is Ismael Mccarthy 19, Falls score 13/bed alarm active, will continue to monitor

## 2022-05-11 NOTE — CONSULTS
Attempted to see Ms. Paris this morning before lunch, and she was not feeling well. I told her I would return this afternoon to check on her, and she was unable to talk to me because she was waiting to go to the restroom.  Will continue to follow for an appropriate time to educate.  Thank you.

## 2022-05-12 PROBLEM — E43 SEVERE MALNUTRITION (HCC): Status: ACTIVE | Noted: 2022-05-12

## 2022-05-12 LAB
ANION GAP SERPL CALCULATED.3IONS-SCNC: 10 MMOL/L (ref 5–15)
BASOPHILS # BLD AUTO: 0.02 10*3/MM3 (ref 0–0.2)
BASOPHILS NFR BLD AUTO: 0.4 % (ref 0–1.5)
BUN SERPL-MCNC: 10 MG/DL (ref 6–20)
BUN/CREAT SERPL: 22.7 (ref 7–25)
CALCIUM SPEC-SCNC: 8.9 MG/DL (ref 8.6–10.5)
CHLORIDE SERPL-SCNC: 108 MMOL/L (ref 98–107)
CO2 SERPL-SCNC: 21 MMOL/L (ref 22–29)
CREAT SERPL-MCNC: 0.44 MG/DL (ref 0.57–1)
DEPRECATED RDW RBC AUTO: 49.1 FL (ref 37–54)
EGFRCR SERPLBLD CKD-EPI 2021: 117.3 ML/MIN/1.73
EOSINOPHIL # BLD AUTO: 0.06 10*3/MM3 (ref 0–0.4)
EOSINOPHIL NFR BLD AUTO: 1.3 % (ref 0.3–6.2)
ERYTHROCYTE [DISTWIDTH] IN BLOOD BY AUTOMATED COUNT: 14.2 % (ref 12.3–15.4)
GLUCOSE BLDC GLUCOMTR-MCNC: 115 MG/DL (ref 70–130)
GLUCOSE BLDC GLUCOMTR-MCNC: 117 MG/DL (ref 70–130)
GLUCOSE BLDC GLUCOMTR-MCNC: 142 MG/DL (ref 70–130)
GLUCOSE BLDC GLUCOMTR-MCNC: 165 MG/DL (ref 70–130)
GLUCOSE BLDC GLUCOMTR-MCNC: 203 MG/DL (ref 70–130)
GLUCOSE BLDC GLUCOMTR-MCNC: 62 MG/DL (ref 70–130)
GLUCOSE SERPL-MCNC: 64 MG/DL (ref 65–99)
HCT VFR BLD AUTO: 35 % (ref 34–46.6)
HGB BLD-MCNC: 12 G/DL (ref 12–15.9)
IMM GRANULOCYTES # BLD AUTO: 0.01 10*3/MM3 (ref 0–0.05)
IMM GRANULOCYTES NFR BLD AUTO: 0.2 % (ref 0–0.5)
LYMPHOCYTES # BLD AUTO: 3.07 10*3/MM3 (ref 0.7–3.1)
LYMPHOCYTES NFR BLD AUTO: 66 % (ref 19.6–45.3)
MAGNESIUM SERPL-MCNC: 2.3 MG/DL (ref 1.6–2.6)
MCH RBC QN AUTO: 32.3 PG (ref 26.6–33)
MCHC RBC AUTO-ENTMCNC: 34.3 G/DL (ref 31.5–35.7)
MCV RBC AUTO: 94.3 FL (ref 79–97)
MONOCYTES # BLD AUTO: 0.43 10*3/MM3 (ref 0.1–0.9)
MONOCYTES NFR BLD AUTO: 9.2 % (ref 5–12)
NEUTROPHILS NFR BLD AUTO: 1.06 10*3/MM3 (ref 1.7–7)
NEUTROPHILS NFR BLD AUTO: 22.9 % (ref 42.7–76)
NRBC BLD AUTO-RTO: 0 /100 WBC (ref 0–0.2)
PLAT MORPH BLD: NORMAL
PLATELET # BLD AUTO: 215 10*3/MM3 (ref 140–450)
PMV BLD AUTO: 10.4 FL (ref 6–12)
POTASSIUM SERPL-SCNC: 4.1 MMOL/L (ref 3.5–5.2)
RBC # BLD AUTO: 3.71 10*6/MM3 (ref 3.77–5.28)
RBC MORPH BLD: NORMAL
SODIUM SERPL-SCNC: 139 MMOL/L (ref 136–145)
WBC MORPH BLD: NORMAL
WBC NRBC COR # BLD: 4.65 10*3/MM3 (ref 3.4–10.8)

## 2022-05-12 PROCEDURE — 85025 COMPLETE CBC W/AUTO DIFF WBC: CPT | Performed by: INTERNAL MEDICINE

## 2022-05-12 PROCEDURE — 83735 ASSAY OF MAGNESIUM: CPT | Performed by: INTERNAL MEDICINE

## 2022-05-12 PROCEDURE — 82962 GLUCOSE BLOOD TEST: CPT

## 2022-05-12 PROCEDURE — 25010000002 ONDANSETRON PER 1 MG: Performed by: NURSE PRACTITIONER

## 2022-05-12 PROCEDURE — 97535 SELF CARE MNGMENT TRAINING: CPT

## 2022-05-12 PROCEDURE — 97530 THERAPEUTIC ACTIVITIES: CPT

## 2022-05-12 PROCEDURE — 97116 GAIT TRAINING THERAPY: CPT

## 2022-05-12 PROCEDURE — 63710000001 INSULIN LISPRO (HUMAN) PER 5 UNITS: Performed by: NURSE PRACTITIONER

## 2022-05-12 PROCEDURE — 85007 BL SMEAR W/DIFF WBC COUNT: CPT | Performed by: INTERNAL MEDICINE

## 2022-05-12 PROCEDURE — 99232 SBSQ HOSP IP/OBS MODERATE 35: CPT | Performed by: INTERNAL MEDICINE

## 2022-05-12 PROCEDURE — 80048 BASIC METABOLIC PNL TOTAL CA: CPT | Performed by: INTERNAL MEDICINE

## 2022-05-12 PROCEDURE — 97110 THERAPEUTIC EXERCISES: CPT

## 2022-05-12 PROCEDURE — 25010000002 HEPARIN (PORCINE) PER 1000 UNITS: Performed by: NURSE PRACTITIONER

## 2022-05-12 PROCEDURE — 63710000001 INSULIN DETEMIR PER 5 UNITS: Performed by: INTERNAL MEDICINE

## 2022-05-12 RX ADMIN — BUSPIRONE HYDROCHLORIDE 10 MG: 10 TABLET ORAL at 21:48

## 2022-05-12 RX ADMIN — PANTOPRAZOLE SODIUM 40 MG: 40 INJECTION, POWDER, FOR SOLUTION INTRAVENOUS at 17:03

## 2022-05-12 RX ADMIN — METOPROLOL TARTRATE 25 MG: 25 TABLET, FILM COATED ORAL at 21:48

## 2022-05-12 RX ADMIN — SENNOSIDES AND DOCUSATE SODIUM 2 TABLET: 50; 8.6 TABLET ORAL at 08:55

## 2022-05-12 RX ADMIN — TRAZODONE HYDROCHLORIDE 50 MG: 50 TABLET ORAL at 21:48

## 2022-05-12 RX ADMIN — INSULIN DETEMIR 45 UNITS: 100 INJECTION, SOLUTION SUBCUTANEOUS at 08:58

## 2022-05-12 RX ADMIN — HEPARIN SODIUM 5000 UNITS: 5000 INJECTION, SOLUTION INTRAVENOUS; SUBCUTANEOUS at 21:48

## 2022-05-12 RX ADMIN — SERTRALINE 100 MG: 100 TABLET, FILM COATED ORAL at 08:55

## 2022-05-12 RX ADMIN — INSULIN LISPRO 4 UNITS: 100 INJECTION, SOLUTION INTRAVENOUS; SUBCUTANEOUS at 08:56

## 2022-05-12 RX ADMIN — BUSPIRONE HYDROCHLORIDE 10 MG: 10 TABLET ORAL at 08:55

## 2022-05-12 RX ADMIN — HEPARIN SODIUM 5000 UNITS: 5000 INJECTION, SOLUTION INTRAVENOUS; SUBCUTANEOUS at 08:56

## 2022-05-12 RX ADMIN — OXYCODONE HYDROCHLORIDE AND ACETAMINOPHEN 1 TABLET: 7.5; 325 TABLET ORAL at 22:38

## 2022-05-12 RX ADMIN — QUETIAPINE FUMARATE 100 MG: 100 TABLET ORAL at 21:48

## 2022-05-12 RX ADMIN — PANTOPRAZOLE SODIUM 40 MG: 40 INJECTION, POWDER, FOR SOLUTION INTRAVENOUS at 08:55

## 2022-05-12 RX ADMIN — Medication 10 ML: at 08:56

## 2022-05-12 RX ADMIN — ONDANSETRON 4 MG: 2 INJECTION INTRAMUSCULAR; INTRAVENOUS at 06:06

## 2022-05-12 RX ADMIN — OXYCODONE HYDROCHLORIDE AND ACETAMINOPHEN 1 TABLET: 7.5; 325 TABLET ORAL at 06:07

## 2022-05-12 RX ADMIN — METOPROLOL TARTRATE 25 MG: 25 TABLET, FILM COATED ORAL at 08:54

## 2022-05-12 RX ADMIN — Medication 10 ML: at 21:49

## 2022-05-12 RX ADMIN — HYDROXYZINE HYDROCHLORIDE 25 MG: 25 TABLET, FILM COATED ORAL at 03:00

## 2022-05-12 RX ADMIN — AMLODIPINE BESYLATE 5 MG: 5 TABLET ORAL at 08:55

## 2022-05-12 RX ADMIN — OXYCODONE HYDROCHLORIDE AND ACETAMINOPHEN 1 TABLET: 7.5; 325 TABLET ORAL at 14:16

## 2022-05-12 RX ADMIN — ACETAMINOPHEN 325 MG: 325 TABLET ORAL at 03:00

## 2022-05-12 RX ADMIN — QUETIAPINE FUMARATE 100 MG: 100 TABLET ORAL at 08:55

## 2022-05-12 RX ADMIN — LISINOPRIL 40 MG: 40 TABLET ORAL at 08:55

## 2022-05-12 RX ADMIN — SENNOSIDES AND DOCUSATE SODIUM 2 TABLET: 50; 8.6 TABLET ORAL at 21:48

## 2022-05-12 NOTE — CASE MANAGEMENT/SOCIAL WORK
Continued Stay Note  [facility]     Patient Name: Bernadette Paris  MRN: [id number]  Today's Date: 5/12/2022    Admit Date: 5/9/2022     Discharge Plan     Row Name 05/12/22 1502       Plan    Plan Comments I spoke with Kiley in admissions at [facility] this morning. She reports that she is considering Mrs. Paris for acute rehab, but requested updated PT notes. I have called and notified her that these are in [facility]. [facility] and [facility] have both declined Mrs. Paris's referral. We discussed alternative discharge plans in case [facility] in [place] denies her case. If she is not accepted at [facility] she will return home. Once she establishes care with a PCP, home health services can be arranged. Mrs. Paris verbalized understanding.    Final Discharge Disposition Code 30 - still a patient               Discharge Codes    No documentation.                   Raúl Miles RN

## 2022-05-12 NOTE — THERAPY TREATMENT NOTE
Patient Name: Bernadette Paris  : 1970    MRN: [id number]                              Today's Date: 2022       Admit Date: 2022    Visit Dx:     ICD-10-CM ICD-9-CM   1. Acidosis  E87.2 276.2   2. Hyperglycemia  R73.9 790.29   3. Moderate dehydration  E86.0 276.51   4. Diabetic ketoacidosis without coma associated with type 2 diabetes mellitus (HCC)  E11.10 250.12   5. Nausea and vomiting, unspecified vomiting type  R11.2 787.01     Patient Active Problem List   Diagnosis   • Spinal stenosis, lumbar region, with neurogenic claudication   • Degenerative disc disease, lumbar   • Facet arthritis of lumbar region   • Lumbar stenosis with neurogenic claudication   • BMI 40.0-44.9, adult (HCC)   • Encounter for smoking cessation counseling   • S/P lumbar laminectomy   • Diabetic ketoacidosis without coma associated with type 2 diabetes mellitus (HCC)   • Nausea & vomiting   • Acidosis   • Bipolar affective disorder (HCC)   • Hepatitis C   • Accelerated hypertension   • Medically noncompliant   • Gastritis   • Hyperlipidemia     Past Medical History:   Diagnosis Date   • Arthritis    • Bipolar disorder (HCC)    • Chronic bronchitis (HCC)    • Depression    • Diabetes mellitus (HCC)     DIAGNOSED    • GERD (gastroesophageal reflux disease)    • Hepatitis-C    • History of blood transfusion    • Hyperlipidemia    • Hypertension    • Infectious viral hepatitis     HEPATITIS C   • Migraine    • Sleep apnea     PATIENT UNSURE OF SETTINGS     Past Surgical History:   Procedure Laterality Date   • CHOLECYSTECTOMY     • ENDOSCOPY N/A 2022    Procedure: ESOPHAGOGASTRODUODENOSCOPY;  Surgeon: Brunner, Mark I, MD;  Location: [facility] ENDOSCOPY;  Service: Gastroenterology;  Laterality: N/A;  with antrum biopsy   • HYSTERECTOMY     • KNEE SURGERY     • LUMBAR LAMINECTOMY DISCECTOMY DECOMPRESSION N/A 2018    Procedure: LUMBAR LAMINECTOMIES L4-S1;  Surgeon: Chip Smith MD;  Location: [facility] OR;  Service:  Neurosurgery      General Information     Row Name 05/12/22 1143          Physical Therapy Time and Intention    Document Type therapy note (daily note)  -     Mode of Treatment physical therapy  -     Row Name 05/12/22 1143          General Information    Patient Profile Reviewed yes  -     Existing Precautions/Restrictions fall  -     Barriers to Rehab medically complex;previous functional deficit  -     Row Name 05/12/22 1143          Cognition    Orientation Status (Cognition) oriented x 3  -     Row Name 05/12/22 1143          Safety Issues, Functional Mobility    Safety Issues Affecting Function (Mobility) awareness of need for assistance;insight into deficits/self-awareness;judgment;safety precaution awareness;safety precautions follow-through/compliance;impulsivity  -     Impairments Affecting Function (Mobility) balance;endurance/activity tolerance;coordination;pain;strength  -           User Key  (r) = Recorded By, (t) = Taken By, (c) = Cosigned By    Initials Name Provider Type     Cami Peterson, PT Physical Therapist               Mobility     Row Name 05/12/22 1147          Bed Mobility    Bed Mobility supine-sit;sit-supine;scooting/bridging  -     Scooting/Bridging [place] (Bed Mobility) independent  -BA     Supine-Sit [place] (Bed Mobility) modified independence  -     Sit-Supine [place] (Bed Mobility) independent  -     Assistive Device (Bed Mobility) bed rails;head of bed elevated  -     Comment, (Bed Mobility) Impulsive with transitioning to sitting EOB.  Reported mild dizziness with sitting EOB that subsided fairly quickly.  -     Row Name 05/12/22 1147          Sit-Stand Transfer    Sit-Stand [place] (Transfers) contact guard;verbal cues  -     Assistive Device (Sit-Stand Transfers) walker, front-wheeled  -     Comment, (Sit-Stand Transfer) VCs for sequencing and hand placement.  Reported mild dizziness upon standing; subsided w/in ~1 min  with static standing.  No LOB or postural sway noted.  -     Row Name 05/12/22 1147          Gait/Stairs (Locomotion)    [place] Level (Gait) contact guard;verbal cues  -     Assistive Device (Gait) walker, front-wheeled  -     Distance in Feet (Gait) 140  -     Deviations/Abnormal Patterns (Gait) bilateral deviations;base of support, narrow;padma decreased;gait speed decreased;stride length decreased  -     Bilateral Gait Deviations heel strike decreased  -     Comment, (Gait/Stairs) Pt demonstrated step through gait pattern at slow pace.  VCs/TCs to widen BERENICE, improved stride length, and improved heel strike upon IC.  Gait distance limited by fatigue.  -           User Key  (r) = Recorded By, (t) = Taken By, (c) = Cosigned By    Initials Name Provider Type     Cami Peterson, PT Physical Therapist               Obj/Interventions     Row Name 05/12/22 1154          Motor Skills    Therapeutic Exercise hip;knee;ankle  -[facility] Name 05/12/22 1154          Hip (Therapeutic Exercise)    Hip (Therapeutic Exercise) strengthening exercise  -     Hip Strengthening (Therapeutic Exercise) bilateral;marching while seated;sitting;heel slides;aBduction;aDduction;supine;10 repetitions  -     Row Name 05/12/22 1154          Knee (Therapeutic Exercise)    Knee (Therapeutic Exercise) strengthening exercise  -     Knee Strengthening (Therapeutic Exercise) bilateral;LAQ (long arc quad);sitting;SLR (straight leg raise);supine;10 repetitions  -[facility] Name 05/12/22 1154          Ankle (Therapeutic Exercise)    Ankle (Therapeutic Exercise) AROM (active range of motion)  -     Ankle AROM (Therapeutic Exercise) bilateral;dorsiflexion;plantarflexion;supine;10 repetitions  -     Row Name 05/12/22 1151          Balance    Balance Assessment sitting static balance;sitting dynamic balance;sit to stand dynamic balance;standing static balance;standing dynamic balance  -     Static Sitting Balance  supervision  -     Dynamic Sitting Balance standby assist  -BA     Position, Sitting Balance unsupported;sitting edge of bed  -     Sit to Stand Dynamic Balance contact guard  -     Static Standing Balance contact guard;verbal cues  -     Dynamic Standing Balance contact guard;verbal cues  -     Position/Device Used, Standing Balance supported;walker, front-wheeled  -     Balance Interventions sit to stand;standing;supported;static;dynamic;occupation based/functional task  -     Comment, Balance Mild instability with standing and ambulation activity with FWW.  No LOB noted.  -           User Key  (r) = Recorded By, (t) = Taken By, (c) = Cosigned By    Initials Name Provider Type     Cami Peterson, PT Physical Therapist               Goals/Plan    No documentation.                Clinical Impression     Row Name 05/12/22 1158          Pain    Pretreatment Pain Rating 10/10  -     Posttreatment Pain Rating 10/10  -     Pain Location generalized  -     Pain Location - abdomen;back  -     Pre/Posttreatment Pain Comment Tolerated activity; RN aware and managing.  -     Pain Intervention(s) Ambulation/increased activity;Repositioned  -     Row Name 05/12/22 3666          Plan of Care Review    Plan of Care Reviewed With patient  -BA     Progress improving  -     Outcome Evaluation Improved performance and toleration to activity today noted by improved level of assist with mobility and increased ambulation distance.  STS and ambulated 140ft with CGA and FWW with cueing for improved gait technique.  Good effort and participation with seated and supine BLE ther ex.  Limited today by decreased functional endurance, balance, weakness, and pain.  Con to progress pt as able per PT POC.  Rec IPR upon d/c.  -     Row Name 05/12/22 1152          Vital Signs    Pre Systolic BP Rehab --  VSS; RN cleared for activity.  -     Pretreatment Heart Rate (beats/min) 75  -     Posttreatment Heart  Rate (beats/min) 72  -BA     Pre SpO2 (%) 99  -BA     O2 Delivery Pre Treatment room air  -BA     O2 Delivery Intra Treatment room air  -BA     Post SpO2 (%) 98  -BA     O2 Delivery Post Treatment room air  -BA     Pre Patient Position Supine  -BA     Intra Patient Position Standing  -BA     Post Patient Position Supine  -BA     Row Name 05/12/22 1158          Positioning and Restraints    Pre-Treatment Position in bed  -BA     Post Treatment Position bed  -BA     In Bed notified nsg;supine;fowlers;call light within reach;encouraged to call for assist;exit alarm on;side rails up x3  -BA           User Key  (r) = Recorded By, (t) = Taken By, (c) = Cosigned By    Initials Name Provider Type    Cami Maya, HAROLDO Physical Therapist               Outcome Measures     Row Name 05/12/22 1211          How much help from another person do you currently need...    Turning from your back to your side while in flat bed without using bedrails? 4  -BA     Moving from lying on back to sitting on the side of a flat bed without bedrails? 4  -BA     Moving to and from a bed to a chair (including a wheelchair)? 3  -BA     Standing up from a chair using your arms (e.g., wheelchair, bedside chair)? 3  -BA     Climbing 3-5 steps with a railing? 3  -BA     To walk in hospital room? 3  -BA     AM-PAC 6 Clicks Score (PT) 20  -BA     Highest level of mobility 6 --> Walked 10 steps or more  -     Row Name 05/12/22 1211          Functional Assessment    Outcome Measure Options AM-PAC 6 Clicks Basic Mobility (PT)  -           User Key  (r) = Recorded By, (t) = Taken By, (c) = Cosigned By    Initials Name Provider Type    Cami Maya, PT Physical Therapist                             Physical Therapy Education                 Title: PT OT SLP Therapies (In Progress)     Topic: Physical Therapy (In Progress)     Point: Mobility training (In Progress)     Learning Progress Summary           Patient Acceptance, E, NR by JUSTIN at  5/12/2022 1211    Eager, E, VU,NR by  at 5/10/2022 1340    Comment: Educated pt. safety/technique with bed mobility, transfers, ambulation, PT POC                   Point: Home exercise program (In Progress)     Learning Progress Summary           Patient Acceptance, E, NR by BA at 5/12/2022 1211                   Point: Body mechanics (In Progress)     Learning Progress Summary           Patient Acceptance, E, NR by BA at 5/12/2022 1211    Eager, E, VU,NR by  at 5/10/2022 1340    Comment: Educated pt. safety/technique with bed mobility, transfers, ambulation, PT POC                   Point: Precautions (In Progress)     Learning Progress Summary           Patient Acceptance, E, NR by BA at 5/12/2022 1211    Eager, E, VU,NR by  at 5/10/2022 1340    Comment: Educated pt. safety/technique with bed mobility, transfers, ambulation, PT POC                               User Key     Initials Effective Dates Name Provider Type Discipline     06/01/21 -  Genet Holley, PT Physical Therapist PT     09/21/21 -  Cami Peterson, PT Physical Therapist PT              PT Recommendation and Plan     Plan of Care Reviewed With: patient  Progress: improving  Outcome Evaluation: Improved performance and toleration to activity today noted by improved level of assist with mobility and increased ambulation distance.  STS and ambulated 140ft with CGA and FWW with cueing for improved gait technique.  Good effort and participation with seated and supine BLE ther ex.  Limited today by decreased functional endurance, balance, weakness, and pain.  Con to progress pt as able per PT POC.  Rec IPR upon d/c.     Time Calculation:    PT Charges     Row Name 05/12/22 1212             Time Calculation    Start Time 1116  -BA      PT Received On 05/12/22  -              Time Calculation- PT    Total Timed Code Minutes- PT 24 minute(s)  -BA              Timed Charges    91699 - PT Therapeutic Exercise Minutes 11  -      21083 - Gait  Training Minutes  13  -BA              Total Minutes    Timed Charges Total Minutes 24  -BA       Total Minutes 24  -BA            User Key  (r) = Recorded By, (t) = Taken By, (c) = Cosigned By    Initials Name Provider Type    Cami Maya, PT Physical Therapist              Therapy Charges for Today     Code Description Service Date Service Provider Modifiers Qty    [id number]  PT THER PROC EA 15 MIN 5/12/2022 Cami Peterson, PT GP 1    [id number] HC GAIT TRAINING EA 15 MIN 5/12/2022 Cami Peterson, PT GP 1          PT G-Codes  Outcome Measure Options: AM-PAC 6 Clicks Basic Mobility (PT)  AM-PAC 6 Clicks Score (PT): 20  AM-PAC 6 Clicks Score (OT): 18    Cami Peterson PT  5/12/2022

## 2022-05-12 NOTE — CONSULTS
Diabetes Education    Patient Name:  Bernadette Paris  YOB: 1970  MRN: [id number]  Admit Date:  5/9/2022        Attempted to see pt for diabetes ed this morning; working with therapy. Will cont to follow.      Electronically signed by:  Aster Zepeda RN  05/12/22 11:48 EDT

## 2022-05-12 NOTE — PROGRESS NOTES
[facility] Medicine Services  PROGRESS NOTE    Patient Name: Bernadette Paris  : 1970  MRN: [id number]    Date of Admission: 2022  Primary Care Physician: Lilly Rose MD    Subjective   Subjective     CC:  F/U DKA    HPI:  Eating more.  Still feels like she needs to have a bowel movement.    ROS:  Gen- No fevers, chills  GI- As above    Objective   Objective     Vital Signs:   Temp:  [97.8 °F (36.6 °C)-97.9 °F (36.6 °C)] 97.9 °F (36.6 °C)  Heart Rate:  [80-96] 82  Resp:  [16-18] 18  BP: (100-175)/() 117/89     Physical Exam:  Constitutional: No acute distress, awake, alert, sitting up in bed  HENT: NCAT, mucous membranes moist  Respiratory: Respiratory effort normal on room air  Cardiovascular: RRR  Musculoskeletal: No bilateral ankle edema  Psychiatric: Appropriate mood and affect  Neurologic: Speech is fluent  Skin: No rashes on exposed surfaces    Results Reviewed:  LAB RESULTS:      Lab 22  0708 22  1301   WBC 4.65 10.02 8.62   HEMOGLOBIN 12.0 13.2 16.6*   HEMATOCRIT 35.0 36.9 47.9*   PLATELETS 215 283 326   NEUTROS ABS 1.06* 7.90* 7.13*   IMMATURE GRANS (ABS) 0.01 0.03 0.01   LYMPHS ABS 3.07 1.64 1.35   MONOS ABS 0.43 0.44 0.11   EOS ABS 0.06 0.00 0.00   MCV 94.3 90.7 93.4         Lab 22  1143 22  1208 22  0755 22  0430 05/10/22  2356 05/10/22  1948 22  1450 22  1301   SODIUM 139 136 140 138 132* 135*   < >  --    POTASSIUM 4.1 4.2 3.7 3.4* 4.0 4.6   < >  --    CHLORIDE 108* 107 110* 109* 106 110*   < >  --    CO2 21.0* 16.0* 18.0* 19.0* 17.0* 15.0*   < >  --    ANION GAP 10.0 13.0 12.0 10.0 9.0 10.0   < >  --    BUN 10 8 9 9 9 9   < >  --    CREATININE 0.44* 0.54* 0.51* 0.55* 0.58 0.62   < >  --    EGFR 117.3 111.6 113.2 111.1 109.7 108.0   < >  --    GLUCOSE 64* 343* 161* 256* 444* 318*   < >  --    CALCIUM 8.9 9.2 9.0 8.9 8.5* 8.6   < >  --    MAGNESIUM 2.3 1.9 2.2 2.1 1.9 2.1   < >  --     PHOSPHORUS  --  3.5 3.2 2.7 3.5 2.4*   < >  --    HEMOGLOBIN A1C  --   --   --   --   --   --   --  11.90*    < > = values in this interval not displayed.         Lab 05/09/22 2009 05/09/22  1450   TOTAL PROTEIN 6.3 7.8   ALBUMIN 4.10 4.90   GLOBULIN 2.2 2.9   ALT (SGPT) 49* 63*   AST (SGOT) 41* 60*   BILIRUBIN 0.3 0.5   ALK PHOS 152* 197*         Lab 05/10/22  0015   TROPONIN T <0.010                 Brief Urine Lab Results  (Last result in the past 365 days)      Color   Clarity   Blood   Leuk Est   Nitrite   Protein   CREAT   Urine HCG        05/10/22 2051 Yellow   Clear   Negative   Negative   Negative   Trace                 Microbiology Results Abnormal     Procedure Component Value - Date/Time    COVID PRE-OP / PRE-PROCEDURE SCREENING ORDER (NO ISOLATION) - Swab, Nasopharynx [[id number]]  (Normal) Collected: 05/09/22 2113    Lab Status: Final result Specimen: Swab from Nasopharynx Updated: 05/10/22 1430    Narrative:      The following orders were created for panel order COVID PRE-OP / PRE-PROCEDURE SCREENING ORDER (NO ISOLATION) - Swab, Nasopharynx.  Procedure                               Abnormality         Status                     ---------                               -----------         ------                     COVID-19, APTIMA PANTHER...[[id number]]  Normal              Final result                 Please view results for these tests on the individual orders.    COVID-19, APTIMA PANTHER AYDEE IN-HOUSE NP/OP SWAB IN UTM/VTM/SALINE TRANSPORT MEDIA 24HR TAT - Swab, Nasopharynx [[id number]]  (Normal) Collected: 05/09/22 2113    Lab Status: Final result Specimen: Swab from Nasopharynx Updated: 05/10/22 1430     COVID19 Not Detected    Narrative:      Fact sheet for providers: https://www.fda.gov/media/220707/download     Fact sheet for patients: https://www.fda.gov/media/563310/download    Test performed by RT PCR.          XR Abdomen KUB    Result Date: 5/11/2022  XR ABDOMEN KUB-  Date of Exam:  5/11/2022 9:20 AM  Indication: abdominal pain; E87.2-Acidosis; R73.9-Hyperglycemia, unspecified; E86.0-Dehydration; E11.10-Type 2 diabetes mellitus with ketoacidosis without coma; R11.2-Nausea with vomiting, unspecified.  Comparison Exams: CT head from April 6, 2022  Technique: Single AP radiograph of the abdomen  FINDINGS: The lung bases are clear. There is a nonobstructive bowel gas pattern. Moderate stool is present throughout the colon. No pathological calcifications are identified. The osseous structures appear intact.      Impression: No acute process identified.  This report was finalized on 5/11/2022 9:46 AM by Ino Oliva MD.            I have reviewed the medications:  Scheduled Meds:amLODIPine, 5 mg, Oral, Q24H  busPIRone, 10 mg, Oral, BID  heparin (porcine), 5,000 Units, Subcutaneous, Q12H  insulin detemir, 45 Units, Subcutaneous, Q12H  insulin lispro, 0-9 Units, Subcutaneous, 4x Daily With Meals & Nightly  lisinopril, 40 mg, Oral, Q24H  metoprolol tartrate, 25 mg, Oral, Q12H  pantoprazole, 40 mg, Intravenous, BID AC  QUEtiapine, 100 mg, Oral, BID  senna-docusate sodium, 2 tablet, Oral, BID  sertraline, 100 mg, Oral, Daily  sodium chloride, 10 mL, Intravenous, Q12H  traZODone, 50 mg, Oral, Nightly      Continuous Infusions:   PRN Meds:.•  acetaminophen  •  albuterol  •  senna-docusate sodium **AND** polyethylene glycol **AND** bisacodyl **AND** bisacodyl  •  cloNIDine  •  dextrose  •  dextrose  •  glucagon (human recombinant)  •  hydrOXYzine  •  labetalol  •  magnesium sulfate **OR** magnesium sulfate **OR** magnesium sulfate  •  ondansetron  •  oxyCODONE-acetaminophen  •  potassium chloride  •  potassium chloride  •  potassium phosphate infusion greater than 15 mMoles **OR** potassium phosphate infusion greater than 15 mMoles **OR** potassium phosphate **OR** sodium phosphate IVPB **OR** sodium phosphate IVPB **OR** sodium phosphate IVPB  •  sodium chloride    [unfilled]  Assessment &  Plan     Active Hospital Problems    Diagnosis  POA   • **Diabetic ketoacidosis without coma associated with type 2 diabetes mellitus (HCC) [E11.10]  Yes   • Severe malnutrition (HCC) [E43]  Yes   • Acidosis [E87.2]  Yes   • Bipolar affective disorder (HCC) [F31.9]  Yes   • Hepatitis C [B19.20]  Yes   • Accelerated hypertension [I10]  Yes   • Medically noncompliant [Z91.19]  Not Applicable   • Gastritis [K29.70]  Yes   • Hyperlipidemia [E78.5]  Yes   • Nausea & vomiting [R11.2]  Yes      Resolved Hospital Problems   No resolved problems to display.        Brief Hospital Course to date:  Bernadette Paris is a 52 y.o. female with uncontrolled insulin dependent type 2 diabetes mellitus, gastroparesis, HTN, hepatitis C, bipolar disorder and medical noncompliance who presented with nausea/vomiting after running out of her GI medication and was found to have DKA.    This patient's problems and plans were partially entered by my partner and updated as appropriate by me 05/12/22.    DKA  Uncontrolled T2DM, insulin dependent  Nausea and vomiting/ Gastroparesis  Constipation  -Transitioned to SQ insulin levemir 45 units BID plus moderate dose SSI  -On scheduled reglan for now.  Should resume charo root 500mg TID at discharge-will need prescription sent to our outpatient pharmacy as she said she never got this medication before.  -KUB with stool burden and is now s/p large bowel movement with suppository.  Will repeat today.  -We discussed small, frequent meals  -PT/OT/ DM educator/ nutrition consult     HTN  -Continue home lisinopril, norvasc, PRN clonidine     Gastritis   Hep C  -Seen by GI last admission. Recommend bid PPI x 1 month, then daily. Has been out of medication x 2 weeks. Continue BID PPI for now (change to IV for a few doses).  -Charo root recommended 500mg TID (doesn't have this at home)  -S/P diflucan x 1 week for candida  -Missed follow up with GI for eval/treatment of Hep C     Bipolar disorder  -Resumed home  seroquel, zoloft, trazodone  -PRN atarax     Medical noncompliance  -Has not followed up with established PCP or GI due to lost medical card and ID  -Has been out of medications for 1-2 weeks.  She did fill insulin in April for $0 copay.    DVT prophylaxis:  Medical DVT prophylaxis orders are present.   AM-PAC 6 Clicks Score (PT): 20 (05/12/22 1211)    Disposition: I expect the patient to be discharged pending rehab referrals.    CODE STATUS:   Code Status and Medical Interventions:   Ordered at: 05/09/22 1602     Code Status (Patient has no pulse and is not breathing):    CPR (Attempt to Resuscitate)     Medical Interventions (Patient has pulse or is breathing):    Full Support       Светлана Navarro MD  05/12/22

## 2022-05-12 NOTE — PROGRESS NOTES
Clinical Nutrition   Reason For Visit: Follow-up protocol    Patient Name: Bernadette Paris  YOB: 1970  MRN: [id number]  Date of Encounter: 05/12/22 09:46 EDT  Admission date: 5/9/2022    Pt meets criteria severe malnutrition in the context of acute illness, see MSA for full assessment.    Intakes are inconsistent, eats high glycemic index foods when feeling well but minimal-not at all when GI symptoms exacerbated.     Weight loss of at least 10.8% body weight past month, pt thinks more and that bed weight is incorrect. RD asked nsg to document zeroed bed/standing weight.    Nutrition Assessment     Admission Problem List:    Diabetic ketoacidosis without coma associated with type 2 diabetes mellitus (HCC)    Nausea & vomiting    Acidosis    Bipolar affective disorder (HCC)    Hepatitis C    Accelerated hypertension    Medically noncompliant    Gastritis    Hyperlipidemia     Applicable PMH:  Recent admission 4/7-4/11  Medical noncompliance X 2 weeks  Gastroparesis    Applicable Nutrition-Related Information:    Applicable medical tests/procedures since admission:      PMH: She  has a past medical history of Arthritis, Bipolar disorder (HCC), Chronic bronchitis (HCC), Depression, Diabetes mellitus (HCC), GERD (gastroesophageal reflux disease), Hepatitis-C, History of blood transfusion, Hyperlipidemia, Hypertension, Infectious viral hepatitis, Migraine, and Sleep apnea.   PSxH: She  has a past surgical history that includes Hysterectomy; Cholecystectomy; Knee surgery; lumbar laminectomy discectomy decompression (N/A, 8/6/2018); and Esophagogastroduodenoscopy (N/A, 4/9/2022).        Reported/Observed/Food/Nutrition Related History     5/12) Pt open to assessment/education, states she has been reading through written diet education materials previously left with her. She states does not follow a consistent cho diet. Does not drink soda but does consume many high GI foods. At time of visit, reports she did  "not like her breakfast of a eggs, sausage, and muffin and requests another tray with pancakes and oatmeal. Pt educated on making balanced, low GI meal choices. Encouraged her to eat some of the protein on her tray to balance the carbs in pancakes. We discussed a consistent cho diet and she voiced understanding but likely requires additional counseling. After education she asked if we had fried chicken, again educated on making better choices for BG management. Pt has had significant nutritional decline over the past year with significant weight loss. She reports she used to weigh ~180 and is down to \"120\", thinks bed weight of 141 lb is incorrect. She certainly has lost weight but RD does suspect weight fluctuates with fluid r/t liver dx. She states her intakes are inconsistent, that when she is feeling well will eat well but often has N/V, especially over the past month. She was not taking any medications in the past 2 weeks. She states did not keep much down in this time. She denies further dietary needs/preferences, NKFA.    5/10) Pt sleeping soundly at time RD visit and does not awaken to name. Per EMR, she has lost significant weight in the past month. Unable to tolerate PO for 3 days r/t N/V/pain. She presents with significant electrolyte disturbances. Hx gastroparesis with recent admission 4/7-4/11. Apparently has not followed up with GI or new PCP and has not taken medications in 2 weeks due to \"loss med card\". This includes insulin and GI medications, was prescribed jian root at d/c recent admission. A1C is 11.9.     Per RD note recent admission on 4/7 pt reported PO intakes <50% of usual for the past month and weight loss from 180 lb. She had reported following a mostly liquid diet at that time. She was given electronic referral for outpatient nutrition counseling which she did not attend. Will f/u for full assessment/verbal education.    Anthropometrics   Height: 66 in  Weight: 141 lb per bed scale " 5/10/22, 120 lb per pt, asked nsg for zeroed wt  BMI: 22.79  BMI classification: Normal: 18.5-24.9kg/m2   IBW: 130 lb    UBW: 180 lb per pt , only weights avail in EMR:  (2018) ~260  (2/20/21) 207 lb  (3/3/21) 220 lb  (7/8/21) 204 lb  (4/6/22) 150 lb - POA  (4/11/22) 158 lb - day of d/c    Weight change: loss 17 lb / 10.8% body weight past month  In total, loss 63 lb / 30.9% body weight past 10 months - suspect some r/t fluid     Nutrition Focused Physical Exam     Unable to perform exam due to: Patient/family declined, pt eating    Labs reviewed   Labs reviewed: Yes    Results from last 7 days   Lab Units 05/11/22  1208 05/11/22  0755 05/11/22  0430   SODIUM mmol/L 136 140 138   POTASSIUM mmol/L 4.2 3.7 3.4*   CHLORIDE mmol/L 107 110* 109*   CO2 mmol/L 16.0* 18.0* 19.0*   BUN mg/dL 8 9 9   CREATININE mg/dL 0.54* 0.51* 0.55*   GLUCOSE mg/dL 343* 161* 256*   CALCIUM mg/dL 9.2 9.0 8.9   PHOSPHORUS mg/dL 3.5 3.2 2.7   MAGNESIUM mg/dL 1.9 2.2 2.1     Results from last 7 days   Lab Units 05/12/22  0708 05/09/22  2009 05/09/22  1450 05/09/22  1301   WBC 10*3/mm3 4.65 10.02  --  8.62   ALBUMIN g/dL  --  4.10 4.90  --      Results from last 7 days   Lab Units 05/12/22  0702 05/12/22  0152 05/11/22  2124 05/11/22  1634 05/11/22  1118 05/11/22  0706   GLUCOSE mg/dL 203* 165* 579* 229* 291* 154*     Lab Results   Lab Value Date/Time    HGBA1C 11.90 (H) 05/09/2022 1301    HGBA1C 11.80 (H) 04/06/2022 1052    HGBA1C 12.2 (H) 07/09/2021 0219     Medications reviewed   Medications reviewed: Yes  Scheduled: insulin, buspar, heparin, protonix, reglan  GTT:   PRN: KCl, zofran, miralax, dulcolax    Current Nutrition Prescription   PO: Diet Regular; Consistent Carbohydrate  Oral Nutrition Supplement: Orders Placed This Encounter      Dietary Nutrition Supplements Boost Glucose Control       Average PO intake: 20.8% x 6 meals documented    Nutrition Diagnosis     Problem Malnutrition    Etiology ?gastroparesis, liver disease, poorly  controlled DM/medical noncompliance   Signs/Symptoms PO intakes <75% estimated needs and loss of at least10.8% body weight past month       Problem Altered nutrition related laboratory values   Etiology DKA   Signs/Symptoms BG>400, A1C>11       Goal:   General: Nutrition to support treatment, Improve nutrition related labs  PO: Tolerate PO, Increase intake     Intervention   Intervention: Follow treatment progress, Care plan reviewed, Menu provided, Supplement provided, Education provided for DM, malnutrition, and altered GI function/managing adequate intakes with N/V/pain    Boost GC 2x/day upon advancement appropriate PO diet   Asked nsg to document zeroed bed/standing weight to clarify weight loss report    Monitoring/Evaluation:   Monitoring/Evaluation: Per protocol, I&O, PO intake, Supplement intake, Pertinent labs, Weight, Skin status, GI status, Symptoms, POC/GOC, Swallow function, Hemodynamic stability    Julisa Alvarado RD  Time Spent: 40

## 2022-05-12 NOTE — PROGRESS NOTES
Malnutrition Severity Assessment    Patient Name:  Bernadette Paris  YOB: 1970  MRN: [id number]  Admit Date:  5/9/2022    Patient meets criteria for : Severe Malnutrition (PO intakes <75% estimated needs and loss of at least 10.8% body weight past month)    Malnutrition Severity Assessment  Malnutrition Type: Acute Disease or Injury - Related Malnutrition  Malnutrition Type (last 8 hours)     Malnutrition Severity Assessment     Row Name 05/12/22 1031       Malnutrition Severity Assessment    Malnutrition Type Acute Disease or Injury - Related Malnutrition    Row Name 05/12/22 1031       Insufficient Energy Intake     Insufficient Energy Intake Findings Severe  <75% est. needs past month    Insufficient Energy Intake  <75% of est. energy requirement for > or equal to 1 month    Row Name 05/12/22 1031       Unintentional Weight Loss     Unintentional Weight Loss Findings Severe  loss 10.8% body weight past month    Unintentional Weight Loss  Weight loss of 5% in one month    Row Name 05/12/22 1031       Muscle Loss    Loss of Muscle Mass Findings --  unable to assess at this time    Row Name 05/12/22 1031       Fat Loss    Subcutaneous Fat Loss Findings --  unable to assess at this time    Row Name 05/12/22 1031       Criteria Met (Must meet criteria for severity in at least 2 of these categories: M Wasting, Fat Loss, Fluid, Secondary Signs, Wt. Status, Intake)    Patient meets criteria for  Severe Malnutrition  PO intakes <75% estimated needs and loss of at least 10.8% body weight past month                Electronically signed by:  Julisa Alvarado RD  05/12/22 10:32 EDT

## 2022-05-12 NOTE — PLAN OF CARE
Goal Outcome Evaluation:  Plan of Care Reviewed With: patient        Progress: improving  Outcome Evaluation: Improved performance and toleration to activity today noted by improved level of assist with mobility and increased ambulation distance.  STS and ambulated 140ft with CGA and FWW with cueing for improved gait technique.  Good effort and participation with seated and supine BLE ther ex.  Limited today by decreased functional endurance, balance, weakness, and pain.  Con to progress pt as able per PT POC.  Rec IPR upon d/c.

## 2022-05-12 NOTE — THERAPY TREATMENT NOTE
Patient Name: Bernadette Paris  : 1970    MRN: [id number]                              Today's Date: 2022       Admit Date: 2022    Visit Dx:     ICD-10-CM ICD-9-CM   1. Acidosis  E87.2 276.2   2. Hyperglycemia  R73.9 790.29   3. Moderate dehydration  E86.0 276.51   4. Diabetic ketoacidosis without coma associated with type 2 diabetes mellitus (HCC)  E11.10 250.12   5. Nausea and vomiting, unspecified vomiting type  R11.2 787.01     Patient Active Problem List   Diagnosis   • Spinal stenosis, lumbar region, with neurogenic claudication   • Degenerative disc disease, lumbar   • Facet arthritis of lumbar region   • Lumbar stenosis with neurogenic claudication   • BMI 40.0-44.9, adult (HCC)   • Encounter for smoking cessation counseling   • S/P lumbar laminectomy   • Diabetic ketoacidosis without coma associated with type 2 diabetes mellitus (HCC)   • Nausea & vomiting   • Acidosis   • Bipolar affective disorder (HCC)   • Hepatitis C   • Accelerated hypertension   • Medically noncompliant   • Gastritis   • Hyperlipidemia   • Severe malnutrition (HCC)     Past Medical History:   Diagnosis Date   • Arthritis    • Bipolar disorder (HCC)    • Chronic bronchitis (HCC)    • Depression    • Diabetes mellitus (HCC)     DIAGNOSED 2016   • GERD (gastroesophageal reflux disease)    • Hepatitis-C    • History of blood transfusion    • Hyperlipidemia    • Hypertension    • Infectious viral hepatitis     HEPATITIS C   • Migraine    • Sleep apnea     PATIENT UNSURE OF SETTINGS     Past Surgical History:   Procedure Laterality Date   • CHOLECYSTECTOMY     • ENDOSCOPY N/A 2022    Procedure: ESOPHAGOGASTRODUODENOSCOPY;  Surgeon: Brunner, Mark I, MD;  Location: [facility];  Service: Gastroenterology;  Laterality: N/A;  with antrum biopsy   • HYSTERECTOMY     • KNEE SURGERY     • LUMBAR LAMINECTOMY DISCECTOMY DECOMPRESSION N/A 2018    Procedure: LUMBAR LAMINECTOMIES L4-S1;  Surgeon: Chip Smith MD;   Location: [facility] OR;  Service: Neurosurgery      General Information     Row Name 05/12/22 1413          OT Time and Intention    Document Type therapy note (daily note)  -JY     Mode of Treatment occupational therapy;individual therapy  -JY     Row Name 05/12/22 1413          General Information    Patient Profile Reviewed yes  -JY     Existing Precautions/Restrictions fall  -JY     Barriers to Rehab medically complex;previous functional deficit  -JY     Row Name 05/12/22 1413          Cognition    Orientation Status (Cognition) oriented x 4  -JY     Row Name 05/12/22 1413          Safety Issues, Functional Mobility    Safety Issues Affecting Function (Mobility) awareness of need for assistance;insight into deficits/self-awareness;judgment;safety precaution awareness;safety precautions follow-through/compliance;impulsivity  -JY     Impairments Affecting Function (Mobility) balance;endurance/activity tolerance;coordination;pain;strength  -JY     Comment, Safety Issues/Impairments (Mobility) pt alert and able to follow commands; initial motivational cues req'd with pt reporting increased pain at stomach and back; reported severity of pain does not appear to correlate with pt presentation during interventions, educated pt on need to communicate pain with nursing staff  -JY           User Key  (r) = Recorded By, (t) = Taken By, (c) = Cosigned By    Initials Name Provider Type    Yue Vasquez OT Occupational Therapist                 Mobility/ADL's     Row Name 05/12/22 1417          Bed Mobility    Bed Mobility supine-sit;sit-supine;scooting/bridging  -JY     Scooting/Bridging [place] (Bed Mobility) independent  -JY     Supine-Sit [place] (Bed Mobility) modified independence  -JY     Sit-Supine [place] (Bed Mobility) modified independence  -JY     Assistive Device (Bed Mobility) bed rails;head of bed elevated  -JY     Comment, (Bed Mobility) no physical A req'd in bed mobility, maintained HOB  elevated and bed rails available throughout; mild impulsivity demonstrated in supine > sitting and stand at EOB prior to full safety readiness, denied any dizziness or feeling LH  -JACE     Row Name 05/12/22 1417          Transfers    Transfers sit-stand transfer;stand-sit transfer;toilet transfer  -JY     Comment, (Transfers) skilled cues for optimal hand placement for controlled ascend, descend largely to reach back prior to sitting and to push up from seated surface vs grasping at wx  -JY     Sit-Stand [place] (Transfers) contact guard;verbal cues  -JY     Stand-Sit [place] (Transfers) contact guard;verbal cues  -JY     [place] Level (Toilet Transfer) contact guard;verbal cues  -JY     Assistive Device (Toilet Transfer) commode;grab bars/safety frame;walker, front-wheeled  -JACE     Row Name 05/12/22 1417          Sit-Stand Transfer    Assistive Device (Sit-Stand Transfers) walker, front-wheeled  -JACE     Row Name 05/12/22 1417          Stand-Sit Transfer    Assistive Device (Stand-Sit Transfers) walker, front-wheeled  -JACE     Row Name 05/12/22 1417          Toilet Transfer    Type (Toilet Transfer) sit-stand;stand-sit  -JY     Comment, (Toilet Transfer) pt req'd maintenance of CGA in STS at toilet with cues for most optimal hand support given grab bar available at pt L side, pt utilized grab bar for control in ascend, descend yet pt does not have at home; in trial without use, pt demonstrated grasp at FWW and posterior lean upon standing  -JACE     Row Name 05/12/22 1417          Functional Mobility    Functional Mobility- Ind. Level contact guard assist;verbal cues required  -JY     Functional Mobility- Device walker, front-wheeled  -JACE     Functional Mobility-Distance (Feet) --  in room distance for ADLs  -JY     Functional Mobility- Comment pt demonstrates slow pace with fxl mobility, requires intermittent cues for most optimal placement of AD dinesh through doorways and turning at toilet, general safety  cues to decrease impulsivity  -JY     Row Name 05/12/22 1417          Activities of Daily Living    BADL Assessment/Intervention lower body dressing;upper body dressing;grooming  -JY     Row Name 05/12/22 1417          Lower Body Dressing Assessment/Training    [place] Level (Lower Body Dressing) doff;don;socks;set up  -JY     Position (Lower Body Dressing) edge of bed sitting  -JY     Row Name 05/12/22 1417          Grooming Assessment/Training    [place] Level (Grooming) wash face, hands;set up  -JY     Position (Grooming) edge of bed sitting  -JY     Row Name 05/12/22 1417          Upper Body Dressing Assessment/Training    [place] Level (Upper Body Dressing) doff;don;pajama/robe;contact guard assist;verbal cues  -JY     Position (Upper Body Dressing) edge of bed sitting  -JY     Comment, (Upper Body Dressing) CGA for posterior mgmt, tying and untying A ; pt attempted to complete in standing initially warranting cues/eduation for completion in sitting for safety given balance deficits and increased risk for LOB or falls when vision occcluded  -JY           User Key  (r) = Recorded By, (t) = Taken By, (c) = Cosigned By    Initials Name Provider Type    Yue Vasquez OT Occupational Therapist               Obj/Interventions     Row Name 05/12/22 1429          Motor Skills    Motor Skills functional endurance  -JY     Functional Endurance decreased activity tolerance with more dynamic tasks, seated rest break after toileting and related t/fs, mobility  -JY     Row Name 05/12/22 1429          Balance    Balance Assessment sitting static balance;sitting dynamic balance;standing static balance;standing dynamic balance  -JY     Static Sitting Balance standby assist  -JY     Dynamic Sitting Balance standby assist;other (see comments)  LBD  -JY     Position, Sitting Balance unsupported;sitting edge of bed  -JY     Static Standing Balance contact guard;verbal cues  -JY     Dynamic Standing Balance  contact guard;verbal cues  -JY     Position/Device Used, Standing Balance supported;walker, front-wheeled  -JY     Balance Interventions sitting;standing;static;dynamic;sit to stand;supported;occupation based/functional task  -JY     Comment, Balance no overt LOB during seated or standing tasks, mild impulsivity noted in change in position from sitting to standing and mild instability during turning during fxl mobility, used FWW throughout  -JY           User Key  (r) = Recorded By, (t) = Taken By, (c) = Cosigned By    Initials Name Provider Type    Yue Vasquez, MELY Occupational Therapist               Goals/Plan    No documentation.                Clinical Impression     Row Name 05/12/22 1436          Pain Assessment    Pretreatment Pain Rating 10/10  -JY     Posttreatment Pain Rating 10/10  -JY     Pain Location generalized  -J     Pain Location - abdomen;back  -JY     Pre/Posttreatment Pain Comment pt consistently reported pain at back and stomach at 10/10 however tolerated OT interventions and performance did not appear to correlate with numeric rating, RN aware  -JY     Pain Intervention(s) Repositioned;Ambulation/increased activity  -JY     Row Name 05/12/22 1436          Plan of Care Review    Plan of Care Reviewed With patient  -JY     Progress improving  -J     Outcome Evaluation Pt demonstrated improved ability to complete mobility related to ADLs and the bed mobility leading up to and after said mobility requiring MI for supine < > sitting, scooting at EOB and CGA for STS and ADL ambulation to/from bathroom using FWW. Pt demonstrated initial impulsivity to change in position and req'd intermittent cues for safety yet did not demonstrate any LOB during seated or standing tasks. Pt completed d/d gown with CGA, d/d socks and face/hand washing with SUA. Pt with need for seated rest break after said ADLs and related mobility and ultimately returned to bed. Pt's decreased activity tolerance and still  present BUE weakness, impaired balance in more dynamic regard and need for safety cues prompt further therapy w/ recommendation for IRF at d/c when medically ready.  -JY     Row Name 05/12/22 1436          Therapy Assessment/Plan (OT)    Rehab Potential (OT) good, to achieve stated therapy goals  -JY     Criteria for Skilled Therapeutic Interventions Met (OT) yes;skilled treatment is necessary  -JY     Therapy Frequency (OT) daily  -JY     Row Name 05/12/22 1436          Therapy Plan Review/Discharge Plan (OT)    Anticipated Discharge Disposition (OT) inpatient rehabilitation facility  -JY     Row Name 05/12/22 1436          Vital Signs    Pre Systolic BP Rehab 117  -JY     Pre Treatment Diastolic BP 89  -JY     Pretreatment Heart Rate (beats/min) 84  -JY     Posttreatment Heart Rate (beats/min) 86  -JY     Pre SpO2 (%) --  pulse oximeter not donned, RN approved OT session  -JY     O2 Delivery Pre Treatment room air  -JY     O2 Delivery Intra Treatment room air  -JY     O2 Delivery Post Treatment room air  -JY     Pre Patient Position Supine  -JY     Intra Patient Position Standing  -JY     Post Patient Position Supine  -JY     Row Name 05/12/22 1436          Positioning and Restraints    Pre-Treatment Position in bed  -JY     Post Treatment Position bed  -JY     In Bed notified nsg;side lying right;call light within reach;encouraged to call for assist;exit alarm on;side rails up x2  -JY           User Key  (r) = Recorded By, (t) = Taken By, (c) = Cosigned By    Initials Name Provider Type    Yue Vasquez, MELY Occupational Therapist               Outcome Measures     Row Name 05/12/22 1447          How much help from another is currently needed...    Putting on and taking off regular lower body clothing? 3  -JY     Bathing (including washing, rinsing, and drying) 3  -JY     Toileting (which includes using toilet bed pan or urinal) 3  -JY     Putting on and taking off regular upper body clothing 3  -JY     Taking  care of personal grooming (such as brushing teeth) 3  -JY     Eating meals 3  -JY     AM-PAC 6 Clicks Score (OT) 18  -JY     Row Name 05/12/22 1211          How much help from another person do you currently need...    Turning from your back to your side while in flat bed without using bedrails? 4  -BA     Moving from lying on back to sitting on the side of a flat bed without bedrails? 4  -BA     Moving to and from a bed to a chair (including a wheelchair)? 3  -BA     Standing up from a chair using your arms (e.g., wheelchair, bedside chair)? 3  -BA     Climbing 3-5 steps with a railing? 3  -BA     To walk in hospital room? 3  -BA     AM-PAC 6 Clicks Score (PT) 20  -BA     Highest level of mobility 6 --> Walked 10 steps or more  -BA     Row Name 05/12/22 1447 05/12/22 1211       Functional Assessment    Outcome Measure Options AM-PAC 6 Clicks Daily Activity (OT)  -JY AM-PAC 6 Clicks Basic Mobility (PT)  -BA          User Key  (r) = Recorded By, (t) = Taken By, (c) = Cosigned By    Initials Name Provider Type    Yue Vasquez, OT Occupational Therapist    BA Cami Peterson, PT Physical Therapist                Occupational Therapy Education                 Title: PT OT SLP Therapies (In Progress)     Topic: Occupational Therapy (In Progress)     Point: ADL training (In Progress)     Description:   Instruct learner(s) on proper safety adaptation and remediation techniques during self care or transfers.   Instruct in proper use of assistive devices.              Learning Progress Summary           Patient Acceptance, E,D, NR by JACE at 5/12/2022 1352    Acceptance, E, VU,NR by AN at 5/10/2022 1358                   Point: Home exercise program (Not Started)     Description:   Instruct learner(s) on appropriate technique for monitoring, assisting and/or progressing therapeutic exercises/activities.              Learner Progress:  Not documented in this visit.          Point: Precautions (In Progress)      Description:   Instruct learner(s) on prescribed precautions during self-care and functional transfers.              Learning Progress Summary           Patient Acceptance, E,D, NR by JACE at 5/12/2022 1352    Acceptance, E, VU,NR by MATTHEW at 5/10/2022 1358                   Point: Body mechanics (In Progress)     Description:   Instruct learner(s) on proper positioning and spine alignment during self-care, functional mobility activities and/or exercises.              Learning Progress Summary           Patient Acceptance, E,D, NR by JACE at 5/12/2022 1352    Acceptance, E, VU,NR by MATTHEW at 5/10/2022 1358                               User Key     Initials Effective Dates Name Provider Type Discipline    JACE 06/16/21 -  Yue Monge OT Occupational Therapist OT    MATTHEW 09/21/21 -  Ivana Hunt OT Occupational Therapist OT              OT Recommendation and Plan  Therapy Frequency (OT): daily  Plan of Care Review  Plan of Care Reviewed With: patient  Progress: improving  Outcome Evaluation: Pt demonstrated improved ability to complete mobility related to ADLs and the bed mobility leading up to and after said mobility requiring MI for supine < > sitting, scooting at EOB and CGA for STS and ADL ambulation to/from bathroom using FWW. Pt demonstrated initial impulsivity to change in position and req'd intermittent cues for safety yet did not demonstrate any LOB during seated or standing tasks. Pt completed d/d gown with CGA, d/d socks and face/hand washing with SUA. Pt with need for seated rest break after said ADLs and related mobility and ultimately returned to bed. Pt's decreased activity tolerance and still present BUE weakness, impaired balance in more dynamic regard and need for safety cues prompt further therapy w/ recommendation for IRF at d/c when medically ready.     Time Calculation:    Time Calculation- OT     Row Name 05/12/22 1448 05/12/22 1212          Time Calculation- OT    OT Start Time 1352  -JY --     OT  Received On 05/12/22  -JACE --     OT Goal Re-Cert Due Date 05/20/22  -JSTONE --            Timed Charges    18548 - Gait Training Minutes  -- 13  -BA     43052 - OT Therapeutic Activity Minutes 9  -JY --     39444 - OT Self Care/Mgmt Minutes 14  -JY --            Total Minutes    Timed Charges Total Minutes 23  -JY 13  -BA      Total Minutes 23  -JY 13  -BA           User Key  (r) = Recorded By, (t) = Taken By, (c) = Cosigned By    Initials Name Provider Type    Yeu Vasquez OT Occupational Therapist    BA Cami Peterson, PT Physical Therapist              Therapy Charges for Today     Code Description Service Date Service Provider Modifiers Qty    99102883773 HC OT THERAPEUTIC ACT EA 15 MIN 5/12/2022 Yue Monge OT GO 1    14319394246 HC OT SELF CARE/MGMT/TRAIN EA 15 MIN 5/12/2022 Yue Monge OT GO 1               Yue Monge OT  5/12/2022

## 2022-05-12 NOTE — PLAN OF CARE
Problem: Adult Inpatient Plan of Care  Goal: Plan of Care Review  Recent Flowsheet Documentation  Taken 5/12/2022 1436 by Yue Monge OT  Progress: improving  Plan of Care Reviewed With: patient  Outcome Evaluation: Pt demonstrated improved ability to complete mobility related to ADLs and the bed mobility leading up to and after said mobility requiring MI for supine < > sitting, scooting at EOB and CGA for STS and ADL ambulation to/from bathroom using FWW. Pt demonstrated initial impulsivity to change in position and req'd intermittent cues for safety yet did not demonstrate any LOB during seated or standing tasks. Pt completed d/d gown with CGA, d/d socks and face/hand washing with SUA. Pt with need for seated rest break after said ADLs and related mobility and ultimately returned to bed. Pt's decreased activity tolerance and still present BUE weakness, impaired balance in more dynamic regard and need for safety cues prompt further therapy w/ recommendation for IRF at d/c when medically ready.

## 2022-05-12 NOTE — PLAN OF CARE
Goal Outcome Evaluation:           Progress: no change  Outcome Evaluation: Patient A&O, c/o abd. discomfort at times (see PRN meds). Glucose dropped to 62 during lunch time however patient was setting up eating, glucose rechecked and was 117. Patient encouraged to eat small frequent meals to assist with GI upset. Referrals sent for rehab placement and waiting for response.

## 2022-05-13 VITALS
WEIGHT: 152.6 LBS | SYSTOLIC BLOOD PRESSURE: 148 MMHG | TEMPERATURE: 98.1 F | OXYGEN SATURATION: 99 % | BODY MASS INDEX: 24.53 KG/M2 | DIASTOLIC BLOOD PRESSURE: 95 MMHG | HEIGHT: 66 IN | RESPIRATION RATE: 18 BRPM | HEART RATE: 80 BPM

## 2022-05-13 PROBLEM — E11.10 DIABETIC KETOACIDOSIS WITHOUT COMA ASSOCIATED WITH TYPE 2 DIABETES MELLITUS (HCC): Status: RESOLVED | Noted: 2022-04-06 | Resolved: 2022-05-13

## 2022-05-13 LAB
GLUCOSE BLDC GLUCOMTR-MCNC: 233 MG/DL (ref 70–130)
GLUCOSE BLDC GLUCOMTR-MCNC: 335 MG/DL (ref 70–130)
GLUCOSE BLDC GLUCOMTR-MCNC: 491 MG/DL (ref 70–130)
GLUCOSE BLDC GLUCOMTR-MCNC: 499 MG/DL (ref 70–130)

## 2022-05-13 PROCEDURE — 82962 GLUCOSE BLOOD TEST: CPT

## 2022-05-13 PROCEDURE — 99239 HOSP IP/OBS DSCHRG MGMT >30: CPT | Performed by: INTERNAL MEDICINE

## 2022-05-13 PROCEDURE — 63710000001 INSULIN LISPRO (HUMAN) PER 5 UNITS: Performed by: NURSE PRACTITIONER

## 2022-05-13 PROCEDURE — G0108 DIAB MANAGE TRN  PER INDIV: HCPCS

## 2022-05-13 PROCEDURE — 25010000002 ONDANSETRON PER 1 MG: Performed by: NURSE PRACTITIONER

## 2022-05-13 PROCEDURE — 25010000002 HEPARIN (PORCINE) PER 1000 UNITS: Performed by: NURSE PRACTITIONER

## 2022-05-13 PROCEDURE — 63710000001 INSULIN DETEMIR PER 5 UNITS: Performed by: INTERNAL MEDICINE

## 2022-05-13 RX ORDER — LANCETS
1 EACH MISCELLANEOUS
Qty: 100 EACH | Refills: 1 | Status: ON HOLD | OUTPATIENT
Start: 2022-05-13 | End: 2022-08-15 | Stop reason: SDUPTHER

## 2022-05-13 RX ORDER — HYDROXYZINE HYDROCHLORIDE 25 MG/1
25 TABLET, FILM COATED ORAL EVERY 8 HOURS PRN
Qty: 90 TABLET | Refills: 1 | Status: ON HOLD | OUTPATIENT
Start: 2022-05-13 | End: 2022-08-15 | Stop reason: SDUPTHER

## 2022-05-13 RX ORDER — ASCORBIC ACID 1000 MG
500 TABLET ORAL
Qty: 90 EACH | Refills: 1 | Status: ON HOLD | OUTPATIENT
Start: 2022-05-13 | End: 2022-08-15 | Stop reason: SDUPTHER

## 2022-05-13 RX ORDER — ONDANSETRON 4 MG/1
4 TABLET, ORALLY DISINTEGRATING ORAL EVERY 8 HOURS PRN
Qty: 30 TABLET | Refills: 0 | Status: ON HOLD | OUTPATIENT
Start: 2022-05-13 | End: 2022-08-15 | Stop reason: SDUPTHER

## 2022-05-13 RX ORDER — ACETAMINOPHEN 325 MG/1
325 TABLET ORAL EVERY 6 HOURS PRN
Qty: 120 TABLET | Refills: 1 | Status: SHIPPED | OUTPATIENT
Start: 2022-05-13

## 2022-05-13 RX ORDER — POLYETHYLENE GLYCOL 3350 17 G/17G
17 POWDER, FOR SOLUTION ORAL DAILY
Qty: 510 G | Refills: 1 | Status: ON HOLD | OUTPATIENT
Start: 2022-05-13 | End: 2022-08-15 | Stop reason: SDUPTHER

## 2022-05-13 RX ORDER — BUSPIRONE HYDROCHLORIDE 10 MG/1
10 TABLET ORAL 2 TIMES DAILY
Qty: 60 TABLET | Refills: 1 | Status: ON HOLD | OUTPATIENT
Start: 2022-05-13 | End: 2022-08-15 | Stop reason: SDUPTHER

## 2022-05-13 RX ORDER — INSULIN LISPRO 100 [IU]/ML
9 INJECTION, SOLUTION INTRAVENOUS; SUBCUTANEOUS ONCE
Status: COMPLETED | OUTPATIENT
Start: 2022-05-13 | End: 2022-05-13

## 2022-05-13 RX ORDER — BLOOD SUGAR DIAGNOSTIC
1 STRIP MISCELLANEOUS 4 TIMES DAILY
Qty: 100 EACH | Refills: 1 | Status: ON HOLD | OUTPATIENT
Start: 2022-05-13 | End: 2022-08-15 | Stop reason: SDUPTHER

## 2022-05-13 RX ORDER — PEN NEEDLE, DIABETIC 30 GX3/16"
1 NEEDLE, DISPOSABLE MISCELLANEOUS 2 TIMES DAILY
Qty: 100 EACH | Refills: 1 | Status: ON HOLD | OUTPATIENT
Start: 2022-05-13 | End: 2022-08-15 | Stop reason: SDUPTHER

## 2022-05-13 RX ORDER — AMOXICILLIN 250 MG
2 CAPSULE ORAL 2 TIMES DAILY PRN
Qty: 30 TABLET | Refills: 1 | Status: ON HOLD | OUTPATIENT
Start: 2022-05-13 | End: 2022-08-15 | Stop reason: SDUPTHER

## 2022-05-13 RX ORDER — PANTOPRAZOLE SODIUM 40 MG/1
40 TABLET, DELAYED RELEASE ORAL 2 TIMES DAILY
Qty: 60 TABLET | Refills: 0 | Status: SHIPPED | OUTPATIENT
Start: 2022-05-13 | End: 2022-05-29 | Stop reason: SDUPTHER

## 2022-05-13 RX ORDER — AMLODIPINE BESYLATE 5 MG/1
5 TABLET ORAL
Qty: 30 TABLET | Refills: 1 | Status: ON HOLD | OUTPATIENT
Start: 2022-05-13 | End: 2022-08-15 | Stop reason: SDUPTHER

## 2022-05-13 RX ORDER — LISINOPRIL 40 MG/1
40 TABLET ORAL DAILY
Qty: 30 TABLET | Refills: 1 | Status: ON HOLD | OUTPATIENT
Start: 2022-05-13 | End: 2022-08-15 | Stop reason: SDUPTHER

## 2022-05-13 RX ORDER — SERTRALINE HYDROCHLORIDE 100 MG/1
100 TABLET, FILM COATED ORAL DAILY
Qty: 30 TABLET | Refills: 1 | Status: SHIPPED | OUTPATIENT
Start: 2022-05-13 | End: 2022-08-15 | Stop reason: HOSPADM

## 2022-05-13 RX ORDER — BISACODYL 10 MG
10 SUPPOSITORY, RECTAL RECTAL DAILY PRN
Qty: 10 EACH | Refills: 1 | Status: ON HOLD | OUTPATIENT
Start: 2022-05-13 | End: 2022-08-15 | Stop reason: SDUPTHER

## 2022-05-13 RX ORDER — TRAZODONE HYDROCHLORIDE 50 MG/1
50 TABLET ORAL NIGHTLY
Qty: 30 TABLET | Refills: 1 | Status: ON HOLD | OUTPATIENT
Start: 2022-05-13 | End: 2022-08-15 | Stop reason: SDUPTHER

## 2022-05-13 RX ORDER — QUETIAPINE FUMARATE 100 MG/1
100 TABLET, FILM COATED ORAL 2 TIMES DAILY
Qty: 60 TABLET | Refills: 1 | Status: ON HOLD | OUTPATIENT
Start: 2022-05-13 | End: 2022-08-15 | Stop reason: SDUPTHER

## 2022-05-13 RX ORDER — BLOOD SUGAR DIAGNOSTIC
1 STRIP MISCELLANEOUS
Qty: 100 EACH | Refills: 12 | Status: ON HOLD | OUTPATIENT
Start: 2022-05-13 | End: 2022-08-15 | Stop reason: SDUPTHER

## 2022-05-13 RX ORDER — ALBUTEROL SULFATE 90 UG/1
AEROSOL, METERED RESPIRATORY (INHALATION)
Refills: 0
Start: 2022-05-13

## 2022-05-13 RX ADMIN — SERTRALINE 100 MG: 100 TABLET, FILM COATED ORAL at 09:44

## 2022-05-13 RX ADMIN — ONDANSETRON 4 MG: 2 INJECTION INTRAMUSCULAR; INTRAVENOUS at 04:24

## 2022-05-13 RX ADMIN — LISINOPRIL 40 MG: 40 TABLET ORAL at 09:44

## 2022-05-13 RX ADMIN — INSULIN LISPRO 4 UNITS: 100 INJECTION, SOLUTION INTRAVENOUS; SUBCUTANEOUS at 09:43

## 2022-05-13 RX ADMIN — INSULIN LISPRO 9 UNITS: 100 INJECTION, SOLUTION INTRAVENOUS; SUBCUTANEOUS at 03:37

## 2022-05-13 RX ADMIN — Medication 10 ML: at 09:44

## 2022-05-13 RX ADMIN — Medication 10 MG: at 10:38

## 2022-05-13 RX ADMIN — AMLODIPINE BESYLATE 5 MG: 5 TABLET ORAL at 09:45

## 2022-05-13 RX ADMIN — HEPARIN SODIUM 5000 UNITS: 5000 INJECTION, SOLUTION INTRAVENOUS; SUBCUTANEOUS at 09:45

## 2022-05-13 RX ADMIN — METOPROLOL TARTRATE 25 MG: 25 TABLET, FILM COATED ORAL at 09:44

## 2022-05-13 RX ADMIN — SENNOSIDES AND DOCUSATE SODIUM 2 TABLET: 50; 8.6 TABLET ORAL at 09:44

## 2022-05-13 RX ADMIN — POLYETHYLENE GLYCOL 3350 17 G: 17 POWDER, FOR SOLUTION ORAL at 09:45

## 2022-05-13 RX ADMIN — PANTOPRAZOLE SODIUM 40 MG: 40 INJECTION, POWDER, FOR SOLUTION INTRAVENOUS at 09:40

## 2022-05-13 RX ADMIN — BUSPIRONE HYDROCHLORIDE 10 MG: 10 TABLET ORAL at 09:45

## 2022-05-13 RX ADMIN — OXYCODONE HYDROCHLORIDE AND ACETAMINOPHEN 1 TABLET: 7.5; 325 TABLET ORAL at 06:25

## 2022-05-13 RX ADMIN — INSULIN LISPRO 7 UNITS: 100 INJECTION, SOLUTION INTRAVENOUS; SUBCUTANEOUS at 12:06

## 2022-05-13 RX ADMIN — QUETIAPINE FUMARATE 100 MG: 100 TABLET ORAL at 09:45

## 2022-05-13 RX ADMIN — INSULIN DETEMIR 45 UNITS: 100 INJECTION, SOLUTION SUBCUTANEOUS at 09:45

## 2022-05-13 NOTE — DISCHARGE SUMMARY
[facility] Medicine Services  DISCHARGE SUMMARY    Patient Name: Bernadette Paris  : 1970  MRN: [id number]    Date of Admission: 2022  1:09 PM  Date of Discharge:  2022  Primary Care Physician: Lilly Rose MD    Consults     Date and Time Order Name Status Description    2022  9:10 AM Inpatient Gastroenterology Consult Completed           Hospital Course     Presenting Problem:   Acidosis [E87.2]    Active Hospital Problems    Diagnosis  POA   • Severe malnutrition (HCC) [E43]  Yes   • Acidosis [E87.2]  Yes   • Bipolar affective disorder (HCC) [F31.9]  Yes   • Hepatitis C [B19.20]  Yes   • Accelerated hypertension [I10]  Yes   • Medically noncompliant [Z91.19]  Not Applicable   • Gastritis [K29.70]  Yes   • Hyperlipidemia [E78.5]  Yes   • Nausea & vomiting [R11.2]  Yes      Resolved Hospital Problems    Diagnosis Date Resolved POA   • **Diabetic ketoacidosis without coma associated with type 2 diabetes mellitus (HCC) [E11.10] 2022 Yes          Hospital Course:  Bernadette Paris is a 52 y.o. female with uncontrolled insulin dependent type 2 diabetes mellitus, gastroparesis, HTN, hepatitis C, bipolar disorder and medical noncompliance who presented with nausea/vomiting after running out of her GI medication and was found to have DKA.     This patient's problems and plans were partially entered by my partner and updated as appropriate by me 22.     DKA  Uncontrolled T2DM, insulin dependent  Nausea and vomiting/ Gastroparesis  Constipation  -Increase levemir to 50 units BID  -Start jian root 500mg TID at discharge  -Continue bowel regimen at discharge  -We discussed small, frequent meals  -Referral to endocrinology placed     HTN  -Continue home lisinopril, norvasc     Gastritis   Hep C  -Seen by GI last admission. Recommend bid PPI x 1 month, then daily. Has been out of medication x 2 weeks. Continue BID PPI for now and can transition to once daily at GI  or PCP follow up  -S/P diflucan x 1 week for candida during prior admission  -Missed follow up with GI for eval/treatment of Hep C-will reschedule     Seen by case management and patient has made call to request new insurance card. She requests all [facility] providers and says she will go to her follow up appointments.  New prescriptions provided for all of her medications and diabetes supplies.    Discharge Follow Up Recommendations for outpatient labs/diagnostics:  F/U with new PCP 5/23  F/U with GI 6/6  Endocrinology referral placed    Day of Discharge     HPI:   She had a small bowel movement today.  Overall feeling better but still gets some abdominal pain after eating.    Review of Systems  Gen- No fevers, chills  GI- as above    Vital Signs:   Temp:  [96.9 °F (36.1 °C)-98.2 °F (36.8 °C)] 98.1 °F (36.7 °C)  Heart Rate:  [76-84] 80  Resp:  [18-20] 18  BP: (119-148)/() 148/95      Physical Exam:  Constitutional: No acute distress, awake, alert, sitting up in bed  HENT: NCAT, mucous membranes moist, edentulous   Respiratory: Respiratory effort normal   Cardiovascular: RRR  Musculoskeletal: No bilateral ankle edema  Psychiatric: Appropriate affect, cooperative  Neurologic: Speech clear and fluent  Skin: No rashes on exposed surfaces    Pertinent  and/or Most Recent Results     LAB RESULTS:      Lab 05/12/22  0708 05/09/22 2009 05/09/22  1301   WBC 4.65 10.02 8.62   HEMOGLOBIN 12.0 13.2 16.6*   HEMATOCRIT 35.0 36.9 47.9*   PLATELETS 215 283 326   NEUTROS ABS 1.06* 7.90* 7.13*   IMMATURE GRANS (ABS) 0.01 0.03 0.01   LYMPHS ABS 3.07 1.64 1.35   MONOS ABS 0.43 0.44 0.11   EOS ABS 0.06 0.00 0.00   MCV 94.3 90.7 93.4         Lab 05/12/22  1143 05/11/22  1208 05/11/22  0755 05/11/22  0430 05/10/22  2356 05/10/22  1948 05/09/22  1450 05/09/22  1301   SODIUM 139 136 140 138 132* 135*   < >  --    POTASSIUM 4.1 4.2 3.7 3.4* 4.0 4.6   < >  --    CHLORIDE 108* 107 110* 109* 106 110*   < >  --    CO2 21.0* 16.0* 18.0*  19.0* 17.0* 15.0*   < >  --    ANION GAP 10.0 13.0 12.0 10.0 9.0 10.0   < >  --    BUN 10 8 9 9 9 9   < >  --    CREATININE 0.44* 0.54* 0.51* 0.55* 0.58 0.62   < >  --    EGFR 117.3 111.6 113.2 111.1 109.7 108.0   < >  --    GLUCOSE 64* 343* 161* 256* 444* 318*   < >  --    CALCIUM 8.9 9.2 9.0 8.9 8.5* 8.6   < >  --    MAGNESIUM 2.3 1.9 2.2 2.1 1.9 2.1   < >  --    PHOSPHORUS  --  3.5 3.2 2.7 3.5 2.4*   < >  --    HEMOGLOBIN A1C  --   --   --   --   --   --   --  11.90*    < > = values in this interval not displayed.         Lab 05/09/22 2009 05/09/22  1450   TOTAL PROTEIN 6.3 7.8   ALBUMIN 4.10 4.90   GLOBULIN 2.2 2.9   ALT (SGPT) 49* 63*   AST (SGOT) 41* 60*   BILIRUBIN 0.3 0.5   ALK PHOS 152* 197*         Lab 05/10/22  0015   TROPONIN T <0.010                 Brief Urine Lab Results  (Last result in the past 365 days)      Color   Clarity   Blood   Leuk Est   Nitrite   Protein   CREAT   Urine HCG        05/10/22 2051 Yellow   Clear   Negative   Negative   Negative   Trace               Microbiology Results (last 10 days)     Procedure Component Value - Date/Time    COVID PRE-OP / PRE-PROCEDURE SCREENING ORDER (NO ISOLATION) - Swab, Nasopharynx [[id number]]  (Normal) Collected: 05/09/22 2113    Lab Status: Final result Specimen: Swab from Nasopharynx Updated: 05/10/22 1430    Narrative:      The following orders were created for panel order COVID PRE-OP / PRE-PROCEDURE SCREENING ORDER (NO ISOLATION) - Swab, Nasopharynx.  Procedure                               Abnormality         Status                     ---------                               -----------         ------                     COVID-19, APTIMA PANTHER...[[id number]]  Normal              Final result                 Please view results for these tests on the individual orders.    COVID-19, APTIMA PANTHER [facility] IN-HOUSE NP/OP SWAB IN UTM/VTM/[facility] TRANSPORT MEDIA 24HR TAT - Swab, Nasopharynx [[id number]]  (Normal) Collected: 05/09/22 2113    Lab Status:  Final result Specimen: Swab from Nasopharynx Updated: 05/10/22 1430     COVID19 Not Detected    Narrative:      Fact sheet for providers: https://www.fda.gov/media/402675/download     Fact sheet for patients: https://www.fda.gov/media/602263/download    Test performed by RT PCR.          XR Chest 1 View    Result Date: 5/9/2022  DATE OF EXAM: 5/9/2022 2:46 PM  PROCEDURE: XR CHEST 1 VW-  INDICATIONS: Nausea vomiting  COMPARISON: Chest x-ray 4/6/2022  TECHNIQUE: Single radiographic AP view of the chest was obtained.  FINDINGS: Normal cardiomediastinal silhouette. The lungs are clear without focal consolidation. No pleural effusion or pneumothorax. No acute osseous findings. The partially imaged upper abdomen appears unremarkable.      No acute cardiopulmonary findings.  This report was finalized on 5/9/2022 2:54 PM by Amor Espinoza MD.      XR Abdomen KUB    Result Date: 5/11/2022  XR ABDOMEN KUB-  Date of Exam: 5/11/2022 9:20 AM  Indication: abdominal pain; E87.2-Acidosis; R73.9-Hyperglycemia, unspecified; E86.0-Dehydration; E11.10-Type 2 diabetes mellitus with ketoacidosis without coma; R11.2-Nausea with vomiting, unspecified.  Comparison Exams: CT head from April 6, 2022  Technique: Single AP radiograph of the abdomen  FINDINGS: The lung bases are clear. There is a nonobstructive bowel gas pattern. Moderate stool is present throughout the colon. No pathological calcifications are identified. The osseous structures appear intact.      No acute process identified.  This report was finalized on 5/11/2022 9:46 AM by Ino Oliva MD.                    Plan for Follow-up of Pending Labs/Results: none pending     Discharge Details        Discharge Medications      New Medications      Instructions Start Date   Accu-Chek Guide test strip  Generic drug: glucose blood   1 each, Other, 4 Times Daily Before Meals & Nightly, Use as instructed      acetaminophen 325 MG tablet  Commonly known as: TYLENOL   325 mg, Oral,  Every 6 Hours PRN      bisacodyl 10 MG suppository  Commonly known as: DULCOLAX   10 mg, Rectal, Daily PRN      Ginger Root 500 MG capsule   500 mg, Oral, 3 Times Daily Before Meals      insulin detemir 100 UNIT/ML injection  Commonly known as: LEVEMIR  Replaces: insulin detemir 100 UNIT/ML injection   50 Units, Subcutaneous, 2 Times Daily      polyethylene glycol 17 GM/SCOOP powder  Commonly known as: MIRALAX   Mix 1 capful (17g) in water and drink by mouth Daily.      sennosides-docusate 8.6-50 MG per tablet  Commonly known as: PERICOLACE   2 tablets, Oral, 2 Times Daily PRN         Changes to Medications      Instructions Start Date   Accu-Chek FastClix Lancets misc  What changed:   · how much to take  · how to take this  · when to take this   1 each, Other, 4 Times Daily Before Meals & Nightly      hydrOXYzine 25 MG tablet  Commonly known as: ATARAX  What changed: reasons to take this   25 mg, Oral, Every 8 Hours PRN      ondansetron ODT 4 MG disintegrating tablet  Commonly known as: Zofran ODT  What changed:   · medication strength  · how much to take  · how to take this   4 mg, Translingual, Every 8 Hours PRN      Pen Needles 32G X 4 MM misc  What changed:   · how much to take  · how to take this  · when to take this  · Another medication with the same name was removed. Continue taking this medication, and follow the directions you see here.   1 each, Subcutaneous, 2 Times Daily      Ventolin  (90 Base) MCG/ACT inhaler  Generic drug: albuterol sulfate HFA  What changed: See the new instructions.   2 puffs every 4-6 hours as needed for shortness of breath         Continue These Medications      Instructions Start Date   Alcohol Pads 70 % pads   1 each, Does not apply, 4 Times Daily      amLODIPine 5 MG tablet  Commonly known as: NORVASC   5 mg, Oral, Every 24 Hours Scheduled      busPIRone 10 MG tablet  Commonly known as: BUSPAR   10 mg, Oral, 2 Times Daily      lisinopril 40 MG tablet  Commonly known  as: PRINIVIL,ZESTRIL   40 mg, Oral, Daily      metFORMIN 1000 MG tablet  Commonly known as: GLUCOPHAGE   1,000 mg, Oral, 2 Times Daily With Meals      metoprolol tartrate 25 MG tablet  Commonly known as: LOPRESSOR   25 mg, Oral, Every 12 Hours Scheduled      pantoprazole 40 MG EC tablet  Commonly known as: PROTONIX   40 mg, Oral, 2 Times Daily      QUEtiapine 100 MG tablet  Commonly known as: SEROquel   100 mg, Oral, 2 Times Daily, PT states she was on this at home..      RA Allergy Relief 10 MG tablet  Generic drug: cetirizine   take 1 tablet by mouth once daily      sertraline 100 MG tablet  Commonly known as: ZOLOFT   100 mg, Oral, Daily      traZODone 50 MG tablet  Commonly known as: DESYREL   50 mg, Oral, Nightly         Stop These Medications    cloNIDine 0.1 MG tablet  Commonly known as: CATAPRES     furosemide 40 MG tablet  Commonly known as: LASIX     insulin detemir 100 UNIT/ML injection  Commonly known as: LEVEMIR  Replaced by: insulin detemir 100 UNIT/ML injection     insulin lispro 100 UNIT/ML injection  Commonly known as: humaLOG     oxyCODONE-acetaminophen 7.5-325 MG per tablet  Commonly known as: PERCOCET            Allergies   Allergen Reactions   • Tylenol [Acetaminophen] Other (See Comments)     SEVERE LIVER DISEASE-CONTRAINDICATED         Discharge Disposition:  Home or Self Care    Diet:  Hospital:  Diet Order   Procedures   • Diet Regular; Consistent Carbohydrate          CODE STATUS:    Code Status and Medical Interventions:   Ordered at: 05/09/22 1607     Code Status (Patient has no pulse and is not breathing):    CPR (Attempt to Resuscitate)     Medical Interventions (Patient has pulse or is breathing):    Full Support       No future appointments.    Additional Instructions for the Follow-ups that You Need to Schedule     Ambulatory Referral to Physical Therapy Evaluate and treat   As directed      Specialty needed: Evaluate and treat         Discharge Follow-up with PCP   As directed        Currently Documented PCP:    Lilly Rose MD    PCP Phone Number:    [phone]     Follow Up Details: Patient needs [facility] PCP appointment         Discharge Follow-up with Specialty: [facility]; 2 Weeks   As directed      Specialty: [facility]    Follow Up: 2 Weeks                     Светлана Navarro MD  05/13/22      Time Spent on Discharge:  I spent  38 minutes on this discharge activity which included: face-to-face encounter with the patient, reviewing the data in the system, coordination of the care with the nursing staff as well as consultants, documentation, and entering orders.

## 2022-05-13 NOTE — CASE MANAGEMENT/SOCIAL WORK
Case Management Discharge Note      Final Note: I met with Ms. Paris at the bedside to discuss discharge planning. She has not received any bed offers at any acute rehab facilities. Her insurance does not offer skilled rehab benefits. She does not have an established primary care provider so she cannot be followed by home health services. She verbalizes understanding of the importance of following up with a PCP once an appointment is made for her. She is agreeable to going to outpatient PT at [facility]. I have called and faxed the referral to them at [phone]. They will call her to schedule her first appointment. Ms. Paris utilizes [facility] for transportation. Case management will notify them of her hospital discharge so they can transport her home. She does have a rolling walker for home use at home. THis was arranged during her last hospitalization.      14:18 EDT received a call stating that her test strips needed a prior authorization. I submitted this on covermy meds. Key is [id number]. This is still pending review. A one month supply will be provided through our indigent fund.    Selected Continued Care - Admitted Since 5/9/2022     Destination    No services have been selected for the patient.              Durable Medical Equipment    No services have been selected for the patient.              Dialysis/Infusion    No services have been selected for the patient.              Home Medical Care    No services have been selected for the patient.              Therapy Coordination complete.    Service Provider Selected Services Address Phone Fax Patient Preferred    [facility] - PHYSICAL THERAPY  Outpatient Physical Therapy [address] [place] [address] [phone] -- --          [facility]    No services have been selected for the patient.              [facility]    No services have been selected for the patient.                Selected Continued  Care - Prior Encounters Includes selections from prior encounters from 2/8/2022 to 5/13/2022    Discharged on 4/11/2022 Admission date: 4/6/2022 - Discharge disposition: Home or Self Care    Durable Medical Equipment     Service Provider Selected Services Address Phone Fax Patient Preferred    [facility]  Durable Medical Equipment [address] [place] [id number] [phone] --                         Final Discharge Disposition Code: 01 - home or self-care

## 2022-05-13 NOTE — DISCHARGE PLACEMENT REQUEST
"Bernadette Mcqueen (52 y.o. Female)             Date of Birth   1970    Social Security Number       Address   [address] [place] [address]    Home Phone   [phone]    MRN   [id number]       [facility]    Marital Status   Single                            Admission Date   22    Admission Type   Emergency    Admitting Provider   Светлана Navarro MD    Attending Provider   Светлана Navarro MD    Department, Room/Bed   [facility] 3E, S339/1       Discharge Date       Discharge Disposition       Discharge Destination                               Attending Provider: Светлана Navarro MD    Allergies: Tylenol [Acetaminophen]    Isolation: None   Infection: MRSA (18)   Code Status: CPR   Advance Care Planning Activity    Ht: 167.6 cm (66\")   Wt: 69.2 kg (152 lb 9.6 oz)    Admission Cmt: None   Principal Problem: Diabetic ketoacidosis without coma associated with type 2 diabetes mellitus (HCC) [E11.10]                 Active Insurance as of 2022     Primary Coverage     Payor Plan Insurance Group Employer/Plan Group    [facility] BY [employer] [facility] BY [employer]HA [id number]     Payor Plan Address Payor Plan Phone Number Payor Plan Fax Number Effective Dates    [address]   2021 - None Entered    [facility] [id number]       Subscriber Name Subscriber Birth Date Member ID       BERNADETTE MCQUENE 1970 [id number]                 Emergency Contacts      (Rel.) Home Phone Work Phone Mobile Phone    Wellington Layne (Significant Other) -- -- [phone]              [address] [address]  Phone:  [phone]  Fax:  [phone] Date: May 13, 2022      Ambulatory Referral to Physical Therapy Evaluate and treat     Patient:  Bernadette Mcqueen MRN:  [id number]   [address] [place] [address] :  1970  SSN:    Phone: [phone] Sex:  F      INSURANCE PAYOR PLAN GROUP # SUBSCRIBER ID   Primary:    " [facility] BY LAKISHA [id number]      Referring Provider Information:  EVI NAVARRO Phone: [phone] Fax: [phone]       Referral Information:   # Visits:  1 Referral Type: Physical Therapy [AE1]   Urgency:  Routine Referral Reason: Specialty Services Required   Start Date: May 13, 2022 End Date:  To be determined by Insurer   Diagnosis: Diabetic ketoacidosis without coma associated with type 2 diabetes mellitus (HCC) (E11.10 [ICD-10-CM] 250.12 [ICD-9-CM])      Refer to Dept:   Refer to Provider:   Refer to Provider Phone:   Refer to Facility:       Specialty needed: Evaluate and treat     This document serves as a request of services and does not constitute Insurance authorization or approval of services.  To determine eligibility, please contact the members Insurance carrier to verify and review coverage.     If you have medical questions regarding this request for services. Please contact [address] at [phone] during normal business hours.        Authorizing Provider:Evi Navarro MD  Authorizing Provider's NPI: [id number]  Order Entered By: Raúl Miles RN 5/13/2022  9:35 AM     Electronically signed by: Evi Navarro MD 5/13/2022  9:35 AM

## 2022-05-13 NOTE — CONSULTS
Diabetes Education    Patient Name:  Bernadette Paris  YOB: 1970  MRN: [id number]  Admit Date:  5/9/2022    Consult for diabetes education received; Chart reviewed. Pt was seen at bedside today. Permission given for visit. She states she doesn't have any of her medicine at home and has not been taking her insulin.    Discussed and taught Ms. Paris about type 2 diabetes self-management, risk factors, and importance of blood glucose control to reduce complications. Target blood glucose readings and A1c goals per ADA were reviewed. Reviewed with patient current A1c 11.9 and discussed its significance. Discussed with pt the importance of taking her insulin as prescribed, every dose, and how not having enough insulin in her body causes DKA.  Explained what DKA means, what causes it, and s/s. Explained how it alters her body's pH and can cause her organs to fail if untreated. Pt states she understands what DKA is and she has experienced it before. Stressed to pt how not taking her insulin will likely cause this to happen again.    Signs, symptoms, and treatment of hyperglycemia and hypoglycemia were discussed. She states she knows how to treat low blood sugar and drinks juice if her sugar is low.  She states she is supposed to take levemir insulin as well as humalog with meals at at bedtime and metformin.     Stressed the importance of adhering to her medication regimen and reaching out to her provider, once established, for assistance if she is not able to obtain her medications. Importance of establishing care and maintaining appt after d/c stressed to pt. She states she does not have a meter bc she has moved multiple times and lost it. She requested that we provide her a meter today.     Lifestyle changes such as physical activity with MD approval and healthy eating were encouraged. Stressed the importance of strict blood sugar control after surgery to prevent complications such as infection and to  "promote healing of incision. Encouraged pt to monitor blood sugar at home 1-2  times per day and to call PCP if blood sugar is trending high. Encouraged to keep record of blood glucose readings to take to follow up appointment with PCP. Provided patient with copy of [facility]'s \"What is Diabetes\" handout, \"Blood Glucose Goals\" handout, and \"What is A1c\" handout.     Meter education: Accu-Check. Donated glucose meter provided. Instructed to check expiration date and safe storage of glucose test strips, how to do a control test, prepare lancing device, obtain a sample, and perform test, safe disposal of lancets.  Testing frequency per MD instructions, provided general guidelines on target blood glucose, advised patient to discuss personal targets for glucose at next visit.  Record keeping discussed. Pt denies the need for return demo today. She states she has had this meter several times before and knows how to use it. Stressed the importance of verifying her understanding through return demo, she states she does not wish to. Discussed and demonstrated how to log test result.  Urged to call [phone] number on back of meter if have any difficulties, complete the warranty card and mail.  Urged patient to obtain prescriptions for test strips and lancets from provider.     Please reach out to us if further educational needs arise. Thank you for this consult.     Electronically signed by:  Aster Zepeda RN  05/13/22 10:54 EDT  "

## 2022-05-14 ENCOUNTER — READMISSION MANAGEMENT (OUTPATIENT)
Dept: CALL CENTER | Facility: HOSPITAL | Age: 52
End: 2022-05-14

## 2022-05-14 NOTE — OUTREACH NOTE
Prep Survey    Flowsheet Row Responses   [facility] facility patient discharged from? [facility]   Is LACE score < 7 ? No   Emergency Room discharge w/ pulse ox? No   Eligibility Readm Mgmt   Discharge diagnosis Severe malnutrition Acidosis   Does the patient have one of the following disease processes/diagnoses(primary or secondary)? Other   Does the patient have Home health ordered? No   Is there a DME ordered? No   Comments regarding appointments [facility] - PHYSICAL THERAPY    Prep survey completed? Yes          NELI RIDLEY - Registered Nurse

## 2022-05-16 NOTE — PAYOR COMM NOTE
"Bernadette Mcqueen (52 y.o. Female)     Katherine LE [phone]. Fax [phone]            Date of Birth   1970    Social Security Number       Address   [address] [place] [address] [address] [place] [address]    Home Phone   [phone]    MRN   [id number]       [facility]    Marital Status   Single                            Admission Date   5/9/22    Admission Type   Emergency    Admitting Provider   Светлана Navarro MD    Attending Provider       Department, Room/Bed   [facility] 3E, S339/1       Discharge Date   5/13/2022    Discharge Disposition   Home or Self Care    Discharge Destination                               Attending Provider: (none)   Allergies: Tylenol [Acetaminophen]    Isolation: None   Infection: MRSA (08/02/18)   Code Status: Prior   Advance Care Planning Activity    Ht: 167.6 cm (66\")   Wt: 69.2 kg (152 lb 9.6 oz)    Admission Cmt: None   Principal Problem: Diabetic ketoacidosis without coma associated with type 2 diabetes mellitus (HCC) [E11.10]                 Active Insurance as of 5/9/2022     Primary Coverage     Payor Plan Insurance Group Employer/Plan Group    [facility] BY LAKISHA [id number]     Payor Plan Address Payor Plan Phone Number Payor Plan Fax Number Effective Dates    [address]   1/1/2021 - None Entered    [place] [address]       Subscriber Name Subscriber Birth Date Member ID       BERNADETTE MCQUEEN 1970 [id number]                 Emergency Contacts      (Rel.) Home Phone Work Phone Mobile Phone    Wellington Layne (Significant Other) -- -- [phone]            Lab Results (last 72 hours)     Procedure Component Value Units Date/Time    POC Glucose Once [[id number]]  (Abnormal) Collected: 05/13/22 1124    Specimen: Blood Updated: 05/13/22 1127     Glucose 335 mg/dL      Comment: Meter: [id number] : [id number] Barber Bey       POC Glucose Once [[id number]]  (Abnormal) Collected: 05/13/22 0725    Specimen: Blood Updated: " 05/13/22 0728     Glucose 233 mg/dL      Comment: Meter: [id number] : [id number] Barber Melvina       POC Glucose Once [[id number]]  (Abnormal) Collected: 05/13/22 0235    Specimen: Blood Updated: 05/13/22 0237     Glucose 491 mg/dL      Comment: Treated Patient Meter: [id number] : [id number] [employer]       POC Glucose Once [[id number]]  (Abnormal) Collected: 05/13/22 0232    Specimen: Blood Updated: 05/13/22 0237     Glucose 499 mg/dL      Comment: Repeat  Test Meter: [id number] : [id number] [employer]       POC Glucose Once [[id number]]  (Normal) Collected: 05/12/22 2012    Specimen: Blood Updated: 05/12/22 2013     Glucose 115 mg/dL      Comment: Meter: [id number] : [id number] [employer] Joslyn       POC Glucose Once [[id number]]  (Abnormal) Collected: 05/12/22 1635    Specimen: Blood Updated: 05/12/22 1636     Glucose 142 mg/dL      Comment: Meter: [id number] : [id number] Barber Maida       Basic Metabolic Panel [[id number]]  (Abnormal) Collected: 05/12/22 1143    Specimen: Blood Updated: 05/12/22 1304     Glucose 64 mg/dL      BUN 10 mg/dL      Creatinine 0.44 mg/dL      Sodium 139 mmol/L      Potassium 4.1 mmol/L      Chloride 108 mmol/L      CO2 21.0 mmol/L      Calcium 8.9 mg/dL      BUN/Creatinine Ratio 22.7     Anion Gap 10.0 mmol/L      eGFR 117.3 mL/min/1.73      Comment: National Kidney Foundation and American Society of Nephrology (ASN) Task Force recommended calculation based on the Chronic Kidney Disease Epidemiology Collaboration (CKD-EPI) equation refit without adjustment for race.       Narrative:      GFR Normal >60  Chronic Kidney Disease <60  Kidney Failure <15      Magnesium [[id number]]  (Normal) Collected: 05/12/22 1143    Specimen: Blood Updated: 05/12/22 1304     Magnesium 2.3 mg/dL     POC Glucose Once [[id number]]  (Normal) Collected: 05/12/22 1234    Specimen: Blood Updated: 05/12/22 1235     Glucose 117 mg/dL      Comment: Meter: [id number] : [id number] Yeison Rincon        POC Glucose Once [[id number]]  (Abnormal) Collected: 05/12/22 1155    Specimen: Blood Updated: 05/12/22 1158     Glucose 62 mg/dL      Comment: Meter: [id number] : [id number] Barber Bey       CBC & Differential [[id number]]  (Abnormal) Collected: 05/12/22 0708    Specimen: Blood Updated: 05/12/22 0843    Narrative:      The following orders were created for panel order CBC & Differential.  Procedure                               Abnormality         Status                     ---------                               -----------         ------                     CBC Auto Differential[[id number]]        Abnormal            Final result               Scan Slide[[id number]]                   Normal              Final result                 Please view results for these tests on the individual orders.    Scan Slide [[id number]]  (Normal) Collected: 05/12/22 0708    Specimen: Blood Updated: 05/12/22 0843     RBC Morphology Normal     WBC Morphology Normal     Platelet Morphology Normal    CBC Auto Differential [[id number]]  (Abnormal) Collected: 05/12/22 0708    Specimen: Blood Updated: 05/12/22 0842     WBC 4.65 10*3/mm3      RBC 3.71 10*6/mm3      Hemoglobin 12.0 g/dL      Hematocrit 35.0 %      MCV 94.3 fL      MCH 32.3 pg      MCHC 34.3 g/dL      RDW 14.2 %      RDW-SD 49.1 fl      MPV 10.4 fL      Platelets 215 10*3/mm3      Neutrophil % 22.9 %      Lymphocyte % 66.0 %      Monocyte % 9.2 %      Eosinophil % 1.3 %      Basophil % 0.4 %      Immature Grans % 0.2 %      Neutrophils, Absolute 1.06 10*3/mm3      Lymphocytes, Absolute 3.07 10*3/mm3      Monocytes, Absolute 0.43 10*3/mm3      Eosinophils, Absolute 0.06 10*3/mm3      Basophils, Absolute 0.02 10*3/mm3      Immature Grans, Absolute 0.01 10*3/mm3      nRBC 0.0 /100 WBC     Narrative:      Appended report. These results have been appended to a previously verified report.    POC Glucose Once [[id number]]  (Abnormal) Collected: 05/12/22 0702    Specimen: Blood  Updated: 05/12/22 0703     Glucose 203 mg/dL      Comment: Meter: [id number] : [id number] Barber Bey       POC Glucose Once [[id number]]  (Abnormal) Collected: 05/12/22 0152    Specimen: Blood Updated: 05/12/22 0154     Glucose 165 mg/dL      Comment: Meter: [id number] : [id number] Eli Kamren       POC Glucose Once [[id number]]  (Abnormal) Collected: 05/11/22 2124    Specimen: Blood Updated: 05/11/22 2127     Glucose 579 mg/dL      Comment: Physician Notified Meter: [id number] : [id number] Eli Karmen       POC Glucose Once [[id number]]  (Abnormal) Collected: 05/11/22 1634    Specimen: Blood Updated: 05/11/22 1638     Glucose 229 mg/dL      Comment: Meter: [id number] : [id number] Aylin Mascorro       Basic Metabolic Panel [[id number]]  (Abnormal) Collected: 05/11/22 1208    Specimen: Blood Updated: 05/11/22 1324     Glucose 343 mg/dL      BUN 8 mg/dL      Creatinine 0.54 mg/dL      Sodium 136 mmol/L      Potassium 4.2 mmol/L      Comment: Slight hemolysis detected by analyzer. Results may be affected.        Chloride 107 mmol/L      CO2 16.0 mmol/L      Calcium 9.2 mg/dL      BUN/Creatinine Ratio 14.8     Anion Gap 13.0 mmol/L      eGFR 111.6 mL/min/1.73      Comment: National Kidney Foundation and American Society of Nephrology (ASN) Task Force recommended calculation based on the Chronic Kidney Disease Epidemiology Collaboration (CKD-EPI) equation refit without adjustment for race.       Narrative:      GFR Normal >60  Chronic Kidney Disease <60  Kidney Failure <15      Phosphorus [[id number]]  (Normal) Collected: 05/11/22 1208    Specimen: Blood Updated: 05/11/22 1324     Phosphorus 3.5 mg/dL     Magnesium [[id number]]  (Normal) Collected: 05/11/22 1208    Specimen: Blood Updated: 05/11/22 1324     Magnesium 1.9 mg/dL     POC Glucose Once [[id number]]  (Abnormal) Collected: 05/11/22 1118    Specimen: Blood Updated: 05/11/22 1133     Glucose 291 mg/dL      Comment: Meter: [id number] :  [id number] Astrid       Basic Metabolic Panel [[id number]]  (Abnormal) Collected: 05/11/22 0755    Specimen: Blood Updated: 05/11/22 0904     Glucose 161 mg/dL      BUN 9 mg/dL      Creatinine 0.51 mg/dL      Sodium 140 mmol/L      Potassium 3.7 mmol/L      Comment: Slight hemolysis detected by analyzer. Results may be affected.        Chloride 110 mmol/L      CO2 18.0 mmol/L      Calcium 9.0 mg/dL      BUN/Creatinine Ratio 17.6     Anion Gap 12.0 mmol/L      eGFR 113.2 mL/min/1.73      Comment: National Kidney Foundation and American Society of Nephrology (ASN) Task Force recommended calculation based on the Chronic Kidney Disease Epidemiology Collaboration (CKD-EPI) equation refit without adjustment for race.       Narrative:      GFR Normal >60  Chronic Kidney Disease <60  Kidney Failure <15      Phosphorus [[id number]]  (Normal) Collected: 05/11/22 0755    Specimen: Blood Updated: 05/11/22 0904     Phosphorus 3.2 mg/dL     Magnesium [[id number]]  (Normal) Collected: 05/11/22 0755    Specimen: Blood Updated: 05/11/22 0904     Magnesium 2.2 mg/dL     POC Glucose Once [[id number]]  (Abnormal) Collected: 05/11/22 0706    Specimen: Blood Updated: 05/11/22 0729     Glucose 154 mg/dL      Comment: Meter: [id number] : [id number] [email or web address]       Basic Metabolic Panel [[id number]]  (Abnormal) Collected: 05/11/22 0430    Specimen: Blood Updated: 05/11/22 0601     Glucose 256 mg/dL      BUN 9 mg/dL      Creatinine 0.55 mg/dL      Sodium 138 mmol/L      Potassium 3.4 mmol/L      Chloride 109 mmol/L      CO2 19.0 mmol/L      Calcium 8.9 mg/dL      BUN/Creatinine Ratio 16.4     Anion Gap 10.0 mmol/L      eGFR 111.1 mL/min/1.73      Comment: National Kidney Foundation and American Society of Nephrology (ASN) Task Force recommended calculation based on the Chronic Kidney Disease Epidemiology Collaboration (CKD-EPI) equation refit without adjustment for race.       Narrative:      GFR Normal >60  Chronic Kidney Disease  <60  Kidney Failure <15      Phosphorus [[id number]]  (Normal) Collected: 05/11/22 0430    Specimen: Blood Updated: 05/11/22 0601     Phosphorus 2.7 mg/dL     Magnesium [[id number]]  (Normal) Collected: 05/11/22 0430    Specimen: Blood Updated: 05/11/22 0601     Magnesium 2.1 mg/dL     POC Glucose Once [[id number]]  (Abnormal) Collected: 05/11/22 0324    Specimen: Blood Updated: 05/11/22 0326     Glucose 324 mg/dL      Comment: Meter: [id number] : [id number] Luis Manuel Luke       POC Glucose Once [[id number]]  (Abnormal) Collected: 05/11/22 0133    Specimen: Blood Updated: 05/11/22 0136     Glucose 384 mg/dL      Comment: Meter: [id number] : [id number] Kamaljit Gray       Basic Metabolic Panel [[id number]]  (Abnormal) Collected: 05/10/22 2356    Specimen: Blood Updated: 05/11/22 0121     Glucose 444 mg/dL      BUN 9 mg/dL      Creatinine 0.58 mg/dL      Sodium 132 mmol/L      Potassium 4.0 mmol/L      Comment: Slight hemolysis detected by analyzer. Results may be affected.        Chloride 106 mmol/L      CO2 17.0 mmol/L      Calcium 8.5 mg/dL      BUN/Creatinine Ratio 15.5     Anion Gap 9.0 mmol/L      eGFR 109.7 mL/min/1.73      Comment: National Kidney Foundation and American Society of Nephrology (ASN) Task Force recommended calculation based on the Chronic Kidney Disease Epidemiology Collaboration (CKD-EPI) equation refit without adjustment for race.       Narrative:      GFR Normal >60  Chronic Kidney Disease <60  Kidney Failure <15      Phosphorus [[id number]]  (Normal) Collected: 05/10/22 2356    Specimen: Blood Updated: 05/11/22 0118     Phosphorus 3.5 mg/dL     Magnesium [[id number]]  (Normal) Collected: 05/10/22 2356    Specimen: Blood Updated: 05/11/22 0111     Magnesium 1.9 mg/dL     Urinalysis With Microscopic If Indicated (No Culture) - Urine, Clean Catch [[id number]]  (Abnormal) Collected: 05/10/22 2051    Specimen: Urine, Clean Catch Updated: 05/10/22 2106     Color, UA Yellow     Appearance, UA Clear      pH, UA 6.5     Specific Gravity, UA 1.024     Glucose, UA >=1000 mg/dL (3+)     Ketones, UA Trace     Bilirubin, UA Negative     Blood, UA Negative     Protein, UA Trace     Leuk Esterase, UA Negative     Nitrite, UA Negative     Urobilinogen, UA 0.2 E.U./dL    Narrative:      Urine microscopic not indicated.    POC Glucose Once [[id number]]  (Abnormal) Collected: 05/10/22 2027    Specimen: Blood Updated: 05/10/22 2029     Glucose 298 mg/dL      Comment: Meter: [id number] : [id number] Luis Manuel Luke       Basic Metabolic Panel [[id number]]  (Abnormal) Collected: 05/10/22 1948    Specimen: Blood Updated: 05/10/22 2027     Glucose 318 mg/dL      BUN 9 mg/dL      Creatinine 0.62 mg/dL      Sodium 135 mmol/L      Potassium 4.6 mmol/L      Comment: Specimen hemolyzed.  Results may be affected.        Chloride 110 mmol/L      CO2 15.0 mmol/L      Calcium 8.6 mg/dL      BUN/Creatinine Ratio 14.5     Anion Gap 10.0 mmol/L      eGFR 108.0 mL/min/1.73      Comment: National Kidney Foundation and American Society of Nephrology (ASN) Task Force recommended calculation based on the Chronic Kidney Disease Epidemiology Collaboration (CKD-EPI) equation refit without adjustment for race.       Narrative:      GFR Normal >60  Chronic Kidney Disease <60  Kidney Failure <15      Phosphorus [[id number]]  (Abnormal) Collected: 05/10/22 1948    Specimen: Blood Updated: 05/10/22 2027     Phosphorus 2.4 mg/dL     Magnesium [[id number]]  (Normal) Collected: 05/10/22 1948    Specimen: Blood Updated: 05/10/22 2020     Magnesium 2.1 mg/dL     Phosphorus [[id number]]  (Abnormal) Collected: 05/10/22 1707    Specimen: Blood Updated: 05/10/22 1747     Phosphorus 1.8 mg/dL     Basic Metabolic Panel [[id number]]  (Abnormal) Collected: 05/10/22 1707    Specimen: Blood Updated: 05/10/22 1746     Glucose 323 mg/dL      BUN 11 mg/dL      Creatinine 0.67 mg/dL      Sodium 136 mmol/L      Potassium 4.4 mmol/L      Comment: Slight hemolysis detected by  analyzer. Results may be affected.        Chloride 107 mmol/L      CO2 16.0 mmol/L      Calcium 8.9 mg/dL      BUN/Creatinine Ratio 16.4     Anion Gap 13.0 mmol/L      eGFR 106.0 mL/min/1.73      Comment: National Kidney Foundation and American Society of Nephrology (ASN) Task Force recommended calculation based on the Chronic Kidney Disease Epidemiology Collaboration (CKD-EPI) equation refit without adjustment for race.       Narrative:      GFR Normal >60  Chronic Kidney Disease <60  Kidney Failure <15      Magnesium [[id number]]  (Normal) Collected: 05/10/22 1707    Specimen: Blood Updated: 05/10/22 1736     Magnesium 2.1 mg/dL     POC Glucose Once [[id number]]  (Abnormal) Collected: 05/10/22 1706    Specimen: Blood Updated: 05/10/22 1709     Glucose 278 mg/dL      Comment: Meter: [id number] : [id number] Aylin Mascorro             Imaging Results (Last 72 Hours)     Procedure Component Value Units Date/Time    XR Abdomen KUB [[id number]] Collected: 05/11/22 0945     Updated: 05/11/22 0949    Narrative:      XR ABDOMEN KUB-     Date of Exam: 5/11/2022 9:20 AM     Indication: abdominal pain; E87.2-Acidosis; R73.9-Hyperglycemia,  unspecified; E86.0-Dehydration; E11.10-Type 2 diabetes mellitus with  ketoacidosis without coma; R11.2-Nausea with vomiting, unspecified.     Comparison Exams: CT head from April 6, 2022     Technique: Single AP radiograph of the abdomen     FINDINGS:  The lung bases are clear. There is a nonobstructive bowel gas pattern.  Moderate stool is present throughout the colon. No pathological  calcifications are identified. The osseous structures appear intact.       Impression:      No acute process identified.     This report was finalized on 5/11/2022 9:46 AM by Ino Oliva MD.           Physician Progress Notes (last 72 hours)  Notes from 05/13/22 1120 through 05/16/22 1120   No notes of this type exist for this encounter.         Consult Notes (last 72 hours)  Notes from 05/13/22 1120  through 22 1120   No notes of this type exist for this encounter.            Discharge Summary      Светлана Navarro MD at 22 1326              [facility] Medicine Services  DISCHARGE SUMMARY    Patient Name: Bernadette Paris  : 1970  MRN: [id number]    Date of Admission: 2022  1:09 PM  Date of Discharge:  2022  Primary Care Physician: Lilly Rose MD    Consults     Date and Time Order Name Status Description    2022  9:10 AM Inpatient Gastroenterology Consult Completed           Hospital Course     Presenting Problem:   Acidosis [E87.2]    Active Hospital Problems    Diagnosis  POA   • Severe malnutrition (HCC) [E43]  Yes   • Acidosis [E87.2]  Yes   • Bipolar affective disorder (HCC) [F31.9]  Yes   • Hepatitis C [B19.20]  Yes   • Accelerated hypertension [I10]  Yes   • Medically noncompliant [Z91.19]  Not Applicable   • Gastritis [K29.70]  Yes   • Hyperlipidemia [E78.5]  Yes   • Nausea & vomiting [R11.2]  Yes      Resolved Hospital Problems    Diagnosis Date Resolved POA   • **Diabetic ketoacidosis without coma associated with type 2 diabetes mellitus (HCC) [E11.10] 2022 Yes          Hospital Course:  Bernadette Paris is a 52 y.o. female with uncontrolled insulin dependent type 2 diabetes mellitus, gastroparesis, HTN, hepatitis C, bipolar disorder and medical noncompliance who presented with nausea/vomiting after running out of her GI medication and was found to have DKA.     This patient's problems and plans were partially entered by my partner and updated as appropriate by me 22.     DKA  Uncontrolled T2DM, insulin dependent  Nausea and vomiting/ Gastroparesis  Constipation  -Increase levemir to 50 units BID  -Start jian root 500mg TID at discharge  -Continue bowel regimen at discharge  -We discussed small, frequent meals  -Referral to endocrinology placed     HTN  -Continue home lisinopril, norvasc     Gastritis   Hep C  -Seen by GI last  admission. Recommend bid PPI x 1 month, then daily. Has been out of medication x 2 weeks. Continue BID PPI for now and can transition to once daily at GI or PCP follow up  -S/P diflucan x 1 week for candida during prior admission  -Missed follow up with GI for eval/treatment of Hep C-will reschedule     Seen by case management and patient has made call to request new insurance card. She requests all [employer] providers and says she will go to her follow up appointments.  New prescriptions provided for all of her medications and diabetes supplies.    Discharge Follow Up Recommendations for outpatient labs/diagnostics:  F/U with new PCP 5/23  F/U with GI 6/6  Endocrinology referral placed    Day of Discharge     HPI:   She had a small bowel movement today.  Overall feeling better but still gets some abdominal pain after eating.    Review of Systems  Gen- No fevers, chills  GI- as above    Vital Signs:   Temp:  [96.9 °F (36.1 °C)-98.2 °F (36.8 °C)] 98.1 °F (36.7 °C)  Heart Rate:  [76-84] 80  Resp:  [18-20] 18  BP: (119-148)/() 148/95      Physical Exam:  Constitutional: No acute distress, awake, alert, sitting up in bed  HENT: NCAT, mucous membranes moist, edentulous   Respiratory: Respiratory effort normal   Cardiovascular: RRR  Musculoskeletal: No bilateral ankle edema  Psychiatric: Appropriate affect, cooperative  Neurologic: Speech clear and fluent  Skin: No rashes on exposed surfaces    Pertinent  and/or Most Recent Results     LAB RESULTS:      Lab 05/12/22  0708 05/09/22 2009 05/09/22  1301   WBC 4.65 10.02 8.62   HEMOGLOBIN 12.0 13.2 16.6*   HEMATOCRIT 35.0 36.9 47.9*   PLATELETS 215 283 326   NEUTROS ABS 1.06* 7.90* 7.13*   IMMATURE GRANS (ABS) 0.01 0.03 0.01   LYMPHS ABS 3.07 1.64 1.35   MONOS ABS 0.43 0.44 0.11   EOS ABS 0.06 0.00 0.00   MCV 94.3 90.7 93.4         Lab 05/12/22  1143 05/11/22  1208 05/11/22  0755 05/11/22  0430 05/10/22  2356 05/10/22  1948 05/09/22  1450 05/09/22  1301   SODIUM 139 136  140 138 132* 135*   < >  --    POTASSIUM 4.1 4.2 3.7 3.4* 4.0 4.6   < >  --    CHLORIDE 108* 107 110* 109* 106 110*   < >  --    CO2 21.0* 16.0* 18.0* 19.0* 17.0* 15.0*   < >  --    ANION GAP 10.0 13.0 12.0 10.0 9.0 10.0   < >  --    BUN 10 8 9 9 9 9   < >  --    CREATININE 0.44* 0.54* 0.51* 0.55* 0.58 0.62   < >  --    EGFR 117.3 111.6 113.2 111.1 109.7 108.0   < >  --    GLUCOSE 64* 343* 161* 256* 444* 318*   < >  --    CALCIUM 8.9 9.2 9.0 8.9 8.5* 8.6   < >  --    MAGNESIUM 2.3 1.9 2.2 2.1 1.9 2.1   < >  --    PHOSPHORUS  --  3.5 3.2 2.7 3.5 2.4*   < >  --    HEMOGLOBIN A1C  --   --   --   --   --   --   --  11.90*    < > = values in this interval not displayed.         Lab 05/09/22 2009 05/09/22  1450   TOTAL PROTEIN 6.3 7.8   ALBUMIN 4.10 4.90   GLOBULIN 2.2 2.9   ALT (SGPT) 49* 63*   AST (SGOT) 41* 60*   BILIRUBIN 0.3 0.5   ALK PHOS 152* 197*         Lab 05/10/22  0015   TROPONIN T <0.010                 Brief Urine Lab Results  (Last result in the past 365 days)      Color   Clarity   Blood   Leuk Est   Nitrite   Protein   CREAT   Urine HCG        05/10/22 2051 Yellow   Clear   Negative   Negative   Negative   Trace               Microbiology Results (last 10 days)     Procedure Component Value - Date/Time    COVID PRE-OP / PRE-PROCEDURE SCREENING ORDER (NO ISOLATION) - Swab, Nasopharynx [[id number]]  (Normal) Collected: 05/09/22 2113    Lab Status: Final result Specimen: Swab from Nasopharynx Updated: 05/10/22 1430    Narrative:      The following orders were created for panel order COVID PRE-OP / PRE-PROCEDURE SCREENING ORDER (NO ISOLATION) - Swab, Nasopharynx.  Procedure                               Abnormality         Status                     ---------                               -----------         ------                     COVID-19, APTIMA PANTHER...[[id number]]  Normal              Final result                 Please view results for these tests on the individual orders.    COVID-19, APTIMA  KATI BAJWA IN-HOUSE NP/OP SWAB IN UTM/VTM/SALINE TRANSPORT MEDIA 24HR TAT - Swab, Nasopharynx [[id number]]  (Normal) Collected: 05/09/22 2113    Lab Status: Final result Specimen: Swab from Nasopharynx Updated: 05/10/22 1430     COVID19 Not Detected    Narrative:      Fact sheet for providers: https://www.fda.gov/media/749170/download     Fact sheet for patients: https://www.fda.gov/media/017009/download    Test performed by RT PCR.          XR Chest 1 View    Result Date: 5/9/2022  DATE OF EXAM: 5/9/2022 2:46 PM  PROCEDURE: XR CHEST 1 VW-  INDICATIONS: Nausea vomiting  COMPARISON: Chest x-ray 4/6/2022  TECHNIQUE: Single radiographic AP view of the chest was obtained.  FINDINGS: Normal cardiomediastinal silhouette. The lungs are clear without focal consolidation. No pleural effusion or pneumothorax. No acute osseous findings. The partially imaged upper abdomen appears unremarkable.      No acute cardiopulmonary findings.  This report was finalized on 5/9/2022 2:54 PM by Amor Espinoza MD.      XR Abdomen KUB    Result Date: 5/11/2022  XR ABDOMEN KUB-  Date of Exam: 5/11/2022 9:20 AM  Indication: abdominal pain; E87.2-Acidosis; R73.9-Hyperglycemia, unspecified; E86.0-Dehydration; E11.10-Type 2 diabetes mellitus with ketoacidosis without coma; R11.2-Nausea with vomiting, unspecified.  Comparison Exams: CT head from April 6, 2022  Technique: Single AP radiograph of the abdomen  FINDINGS: The lung bases are clear. There is a nonobstructive bowel gas pattern. Moderate stool is present throughout the colon. No pathological calcifications are identified. The osseous structures appear intact.      No acute process identified.  This report was finalized on 5/11/2022 9:46 AM by Ino Oliva MD.                    Plan for Follow-up of Pending Labs/Results: none pending     Discharge Details        Discharge Medications      New Medications      Instructions Start Date   Accu-Chek Guide test strip  Generic drug: glucose  blood   1 each, Other, 4 Times Daily Before Meals & Nightly, Use as instructed      acetaminophen 325 MG tablet  Commonly known as: TYLENOL   325 mg, Oral, Every 6 Hours PRN      bisacodyl 10 MG suppository  Commonly known as: DULCOLAX   10 mg, Rectal, Daily PRN      Ginger Root 500 MG capsule   500 mg, Oral, 3 Times Daily Before Meals      insulin detemir 100 UNIT/ML injection  Commonly known as: LEVEMIR  Replaces: insulin detemir 100 UNIT/ML injection   50 Units, Subcutaneous, 2 Times Daily      polyethylene glycol 17 GM/SCOOP powder  Commonly known as: MIRALAX   Mix 1 capful (17g) in water and drink by mouth Daily.      sennosides-docusate 8.6-50 MG per tablet  Commonly known as: PERICOLACE   2 tablets, Oral, 2 Times Daily PRN         Changes to Medications      Instructions Start Date   Accu-Chek FastClix Lancets misc  What changed:   · how much to take  · how to take this  · when to take this   1 each, Other, 4 Times Daily Before Meals & Nightly      hydrOXYzine 25 MG tablet  Commonly known as: ATARAX  What changed: reasons to take this   25 mg, Oral, Every 8 Hours PRN      ondansetron ODT 4 MG disintegrating tablet  Commonly known as: Zofran ODT  What changed:   · medication strength  · how much to take  · how to take this   4 mg, Translingual, Every 8 Hours PRN      Pen Needles 32G X 4 MM misc  What changed:   · how much to take  · how to take this  · when to take this  · Another medication with the same name was removed. Continue taking this medication, and follow the directions you see here.   1 each, Subcutaneous, 2 Times Daily      Ventolin  (90 Base) MCG/ACT inhaler  Generic drug: albuterol sulfate HFA  What changed: See the new instructions.   2 puffs every 4-6 hours as needed for shortness of breath         Continue These Medications      Instructions Start Date   Alcohol Pads 70 % pads   1 each, Does not apply, 4 Times Daily      amLODIPine 5 MG tablet  Commonly known as: NORVASC   5 mg, Oral,  Every 24 Hours Scheduled      busPIRone 10 MG tablet  Commonly known as: BUSPAR   10 mg, Oral, 2 Times Daily      lisinopril 40 MG tablet  Commonly known as: PRINIVIL,ZESTRIL   40 mg, Oral, Daily      metFORMIN 1000 MG tablet  Commonly known as: GLUCOPHAGE   1,000 mg, Oral, 2 Times Daily With Meals      metoprolol tartrate 25 MG tablet  Commonly known as: LOPRESSOR   25 mg, Oral, Every 12 Hours Scheduled      pantoprazole 40 MG EC tablet  Commonly known as: PROTONIX   40 mg, Oral, 2 Times Daily      QUEtiapine 100 MG tablet  Commonly known as: SEROquel   100 mg, Oral, 2 Times Daily, PT states she was on this at home..      RA Allergy Relief 10 MG tablet  Generic drug: cetirizine   take 1 tablet by mouth once daily      sertraline 100 MG tablet  Commonly known as: ZOLOFT   100 mg, Oral, Daily      traZODone 50 MG tablet  Commonly known as: DESYREL   50 mg, Oral, Nightly         Stop These Medications    cloNIDine 0.1 MG tablet  Commonly known as: CATAPRES     furosemide 40 MG tablet  Commonly known as: LASIX     insulin detemir 100 UNIT/ML injection  Commonly known as: LEVEMIR  Replaced by: insulin detemir 100 UNIT/ML injection     insulin lispro 100 UNIT/ML injection  Commonly known as: humaLOG     oxyCODONE-acetaminophen 7.5-325 MG per tablet  Commonly known as: PERCOCET            Allergies   Allergen Reactions   • Tylenol [Acetaminophen] Other (See Comments)     SEVERE LIVER DISEASE-CONTRAINDICATED         Discharge Disposition:  Home or Self Care    Diet:  Hospital:  Diet Order   Procedures   • Diet Regular; Consistent Carbohydrate          CODE STATUS:    Code Status and Medical Interventions:   Ordered at: 05/09/22 1607     Code Status (Patient has no pulse and is not breathing):    CPR (Attempt to Resuscitate)     Medical Interventions (Patient has pulse or is breathing):    Full Support       No future appointments.    Additional Instructions for the Follow-ups that You Need to Schedule     Ambulatory  Referral to Physical Therapy Evaluate and treat   As directed      Specialty needed: Evaluate and treat         Discharge Follow-up with PCP   As directed       Currently Documented PCP:    Lilly Rose MD    PCP Phone Number:    [phone]     Follow Up Details: Patient needs [facility] PCP appointment         Discharge Follow-up with Specialty:  [facility]; 2 Weeks   As directed      [facility]    Follow Up: 2 Weeks                     Светлана Navarro MD  05/13/22      Time Spent on Discharge:  I spent  38 minutes on this discharge activity which included: face-to-face encounter with the patient, reviewing the data in the system, coordination of the care with the nursing staff as well as consultants, documentation, and entering orders.          Electronically signed by Светлана Navarro MD at 05/13/22 4764        normal (ped)...

## 2022-05-19 ENCOUNTER — READMISSION MANAGEMENT (OUTPATIENT)
Dept: CALL CENTER | Facility: HOSPITAL | Age: 52
End: 2022-05-19

## 2022-05-19 NOTE — OUTREACH NOTE
Medical Week 1 Survey    Flowsheet Row Responses   [facility] patient discharged from? [facility]   Does the patient have one of the following disease processes/diagnoses(primary or secondary)? Other   Week 1 attempt successful? No   Unsuccessful attempts Attempt 1          FAIZAN SIDHU - Registered Nurse

## 2022-05-24 ENCOUNTER — READMISSION MANAGEMENT (OUTPATIENT)
Dept: CALL CENTER | Facility: HOSPITAL | Age: 52
End: 2022-05-24

## 2022-05-24 NOTE — OUTREACH NOTE
Medical Week 1 Survey    Flowsheet Row Responses   [facility] patient discharged from? [place]   Does the patient have one of the following disease processes/diagnoses(primary or secondary)? Other   Week 1 attempt successful? No   Unsuccessful attempts Attempt 2          JAYLEEN LUNA - Registered Nurse

## 2022-05-29 ENCOUNTER — HOSPITAL ENCOUNTER (EMERGENCY)
Facility: HOSPITAL | Age: 52
Discharge: HOME | End: 2022-05-29
Attending: EMERGENCY MEDICINE | Admitting: EMERGENCY MEDICINE

## 2022-05-29 ENCOUNTER — APPOINTMENT (OUTPATIENT)
Dept: CT IMAGING | Facility: HOSPITAL | Age: 52
End: 2022-05-29

## 2022-05-29 VITALS
RESPIRATION RATE: 16 BRPM | TEMPERATURE: 98.7 F | BODY MASS INDEX: 24.11 KG/M2 | HEIGHT: 66 IN | HEART RATE: 98 BPM | OXYGEN SATURATION: 98 % | DIASTOLIC BLOOD PRESSURE: 98 MMHG | SYSTOLIC BLOOD PRESSURE: 164 MMHG | WEIGHT: 150 LBS

## 2022-05-29 DIAGNOSIS — R10.10 ACUTE UPPER ABDOMINAL PAIN: Primary | ICD-10-CM

## 2022-05-29 DIAGNOSIS — E11.65 TYPE 2 DIABETES MELLITUS WITH HYPERGLYCEMIA, UNSPECIFIED WHETHER LONG TERM INSULIN USE: ICD-10-CM

## 2022-05-29 DIAGNOSIS — E86.0 DEHYDRATION: ICD-10-CM

## 2022-05-29 LAB
ALBUMIN SERPL-MCNC: 4.2 G/DL (ref 3.5–5.2)
ALBUMIN/GLOB SERPL: 1.6 G/DL
ALP SERPL-CCNC: 139 U/L (ref 39–117)
ALT SERPL W P-5'-P-CCNC: 79 U/L (ref 1–33)
AMPHET+METHAMPHET UR QL: NEGATIVE
AMPHETAMINES UR QL: NEGATIVE
ANION GAP SERPL CALCULATED.3IONS-SCNC: 21 MMOL/L (ref 5–15)
AST SERPL-CCNC: 87 U/L (ref 1–32)
ATMOSPHERIC PRESS: ABNORMAL MM[HG]
B-OH-BUTYR SERPL-SCNC: 5.08 MMOL/L (ref 0.02–0.27)
BACTERIA UR QL AUTO: ABNORMAL /HPF
BARBITURATES UR QL SCN: NEGATIVE
BASE EXCESS BLDV CALC-SCNC: -12.8 MMOL/L (ref -2–2)
BASOPHILS # BLD AUTO: 0.02 10*3/MM3 (ref 0–0.2)
BASOPHILS NFR BLD AUTO: 0.4 % (ref 0–1.5)
BDY SITE: ABNORMAL
BENZODIAZ UR QL SCN: NEGATIVE
BILIRUB SERPL-MCNC: 0.6 MG/DL (ref 0–1.2)
BILIRUB UR QL STRIP: NEGATIVE
BODY TEMPERATURE: 37 C
BUN BLDA-MCNC: 7 MG/DL (ref 8–26)
BUN SERPL-MCNC: 6 MG/DL (ref 6–20)
BUN/CREAT SERPL: 8.5 (ref 7–25)
BUPRENORPHINE SERPL-MCNC: NEGATIVE NG/ML
CA-I BLDA-SCNC: 1.23 MMOL/L (ref 1.2–1.32)
CALCIUM SPEC-SCNC: 9.7 MG/DL (ref 8.6–10.5)
CANNABINOIDS SERPL QL: NEGATIVE
CHLORIDE BLDA-SCNC: 100 MMOL/L (ref 98–109)
CHLORIDE SERPL-SCNC: 97 MMOL/L (ref 98–107)
CLARITY UR: CLEAR
CO2 BLDA-SCNC: 12.1 MMOL/L (ref 22–33)
CO2 BLDA-SCNC: 20 MMOL/L (ref 24–29)
CO2 SERPL-SCNC: 17 MMOL/L (ref 22–29)
COCAINE UR QL: POSITIVE
COHGB MFR BLD: 1.4 %
COLOR UR: YELLOW
CREAT BLDA-MCNC: 0.4 MG/DL (ref 0.6–1.3)
CREAT SERPL-MCNC: 0.71 MG/DL (ref 0.57–1)
DEPRECATED RDW RBC AUTO: 48.2 FL (ref 37–54)
EGFRCR SERPLBLD CKD-EPI 2021: 103.1 ML/MIN/1.73
EGFRCR SERPLBLD CKD-EPI 2021: 120 ML/MIN/1.73
EOSINOPHIL # BLD AUTO: 0.04 10*3/MM3 (ref 0–0.4)
EOSINOPHIL NFR BLD AUTO: 0.9 % (ref 0.3–6.2)
EPAP: 0
ERYTHROCYTE [DISTWIDTH] IN BLOOD BY AUTOMATED COUNT: 14 % (ref 12.3–15.4)
ETHANOL BLD-MCNC: <10 MG/DL (ref 0–10)
FLUAV SUBTYP SPEC NAA+PROBE: NOT DETECTED
FLUBV RNA ISLT QL NAA+PROBE: NOT DETECTED
GLOBULIN UR ELPH-MCNC: 2.6 GM/DL
GLUCOSE BLDC GLUCOMTR-MCNC: 246 MG/DL (ref 70–130)
GLUCOSE BLDC GLUCOMTR-MCNC: 362 MG/DL (ref 70–130)
GLUCOSE SERPL-MCNC: 360 MG/DL (ref 65–99)
GLUCOSE UR STRIP-MCNC: ABNORMAL MG/DL
HCO3 BLDV-SCNC: 11.4 MMOL/L (ref 22–28)
HCT VFR BLD AUTO: 39.5 % (ref 34–46.6)
HCT VFR BLDA CALC: 44 % (ref 38–51)
HGB BLD-MCNC: 13.6 G/DL (ref 12–15.9)
HGB BLDA-MCNC: 11 G/DL (ref 14–18)
HGB BLDA-MCNC: 15 G/DL (ref 12–17)
HGB UR QL STRIP.AUTO: NEGATIVE
HYALINE CASTS UR QL AUTO: ABNORMAL /LPF
IMM GRANULOCYTES # BLD AUTO: 0.01 10*3/MM3 (ref 0–0.05)
IMM GRANULOCYTES NFR BLD AUTO: 0.2 % (ref 0–0.5)
INHALED O2 CONCENTRATION: 21 %
IPAP: 0
KETONES UR QL STRIP: ABNORMAL
LEUKOCYTE ESTERASE UR QL STRIP.AUTO: NEGATIVE
LIPASE SERPL-CCNC: 47 U/L (ref 13–60)
LYMPHOCYTES # BLD AUTO: 1.59 10*3/MM3 (ref 0.7–3.1)
LYMPHOCYTES NFR BLD AUTO: 34.3 % (ref 19.6–45.3)
Lab: ABNORMAL
MAGNESIUM SERPL-MCNC: 1.8 MG/DL (ref 1.6–2.6)
MCH RBC QN AUTO: 32.3 PG (ref 26.6–33)
MCHC RBC AUTO-ENTMCNC: 34.4 G/DL (ref 31.5–35.7)
MCV RBC AUTO: 93.8 FL (ref 79–97)
METHADONE UR QL SCN: NEGATIVE
METHGB BLD QL: 0.2 %
MODALITY: ABNORMAL
MONOCYTES # BLD AUTO: 0.46 10*3/MM3 (ref 0.1–0.9)
MONOCYTES NFR BLD AUTO: 9.9 % (ref 5–12)
NEUTROPHILS NFR BLD AUTO: 2.52 10*3/MM3 (ref 1.7–7)
NEUTROPHILS NFR BLD AUTO: 54.3 % (ref 42.7–76)
NITRITE UR QL STRIP: NEGATIVE
NOTE: ABNORMAL
NOTIFIED BY: ABNORMAL
NOTIFIED WHO: ABNORMAL
NRBC BLD AUTO-RTO: 0 /100 WBC (ref 0–0.2)
OPIATES UR QL: POSITIVE
OXYCODONE UR QL SCN: NEGATIVE
OXYHGB MFR BLDV: 97.9 %
PAW @ PEAK INSP FLOW SETTING VENT: 0 CMH2O
PCO2 BLDV: 22 MM HG (ref 41–51)
PCP UR QL SCN: NEGATIVE
PH BLDV: 7.32 PH UNITS (ref 7.31–7.41)
PH UR STRIP.AUTO: 6 [PH] (ref 5–8)
PHOSPHATE SERPL-MCNC: 3.9 MG/DL (ref 2.5–4.5)
PLATELET # BLD AUTO: 306 10*3/MM3 (ref 140–450)
PMV BLD AUTO: 9.6 FL (ref 6–12)
PO2 BLDV: 147 MM HG (ref 27–53)
POTASSIUM BLDA-SCNC: 5.6 MMOL/L (ref 3.5–4.9)
POTASSIUM SERPL-SCNC: 4.8 MMOL/L (ref 3.5–5.2)
PROPOXYPH UR QL: NEGATIVE
PROT SERPL-MCNC: 6.8 G/DL (ref 6–8.5)
PROT UR QL STRIP: ABNORMAL
RBC # BLD AUTO: 4.21 10*6/MM3 (ref 3.77–5.28)
RBC # UR STRIP: ABNORMAL /HPF
REF LAB TEST METHOD: ABNORMAL
SARS-COV-2 RNA PNL SPEC NAA+PROBE: NOT DETECTED
SODIUM BLD-SCNC: 133 MMOL/L (ref 138–146)
SODIUM SERPL-SCNC: 135 MMOL/L (ref 136–145)
SP GR UR STRIP: 1.02 (ref 1–1.03)
SQUAMOUS #/AREA URNS HPF: ABNORMAL /HPF
TOTAL RATE: 0 BREATHS/MINUTE
TRICYCLICS UR QL SCN: NEGATIVE
TROPONIN T SERPL-MCNC: <0.01 NG/ML (ref 0–0.03)
UROBILINOGEN UR QL STRIP: ABNORMAL
WBC # UR STRIP: ABNORMAL /HPF
WBC NRBC COR # BLD: 4.64 10*3/MM3 (ref 3.4–10.8)

## 2022-05-29 PROCEDURE — 87636 SARSCOV2 & INF A&B AMP PRB: CPT | Performed by: EMERGENCY MEDICINE

## 2022-05-29 PROCEDURE — 81001 URINALYSIS AUTO W/SCOPE: CPT | Performed by: EMERGENCY MEDICINE

## 2022-05-29 PROCEDURE — 84100 ASSAY OF PHOSPHORUS: CPT | Performed by: EMERGENCY MEDICINE

## 2022-05-29 PROCEDURE — 82077 ASSAY SPEC XCP UR&BREATH IA: CPT | Performed by: EMERGENCY MEDICINE

## 2022-05-29 PROCEDURE — 82805 BLOOD GASES W/O2 SATURATION: CPT

## 2022-05-29 PROCEDURE — 82962 GLUCOSE BLOOD TEST: CPT

## 2022-05-29 PROCEDURE — 74176 CT ABD & PELVIS W/O CONTRAST: CPT

## 2022-05-29 PROCEDURE — 85025 COMPLETE CBC W/AUTO DIFF WBC: CPT | Performed by: EMERGENCY MEDICINE

## 2022-05-29 PROCEDURE — 82010 KETONE BODYS QUAN: CPT | Performed by: EMERGENCY MEDICINE

## 2022-05-29 PROCEDURE — 80306 DRUG TEST PRSMV INSTRMNT: CPT | Performed by: EMERGENCY MEDICINE

## 2022-05-29 PROCEDURE — 83735 ASSAY OF MAGNESIUM: CPT | Performed by: EMERGENCY MEDICINE

## 2022-05-29 PROCEDURE — 84484 ASSAY OF TROPONIN QUANT: CPT | Performed by: EMERGENCY MEDICINE

## 2022-05-29 PROCEDURE — 99284 EMERGENCY DEPT VISIT MOD MDM: CPT

## 2022-05-29 PROCEDURE — 96375 TX/PRO/DX INJ NEW DRUG ADDON: CPT

## 2022-05-29 PROCEDURE — 80047 BASIC METABLC PNL IONIZED CA: CPT

## 2022-05-29 PROCEDURE — 36415 COLL VENOUS BLD VENIPUNCTURE: CPT

## 2022-05-29 PROCEDURE — 93005 ELECTROCARDIOGRAM TRACING: CPT | Performed by: EMERGENCY MEDICINE

## 2022-05-29 PROCEDURE — 96374 THER/PROPH/DIAG INJ IV PUSH: CPT

## 2022-05-29 PROCEDURE — 25010000002 ONDANSETRON PER 1 MG: Performed by: EMERGENCY MEDICINE

## 2022-05-29 PROCEDURE — 25010000002 MORPHINE PER 10 MG: Performed by: EMERGENCY MEDICINE

## 2022-05-29 PROCEDURE — 85014 HEMATOCRIT: CPT

## 2022-05-29 PROCEDURE — 63710000001 INSULIN REGULAR HUMAN PER 5 UNITS: Performed by: EMERGENCY MEDICINE

## 2022-05-29 PROCEDURE — 83690 ASSAY OF LIPASE: CPT | Performed by: EMERGENCY MEDICINE

## 2022-05-29 PROCEDURE — 80053 COMPREHEN METABOLIC PANEL: CPT | Performed by: EMERGENCY MEDICINE

## 2022-05-29 RX ORDER — IBUPROFEN 600 MG/1
600 TABLET ORAL 3 TIMES DAILY
Qty: 15 TABLET | Refills: 0 | Status: ON HOLD | OUTPATIENT
Start: 2022-05-29 | End: 2022-06-02 | Stop reason: SDUPTHER

## 2022-05-29 RX ORDER — ONDANSETRON 2 MG/ML
4 INJECTION INTRAMUSCULAR; INTRAVENOUS ONCE
Status: COMPLETED | OUTPATIENT
Start: 2022-05-29 | End: 2022-05-29

## 2022-05-29 RX ORDER — SODIUM CHLORIDE 0.9 % (FLUSH) 0.9 %
10 SYRINGE (ML) INJECTION AS NEEDED
Status: DISCONTINUED | OUTPATIENT
Start: 2022-05-29 | End: 2022-05-30 | Stop reason: HOSPADM

## 2022-05-29 RX ORDER — FAMOTIDINE 10 MG/ML
20 INJECTION, SOLUTION INTRAVENOUS ONCE
Status: COMPLETED | OUTPATIENT
Start: 2022-05-29 | End: 2022-05-29

## 2022-05-29 RX ORDER — DICYCLOMINE HCL 20 MG
20 TABLET ORAL EVERY 6 HOURS PRN
Qty: 20 TABLET | Refills: 0 | Status: SHIPPED | OUTPATIENT
Start: 2022-05-29

## 2022-05-29 RX ORDER — PANTOPRAZOLE SODIUM 40 MG/1
40 TABLET, DELAYED RELEASE ORAL 2 TIMES DAILY
Qty: 60 TABLET | Refills: 0 | Status: ON HOLD | OUTPATIENT
Start: 2022-05-29 | End: 2022-06-02 | Stop reason: SDUPTHER

## 2022-05-29 RX ORDER — MORPHINE SULFATE 4 MG/ML
4 INJECTION, SOLUTION INTRAMUSCULAR; INTRAVENOUS ONCE
Status: COMPLETED | OUTPATIENT
Start: 2022-05-29 | End: 2022-05-29

## 2022-05-29 RX ADMIN — ONDANSETRON 4 MG: 2 INJECTION INTRAMUSCULAR; INTRAVENOUS at 18:22

## 2022-05-29 RX ADMIN — SODIUM CHLORIDE, POTASSIUM CHLORIDE, SODIUM LACTATE AND CALCIUM CHLORIDE 1000 ML: 600; 310; 30; 20 INJECTION, SOLUTION INTRAVENOUS at 18:28

## 2022-05-29 RX ADMIN — FAMOTIDINE 20 MG: 10 INJECTION INTRAVENOUS at 18:23

## 2022-05-29 RX ADMIN — INSULIN HUMAN 7 UNITS: 100 INJECTION, SOLUTION PARENTERAL at 19:33

## 2022-05-29 RX ADMIN — MORPHINE SULFATE 4 MG: 4 INJECTION, SOLUTION INTRAMUSCULAR; INTRAVENOUS at 18:25

## 2022-05-31 ENCOUNTER — APPOINTMENT (OUTPATIENT)
Dept: CT IMAGING | Facility: HOSPITAL | Age: 52
End: 2022-05-31

## 2022-05-31 ENCOUNTER — APPOINTMENT (OUTPATIENT)
Dept: GENERAL RADIOLOGY | Facility: HOSPITAL | Age: 52
End: 2022-05-31

## 2022-05-31 ENCOUNTER — HOSPITAL ENCOUNTER (OUTPATIENT)
Facility: HOSPITAL | Age: 52
Setting detail: OBSERVATION
Discharge: HOME | End: 2022-06-02
Attending: EMERGENCY MEDICINE | Admitting: INTERNAL MEDICINE

## 2022-05-31 DIAGNOSIS — R11.2 NAUSEA AND VOMITING, UNSPECIFIED VOMITING TYPE: ICD-10-CM

## 2022-05-31 DIAGNOSIS — N12 PYELONEPHRITIS: ICD-10-CM

## 2022-05-31 DIAGNOSIS — R10.9 ACUTE ABDOMINAL PAIN: Primary | ICD-10-CM

## 2022-05-31 DIAGNOSIS — Z78.9 FAILURE OF OUTPATIENT TREATMENT: ICD-10-CM

## 2022-05-31 PROBLEM — Z79.4 TYPE 2 DIABETES MELLITUS WITH HYPERGLYCEMIA, WITH LONG-TERM CURRENT USE OF INSULIN (HCC): Status: ACTIVE | Noted: 2022-05-31

## 2022-05-31 PROBLEM — K31.84 GASTROPARESIS: Status: ACTIVE | Noted: 2022-05-31

## 2022-05-31 PROBLEM — E11.65 TYPE 2 DIABETES MELLITUS WITH HYPERGLYCEMIA, WITH LONG-TERM CURRENT USE OF INSULIN: Status: ACTIVE | Noted: 2022-05-31

## 2022-05-31 LAB
ALBUMIN SERPL-MCNC: 4.1 G/DL (ref 3.5–5.2)
ALBUMIN/GLOB SERPL: 1.6 G/DL
ALP SERPL-CCNC: 120 U/L (ref 39–117)
ALT SERPL W P-5'-P-CCNC: 69 U/L (ref 1–33)
ANION GAP SERPL CALCULATED.3IONS-SCNC: 15 MMOL/L (ref 5–15)
AST SERPL-CCNC: 65 U/L (ref 1–32)
B-OH-BUTYR SERPL-SCNC: 0.1 MMOL/L (ref 0.02–0.27)
BACTERIA UR QL AUTO: ABNORMAL /HPF
BASOPHILS # BLD AUTO: 0.02 10*3/MM3 (ref 0–0.2)
BASOPHILS NFR BLD AUTO: 0.4 % (ref 0–1.5)
BILIRUB SERPL-MCNC: 0.4 MG/DL (ref 0–1.2)
BILIRUB UR QL STRIP: NEGATIVE
BILIRUB UR QL STRIP: NEGATIVE
BUN SERPL-MCNC: 7 MG/DL (ref 6–20)
BUN/CREAT SERPL: 8.3 (ref 7–25)
CALCIUM SPEC-SCNC: 10.1 MG/DL (ref 8.6–10.5)
CHLORIDE SERPL-SCNC: 103 MMOL/L (ref 98–107)
CLARITY UR: CLEAR
CLARITY UR: CLEAR
CO2 SERPL-SCNC: 22 MMOL/L (ref 22–29)
COLOR UR: YELLOW
COLOR UR: YELLOW
CREAT SERPL-MCNC: 0.84 MG/DL (ref 0.57–1)
D-LACTATE SERPL-SCNC: 2.9 MMOL/L (ref 0.5–2)
D-LACTATE SERPL-SCNC: 3.2 MMOL/L (ref 0.5–2)
D-LACTATE SERPL-SCNC: 3.6 MMOL/L (ref 0.5–2)
D-LACTATE SERPL-SCNC: 3.7 MMOL/L (ref 0.5–2)
DEPRECATED RDW RBC AUTO: 49.1 FL (ref 37–54)
EGFRCR SERPLBLD CKD-EPI 2021: 84.3 ML/MIN/1.73
EOSINOPHIL # BLD AUTO: 0.04 10*3/MM3 (ref 0–0.4)
EOSINOPHIL NFR BLD AUTO: 0.7 % (ref 0.3–6.2)
ERYTHROCYTE [DISTWIDTH] IN BLOOD BY AUTOMATED COUNT: 14.1 % (ref 12.3–15.4)
GLOBULIN UR ELPH-MCNC: 2.6 GM/DL
GLUCOSE BLDC GLUCOMTR-MCNC: 272 MG/DL (ref 70–130)
GLUCOSE SERPL-MCNC: 198 MG/DL (ref 65–99)
GLUCOSE UR STRIP-MCNC: ABNORMAL MG/DL
GLUCOSE UR STRIP-MCNC: ABNORMAL MG/DL
HCT VFR BLD AUTO: 38.5 % (ref 34–46.6)
HGB BLD-MCNC: 13.2 G/DL (ref 12–15.9)
HGB UR QL STRIP.AUTO: NEGATIVE
HGB UR QL STRIP.AUTO: NEGATIVE
HOLD SPECIMEN: NORMAL
HYALINE CASTS UR QL AUTO: ABNORMAL /LPF
IMM GRANULOCYTES # BLD AUTO: 0.01 10*3/MM3 (ref 0–0.05)
IMM GRANULOCYTES NFR BLD AUTO: 0.2 % (ref 0–0.5)
KETONES UR QL STRIP: ABNORMAL
KETONES UR QL STRIP: NEGATIVE
LEUKOCYTE ESTERASE UR QL STRIP.AUTO: ABNORMAL
LEUKOCYTE ESTERASE UR QL STRIP.AUTO: NEGATIVE
LIPASE SERPL-CCNC: 26 U/L (ref 13–60)
LYMPHOCYTES # BLD AUTO: 2.89 10*3/MM3 (ref 0.7–3.1)
LYMPHOCYTES NFR BLD AUTO: 53.4 % (ref 19.6–45.3)
MCH RBC QN AUTO: 32.4 PG (ref 26.6–33)
MCHC RBC AUTO-ENTMCNC: 34.3 G/DL (ref 31.5–35.7)
MCV RBC AUTO: 94.4 FL (ref 79–97)
MONOCYTES # BLD AUTO: 0.49 10*3/MM3 (ref 0.1–0.9)
MONOCYTES NFR BLD AUTO: 9.1 % (ref 5–12)
NEUTROPHILS NFR BLD AUTO: 1.96 10*3/MM3 (ref 1.7–7)
NEUTROPHILS NFR BLD AUTO: 36.2 % (ref 42.7–76)
NITRITE UR QL STRIP: NEGATIVE
NITRITE UR QL STRIP: NEGATIVE
NRBC BLD AUTO-RTO: 0 /100 WBC (ref 0–0.2)
PH UR STRIP.AUTO: 5.5 [PH] (ref 5–8)
PH UR STRIP.AUTO: 6.5 [PH] (ref 5–8)
PLATELET # BLD AUTO: 274 10*3/MM3 (ref 140–450)
PMV BLD AUTO: 9.9 FL (ref 6–12)
POTASSIUM SERPL-SCNC: 3.9 MMOL/L (ref 3.5–5.2)
PROCALCITONIN SERPL-MCNC: 0.09 NG/ML (ref 0–0.25)
PROT SERPL-MCNC: 6.7 G/DL (ref 6–8.5)
PROT UR QL STRIP: ABNORMAL
PROT UR QL STRIP: ABNORMAL
QT INTERVAL: 378 MS
QTC INTERVAL: 504 MS
RBC # BLD AUTO: 4.08 10*6/MM3 (ref 3.77–5.28)
RBC # UR STRIP: ABNORMAL /HPF
REF LAB TEST METHOD: ABNORMAL
SODIUM SERPL-SCNC: 140 MMOL/L (ref 136–145)
SP GR UR STRIP: 1.06 (ref 1–1.03)
SP GR UR STRIP: 1.06 (ref 1–1.03)
SQUAMOUS #/AREA URNS HPF: ABNORMAL /HPF
TROPONIN T SERPL-MCNC: <0.01 NG/ML (ref 0–0.03)
UROBILINOGEN UR QL STRIP: ABNORMAL
UROBILINOGEN UR QL STRIP: ABNORMAL
WBC # UR STRIP: ABNORMAL /HPF
WBC NRBC COR # BLD: 5.41 10*3/MM3 (ref 3.4–10.8)
WHOLE BLOOD HOLD COAG: NORMAL
WHOLE BLOOD HOLD SPECIMEN: NORMAL

## 2022-05-31 PROCEDURE — 96375 TX/PRO/DX INJ NEW DRUG ADDON: CPT

## 2022-05-31 PROCEDURE — 82962 GLUCOSE BLOOD TEST: CPT

## 2022-05-31 PROCEDURE — 96361 HYDRATE IV INFUSION ADD-ON: CPT

## 2022-05-31 PROCEDURE — 36415 COLL VENOUS BLD VENIPUNCTURE: CPT

## 2022-05-31 PROCEDURE — 74177 CT ABD & PELVIS W/CONTRAST: CPT

## 2022-05-31 PROCEDURE — 25010000002 IOPAMIDOL 61 % SOLUTION: Performed by: EMERGENCY MEDICINE

## 2022-05-31 PROCEDURE — 25010000002 ONDANSETRON PER 1 MG: Performed by: EMERGENCY MEDICINE

## 2022-05-31 PROCEDURE — G0378 HOSPITAL OBSERVATION PER HR: HCPCS

## 2022-05-31 PROCEDURE — 96366 THER/PROPH/DIAG IV INF ADDON: CPT

## 2022-05-31 PROCEDURE — 96365 THER/PROPH/DIAG IV INF INIT: CPT

## 2022-05-31 PROCEDURE — 71045 X-RAY EXAM CHEST 1 VIEW: CPT

## 2022-05-31 PROCEDURE — 93005 ELECTROCARDIOGRAM TRACING: CPT | Performed by: NURSE PRACTITIONER

## 2022-05-31 PROCEDURE — 25010000002 HYDROMORPHONE PER 4 MG: Performed by: EMERGENCY MEDICINE

## 2022-05-31 PROCEDURE — 83690 ASSAY OF LIPASE: CPT | Performed by: EMERGENCY MEDICINE

## 2022-05-31 PROCEDURE — 99285 EMERGENCY DEPT VISIT HI MDM: CPT

## 2022-05-31 PROCEDURE — 87086 URINE CULTURE/COLONY COUNT: CPT | Performed by: NURSE PRACTITIONER

## 2022-05-31 PROCEDURE — 84484 ASSAY OF TROPONIN QUANT: CPT | Performed by: EMERGENCY MEDICINE

## 2022-05-31 PROCEDURE — 82010 KETONE BODYS QUAN: CPT | Performed by: NURSE PRACTITIONER

## 2022-05-31 PROCEDURE — 87340 HEPATITIS B SURFACE AG IA: CPT | Performed by: PHYSICIAN ASSISTANT

## 2022-05-31 PROCEDURE — 86706 HEP B SURFACE ANTIBODY: CPT | Performed by: PHYSICIAN ASSISTANT

## 2022-05-31 PROCEDURE — 84145 PROCALCITONIN (PCT): CPT | Performed by: EMERGENCY MEDICINE

## 2022-05-31 PROCEDURE — 96367 TX/PROPH/DG ADDL SEQ IV INF: CPT

## 2022-05-31 PROCEDURE — 63710000001 INSULIN LISPRO (HUMAN) PER 5 UNITS: Performed by: INTERNAL MEDICINE

## 2022-05-31 PROCEDURE — 25010000002 MAGNESIUM SULFATE 2 GM/50ML SOLUTION: Performed by: INTERNAL MEDICINE

## 2022-05-31 PROCEDURE — 80053 COMPREHEN METABOLIC PANEL: CPT | Performed by: EMERGENCY MEDICINE

## 2022-05-31 PROCEDURE — 63710000001 INSULIN DETEMIR PER 5 UNITS: Performed by: INTERNAL MEDICINE

## 2022-05-31 PROCEDURE — 99218 PR INITIAL OBSERVATION CARE/DAY 30 MINUTES: CPT | Performed by: INTERNAL MEDICINE

## 2022-05-31 PROCEDURE — 85025 COMPLETE CBC W/AUTO DIFF WBC: CPT | Performed by: EMERGENCY MEDICINE

## 2022-05-31 PROCEDURE — 25010000002 CEFTRIAXONE PER 250 MG: Performed by: NURSE PRACTITIONER

## 2022-05-31 PROCEDURE — 83605 ASSAY OF LACTIC ACID: CPT | Performed by: NURSE PRACTITIONER

## 2022-05-31 PROCEDURE — 87040 BLOOD CULTURE FOR BACTERIA: CPT | Performed by: NURSE PRACTITIONER

## 2022-05-31 PROCEDURE — 81003 URINALYSIS AUTO W/O SCOPE: CPT | Performed by: INTERNAL MEDICINE

## 2022-05-31 PROCEDURE — 81001 URINALYSIS AUTO W/SCOPE: CPT | Performed by: EMERGENCY MEDICINE

## 2022-05-31 PROCEDURE — 93005 ELECTROCARDIOGRAM TRACING: CPT | Performed by: EMERGENCY MEDICINE

## 2022-05-31 RX ORDER — SODIUM CHLORIDE 9 MG/ML
125 INJECTION, SOLUTION INTRAVENOUS CONTINUOUS
Status: DISCONTINUED | OUTPATIENT
Start: 2022-05-31 | End: 2022-06-02

## 2022-05-31 RX ORDER — ONDANSETRON 4 MG/1
4 TABLET, FILM COATED ORAL EVERY 6 HOURS PRN
Status: DISCONTINUED | OUTPATIENT
Start: 2022-05-31 | End: 2022-06-02 | Stop reason: HOSPADM

## 2022-05-31 RX ORDER — BUSPIRONE HYDROCHLORIDE 10 MG/1
10 TABLET ORAL 2 TIMES DAILY
Status: DISCONTINUED | OUTPATIENT
Start: 2022-05-31 | End: 2022-06-02 | Stop reason: HOSPADM

## 2022-05-31 RX ORDER — SERTRALINE HYDROCHLORIDE 100 MG/1
100 TABLET, FILM COATED ORAL DAILY
Status: DISCONTINUED | OUTPATIENT
Start: 2022-06-01 | End: 2022-06-02 | Stop reason: HOSPADM

## 2022-05-31 RX ORDER — ACETAMINOPHEN 325 MG/1
325 TABLET ORAL EVERY 6 HOURS PRN
Status: DISCONTINUED | OUTPATIENT
Start: 2022-05-31 | End: 2022-06-02 | Stop reason: HOSPADM

## 2022-05-31 RX ORDER — AMLODIPINE BESYLATE 5 MG/1
5 TABLET ORAL
Status: DISCONTINUED | OUTPATIENT
Start: 2022-06-01 | End: 2022-06-02 | Stop reason: HOSPADM

## 2022-05-31 RX ORDER — ONDANSETRON 2 MG/ML
4 INJECTION INTRAMUSCULAR; INTRAVENOUS ONCE
Status: COMPLETED | OUTPATIENT
Start: 2022-05-31 | End: 2022-05-31

## 2022-05-31 RX ORDER — BISACODYL 5 MG/1
5 TABLET, DELAYED RELEASE ORAL DAILY PRN
Status: DISCONTINUED | OUTPATIENT
Start: 2022-05-31 | End: 2022-06-02 | Stop reason: HOSPADM

## 2022-05-31 RX ORDER — AMOXICILLIN 250 MG
2 CAPSULE ORAL 2 TIMES DAILY
Status: DISCONTINUED | OUTPATIENT
Start: 2022-05-31 | End: 2022-06-02 | Stop reason: HOSPADM

## 2022-05-31 RX ORDER — MAGNESIUM SULFATE HEPTAHYDRATE 40 MG/ML
2 INJECTION, SOLUTION INTRAVENOUS ONCE
Status: COMPLETED | OUTPATIENT
Start: 2022-05-31 | End: 2022-05-31

## 2022-05-31 RX ORDER — TRAZODONE HYDROCHLORIDE 50 MG/1
50 TABLET ORAL NIGHTLY
Status: DISCONTINUED | OUTPATIENT
Start: 2022-05-31 | End: 2022-06-02 | Stop reason: HOSPADM

## 2022-05-31 RX ORDER — PANTOPRAZOLE SODIUM 40 MG/10ML
40 INJECTION, POWDER, LYOPHILIZED, FOR SOLUTION INTRAVENOUS
Status: DISCONTINUED | OUTPATIENT
Start: 2022-05-31 | End: 2022-06-01

## 2022-05-31 RX ORDER — SODIUM CHLORIDE 0.9 % (FLUSH) 0.9 %
10 SYRINGE (ML) INJECTION AS NEEDED
Status: DISCONTINUED | OUTPATIENT
Start: 2022-05-31 | End: 2022-06-02 | Stop reason: HOSPADM

## 2022-05-31 RX ORDER — POLYETHYLENE GLYCOL 3350 17 G/17G
17 POWDER, FOR SOLUTION ORAL DAILY PRN
Status: DISCONTINUED | OUTPATIENT
Start: 2022-05-31 | End: 2022-06-02 | Stop reason: HOSPADM

## 2022-05-31 RX ORDER — INSULIN LISPRO 100 [IU]/ML
0-9 INJECTION, SOLUTION INTRAVENOUS; SUBCUTANEOUS
Status: DISCONTINUED | OUTPATIENT
Start: 2022-05-31 | End: 2022-06-02 | Stop reason: HOSPADM

## 2022-05-31 RX ORDER — SODIUM CHLORIDE 0.9 % (FLUSH) 0.9 %
10 SYRINGE (ML) INJECTION EVERY 12 HOURS SCHEDULED
Status: DISCONTINUED | OUTPATIENT
Start: 2022-05-31 | End: 2022-06-02 | Stop reason: HOSPADM

## 2022-05-31 RX ORDER — BISACODYL 10 MG
10 SUPPOSITORY, RECTAL RECTAL DAILY PRN
Status: DISCONTINUED | OUTPATIENT
Start: 2022-05-31 | End: 2022-06-02 | Stop reason: HOSPADM

## 2022-05-31 RX ORDER — NICOTINE POLACRILEX 4 MG
15 LOZENGE BUCCAL
Status: DISCONTINUED | OUTPATIENT
Start: 2022-05-31 | End: 2022-06-02 | Stop reason: HOSPADM

## 2022-05-31 RX ORDER — DEXTROSE MONOHYDRATE 25 G/50ML
25 INJECTION, SOLUTION INTRAVENOUS
Status: DISCONTINUED | OUTPATIENT
Start: 2022-05-31 | End: 2022-06-02 | Stop reason: HOSPADM

## 2022-05-31 RX ORDER — ONDANSETRON 2 MG/ML
4 INJECTION INTRAMUSCULAR; INTRAVENOUS EVERY 6 HOURS PRN
Status: DISCONTINUED | OUTPATIENT
Start: 2022-05-31 | End: 2022-06-02 | Stop reason: HOSPADM

## 2022-05-31 RX ORDER — QUETIAPINE FUMARATE 100 MG/1
100 TABLET, FILM COATED ORAL 2 TIMES DAILY
Status: DISCONTINUED | OUTPATIENT
Start: 2022-05-31 | End: 2022-06-02 | Stop reason: HOSPADM

## 2022-05-31 RX ORDER — HYDROXYZINE HYDROCHLORIDE 25 MG/1
25 TABLET, FILM COATED ORAL EVERY 8 HOURS PRN
Status: DISCONTINUED | OUTPATIENT
Start: 2022-05-31 | End: 2022-06-02 | Stop reason: HOSPADM

## 2022-05-31 RX ORDER — DICYCLOMINE HCL 20 MG
20 TABLET ORAL EVERY 6 HOURS PRN
Status: DISCONTINUED | OUTPATIENT
Start: 2022-05-31 | End: 2022-06-02 | Stop reason: HOSPADM

## 2022-05-31 RX ORDER — HYDROMORPHONE HYDROCHLORIDE 1 MG/ML
0.5 INJECTION, SOLUTION INTRAMUSCULAR; INTRAVENOUS; SUBCUTANEOUS ONCE
Status: COMPLETED | OUTPATIENT
Start: 2022-05-31 | End: 2022-05-31

## 2022-05-31 RX ADMIN — SODIUM CHLORIDE 1 G: 900 INJECTION INTRAVENOUS at 16:04

## 2022-05-31 RX ADMIN — IOPAMIDOL 98 ML: 612 INJECTION, SOLUTION INTRAVENOUS at 11:10

## 2022-05-31 RX ADMIN — BUSPIRONE HYDROCHLORIDE 10 MG: 10 TABLET ORAL at 20:33

## 2022-05-31 RX ADMIN — SODIUM CHLORIDE 125 ML/HR: 9 INJECTION, SOLUTION INTRAVENOUS at 18:28

## 2022-05-31 RX ADMIN — SODIUM CHLORIDE 1000 ML: 9 INJECTION, SOLUTION INTRAVENOUS at 09:41

## 2022-05-31 RX ADMIN — ONDANSETRON 4 MG: 2 INJECTION INTRAMUSCULAR; INTRAVENOUS at 09:42

## 2022-05-31 RX ADMIN — MAGNESIUM SULFATE HEPTAHYDRATE 2 G: 2 INJECTION, SOLUTION INTRAVENOUS at 18:27

## 2022-05-31 RX ADMIN — METOPROLOL TARTRATE 25 MG: 25 TABLET, FILM COATED ORAL at 20:32

## 2022-05-31 RX ADMIN — DICYCLOMINE HYDROCHLORIDE 20 MG: 20 TABLET ORAL at 21:37

## 2022-05-31 RX ADMIN — INSULIN LISPRO 6 UNITS: 100 INJECTION, SOLUTION INTRAVENOUS; SUBCUTANEOUS at 18:28

## 2022-05-31 RX ADMIN — TRAZODONE HYDROCHLORIDE 50 MG: 50 TABLET ORAL at 23:57

## 2022-05-31 RX ADMIN — PANTOPRAZOLE SODIUM 40 MG: 40 INJECTION, POWDER, FOR SOLUTION INTRAVENOUS at 18:28

## 2022-05-31 RX ADMIN — SENNOSIDES AND DOCUSATE SODIUM 2 TABLET: 50; 8.6 TABLET ORAL at 20:33

## 2022-05-31 RX ADMIN — Medication 10 ML: at 20:33

## 2022-05-31 RX ADMIN — INSULIN DETEMIR 25 UNITS: 100 INJECTION, SOLUTION SUBCUTANEOUS at 20:36

## 2022-05-31 RX ADMIN — HYDROMORPHONE HYDROCHLORIDE 0.5 MG: 1 INJECTION, SOLUTION INTRAMUSCULAR; INTRAVENOUS; SUBCUTANEOUS at 09:44

## 2022-05-31 RX ADMIN — QUETIAPINE FUMARATE 100 MG: 100 TABLET ORAL at 20:33

## 2022-06-01 LAB
ALBUMIN SERPL-MCNC: 3.5 G/DL (ref 3.5–5.2)
ALBUMIN/GLOB SERPL: 1.4 G/DL
ALP SERPL-CCNC: 93 U/L (ref 39–117)
ALT SERPL W P-5'-P-CCNC: 55 U/L (ref 1–33)
ANION GAP SERPL CALCULATED.3IONS-SCNC: 11 MMOL/L (ref 5–15)
AST SERPL-CCNC: 77 U/L (ref 1–32)
BACTERIA SPEC AEROBE CULT: NORMAL
BASOPHILS # BLD AUTO: 0.03 10*3/MM3 (ref 0–0.2)
BASOPHILS NFR BLD AUTO: 0.7 % (ref 0–1.5)
BILIRUB SERPL-MCNC: 0.4 MG/DL (ref 0–1.2)
BUN SERPL-MCNC: 5 MG/DL (ref 6–20)
BUN/CREAT SERPL: 8.8 (ref 7–25)
CALCIUM SPEC-SCNC: 8.4 MG/DL (ref 8.6–10.5)
CHLORIDE SERPL-SCNC: 109 MMOL/L (ref 98–107)
CO2 SERPL-SCNC: 21 MMOL/L (ref 22–29)
CREAT SERPL-MCNC: 0.57 MG/DL (ref 0.57–1)
D-LACTATE SERPL-SCNC: 1.9 MMOL/L (ref 0.5–2)
DEPRECATED RDW RBC AUTO: 46.6 FL (ref 37–54)
EGFRCR SERPLBLD CKD-EPI 2021: 110.2 ML/MIN/1.73
EOSINOPHIL # BLD AUTO: 0.08 10*3/MM3 (ref 0–0.4)
EOSINOPHIL NFR BLD AUTO: 1.8 % (ref 0.3–6.2)
ERYTHROCYTE [DISTWIDTH] IN BLOOD BY AUTOMATED COUNT: 13.7 % (ref 12.3–15.4)
FLUAV RNA RESP QL NAA+PROBE: NOT DETECTED
FLUBV RNA RESP QL NAA+PROBE: NOT DETECTED
GLOBULIN UR ELPH-MCNC: 2.5 GM/DL
GLUCOSE BLDC GLUCOMTR-MCNC: 129 MG/DL (ref 70–130)
GLUCOSE BLDC GLUCOMTR-MCNC: 222 MG/DL (ref 70–130)
GLUCOSE BLDC GLUCOMTR-MCNC: 353 MG/DL (ref 70–130)
GLUCOSE BLDC GLUCOMTR-MCNC: 82 MG/DL (ref 70–130)
GLUCOSE SERPL-MCNC: 61 MG/DL (ref 65–99)
HBV SURFACE AB SER RIA-ACNC: REACTIVE
HBV SURFACE AG SERPL QL IA: NORMAL
HCT VFR BLD AUTO: 34 % (ref 34–46.6)
HGB BLD-MCNC: 11.7 G/DL (ref 12–15.9)
IMM GRANULOCYTES # BLD AUTO: 0.01 10*3/MM3 (ref 0–0.05)
IMM GRANULOCYTES NFR BLD AUTO: 0.2 % (ref 0–0.5)
LYMPHOCYTES # BLD AUTO: 3 10*3/MM3 (ref 0.7–3.1)
LYMPHOCYTES NFR BLD AUTO: 67.3 % (ref 19.6–45.3)
MCH RBC QN AUTO: 32.3 PG (ref 26.6–33)
MCHC RBC AUTO-ENTMCNC: 34.4 G/DL (ref 31.5–35.7)
MCV RBC AUTO: 93.9 FL (ref 79–97)
MONOCYTES # BLD AUTO: 0.43 10*3/MM3 (ref 0.1–0.9)
MONOCYTES NFR BLD AUTO: 9.6 % (ref 5–12)
NEUTROPHILS NFR BLD AUTO: 0.91 10*3/MM3 (ref 1.7–7)
NEUTROPHILS NFR BLD AUTO: 20.4 % (ref 42.7–76)
NRBC BLD AUTO-RTO: 0 /100 WBC (ref 0–0.2)
PLAT MORPH BLD: NORMAL
PLATELET # BLD AUTO: 230 10*3/MM3 (ref 140–450)
PMV BLD AUTO: 10 FL (ref 6–12)
POTASSIUM SERPL-SCNC: 3.8 MMOL/L (ref 3.5–5.2)
PROT SERPL-MCNC: 6 G/DL (ref 6–8.5)
QT INTERVAL: 426 MS
QTC INTERVAL: 479 MS
RBC # BLD AUTO: 3.62 10*6/MM3 (ref 3.77–5.28)
RBC MORPH BLD: NORMAL
RSV RNA NPH QL NAA+NON-PROBE: NOT DETECTED
SARS-COV-2 RNA RESP QL NAA+PROBE: NOT DETECTED
SODIUM SERPL-SCNC: 141 MMOL/L (ref 136–145)
WBC MORPH BLD: NORMAL
WBC NRBC COR # BLD: 4.46 10*3/MM3 (ref 3.4–10.8)

## 2022-06-01 PROCEDURE — 82962 GLUCOSE BLOOD TEST: CPT

## 2022-06-01 PROCEDURE — 87637 SARSCOV2&INF A&B&RSV AMP PRB: CPT | Performed by: NURSE PRACTITIONER

## 2022-06-01 PROCEDURE — 85025 COMPLETE CBC W/AUTO DIFF WBC: CPT | Performed by: INTERNAL MEDICINE

## 2022-06-01 PROCEDURE — 93005 ELECTROCARDIOGRAM TRACING: CPT | Performed by: INTERNAL MEDICINE

## 2022-06-01 PROCEDURE — 96361 HYDRATE IV INFUSION ADD-ON: CPT

## 2022-06-01 PROCEDURE — 96376 TX/PRO/DX INJ SAME DRUG ADON: CPT

## 2022-06-01 PROCEDURE — 99226 PR SBSQ OBSERVATION CARE/DAY 35 MINUTES: CPT | Performed by: INTERNAL MEDICINE

## 2022-06-01 PROCEDURE — 80053 COMPREHEN METABOLIC PANEL: CPT | Performed by: INTERNAL MEDICINE

## 2022-06-01 PROCEDURE — 93010 ELECTROCARDIOGRAM REPORT: CPT | Performed by: INTERNAL MEDICINE

## 2022-06-01 PROCEDURE — 63710000001 INSULIN LISPRO (HUMAN) PER 5 UNITS: Performed by: INTERNAL MEDICINE

## 2022-06-01 PROCEDURE — 83605 ASSAY OF LACTIC ACID: CPT | Performed by: NURSE PRACTITIONER

## 2022-06-01 PROCEDURE — G0108 DIAB MANAGE TRN  PER INDIV: HCPCS | Performed by: REGISTERED NURSE

## 2022-06-01 PROCEDURE — 85007 BL SMEAR W/DIFF WBC COUNT: CPT | Performed by: INTERNAL MEDICINE

## 2022-06-01 PROCEDURE — G0378 HOSPITAL OBSERVATION PER HR: HCPCS

## 2022-06-01 PROCEDURE — 63710000001 INSULIN DETEMIR PER 5 UNITS: Performed by: INTERNAL MEDICINE

## 2022-06-01 PROCEDURE — 99221 1ST HOSP IP/OBS SF/LOW 40: CPT | Performed by: PHYSICIAN ASSISTANT

## 2022-06-01 RX ORDER — PANTOPRAZOLE SODIUM 40 MG/1
40 TABLET, DELAYED RELEASE ORAL
Status: DISCONTINUED | OUTPATIENT
Start: 2022-06-02 | End: 2022-06-02 | Stop reason: HOSPADM

## 2022-06-01 RX ORDER — OXYCODONE HYDROCHLORIDE 5 MG/1
5 TABLET ORAL EVERY 6 HOURS PRN
Status: DISCONTINUED | OUTPATIENT
Start: 2022-06-01 | End: 2022-06-02

## 2022-06-01 RX ADMIN — TRAZODONE HYDROCHLORIDE 50 MG: 50 TABLET ORAL at 20:43

## 2022-06-01 RX ADMIN — HYDROXYZINE HYDROCHLORIDE 25 MG: 25 TABLET, FILM COATED ORAL at 08:40

## 2022-06-01 RX ADMIN — SODIUM CHLORIDE 125 ML/HR: 9 INJECTION, SOLUTION INTRAVENOUS at 02:58

## 2022-06-01 RX ADMIN — INSULIN DETEMIR 20 UNITS: 100 INJECTION, SOLUTION SUBCUTANEOUS at 20:42

## 2022-06-01 RX ADMIN — METOPROLOL TARTRATE 25 MG: 25 TABLET, FILM COATED ORAL at 20:43

## 2022-06-01 RX ADMIN — SERTRALINE HYDROCHLORIDE 100 MG: 100 TABLET ORAL at 08:40

## 2022-06-01 RX ADMIN — HYDROXYZINE HYDROCHLORIDE 25 MG: 25 TABLET, FILM COATED ORAL at 01:11

## 2022-06-01 RX ADMIN — Medication 10 ML: at 08:40

## 2022-06-01 RX ADMIN — INSULIN LISPRO 8 UNITS: 100 INJECTION, SOLUTION INTRAVENOUS; SUBCUTANEOUS at 17:00

## 2022-06-01 RX ADMIN — QUETIAPINE FUMARATE 100 MG: 100 TABLET ORAL at 08:40

## 2022-06-01 RX ADMIN — BUSPIRONE HYDROCHLORIDE 10 MG: 10 TABLET ORAL at 20:43

## 2022-06-01 RX ADMIN — PANTOPRAZOLE SODIUM 40 MG: 40 INJECTION, POWDER, FOR SOLUTION INTRAVENOUS at 08:40

## 2022-06-01 RX ADMIN — OXYCODONE 5 MG: 5 TABLET ORAL at 10:16

## 2022-06-01 RX ADMIN — METOPROLOL TARTRATE 25 MG: 25 TABLET, FILM COATED ORAL at 08:39

## 2022-06-01 RX ADMIN — SODIUM CHLORIDE 125 ML/HR: 9 INJECTION, SOLUTION INTRAVENOUS at 08:41

## 2022-06-01 RX ADMIN — DICYCLOMINE HYDROCHLORIDE 20 MG: 20 TABLET ORAL at 06:08

## 2022-06-01 RX ADMIN — OXYCODONE 5 MG: 5 TABLET ORAL at 17:00

## 2022-06-01 RX ADMIN — AMLODIPINE BESYLATE 5 MG: 5 TABLET ORAL at 08:40

## 2022-06-01 RX ADMIN — BUSPIRONE HYDROCHLORIDE 10 MG: 10 TABLET ORAL at 08:39

## 2022-06-01 RX ADMIN — QUETIAPINE FUMARATE 100 MG: 100 TABLET ORAL at 20:43

## 2022-06-01 RX ADMIN — Medication 10 ML: at 20:44

## 2022-06-01 NOTE — PROGRESS NOTES
Malnutrition Severity Assessment    Patient Name:  Bernadette Paris  YOB: 1970  MRN: [id number]  Admit Date:  5/31/2022    Patient meets criteria for : Severe Malnutrition (PO intakes <75% est. needs and loss 30% body weight past 10 months.)    Malnutrition Severity Assessment  Malnutrition Type: Chronic Disease - Related Malnutrition  Malnutrition Type (last 8 hours)     Malnutrition Severity Assessment     Row Name 06/01/22 1521       Malnutrition Severity Assessment    Malnutrition Type Chronic Disease - Related Malnutrition    Row Name 06/01/22 1521       Insufficient Energy Intake     Insufficient Energy Intake Findings Moderate    Insufficient Energy Intake  <75% of est. energy requirement for > or equal to 3 months  PO intakes <75% est. needs past 10 months.    Row Name 06/01/22 1521       Unintentional Weight Loss     Unintentional Weight Loss Findings Severe  loss 30% body weight past 10 months.    Unintentional Weight Loss  Weight loss greater than 20% in one year    Row Name 06/01/22 1521       Muscle Loss    Loss of Muscle Mass Findings --  RENEA at this time    Row Name 06/01/22 1521       Fat Loss    Subcutaneous Fat Loss Findings --  RENEA at this time    Row Name 06/01/22 1521       Criteria Met (Must meet criteria for severity in at least 2 of these categories: M Wasting, Fat Loss, Fluid, Secondary Signs, Wt. Status, Intake)    Patient meets criteria for  Severe Malnutrition  PO intakes <75% est. needs and loss 30% body weight past 10 months.                Electronically signed by:  Julisa Alvarado RD  06/01/22 15:24 EDT

## 2022-06-01 NOTE — CONSULTS
[facility]astroenterology Consult    Referring Provider: Светлана Navarro MD   PCP: Provider, No Known    Reason for Consultation: Recurrent abdominal pain     Chief complaint: Abdominal pain     History of present illness:    Bernadette Paris is a 52 y.o. female with history of illicit drug use who is admitted with recurrent abdominal pain. She describes diffuse cramping abdominal pain with nausea and vomiting.   She denies hematemesis nor melena.   She is known to our service for recent admission last month for similar presentation.   She had an EGD with Dr. Brunner on 4/9/22 that ahosed moderate diffuse gastritis and candida esophagitis.    Antrum biopsy was negative for H. Pylori.      Patient has history of chronic hepatitis C and is treatment naive.   A HCV RNA was ordered last admission but test was not performed due to insufficient specimen.       Her urine drug screen is positive again for Cocaine.      Allergies:  Tylenol [acetaminophen]    Scheduled Meds:  amLODIPine, 5 mg, Oral, Q24H  busPIRone, 10 mg, Oral, BID  insulin detemir, 20 Units, Subcutaneous, Q12H  insulin lispro, 0-9 Units, Subcutaneous, TID AC  metoprolol tartrate, 25 mg, Oral, Q12H  pantoprazole, 40 mg, Intravenous, BID AC  QUEtiapine, 100 mg, Oral, BID  senna-docusate sodium, 2 tablet, Oral, BID  sertraline, 100 mg, Oral, Daily  sodium chloride, 10 mL, Intravenous, Q12H  traZODone, 50 mg, Oral, Nightly         Infusions:  sodium chloride, 125 mL/hr, Last Rate: 125 mL/hr (06/01/22 0841)        PRN Meds:  •  acetaminophen  •  senna-docusate sodium **AND** polyethylene glycol **AND** bisacodyl **AND** bisacodyl  •  dextrose  •  dextrose  •  dicyclomine  •  glucagon (human recombinant)  •  hydrOXYzine  •  ondansetron **OR** ondansetron  •  oxyCODONE  •  sodium chloride  •  sodium chloride    Home Meds:  Medications Prior to Admission   Medication Sig Dispense Refill Last Dose   • Accu-Chek FastClix Lancets misc 1 each by Other route 4 (Four) Times a Day  Before Meals & at Bedtime. 100 each 1    • acetaminophen (TYLENOL) 325 MG tablet Take 1 tablet by mouth Every 6 (Six) Hours As Needed for Mild Pain  or Moderate Pain . 120 tablet 1    • Alcohol Swabs (Alcohol Pads) 70 % pads 1 each 4 (Four) Times a Day. 100 each 1    • amLODIPine (NORVASC) 5 MG tablet Take 1 tablet by mouth Daily. 30 tablet 1    • bisacodyl (DULCOLAX) 10 MG suppository Insert 1 suppository into the rectum Daily As Needed for Constipation (Use if bisacodyl oral is ineffective). 10 each 1    • busPIRone (BUSPAR) 10 MG tablet Take 1 tablet by mouth 2 (Two) Times a Day. 60 tablet 1    • dicyclomine (BENTYL) 20 MG tablet Take 1 tablet by mouth Every 6 (Six) Hours As Needed (pain). 20 tablet 0    • Ginger, Zingiber officinalis, (Ginger Root) 500 MG capsule Take 500 mg by mouth 3 (Three) Times a Day Before Meals. 90 each 1    • glucose blood (Accu-Chek Guide) test strip 1 each by Other route 4 (Four) Times a Day Before Meals & at Bedtime. Use as instructed 100 each 12    • hydrOXYzine (ATARAX) 25 MG tablet Take 1 tablet by mouth Every 8 (Eight) Hours As Needed for Anxiety. 90 tablet 1    • ibuprofen (ADVIL,MOTRIN) 600 MG tablet Take 1 tablet by mouth 3 (Three) Times a Day. 15 tablet 0    • insulin detemir (LEVEMIR) 100 UNIT/ML injection Inject 50 Units under the skin into the appropriate area as directed 2 (Two) Times a Day. 30 mL 1    • Insulin Pen Needle (Pen Needles) 32G X 4 MM misc Inject 1 each under the skin into the appropriate area as directed 2 (Two) Times a Day. 100 each 1    • lisinopril (PRINIVIL,ZESTRIL) 40 MG tablet Take 1 tablet by mouth Daily. 30 tablet 1    • metFORMIN (GLUCOPHAGE) 1000 MG tablet Take 1 tablet by mouth 2 (Two) Times a Day With Meals. 60 tablet 1    • metoprolol tartrate (LOPRESSOR) 25 MG tablet Take 1 tablet by mouth Every 12 (Twelve) Hours. 30 tablet 1    • ondansetron ODT (Zofran ODT) 4 MG disintegrating tablet Place 1 tablet on the tongue Every 8 (Eight) Hours As Needed  for Nausea or Vomiting. 30 tablet 0    • pantoprazole (PROTONIX) 40 MG EC tablet Take 1 tablet by mouth 2 (Two) Times a Day. 60 tablet 0    • polyethylene glycol (MIRALAX) 17 GM/SCOOP powder Mix 1 capful (17g) in water and drink by mouth Daily. 510 g 1    • QUEtiapine (SEROquel) 100 MG tablet Take 1 tablet by mouth 2 (Two) Times a Day. PT states she was on this at home.. 60 tablet 1    • RA ALLERGY RELIEF 10 MG tablet take 1 tablet by mouth once daily  0    • sennosides-docusate (PERICOLACE) 8.6-50 MG per tablet Take 2 tablets by mouth 2 (Two) Times a Day As Needed for Constipation. 30 tablet 1    • sertraline (ZOLOFT) 100 MG tablet Take 1 tablet by mouth Daily. 30 tablet 1    • traZODone (DESYREL) 50 MG tablet Take 1 tablet by mouth Every Night. 30 tablet 1    • Ventolin  (90 Base) MCG/ACT inhaler 2 puffs every 4-6 hours as needed for shortness of breath  0      ROS: Review of Systems   Constitutional: Negative.    HENT: Negative.    Eyes: Negative.    Respiratory: Negative.    Cardiovascular: Negative.    Gastrointestinal: Positive for abdominal pain, nausea and vomiting.   Endocrine: Negative.    Genitourinary: Negative.    Musculoskeletal: Negative.    Skin: Negative.    Allergic/Immunologic: Negative.    Neurological: Negative.    Hematological: Negative.    Psychiatric/Behavioral: Negative.        PAST MED HX:  Past Medical History:   Diagnosis Date   • Arthritis    • Bipolar disorder (HCC)    • Chronic bronchitis (HCC)    • Depression    • Diabetes mellitus (HCC)     DIAGNOSED 2016   • GERD (gastroesophageal reflux disease)    • Hepatitis-C    • History of blood transfusion    • Hyperlipidemia    • Hypertension    • Infectious viral hepatitis     HEPATITIS C   • Migraine    • Sleep apnea     PATIENT UNSURE OF SETTINGS       PAST SURG HX:  Past Surgical History:   Procedure Laterality Date   • CHOLECYSTECTOMY     • ENDOSCOPY N/A 4/9/2022    Procedure: ESOPHAGOGASTRODUODENOSCOPY;  Surgeon: Brunner, Mark  "MD NAY;  Location: [facility] ENDOSCOPY;  Service: Gastroenterology;  Laterality: N/A;  with antrum biopsy   • HYSTERECTOMY     • KNEE SURGERY     • LUMBAR LAMINECTOMY DISCECTOMY DECOMPRESSION N/A 8/6/2018    Procedure: LUMBAR LAMINECTOMIES L4-S1;  Surgeon: Chip Smith MD;  Location: [facility] OR;  Service: Neurosurgery       FAM HX:  No family history on file.    SOC HX:  Social History     Socioeconomic History   • Marital status: Single   Tobacco Use   • Smoking status: Current Every Day Smoker     Packs/day: 1.00   • Smokeless tobacco: Never Used   Substance and Sexual Activity   • Alcohol use: No   • Drug use: No   • Sexual activity: Defer       PHYSICAL EXAM  /81 (BP Location: Right arm, Patient Position: Lying)   Pulse 84   Temp 98 °F (36.7 °C) (Oral)   Resp 17   Ht 167.6 cm (66\")   Wt 65 kg (143 lb 4.8 oz)   SpO2 98%   BMI 23.13 kg/m²   Wt Readings from Last 3 Encounters:   05/31/22 65 kg (143 lb 4.8 oz)   05/29/22 68 kg (150 lb)   05/13/22 69.2 kg (152 lb 9.6 oz)   ,body mass index is 23.13 kg/m².  Physical Exam  Constitutional:       General: She is not in acute distress.     Appearance: She is not ill-appearing.      Comments: Middle aged female sitting up in bed in no acute distress   HENT:      Head: Normocephalic and atraumatic.      Mouth/Throat:      Mouth: Mucous membranes are moist.   Eyes:      General: No scleral icterus.  Cardiovascular:      Rate and Rhythm: Normal rate and regular rhythm.   Pulmonary:      Effort: Pulmonary effort is normal.      Breath sounds: Normal breath sounds.   Abdominal:      General: Bowel sounds are normal. There is no distension.      Palpations: Abdomen is soft.      Tenderness: There is no guarding.      Comments: Mild diffuse tenderness to palpation, most prominent in the epigastric region.  Abdomen is soft.   She has normoactive bowel sounds    Musculoskeletal:      Right lower leg: No edema.      Left lower leg: No edema.   Skin:     General: Skin is " warm and dry.   Neurological:      Mental Status: She is alert and oriented to person, place, and time.   Psychiatric:         Behavior: Behavior normal.       Results Review:   I reviewed the patient's new clinical results.    Lab Results   Component Value Date    WBC 4.46 06/01/2022    HGB 11.7 (L) 06/01/2022    HGB 13.2 05/31/2022    HGB 15.0 05/29/2022    HCT 34.0 06/01/2022    MCV 93.9 06/01/2022     06/01/2022     Lab Results   Component Value Date    INR 1.04 08/20/2018    INR 0.98 06/19/2014     Lab Results   Component Value Date    GLUCOSE 61 (L) 06/01/2022    BUN 5 (L) 06/01/2022    CREATININE 0.57 06/01/2022    EGFRIFNONA >60 07/10/2021    EGFRIFAFRI >60 07/10/2021    BCR 8.8 06/01/2022     06/01/2022    K 3.8 06/01/2022    CO2 21.0 (L) 06/01/2022    CALCIUM 8.4 (L) 06/01/2022    ALBUMIN 3.50 06/01/2022    ALKPHOS 93 06/01/2022    BILITOT 0.4 06/01/2022    ALT 55 (H) 06/01/2022    AST 77 (H) 06/01/2022     CT Abdomen/Pelvis with IV contrast: (as interpreted by radiologist)  FINDINGS:   No evidence of pneumonia or other acute process in the base of the  chest. No pleural effusion.   Striated nephrograms on delayed imaging of both kidneys are present. No  hydronephrosis of either kidney and no renal abscess. No hydroureter. No  evidence of a ureteral stone on either side. No evidence of acute  process of the the other organs throughout the abdomen. No evidence of  pancreatitis. Cholecystomy clips again noted.   No bowel obstruction. No evidence of colitis. No diverticulitis. No  evidence of appendicitis.   No free air or abscess. No free fluid.   No evidence of acute process in the pelvis. Bladder appears within  normal limits.   No acute fracture or other acute osseous abnormality    IMPRESSION:   Findings of the kidneys discussed above can be seen related to normal  anatomic variation but raises question of bilateral pyelonephritis.  Regardless there is no obstructive uropathy, no  hydronephrosis and no  renal abscess.   No evidence of acute process elsewhere in the abdomen and pelvis.    ASSESSMENTS/PLANS    1. Recurrent abdominal pain, suspect intestinal ischemia due to Cocaine use  2. Chronic hepatitis C.    3. Elevated LFTs, likely due to above.    4. Illicit drug use  5. Uncontrolled Type 2 diabetes mellitus, A1c was 11.     6. Recent gastritis     Contrasted CT abdomen/pelvis does not show any acute process with exception of possible bilateral pyelonephritis vs anatomic variant.   Urine analysis not consistent with infection.   Lipase was normal.    I suspect her abdominal pain is secondary to recurrent Cocaine use.   Cocaine use can cause arterial vasospasm or vasoconstriction leading to intestinal ischemia.   I openly discussed this with her and recommended she discontinue all use.   She voiced understanding.       >>> Discontinue use of cocaine  >>> Will recheck HCV RNA as previous specimen was insufficient for testing to be performed  >>> Once daily Protonix  >>> Supportive care with IV fluids and antiemetics as needed   >>> Will screen for Hepatitis B as well as check immune status.  Would recommend vaccination if not immune.       She was a no show for her outpatient follow up appointment with DMITRY Patricia on 5/3/22.    Recommend she keep her outpatient appointment at [facility] with Deidre Lan PA-C on 6/6/2022 at 8:30 AM.       We will sign off.  Please call for questions or concerns.      I discussed the patient's findings and my recommendations with patient    CHASE Corbett  06/01/22  10:13 EDT

## 2022-06-01 NOTE — PLAN OF CARE
Problem: Fall Injury Risk  Goal: Absence of Fall and Fall-Related Injury  Outcome: Ongoing, Progressing  Intervention: Promote Injury-Free Environment  Recent Flowsheet Documentation  Taken 5/31/2022 2030 by Olga Roldan RN  Safety Promotion/Fall Prevention:   activity supervised   assistive device/personal items within reach   clutter free environment maintained   safety round/check completed     Problem: Adult Inpatient Plan of Care  Goal: Plan of Care Review  Outcome: Ongoing, Progressing     Problem: Skin Injury Risk Increased  Goal: Skin Health and Integrity  Outcome: Ongoing, Progressing     Problem: Adult Inpatient Plan of Care  Goal: Absence of Hospital-Acquired Illness or Injury  Intervention: Identify and Manage Fall Risk  Recent Flowsheet Documentation  Taken 5/31/2022 2030 by Olga Roldan RN  Safety Promotion/Fall Prevention:   activity supervised   assistive device/personal items within reach   clutter free environment maintained   safety round/check completed  Intervention: Prevent Skin Injury  Recent Flowsheet Documentation  Taken 5/31/2022 2030 by Olga Roldan RN  Body Position: position changed independently  Intervention: Prevent and Manage VTE (Venous Thromboembolism) Risk  Recent Flowsheet Documentation  Taken 5/31/2022 2030 by Olga Roldan RN  Activity Management: activity adjusted per tolerance  Goal: Optimal Comfort and Wellbeing  Intervention: Monitor Pain and Promote Comfort  Recent Flowsheet Documentation  Taken 5/31/2022 2030 by Olga Roldan RN  Pain Management Interventions: see MAR   Goal Outcome Evaluation:

## 2022-06-01 NOTE — PROGRESS NOTES
[facility] Medicine Services  PROGRESS NOTE    Patient Name: Bernadette Paris  : 1970  MRN: [id number]    Date of Admission: 2022  Primary Care Physician: Provider, No Known    Subjective   Subjective     CC:  Abdominal pain, hungry    HPI:  Her predominant concern this morning is being hungry and wanting to eat.  States that her abdominal pain is persistent and not affected by meals.  Has not been vomiting.    ROS:  Gen- No fevers, chills  CV- No chest pain, palpitations  Resp- No cough, dyspnea  GI- No N/V/D, yes abd pain    Objective   Objective     Vital Signs:   Temp:  [96.3 °F (35.7 °C)-98 °F (36.7 °C)] 98 °F (36.7 °C)  Heart Rate:  [] 84  Resp:  [16-18] 17  BP: (112-143)/(65-98) 121/81     Physical Exam:  Constitutional: Awake, alert, chronically ill-appearing female, appears older than stated age, not in distress  HENT: NCAT, mucous membranes moist  Respiratory: Clear to auscultation bilaterally, respiratory effort normal   Cardiovascular: RRR, palpable radial pulses  Gastrointestinal: Positive bowel sounds, soft, minimally tender, nondistended  Musculoskeletal: No bilateral ankle edema  Psychiatric: Distant affect, cooperative  Neurologic: Speech clear, moving extremities spontaneously    Results Reviewed:  LAB RESULTS:      Lab 22  0224 22  1530 22  1232 22  0847 22  1816 22  1808   WBC 4.46  --   --   --  5.41  --  4.64   HEMOGLOBIN 11.7*  --   --   --  13.2  --  13.6   HEMOGLOBIN, POC  --   --   --   --   --  15.0  --    HEMATOCRIT 34.0  --   --   --  38.5  --  39.5   HEMATOCRIT POC  --   --   --   --   --  44  --    PLATELETS 230  --   --   --  274  --  306   NEUTROS ABS 0.91*  --   --   --  1.96  --  2.52   IMMATURE GRANS (ABS) 0.01  --   --   --  0.01  --  0.01   LYMPHS ABS 3.00  --   --   --  2.89  --  1.59   MONOS ABS 0.43  --   --   --  0.49  --  0.46   EOS ABS 0.08  --   --   --  0.04  --  0.04   MCV 93.9   --   --   --  94.4  --  93.8   PROCALCITONIN  --   --   --   --  0.09  --   --    LACTATE 1.9 2.9* 3.7* 3.2* 3.6*  --   --          Lab 06/01/22 0224 05/31/22  0847 05/29/22  1816 05/29/22  1808   SODIUM 141 140  --  135*   POTASSIUM 3.8 3.9  --  4.8   CHLORIDE 109* 103  --  97*   CO2 21.0* 22.0  --  17.0*   ANION GAP 11.0 15.0  --  21.0*   BUN 5* 7  --  6   CREATININE 0.57 0.84 0.40* 0.71   EGFR 110.2 84.3 120.0 103.1   GLUCOSE 61* 198*  --  360*   CALCIUM 8.4* 10.1  --  9.7   MAGNESIUM  --   --   --  1.8   PHOSPHORUS  --   --   --  3.9         Lab 06/01/22 0224 05/31/22  0847 05/29/22  1808   TOTAL PROTEIN 6.0 6.7 6.8   ALBUMIN 3.50 4.10 4.20   GLOBULIN 2.5 2.6 2.6   ALT (SGPT) 55* 69* 79*   AST (SGOT) 77* 65* 87*   BILIRUBIN 0.4 0.4 0.6   ALK PHOS 93 120* 139*   LIPASE  --  26 47         Lab 05/31/22  0847 05/29/22  1808   TROPONIN T <0.010 <0.010                 Lab 05/29/22  2003   FIO2 21   CARBOXYHEMOGLOBIN (VENOUS) 1.4     Brief Urine Lab Results  (Last result in the past 365 days)      Color   Clarity   Blood   Leuk Est   Nitrite   Protein   CREAT   Urine HCG        05/31/22 2138 Yellow   Clear   Negative   Negative   Negative   Trace                 Microbiology Results Abnormal     Procedure Component Value - Date/Time    COVID PRE-OP / PRE-PROCEDURE SCREENING ORDER (NO ISOLATION) - Swab, Nasopharynx [[id number]]  (Normal) Collected: 06/01/22 0405    Lab Status: Final result Specimen: Swab from Nasopharynx Updated: 06/01/22 0512    Narrative:      The following orders were created for panel order COVID PRE-OP / PRE-PROCEDURE SCREENING ORDER (NO ISOLATION) - Swab, Nasopharynx.  Procedure                               Abnormality         Status                     ---------                               -----------         ------                     COVID-19, FLU A/B, RSV P...[[id number]]  Normal              Final result                 Please view results for these tests on the individual orders.     COVID-19, FLU A/B, RSV PCR - Swab, Nasopharynx [[id number]]  (Normal) Collected: 06/01/22 0405    Lab Status: Final result Specimen: Swab from Nasopharynx Updated: 06/01/22 0512     COVID19 Not Detected     Influenza A PCR Not Detected     Influenza B PCR Not Detected     RSV, PCR Not Detected    Narrative:      Fact sheet for providers: https://www.fda.gov/media/885888/download    Fact sheet for patients: https://www.fda.gov/media/648347/download    Test performed by PCR.          CT Abdomen Pelvis With Contrast    Result Date: 5/31/2022  CT ABDOMEN PELVIS W CONTRAST-  Date of Exam: 5/31/2022 11:05 AM  Indication: worsening abd pain. recent CT scan but abd pain worse now..   Comparison: CT abdomen and pelvis 05/29/2022  Technique: Contiguous axial CT images were obtained from the lung bases to the pubic symphysis following uneventful administration of intravenous contrast. Sagittal and coronal reconstructions were performed. Automated exposure control and iterative reconstruction methods were utilized.  FINDINGS:  No evidence of pneumonia or other acute process in the base of the chest. No pleural effusion.  Striated nephrograms on delayed imaging of both kidneys are present. No hydronephrosis of either kidney and no renal abscess. No hydroureter. No evidence of a ureteral stone on either side. No evidence of acute process of the the other organs throughout the abdomen. No evidence of pancreatitis. Cholecystomy clips again noted.  No bowel obstruction. No evidence of colitis. No diverticulitis. No evidence of appendicitis.  No free air or abscess. No free fluid.  No evidence of acute process in the pelvis. Bladder appears within normal limits.  No acute fracture or other acute osseous abnormality      Impression:  Findings of the kidneys discussed above can be seen related to normal anatomic variation but raises question of bilateral pyelonephritis. Regardless there is no obstructive uropathy, no hydronephrosis and  no renal abscess.  No evidence of acute process elsewhere in the abdomen and pelvis.  This report was finalized on 5/31/2022 11:34 AM by Roldan Hill.      XR Chest 1 View    Result Date: 5/31/2022  XR CHEST 1 VW-  Date of Exam: 5/31/2022 8:22 AM  Indication: Upper Abdominal Pain Triage Protocol.  Comparison Exams: May 9, 2022  Technique: Single AP chest radiograph  FINDINGS: The lungs are clear. The heart and mediastinal contours appear normal. The pulmonary vasculature appears normal. The osseous structures appear intact.      Impression: No acute cardiopulmonary process identified.  This report was finalized on 5/31/2022 8:36 AM by Ino Oliva MD.            I have reviewed the medications:  Scheduled Meds:amLODIPine, 5 mg, Oral, Q24H  busPIRone, 10 mg, Oral, BID  insulin detemir, 25 Units, Subcutaneous, Q12H  insulin lispro, 0-9 Units, Subcutaneous, TID AC  metoprolol tartrate, 25 mg, Oral, Q12H  pantoprazole, 40 mg, Intravenous, BID AC  QUEtiapine, 100 mg, Oral, BID  senna-docusate sodium, 2 tablet, Oral, BID  sertraline, 100 mg, Oral, Daily  sodium chloride, 10 mL, Intravenous, Q12H  traZODone, 50 mg, Oral, Nightly      Continuous Infusions:sodium chloride, 125 mL/hr, Last Rate: 125 mL/hr (06/01/22 0611)      PRN Meds:.•  acetaminophen  •  senna-docusate sodium **AND** polyethylene glycol **AND** bisacodyl **AND** bisacodyl  •  dextrose  •  dextrose  •  dicyclomine  •  glucagon (human recombinant)  •  hydrOXYzine  •  ondansetron **OR** ondansetron  •  sodium chloride  •  sodium chloride    Assessment & Plan   Assessment & Plan     Active Hospital Problems    Diagnosis  POA   • **Intractable abdominal pain [R10.9]  Yes   • Type 2 diabetes mellitus with hyperglycemia, with long-term current use of insulin (HCC) [E11.65, Z79.4]  Not Applicable   • Gastroparesis [K31.84]  Yes   • Bipolar affective disorder (HCC) [F31.9]  Yes   • Hepatitis C [B19.20]  Yes   • Nausea & vomiting [R11.2]  Yes      Resolved  Hospital Problems   No resolved problems to display.        Brief Hospital Course to date:  Bernadette Paris is a 52 y.o. female w/ chronically uncontrolled diabetes, admissions for DKA, gastroparesis, ongoing cocaine use, multiple other medical comorbidities who presented with acute onset severe abdominal pain with associated nausea/vomiting; symptoms already stabilizing on arrival and was immediately asking for food    The following problems are new to me today    Assessment/plan    Acute abdominal pain with associated nausea/vomiting  - CT of the A/P w/o acute process  - Low-dose oral narcotic for pain control while awaiting GI evaluation  -IV PPI bid  -Tolerated diet without issue last night, currently n.p.o., pending GI evaluation we will plan to resume diet  -Antiemetics as needed, IVF for volume support today    Uncontrolled type 2 DM, A1c 11.9%, with gastroparesis, with long-term insulin use  - Resume gingerroot at discharge, okay to take care if she can supply (not on formulary)  - Levemir, Accu-Cheks with SSI    Ongoing polysubstance abuse  - Ongoing cocaine and tobacco abuse  -Needs cessation of both    Prolonged QTc  - Still slightly prolonged however improved    Hepatitis C  -Reports it has not been treated    Bipolar disorder  Depression  -Home BuSpar, Seroquel, Zoloft    Hypertension  Hyperlipidemia  -Amlodipine    GERD  -PPI    DVT prophylaxis:  Mechanical DVT prophylaxis orders are present.       AM-PAC 6 Clicks Score (PT): 22 (05/31/22 2030)    Disposition: Home soon pending GI evaluation and tolerance of diet    CODE STATUS:   Code Status and Medical Interventions:   Ordered at: 05/31/22 4818     Code Status (Patient has no pulse and is not breathing):    CPR (Attempt to Resuscitate)     Medical Interventions (Patient has pulse or is breathing):    Full Support       Jamel Swift, DO  06/01/22

## 2022-06-01 NOTE — CONSULTS
Reviewed chart for diabetes education consult.  Noted diagnosis of diabetes and a1c of 11.9%.  Noted that Ms. Paris was seen by diabetes education 5/13/2021 in previous hospitalization.  She was given an Acu-check Guide Me meter at that time.  She stated she has been checking her blood sugar at home, but she ran out of lancets.  She stated she has been using the pen needles for insulin pen to stick her finger.  She states she walks daily after work, and wants to check her blood sugar after her walk.  Encouraged her that we could get prescription for strips and lancets before discharge.  Spoke to her nurse and let her know that she would need prescription for strips and lancets for Acu-check Guide Me meter to check twice daily.  She states she has been taking her medication for diabetes at home daily.  She states they sent her home with the Levemir pens after her last visit.  Encouraged her to let her nurse know if there was anything else that we can do for her related to diabetes education before she goes home.  Thank you for this referral.

## 2022-06-01 NOTE — CONSULTS
Clinical Nutrition   Reason For Visit: MST score 2+, Unintentional weight loss, Reduced oral intake    Patient Name: Bernadette Paris  YOB: 1970  MRN: [id number]  Date of Encounter: 06/01/22 14:57 EDT  Admission date: 5/31/2022    Pt with ongoing severe/chronic malnutrition, see [facility] for full assessment.     Nutrition Assessment     Admission Problem List:    Intractable abdominal pain    Nausea & vomiting    Bipolar affective disorder (HCC)    Hepatitis C    Type 2 diabetes mellitus with hyperglycemia, with long-term current use of insulin (HCC)    Gastroparesis     Applicable PMH:    Applicable Nutrition-Related Information:    Applicable medical tests/procedures since admission:      PMH: She  has a past medical history of Arthritis, Bipolar disorder (HCC), Chronic bronchitis (HCC), Depression, Diabetes mellitus (HCC), GERD (gastroesophageal reflux disease), Hepatitis-C, History of blood transfusion, Hyperlipidemia, Hypertension, Infectious viral hepatitis, Migraine, and Sleep apnea.   PSxH: She  has a past surgical history that includes Hysterectomy; Cholecystectomy; Knee surgery; lumbar laminectomy discectomy decompression (N/A, 8/6/2018); and Esophagogastroduodenoscopy (N/A, 4/9/2022).        Reported/Observed/Food/Nutrition Related History     Pt familiar to this RD, seen during recent admission for DKA. This RD dx with severe malnutrition and provided diet education for malnutrition, DM, and gastroparesis during that time. She remembers me and extensive diet education. She states her BG have been better controlled ~200. However she is unable to tell me specifically how she has applied nutrition related recommendations. Pt with ongoing illicit drug use. Her intakes are inconsistent. When she is feeling well eats high GI foods/beverages. When having N/V/pain, does not eat at all. Right now she is doing well, suspect she eats better when at the hospital than she does at home. She is aware she has  "extensive GI issues and poorly controlled DM and needs to make dietary changes, but has not done so. She tells me prior to this admission on memorial day she ate \"a lot of BBQ, half of an entire cheesecake, 3 beers.\" She states she knows this is excessive and that she would have a bad reaction. She again as last admission reports she ran out of medications for the past 2 weeks. She does state has been taking jian root and thinks this helps. Unsure if accurate. She did not bring them to the hospital. She requests Boost GC while here. She denies further dietary needs/preferences, NKFA.  Note that pt is again positive for cocaine on POA. She has been counseled on cessation and educated that this is a likely factor of GI issues. Pt educated on nutrition, unfortunately RD suspects she will continue to have nutritional decline/poorly controlled DM due to diet and lifestyle choices, pre-contemplation stage of readiness for change.    Anthropometrics   Height: 66 in  Weight: 143 lb per bed scale 5/31  BMI: 23.13  BMI classification: Normal: 18.5-24.9kg/m2   IBW: 130 lb    UBW: 180 lb per pt , only weights avail in EMR:  (2018) ~260  (2/20/21) 207 lb  (3/3/21) 220 lb  (7/8/21) 204 lb  (4/6/22) 150 lb - POA  (5/10/22) 141 lb    Weight change: loss 61 lb / 30% body weight past 10 months    Nutrition Focused Physical Exam     Unable to perform exam due to: Patient/family declined, pt eating     Labs reviewed   Labs reviewed: Yes    Results from last 7 days   Lab Units 06/01/22  0224 05/31/22  0847 05/29/22  1816 05/29/22  1808   SODIUM mmol/L 141 140  --  135*   POTASSIUM mmol/L 3.8 3.9  --  4.8   CHLORIDE mmol/L 109* 103  --  97*   CO2 mmol/L 21.0* 22.0  --  17.0*   BUN mg/dL 5* 7  --  6   CREATININE mg/dL 0.57 0.84 0.40* 0.71   GLUCOSE mg/dL 61* 198*  --  360*   CALCIUM mg/dL 8.4* 10.1  --  9.7   PHOSPHORUS mg/dL  --   --   --  3.9   MAGNESIUM mg/dL  --   --   --  1.8     Results from last 7 days   Lab Units 06/01/22  2494 " 05/31/22  0847 05/29/22  1808   WBC 10*3/mm3 4.46 5.41 4.64   ALBUMIN g/dL 3.50 4.10 4.20     Results from last 7 days   Lab Units 06/01/22  1120 06/01/22  0735 06/01/22  0350 05/31/22  1751 05/29/22  2137 05/29/22  1816   GLUCOSE mg/dL 129 82 222* 272* 246* 362*     Lab Results   Lab Value Date/Time    HGBA1C 11.90 (H) 05/09/2022 1301    HGBA1C 11.80 (H) 04/06/2022 1052    HGBA1C 11.5 (H) 03/21/2022 0508    HGBA1C 12.2 (H) 07/09/2021 0219     Medications reviewed   Medications reviewed: Yes  Scheduled: insulin, protonix, pericolace  GTT: NS @ 125 ml/hr  PRN: bentyl    Current Nutrition Prescription   PO: Diet Regular; Consistent Carbohydrate  Oral Nutrition Supplement: Orders Placed This Encounter      Dietary Nutrition Supplements Boost Glucose Control; strawberry     Average PO intake: 100% x 2 meals documented    Nutrition Diagnosis     Problem Malnutrition severe/chronic   Etiology Energy needs > energy intake   Signs/Symptoms PO intakes <75% est. needs and loss 30% body weight past 10 months.       Problem Not ready for diet/lifestyle change   Etiology Pre contemplation stage of readiness for change   Signs/Symptoms Pt voices understanding of extensive education nutrition related recommendations for conditions, but has continued to not make any actionable changes.       Goal:   General: Nutrition to support treatment  PO: Tolerate PO, Maintain intake       Intervention   Intervention: Follow treatment progress, Care plan reviewed, Advise alternate selection, Advised available snacks, Interview for preferences, Menu provided, Encourage intake, Supplement provided, Education provided for malnutrition, DM, gastroparesis    Boost GC 2x/day    Monitoring/Evaluation:   Monitoring/Evaluation: Per protocol, I&O, PO intake, Supplement intake, Pertinent labs, Weight, Skin status, GI status, Symptoms, POC/GOC, Swallow function, Hemodynamic stability    Julisa Alvarado RD  Time Spent: 45

## 2022-06-02 ENCOUNTER — READMISSION MANAGEMENT (OUTPATIENT)
Dept: CALL CENTER | Facility: HOSPITAL | Age: 52
End: 2022-06-02

## 2022-06-02 VITALS
RESPIRATION RATE: 18 BRPM | TEMPERATURE: 98.1 F | HEART RATE: 83 BPM | BODY MASS INDEX: 23.03 KG/M2 | SYSTOLIC BLOOD PRESSURE: 148 MMHG | WEIGHT: 143.3 LBS | HEIGHT: 66 IN | OXYGEN SATURATION: 100 % | DIASTOLIC BLOOD PRESSURE: 107 MMHG

## 2022-06-02 LAB
GLUCOSE BLDC GLUCOMTR-MCNC: 162 MG/DL (ref 70–130)
GLUCOSE BLDC GLUCOMTR-MCNC: 420 MG/DL (ref 70–130)
GLUCOSE BLDC GLUCOMTR-MCNC: 462 MG/DL (ref 70–130)
GLUCOSE BLDC GLUCOMTR-MCNC: 58 MG/DL (ref 70–130)

## 2022-06-02 PROCEDURE — 63710000001 INSULIN LISPRO (HUMAN) PER 5 UNITS: Performed by: INTERNAL MEDICINE

## 2022-06-02 PROCEDURE — G0378 HOSPITAL OBSERVATION PER HR: HCPCS

## 2022-06-02 PROCEDURE — 87522 HEPATITIS C REVRS TRNSCRPJ: CPT | Performed by: PHYSICIAN ASSISTANT

## 2022-06-02 PROCEDURE — 99217 PR OBSERVATION CARE DISCHARGE MANAGEMENT: CPT | Performed by: INTERNAL MEDICINE

## 2022-06-02 PROCEDURE — 87902 NFCT AGT GNTYP ALYS HEP C: CPT | Performed by: PHYSICIAN ASSISTANT

## 2022-06-02 PROCEDURE — 63710000001 INSULIN DETEMIR PER 5 UNITS: Performed by: INTERNAL MEDICINE

## 2022-06-02 PROCEDURE — 82962 GLUCOSE BLOOD TEST: CPT

## 2022-06-02 RX ORDER — IBUPROFEN 600 MG/1
600 TABLET ORAL 3 TIMES DAILY
Qty: 15 TABLET | Refills: 0 | Status: ON HOLD | OUTPATIENT
Start: 2022-06-02 | End: 2022-08-15 | Stop reason: SDUPTHER

## 2022-06-02 RX ORDER — PANTOPRAZOLE SODIUM 40 MG/1
40 TABLET, DELAYED RELEASE ORAL DAILY
Qty: 60 TABLET | Refills: 0 | Status: ON HOLD | OUTPATIENT
Start: 2022-06-02 | End: 2022-08-15 | Stop reason: SDUPTHER

## 2022-06-02 RX ORDER — OXYCODONE HYDROCHLORIDE 5 MG/1
5 TABLET ORAL EVERY 8 HOURS PRN
Status: DISCONTINUED | OUTPATIENT
Start: 2022-06-02 | End: 2022-06-02

## 2022-06-02 RX ORDER — INSULIN LISPRO 100 [IU]/ML
3 INJECTION, SOLUTION INTRAVENOUS; SUBCUTANEOUS
Status: DISCONTINUED | OUTPATIENT
Start: 2022-06-02 | End: 2022-06-02 | Stop reason: HOSPADM

## 2022-06-02 RX ADMIN — INSULIN LISPRO 9 UNITS: 100 INJECTION, SOLUTION INTRAVENOUS; SUBCUTANEOUS at 08:37

## 2022-06-02 RX ADMIN — SERTRALINE HYDROCHLORIDE 100 MG: 100 TABLET ORAL at 08:37

## 2022-06-02 RX ADMIN — SODIUM CHLORIDE 125 ML/HR: 9 INJECTION, SOLUTION INTRAVENOUS at 03:00

## 2022-06-02 RX ADMIN — INSULIN DETEMIR 25 UNITS: 100 INJECTION, SOLUTION SUBCUTANEOUS at 08:36

## 2022-06-02 RX ADMIN — PANTOPRAZOLE SODIUM 40 MG: 40 TABLET, DELAYED RELEASE ORAL at 06:23

## 2022-06-02 RX ADMIN — SENNOSIDES AND DOCUSATE SODIUM 2 TABLET: 50; 8.6 TABLET ORAL at 08:37

## 2022-06-02 RX ADMIN — METOPROLOL TARTRATE 25 MG: 25 TABLET, FILM COATED ORAL at 08:37

## 2022-06-02 RX ADMIN — QUETIAPINE FUMARATE 100 MG: 100 TABLET ORAL at 08:37

## 2022-06-02 RX ADMIN — AMLODIPINE BESYLATE 5 MG: 5 TABLET ORAL at 08:37

## 2022-06-02 RX ADMIN — SODIUM CHLORIDE 125 ML/HR: 9 INJECTION, SOLUTION INTRAVENOUS at 03:12

## 2022-06-02 RX ADMIN — OXYCODONE 5 MG: 5 TABLET ORAL at 03:15

## 2022-06-02 RX ADMIN — BUSPIRONE HYDROCHLORIDE 10 MG: 10 TABLET ORAL at 08:37

## 2022-06-02 RX ADMIN — INSULIN LISPRO 3 UNITS: 100 INJECTION, SOLUTION INTRAVENOUS; SUBCUTANEOUS at 08:38

## 2022-06-02 NOTE — DISCHARGE SUMMARY
[facility] Medicine Services  DISCHARGE SUMMARY    Patient Name: Bernadette Paris  : 1970  MRN: [id number]    Date of Admission: 2022  8:13 AM  Date of Discharge: 2022  Primary Care Physician: Provider, No Known    Consults     Date and Time Order Name Status Description    2022 12:34 AM Inpatient Gastroenterology Consult Completed           Hospital Course     Presenting Problem:   Intractable abdominal pain [R10.9]    Active Hospital Problems    Diagnosis  POA   • **Intractable abdominal pain [R10.9]  Yes   • Type 2 diabetes mellitus with hyperglycemia, with long-term current use of insulin (HCC) [E11.65, Z79.4]  Not Applicable   • Gastroparesis [K31.84]  Yes   • Bipolar affective disorder (HCC) [F31.9]  Yes   • Hepatitis C [B19.20]  Yes   • Nausea & vomiting [R11.2]  Yes      Resolved Hospital Problems   No resolved problems to display.          Hospital Course:  Bernadette Paris is a 52 y.o. female with chronically uncontrolled diabetes, diabetic gastroparesis, prior admissions for DKA, ongoing cocaine use, multiple other chronic medical comorbidities who presented to hospital with acute onset severe abdominal pain with associated nausea/vomiting.  At the time of arrival her symptoms were improving and she was requesting something to eat.  She tolerated a clear liquid diet was made n.p.o. that night with GI consultation, they saw and evaluated her, concern was for cocaine induced vasospasm of the abdominal vasculature and complete cessation was recommended.  A diet was ordered for her and per nursing she was up all night eating food with hyperglycemia subsequently reflected on her laboratory data.  She was tolerating a diet and no further work-up was recommended/planned, during discharge planning with the patient at bedside she mentioned that approximately 5 days PTA she ran out of her pantoprazole at home and she subsequently developed severe GERD and all of the above  "symptoms prompting visit to the hospital.  She is adamantly requesting refill of her PPI for her symptoms as it is \"the only thing that makes the pain better.\"  I have resent a 60-day supply to the pharmacy to be filled via meds to beds.    Per GI she was a no-show to her last outpatient appointment scheduled 5/3, they have kindly rescheduled her for 6/6/22, she has been strongly encouraged to come to this appointment.    Acute abdominal pain with associated nausea/vomiting, improved  GERD  - CT of the A/P w/o acute process  -Seen by GI, concern is for cocaine induced intra-abdominal vasospasm, strict cessation was recommended  - Tolerating diet without issue  - During discharge planning she reported being out of her PPI 5 days PTA and symptoms immediately started shortly thereafter, she clearly identifies today that the symptoms worsen when she does not take her PPI; a 2-month supply has been ordered for her     Uncontrolled type 2 DM, A1c 11.9%, with gastroparesis, with long-term insulin use  - Resume gingerroot at discharge  -  Resume home medications, strong suspicion for lack of adherence vs dietary indiscretion (hypoglycemic when given a reduced dose of insulin relative to home prescription here)     Ongoing polysubstance abuse  - Ongoing cocaine and tobacco abuse  -Needs cessation of both     Prolonged QTc  - Still slightly prolonged however improved     Hepatitis C  -Reports it has not been treated  - GI planning follow-up in the clinic     Bipolar disorder  Depression  -Home BuSpar, Seroquel, Zoloft     Hypertension  Hyperlipidemia  -Amlodipine    Discharge Follow Up Recommendations for outpatient labs/diagnostics:  PCP in 1 week  Keep appointment with GI in the clinic 6/6/22    Day of Discharge     HPI:   Continues to have pain, asking for refill on her pantoprazole as it is the primary thing that makes her pain better.  States that she ran out of it 5 days before coming to the hospital.  Has been eating " without issue, stating that her pain is not affected by food    Review of Systems  Gen- No fevers, chills  CV- No chest pain, palpitations  Resp- No cough, dyspnea  GI- No N/V/D, yes abd pain    Vital Signs:   Temp:  [97.9 °F (36.6 °C)-98.3 °F (36.8 °C)] 97.9 °F (36.6 °C)  Heart Rate:  [74-81] 77  Resp:  [18] 18  BP: (146-153)/() 150/97      Physical Exam:  Constitutional: Awake, alert, chronically ill-appearing female who appears older than stated age, not in acute distress  HENT: NCAT, mucous membranes moist  Respiratory: Clear to auscultation bilaterally, respiratory effort normal   Cardiovascular: RRR, palpable radial pulses  Gastrointestinal: Positive bowel sounds, soft, minimally tender, nondistended  Musculoskeletal: No bilateral ankle edema  Psychiatric:  Odd affect, cooperative  Neurologic: Speech clear, moving extremities spontaneously    Pertinent  and/or Most Recent Results     LAB RESULTS:      Lab 06/01/22  0224 05/31/22 2026 05/31/22  1530 05/31/22  1232 05/31/22  0847 05/29/22  1816 05/29/22  1808   WBC 4.46  --   --   --  5.41  --  4.64   HEMOGLOBIN 11.7*  --   --   --  13.2  --  13.6   HEMOGLOBIN, POC  --   --   --   --   --  15.0  --    HEMATOCRIT 34.0  --   --   --  38.5  --  39.5   HEMATOCRIT POC  --   --   --   --   --  44  --    PLATELETS 230  --   --   --  274  --  306   NEUTROS ABS 0.91*  --   --   --  1.96  --  2.52   IMMATURE GRANS (ABS) 0.01  --   --   --  0.01  --  0.01   LYMPHS ABS 3.00  --   --   --  2.89  --  1.59   MONOS ABS 0.43  --   --   --  0.49  --  0.46   EOS ABS 0.08  --   --   --  0.04  --  0.04   MCV 93.9  --   --   --  94.4  --  93.8   PROCALCITONIN  --   --   --   --  0.09  --   --    LACTATE 1.9 2.9* 3.7* 3.2* 3.6*  --   --          Lab 06/01/22  0224 05/31/22  0847 05/29/22  1816 05/29/22  1808   SODIUM 141 140  --  135*   POTASSIUM 3.8 3.9  --  4.8   CHLORIDE 109* 103  --  97*   CO2 21.0* 22.0  --  17.0*   ANION GAP 11.0 15.0  --  21.0*   BUN 5* 7  --  6    CREATININE 0.57 0.84 0.40* 0.71   EGFR 110.2 84.3 120.0 103.1   GLUCOSE 61* 198*  --  360*   CALCIUM 8.4* 10.1  --  9.7   MAGNESIUM  --   --   --  1.8   PHOSPHORUS  --   --   --  3.9         Lab 06/01/22  0224 05/31/22  0847 05/29/22  1808   TOTAL PROTEIN 6.0 6.7 6.8   ALBUMIN 3.50 4.10 4.20   GLOBULIN 2.5 2.6 2.6   ALT (SGPT) 55* 69* 79*   AST (SGOT) 77* 65* 87*   BILIRUBIN 0.4 0.4 0.6   ALK PHOS 93 120* 139*   LIPASE  --  26 47         Lab 05/31/22  0847 05/29/22  1808   TROPONIN T <0.010 <0.010                 Lab 05/29/22  2003   FIO2 21   CARBOXYHEMOGLOBIN (VENOUS) 1.4     Brief Urine Lab Results  (Last result in the past 365 days)      Color   Clarity   Blood   Leuk Est   Nitrite   Protein   CREAT   Urine HCG        05/31/22 2138 Yellow   Clear   Negative   Negative   Negative   Trace               Microbiology Results (last 10 days)     Procedure Component Value - Date/Time    COVID PRE-OP / PRE-PROCEDURE SCREENING ORDER (NO ISOLATION) - Swab, Nasopharynx [[id number]]  (Normal) Collected: 06/01/22 0405    Lab Status: Final result Specimen: Swab from Nasopharynx Updated: 06/01/22 0512    Narrative:      The following orders were created for panel order COVID PRE-OP / PRE-PROCEDURE SCREENING ORDER (NO ISOLATION) - Swab, Nasopharynx.  Procedure                               Abnormality         Status                     ---------                               -----------         ------                     COVID-19, FLU A/B, RSV P...[[id number]]  Normal              Final result                 Please view results for these tests on the individual orders.    COVID-19, FLU A/B, RSV PCR - Swab, Nasopharynx [[id number]]  (Normal) Collected: 06/01/22 0405    Lab Status: Final result Specimen: Swab from Nasopharynx Updated: 06/01/22 0512     COVID19 Not Detected     Influenza A PCR Not Detected     Influenza B PCR Not Detected     RSV, PCR Not Detected    Narrative:      Fact sheet for providers:  https://www.fda.gov/media/695728/download    Fact sheet for patients: https://www.fda.gov/media/066548/download    Test performed by PCR.    Blood Culture - Blood, Hand, Right [[id number]]  (Normal) Collected: 05/31/22 1530    Lab Status: Preliminary result Specimen: Blood from Hand, Right Updated: 06/01/22 1604     Blood Culture No growth at 24 hours    Blood Culture - Blood, Arm, Left [[id number]]  (Normal) Collected: 05/31/22 1520    Lab Status: Preliminary result Specimen: Blood from Arm, Left Updated: 06/01/22 1604     Blood Culture No growth at 24 hours    Urine Culture - Urine, Urine, Clean Catch [[id number]] Collected: 05/31/22 1220    Lab Status: Final result Specimen: Urine, Clean Catch Updated: 06/01/22 1039     Urine Culture <25,000 CFU/mL Normal Urogenital Tanya    Narrative:      Colonization of the urinary tract without infection is common. Treatment is discouraged unless the patient is symptomatic, pregnant, or undergoing an invasive urologic procedure.    COVID-19 and FLU A/B PCR - Swab, Nasopharynx [[id number]]  (Normal) Collected: 05/29/22 1755    Lab Status: Final result Specimen: Swab from Nasopharynx Updated: 05/29/22 1935     COVID19 Not Detected     Influenza A PCR Not Detected     Influenza B PCR Not Detected    Narrative:      Fact sheet for providers: https://www.fda.gov/media/031867/download    Fact sheet for patients: https://www.fda.gov/media/484126/download    Test performed by PCR.          CT Abdomen Pelvis Without Contrast    Result Date: 5/29/2022  DATE OF EXAM: 5/29/2022 6:37 PM  PROCEDURE: CT ABDOMEN PELVIS WO CONTRAST-  INDICATIONS: eipgastric pain, vomiting, diabetic, suspect pancreatitis  COMPARISON: 4/6/2022  TECHNIQUE: Routine transaxial slices were obtained through the abdomen and pelvis without the administration of intravenous contrast. Reconstructed coronal and sagittal images were also obtained. Automated exposure control and iterative construction methods were used.  The  radiation dose reduction device was turned on for each scan per the ALARA (As Low as Reasonably Achievable) protocol.  FINDINGS: The lung bases are grossly clear. Evaluation of the body wall soft tissues demonstrates mild diffuse anasarca. The osseous structures demonstrate no evidence of acute fracture or aggressive osseous lesion. The liver, spleen, pancreas and bilateral adrenal glands demonstrate no specific evidence of acute finding or suspicious lesion on limited noncontrast evaluation. Prior cholecystectomy. The kidneys demonstrate no evidence of stones or hydronephrosis. Small and large bowel loops are nondilated. There is no suspicious focal bowel wall thickening. No free fluid or pneumoperitoneum. Mildly atherosclerotic nonaneurysmal abdominal aorta. No bulky retroperitoneal lymphadenopathy. The pelvic viscera are unremarkable.      There is mild diffuse anasarca present, new since comparison, suggesting component of volume overload. No acute findings are otherwise present in the abdomen and pelvis.  This report was finalized on 5/29/2022 7:30 PM by Panda Laird.      CT Abdomen Pelvis With Contrast    Result Date: 5/31/2022  CT ABDOMEN PELVIS W CONTRAST-  Date of Exam: 5/31/2022 11:05 AM  Indication: worsening abd pain. recent CT scan but abd pain worse now..   Comparison: CT abdomen and pelvis 05/29/2022  Technique: Contiguous axial CT images were obtained from the lung bases to the pubic symphysis following uneventful administration of intravenous contrast. Sagittal and coronal reconstructions were performed. Automated exposure control and iterative reconstruction methods were utilized.  FINDINGS:  No evidence of pneumonia or other acute process in the base of the chest. No pleural effusion.  Striated nephrograms on delayed imaging of both kidneys are present. No hydronephrosis of either kidney and no renal abscess. No hydroureter. No evidence of a ureteral stone on either side. No evidence of acute  process of the the other organs throughout the abdomen. No evidence of pancreatitis. Cholecystomy clips again noted.  No bowel obstruction. No evidence of colitis. No diverticulitis. No evidence of appendicitis.  No free air or abscess. No free fluid.  No evidence of acute process in the pelvis. Bladder appears within normal limits.  No acute fracture or other acute osseous abnormality       Findings of the kidneys discussed above can be seen related to normal anatomic variation but raises question of bilateral pyelonephritis. Regardless there is no obstructive uropathy, no hydronephrosis and no renal abscess.  No evidence of acute process elsewhere in the abdomen and pelvis.  This report was finalized on 5/31/2022 11:34 AM by Roldan Hill.      XR Chest 1 View    Result Date: 5/31/2022  XR CHEST 1 VW-  Date of Exam: 5/31/2022 8:22 AM  Indication: Upper Abdominal Pain Triage Protocol.  Comparison Exams: May 9, 2022  Technique: Single AP chest radiograph  FINDINGS: The lungs are clear. The heart and mediastinal contours appear normal. The pulmonary vasculature appears normal. The osseous structures appear intact.      No acute cardiopulmonary process identified.  This report was finalized on 5/31/2022 8:36 AM by Ino Oliva MD.        Pending Labs     Order Current Status    HCV RNA By PCR, Qn Rfx Danielle In process    Blood Culture - Blood, Arm, Left Preliminary result    Blood Culture - Blood, Hand, Right Preliminary result        Discharge Details        Discharge Medications      Changes to Medications      Instructions Start Date   pantoprazole 40 MG EC tablet  Commonly known as: PROTONIX  What changed: when to take this   40 mg, Oral, Daily         Continue These Medications      Instructions Start Date   Accu-Chek FastClix Lancets misc   1 each, Other, 4 Times Daily Before Meals & Nightly      Accu-Chek Guide test strip  Generic drug: glucose blood   1 each, Other, 4 Times Daily Before Meals & Nightly, Use as  instructed      acetaminophen 325 MG tablet  Commonly known as: TYLENOL   325 mg, Oral, Every 6 Hours PRN      Alcohol Pads 70 % pads   1 each, Does not apply, 4 Times Daily      amLODIPine 5 MG tablet  Commonly known as: NORVASC   5 mg, Oral, Every 24 Hours Scheduled      BD Pen Needle Victoria U/F 32G X 4 MM misc  Generic drug: Insulin Pen Needle   1 each, Subcutaneous, 2 Times Daily      bisacodyl 10 MG suppository  Commonly known as: DULCOLAX   10 mg, Rectal, Daily PRN      busPIRone 10 MG tablet  Commonly known as: BUSPAR   10 mg, Oral, 2 Times Daily      dicyclomine 20 MG tablet  Commonly known as: BENTYL   20 mg, Oral, Every 6 Hours PRN      Charo 500 MG capsule   500 mg, Oral, 3 Times Daily Before Meals      hydrOXYzine 25 MG tablet  Commonly known as: ATARAX   25 mg, Oral, Every 8 Hours PRN      ibuprofen 600 MG tablet  Commonly known as: ADVIL,MOTRIN   600 mg, Oral, 3 Times Daily      Levemir FlexTouch 100 UNIT/ML injection  Generic drug: insulin detemir   50 Units, Subcutaneous, 2 Times Daily      lisinopril 40 MG tablet  Commonly known as: PRINIVIL,ZESTRIL   40 mg, Oral, Daily      metFORMIN 1000 MG tablet  Commonly known as: GLUCOPHAGE   1,000 mg, Oral, 2 Times Daily With Meals      metoprolol tartrate 25 MG tablet  Commonly known as: LOPRESSOR   25 mg, Oral, Every 12 Hours Scheduled      ondansetron ODT 4 MG disintegrating tablet  Commonly known as: Zofran ODT   4 mg, Translingual, Every 8 Hours PRN      polyethylene glycol 17 GM/SCOOP powder  Commonly known as: MIRALAX   Mix 1 capful (17g) in water and drink by mouth Daily.      QUEtiapine 100 MG tablet  Commonly known as: SEROquel   100 mg, Oral, 2 Times Daily, PT states she was on this at home..      RA Allergy Relief 10 MG tablet  Generic drug: cetirizine   take 1 tablet by mouth once daily      sertraline 100 MG tablet  Commonly known as: ZOLOFT   100 mg, Oral, Daily      Stimulant Laxative 8.6-50 MG per tablet  Generic drug: sennosides-docusate   2  tablets, Oral, 2 Times Daily PRN      traZODone 50 MG tablet  Commonly known as: DESYREL   50 mg, Oral, Nightly      Ventolin  (90 Base) MCG/ACT inhaler  Generic drug: albuterol sulfate HFA   2 puffs every 4-6 hours as needed for shortness of breath             Allergies   Allergen Reactions   • Tylenol [Acetaminophen] Other (See Comments)     SEVERE LIVER DISEASE-CONTRAINDICATED         Discharge Disposition:  Home or Self Care    Diet:  Hospital:  Diet Order   Procedures   • Diet Regular; Consistent Carbohydrate          CODE STATUS:    Code Status and Medical Interventions:   Ordered at: 05/31/22 1712     Code Status (Patient has no pulse and is not breathing):    CPR (Attempt to Resuscitate)     Medical Interventions (Patient has pulse or is breathing):    Full Support       No future appointments.    Additional Instructions for the Follow-ups that You Need to Schedule     Discharge Follow-up with PCP   As directed       Currently Documented PCP:    Provider, No Known    PCP Phone Number:    None     Follow Up Details: 1 week         Discharge Follow-up with Specified Provider: keep her outpatient appointment at [facility] with Deidre Lan PA-C on 6/6/2022 at 8:30 AM   As directed      To: keep her outpatient appointment at [facility] with Deider Lan PA-C on 6/6/2022 at 8:30 AM                     Jamel Swift DO  06/02/22      Time Spent on Discharge:  I spent  33  minutes on this discharge activity which included: face-to-face encounter with the patient, reviewing the data in the system, coordination of the care with the nursing staff as well as consultants, documentation, and entering orders.

## 2022-06-02 NOTE — OUTREACH NOTE
Prep Survey    Flowsheet Row Responses   [facility] patient discharged from? [place]   Is LACE score < 7 ? No   Emergency Room discharge w/ pulse ox? No   Eligibility [facility]   Date of Admission 05/31/22   Date of Discharge 06/02/22   Discharge Disposition Home or Self Care   Discharge diagnosis Intractable abdominal pain, Diabetes, Chronic Hep C, Cocaine use.   Does the patient have one of the following disease processes/diagnoses(primary or secondary)? Other   Does the patient have Home health ordered? No   Is there a DME ordered? No   Medication alerts for this patient --  [meds covered for week supply until insurance covers]   General alerts for this patient --  [Transporation home arranged w/ [employer]]   Prep survey completed? Yes          BRETT MIRELES - Registered Nurse

## 2022-06-02 NOTE — CASE MANAGEMENT/SOCIAL WORK
Case Management Discharge Note      Final Note: I spoke wiht pt in room regarding discharge plan and plan is home with significant other. New PCP appointment made with Dr Panda Hubbard for 6/6 at 2:15 pm and in AVS.  Pt's meds to be filled/supplied by [facility] and pt's protonix and ibuprofen is 1 week too early to be covered by insurance. Pt states she is out of those meds and does not have the money to cover the weeks supply until she can get a refill. The out of pocket cost is $12 and arranged to be covered by  indigent fund. Per pt request, transportation arranged with [employer] to transport pt home today. [employer] will pick pt up at [address] at [facility] and notify the nurse when in route, trip no. [phone]. Pt agreeable to discharge plan and denies futher discharge needs.         Selected Continued Care - Admitted Since 5/31/2022     Destination    No services have been selected for the patient.              Durable Medical Equipment    No services have been selected for the patient.              Dialysis/Infusion    No services have been selected for the patient.              Home Medical Care    No services have been selected for the patient.              Therapy    No services have been selected for the patient.              Community Resources    No services have been selected for the patient.              Community & DME    No services have been selected for the patient.                Selected Continued Care - Prior Encounters Includes selections from prior encounters from 3/2/2022 to 6/2/2022    Discharged on 5/13/2022 Admission date: 5/9/2022 - Discharge disposition: Home or Self Care    Therapy     Service Provider Selected Services Address Phone Fax Patient Preferred    [facility] - PHYSICAL THERAPY  Outpatient Physical Therapy [address] [place] [address] [phone] -- --                Discharged on 4/11/2022 Admission date: 4/6/2022 - Discharge disposition:  Home or Self Care    Durable Medical Equipment     Service Provider Selected Services Address Phone Fax Patient Preferred    [facility]  Durable Medical Equipment [address] [place] [id number] [phone] --                    Transportation Services  Public Transit/Bus Service: [employer]    Final Discharge Disposition Code: 01 - home or self-care

## 2022-06-03 ENCOUNTER — TRANSITIONAL CARE MANAGEMENT TELEPHONE ENCOUNTER (OUTPATIENT)
Dept: CALL CENTER | Facility: HOSPITAL | Age: 52
End: 2022-06-03

## 2022-06-03 NOTE — OUTREACH NOTE
Call Center TCM Note    Flowsheet Row Responses   [facility] patient discharged from? [place]   Does the patient have one of the following disease processes/diagnoses(primary or secondary)? Other   TCM attempt successful? No  [No verbal consent]   Unsuccessful attempts Attempt 1   Does the patient have a primary care provider?  Yes   Does the patient have an appointment with their PCP within 7 days of discharge? Yes   Comments regarding PCP hospital fu appt on 6/7/22 at 2:15 PM   Has the patient kept scheduled appointments due by today? N/A          Indiana Gomez RN    6/3/2022, 11:25 EDT

## 2022-06-03 NOTE — OUTREACH NOTE
Call Center TCM Note    Flowsheet Row Responses   [facility] patient discharged from? [place]   Does the patient have one of the following disease processes/diagnoses(primary or secondary)? Other   TCM attempt successful? No   Unsuccessful attempts Attempt 2   Does the patient have a primary care provider?  Yes   Does the patient have an appointment with their PCP within 7 days of discharge? Yes   Comments regarding PCP hospital fu appt on 6/7/22 at 2:15 PM   Has the patient kept scheduled appointments due by today? N/A          Indiana Gomez RN    6/3/2022, 11:58 EDT

## 2022-06-05 ENCOUNTER — TRANSITIONAL CARE MANAGEMENT TELEPHONE ENCOUNTER (OUTPATIENT)
Dept: CALL CENTER | Facility: HOSPITAL | Age: 52
End: 2022-06-05

## 2022-06-05 LAB
BACTERIA SPEC AEROBE CULT: NORMAL
BACTERIA SPEC AEROBE CULT: NORMAL

## 2022-06-05 NOTE — OUTREACH NOTE
Call Center TCM Note    Flowsheet Row Responses   [facility] patient discharged from? [place]   Does the patient have one of the following disease processes/diagnoses(primary or secondary)? Other   TCM attempt successful? No   Unsuccessful attempts Attempt 3          RITA ALMANZA RN    6/5/2022, 11:44 EDT

## 2022-06-07 LAB
HCV GENTYP SERPL NAA+PROBE: NORMAL
HCV GENTYP SERPL NAA+PROBE: NORMAL
HCV RNA SERPL NAA+PROBE-ACNC: NORMAL IU/ML
HCV RNA SERPL NAA+PROBE-LOG IU: 6.86 LOG10 IU/ML
LABORATORY COMMENT REPORT: NORMAL
LABORATORY COMMENT REPORT: NORMAL

## 2022-06-09 LAB
QT INTERVAL: 372 MS
QTC INTERVAL: 479 MS

## 2022-06-10 ENCOUNTER — READMISSION MANAGEMENT (OUTPATIENT)
Dept: CALL CENTER | Facility: HOSPITAL | Age: 52
End: 2022-06-10

## 2022-06-10 NOTE — OUTREACH NOTE
Medical Week 2 Survey    Flowsheet Row Responses   [facility] patient discharged from? [place]   Does the patient have one of the following disease processes/diagnoses(primary or secondary)? Other   Week 2 attempt successful? No   Unsuccessful attempts Attempt 1   Discharge diagnosis Intractable abdominal pain, Diabetes, Chronic Hep C, Cocaine use.          AYE ROOT - Registered Nurse

## 2022-06-14 ENCOUNTER — READMISSION MANAGEMENT (OUTPATIENT)
Dept: CALL CENTER | Facility: HOSPITAL | Age: 52
End: 2022-06-14

## 2022-06-14 NOTE — OUTREACH NOTE
Medical Week 2 Survey    Flowsheet Row Responses   [facility] patient discharged from? [place]   Does the patient have one of the following disease processes/diagnoses(primary or secondary)? Other   Week 2 attempt successful? No   Unsuccessful attempts Attempt 2   Rescheduled Revoked          PANDA RIVERS - Registered Nurse

## 2022-06-16 ENCOUNTER — APPOINTMENT (OUTPATIENT)
Dept: CT IMAGING | Facility: HOSPITAL | Age: 52
End: 2022-06-16

## 2022-06-16 ENCOUNTER — APPOINTMENT (OUTPATIENT)
Dept: GENERAL RADIOLOGY | Facility: HOSPITAL | Age: 52
End: 2022-06-16

## 2022-06-16 ENCOUNTER — HOSPITAL ENCOUNTER (EMERGENCY)
Facility: HOSPITAL | Age: 52
Discharge: HOME | End: 2022-06-17
Attending: EMERGENCY MEDICINE | Admitting: EMERGENCY MEDICINE

## 2022-06-16 VITALS
HEART RATE: 114 BPM | RESPIRATION RATE: 18 BRPM | TEMPERATURE: 98 F | OXYGEN SATURATION: 98 % | BODY MASS INDEX: 24.11 KG/M2 | HEIGHT: 66 IN | WEIGHT: 150 LBS | DIASTOLIC BLOOD PRESSURE: 117 MMHG | SYSTOLIC BLOOD PRESSURE: 168 MMHG

## 2022-06-16 DIAGNOSIS — S09.90XA INJURY OF HEAD, INITIAL ENCOUNTER: ICD-10-CM

## 2022-06-16 DIAGNOSIS — S30.0XXA LUMBAR CONTUSION, INITIAL ENCOUNTER: ICD-10-CM

## 2022-06-16 DIAGNOSIS — R73.9 HYPERGLYCEMIA: Primary | ICD-10-CM

## 2022-06-16 LAB
ALBUMIN SERPL-MCNC: 4.1 G/DL (ref 3.5–5.2)
ALBUMIN/GLOB SERPL: 1.4 G/DL
ALP SERPL-CCNC: 160 U/L (ref 39–117)
ALT SERPL W P-5'-P-CCNC: 69 U/L (ref 1–33)
AMPHET+METHAMPHET UR QL: NEGATIVE
AMPHETAMINES UR QL: NEGATIVE
ANION GAP SERPL CALCULATED.3IONS-SCNC: 14 MMOL/L (ref 5–15)
AST SERPL-CCNC: 97 U/L (ref 1–32)
B-OH-BUTYR SERPL-SCNC: 2.05 MMOL/L (ref 0.02–0.27)
BARBITURATES UR QL SCN: NEGATIVE
BASOPHILS # BLD AUTO: 0.03 10*3/MM3 (ref 0–0.2)
BASOPHILS NFR BLD AUTO: 0.6 % (ref 0–1.5)
BENZODIAZ UR QL SCN: NEGATIVE
BILIRUB SERPL-MCNC: 0.4 MG/DL (ref 0–1.2)
BILIRUB UR QL STRIP: NEGATIVE
BUN SERPL-MCNC: 20 MG/DL (ref 6–20)
BUN/CREAT SERPL: 30.8 (ref 7–25)
BUPRENORPHINE SERPL-MCNC: NEGATIVE NG/ML
CALCIUM SPEC-SCNC: 9.7 MG/DL (ref 8.6–10.5)
CANNABINOIDS SERPL QL: NEGATIVE
CHLORIDE SERPL-SCNC: 95 MMOL/L (ref 98–107)
CK SERPL-CCNC: 220 U/L (ref 20–180)
CLARITY UR: CLEAR
CO2 SERPL-SCNC: 22 MMOL/L (ref 22–29)
COCAINE UR QL: POSITIVE
COLOR UR: YELLOW
CREAT SERPL-MCNC: 0.65 MG/DL (ref 0.57–1)
D-LACTATE SERPL-SCNC: 1 MMOL/L (ref 0.5–2)
DEPRECATED RDW RBC AUTO: 47.8 FL (ref 37–54)
EGFRCR SERPLBLD CKD-EPI 2021: 106.7 ML/MIN/1.73
EOSINOPHIL # BLD AUTO: 0.04 10*3/MM3 (ref 0–0.4)
EOSINOPHIL NFR BLD AUTO: 0.9 % (ref 0.3–6.2)
ERYTHROCYTE [DISTWIDTH] IN BLOOD BY AUTOMATED COUNT: 13.4 % (ref 12.3–15.4)
GLOBULIN UR ELPH-MCNC: 3 GM/DL
GLUCOSE BLDC GLUCOMTR-MCNC: 261 MG/DL (ref 70–130)
GLUCOSE BLDC GLUCOMTR-MCNC: 400 MG/DL (ref 70–130)
GLUCOSE BLDC GLUCOMTR-MCNC: 564 MG/DL (ref 70–130)
GLUCOSE SERPL-MCNC: 555 MG/DL (ref 65–99)
GLUCOSE UR STRIP-MCNC: ABNORMAL MG/DL
HBA1C MFR BLD: 12.7 % (ref 4.8–5.6)
HCT VFR BLD AUTO: 36.7 % (ref 34–46.6)
HGB BLD-MCNC: 12.7 G/DL (ref 12–15.9)
HGB UR QL STRIP.AUTO: NEGATIVE
IMM GRANULOCYTES # BLD AUTO: 0.01 10*3/MM3 (ref 0–0.05)
IMM GRANULOCYTES NFR BLD AUTO: 0.2 % (ref 0–0.5)
KETONES UR QL STRIP: ABNORMAL
LEUKOCYTE ESTERASE UR QL STRIP.AUTO: NEGATIVE
LIPASE SERPL-CCNC: 22 U/L (ref 13–60)
LYMPHOCYTES # BLD AUTO: 1.82 10*3/MM3 (ref 0.7–3.1)
LYMPHOCYTES NFR BLD AUTO: 38.8 % (ref 19.6–45.3)
MAGNESIUM SERPL-MCNC: 1.9 MG/DL (ref 1.6–2.6)
MCH RBC QN AUTO: 33.2 PG (ref 26.6–33)
MCHC RBC AUTO-ENTMCNC: 34.6 G/DL (ref 31.5–35.7)
MCV RBC AUTO: 96.1 FL (ref 79–97)
METHADONE UR QL SCN: NEGATIVE
MONOCYTES # BLD AUTO: 0.55 10*3/MM3 (ref 0.1–0.9)
MONOCYTES NFR BLD AUTO: 11.7 % (ref 5–12)
NEUTROPHILS NFR BLD AUTO: 2.24 10*3/MM3 (ref 1.7–7)
NEUTROPHILS NFR BLD AUTO: 47.8 % (ref 42.7–76)
NITRITE UR QL STRIP: NEGATIVE
NRBC BLD AUTO-RTO: 0 /100 WBC (ref 0–0.2)
OPIATES UR QL: NEGATIVE
OXYCODONE UR QL SCN: NEGATIVE
PCP UR QL SCN: NEGATIVE
PH UR STRIP.AUTO: 6.5 [PH] (ref 5–8)
PHOSPHATE SERPL-MCNC: 4.4 MG/DL (ref 2.5–4.5)
PLATELET # BLD AUTO: 343 10*3/MM3 (ref 140–450)
PMV BLD AUTO: 10 FL (ref 6–12)
POTASSIUM SERPL-SCNC: 5 MMOL/L (ref 3.5–5.2)
PROPOXYPH UR QL: NEGATIVE
PROT SERPL-MCNC: 7.1 G/DL (ref 6–8.5)
PROT UR QL STRIP: NEGATIVE
RBC # BLD AUTO: 3.82 10*6/MM3 (ref 3.77–5.28)
SODIUM SERPL-SCNC: 131 MMOL/L (ref 136–145)
SP GR UR STRIP: 1.03 (ref 1–1.03)
TRICYCLICS UR QL SCN: NEGATIVE
TROPONIN T SERPL-MCNC: <0.01 NG/ML (ref 0–0.03)
UROBILINOGEN UR QL STRIP: ABNORMAL
WBC NRBC COR # BLD: 4.69 10*3/MM3 (ref 3.4–10.8)

## 2022-06-16 PROCEDURE — 70450 CT HEAD/BRAIN W/O DYE: CPT

## 2022-06-16 PROCEDURE — 82550 ASSAY OF CK (CPK): CPT | Performed by: NURSE PRACTITIONER

## 2022-06-16 PROCEDURE — 83036 HEMOGLOBIN GLYCOSYLATED A1C: CPT | Performed by: NURSE PRACTITIONER

## 2022-06-16 PROCEDURE — 81003 URINALYSIS AUTO W/O SCOPE: CPT | Performed by: NURSE PRACTITIONER

## 2022-06-16 PROCEDURE — 80306 DRUG TEST PRSMV INSTRMNT: CPT | Performed by: NURSE PRACTITIONER

## 2022-06-16 PROCEDURE — 83690 ASSAY OF LIPASE: CPT | Performed by: NURSE PRACTITIONER

## 2022-06-16 PROCEDURE — 83605 ASSAY OF LACTIC ACID: CPT | Performed by: NURSE PRACTITIONER

## 2022-06-16 PROCEDURE — 84484 ASSAY OF TROPONIN QUANT: CPT | Performed by: NURSE PRACTITIONER

## 2022-06-16 PROCEDURE — 82962 GLUCOSE BLOOD TEST: CPT

## 2022-06-16 PROCEDURE — 63710000001 INSULIN REGULAR HUMAN PER 5 UNITS: Performed by: NURSE PRACTITIONER

## 2022-06-16 PROCEDURE — 72100 X-RAY EXAM L-S SPINE 2/3 VWS: CPT

## 2022-06-16 PROCEDURE — 80053 COMPREHEN METABOLIC PANEL: CPT | Performed by: NURSE PRACTITIONER

## 2022-06-16 PROCEDURE — 99284 EMERGENCY DEPT VISIT MOD MDM: CPT

## 2022-06-16 PROCEDURE — 96374 THER/PROPH/DIAG INJ IV PUSH: CPT

## 2022-06-16 PROCEDURE — 85025 COMPLETE CBC W/AUTO DIFF WBC: CPT | Performed by: NURSE PRACTITIONER

## 2022-06-16 PROCEDURE — 83735 ASSAY OF MAGNESIUM: CPT | Performed by: NURSE PRACTITIONER

## 2022-06-16 PROCEDURE — 82010 KETONE BODYS QUAN: CPT | Performed by: NURSE PRACTITIONER

## 2022-06-16 PROCEDURE — 71046 X-RAY EXAM CHEST 2 VIEWS: CPT

## 2022-06-16 PROCEDURE — 84100 ASSAY OF PHOSPHORUS: CPT | Performed by: NURSE PRACTITIONER

## 2022-06-16 PROCEDURE — 25010000002 FENTANYL CITRATE (PF) 50 MCG/ML SOLUTION: Performed by: EMERGENCY MEDICINE

## 2022-06-16 PROCEDURE — 36415 COLL VENOUS BLD VENIPUNCTURE: CPT

## 2022-06-16 RX ORDER — IBUPROFEN 600 MG/1
600 TABLET ORAL ONCE
Status: COMPLETED | OUTPATIENT
Start: 2022-06-16 | End: 2022-06-16

## 2022-06-16 RX ORDER — POTASSIUM CHLORIDE, DEXTROSE MONOHYDRATE AND SODIUM CHLORIDE 300; 5; 900 MG/100ML; G/100ML; MG/100ML
150 INJECTION, SOLUTION INTRAVENOUS CONTINUOUS PRN
Status: DISCONTINUED | OUTPATIENT
Start: 2022-06-16 | End: 2022-06-16

## 2022-06-16 RX ORDER — DEXTROSE, SODIUM CHLORIDE, AND POTASSIUM CHLORIDE 5; .45; .15 G/100ML; G/100ML; G/100ML
150 INJECTION INTRAVENOUS CONTINUOUS PRN
Status: DISCONTINUED | OUTPATIENT
Start: 2022-06-16 | End: 2022-06-16

## 2022-06-16 RX ORDER — FLUCONAZOLE 200 MG/1
200 TABLET ORAL ONCE
Status: COMPLETED | OUTPATIENT
Start: 2022-06-16 | End: 2022-06-16

## 2022-06-16 RX ORDER — DEXTROSE, SODIUM CHLORIDE, AND POTASSIUM CHLORIDE 5; .9; .15 G/100ML; G/100ML; G/100ML
150 INJECTION INTRAVENOUS CONTINUOUS PRN
Status: DISCONTINUED | OUTPATIENT
Start: 2022-06-16 | End: 2022-06-16

## 2022-06-16 RX ORDER — SODIUM CHLORIDE 0.9 % (FLUSH) 0.9 %
10 SYRINGE (ML) INJECTION EVERY 12 HOURS SCHEDULED
Status: DISCONTINUED | OUTPATIENT
Start: 2022-06-16 | End: 2022-06-16

## 2022-06-16 RX ORDER — DEXTROSE, SODIUM CHLORIDE, AND POTASSIUM CHLORIDE 5; .45; .3 G/100ML; G/100ML; G/100ML
150 INJECTION INTRAVENOUS CONTINUOUS PRN
Status: DISCONTINUED | OUTPATIENT
Start: 2022-06-16 | End: 2022-06-16

## 2022-06-16 RX ORDER — NICOTINE POLACRILEX 4 MG
15 LOZENGE BUCCAL
Status: DISCONTINUED | OUTPATIENT
Start: 2022-06-16 | End: 2022-06-17 | Stop reason: HOSPADM

## 2022-06-16 RX ORDER — DEXTROSE AND SODIUM CHLORIDE 5; .9 G/100ML; G/100ML
150 INJECTION, SOLUTION INTRAVENOUS CONTINUOUS PRN
Status: DISCONTINUED | OUTPATIENT
Start: 2022-06-16 | End: 2022-06-16

## 2022-06-16 RX ORDER — SODIUM CHLORIDE 450 MG/100ML
250 INJECTION, SOLUTION INTRAVENOUS CONTINUOUS PRN
Status: DISCONTINUED | OUTPATIENT
Start: 2022-06-16 | End: 2022-06-16

## 2022-06-16 RX ORDER — SODIUM CHLORIDE 0.9 % (FLUSH) 0.9 %
10 SYRINGE (ML) INJECTION AS NEEDED
Status: DISCONTINUED | OUTPATIENT
Start: 2022-06-16 | End: 2022-06-16

## 2022-06-16 RX ORDER — SODIUM CHLORIDE 9 MG/ML
250 INJECTION, SOLUTION INTRAVENOUS CONTINUOUS PRN
Status: DISCONTINUED | OUTPATIENT
Start: 2022-06-16 | End: 2022-06-16

## 2022-06-16 RX ORDER — SODIUM CHLORIDE 0.9 % (FLUSH) 0.9 %
10 SYRINGE (ML) INJECTION AS NEEDED
Status: DISCONTINUED | OUTPATIENT
Start: 2022-06-16 | End: 2022-06-17 | Stop reason: HOSPADM

## 2022-06-16 RX ORDER — DEXTROSE AND SODIUM CHLORIDE 5; .45 G/100ML; G/100ML
150 INJECTION, SOLUTION INTRAVENOUS CONTINUOUS PRN
Status: DISCONTINUED | OUTPATIENT
Start: 2022-06-16 | End: 2022-06-16

## 2022-06-16 RX ORDER — SODIUM CHLORIDE AND POTASSIUM CHLORIDE 300; 900 MG/100ML; MG/100ML
250 INJECTION, SOLUTION INTRAVENOUS CONTINUOUS PRN
Status: DISCONTINUED | OUTPATIENT
Start: 2022-06-16 | End: 2022-06-16

## 2022-06-16 RX ORDER — FENTANYL CITRATE 50 UG/ML
25 INJECTION, SOLUTION INTRAMUSCULAR; INTRAVENOUS ONCE
Status: COMPLETED | OUTPATIENT
Start: 2022-06-16 | End: 2022-06-16

## 2022-06-16 RX ORDER — SODIUM CHLORIDE AND POTASSIUM CHLORIDE 150; 450 MG/100ML; MG/100ML
250 INJECTION, SOLUTION INTRAVENOUS CONTINUOUS PRN
Status: DISCONTINUED | OUTPATIENT
Start: 2022-06-16 | End: 2022-06-16

## 2022-06-16 RX ORDER — DEXTROSE MONOHYDRATE 25 G/50ML
10-50 INJECTION, SOLUTION INTRAVENOUS
Status: DISCONTINUED | OUTPATIENT
Start: 2022-06-16 | End: 2022-06-17 | Stop reason: HOSPADM

## 2022-06-16 RX ORDER — SODIUM CHLORIDE AND POTASSIUM CHLORIDE 150; 900 MG/100ML; MG/100ML
250 INJECTION, SOLUTION INTRAVENOUS CONTINUOUS PRN
Status: DISCONTINUED | OUTPATIENT
Start: 2022-06-16 | End: 2022-06-16

## 2022-06-16 RX ADMIN — SODIUM CHLORIDE 1000 ML: 9 INJECTION, SOLUTION INTRAVENOUS at 22:20

## 2022-06-16 RX ADMIN — IBUPROFEN 600 MG: 600 TABLET ORAL at 22:31

## 2022-06-16 RX ADMIN — INSULIN HUMAN 8 UNITS: 100 INJECTION, SOLUTION PARENTERAL at 22:17

## 2022-06-16 RX ADMIN — FLUCONAZOLE 200 MG: 200 TABLET ORAL at 18:50

## 2022-06-16 RX ADMIN — SODIUM CHLORIDE 1000 ML: 9 INJECTION, SOLUTION INTRAVENOUS at 18:37

## 2022-06-16 RX ADMIN — FENTANYL CITRATE 25 MCG: 50 INJECTION, SOLUTION INTRAMUSCULAR; INTRAVENOUS at 19:05

## 2022-06-16 RX ADMIN — INSULIN HUMAN 8 UNITS: 100 INJECTION, SOLUTION PARENTERAL at 19:05

## 2022-06-17 NOTE — ED PROVIDER NOTES
" EMERGENCY DEPARTMENT ENCOUNTER    Pt Name: Bernadette Paris  MRN: [id number]  Pt :   1970  Room Number:    Date of encounter:  2022  PCP: Provider, No Known  ED Provider: DMITRY Brady    Historian: patient      HPI:  Chief Complaint: Fall        Context: Bernadette Paris is a 52 y.o. female who presents to the ED head injury and low back pain as a result of a fall that occurred at 7 AM this morning.  \"My sugar being high I made me fall\".  Patient denies any nausea, vomiting, diarrhea or abdominal pain.    Review of system is negative for fever or chills.  Negative for chest pain or cough or shortness of breath.  Negative for nausea or vomiting or diarrhea or abdominal pain.  Positive for generalized weakness without dizziness or syncope.  No neurosensory complaint or focal weakness.      PAST MEDICAL HISTORY  Past Medical History:   Diagnosis Date   • Arthritis    • Bipolar disorder (HCC)    • Chronic bronchitis (HCC)    • Depression    • Diabetes mellitus (HCC)     DIAGNOSED    • GERD (gastroesophageal reflux disease)    • Hepatitis-C    • History of blood transfusion    • Hyperlipidemia    • Hypertension    • Infectious viral hepatitis     HEPATITIS C   • Migraine    • Sleep apnea     PATIENT UNSURE OF SETTINGS         PAST SURGICAL HISTORY  Past Surgical History:   Procedure Laterality Date   • CHOLECYSTECTOMY     • ENDOSCOPY N/A 2022    Procedure: ESOPHAGOGASTRODUODENOSCOPY;  Surgeon: Brunner, Mark I, MD;  Location: [facility] ENDOSCOPY;  Service: Gastroenterology;  Laterality: N/A;  with antrum biopsy   • HYSTERECTOMY     • KNEE SURGERY     • LUMBAR LAMINECTOMY DISCECTOMY DECOMPRESSION N/A 2018    Procedure: LUMBAR LAMINECTOMIES L4-S1;  Surgeon: Chip Smith MD;  Location: [facility] OR;  Service: Neurosurgery         FAMILY HISTORY  No family history on file.      SOCIAL HISTORY  Social History     Socioeconomic History   • Marital status: Single   Tobacco Use   • Smoking status: " Current Every Day Smoker     Packs/day: 1.00   • Smokeless tobacco: Never Used   Substance and Sexual Activity   • Alcohol use: No   • Drug use: No   • Sexual activity: Defer         ALLERGIES  Tylenol [acetaminophen]        REVIEW OF SYSTEMS  Review of Systems     All systems reviewed and negative except for those discussed in HPI.       PHYSICAL EXAM    I have reviewed the triage vital signs and nursing notes.    ED Triage Vitals [06/16/22 1715]   Temp Heart Rate Resp BP SpO2   98 °F (36.7 °C) 91 16 (!) 170/103 98 %      Temp src Heart Rate Source Patient Position BP Location FiO2 (%)   Oral Monitor Lying Right arm --       Physical Exam  GENERAL:   Appears in no acute distress.  She is slightly hypertensive.  She is a good historian  HENT: Nares patent.  Appears atraumatic.  Patient localizes tenderness to the right hairline near her forehead.   Neck: Patient demonstrates full range of motion without limitation or tenderness to palpation cervical spine  EYES: No scleral icterus.  CV: Regular rhythm, regular rate.  No tachycardia.  No peripheral edema  RESPIRATORY: Normal effort.  No audible wheezes, rales or rhonchi.  ABDOMEN: Soft, nontender  MUSCULOSKELETAL: No deformities.  Pelvis is stable.  Moves all 4 extremities well.  She localizes tenderness to the diffuse lumbar region.  NEURO: Alert, moves all extremities, follows commands.  5 out of 5 strength in all 4 extremities.  SKIN: Warm, dry, no rash visualized.        LAB RESULTS  Recent Results (from the past 24 hour(s))   POC Glucose Once    Collection Time: 06/16/22  5:20 PM    Specimen: Blood   Result Value Ref Range    Glucose 564 (C) 70 - 130 mg/dL   Urinalysis With Microscopic If Indicated (No Culture) - Urine, Clean Catch    Collection Time: 06/16/22  6:04 PM    Specimen: Urine, Clean Catch   Result Value Ref Range    Color, UA Yellow Yellow, Straw    Appearance, UA Clear Clear    pH, UA 6.5 5.0 - 8.0    Specific Gravity, UA 1.029 1.001 - 1.030     Glucose, UA >=1000 mg/dL (3+) (A) Negative    Ketones, UA 15 mg/dL (1+) (A) Negative    Bilirubin, UA Negative Negative    Blood, UA Negative Negative    Protein, UA Negative Negative    Leuk Esterase, UA Negative Negative    Nitrite, UA Negative Negative    Urobilinogen, UA 0.2 E.U./dL 0.2 - 1.0 E.U./dL   Urine Drug Screen - Urine, Clean Catch    Collection Time: 06/16/22  6:04 PM    Specimen: Urine, Clean Catch   Result Value Ref Range    THC, Screen, Urine Negative Negative    Phencyclidine (PCP), Urine Negative Negative    Cocaine Screen, Urine Positive (A) Negative    Methamphetamine, Ur Negative Negative    Opiate Screen Negative Negative    Amphetamine Screen, Urine Negative Negative    Benzodiazepine Screen, Urine Negative Negative    Tricyclic Antidepressants Screen Negative Negative    Methadone Screen, Urine Negative Negative    Barbiturates Screen, Urine Negative Negative    Oxycodone Screen, Urine Negative Negative    Propoxyphene Screen Negative Negative    Buprenorphine, Screen, Urine Negative Negative   Comprehensive Metabolic Panel    Collection Time: 06/16/22  6:22 PM    Specimen: Blood   Result Value Ref Range    Glucose 555 (C) 65 - 99 mg/dL    BUN 20 6 - 20 mg/dL    Creatinine 0.65 0.57 - 1.00 mg/dL    Sodium 131 (L) 136 - 145 mmol/L    Potassium 5.0 3.5 - 5.2 mmol/L    Chloride 95 (L) 98 - 107 mmol/L    CO2 22.0 22.0 - 29.0 mmol/L    Calcium 9.7 8.6 - 10.5 mg/dL    Total Protein 7.1 6.0 - 8.5 g/dL    Albumin 4.10 3.50 - 5.20 g/dL    ALT (SGPT) 69 (H) 1 - 33 U/L    AST (SGOT) 97 (H) 1 - 32 U/L    Alkaline Phosphatase 160 (H) 39 - 117 U/L    Total Bilirubin 0.4 0.0 - 1.2 mg/dL    Globulin 3.0 gm/dL    A/G Ratio 1.4 g/dL    BUN/Creatinine Ratio 30.8 (H) 7.0 - 25.0    Anion Gap 14.0 5.0 - 15.0 mmol/L    eGFR 106.7 >60.0 mL/min/1.73   Beta Hydroxybutyrate Quantitative    Collection Time: 06/16/22  6:22 PM    Specimen: Blood   Result Value Ref Range    Beta-Hydroxybutyrate Quant 2.047 (H) 0.020 -  0.270 mmol/L   Lactic Acid, Plasma    Collection Time: 06/16/22  6:22 PM    Specimen: Blood   Result Value Ref Range    Lactate 1.0 0.5 - 2.0 mmol/L   CK    Collection Time: 06/16/22  6:22 PM    Specimen: Blood   Result Value Ref Range    Creatine Kinase 220 (H) 20 - 180 U/L   Magnesium    Collection Time: 06/16/22  6:22 PM    Specimen: Blood   Result Value Ref Range    Magnesium 1.9 1.6 - 2.6 mg/dL   Lipase    Collection Time: 06/16/22  6:22 PM    Specimen: Blood   Result Value Ref Range    Lipase 22 13 - 60 U/L   CBC Auto Differential    Collection Time: 06/16/22  6:22 PM    Specimen: Blood   Result Value Ref Range    WBC 4.69 3.40 - 10.80 10*3/mm3    RBC 3.82 3.77 - 5.28 10*6/mm3    Hemoglobin 12.7 12.0 - 15.9 g/dL    Hematocrit 36.7 34.0 - 46.6 %    MCV 96.1 79.0 - 97.0 fL    MCH 33.2 (H) 26.6 - 33.0 pg    MCHC 34.6 31.5 - 35.7 g/dL    RDW 13.4 12.3 - 15.4 %    RDW-SD 47.8 37.0 - 54.0 fl    MPV 10.0 6.0 - 12.0 fL    Platelets 343 140 - 450 10*3/mm3    Neutrophil % 47.8 42.7 - 76.0 %    Lymphocyte % 38.8 19.6 - 45.3 %    Monocyte % 11.7 5.0 - 12.0 %    Eosinophil % 0.9 0.3 - 6.2 %    Basophil % 0.6 0.0 - 1.5 %    Immature Grans % 0.2 0.0 - 0.5 %    Neutrophils, Absolute 2.24 1.70 - 7.00 10*3/mm3    Lymphocytes, Absolute 1.82 0.70 - 3.10 10*3/mm3    Monocytes, Absolute 0.55 0.10 - 0.90 10*3/mm3    Eosinophils, Absolute 0.04 0.00 - 0.40 10*3/mm3    Basophils, Absolute 0.03 0.00 - 0.20 10*3/mm3    Immature Grans, Absolute 0.01 0.00 - 0.05 10*3/mm3    nRBC 0.0 0.0 - 0.2 /100 WBC   Phosphorus    Collection Time: 06/16/22  6:22 PM    Specimen: Blood   Result Value Ref Range    Phosphorus 4.4 2.5 - 4.5 mg/dL   Hemoglobin A1c    Collection Time: 06/16/22  6:22 PM    Specimen: Blood   Result Value Ref Range    Hemoglobin A1C 12.70 (H) 4.80 - 5.60 %   Troponin    Collection Time: 06/16/22  6:22 PM    Specimen: Blood   Result Value Ref Range    Troponin T <0.010 0.000 - 0.030 ng/mL   POC Glucose Once    Collection Time:  06/16/22  8:10 PM    Specimen: Blood   Result Value Ref Range    Glucose 400 (H) 70 - 130 mg/dL   POC Glucose Once    Collection Time: 06/16/22 11:44 PM    Specimen: Blood   Result Value Ref Range    Glucose 261 (H) 70 - 130 mg/dL       If labs were ordered, I independently reviewed the results.        RADIOLOGY  XR Chest 2 View    Addendum Date: 6/16/2022    Addendum following late arrival of lateral chest view:  Unremarkable lateral view of the chest. No acute cardiopulmonary findings.  This report was finalized on 6/16/2022 8:58 PM by Amor Espinoza MD.      Result Date: 6/16/2022  DATE OF EXAM: 6/16/2022 8:29 PM  PROCEDURE: XR CHEST 2 VW-  INDICATIONS: DKA  COMPARISON: Chest x-ray 5/31/2022  TECHNIQUE: Single view of the chest  FINDINGS: Normal cardiomediastinal silhouette. The lungs are clear without focal consolidation. No pleural effusion or pneumothorax. No acute osseous findings.      No acute cardiopulmonary findings.  This report was finalized on 6/16/2022 8:47 PM by Amor Espinoza MD.      XR Spine Lumbar 2 or 3 View    Result Date: 6/16/2022  DATE OF EXAM: 6/16/2022 5:39 PM  PROCEDURE: XR SPINE LUMBAR 2 OR 3 VW-  INDICATIONS: fall injury  COMPARISON: No comparisons available.  TECHNIQUE: 4 images of the lumbar spine  FINDINGS: Hypoplastic ribs at T12. Transitional lumbosacral anatomy with sacralized L5 segment with primitive disc space at L5-S1 with the last well-formed disc space at L4-L5. There is trace degenerative retrolisthesis of L4 on L5. No traumatic subluxation. No acute fracture. Vertebral body heights are maintained. Mild multilevel degenerative disc disease. Moderate facet arthropathy in the mid and lower lumbar spine. Postsurgical change of laminectomy/posterior decompression at L3-L4. Paravertebral soft tissues are unremarkable. Right upper quadrant cholecystectomy clips are noted. No acute osseous findings in the pelvis.      No acute fracture or traumatic malalignment. Mild  degenerative disc disease and moderate facet arthropathy in the mid and lower lumbar spine.  This report was finalized on 6/16/2022 6:50 PM by Amor Espinoza MD.      CT Head Without Contrast    Result Date: 6/16/2022  DATE OF EXAM: 6/16/2022 6:07 PM  PROCEDURE: CT HEAD WO CONTRAST-  INDICATIONS: fall w head injury  COMPARISON: Head CT 12/23/2018  TECHNIQUE: Routine transaxial and coronal reconstruction images were obtained through the head without the administration of contrast. Automated exposure control and iterative reconstruction methods were used.  The radiation dose reduction device was turned on for each scan per the ALARA (As Low as Reasonably Achievable) protocol.  FINDINGS: There is mild right frontal scalp soft tissue swelling and likely scarring in the left frontal scalp. No acute intracranial hemorrhage. No acute large territory infarct. The gray-white differentiation appears grossly preserved. No extra-axial collections. No mass effect. Normal size and configuration of the ventricles. Normal appearance of the orbits. The mastoid air cells and paranasal sinuses are clear. No acute osseous findings.      No acute intracranial findings.  This report was finalized on 6/16/2022 6:58 PM by Amor Espinoza MD.          PROCEDURES    Procedures    No orders to display       MEDICATIONS GIVEN IN ER    Medications   sodium chloride 0.9 % flush 10 mL (has no administration in time range)   dextrose (GLUTOSE) oral gel 15 g (has no administration in time range)   dextrose (D50W) (25 g/50 mL) IV injection 10-50 mL (has no administration in time range)   sodium chloride 0.9 % bolus 1,000 mL (0 mL Intravenous Stopped 6/16/22 1907)   fluconazole (DIFLUCAN) tablet 200 mg (200 mg Oral Given 6/16/22 1850)   insulin regular (humuLIN R,novoLIN R) injection 8 Units (8 Units Intravenous Given 6/16/22 1905)   fentaNYL citrate (PF) (SUBLIMAZE) injection 25 mcg (25 mcg Intravenous Given 6/16/22 1905)   insulin regular (humuLIN  R,novoLIN R) injection 8 Units (8 Units Intravenous Given 6/16/22 2217)   sodium chloride 0.9 % bolus 1,000 mL (1,000 mL Intravenous New Bag 6/16/22 2220)   ibuprofen (ADVIL,MOTRIN) tablet 600 mg (600 mg Oral Given 6/16/22 2231)           ED Course as of 06/16/22 2347   Thu Jun 16, 2022   1843 Cocaine Screen, Urine(!): Positive [MS]   1843 Glucose(!): >=1000 mg/dL (3+) [MS]   1904 Beta-Hydroxybutyrate Quant(!): 2.047 [MS]   1904 Creatine Kinase(!): 220 [MS]   1904 Anion Gap: 14.0 [MS]   2124 I have conferred with Dr. Camargo.  Patient has a beta hydroxybutyrate greater than 2 nevertheless, patient has a normal anion gap and normal CO2.  Her average hemoglobin A1c is greater than 12.  Plan of care is to hydrate and give insulin and bring her glucose down to below 300 and discharged to close follow-up. [MS]   2345 Patient's follow-up glucose is now 260.  We discussed parameters for concern that would warrant return to the emergency department.  Ms. Paris understands and concurs with this outpatient plan and close follow-up [MS]      ED Course User Index  [MS] Jeffy Talbotfran Martin, APRN             AS OF 23:47 EDT VITALS:    BP - (!) 168/117  HR - 114  TEMP - 98 °F (36.7 °C) (Oral)  O2 SATS - 98%                  DIAGNOSIS  Final diagnoses:   Hyperglycemia   Injury of head, initial encounter   Lumbar contusion, initial encounter         DISPOSITION    DISCHARGE    Patient discharged in stable condition.    Reviewed implications of results, diagnosis, meds, responsibility to follow up, warning signs and symptoms of possible worsening, potential complications and reasons to return to ER.    Patient/Family voiced understanding of above instructions.    Discussed plan for discharge, as there is no emergent indication for admission.  Pt/family is agreeable and understands need for follow up and possible repeat testing.  Pt/family is aware that discharge does not mean that nothing is wrong but that it indicates no emergency  is currently present that requires admission and they must continue care with follow-up as given below or with a physician of their choice.     FOLLOW-UP  Provider, No Known  [place] [id number]               Medication List      No changes were made to your prescriptions during this visit.                  Sharyn Talbot, DMITRY  06/16/22 1984       Sharyn Talbot, DMITRY  06/16/22 1854

## 2022-06-17 NOTE — DISCHARGE INSTRUCTIONS
Maintain your very best hydration and nutrition.  Follow-up with your primary care provider.  Tylenol as needed for discomfort.  Return to the emergency department as needed for worsening symptoms or concerns.  Thank you

## 2022-06-21 ENCOUNTER — HOSPITAL ENCOUNTER (EMERGENCY)
Facility: HOSPITAL | Age: 52
Discharge: HOME | End: 2022-06-21
Attending: EMERGENCY MEDICINE | Admitting: EMERGENCY MEDICINE

## 2022-06-21 ENCOUNTER — APPOINTMENT (OUTPATIENT)
Dept: CT IMAGING | Facility: HOSPITAL | Age: 52
End: 2022-06-21

## 2022-06-21 VITALS
HEART RATE: 86 BPM | WEIGHT: 151 LBS | DIASTOLIC BLOOD PRESSURE: 74 MMHG | BODY MASS INDEX: 24.27 KG/M2 | OXYGEN SATURATION: 99 % | SYSTOLIC BLOOD PRESSURE: 111 MMHG | TEMPERATURE: 98.1 F | HEIGHT: 66 IN | RESPIRATION RATE: 18 BRPM

## 2022-06-21 DIAGNOSIS — Z86.79 HISTORY OF HYPERTENSION: ICD-10-CM

## 2022-06-21 DIAGNOSIS — E11.65 TYPE 2 DIABETES MELLITUS WITH HYPERGLYCEMIA, UNSPECIFIED WHETHER LONG TERM INSULIN USE: Primary | ICD-10-CM

## 2022-06-21 DIAGNOSIS — R10.9 LEFT SIDED ABDOMINAL PAIN: ICD-10-CM

## 2022-06-21 LAB
ALBUMIN SERPL-MCNC: 3.7 G/DL (ref 3.5–5.2)
ALBUMIN/GLOB SERPL: 1.5 G/DL
ALP SERPL-CCNC: 99 U/L (ref 39–117)
ALT SERPL W P-5'-P-CCNC: 61 U/L (ref 1–33)
ANION GAP SERPL CALCULATED.3IONS-SCNC: 15 MMOL/L (ref 5–15)
AST SERPL-CCNC: 76 U/L (ref 1–32)
ATMOSPHERIC PRESS: ABNORMAL MM[HG]
B-OH-BUTYR SERPL-SCNC: 1.38 MMOL/L (ref 0.02–0.27)
BASE EXCESS BLDV CALC-SCNC: -9.5 MMOL/L (ref -2–2)
BASOPHILS # BLD AUTO: 0.01 10*3/MM3 (ref 0–0.2)
BASOPHILS NFR BLD AUTO: 0.2 % (ref 0–1.5)
BDY SITE: ABNORMAL
BILIRUB SERPL-MCNC: 0.3 MG/DL (ref 0–1.2)
BODY TEMPERATURE: 37 C
BUN SERPL-MCNC: 15 MG/DL (ref 6–20)
BUN/CREAT SERPL: 14.2 (ref 7–25)
CALCIUM SPEC-SCNC: 9.5 MG/DL (ref 8.6–10.5)
CHLORIDE SERPL-SCNC: 91 MMOL/L (ref 98–107)
CO2 BLDA-SCNC: 15.9 MMOL/L (ref 22–33)
CO2 SERPL-SCNC: 20 MMOL/L (ref 22–29)
COHGB MFR BLD: 3.3 %
CREAT SERPL-MCNC: 1.06 MG/DL (ref 0.57–1)
DEPRECATED RDW RBC AUTO: 44.5 FL (ref 37–54)
EGFRCR SERPLBLD CKD-EPI 2021: 63.7 ML/MIN/1.73
EOSINOPHIL # BLD AUTO: 0.02 10*3/MM3 (ref 0–0.4)
EOSINOPHIL NFR BLD AUTO: 0.4 % (ref 0.3–6.2)
EPAP: 0
ERYTHROCYTE [DISTWIDTH] IN BLOOD BY AUTOMATED COUNT: 13.2 % (ref 12.3–15.4)
FLUAV SUBTYP SPEC NAA+PROBE: NOT DETECTED
FLUBV RNA ISLT QL NAA+PROBE: NOT DETECTED
GLOBULIN UR ELPH-MCNC: 2.5 GM/DL
GLUCOSE BLDC GLUCOMTR-MCNC: 258 MG/DL (ref 70–130)
GLUCOSE BLDC GLUCOMTR-MCNC: 315 MG/DL (ref 70–130)
GLUCOSE BLDC GLUCOMTR-MCNC: 354 MG/DL (ref 70–130)
GLUCOSE BLDC GLUCOMTR-MCNC: 438 MG/DL (ref 70–130)
GLUCOSE SERPL-MCNC: 442 MG/DL (ref 65–99)
HCO3 BLDV-SCNC: 15 MMOL/L (ref 22–28)
HCT VFR BLD AUTO: 34.9 % (ref 34–46.6)
HGB BLD-MCNC: 12.2 G/DL (ref 12–15.9)
HGB BLDA-MCNC: 7.5 G/DL (ref 14–18)
HOLD SPECIMEN: NORMAL
IMM GRANULOCYTES # BLD AUTO: 0.01 10*3/MM3 (ref 0–0.05)
IMM GRANULOCYTES NFR BLD AUTO: 0.2 % (ref 0–0.5)
INHALED O2 CONCENTRATION: 21 %
IPAP: 0
LIPASE SERPL-CCNC: 14 U/L (ref 13–60)
LYMPHOCYTES # BLD AUTO: 1.81 10*3/MM3 (ref 0.7–3.1)
LYMPHOCYTES NFR BLD AUTO: 32.7 % (ref 19.6–45.3)
Lab: ABNORMAL
MCH RBC QN AUTO: 32.4 PG (ref 26.6–33)
MCHC RBC AUTO-ENTMCNC: 35 G/DL (ref 31.5–35.7)
MCV RBC AUTO: 92.6 FL (ref 79–97)
METHGB BLD QL: 0.5 %
MODALITY: ABNORMAL
MONOCYTES # BLD AUTO: 0.46 10*3/MM3 (ref 0.1–0.9)
MONOCYTES NFR BLD AUTO: 8.3 % (ref 5–12)
NEUTROPHILS NFR BLD AUTO: 3.22 10*3/MM3 (ref 1.7–7)
NEUTROPHILS NFR BLD AUTO: 58.2 % (ref 42.7–76)
NOTE: ABNORMAL
NOTIFIED BY: ABNORMAL
NOTIFIED WHO: ABNORMAL
NRBC BLD AUTO-RTO: 0 /100 WBC (ref 0–0.2)
OXYHGB MFR BLDV: 67.9 %
PAW @ PEAK INSP FLOW SETTING VENT: 0 CMH2O
PCO2 BLDV: 27.2 MM HG (ref 41–51)
PH BLDV: 7.35 PH UNITS (ref 7.31–7.41)
PLATELET # BLD AUTO: 338 10*3/MM3 (ref 140–450)
PMV BLD AUTO: 10.3 FL (ref 6–12)
PO2 BLDV: 37.8 MM HG (ref 27–53)
POTASSIUM SERPL-SCNC: 4 MMOL/L (ref 3.5–5.2)
PROT SERPL-MCNC: 6.2 G/DL (ref 6–8.5)
RBC # BLD AUTO: 3.77 10*6/MM3 (ref 3.77–5.28)
SARS-COV-2 RNA PNL SPEC NAA+PROBE: NOT DETECTED
SODIUM SERPL-SCNC: 126 MMOL/L (ref 136–145)
TOTAL RATE: 0 BREATHS/MINUTE
WBC NRBC COR # BLD: 5.53 10*3/MM3 (ref 3.4–10.8)
WHOLE BLOOD HOLD COAG: NORMAL
WHOLE BLOOD HOLD SPECIMEN: NORMAL

## 2022-06-21 PROCEDURE — 25010000002 DROPERIDOL PER 5 MG: Performed by: EMERGENCY MEDICINE

## 2022-06-21 PROCEDURE — 82805 BLOOD GASES W/O2 SATURATION: CPT

## 2022-06-21 PROCEDURE — 82962 GLUCOSE BLOOD TEST: CPT

## 2022-06-21 PROCEDURE — 85025 COMPLETE CBC W/AUTO DIFF WBC: CPT | Performed by: EMERGENCY MEDICINE

## 2022-06-21 PROCEDURE — 82010 KETONE BODYS QUAN: CPT | Performed by: EMERGENCY MEDICINE

## 2022-06-21 PROCEDURE — 74176 CT ABD & PELVIS W/O CONTRAST: CPT

## 2022-06-21 PROCEDURE — 96374 THER/PROPH/DIAG INJ IV PUSH: CPT

## 2022-06-21 PROCEDURE — 83690 ASSAY OF LIPASE: CPT | Performed by: EMERGENCY MEDICINE

## 2022-06-21 PROCEDURE — 87636 SARSCOV2 & INF A&B AMP PRB: CPT | Performed by: EMERGENCY MEDICINE

## 2022-06-21 PROCEDURE — 99284 EMERGENCY DEPT VISIT MOD MDM: CPT

## 2022-06-21 PROCEDURE — 80053 COMPREHEN METABOLIC PANEL: CPT | Performed by: EMERGENCY MEDICINE

## 2022-06-21 RX ORDER — DROPERIDOL 2.5 MG/ML
2.5 INJECTION, SOLUTION INTRAMUSCULAR; INTRAVENOUS ONCE
Status: COMPLETED | OUTPATIENT
Start: 2022-06-21 | End: 2022-06-21

## 2022-06-21 RX ORDER — SODIUM CHLORIDE 0.9 % (FLUSH) 0.9 %
10 SYRINGE (ML) INJECTION AS NEEDED
Status: DISCONTINUED | OUTPATIENT
Start: 2022-06-21 | End: 2022-06-21 | Stop reason: HOSPADM

## 2022-06-21 RX ADMIN — SODIUM CHLORIDE, POTASSIUM CHLORIDE, SODIUM LACTATE AND CALCIUM CHLORIDE 1000 ML: 600; 310; 30; 20 INJECTION, SOLUTION INTRAVENOUS at 09:31

## 2022-06-21 RX ADMIN — DROPERIDOL 2.5 MG: 2.5 INJECTION, SOLUTION INTRAMUSCULAR; INTRAVENOUS at 06:10

## 2022-06-21 RX ADMIN — SODIUM CHLORIDE, POTASSIUM CHLORIDE, SODIUM LACTATE AND CALCIUM CHLORIDE 1000 ML: 600; 310; 30; 20 INJECTION, SOLUTION INTRAVENOUS at 06:26

## 2022-06-21 NOTE — CASE MANAGEMENT/SOCIAL WORK
Continued Stay Note  [facility]     Patient Name: Bernadette Paris  MRN: [id number]  Today's Date: 6/21/2022    Admit Date: 6/21/2022     Discharge Plan     Row Name 06/21/22 1243       Plan    Plan SW follow up    Plan Comments RN came to [facility] about assistance with transportation for pt. upon her d/c. Pt. uses [facility]. [facility] called and scheduled a return  for pt. [facility] could not give [facility] an ETA only said “we will be there as soon as we can”. Pt. will be picked up at the Emergency Room.  RN updated.    Final Discharge Disposition Code 01 - home or self-care               Discharge Codes    No documentation.                     VINCENZO Rowe

## 2022-06-21 NOTE — ED PROVIDER NOTES
Subjective   The patient is a 52-year-old female presenting with 2 days of left-sided abdominal pain with dysregulation of her diabetes characterized with hyperglycemia.  Patient does report a history of recurrent and chronic pancreatitis.  Patient also reports cough.  Patient reports generalized fatigue and weakness.  Patient denies any vomiting or diarrhea.  Patient does report nausea.  No chest pain or shortness of breath reported.      History provided by:  Patient      Review of Systems   Constitutional: Negative for fever.   Respiratory: Positive for cough.    Cardiovascular: Negative.    Gastrointestinal: Positive for abdominal pain and nausea. Negative for diarrhea and vomiting.   Endocrine: Positive for polydipsia and polyuria.   Genitourinary: Negative.    Musculoskeletal: Negative.    Neurological: Negative.    Psychiatric/Behavioral: Negative.    All other systems reviewed and are negative.      Past Medical History:   Diagnosis Date   • Arthritis    • Bipolar disorder (HCC)    • Chronic bronchitis (HCC)    • Depression    • Diabetes mellitus (HCC)     DIAGNOSED 2016   • GERD (gastroesophageal reflux disease)    • Hepatitis-C    • History of blood transfusion    • Hyperlipidemia    • Hypertension    • Infectious viral hepatitis     HEPATITIS C   • Migraine    • Sleep apnea     PATIENT UNSURE OF SETTINGS       Allergies   Allergen Reactions   • Tylenol [Acetaminophen] Other (See Comments)     SEVERE LIVER DISEASE-CONTRAINDICATED       Past Surgical History:   Procedure Laterality Date   • CHOLECYSTECTOMY     • ENDOSCOPY N/A 4/9/2022    Procedure: ESOPHAGOGASTRODUODENOSCOPY;  Surgeon: Brunner, Mark I, MD;  Location: [facility] ENDOSCOPY;  Service: Gastroenterology;  Laterality: N/A;  with antrum biopsy   • HYSTERECTOMY     • KNEE SURGERY     • LUMBAR LAMINECTOMY DISCECTOMY DECOMPRESSION N/A 8/6/2018    Procedure: LUMBAR LAMINECTOMIES L4-S1;  Surgeon: Chip Smith MD;  Location: [facility] OR;  Service:  Neurosurgery       No family history on file.    Social History     Socioeconomic History   • Marital status: Single   Tobacco Use   • Smoking status: Current Every Day Smoker     Packs/day: 1.00   • Smokeless tobacco: Never Used   Substance and Sexual Activity   • Alcohol use: Yes     Comment: hx etoh abuse per ems   • Drug use: No   • Sexual activity: Defer           Objective   Physical Exam  Vitals and nursing note reviewed.   Constitutional:       General: She is not in acute distress.     Appearance: Normal appearance. She is ill-appearing. She is not toxic-appearing.   HENT:      Head: Normocephalic and atraumatic.   Eyes:      Pupils: Pupils are equal, round, and reactive to light.   Cardiovascular:      Rate and Rhythm: Regular rhythm. Tachycardia present.      Pulses: Normal pulses.   Pulmonary:      Effort: Pulmonary effort is normal. No respiratory distress.      Breath sounds: Normal breath sounds.   Abdominal:      Palpations: Abdomen is soft.      Tenderness: There is abdominal tenderness in the epigastric area, left upper quadrant and left lower quadrant. There is guarding.   Musculoskeletal:      Cervical back: Normal range of motion.   Skin:     General: Skin is warm and dry.   Neurological:      Mental Status: She is alert and oriented to person, place, and time.   Psychiatric:         Mood and Affect: Mood normal.         Behavior: Behavior normal.         Procedures           ED Course  ED Course as of 06/21/22 1506   Tue Jun 21, 2022   0546 Glucose(!!): 438 [RS]   0612 Beta-Hydroxybutyrate Quant(!): 1.383 [RS]   0745 CT Abdomen Pelvis Without Contrast  Personally reviewed CT scan abdomen pelvis demonstrating no focal emergent finding.  See report for radiology for details. [RS]   0808 Glucose(!): 354 [RS]   1113 Patient reporting controlled diabetes mellitus type 2 with hyperglycemia.  No evidence of DKA.  And has responded well to fluids alone.  CT scan of the abdomen pelvis unremarkable.  We  will plan to discharge the patient with supportive care to follow-up with her doctor soon as possible. I had a discussion with the patient/family regarding diagnosis, diagnostic results, treatment plan, and medications.  The patient/family indicated understanding of these instructions.  I spent adequate time at the bedside proceeding discharge necessary to personally discuss the aftercare instructions, giving patient education, providing explanations of the results of our evaluations/findings, and my decision making to assure that the patient/family understand the plan of care.  Time was allotted to answer questions at that time and throughout the ED course.  Emphasis was placed on timely follow-up after discharge.  I also discussed the potential for the development of an acute emergent condition requiring further evaluation, admission, or even surgical intervention. I discussed that we found nothing during the visit today indicating the need for further workup, admission, or the presence of an unstable medical condition.  I encouraged the patient to return to the emergency department immediately for ANY concerns, worsening, new complaints, or if symptoms persist and unable to seek follow-up in a timely fashion.  The patient/family expressed understanding and agreement with this plan.  [RS]      ED Course User Index  [RS] Amor Parsons MD                                                 MDM  Number of Diagnoses or Management Options  History of hypertension  Left sided abdominal pain  Type 2 diabetes mellitus with hyperglycemia, unspecified whether long term insulin use (HCC)  Diagnosis management comments: Recent Results (from the past 24 hour(s))  -Comprehensive Metabolic Panel:   Collection Time: 06/21/22  5:17 AM  Specimen: Blood       Result                      Value             Ref Range           Glucose                     442 (C)           65 - 99 mg/dL       BUN                         15                 6 - 20 mg/dL        Creatinine                  1.06 (H)          0.57 - 1.00 *       Sodium                      126 (L)           136 - 145 mm*       Potassium                   4.0               3.5 - 5.2 mm*       Chloride                    91 (L)            98 - 107 mmo*       CO2                         20.0 (L)          22.0 - 29.0 *       Calcium                     9.5               8.6 - 10.5 m*       Total Protein               6.2               6.0 - 8.5 g/*       Albumin                     3.70              3.50 - 5.20 *       ALT (SGPT)                  61 (H)            1 - 33 U/L          AST (SGOT)                  76 (H)            1 - 32 U/L          Alkaline Phosphatase        99                39 - 117 U/L        Total Bilirubin             0.3               0.0 - 1.2 mg*       Globulin                    2.5               gm/dL               A/G Ratio                   1.5               g/dL                BUN/Creatinine Ratio        14.2              7.0 - 25.0          Anion Gap                   15.0              5.0 - 15.0 m*       eGFR                        63.7              >60.0 mL/min*  -Green Top (Gel):   Collection Time: 06/21/22  5:17 AM       Result                      Value             Ref Range           Extra Tube                                                    Hold for add-ons.  -Lavender Top:   Collection Time: 06/21/22  5:17 AM       Result                      Value             Ref Range           Extra Tube                                                    hold for add-on  -Gold Top - SST:   Collection Time: 06/21/22  5:17 AM       Result                      Value             Ref Range           Extra Tube                                                    Hold for add-ons.  -Gray Top:   Collection Time: 06/21/22  5:17 AM       Result                      Value             Ref Range           Extra Tube                                                    Hold  for add-ons.  -Light Blue Top:   Collection Time: 06/21/22  5:17 AM       Result                      Value             Ref Range           Extra Tube                                                    Hold for add-ons.  -CBC Auto Differential:   Collection Time: 06/21/22  5:17 AM  Specimen: Blood       Result                      Value             Ref Range           WBC                         5.53              3.40 - 10.80*       RBC                         3.77              3.77 - 5.28 *       Hemoglobin                  12.2              12.0 - 15.9 *       Hematocrit                  34.9              34.0 - 46.6 %       MCV                         92.6              79.0 - 97.0 *       MCH                         32.4              26.6 - 33.0 *       MCHC                        35.0              31.5 - 35.7 *       RDW                         13.2              12.3 - 15.4 %       RDW-SD                      44.5              37.0 - 54.0 *       MPV                         10.3              6.0 - 12.0 fL       Platelets                   338               140 - 450 10*       Neutrophil %                58.2              42.7 - 76.0 %       Lymphocyte %                32.7              19.6 - 45.3 %       Monocyte %                  8.3               5.0 - 12.0 %        Eosinophil %                0.4               0.3 - 6.2 %         Basophil %                  0.2               0.0 - 1.5 %         Immature Grans %            0.2               0.0 - 0.5 %         Neutrophils, Absolute       3.22              1.70 - 7.00 *       Lymphocytes, Absolute       1.81              0.70 - 3.10 *       Monocytes, Absolute         0.46              0.10 - 0.90 *       Eosinophils, Absolute       0.02              0.00 - 0.40 *       Basophils, Absolute         0.01              0.00 - 0.20 *       Immature Grans, Absolu*     0.01              0.00 - 0.05 *       nRBC                        0.0               0.0 - 0.2  /1*  -Beta Hydroxybutyrate Quantitative:   Collection Time: 06/21/22  5:17 AM  Specimen: Blood       Result                      Value             Ref Range           Beta-Hydroxybutyrate Q*     1.383 (H)         0.020 - 0.27*  -Lipase:   Collection Time: 06/21/22  5:17 AM  Specimen: Blood       Result                      Value             Ref Range           Lipase                      14                13 - 60 U/L    -POC Glucose Once:   Collection Time: 06/21/22  5:44 AM  Specimen: Blood       Result                      Value             Ref Range           Glucose                     438 (C)           70 - 130 mg/*  -POC Glucose Once:   Collection Time: 06/21/22  7:57 AM  Specimen: Blood       Result                      Value             Ref Range           Glucose                     354 (H)           70 - 130 mg/*  -COVID-19 and FLU A/B PCR - Swab, Nasopharynx:   Collection Time: 06/21/22  8:48 AM  Specimen: Nasopharynx; Swab       Result                      Value             Ref Range           COVID19                     Not Detected      Not Detected*       Influenza A PCR             Not Detected      Not Detected        Influenza B PCR             Not Detected      Not Detected   -Blood Gas, Venous With Co-Ox:   Collection Time: 06/21/22  9:29 AM  Specimen: Venous Blood       Result                      Value             Ref Range           Site                        Nurse/Dr Draw                         pH, Venous                  7.351             7.310 - 7.41*       pCO2, Venous                27.2 (L)          41.0 - 51.0 *       pO2, Venous                 37.8              27.0 - 53.0 *       HCO3, Venous                15.0 (L)          22.0 - 28.0 *       Base Excess, Venous         -9.5 (L)          -2.0 - 2.0 m*       Hemoglobin, Blood Gas       7.5 (L)           14 - 18 g/dL        Oxyhemoglobin Venous        67.9              %                   Methemoglobin Venous        0.5                %                   Carboxyhemoglobin Veno*     3.3               %                   CO2 Content                 15.9 (L)          22 - 33 mmol*       Temperature                 37.0              C                   Barometric Pressure fo*                                           Modality                    Room Air                              FIO2                        21                %                   Rate                        0                 Breaths/phuong*       PIP                         0                 cmH2O               IPAP                        0                                     EPAP                        0                                     Note                                                              Notified Who                                                  ADITHYA FRIEDMAN RN       Notified By                 [id number]                                Notified Time                                                 06/21/2022 09:30  -POC Glucose Once:   Collection Time: 06/21/22  9:39 AM  Specimen: Blood       Result                      Value             Ref Range           Glucose                     315 (H)           70 - 130 mg/*  -POC Glucose Once:   Collection Time: 06/21/22 11:07 AM  Specimen: Blood       Result                      Value             Ref Range           Glucose                     258 (H)           70 - 130 mg/*  Note: In addition to lab results from this visit, the labs listed above may include labs taken at another facility or during a different encounter within the last 24 hours. Please correlate lab times with ED admission and discharge times for further clarification of the services performed during this visit.    CT Abdomen Pelvis Without Contrast   Final Result    1. No acute abnormality in the abdomen or pelvis.    2. Status post cholecystectomy and hysterectomy.    3. Additional chronic findings above.         This report was finalized on 6/21/2022 7:17  AM by Flako Begum MD.          -----------------------------------------------------            06/21/22 06/21/22 06/21/22 06/21/22               0934      0935      1030      1100     -----------------------------------------------------   BP:       132/83              116/75    111/74     BP Location:                                           Patient Position:                                           Pulse:                84        85        86       Resp:                                              Temp:                                              TempSrc:                                           SpO2:                99%       99%       99%       Weight:                                            Height:                                           -----------------------------------------------------  Medications  droperidol (INAPSINE) injection 2.5 mg (2.5 mg Intravenous Given 6/21/22 0610)  lactated ringers bolus 1,000 mL (0 mL Intravenous Stopped 6/21/22 1132)  lactated ringers bolus 1,000 mL (0 mL Intravenous Stopped 6/21/22 1132)  ECG/EMG Results (last 24 hours)     ** No results found for the last 24 hours. **      No orders to display         Amount and/or Complexity of Data Reviewed  Clinical lab tests: reviewed  Tests in the radiology section of CPT®: reviewed  Obtain history from someone other than the patient: yes  Independent visualization of images, tracings, or specimens: yes        Final diagnoses:   Type 2 diabetes mellitus with hyperglycemia, unspecified whether long term insulin use (HCC)   Left sided abdominal pain   History of hypertension       ED Disposition  ED Disposition     ED Disposition   Discharge    Condition   Stable    Comment   --             [facility]  [address] [address] [phone]    As needed, If symptoms worsen or ANY  concerns.    PATIENT CONNECTION - [facility] [id number]  [phone]  Schedule an appointment as soon as possible for a visit            Medication List      No changes were made to your prescriptions during this visit.          Amor Parsons MD  06/21/22 8344

## 2022-08-10 ENCOUNTER — APPOINTMENT (OUTPATIENT)
Dept: GENERAL RADIOLOGY | Facility: HOSPITAL | Age: 52
End: 2022-08-10

## 2022-08-10 ENCOUNTER — APPOINTMENT (OUTPATIENT)
Dept: CT IMAGING | Facility: HOSPITAL | Age: 52
End: 2022-08-10

## 2022-08-10 ENCOUNTER — APPOINTMENT (OUTPATIENT)
Dept: MRI IMAGING | Facility: HOSPITAL | Age: 52
End: 2022-08-10

## 2022-08-10 ENCOUNTER — HOSPITAL ENCOUNTER (INPATIENT)
Facility: HOSPITAL | Age: 52
LOS: 5 days | Discharge: HOME | End: 2022-08-15
Attending: EMERGENCY MEDICINE | Admitting: INTERNAL MEDICINE

## 2022-08-10 DIAGNOSIS — R47.1 DYSARTHRIA: ICD-10-CM

## 2022-08-10 DIAGNOSIS — K86.1 CHRONIC CALCIFIC PANCREATITIS: Chronic | ICD-10-CM

## 2022-08-10 DIAGNOSIS — B18.2 CHRONIC HEPATITIS C WITHOUT HEPATIC COMA: ICD-10-CM

## 2022-08-10 DIAGNOSIS — R03.0 ELEVATED BLOOD PRESSURE READING: ICD-10-CM

## 2022-08-10 DIAGNOSIS — Z91.14 NONCOMPLIANCE WITH MEDICATIONS: ICD-10-CM

## 2022-08-10 DIAGNOSIS — R29.810 FACIAL DROOP: ICD-10-CM

## 2022-08-10 DIAGNOSIS — M21.372 LEFT FOOT DROP: ICD-10-CM

## 2022-08-10 DIAGNOSIS — R73.9 HYPERGLYCEMIA: ICD-10-CM

## 2022-08-10 DIAGNOSIS — R79.89 ELEVATED LFTS: ICD-10-CM

## 2022-08-10 DIAGNOSIS — I16.0 HYPERTENSIVE URGENCY: ICD-10-CM

## 2022-08-10 DIAGNOSIS — E86.0 DEHYDRATION: Primary | ICD-10-CM

## 2022-08-10 DIAGNOSIS — K86.1 CHRONIC PANCREATITIS, UNSPECIFIED PANCREATITIS TYPE: ICD-10-CM

## 2022-08-10 DIAGNOSIS — M48.062 LUMBAR STENOSIS WITH NEUROGENIC CLAUDICATION: ICD-10-CM

## 2022-08-10 DIAGNOSIS — E11.40 TYPE 2 DIABETES MELLITUS WITH DIABETIC NEUROPATHY, WITH LONG-TERM CURRENT USE OF INSULIN: ICD-10-CM

## 2022-08-10 DIAGNOSIS — Z79.4 TYPE 2 DIABETES MELLITUS WITH DIABETIC NEUROPATHY, WITH LONG-TERM CURRENT USE OF INSULIN: ICD-10-CM

## 2022-08-10 PROBLEM — I16.9 HYPERTENSIVE CRISIS: Status: ACTIVE | Noted: 2022-08-10

## 2022-08-10 PROBLEM — R53.1 RIGHT SIDED WEAKNESS: Status: ACTIVE | Noted: 2022-08-10

## 2022-08-10 PROBLEM — K85.90 ACUTE ON CHRONIC PANCREATITIS: Status: ACTIVE | Noted: 2022-08-10

## 2022-08-10 LAB
ALBUMIN SERPL-MCNC: 4.2 G/DL (ref 3.5–5.2)
ALBUMIN SERPL-MCNC: 4.6 G/DL (ref 3.5–5.2)
ALBUMIN/GLOB SERPL: 1.3 G/DL
ALBUMIN/GLOB SERPL: 1.4 G/DL
ALP SERPL-CCNC: 197 U/L (ref 39–117)
ALP SERPL-CCNC: 205 U/L (ref 39–117)
ALT SERPL W P-5'-P-CCNC: 78 U/L (ref 1–33)
ALT SERPL W P-5'-P-CCNC: 80 U/L (ref 1–33)
ANION GAP SERPL CALCULATED.3IONS-SCNC: 17 MMOL/L (ref 5–15)
ANION GAP SERPL CALCULATED.3IONS-SCNC: 19 MMOL/L (ref 5–15)
ANION GAP SERPL CALCULATED.3IONS-SCNC: 22 MMOL/L (ref 5–15)
ANION GAP SERPL CALCULATED.3IONS-SCNC: 22 MMOL/L (ref 5–15)
AST SERPL-CCNC: 139 U/L (ref 1–32)
AST SERPL-CCNC: 89 U/L (ref 1–32)
B-OH-BUTYR SERPL-SCNC: 1.64 MMOL/L (ref 0.02–0.27)
BASOPHILS # BLD AUTO: 0.02 10*3/MM3 (ref 0–0.2)
BASOPHILS # BLD AUTO: 0.04 10*3/MM3 (ref 0–0.2)
BASOPHILS NFR BLD AUTO: 0.3 % (ref 0–1.5)
BASOPHILS NFR BLD AUTO: 0.4 % (ref 0–1.5)
BILIRUB SERPL-MCNC: 0.5 MG/DL (ref 0–1.2)
BILIRUB SERPL-MCNC: 0.6 MG/DL (ref 0–1.2)
BILIRUB UR QL STRIP: NEGATIVE
BUN SERPL-MCNC: 4 MG/DL (ref 6–20)
BUN SERPL-MCNC: 4 MG/DL (ref 6–20)
BUN SERPL-MCNC: 5 MG/DL (ref 6–20)
BUN SERPL-MCNC: 5 MG/DL (ref 6–20)
BUN/CREAT SERPL: 10.6 (ref 7–25)
BUN/CREAT SERPL: 6.7 (ref 7–25)
BUN/CREAT SERPL: 8.5 (ref 7–25)
BUN/CREAT SERPL: 8.5 (ref 7–25)
CALCIUM SPEC-SCNC: 8.7 MG/DL (ref 8.6–10.5)
CALCIUM SPEC-SCNC: 9.2 MG/DL (ref 8.6–10.5)
CALCIUM SPEC-SCNC: 9.2 MG/DL (ref 8.6–10.5)
CALCIUM SPEC-SCNC: 9.6 MG/DL (ref 8.6–10.5)
CHLORIDE SERPL-SCNC: 102 MMOL/L (ref 98–107)
CHLORIDE SERPL-SCNC: 90 MMOL/L (ref 98–107)
CHLORIDE SERPL-SCNC: 98 MMOL/L (ref 98–107)
CHLORIDE SERPL-SCNC: 98 MMOL/L (ref 98–107)
CLARITY UR: CLEAR
CO2 SERPL-SCNC: 19 MMOL/L (ref 22–29)
CO2 SERPL-SCNC: 19 MMOL/L (ref 22–29)
CO2 SERPL-SCNC: 20 MMOL/L (ref 22–29)
CO2 SERPL-SCNC: 23 MMOL/L (ref 22–29)
COLOR UR: YELLOW
CREAT SERPL-MCNC: 0.47 MG/DL (ref 0.57–1)
CREAT SERPL-MCNC: 0.75 MG/DL (ref 0.57–1)
D-LACTATE SERPL-SCNC: 1.7 MMOL/L (ref 0.5–2)
D-LACTATE SERPL-SCNC: 3.6 MMOL/L (ref 0.5–2)
D-LACTATE SERPL-SCNC: 4 MMOL/L (ref 0.5–2)
DEPRECATED RDW RBC AUTO: 43.5 FL (ref 37–54)
DEPRECATED RDW RBC AUTO: 45.3 FL (ref 37–54)
EGFRCR SERPLBLD CKD-EPI 2021: 115.4 ML/MIN/1.73
EGFRCR SERPLBLD CKD-EPI 2021: 96.5 ML/MIN/1.73
EOSINOPHIL # BLD AUTO: 0.01 10*3/MM3 (ref 0–0.4)
EOSINOPHIL # BLD AUTO: 0.04 10*3/MM3 (ref 0–0.4)
EOSINOPHIL NFR BLD AUTO: 0.2 % (ref 0.3–6.2)
EOSINOPHIL NFR BLD AUTO: 0.4 % (ref 0.3–6.2)
ERYTHROCYTE [DISTWIDTH] IN BLOOD BY AUTOMATED COUNT: 12.7 % (ref 12.3–15.4)
ERYTHROCYTE [DISTWIDTH] IN BLOOD BY AUTOMATED COUNT: 13.2 % (ref 12.3–15.4)
GLOBULIN UR ELPH-MCNC: 3.2 GM/DL
GLOBULIN UR ELPH-MCNC: 3.3 GM/DL
GLUCOSE BLDC GLUCOMTR-MCNC: 145 MG/DL (ref 70–130)
GLUCOSE BLDC GLUCOMTR-MCNC: 147 MG/DL (ref 70–130)
GLUCOSE BLDC GLUCOMTR-MCNC: 169 MG/DL (ref 70–130)
GLUCOSE BLDC GLUCOMTR-MCNC: 178 MG/DL (ref 70–130)
GLUCOSE BLDC GLUCOMTR-MCNC: 179 MG/DL (ref 70–130)
GLUCOSE BLDC GLUCOMTR-MCNC: 210 MG/DL (ref 70–130)
GLUCOSE BLDC GLUCOMTR-MCNC: 221 MG/DL (ref 70–130)
GLUCOSE BLDC GLUCOMTR-MCNC: 232 MG/DL (ref 70–130)
GLUCOSE BLDC GLUCOMTR-MCNC: 266 MG/DL (ref 70–130)
GLUCOSE SERPL-MCNC: 175 MG/DL (ref 65–99)
GLUCOSE SERPL-MCNC: 272 MG/DL (ref 65–99)
GLUCOSE SERPL-MCNC: 272 MG/DL (ref 65–99)
GLUCOSE SERPL-MCNC: 274 MG/DL (ref 65–99)
GLUCOSE UR STRIP-MCNC: ABNORMAL MG/DL
HBA1C MFR BLD: 11.5 % (ref 4.8–5.6)
HCT VFR BLD AUTO: 41.1 % (ref 34–46.6)
HCT VFR BLD AUTO: 43.9 % (ref 34–46.6)
HGB BLD-MCNC: 14.6 G/DL (ref 12–15.9)
HGB BLD-MCNC: 15.2 G/DL (ref 12–15.9)
HGB UR QL STRIP.AUTO: NEGATIVE
HOLD SPECIMEN: NORMAL
IMM GRANULOCYTES # BLD AUTO: 0.01 10*3/MM3 (ref 0–0.05)
IMM GRANULOCYTES # BLD AUTO: 0.02 10*3/MM3 (ref 0–0.05)
IMM GRANULOCYTES NFR BLD AUTO: 0.2 % (ref 0–0.5)
IMM GRANULOCYTES NFR BLD AUTO: 0.2 % (ref 0–0.5)
KETONES UR QL STRIP: ABNORMAL
LEUKOCYTE ESTERASE UR QL STRIP.AUTO: NEGATIVE
LIPASE SERPL-CCNC: 231 U/L (ref 13–60)
LYMPHOCYTES # BLD AUTO: 1.72 10*3/MM3 (ref 0.7–3.1)
LYMPHOCYTES # BLD AUTO: 4.48 10*3/MM3 (ref 0.7–3.1)
LYMPHOCYTES NFR BLD AUTO: 29.6 % (ref 19.6–45.3)
LYMPHOCYTES NFR BLD AUTO: 50.4 % (ref 19.6–45.3)
MAGNESIUM SERPL-MCNC: 1.5 MG/DL (ref 1.6–2.6)
MAGNESIUM SERPL-MCNC: 1.5 MG/DL (ref 1.6–2.6)
MAGNESIUM SERPL-MCNC: 1.6 MG/DL (ref 1.6–2.6)
MCH RBC QN AUTO: 32.5 PG (ref 26.6–33)
MCH RBC QN AUTO: 33 PG (ref 26.6–33)
MCHC RBC AUTO-ENTMCNC: 34.6 G/DL (ref 31.5–35.7)
MCHC RBC AUTO-ENTMCNC: 35.5 G/DL (ref 31.5–35.7)
MCV RBC AUTO: 92.8 FL (ref 79–97)
MCV RBC AUTO: 94 FL (ref 79–97)
MONOCYTES # BLD AUTO: 0.32 10*3/MM3 (ref 0.1–0.9)
MONOCYTES # BLD AUTO: 0.47 10*3/MM3 (ref 0.1–0.9)
MONOCYTES NFR BLD AUTO: 5.3 % (ref 5–12)
MONOCYTES NFR BLD AUTO: 5.5 % (ref 5–12)
NEUTROPHILS NFR BLD AUTO: 3.74 10*3/MM3 (ref 1.7–7)
NEUTROPHILS NFR BLD AUTO: 3.84 10*3/MM3 (ref 1.7–7)
NEUTROPHILS NFR BLD AUTO: 43.3 % (ref 42.7–76)
NEUTROPHILS NFR BLD AUTO: 64.2 % (ref 42.7–76)
NITRITE UR QL STRIP: NEGATIVE
NRBC BLD AUTO-RTO: 0 /100 WBC (ref 0–0.2)
NRBC BLD AUTO-RTO: 0 /100 WBC (ref 0–0.2)
OSMOLALITY SERPL: 289 MOSM/KG (ref 275–295)
PH UR STRIP.AUTO: 6.5 [PH] (ref 5–8)
PHOSPHATE SERPL-MCNC: 3.2 MG/DL (ref 2.5–4.5)
PHOSPHATE SERPL-MCNC: 3.3 MG/DL (ref 2.5–4.5)
PHOSPHATE SERPL-MCNC: 3.3 MG/DL (ref 2.5–4.5)
PLATELET # BLD AUTO: 306 10*3/MM3 (ref 140–450)
PLATELET # BLD AUTO: 310 10*3/MM3 (ref 140–450)
PMV BLD AUTO: 9.7 FL (ref 6–12)
PMV BLD AUTO: 9.7 FL (ref 6–12)
POTASSIUM SERPL-SCNC: 3.4 MMOL/L (ref 3.5–5.2)
POTASSIUM SERPL-SCNC: 3.5 MMOL/L (ref 3.5–5.2)
POTASSIUM SERPL-SCNC: 3.5 MMOL/L (ref 3.5–5.2)
POTASSIUM SERPL-SCNC: 3.7 MMOL/L (ref 3.5–5.2)
PROT SERPL-MCNC: 7.4 G/DL (ref 6–8.5)
PROT SERPL-MCNC: 7.9 G/DL (ref 6–8.5)
PROT UR QL STRIP: NEGATIVE
RBC # BLD AUTO: 4.43 10*6/MM3 (ref 3.77–5.28)
RBC # BLD AUTO: 4.67 10*6/MM3 (ref 3.77–5.28)
SARS-COV-2 RDRP RESP QL NAA+PROBE: NORMAL
SODIUM SERPL-SCNC: 132 MMOL/L (ref 136–145)
SODIUM SERPL-SCNC: 139 MMOL/L (ref 136–145)
SP GR UR STRIP: 1.01 (ref 1–1.03)
TROPONIN T SERPL-MCNC: <0.01 NG/ML (ref 0–0.03)
UROBILINOGEN UR QL STRIP: ABNORMAL
WBC NRBC COR # BLD: 5.82 10*3/MM3 (ref 3.4–10.8)
WBC NRBC COR # BLD: 8.89 10*3/MM3 (ref 3.4–10.8)
WHOLE BLOOD HOLD COAG: NORMAL
WHOLE BLOOD HOLD SPECIMEN: NORMAL

## 2022-08-10 PROCEDURE — 25010000002 MORPHINE PER 10 MG: Performed by: PEDIATRICS

## 2022-08-10 PROCEDURE — 83690 ASSAY OF LIPASE: CPT | Performed by: EMERGENCY MEDICINE

## 2022-08-10 PROCEDURE — 81003 URINALYSIS AUTO W/O SCOPE: CPT | Performed by: EMERGENCY MEDICINE

## 2022-08-10 PROCEDURE — 25010000002 HYDRALAZINE PER 20 MG: Performed by: EMERGENCY MEDICINE

## 2022-08-10 PROCEDURE — 85025 COMPLETE CBC W/AUTO DIFF WBC: CPT | Performed by: PEDIATRICS

## 2022-08-10 PROCEDURE — 84484 ASSAY OF TROPONIN QUANT: CPT | Performed by: PEDIATRICS

## 2022-08-10 PROCEDURE — 85025 COMPLETE CBC W/AUTO DIFF WBC: CPT | Performed by: EMERGENCY MEDICINE

## 2022-08-10 PROCEDURE — 83735 ASSAY OF MAGNESIUM: CPT | Performed by: PEDIATRICS

## 2022-08-10 PROCEDURE — 99223 1ST HOSP IP/OBS HIGH 75: CPT | Performed by: PEDIATRICS

## 2022-08-10 PROCEDURE — 70450 CT HEAD/BRAIN W/O DYE: CPT

## 2022-08-10 PROCEDURE — 83605 ASSAY OF LACTIC ACID: CPT | Performed by: EMERGENCY MEDICINE

## 2022-08-10 PROCEDURE — 71046 X-RAY EXAM CHEST 2 VIEWS: CPT

## 2022-08-10 PROCEDURE — 25010000002 ENOXAPARIN PER 10 MG: Performed by: PEDIATRICS

## 2022-08-10 PROCEDURE — 87635 SARS-COV-2 COVID-19 AMP PRB: CPT | Performed by: PEDIATRICS

## 2022-08-10 PROCEDURE — 73660 X-RAY EXAM OF TOE(S): CPT

## 2022-08-10 PROCEDURE — 83930 ASSAY OF BLOOD OSMOLALITY: CPT | Performed by: PEDIATRICS

## 2022-08-10 PROCEDURE — 83036 HEMOGLOBIN GLYCOSYLATED A1C: CPT | Performed by: PEDIATRICS

## 2022-08-10 PROCEDURE — 82010 KETONE BODYS QUAN: CPT | Performed by: PEDIATRICS

## 2022-08-10 PROCEDURE — 99285 EMERGENCY DEPT VISIT HI MDM: CPT

## 2022-08-10 PROCEDURE — 93005 ELECTROCARDIOGRAM TRACING: CPT | Performed by: PEDIATRICS

## 2022-08-10 PROCEDURE — 99222 1ST HOSP IP/OBS MODERATE 55: CPT | Performed by: CLINICAL NURSE SPECIALIST

## 2022-08-10 PROCEDURE — 82962 GLUCOSE BLOOD TEST: CPT

## 2022-08-10 PROCEDURE — 25010000002 DROPERIDOL PER 5 MG: Performed by: EMERGENCY MEDICINE

## 2022-08-10 PROCEDURE — 80053 COMPREHEN METABOLIC PANEL: CPT | Performed by: EMERGENCY MEDICINE

## 2022-08-10 PROCEDURE — 84100 ASSAY OF PHOSPHORUS: CPT | Performed by: PEDIATRICS

## 2022-08-10 RX ORDER — DEXTROSE, SODIUM CHLORIDE, AND POTASSIUM CHLORIDE 5; .45; .15 G/100ML; G/100ML; G/100ML
150 INJECTION INTRAVENOUS CONTINUOUS PRN
Status: DISCONTINUED | OUTPATIENT
Start: 2022-08-10 | End: 2022-08-11 | Stop reason: ALTCHOICE

## 2022-08-10 RX ORDER — NICOTINE 21 MG/24HR
1 PATCH, TRANSDERMAL 24 HOURS TRANSDERMAL EVERY 24 HOURS
Status: DISCONTINUED | OUTPATIENT
Start: 2022-08-10 | End: 2022-08-15 | Stop reason: HOSPADM

## 2022-08-10 RX ORDER — ASPIRIN 300 MG/1
300 SUPPOSITORY RECTAL DAILY
Status: DISCONTINUED | OUTPATIENT
Start: 2022-08-10 | End: 2022-08-15 | Stop reason: HOSPADM

## 2022-08-10 RX ORDER — SODIUM CHLORIDE 450 MG/100ML
250 INJECTION, SOLUTION INTRAVENOUS CONTINUOUS PRN
Status: DISCONTINUED | OUTPATIENT
Start: 2022-08-10 | End: 2022-08-11 | Stop reason: ALTCHOICE

## 2022-08-10 RX ORDER — MAGNESIUM SULFATE HEPTAHYDRATE 40 MG/ML
2 INJECTION, SOLUTION INTRAVENOUS AS NEEDED
Status: DISCONTINUED | OUTPATIENT
Start: 2022-08-10 | End: 2022-08-15 | Stop reason: HOSPADM

## 2022-08-10 RX ORDER — SODIUM CHLORIDE AND POTASSIUM CHLORIDE 150; 900 MG/100ML; MG/100ML
250 INJECTION, SOLUTION INTRAVENOUS CONTINUOUS PRN
Status: DISCONTINUED | OUTPATIENT
Start: 2022-08-10 | End: 2022-08-11 | Stop reason: ALTCHOICE

## 2022-08-10 RX ORDER — DEXTROSE MONOHYDRATE 25 G/50ML
10-50 INJECTION, SOLUTION INTRAVENOUS
Status: DISCONTINUED | OUTPATIENT
Start: 2022-08-10 | End: 2022-08-11

## 2022-08-10 RX ORDER — LISINOPRIL 40 MG/1
40 TABLET ORAL DAILY
Status: DISCONTINUED | OUTPATIENT
Start: 2022-08-10 | End: 2022-08-15 | Stop reason: HOSPADM

## 2022-08-10 RX ORDER — POTASSIUM CHLORIDE 7.45 MG/ML
10 INJECTION INTRAVENOUS
Status: DISCONTINUED | OUTPATIENT
Start: 2022-08-10 | End: 2022-08-15 | Stop reason: HOSPADM

## 2022-08-10 RX ORDER — SODIUM CHLORIDE 0.9 % (FLUSH) 0.9 %
10 SYRINGE (ML) INJECTION EVERY 12 HOURS SCHEDULED
Status: DISCONTINUED | OUTPATIENT
Start: 2022-08-10 | End: 2022-08-11 | Stop reason: ALTCHOICE

## 2022-08-10 RX ORDER — DEXTROSE AND SODIUM CHLORIDE 5; .45 G/100ML; G/100ML
150 INJECTION, SOLUTION INTRAVENOUS CONTINUOUS PRN
Status: DISCONTINUED | OUTPATIENT
Start: 2022-08-10 | End: 2022-08-11 | Stop reason: ALTCHOICE

## 2022-08-10 RX ORDER — LABETALOL HYDROCHLORIDE 5 MG/ML
10 INJECTION, SOLUTION INTRAVENOUS EVERY 6 HOURS PRN
Status: DISCONTINUED | OUTPATIENT
Start: 2022-08-10 | End: 2022-08-15 | Stop reason: HOSPADM

## 2022-08-10 RX ORDER — SODIUM CHLORIDE 9 MG/ML
10 INJECTION INTRAVENOUS AS NEEDED
Status: DISCONTINUED | OUTPATIENT
Start: 2022-08-10 | End: 2022-08-11 | Stop reason: ALTCHOICE

## 2022-08-10 RX ORDER — SODIUM CHLORIDE 0.9 % (FLUSH) 0.9 %
10 SYRINGE (ML) INJECTION AS NEEDED
Status: DISCONTINUED | OUTPATIENT
Start: 2022-08-10 | End: 2022-08-15 | Stop reason: HOSPADM

## 2022-08-10 RX ORDER — MORPHINE SULFATE 2 MG/ML
2 INJECTION, SOLUTION INTRAMUSCULAR; INTRAVENOUS
Status: DISCONTINUED | OUTPATIENT
Start: 2022-08-10 | End: 2022-08-11

## 2022-08-10 RX ORDER — ALBUTEROL SULFATE 90 UG/1
2 AEROSOL, METERED RESPIRATORY (INHALATION) EVERY 4 HOURS PRN
Status: DISCONTINUED | OUTPATIENT
Start: 2022-08-10 | End: 2022-08-15 | Stop reason: HOSPADM

## 2022-08-10 RX ORDER — TRAZODONE HYDROCHLORIDE 50 MG/1
50 TABLET ORAL NIGHTLY
Status: DISCONTINUED | OUTPATIENT
Start: 2022-08-10 | End: 2022-08-11

## 2022-08-10 RX ORDER — ENOXAPARIN SODIUM 100 MG/ML
40 INJECTION SUBCUTANEOUS DAILY
Status: DISCONTINUED | OUTPATIENT
Start: 2022-08-10 | End: 2022-08-15 | Stop reason: HOSPADM

## 2022-08-10 RX ORDER — ATORVASTATIN CALCIUM 40 MG/1
80 TABLET, FILM COATED ORAL NIGHTLY
Status: DISCONTINUED | OUTPATIENT
Start: 2022-08-10 | End: 2022-08-15 | Stop reason: HOSPADM

## 2022-08-10 RX ORDER — DROPERIDOL 2.5 MG/ML
1.25 INJECTION, SOLUTION INTRAMUSCULAR; INTRAVENOUS ONCE
Status: COMPLETED | OUTPATIENT
Start: 2022-08-10 | End: 2022-08-10

## 2022-08-10 RX ORDER — SODIUM CHLORIDE 9 MG/ML
250 INJECTION, SOLUTION INTRAVENOUS CONTINUOUS PRN
Status: DISCONTINUED | OUTPATIENT
Start: 2022-08-10 | End: 2022-08-11 | Stop reason: ALTCHOICE

## 2022-08-10 RX ORDER — SODIUM CHLORIDE 0.9 % (FLUSH) 0.9 %
10 SYRINGE (ML) INJECTION EVERY 12 HOURS SCHEDULED
Status: DISCONTINUED | OUTPATIENT
Start: 2022-08-10 | End: 2022-08-15 | Stop reason: HOSPADM

## 2022-08-10 RX ORDER — SODIUM CHLORIDE 0.9 % (FLUSH) 0.9 %
10 SYRINGE (ML) INJECTION AS NEEDED
Status: DISCONTINUED | OUTPATIENT
Start: 2022-08-10 | End: 2022-08-11 | Stop reason: ALTCHOICE

## 2022-08-10 RX ORDER — QUETIAPINE FUMARATE 100 MG/1
100 TABLET, FILM COATED ORAL 2 TIMES DAILY
Status: DISCONTINUED | OUTPATIENT
Start: 2022-08-10 | End: 2022-08-11

## 2022-08-10 RX ORDER — DEXTROSE MONOHYDRATE 25 G/50ML
25 INJECTION, SOLUTION INTRAVENOUS
Status: DISCONTINUED | OUTPATIENT
Start: 2022-08-10 | End: 2022-08-15 | Stop reason: HOSPADM

## 2022-08-10 RX ORDER — DEXTROSE, SODIUM CHLORIDE, AND POTASSIUM CHLORIDE 5; .45; .3 G/100ML; G/100ML; G/100ML
150 INJECTION INTRAVENOUS CONTINUOUS PRN
Status: DISCONTINUED | OUTPATIENT
Start: 2022-08-10 | End: 2022-08-11 | Stop reason: ALTCHOICE

## 2022-08-10 RX ORDER — DEXTROSE AND SODIUM CHLORIDE 5; .9 G/100ML; G/100ML
150 INJECTION, SOLUTION INTRAVENOUS CONTINUOUS PRN
Status: DISCONTINUED | OUTPATIENT
Start: 2022-08-10 | End: 2022-08-11 | Stop reason: ALTCHOICE

## 2022-08-10 RX ORDER — INSULIN LISPRO 100 [IU]/ML
0-9 INJECTION, SOLUTION INTRAVENOUS; SUBCUTANEOUS
Status: DISCONTINUED | OUTPATIENT
Start: 2022-08-10 | End: 2022-08-10

## 2022-08-10 RX ORDER — DEXTROSE, SODIUM CHLORIDE, AND POTASSIUM CHLORIDE 5; .9; .15 G/100ML; G/100ML; G/100ML
150 INJECTION INTRAVENOUS CONTINUOUS PRN
Status: DISCONTINUED | OUTPATIENT
Start: 2022-08-10 | End: 2022-08-11 | Stop reason: ALTCHOICE

## 2022-08-10 RX ORDER — NICOTINE POLACRILEX 4 MG
15 LOZENGE BUCCAL
Status: DISCONTINUED | OUTPATIENT
Start: 2022-08-10 | End: 2022-08-15 | Stop reason: HOSPADM

## 2022-08-10 RX ORDER — AMLODIPINE BESYLATE 5 MG/1
5 TABLET ORAL
Status: DISCONTINUED | OUTPATIENT
Start: 2022-08-10 | End: 2022-08-15 | Stop reason: HOSPADM

## 2022-08-10 RX ORDER — MORPHINE SULFATE 2 MG/ML
2 INJECTION, SOLUTION INTRAMUSCULAR; INTRAVENOUS EVERY 4 HOURS PRN
Status: DISCONTINUED | OUTPATIENT
Start: 2022-08-10 | End: 2022-08-10

## 2022-08-10 RX ORDER — HYDRALAZINE HYDROCHLORIDE 20 MG/ML
10 INJECTION INTRAMUSCULAR; INTRAVENOUS ONCE
Status: COMPLETED | OUTPATIENT
Start: 2022-08-10 | End: 2022-08-10

## 2022-08-10 RX ORDER — POTASSIUM CHLORIDE 1.5 G/1.77G
40 POWDER, FOR SOLUTION ORAL AS NEEDED
Status: DISCONTINUED | OUTPATIENT
Start: 2022-08-10 | End: 2022-08-15 | Stop reason: HOSPADM

## 2022-08-10 RX ORDER — POTASSIUM CHLORIDE, DEXTROSE MONOHYDRATE AND SODIUM CHLORIDE 300; 5; 900 MG/100ML; G/100ML; MG/100ML
150 INJECTION, SOLUTION INTRAVENOUS CONTINUOUS PRN
Status: DISCONTINUED | OUTPATIENT
Start: 2022-08-10 | End: 2022-08-11 | Stop reason: ALTCHOICE

## 2022-08-10 RX ORDER — SODIUM CHLORIDE AND POTASSIUM CHLORIDE 300; 900 MG/100ML; MG/100ML
250 INJECTION, SOLUTION INTRAVENOUS CONTINUOUS PRN
Status: DISCONTINUED | OUTPATIENT
Start: 2022-08-10 | End: 2022-08-11 | Stop reason: ALTCHOICE

## 2022-08-10 RX ORDER — ASPIRIN 81 MG/1
81 TABLET, CHEWABLE ORAL DAILY
Status: DISCONTINUED | OUTPATIENT
Start: 2022-08-10 | End: 2022-08-15 | Stop reason: HOSPADM

## 2022-08-10 RX ORDER — MAGNESIUM SULFATE HEPTAHYDRATE 40 MG/ML
4 INJECTION, SOLUTION INTRAVENOUS AS NEEDED
Status: DISCONTINUED | OUTPATIENT
Start: 2022-08-10 | End: 2022-08-15 | Stop reason: HOSPADM

## 2022-08-10 RX ORDER — SODIUM CHLORIDE AND POTASSIUM CHLORIDE 150; 450 MG/100ML; MG/100ML
250 INJECTION, SOLUTION INTRAVENOUS CONTINUOUS PRN
Status: DISCONTINUED | OUTPATIENT
Start: 2022-08-10 | End: 2022-08-11 | Stop reason: ALTCHOICE

## 2022-08-10 RX ORDER — BUSPIRONE HYDROCHLORIDE 10 MG/1
10 TABLET ORAL 2 TIMES DAILY
Status: DISCONTINUED | OUTPATIENT
Start: 2022-08-10 | End: 2022-08-15 | Stop reason: HOSPADM

## 2022-08-10 RX ORDER — POTASSIUM CHLORIDE 750 MG/1
40 CAPSULE, EXTENDED RELEASE ORAL AS NEEDED
Status: DISCONTINUED | OUTPATIENT
Start: 2022-08-10 | End: 2022-08-15 | Stop reason: HOSPADM

## 2022-08-10 RX ORDER — HYDROXYZINE HYDROCHLORIDE 25 MG/1
25 TABLET, FILM COATED ORAL EVERY 8 HOURS PRN
Status: DISCONTINUED | OUTPATIENT
Start: 2022-08-10 | End: 2022-08-15 | Stop reason: HOSPADM

## 2022-08-10 RX ORDER — ONDANSETRON 2 MG/ML
4 INJECTION INTRAMUSCULAR; INTRAVENOUS EVERY 6 HOURS PRN
Status: DISCONTINUED | OUTPATIENT
Start: 2022-08-10 | End: 2022-08-15 | Stop reason: HOSPADM

## 2022-08-10 RX ORDER — NICOTINE POLACRILEX 4 MG
15 LOZENGE BUCCAL
Status: DISCONTINUED | OUTPATIENT
Start: 2022-08-10 | End: 2022-08-10

## 2022-08-10 RX ADMIN — MORPHINE SULFATE 2 MG: 2 INJECTION, SOLUTION INTRAMUSCULAR; INTRAVENOUS at 20:17

## 2022-08-10 RX ADMIN — MORPHINE SULFATE 2 MG: 2 INJECTION, SOLUTION INTRAMUSCULAR; INTRAVENOUS at 16:35

## 2022-08-10 RX ADMIN — SODIUM CHLORIDE, POTASSIUM CHLORIDE, SODIUM LACTATE AND CALCIUM CHLORIDE 1000 ML: 600; 310; 30; 20 INJECTION, SOLUTION INTRAVENOUS at 10:15

## 2022-08-10 RX ADMIN — Medication 10 ML: at 16:35

## 2022-08-10 RX ADMIN — NICARDIPINE HYDROCHLORIDE 2.5 MG/HR: 25 INJECTION, SOLUTION INTRAVENOUS at 20:21

## 2022-08-10 RX ADMIN — Medication 1 PATCH: at 15:08

## 2022-08-10 RX ADMIN — ASPIRIN 300 MG: 300 SUPPOSITORY RECTAL at 23:03

## 2022-08-10 RX ADMIN — HYDRALAZINE HYDROCHLORIDE 10 MG: 20 INJECTION INTRAMUSCULAR; INTRAVENOUS at 12:45

## 2022-08-10 RX ADMIN — Medication 10 ML: at 23:04

## 2022-08-10 RX ADMIN — LISINOPRIL 40 MG: 40 TABLET ORAL at 15:08

## 2022-08-10 RX ADMIN — AMLODIPINE BESYLATE 5 MG: 5 TABLET ORAL at 15:08

## 2022-08-10 RX ADMIN — POTASSIUM CHLORIDE, DEXTROSE MONOHYDRATE AND SODIUM CHLORIDE 150 ML/HR: 150; 5; 450 INJECTION, SOLUTION INTRAVENOUS at 19:31

## 2022-08-10 RX ADMIN — Medication 10 ML: at 20:19

## 2022-08-10 RX ADMIN — SODIUM CHLORIDE 1000 ML/HR: 9 INJECTION, SOLUTION INTRAVENOUS at 16:35

## 2022-08-10 RX ADMIN — MORPHINE SULFATE 2 MG: 2 INJECTION, SOLUTION INTRAMUSCULAR; INTRAVENOUS at 23:36

## 2022-08-10 RX ADMIN — ENOXAPARIN SODIUM 40 MG: 40 INJECTION SUBCUTANEOUS at 15:09

## 2022-08-10 RX ADMIN — SODIUM CHLORIDE 1000 ML/HR: 9 INJECTION, SOLUTION INTRAVENOUS at 18:26

## 2022-08-10 RX ADMIN — MORPHINE SULFATE 2 MG: 2 INJECTION, SOLUTION INTRAMUSCULAR; INTRAVENOUS at 13:22

## 2022-08-10 RX ADMIN — Medication 10 ML: at 23:05

## 2022-08-10 RX ADMIN — INSULIN HUMAN 1.2 UNITS/HR: 1 INJECTION, SOLUTION INTRAVENOUS at 19:12

## 2022-08-10 RX ADMIN — HYDROXYZINE HYDROCHLORIDE 25 MG: 25 TABLET, FILM COATED ORAL at 15:26

## 2022-08-10 RX ADMIN — INSULIN HUMAN 1.7 UNITS/HR: 1 INJECTION, SOLUTION INTRAVENOUS at 18:24

## 2022-08-10 RX ADMIN — LABETALOL HYDROCHLORIDE 10 MG: 5 INJECTION, SOLUTION INTRAVENOUS at 17:32

## 2022-08-10 RX ADMIN — DROPERIDOL 1.25 MG: 2.5 INJECTION, SOLUTION INTRAMUSCULAR; INTRAVENOUS at 10:14

## 2022-08-10 NOTE — ED PROVIDER NOTES
Subjective   This patient is a relatively chronically ill 52-year-old female who appears much older than her stated age.  She tells me she does not currently have outpatient primary care management as her physician, Dr. Rose, recently exited practice.  Patient lives in [facility].  She tells me she generally comes to the emergency department for any complaint.  She was seen here at the end of June.  I have reviewed the past medical documentation from that visit and a couple of prior visits.  Today, patient comes in indicating numbness of the left great toe at triage.  When I walked into the room a couple of minutes after she was placed into the room, her story changed and she indicated she was having some discomfort on the top of her toe and she was worried about an infection.  She pointed to a small area of excoriation on the top of her toe near the IP joint of the great left toe there was approximately 5 x 5 mm.  She reports that it is somewhat uncomfortable.  She has a history of diabetes and does have transient and chronic numbness in the lower extremities.  Patient also tells me she has had abdominal pain for 2 days.  She has a history of chronic pancreatitis and this is not new for her.  She asks me if she can receive some Dilaudid while we are talking in order to help with her discomfort.  She has had no hemoptysis or hematemesis.  She tells me she has been taking her medications relatively compliantly.  In June when she was here, she had hyperglycemia and was given droperidol, IV fluid rehydration, and was ultimately discharged home feeling somewhat better.  Patient denies any headache.  Denies any chest pain.  Denies any hemoptysis or hematemesis.  Denies any dark tarry stools or diarrhea.  She is unaccompanied and oriented x4.  She appears in no acute distress.  In summary, we have a 52-year-old female here with chronic abdominal pain, worse of the last couple of days as well as generalized numbness  in the lower extremities secondary to diabetes, per patient as well as left great toe area of concern with mild pain over the last couple of weeks and generalized weakness and fatigue over the last several months.    Past medical history  Depression, diabetes, insulin-dependent, hypertension, bipolar disease, migraine headaches, GERD, hepatitis C. Patient denies history of gout.    Family history  Patient denies CAD or CVA in mom or dad          Review of Systems   Constitutional: Positive for fatigue. Negative for chills, fever and unexpected weight change.   HENT: Negative for dental problem, ear pain, hearing loss and sinus pressure.    Eyes: Negative for pain and visual disturbance.   Respiratory: Negative for chest tightness and shortness of breath.    Cardiovascular: Negative for chest pain, palpitations and leg swelling.   Gastrointestinal: Positive for abdominal pain. Negative for blood in stool, diarrhea, nausea and vomiting.   Genitourinary: Negative for difficulty urinating, dysuria, frequency, hematuria and urgency.   Musculoskeletal: Positive for arthralgias and myalgias. Negative for neck pain and neck stiffness.        Left great toe and foot   Neurological: Positive for weakness. Negative for seizures, syncope, speech difficulty, light-headedness and headaches.        Generalized weakness   Psychiatric/Behavioral: Negative for confusion.   All other systems reviewed and are negative.      Past Medical History:   Diagnosis Date   • Arthritis    • Bipolar disorder (HCC)    • Chronic bronchitis (HCC)    • Depression    • Diabetes mellitus (HCC)     DIAGNOSED 2016   • GERD (gastroesophageal reflux disease)    • Hepatitis-C    • History of blood transfusion    • Hyperlipidemia    • Hypertension    • Infectious viral hepatitis     HEPATITIS C   • Migraine    • Sleep apnea     PATIENT UNSURE OF SETTINGS       Allergies   Allergen Reactions   • Tylenol [Acetaminophen] Other (See Comments)     SEVERE LIVER  DISEASE-CONTRAINDICATED       Past Surgical History:   Procedure Laterality Date   • CHOLECYSTECTOMY     • ENDOSCOPY N/A 4/9/2022    Procedure: ESOPHAGOGASTRODUODENOSCOPY;  Surgeon: Brunner, Mark I, MD;  Location:  [facility];  Service: Gastroenterology;  Laterality: N/A;  with antrum biopsy   • HYSTERECTOMY     • KNEE SURGERY     • LUMBAR LAMINECTOMY DISCECTOMY DECOMPRESSION N/A 8/6/2018    Procedure: LUMBAR LAMINECTOMIES L4-S1;  Surgeon: Chip Smith MD;  Location: [facility] OR;  Service: Neurosurgery       Family History   Problem Relation Age of Onset   • Diabetes Mother    • Hypertension Mother        Social History     Socioeconomic History   • Marital status: Single   Tobacco Use   • Smoking status: Current Every Day Smoker     Packs/day: 1.00   • Smokeless tobacco: Never Used   Substance and Sexual Activity   • Alcohol use: Yes     Comment: hx etoh abuse per ems   • Drug use: Yes     Types: Cocaine(coke)   • Sexual activity: Defer           Objective   Physical Exam  Vitals and nursing note reviewed.   Constitutional:       General: She is not in acute distress.     Appearance: She is not ill-appearing or toxic-appearing.   HENT:      Head: Normocephalic and atraumatic.      Right Ear: External ear normal.      Left Ear: External ear normal.      Nose: Nose normal.      Mouth/Throat:      Mouth: Mucous membranes are dry.      Pharynx: Oropharynx is clear.   Eyes:      General:         Right eye: No discharge.         Left eye: No discharge.      Extraocular Movements: Extraocular movements intact.      Conjunctiva/sclera: Conjunctivae normal.      Pupils: Pupils are equal, round, and reactive to light.   Cardiovascular:      Rate and Rhythm: Regular rhythm. Tachycardia present.      Pulses: Normal pulses.      Heart sounds: No murmur heard.     Comments: Rate 103.  Pulmonary:      Effort: Pulmonary effort is normal.   Abdominal:      General: Abdomen is flat. There is no distension.      Palpations:  Abdomen is soft. There is no mass.      Tenderness: There is no abdominal tenderness. There is no guarding.      Hernia: No hernia is present.      Comments: No signs of acute abdomen.  No pain at McBurney's point.  No pulsatile abdominal mass.   Musculoskeletal:         General: No deformity or signs of injury. Normal range of motion.      Cervical back: Normal range of motion.      Right lower leg: No edema.      Left lower leg: No edema.   Skin:     General: Skin is warm and dry.      Capillary Refill: Capillary refill takes less than 2 seconds.      Findings: Lesion present.      Comments: Small area of excoriation on the IP joint of the left great toe.  No signs of streaking cellulitis.  Good range of motion of the toe.  Mild tenderness in this area.  No signs of ulceration on the plantar surface of either feet.   Neurological:      General: No focal deficit present.      Mental Status: She is alert and oriented to person, place, and time.   Psychiatric:         Mood and Affect: Mood normal.         Behavior: Behavior normal.         Thought Content: Thought content normal.         Procedures           ED Course  ED Course as of 08/11/22 0618   Wed Aug 10, 2022   0948 I had a nice conversation with the patient.  She requested pain medication numerous times and I told her we would certainly keep her pain and focus and attempt actively to alleviate her discomfort.  She was comforted by this.  I reviewed past medical documentation.  Patient has come in with numerous similar complaints over the last several months.  Most recent visit was at the end of June.  She was given droperidol and evidently had resolution of her abdominal discomfort and I have ordered.  I have ordered a CT of the head and multiple labs.  I have talked with the patient at her blood pressure elevation.  She is concerned about her left toe and I am concerned about a potential early infection.  I do anticipate antibiotic therapy at the time of  discharge.  Perhaps the most concerning aspect of the patient's visit is a lack of outpatient primary care follow-up.  She tells me her doctor, Dr. Rose, is no longer seeing patients and she has not taken the time to follow-up with a new PCP since Dr. Robledo exit.  We will refer the patient to patient [facility] and I have stressed the importance of following up with a good PCP for her chronic conditions.  Patient understands the importance of this.  We have talked about the differential diagnosis and medical decision making in great detail.  I have talked about acute pancreatitis, bowel obstruction, sepsis, diabetic foot ulcer, cellulitis, and other etiologies.  Although the patient's primary and presenting complaint was left toe discomfort, she also tells me she is weak all over, has abdominal pain related to chronic pancreatitis, gets fatigued easily and multiple other seemingly chronic complaints, which have been noted in prior visits.  I am going to rehydrate the patient, keep a close eye on her, although I do anticipate ultimate discharge home. [DAYNA]   1040 Patient's blood pressure has remained somewhat high.  I thought it might get better after the droperidol was given.  Patient is sleeping soundly and has no vomiting which is encouraging.  White count is normal with a white count of 8.89.  Hemoglobin is 15.2 with hematocrit of 43.9 and a platelet count of 310.  I have ordered hydralazine.  I am concerned about this history of numbness and what the nurse thought was potentially some facial droop that she noted.  She indicated the patient told her this has been present for the last couple of weeks.  We will see what the CT of the head looks like.  I am anticipating the patient may require admission. [DAYNA]   1133 CT of the head shows no evidence of acute abnormality.  Lipase elevated to 31 with a lactic acid of 4.0.  CBC is reassuring with white count of 8.89 with a hemoglobin of 15.2 and hematocrit  of 43.9.  Urinalysis shows elevated glucose levels.  CMP shows hyperglycemia with a glucose of 274.  Sodium 132 with a potassium of 3.4.  Mild transaminase elevation with ALT and AST of 78 and 89 respectively with alkaline phosphatase of 197. [DAYNA]   1134 Lipase is elevated compared to oh 1 month ago.  Glucose is relatively consistent.  Given these reportedly new complaints of numbness and weakness in the left side with some mild left-sided facial droop over the last few weeks I do believe bring the patient in for further care related to her deconditioning, medication noncompliance, hypertensive urgency and for further stroke work-up makes sense.  Patient is agreeable to the plan.  She does feel better.  Hydralazine being given currently and we will watch her blood pressure closely.  Impression will include hypertensive urgency, generalized weakness, abdominal pain, elevated lipase, chronic pancreatitis.  I am hopeful we are able to get social work involved as I do believe outpatient care to be an important part of the patient's outpatient likelihood of success. [DAYNA]   0685 I have discussed the case with the hospitalist, Dr. Castañeda, who will bring the patient in for admission.  I also discussed the case with Amrita, stroke coordinator.  They are going to move forward with an MRI.  Patient has responded nicely to the droperidol and blood pressure is down to 169/99.  She did not have to receive hydralazine and we will keep a close eye on her blood pressure and provide medications as needed.  Patient will be brought in with an impression that includes deconditioning, dehydration, hyperglycemia, chronic pancreatitis and hypertensive urgency. [DAYNA]      ED Course User Index  [DAYNA] Marlon Cruz MD      Recent Results (from the past 24 hour(s))   Comprehensive Metabolic Panel    Collection Time: 08/10/22 10:16 AM    Specimen: Blood   Result Value Ref Range    Glucose 274 (H) 65 - 99 mg/dL    BUN 5 (L) 6 - 20 mg/dL     Creatinine 0.75 0.57 - 1.00 mg/dL    Sodium 132 (L) 136 - 145 mmol/L    Potassium 3.4 (L) 3.5 - 5.2 mmol/L    Chloride 90 (L) 98 - 107 mmol/L    CO2 23.0 22.0 - 29.0 mmol/L    Calcium 9.6 8.6 - 10.5 mg/dL    Total Protein 7.9 6.0 - 8.5 g/dL    Albumin 4.60 3.50 - 5.20 g/dL    ALT (SGPT) 78 (H) 1 - 33 U/L    AST (SGOT) 89 (H) 1 - 32 U/L    Alkaline Phosphatase 197 (H) 39 - 117 U/L    Total Bilirubin 0.5 0.0 - 1.2 mg/dL    Globulin 3.3 gm/dL    A/G Ratio 1.4 g/dL    BUN/Creatinine Ratio 6.7 (L) 7.0 - 25.0    Anion Gap 19.0 (H) 5.0 - 15.0 mmol/L    eGFR 96.5 >60.0 mL/min/1.73   Lipase    Collection Time: 08/10/22 10:16 AM    Specimen: Blood   Result Value Ref Range    Lipase 231 (H) 13 - 60 U/L   Lactic Acid, Plasma    Collection Time: 08/10/22 10:16 AM    Specimen: Blood   Result Value Ref Range    Lactate 4.0 (C) 0.5 - 2.0 mmol/L   Green Top (Gel)    Collection Time: 08/10/22 10:16 AM   Result Value Ref Range    Extra Tube Hold for add-ons.    Lavender Top    Collection Time: 08/10/22 10:16 AM   Result Value Ref Range    Extra Tube hold for add-on    Gold Top - SST    Collection Time: 08/10/22 10:16 AM   Result Value Ref Range    Extra Tube Hold for add-ons.    Gray Top    Collection Time: 08/10/22 10:16 AM   Result Value Ref Range    Extra Tube Hold for add-ons.    Light Blue Top    Collection Time: 08/10/22 10:16 AM   Result Value Ref Range    Extra Tube Hold for add-ons.    CBC Auto Differential    Collection Time: 08/10/22 10:16 AM    Specimen: Blood   Result Value Ref Range    WBC 8.89 3.40 - 10.80 10*3/mm3    RBC 4.67 3.77 - 5.28 10*6/mm3    Hemoglobin 15.2 12.0 - 15.9 g/dL    Hematocrit 43.9 34.0 - 46.6 %    MCV 94.0 79.0 - 97.0 fL    MCH 32.5 26.6 - 33.0 pg    MCHC 34.6 31.5 - 35.7 g/dL    RDW 13.2 12.3 - 15.4 %    RDW-SD 45.3 37.0 - 54.0 fl    MPV 9.7 6.0 - 12.0 fL    Platelets 310 140 - 450 10*3/mm3    Neutrophil % 43.3 42.7 - 76.0 %    Lymphocyte % 50.4 (H) 19.6 - 45.3 %    Monocyte % 5.3 5.0 - 12.0 %     Eosinophil % 0.4 0.3 - 6.2 %    Basophil % 0.4 0.0 - 1.5 %    Immature Grans % 0.2 0.0 - 0.5 %    Neutrophils, Absolute 3.84 1.70 - 7.00 10*3/mm3    Lymphocytes, Absolute 4.48 (H) 0.70 - 3.10 10*3/mm3    Monocytes, Absolute 0.47 0.10 - 0.90 10*3/mm3    Eosinophils, Absolute 0.04 0.00 - 0.40 10*3/mm3    Basophils, Absolute 0.04 0.00 - 0.20 10*3/mm3    Immature Grans, Absolute 0.02 0.00 - 0.05 10*3/mm3    nRBC 0.0 0.0 - 0.2 /100 WBC   Beta Hydroxybutyrate Quantitative    Collection Time: 08/10/22 10:16 AM    Specimen: Blood   Result Value Ref Range    Beta-Hydroxybutyrate Quant 1.639 (H) 0.020 - 0.270 mmol/L   Urinalysis With Microscopic If Indicated (No Culture) - Urine, Clean Catch    Collection Time: 08/10/22 10:35 AM    Specimen: Urine, Clean Catch   Result Value Ref Range    Color, UA Yellow Yellow, Straw    Appearance, UA Clear Clear    pH, UA 6.5 5.0 - 8.0    Specific Gravity, UA 1.011 1.001 - 1.030    Glucose, UA >=1000 mg/dL (3+) (A) Negative    Ketones, UA 15 mg/dL (1+) (A) Negative    Bilirubin, UA Negative Negative    Blood, UA Negative Negative    Protein, UA Negative Negative    Leuk Esterase, UA Negative Negative    Nitrite, UA Negative Negative    Urobilinogen, UA 0.2 E.U./dL 0.2 - 1.0 E.U./dL   STAT Lactic Acid, Reflex    Collection Time: 08/10/22  1:58 PM    Specimen: Blood   Result Value Ref Range    Lactate 3.6 (C) 0.5 - 2.0 mmol/L   POC Glucose Once    Collection Time: 08/10/22  3:21 PM    Specimen: Blood   Result Value Ref Range    Glucose 221 (H) 70 - 130 mg/dL   STAT Lactic Acid, Reflex    Collection Time: 08/10/22  4:11 PM    Specimen: Blood   Result Value Ref Range    Lactate 1.7 0.5 - 2.0 mmol/L   Comprehensive Metabolic Panel    Collection Time: 08/10/22  4:11 PM    Specimen: Blood   Result Value Ref Range    Glucose 272 (H) 65 - 99 mg/dL    BUN 4 (L) 6 - 20 mg/dL    Creatinine 0.47 (L) 0.57 - 1.00 mg/dL    Sodium 139 136 - 145 mmol/L    Potassium 3.5 3.5 - 5.2 mmol/L    Chloride 98 98 -  107 mmol/L    CO2 19.0 (L) 22.0 - 29.0 mmol/L    Calcium 9.2 8.6 - 10.5 mg/dL    Total Protein 7.4 6.0 - 8.5 g/dL    Albumin 4.20 3.50 - 5.20 g/dL    ALT (SGPT) 80 (H) 1 - 33 U/L    AST (SGOT) 139 (H) 1 - 32 U/L    Alkaline Phosphatase 205 (H) 39 - 117 U/L    Total Bilirubin 0.6 0.0 - 1.2 mg/dL    Globulin 3.2 gm/dL    A/G Ratio 1.3 g/dL    BUN/Creatinine Ratio 8.5 7.0 - 25.0    Anion Gap 22.0 (H) 5.0 - 15.0 mmol/L    eGFR 115.4 >60.0 mL/min/1.73   Phosphorus    Collection Time: 08/10/22  4:11 PM    Specimen: Blood   Result Value Ref Range    Phosphorus 3.3 2.5 - 4.5 mg/dL   Magnesium    Collection Time: 08/10/22  4:11 PM    Specimen: Blood   Result Value Ref Range    Magnesium 1.5 (L) 1.6 - 2.6 mg/dL   Osmolality, Serum    Collection Time: 08/10/22  4:11 PM    Specimen: Blood   Result Value Ref Range    Osmolality 289 275 - 295 mOsm/kg   Hemoglobin A1c    Collection Time: 08/10/22  4:11 PM    Specimen: Blood   Result Value Ref Range    Hemoglobin A1C 11.50 (H) 4.80 - 5.60 %   Troponin    Collection Time: 08/10/22  4:11 PM    Specimen: Blood   Result Value Ref Range    Troponin T <0.010 0.000 - 0.030 ng/mL   Basic Metabolic Panel    Collection Time: 08/10/22  4:11 PM    Specimen: Blood   Result Value Ref Range    Glucose 272 (H) 65 - 99 mg/dL    BUN 4 (L) 6 - 20 mg/dL    Creatinine 0.47 (L) 0.57 - 1.00 mg/dL    Sodium 139 136 - 145 mmol/L    Potassium 3.5 3.5 - 5.2 mmol/L    Chloride 98 98 - 107 mmol/L    CO2 19.0 (L) 22.0 - 29.0 mmol/L    Calcium 9.2 8.6 - 10.5 mg/dL    BUN/Creatinine Ratio 8.5 7.0 - 25.0    Anion Gap 22.0 (H) 5.0 - 15.0 mmol/L    eGFR 115.4 >60.0 mL/min/1.73   Magnesium    Collection Time: 08/10/22  4:11 PM    Specimen: Blood   Result Value Ref Range    Magnesium 1.5 (L) 1.6 - 2.6 mg/dL   Phosphorus    Collection Time: 08/10/22  4:11 PM    Specimen: Blood   Result Value Ref Range    Phosphorus 3.3 2.5 - 4.5 mg/dL   CBC Auto Differential    Collection Time: 08/10/22  4:11 PM    Specimen: Blood    Result Value Ref Range    WBC 5.82 3.40 - 10.80 10*3/mm3    RBC 4.43 3.77 - 5.28 10*6/mm3    Hemoglobin 14.6 12.0 - 15.9 g/dL    Hematocrit 41.1 34.0 - 46.6 %    MCV 92.8 79.0 - 97.0 fL    MCH 33.0 26.6 - 33.0 pg    MCHC 35.5 31.5 - 35.7 g/dL    RDW 12.7 12.3 - 15.4 %    RDW-SD 43.5 37.0 - 54.0 fl    MPV 9.7 6.0 - 12.0 fL    Platelets 306 140 - 450 10*3/mm3    Neutrophil % 64.2 42.7 - 76.0 %    Lymphocyte % 29.6 19.6 - 45.3 %    Monocyte % 5.5 5.0 - 12.0 %    Eosinophil % 0.2 (L) 0.3 - 6.2 %    Basophil % 0.3 0.0 - 1.5 %    Immature Grans % 0.2 0.0 - 0.5 %    Neutrophils, Absolute 3.74 1.70 - 7.00 10*3/mm3    Lymphocytes, Absolute 1.72 0.70 - 3.10 10*3/mm3    Monocytes, Absolute 0.32 0.10 - 0.90 10*3/mm3    Eosinophils, Absolute 0.01 0.00 - 0.40 10*3/mm3    Basophils, Absolute 0.02 0.00 - 0.20 10*3/mm3    Immature Grans, Absolute 0.01 0.00 - 0.05 10*3/mm3    nRBC 0.0 0.0 - 0.2 /100 WBC   COVID-19, [facility] IN-HOUSE,NASAL Swab (NO TRANSPORT MEDIA) 2 HR TAT - Swab, Nasopharynx    Collection Time: 08/10/22  4:48 PM    Specimen: Nasopharynx; Swab   Result Value Ref Range    COVID19 Presumptive Negative Presumptive Negative - Ref. Range   POC Glucose Once    Collection Time: 08/10/22  5:11 PM    Specimen: Blood   Result Value Ref Range    Glucose 210 (H) 70 - 130 mg/dL   POC Glucose Once    Collection Time: 08/10/22  6:12 PM    Specimen: Blood   Result Value Ref Range    Glucose 232 (H) 70 - 130 mg/dL   POC Glucose Once    Collection Time: 08/10/22  7:04 PM    Specimen: Blood   Result Value Ref Range    Glucose 178 (H) 70 - 130 mg/dL   POC Glucose Once    Collection Time: 08/10/22  8:24 PM    Specimen: Blood   Result Value Ref Range    Glucose 266 (H) 70 - 130 mg/dL   Basic Metabolic Panel    Collection Time: 08/10/22  8:48 PM    Specimen: Blood   Result Value Ref Range    Glucose 175 (H) 65 - 99 mg/dL    BUN 5 (L) 6 - 20 mg/dL    Creatinine 0.47 (L) 0.57 - 1.00 mg/dL    Sodium 139 136 - 145 mmol/L    Potassium 3.7 3.5 -  5.2 mmol/L    Chloride 102 98 - 107 mmol/L    CO2 20.0 (L) 22.0 - 29.0 mmol/L    Calcium 8.7 8.6 - 10.5 mg/dL    BUN/Creatinine Ratio 10.6 7.0 - 25.0    Anion Gap 17.0 (H) 5.0 - 15.0 mmol/L    eGFR 115.4 >60.0 mL/min/1.73   Magnesium    Collection Time: 08/10/22  8:48 PM    Specimen: Blood   Result Value Ref Range    Magnesium 1.6 1.6 - 2.6 mg/dL   Phosphorus    Collection Time: 08/10/22  8:48 PM    Specimen: Blood   Result Value Ref Range    Phosphorus 3.2 2.5 - 4.5 mg/dL   POC Glucose Once    Collection Time: 08/10/22  9:17 PM    Specimen: Blood   Result Value Ref Range    Glucose 145 (H) 70 - 130 mg/dL   POC Glucose Once    Collection Time: 08/10/22  9:57 PM    Specimen: Blood   Result Value Ref Range    Glucose 147 (H) 70 - 130 mg/dL   POC Glucose Once    Collection Time: 08/10/22 10:08 PM    Specimen: Blood   Result Value Ref Range    Glucose 179 (H) 70 - 130 mg/dL   POC Glucose Once    Collection Time: 08/10/22 11:30 PM    Specimen: Blood   Result Value Ref Range    Glucose 169 (H) 70 - 130 mg/dL   Basic Metabolic Panel    Collection Time: 08/11/22 12:13 AM    Specimen: Blood   Result Value Ref Range    Glucose 260 (H) 65 - 99 mg/dL    BUN 4 (L) 6 - 20 mg/dL    Creatinine 0.48 (L) 0.57 - 1.00 mg/dL    Sodium 136 136 - 145 mmol/L    Potassium 3.4 (L) 3.5 - 5.2 mmol/L    Chloride 98 98 - 107 mmol/L    CO2 23.0 22.0 - 29.0 mmol/L    Calcium 8.7 8.6 - 10.5 mg/dL    BUN/Creatinine Ratio 8.3 7.0 - 25.0    Anion Gap 15.0 5.0 - 15.0 mmol/L    eGFR 114.8 >60.0 mL/min/1.73   Magnesium    Collection Time: 08/11/22 12:13 AM    Specimen: Blood   Result Value Ref Range    Magnesium 1.9 1.6 - 2.6 mg/dL   Phosphorus    Collection Time: 08/11/22 12:13 AM    Specimen: Blood   Result Value Ref Range    Phosphorus 2.8 2.5 - 4.5 mg/dL   Lipid Panel    Collection Time: 08/11/22 12:13 AM    Specimen: Blood   Result Value Ref Range    Total Cholesterol 162 0 - 200 mg/dL    Triglycerides 61 0 - 150 mg/dL    HDL Cholesterol 113 (H) 40  - 60 mg/dL    LDL Cholesterol  37 0 - 100 mg/dL    VLDL Cholesterol 12 5 - 40 mg/dL    LDL/HDL Ratio 0.33    POC Glucose Once    Collection Time: 08/11/22 12:26 AM    Specimen: Blood   Result Value Ref Range    Glucose 233 (H) 70 - 130 mg/dL   POC Glucose Once    Collection Time: 08/11/22  1:34 AM    Specimen: Blood   Result Value Ref Range    Glucose 182 (H) 70 - 130 mg/dL   ECG 12 Lead    Collection Time: 08/11/22  2:11 AM   Result Value Ref Range    QT Interval 352 ms    QTC Interval 485 ms   POC Glucose Once    Collection Time: 08/11/22  3:10 AM    Specimen: Blood   Result Value Ref Range    Glucose 129 70 - 130 mg/dL   POC Glucose Once    Collection Time: 08/11/22  4:17 AM    Specimen: Blood   Result Value Ref Range    Glucose 177 (H) 70 - 130 mg/dL   POC Glucose Once    Collection Time: 08/11/22  5:39 AM    Specimen: Blood   Result Value Ref Range    Glucose 131 (H) 70 - 130 mg/dL     Note: In addition to lab results from this visit, the labs listed above may include labs taken at another facility or during a different encounter within the last 24 hours. Please correlate lab times with ED admission and discharge times for further clarification of the services performed during this visit.    XR Chest 2 View   Final Result   No evidence of acute disease in the chest.        This report was finalized on 8/10/2022 5:55 PM by Panda Laird.          XR Toe 2+ View Left   Final Result   No acute findings.       This report was finalized on 8/10/2022 4:50 PM by Corey Mendoza MD.          CT Head Without Contrast   Final Result   No acute intracranial process identified.       This report was finalized on 8/10/2022 11:04 AM by Ino Oliva MD.          MRI Brain Without Contrast    (Results Pending)     Vitals:    08/11/22 0001 08/11/22 0241 08/11/22 0418 08/11/22 0430   BP: 149/91 (!) 169/119 (!) 171/108 (!) 158/108   BP Location: Right arm  Right arm    Patient Position: Lying  Lying    Pulse: 107 118 109 107    Resp: 16  16    Temp: 98.2 °F (36.8 °C)  98.6 °F (37 °C)    TempSrc: Oral  Oral    SpO2: 94% 99% 97%    Weight:       Height:         Medications   Sodium Chloride (PF) 0.9 % 10 mL (has no administration in time range)   amLODIPine (NORVASC) tablet 5 mg (5 mg Oral Given 8/10/22 1508)   busPIRone (BUSPAR) tablet 10 mg (10 mg Oral Not Given 8/10/22 2234)   hydrOXYzine (ATARAX) tablet 25 mg (25 mg Oral Given 8/10/22 1526)   lisinopril (PRINIVIL,ZESTRIL) tablet 40 mg (40 mg Oral Given 8/10/22 1508)   metoprolol tartrate (LOPRESSOR) tablet 25 mg (25 mg Oral Not Given 8/10/22 2234)   QUEtiapine (SEROquel) tablet 100 mg (100 mg Oral Not Given 8/10/22 2236)   traZODone (DESYREL) tablet 50 mg (50 mg Oral Not Given 8/10/22 2236)   albuterol sulfate HFA (PROVENTIL HFA;VENTOLIN HFA;PROAIR HFA) inhaler 2 puff (has no administration in time range)   sodium chloride 0.9 % flush 10 mL (10 mL Intravenous Given 8/10/22 2019)   sodium chloride 0.9 % flush 10 mL (has no administration in time range)   dextrose (D50W) (25 g/50 mL) IV injection 25 g (has no administration in time range)   Enoxaparin Sodium (LOVENOX) syringe 40 mg (40 mg Subcutaneous Given 8/10/22 1509)   potassium chloride (MICRO-K) CR capsule 40 mEq (has no administration in time range)     Or   potassium chloride (KLOR-CON) packet 40 mEq (has no administration in time range)     Or   potassium chloride 10 mEq in 100 mL IVPB (has no administration in time range)   Magnesium Sulfate 2 gram Bolus, followed by 8 gram infusion (total Mg dose 10 grams)- Mg less than or equal to 1mg/dL (has no administration in time range)     Or   Magnesium Sulfate 2 gram / 50mL Infusion (GIVE X 3 BAGS TO EQUAL 6GM TOTAL DOSE) - Mg 1.1 - 1.5 mg/dl (has no administration in time range)     Or   Magnesium Sulfate 4 gram infusion- Mg 1.6-1.9 mg/dL (has no administration in time range)   ondansetron (ZOFRAN) injection 4 mg (has no administration in time range)   nicotine (NICODERM CQ) 21  MG/24HR patch 1 patch (1 patch Transdermal Medication Applied 8/10/22 2268)   sodium chloride 0.9 % flush 10 mL (10 mL Intravenous Given 8/10/22 6665)   sodium chloride 0.9 % flush 10 mL (has no administration in time range)   dextrose (GLUTOSE) oral gel 15 g (has no administration in time range)   dextrose (D50W) (25 g/50 mL) IV injection 10-50 mL (has no administration in time range)   glucagon (human recombinant) (GLUCAGEN DIAGNOSTIC) injection 1 mg (has no administration in time range)   sodium chloride 0.9 % bolus (1,000 mL/hr Intravenous New Bag 8/10/22 3132)   sodium chloride 0.9 % infusion (has no administration in time range)   sodium chloride 0.9 % with KCl 20 mEq/L infusion (has no administration in time range)   sodium chloride 0.9 % with KCl 40 mEq/L infusion (has no administration in time range)   dextrose 5 % and sodium chloride 0.9 % infusion (has no administration in time range)   dextrose 5 % and sodium chloride 0.9 % with KCl 20 mEq/L infusion (has no administration in time range)   dextrose 5 % and sodium chloride 0.9 % with KCl 40 mEq/L infusion (has no administration in time range)   sodium chloride 0.45 % infusion (has no administration in time range)   sodium chloride 0.45 % with KCl 20 mEq/L infusion (has no administration in time range)   sodium chloride 0.45 % 1,000 mL with potassium chloride 40 mEq infusion (has no administration in time range)   dextrose 5 % and sodium chloride 0.45 % infusion (has no administration in time range)   dextrose 5 % and sodium chloride 0.45 % with KCl 20 mEq/L infusion (150 mL/hr Intravenous New Bag 8/11/22 0159)   dextrose 5 % and sodium chloride 0.45 % with KCl 40 mEq/L infusion (has no administration in time range)   insulin regular 1 unit/mL in 0.9% sodium chloride (2.2 Units/hr Intravenous Rate Change (DUAL SIGN) 8/11/22 4646)   morphine injection 2 mg (2 mg Intravenous Given 8/11/22 0029)   labetalol (NORMODYNE,TRANDATE) injection 10 mg (10 mg  Intravenous Given 8/10/22 1732)   sodium chloride 0.9 % flush 10 mL (10 mL Intravenous Given 8/10/22 2304)   sodium chloride 0.9 % flush 10 mL (has no administration in time range)   atorvastatin (LIPITOR) tablet 80 mg (80 mg Oral Not Given 8/10/22 2234)   aspirin chewable tablet 81 mg ( Oral Not Given:  See Alt 8/10/22 2303)     Or   aspirin suppository 300 mg (300 mg Rectal Given 8/10/22 2303)   niCARdipine (CARDENE) 25mg in 250mL NS infusion ( Intravenous Canceled Entry 8/10/22 2235)   lactated ringers bolus 1,000 mL (0 mL Intravenous Stopped 8/10/22 1136)   droperidol (INAPSINE) injection 1.25 mg (1.25 mg Intravenous Given 8/10/22 1014)   hydrALAZINE (APRESOLINE) injection 10 mg (10 mg Intravenous Given 8/10/22 1245)     ECG/EMG Results (last 24 hours)     Procedure Component Value Units Date/Time    ECG 12 Lead [[id number]] Collected: 08/11/22 0211     Updated: 08/11/22 0212     QT Interval 352 ms      QTC Interval 485 ms     Narrative:      Test Reason : DKA  Blood Pressure :   */*   mmHG  Vent. Rate : 114 BPM     Atrial Rate : 114 BPM     P-R Int : 156 ms          QRS Dur :  80 ms      QT Int : 352 ms       P-R-T Axes :  80  71  82 degrees     QTc Int : 485 ms    Sinus tachycardia  Right atrial enlargement  Septal infarct (cited on or before 01-JUN-2022)  Abnormal ECG  When compared with ECG of 01-JUN-2022 05:28,  Vent. rate has increased BY  38 BPM    Referred By:            Confirmed By:         ECG 12 Lead   Preliminary Result   Test Reason : DKA   Blood Pressure :   */*   mmHG   Vent. Rate : 114 BPM     Atrial Rate : 114 BPM      P-R Int : 156 ms          QRS Dur :  80 ms       QT Int : 352 ms       P-R-T Axes :  80  71  82 degrees      QTc Int : 485 ms      Sinus tachycardia   Right atrial enlargement   Septal infarct (cited on or before 01-JUN-2022)   Abnormal ECG   When compared with ECG of 01-JUN-2022 05:28,   Vent. rate has increased BY  38 BPM      Referred By:            Confirmed By:                                                 MDM    Final diagnoses:   Dehydration   Hyperglycemia   Chronic pancreatitis, unspecified pancreatitis type (HCC)   Hypertensive urgency   Elevated blood pressure reading   Noncompliance with medications   Facial droop       ED Disposition  ED Disposition     ED Disposition   Decision to Admit    Condition   --    Comment   Level of Care: Telemetry [5]   Diagnosis: Dehydration [276.51.ICD-9-CM]   Admitting Physician: LAURA RICK [[id number]]   Attending Physician: LAURA RICK [[id number]]   Isolate for COVID?: No [0]   Certification: I Certify That Inpatient Hospital Services Are Medically Necessary For Greater Than 2 Midnights               No follow-up provider specified.       Medication List      No changes were made to your prescriptions during this visit.          Marlon Cruz MD  08/11/22 0618

## 2022-08-10 NOTE — H&P
[facility] Medicine Services  HISTORY AND PHYSICAL    Patient Name: Bernadette Paris  : 1970  MRN: [id number]  Primary Care Physician: Provider, No Known  Date of admission: 8/10/2022      Subjective   Subjective     Chief Complaint:  abd pain    HPI:  Bernadette Paris is a 52 y.o. female with a history of hypertension, diabetes, history of drug use, and chronic pancreatitis presents with a multitude of complaints.  The first being abdominal pain which she states started 2 or 3 days ago and has progressively gotten worse.  She has had some nausea and vomiting associated with it, but denies any blood.  She also states she has had some intermittent diarrhea as well where she is describes some incontinence.  She also is complaining of some weakness and inability to lift up her left foot which she describes going on for the past 2 weeks.  She also states that her left arm feels a little weak and numb.  She describes difficulty holding a cup.  She has had some recent falls due to the lower extremity weakness and injury to her left foot.  She is also noted some difficulty with her speech as well.  She does visit the emergency room frequently, and has difficulty with her medication compliance.  She states that her PCP recently moved and she does not have anyone prescribing her medications regularly.  She says that she has been not able to take them over the past week or so.  She denies any any fevers or weight loss.  She has had some polyuria and polydipsia.  Also describes some urine and bowel incontinence.  At home she does use a cane for mobility.      Review of Systems   Constitutional: Positive for appetite change.   HENT: Negative.    Eyes: Negative.    Respiratory: Negative.    Cardiovascular: Negative.    Gastrointestinal: Positive for abdominal pain, diarrhea, nausea and vomiting.   Endocrine: Positive for polydipsia and polyuria.   Genitourinary: Positive for dysuria and urgency.    Musculoskeletal: Positive for gait problem.   Skin: Negative.    Allergic/Immunologic: Negative.    Neurological: Positive for facial asymmetry, weakness and numbness.   Hematological: Negative.    Psychiatric/Behavioral: Negative.         All other systems reviewed and are negative.     Personal History     Past Medical History:   Diagnosis Date   • Arthritis    • Bipolar disorder (HCC)    • Chronic bronchitis (HCC)    • Depression    • Diabetes mellitus (HCC)     DIAGNOSED 2016   • GERD (gastroesophageal reflux disease)    • Hepatitis-C    • History of blood transfusion    • Hyperlipidemia    • Hypertension    • Infectious viral hepatitis     HEPATITIS C   • Migraine    • Sleep apnea     PATIENT UNSURE OF SETTINGS             Past Surgical History:   Procedure Laterality Date   • CHOLECYSTECTOMY     • ENDOSCOPY N/A 4/9/2022    Procedure: ESOPHAGOGASTRODUODENOSCOPY;  Surgeon: Brunner, Mark I, MD;  Location:  [facility];  Service: Gastroenterology;  Laterality: N/A;  with antrum biopsy   • HYSTERECTOMY     • KNEE SURGERY     • LUMBAR LAMINECTOMY DISCECTOMY DECOMPRESSION N/A 8/6/2018    Procedure: LUMBAR LAMINECTOMIES L4-S1;  Surgeon: Chip Smith MD;  Location:  [facility];  Service: Neurosurgery       Family History:  family history includes Diabetes in her mother; Hypertension in her mother. Otherwise pertinent FHx was reviewed and unremarkable.     Social History:  reports that she has been smoking. She has been smoking about 1.00 pack per day. She has never used smokeless tobacco. She reports current alcohol use. She reports current drug use. Drug: Cocaine(coke).  Social History     Social History Narrative   • Not on file   Patient states that she has not used any alcohol in the past 4 to 5 months and has not used any cocaine over the past 4 to 5 months.  She does admit to tobacco use about a half a pack per day.  She lives in [place] with her boyfriend    Medications:  Available home medication  information reviewed.  (Not in a hospital admission)      Allergies   Allergen Reactions   • Tylenol [Acetaminophen] Other (See Comments)     SEVERE LIVER DISEASE-CONTRAINDICATED       Objective   Objective     Vital Signs:   Temp:  [98.2 °F (36.8 °C)] 98.2 °F (36.8 °C)  Heart Rate:  [103-117] 117  Resp:  [18] 18  BP: (166-218)/() 210/133       Physical Exam  Constitutional:       General: She is in acute distress.      Appearance: She is not toxic-appearing.   HENT:      Head: Normocephalic and atraumatic.      Right Ear: External ear normal.      Left Ear: External ear normal.      Nose: Nose normal.      Mouth/Throat:      Mouth: Mucous membranes are moist.   Eyes:      Extraocular Movements: Extraocular movements intact.      Pupils: Pupils are equal, round, and reactive to light.   Cardiovascular:      Rate and Rhythm: Tachycardia present.      Pulses: Normal pulses.      Heart sounds: Murmur heard.      Comments: Systolic murmur at LUSB  Pulmonary:      Effort: Pulmonary effort is normal.      Breath sounds: Normal breath sounds.   Abdominal:      Palpations: Abdomen is soft.      Tenderness: There is abdominal tenderness.      Comments: Diffuse tenderness dinesh epigastric   Musculoskeletal:         General: Normal range of motion.      Cervical back: Normal range of motion and neck supple.   Skin:     General: Skin is warm.   Neurological:      Mental Status: She is alert and oriented to person, place, and time.      Cranial Nerves: No cranial nerve deficit.      Comments: She has 4 out of 5 strength in her left upper extremity including distal extremity strength.  5 out of 5 on the right upper extremity.  She has 3+ out of 5 in her left lower extremity with 3-5 with dorsiflexion and positive clonus in her left lower extremity.  5 out of 5 in her right lower extremity.  No cerebellar ataxia.  She has decrease in her nasolabial fold on her left side with a slight facial droop  on the left   Psychiatric:          Mood and Affect: Mood normal.         Behavior: Behavior normal.            Result Review:  I have personally reviewed the results from the time of this admission to 8/10/2022 13:13 EDT and agree with these findings:  [x]  Laboratory list / accordion  []  Microbiology  [x]  Radiology  [x]  EKG/Telemetry   [x]  Cardiology/Vascular   []  Pathology  [x]  Old records          LAB RESULTS:      Lab 08/10/22  1016   WBC 8.89   HEMOGLOBIN 15.2   HEMATOCRIT 43.9   PLATELETS 310   NEUTROS ABS 3.84   IMMATURE GRANS (ABS) 0.02   LYMPHS ABS 4.48*   MONOS ABS 0.47   EOS ABS 0.04   MCV 94.0   LACTATE 4.0*         Lab 08/10/22  1016   SODIUM 132*   POTASSIUM 3.4*   CHLORIDE 90*   CO2 23.0   ANION GAP 19.0*   BUN 5*   CREATININE 0.75   EGFR 96.5   GLUCOSE 274*   CALCIUM 9.6         Lab 08/10/22  1016   TOTAL PROTEIN 7.9   ALBUMIN 4.60   GLOBULIN 3.3   ALT (SGPT) 78*   AST (SGOT) 89*   BILIRUBIN 0.5   ALK PHOS 197*   LIPASE 231*                     UA    Urinalysis 5/31/22 5/31/22 5/31/22 6/16/22 8/10/22    1220 1220 2138     Squamous Epithelial Cells, UA  7-12 (A)      Specific Gravity, UA 1.057 (A)  1.058 (A) 1.029 1.011   Ketones, UA Trace (A)  Negative 15 mg/dL (1+) (A) 15 mg/dL (1+) (A)   Blood, UA Negative  Negative Negative Negative   Leukocytes, UA Trace (A)  Negative Negative Negative   Nitrite, UA Negative  Negative Negative Negative   RBC, UA  0-2      WBC, UA  6-12 (A)      Bacteria, UA  Trace      (A) Abnormal value              Microbiology Results (last 10 days)     ** No results found for the last 240 hours. **          CT Head Without Contrast    Result Date: 8/10/2022  CT HEAD WO CONTRAST-  Date of Exam: 8/10/2022 10:36 AM  Indication: weak.  Comparison: June 16, 2022  Technique: CT scan of the head without IV contrast.  Automated exposure control and iterative reconstruction methods were used.  FINDINGS No acute intracranial hemorrhage or extra-axial collection is identified. The ventricles appear normal in  caliber, with no evidence of mass effect or midline shift. The basal cisterns appear patent. The gray-white differentiation appears preserved.  The calvarium appears intact. The visualized paranasal sinuses are clear. The mastoid air cells are well-aerated.      Impression: No acute intracranial process identified.  This report was finalized on 8/10/2022 11:04 AM by Ino Oliva MD.            Assessment & Plan   Assessment & Plan     Active Hospital Problems    Diagnosis  POA   • **Hypertensive crisis [I16.9]  Yes   • Dehydration [E86.0]  Unknown   • Right sided weakness [R53.1]  Yes   • Acute on chronic pancreatitis (HCC) [K85.90, K86.1]  Yes   • Type 2 diabetes mellitus with hyperglycemia, with long-term current use of insulin (HCC) [E11.65, Z79.4]  Not Applicable   • Gastroparesis [K31.84]  Yes       52-year-old female with a history of hypertension and diabetes who presents with hypertensive emergency with signs and symptoms consistent with a subacute stroke, as well as concern for possible DKA and pancreatitis    DKA: She has anion gap of 19 with hyperglycemia, although her bicarb is 23.  Her beta hydroxybutyrate is positive so we will start her on an insulin drip.  Continue IV fluids with every 4 hours BMP checks.  Once she has closed her anion gap will be able to transition her to subcutaneous insulin.    Hypertensive emergency: We will prescribe her labetalol IV as needed.  We will see if we can control this without requiring a drip.    Pancreatitis: Acute on chronic.  We will treat her with IV fluids, and treat her pain with morphine as needed.    Subacute stroke: The stroke navigator has been notified.  The MRI is ordered.  The CT scan does not show any evidence of stroke.  Will order PT/OT/ST. hemoglobin A1c and lipid panel.  Also order echocardiogram.  Neurochecks    R toe pain: obtain xray    Substance abuse: Monitor for any signs of withdrawal    DVT prophylaxis:  lovenox        CODE STATUS:   full  There are no questions and answers to display.         Cintia Cordova MD  08/10/22

## 2022-08-10 NOTE — CONSULTS
Stroke Consult Note    Patient Name: Bernadette Paris   MRN: [id number]  Age: 52 y.o.  Sex: female  : 1970    Primary Care Physician: Provider, No Known  Referring Physician:  No ref. provider found        Handedness: Left  Race: -American    Chief Complaint/Reason for Consultation: ED referral    HPI: 52-year-old left-handed black female with known hepatitis C, hyperlipidemia, diabetes, hypertension, bipolar, pancreatitis, and medical noncompliance who presented to the emergency department with left great toe pain and numbness.  Apparently staff in the ED who noted the patient feel like she had a facial droop and therefore Dr. Cruz ED physician called and asked for their opinion.  I did go assess the patient, I did not appreciate any facial droop, no unilateral weakness, no speech disturbance, she does verbalize she has decreased sensation in the left face all over, upper and lower extremity.  She tells me she cannot move her left toe, however she is able to wiggle her toes.  She apparently had not previously mentioned any weakness, however during assessment she appears to have slight weakness in her left lower extremity, however there is no drift and there is a positive Anton sign.  Therefore her NIH is a 1.  Of note when she came in her blood pressure was 193/117 and has gotten as high as 220/137.  Again, patient did not have any complaints of stroke related symptoms until ED staff thought they noticed a change in the patient who they just saw a couple weeks ago in the ED.    Last Known Normal Date/Time: Unknown EST     Review of Systems   Constitutional: Positive for fatigue.   HENT: Negative.    Respiratory: Positive for shortness of breath.    Cardiovascular: Positive for palpitations.   Gastrointestinal: Positive for abdominal pain.   Genitourinary: Negative.    Musculoskeletal: Negative.    Neurological: Positive for numbness.   Psychiatric/Behavioral: Negative.       Past Medical History:    Diagnosis Date   • Arthritis    • Bipolar disorder (HCC)    • Chronic bronchitis (HCC)    • Depression    • Diabetes mellitus (HCC)     DIAGNOSED 2016   • GERD (gastroesophageal reflux disease)    • Hepatitis-C    • History of blood transfusion    • Hyperlipidemia    • Hypertension    • Infectious viral hepatitis     HEPATITIS C   • Migraine    • Sleep apnea     PATIENT UNSURE OF SETTINGS     Past Surgical History:   Procedure Laterality Date   • CHOLECYSTECTOMY     • ENDOSCOPY N/A 4/9/2022    Procedure: ESOPHAGOGASTRODUODENOSCOPY;  Surgeon: Brunner, Mark I, MD;  Location: [facility] ENDOSCOPY;  Service: Gastroenterology;  Laterality: N/A;  with antrum biopsy   • HYSTERECTOMY     • KNEE SURGERY     • LUMBAR LAMINECTOMY DISCECTOMY DECOMPRESSION N/A 8/6/2018    Procedure: LUMBAR LAMINECTOMIES L4-S1;  Surgeon: Chip Smith MD;  Location: [facility] OR;  Service: Neurosurgery     Family History   Problem Relation Age of Onset   • Diabetes Mother    • Hypertension Mother      Social History     Socioeconomic History   • Marital status: Single   Tobacco Use   • Smoking status: Current Every Day Smoker     Packs/day: 1.00   • Smokeless tobacco: Never Used   Substance and Sexual Activity   • Alcohol use: Yes     Comment: hx etoh abuse per ems   • Drug use: Yes     Types: Cocaine(coke)   • Sexual activity: Defer     Allergies   Allergen Reactions   • Tylenol [Acetaminophen] Other (See Comments)     SEVERE LIVER DISEASE-CONTRAINDICATED     Prior to Admission medications    Medication Sig Start Date End Date Taking? Authorizing Provider   Accu-Chek FastClix Lancets misc 1 each by Other route 4 (Four) Times a Day Before Meals & at Bedtime. 5/13/22   Светлана Navarro MD   acetaminophen (TYLENOL) 325 MG tablet Take 1 tablet by mouth Every 6 (Six) Hours As Needed for Mild Pain  or Moderate Pain . 5/13/22   Светлана Navarro MD   Alcohol Swabs (Alcohol Pads) 70 % pads 1 each 4 (Four) Times a Day. 5/13/22   Светлана Navarro MD   amLODIPine  (NORVASC) 5 MG tablet Take 1 tablet by mouth Daily. 5/13/22   Светлана Navarro MD   bisacodyl (DULCOLAX) 10 MG suppository Insert 1 suppository into the rectum Daily As Needed for Constipation (Use if bisacodyl oral is ineffective). 5/13/22   Светлана Navarro MD   busPIRone (BUSPAR) 10 MG tablet Take 1 tablet by mouth 2 (Two) Times a Day. 5/13/22   Светлана Navarro MD   dicyclomine (BENTYL) 20 MG tablet Take 1 tablet by mouth Every 6 (Six) Hours As Needed (pain). 5/29/22   Adrian Perez MD   Ginger, Zingiber officinalis, (Ginger Root) 500 MG capsule Take 500 mg by mouth 3 (Three) Times a Day Before Meals. 5/13/22   Светлана Navarro MD   glucose blood (Accu-Chek Guide) test strip 1 each by Other route 4 (Four) Times a Day Before Meals & at Bedtime. Use as instructed 5/13/22   Светлана Navarro MD   hydrOXYzine (ATARAX) 25 MG tablet Take 1 tablet by mouth Every 8 (Eight) Hours As Needed for Anxiety. 5/13/22   Светлана Navarro MD   ibuprofen (ADVIL,MOTRIN) 600 MG tablet Take 1 tablet by mouth 3 (Three) Times a Day. 6/2/22   Jamel Swift,    insulin detemir (LEVEMIR) 100 UNIT/ML injection Inject 50 Units under the skin into the appropriate area as directed 2 (Two) Times a Day. 5/13/22   Светлана Navarro MD   Insulin Pen Needle (Pen Needles) 32G X 4 MM misc Inject 1 each under the skin into the appropriate area as directed 2 (Two) Times a Day. 5/13/22   Светлана Navarro MD   lisinopril (PRINIVIL,ZESTRIL) 40 MG tablet Take 1 tablet by mouth Daily. 5/13/22   Светлана Navarro MD   metFORMIN (GLUCOPHAGE) 1000 MG tablet Take 1 tablet by mouth 2 (Two) Times a Day With Meals. 5/13/22   Светлана Navarro MD   metoprolol tartrate (LOPRESSOR) 25 MG tablet Take 1 tablet by mouth Every 12 (Twelve) Hours. 5/13/22   Светлана Navarro MD   ondansetron ODT (Zofran ODT) 4 MG disintegrating tablet Place 1 tablet on the tongue Every 8 (Eight) Hours As Needed for Nausea or Vomiting. 5/13/22   Светлана Navarro MD   pantoprazole (PROTONIX) 40 MG EC  tablet Take 1 tablet by mouth Daily. 6/2/22   Jamel Swift,    polyethylene glycol (MIRALAX) 17 GM/SCOOP powder Mix 1 capful (17g) in water and drink by mouth Daily. 5/13/22   Светлана Navarro MD   QUEtiapine (SEROquel) 100 MG tablet Take 1 tablet by mouth 2 (Two) Times a Day. PT states she was on this at home.. 5/13/22   Светлана Navarro MD   RA ALLERGY RELIEF 10 MG tablet take 1 tablet by mouth once daily 10/11/17   ProviderSheree MD   sennosides-docusate (PERICOLACE) 8.6-50 MG per tablet Take 2 tablets by mouth 2 (Two) Times a Day As Needed for Constipation. 5/13/22   Светлана Navarro MD   sertraline (ZOLOFT) 100 MG tablet Take 1 tablet by mouth Daily. 5/13/22   Светлана Navarro MD   traZODone (DESYREL) 50 MG tablet Take 1 tablet by mouth Every Night. 5/13/22   Светлана Navarro MD   Ventolin  (90 Base) MCG/ACT inhaler 2 puffs every 4-6 hours as needed for shortness of breath 5/13/22   Светлана Navarro MD       Temp:  [98.2 °F (36.8 °C)] 98.2 °F (36.8 °C)  Heart Rate:  [103-135] 112  Resp:  [18] 18  BP: (166-220)/() 188/101  Neurological Exam  Mental Status  Awake, alert and oriented to person, place and time.Alert. Speech is normal. Language is fluent with no aphasia.    Cranial Nerves  CN II: Visual fields full to confrontation.  CN III, IV, VI: Extraocular movements intact bilaterally.  CN V:  Right: Diminished sensation of the entire right side of the face.  Left: Diminished sensation of the entire left side of the face. Verbalizes decreased sensation to light touch in the left face and all of her forehead.    Motor   Strength is 5/5 throughout all four extremities.    Sensory  Light touch abnormality: Sensation: Verbalizes decreased sensation in the left upper and lower extremity to light touch.     Coordination    No obvious dysmetria.    Gait    Not observed.      Physical Exam  Vitals reviewed.   Constitutional:       Appearance: She is ill-appearing.   HENKHAI:      Head: Normocephalic.       Mouth/Throat:      Mouth: Mucous membranes are dry.   Eyes:      Extraocular Movements: Extraocular movements intact.   Pulmonary:      Effort: Pulmonary effort is normal.   Skin:     General: Skin is warm.   Neurological:      Mental Status: She is alert.      Sensory: Sensory deficit present.      Deep Tendon Reflexes: Strength normal.   Psychiatric:         Mood and Affect: Mood normal.         Speech: Speech normal.         Acute Stroke Data    IV Thrombolytic (TPA/Tenecteplase) Inclusion / Exclusion Criteria    Time: 14:58 EDT  Person Administering Scale: DMITRY Bobo    Inclusion Criteria  [x]   18 years of age or greater   []   Onset of symptoms < 4.5 hours before beginning treatment (stroke onset = time patient was last seen well or without symptoms).   []   Diagnosis of acute ischemic stroke causing measurable disabling deficit (Complete Hemianopia, Any Aphasia, Visual or Sensory Extinction, Any weakness limiting sustained effort against gravity)   []   Any remaining deficit considered potentially disabling in view of patient and practitioner   Exclusion criteria (Do not proceed with Alteplase if any are checked under exclusion criteria)  [x]   Onset unknown or GREATER than 4.5 hours   []   ICH on CT/MRI   []   CT demonstrates hypodensity representing acute or subacute infarct   []   Significant head trauma or prior stroke in the previous 3 months   []   Symptoms suggestive of subarachnoid hemorrhage   []   History of un-ruptured intracranial aneurysm GREATER than 10 mm   []   Recent intracranial or intraspinal surgery within the last 3 months   []   Arterial puncture at a non-compressible site in the previous 7 days   []   Active internal bleeding   []   Acute bleeding tendency   []   Platelet count LESS than 100,000 for known hematological diseases such as leukemia, thrombocytopenia or chronic cirrhosis   []   Current use of anticoagulant with INR GREATER than 1.7 or PT GREATER than 15 seconds,  aPTT GREATER than 40 seconds   []   Heparin received within 48 hours, resulting in abnormally elevated aPTT GREATER than upper limit of normal   []   Current use of direct thrombin inhibitors or direct factor Xa inhibitors in the past 48 hours   []   Elevated blood pressure refractory to treatment (systolic GREATER than 185 mm/Hg or diastolic  GREATER than 110 mm/Hg   []   Suspected infective endocarditis and aortic arch dissection   []   Current use of therapeutic treatment dose of low-molecular-weight heparin (LMWH) within the previous 24 hours   []   Structural GI malignancy or bleed   Relative exclusion for all patients  []   Only minor non-disabling symptoms   []   Pregnancy   []   Seizure at onset with postictal residual neurological impairments   []   Major surgery or previous trauma within past 14 days   []   History of previous spontaneous ICH, intracranial neoplasm, or AV malformation   []   Postpartum (within previous 14 days)   []   Recent GI or urinary tract hemorrhage (within previous 21 days)   []   Recent acute MI (within previous 3 months)   []   History of un-ruptured intracranial aneurysm LESS than 10 mm   []   History of ruptured intracranial aneurysm   []   Blood glucose LESS than 50 mg/dL (2.7 mmol/L)   []   Dural puncture within the last 7 days   []   Known GREATER than 10 cerebral microbleeds   Additional exclusions for patients with symptoms onset between 3 and 4.5 hours.  []   Age > 80.   []   On any anticoagulants regardless of INR  >>> Warfarin (Coumadin), Heparin, Enoxaparin (Lovenox), fondaparinux (Arixtra), bivalirudin (Angiomax), Argatroban, dabigatran (Pradaxa), rivaroxaban (Xarelto), or apixaban (Eliquis)   []   Severe stroke (NIHSS > 25).   []   History of BOTH diabetes and previous ischemic stroke.   []   The risks and benefits have been discussed with the patient or family related to the administration of IV Alteplase for stroke symptoms.   []   I have discussed and reviewed the  patient's case and imaging with the attending prior to IV Thrombolytic (TPA/Tenecteplase).   n/a Time Thrombolytic administered       Hospital Meds:  Scheduled- enoxaparin, 40 mg, Subcutaneous, Daily  insulin lispro, 0-9 Units, Subcutaneous, TID AC  nicotine, 1 patch, Transdermal, Q24H  sodium chloride, 10 mL, Intravenous, Q12H      Infusions-     PRNs- •  dextrose  •  dextrose  •  glucagon (human recombinant)  •  magnesium sulfate **OR** magnesium sulfate **OR** magnesium sulfate  •  Morphine  •  ondansetron  •  potassium chloride **OR** potassium chloride **OR** potassium chloride  •  Sodium Chloride (PF)  •  sodium chloride    Functional Status Prior to Current Stroke/Novi Score: 0    NIH Stroke Scale  Time: 14:58 EDT  Person Administering Scale: DMITRY Bobo    Interval: baseline  1a. Level of Consciousness: 0-->Alert, keenly responsive  1b. LOC Questions: 0-->Answers both questions correctly  1c. LOC Commands: 0-->Performs both tasks correctly  2. Best Gaze: 0-->Normal  3. Visual: 0-->No visual loss  4. Facial Palsy: 0-->Normal symmetrical movements  5a. Motor Arm, Left: 0-->No drift, limb holds 90 (or 45) degrees for full 10 secs  5b. Motor Arm, Right: 0-->No drift, limb holds 90 (or 45) degrees for full 10 secs  6a. Motor Leg, Left: 0-->No drift, leg holds 30 degree position for full 5 secs  6b. Motor Leg, Right: 0-->No drift, leg holds 30 degree position for full 5 secs  7. Limb Ataxia: 0-->Absent  8. Sensory: 1-->Mild-to-moderate sensory loss, patient feels pinprick is less sharp or is dull on the affected side, or there is a loss of superficial pain with pinprick, but patient is aware of being touched  9. Best Language: 0-->No aphasia, normal  10. Dysarthria: 0-->Normal  11. Extinction and Inattention (formerly Neglect): 0-->No abnormality    Total (NIH Stroke Scale): 1    Results Reviewed:  I have personally reviewed current lab, radiology, and data and agree with  results.        Assessment/Plan:      52-year-old with hepatitis, hyperlipidemia, pancreatitis, diabetes, hypertension who was admitted from the emergency department after original complaint of left toe pain and numbness.  Patient did not have any complaints of strokelike symptoms upon arrival.    I do not appreciate a facial droop, she does verbalize decreased sensation in the left upper and lower extremity however she also has decreased sensation to light touch in her whole forehead, and left face.  This does not appear to be a vascular event, however will get MRI brain.  If that is negative there is no further stroke or general neurology work-up needed.          Amrita Chavez, DAVID, APRN, [facility]  August 10, 2022  14:58 EDT

## 2022-08-11 ENCOUNTER — APPOINTMENT (OUTPATIENT)
Dept: CARDIOLOGY | Facility: HOSPITAL | Age: 52
End: 2022-08-11

## 2022-08-11 ENCOUNTER — APPOINTMENT (OUTPATIENT)
Dept: MRI IMAGING | Facility: HOSPITAL | Age: 52
End: 2022-08-11

## 2022-08-11 ENCOUNTER — APPOINTMENT (OUTPATIENT)
Dept: CT IMAGING | Facility: HOSPITAL | Age: 52
End: 2022-08-11

## 2022-08-11 LAB
AMPHET+METHAMPHET UR QL: NEGATIVE
AMPHETAMINES UR QL: NEGATIVE
ANION GAP SERPL CALCULATED.3IONS-SCNC: 12 MMOL/L (ref 5–15)
ANION GAP SERPL CALCULATED.3IONS-SCNC: 13 MMOL/L (ref 5–15)
ANION GAP SERPL CALCULATED.3IONS-SCNC: 14 MMOL/L (ref 5–15)
ANION GAP SERPL CALCULATED.3IONS-SCNC: 15 MMOL/L (ref 5–15)
BARBITURATES UR QL SCN: NEGATIVE
BASOPHILS # BLD AUTO: 0.02 10*3/MM3 (ref 0–0.2)
BASOPHILS NFR BLD AUTO: 0.3 % (ref 0–1.5)
BENZODIAZ UR QL SCN: NEGATIVE
BH CV ECHO MEAS - AO MAX PG: 6.9 MMHG
BH CV ECHO MEAS - AO MEAN PG: 4 MMHG
BH CV ECHO MEAS - AO ROOT DIAM: 2.9 CM
BH CV ECHO MEAS - AO V2 MAX: 131 CM/SEC
BH CV ECHO MEAS - AO V2 VTI: 20 CM
BH CV ECHO MEAS - AVA(I,D): 2.39 CM2
BH CV ECHO MEAS - EDV(CUBED): 39.3 ML
BH CV ECHO MEAS - EDV(MOD-SP2): 43.7 ML
BH CV ECHO MEAS - EDV(MOD-SP4): 62.9 ML
BH CV ECHO MEAS - EF(MOD-BP): 71.2 %
BH CV ECHO MEAS - EF(MOD-SP2): 69.8 %
BH CV ECHO MEAS - EF(MOD-SP4): 71.1 %
BH CV ECHO MEAS - ESV(CUBED): 15.6 ML
BH CV ECHO MEAS - ESV(MOD-SP2): 13.2 ML
BH CV ECHO MEAS - ESV(MOD-SP4): 18.2 ML
BH CV ECHO MEAS - FS: 26.5 %
BH CV ECHO MEAS - IVS/LVPW: 1 CM
BH CV ECHO MEAS - IVSD: 1.3 CM
BH CV ECHO MEAS - LA DIMENSION: 2.6 CM
BH CV ECHO MEAS - LAT PEAK E' VEL: 10.1 CM/SEC
BH CV ECHO MEAS - LV MASS(C)D: 147.6 GRAMS
BH CV ECHO MEAS - LV MAX PG: 4.6 MMHG
BH CV ECHO MEAS - LV MEAN PG: 2 MMHG
BH CV ECHO MEAS - LV V1 MAX: 107 CM/SEC
BH CV ECHO MEAS - LV V1 VTI: 15.2 CM
BH CV ECHO MEAS - LVIDD: 3.4 CM
BH CV ECHO MEAS - LVIDS: 2.5 CM
BH CV ECHO MEAS - LVOT AREA: 3.1 CM2
BH CV ECHO MEAS - LVOT DIAM: 2 CM
BH CV ECHO MEAS - LVPWD: 1.3 CM
BH CV ECHO MEAS - MED PEAK E' VEL: 6.5 CM/SEC
BH CV ECHO MEAS - MV A MAX VEL: 91.6 CM/SEC
BH CV ECHO MEAS - MV DEC TIME: 0.19 MSEC
BH CV ECHO MEAS - MV E MAX VEL: 57.6 CM/SEC
BH CV ECHO MEAS - MV E/A: 0.63
BH CV ECHO MEAS - PA ACC TIME: 0.17 SEC
BH CV ECHO MEAS - PA PR(ACCEL): 4.8 MMHG
BH CV ECHO MEAS - PA V2 MAX: 94.6 CM/SEC
BH CV ECHO MEAS - RAP SYSTOLE: 3 MMHG
BH CV ECHO MEAS - RVSP: 30 MMHG
BH CV ECHO MEAS - SV(LVOT): 47.8 ML
BH CV ECHO MEAS - SV(MOD-SP2): 30.5 ML
BH CV ECHO MEAS - SV(MOD-SP4): 44.7 ML
BH CV ECHO MEAS - TAPSE (>1.6): 1.9 CM
BH CV ECHO MEAS - TR MAX PG: 27 MMHG
BH CV ECHO MEAS - TR MAX VEL: 260 CM/SEC
BH CV ECHO MEASUREMENTS AVERAGE E/E' RATIO: 6.94
BH CV VAS BP LEFT ARM: NORMAL MMHG
BH CV XLRA - RV BASE: 2.5 CM
BH CV XLRA - RV LENGTH: 4.6 CM
BH CV XLRA - RV MID: 2 CM
BH CV XLRA - TDI S': 15.9 CM/SEC
BUN SERPL-MCNC: 4 MG/DL (ref 6–20)
BUN SERPL-MCNC: 4 MG/DL (ref 6–20)
BUN SERPL-MCNC: 6 MG/DL (ref 6–20)
BUN SERPL-MCNC: 7 MG/DL (ref 6–20)
BUN/CREAT SERPL: 10.8 (ref 7–25)
BUN/CREAT SERPL: 8.3 (ref 7–25)
BUN/CREAT SERPL: 8.9 (ref 7–25)
BUN/CREAT SERPL: 9.2 (ref 7–25)
BUPRENORPHINE SERPL-MCNC: NEGATIVE NG/ML
CALCIUM SPEC-SCNC: 8.7 MG/DL (ref 8.6–10.5)
CALCIUM SPEC-SCNC: 9 MG/DL (ref 8.6–10.5)
CALCIUM SPEC-SCNC: 9.1 MG/DL (ref 8.6–10.5)
CALCIUM SPEC-SCNC: 9.2 MG/DL (ref 8.6–10.5)
CANNABINOIDS SERPL QL: NEGATIVE
CHLORIDE SERPL-SCNC: 103 MMOL/L (ref 98–107)
CHLORIDE SERPL-SCNC: 105 MMOL/L (ref 98–107)
CHLORIDE SERPL-SCNC: 98 MMOL/L (ref 98–107)
CHLORIDE SERPL-SCNC: 99 MMOL/L (ref 98–107)
CHOLEST SERPL-MCNC: 162 MG/DL (ref 0–200)
CO2 SERPL-SCNC: 20 MMOL/L (ref 22–29)
CO2 SERPL-SCNC: 22 MMOL/L (ref 22–29)
CO2 SERPL-SCNC: 23 MMOL/L (ref 22–29)
CO2 SERPL-SCNC: 23 MMOL/L (ref 22–29)
COCAINE UR QL: POSITIVE
CREAT SERPL-MCNC: 0.45 MG/DL (ref 0.57–1)
CREAT SERPL-MCNC: 0.48 MG/DL (ref 0.57–1)
CREAT SERPL-MCNC: 0.65 MG/DL (ref 0.57–1)
CREAT SERPL-MCNC: 0.65 MG/DL (ref 0.57–1)
DEPRECATED RDW RBC AUTO: 43.7 FL (ref 37–54)
EGFRCR SERPLBLD CKD-EPI 2021: 106.7 ML/MIN/1.73
EGFRCR SERPLBLD CKD-EPI 2021: 106.7 ML/MIN/1.73
EGFRCR SERPLBLD CKD-EPI 2021: 114.8 ML/MIN/1.73
EGFRCR SERPLBLD CKD-EPI 2021: 116.6 ML/MIN/1.73
EOSINOPHIL # BLD AUTO: 0.06 10*3/MM3 (ref 0–0.4)
EOSINOPHIL NFR BLD AUTO: 1 % (ref 0.3–6.2)
ERYTHROCYTE [DISTWIDTH] IN BLOOD BY AUTOMATED COUNT: 12.8 % (ref 12.3–15.4)
GLUCOSE BLDC GLUCOMTR-MCNC: 127 MG/DL (ref 70–130)
GLUCOSE BLDC GLUCOMTR-MCNC: 129 MG/DL (ref 70–130)
GLUCOSE BLDC GLUCOMTR-MCNC: 131 MG/DL (ref 70–130)
GLUCOSE BLDC GLUCOMTR-MCNC: 131 MG/DL (ref 70–130)
GLUCOSE BLDC GLUCOMTR-MCNC: 140 MG/DL (ref 70–130)
GLUCOSE BLDC GLUCOMTR-MCNC: 148 MG/DL (ref 70–130)
GLUCOSE BLDC GLUCOMTR-MCNC: 152 MG/DL (ref 70–130)
GLUCOSE BLDC GLUCOMTR-MCNC: 167 MG/DL (ref 70–130)
GLUCOSE BLDC GLUCOMTR-MCNC: 168 MG/DL (ref 70–130)
GLUCOSE BLDC GLUCOMTR-MCNC: 177 MG/DL (ref 70–130)
GLUCOSE BLDC GLUCOMTR-MCNC: 182 MG/DL (ref 70–130)
GLUCOSE BLDC GLUCOMTR-MCNC: 233 MG/DL (ref 70–130)
GLUCOSE BLDC GLUCOMTR-MCNC: 256 MG/DL (ref 70–130)
GLUCOSE BLDC GLUCOMTR-MCNC: 292 MG/DL (ref 70–130)
GLUCOSE BLDC GLUCOMTR-MCNC: 63 MG/DL (ref 70–130)
GLUCOSE BLDC GLUCOMTR-MCNC: 88 MG/DL (ref 70–130)
GLUCOSE BLDC GLUCOMTR-MCNC: 93 MG/DL (ref 70–130)
GLUCOSE SERPL-MCNC: 117 MG/DL (ref 65–99)
GLUCOSE SERPL-MCNC: 147 MG/DL (ref 65–99)
GLUCOSE SERPL-MCNC: 260 MG/DL (ref 65–99)
GLUCOSE SERPL-MCNC: 296 MG/DL (ref 65–99)
HCT VFR BLD AUTO: 41.6 % (ref 34–46.6)
HDLC SERPL-MCNC: 113 MG/DL (ref 40–60)
HGB BLD-MCNC: 14.7 G/DL (ref 12–15.9)
IMM GRANULOCYTES # BLD AUTO: 0.01 10*3/MM3 (ref 0–0.05)
IMM GRANULOCYTES NFR BLD AUTO: 0.2 % (ref 0–0.5)
LDLC SERPL CALC-MCNC: 37 MG/DL (ref 0–100)
LDLC/HDLC SERPL: 0.33 {RATIO}
LEFT ATRIUM VOLUME INDEX: 18 ML/M2
LYMPHOCYTES # BLD AUTO: 2.93 10*3/MM3 (ref 0.7–3.1)
LYMPHOCYTES NFR BLD AUTO: 50.4 % (ref 19.6–45.3)
MAGNESIUM SERPL-MCNC: 1.4 MG/DL (ref 1.6–2.6)
MAGNESIUM SERPL-MCNC: 1.9 MG/DL (ref 1.6–2.6)
MAGNESIUM SERPL-MCNC: 2.2 MG/DL (ref 1.6–2.6)
MAGNESIUM SERPL-MCNC: 2.9 MG/DL (ref 1.6–2.6)
MAXIMAL PREDICTED HEART RATE: 169 BPM
MCH RBC QN AUTO: 33 PG (ref 26.6–33)
MCHC RBC AUTO-ENTMCNC: 35.3 G/DL (ref 31.5–35.7)
MCV RBC AUTO: 93.3 FL (ref 79–97)
METHADONE UR QL SCN: NEGATIVE
MONOCYTES # BLD AUTO: 0.66 10*3/MM3 (ref 0.1–0.9)
MONOCYTES NFR BLD AUTO: 11.4 % (ref 5–12)
NEUTROPHILS NFR BLD AUTO: 2.13 10*3/MM3 (ref 1.7–7)
NEUTROPHILS NFR BLD AUTO: 36.7 % (ref 42.7–76)
NRBC BLD AUTO-RTO: 0 /100 WBC (ref 0–0.2)
OPIATES UR QL: NEGATIVE
OXYCODONE UR QL SCN: NEGATIVE
PCP UR QL SCN: NEGATIVE
PHOSPHATE SERPL-MCNC: 2.5 MG/DL (ref 2.5–4.5)
PHOSPHATE SERPL-MCNC: 2.8 MG/DL (ref 2.5–4.5)
PHOSPHATE SERPL-MCNC: 3 MG/DL (ref 2.5–4.5)
PHOSPHATE SERPL-MCNC: 3.8 MG/DL (ref 2.5–4.5)
PLATELET # BLD AUTO: 269 10*3/MM3 (ref 140–450)
PMV BLD AUTO: 10.5 FL (ref 6–12)
POTASSIUM SERPL-SCNC: 3.3 MMOL/L (ref 3.5–5.2)
POTASSIUM SERPL-SCNC: 3.4 MMOL/L (ref 3.5–5.2)
POTASSIUM SERPL-SCNC: 3.7 MMOL/L (ref 3.5–5.2)
POTASSIUM SERPL-SCNC: 4 MMOL/L (ref 3.5–5.2)
PROPOXYPH UR QL: NEGATIVE
RBC # BLD AUTO: 4.46 10*6/MM3 (ref 3.77–5.28)
SODIUM SERPL-SCNC: 136 MMOL/L (ref 136–145)
SODIUM SERPL-SCNC: 136 MMOL/L (ref 136–145)
SODIUM SERPL-SCNC: 137 MMOL/L (ref 136–145)
SODIUM SERPL-SCNC: 138 MMOL/L (ref 136–145)
STRESS TARGET HR: 144 BPM
TRICYCLICS UR QL SCN: NEGATIVE
TRIGL SERPL-MCNC: 61 MG/DL (ref 0–150)
VLDLC SERPL-MCNC: 12 MG/DL (ref 5–40)
WBC NRBC COR # BLD: 5.81 10*3/MM3 (ref 3.4–10.8)

## 2022-08-11 PROCEDURE — 25010000002 ENOXAPARIN PER 10 MG: Performed by: PEDIATRICS

## 2022-08-11 PROCEDURE — 92523 SPEECH SOUND LANG COMPREHEN: CPT

## 2022-08-11 PROCEDURE — 70551 MRI BRAIN STEM W/O DYE: CPT

## 2022-08-11 PROCEDURE — 85025 COMPLETE CBC W/AUTO DIFF WBC: CPT | Performed by: PEDIATRICS

## 2022-08-11 PROCEDURE — 99232 SBSQ HOSP IP/OBS MODERATE 35: CPT | Performed by: STUDENT IN AN ORGANIZED HEALTH CARE EDUCATION/TRAINING PROGRAM

## 2022-08-11 PROCEDURE — 80061 LIPID PANEL: CPT | Performed by: PEDIATRICS

## 2022-08-11 PROCEDURE — 93306 TTE W/DOPPLER COMPLETE: CPT

## 2022-08-11 PROCEDURE — 97166 OT EVAL MOD COMPLEX 45 MIN: CPT

## 2022-08-11 PROCEDURE — 93010 ELECTROCARDIOGRAM REPORT: CPT | Performed by: INTERNAL MEDICINE

## 2022-08-11 PROCEDURE — 80048 BASIC METABOLIC PNL TOTAL CA: CPT | Performed by: PEDIATRICS

## 2022-08-11 PROCEDURE — 83735 ASSAY OF MAGNESIUM: CPT | Performed by: PEDIATRICS

## 2022-08-11 PROCEDURE — 63710000001 INSULIN DETEMIR PER 5 UNITS: Performed by: STUDENT IN AN ORGANIZED HEALTH CARE EDUCATION/TRAINING PROGRAM

## 2022-08-11 PROCEDURE — 84100 ASSAY OF PHOSPHORUS: CPT | Performed by: PEDIATRICS

## 2022-08-11 PROCEDURE — 82962 GLUCOSE BLOOD TEST: CPT

## 2022-08-11 PROCEDURE — 74176 CT ABD & PELVIS W/O CONTRAST: CPT

## 2022-08-11 PROCEDURE — 0 MAGNESIUM SULFATE 4 GM/100ML SOLUTION: Performed by: PEDIATRICS

## 2022-08-11 PROCEDURE — 80306 DRUG TEST PRSMV INSTRMNT: CPT | Performed by: STUDENT IN AN ORGANIZED HEALTH CARE EDUCATION/TRAINING PROGRAM

## 2022-08-11 PROCEDURE — 93306 TTE W/DOPPLER COMPLETE: CPT | Performed by: INTERNAL MEDICINE

## 2022-08-11 PROCEDURE — 99232 SBSQ HOSP IP/OBS MODERATE 35: CPT | Performed by: PSYCHIATRY & NEUROLOGY

## 2022-08-11 PROCEDURE — 25010000002 MORPHINE PER 10 MG: Performed by: PEDIATRICS

## 2022-08-11 RX ORDER — QUETIAPINE FUMARATE 100 MG/1
100 TABLET, FILM COATED ORAL NIGHTLY
Status: DISCONTINUED | OUTPATIENT
Start: 2022-08-11 | End: 2022-08-15 | Stop reason: HOSPADM

## 2022-08-11 RX ORDER — KETOROLAC TROMETHAMINE 30 MG/ML
30 INJECTION, SOLUTION INTRAMUSCULAR; INTRAVENOUS ONCE
Status: COMPLETED | OUTPATIENT
Start: 2022-08-11 | End: 2022-08-12

## 2022-08-11 RX ORDER — INSULIN LISPRO 100 [IU]/ML
0-9 INJECTION, SOLUTION INTRAVENOUS; SUBCUTANEOUS
Status: DISCONTINUED | OUTPATIENT
Start: 2022-08-11 | End: 2022-08-15 | Stop reason: HOSPADM

## 2022-08-11 RX ORDER — TRAZODONE HYDROCHLORIDE 50 MG/1
50 TABLET ORAL NIGHTLY PRN
Status: DISCONTINUED | OUTPATIENT
Start: 2022-08-11 | End: 2022-08-15 | Stop reason: HOSPADM

## 2022-08-11 RX ORDER — SODIUM CHLORIDE 9 MG/ML
100 INJECTION, SOLUTION INTRAVENOUS CONTINUOUS
Status: DISCONTINUED | OUTPATIENT
Start: 2022-08-11 | End: 2022-08-13

## 2022-08-11 RX ADMIN — INSULIN HUMAN 3.3 UNITS/HR: 1 INJECTION, SOLUTION INTRAVENOUS at 11:51

## 2022-08-11 RX ADMIN — BUSPIRONE HYDROCHLORIDE 10 MG: 10 TABLET ORAL at 20:59

## 2022-08-11 RX ADMIN — MORPHINE SULFATE 2 MG: 2 INJECTION, SOLUTION INTRAMUSCULAR; INTRAVENOUS at 02:49

## 2022-08-11 RX ADMIN — AMLODIPINE BESYLATE 5 MG: 5 TABLET ORAL at 09:20

## 2022-08-11 RX ADMIN — MORPHINE SULFATE 2 MG: 2 INJECTION, SOLUTION INTRAMUSCULAR; INTRAVENOUS at 06:45

## 2022-08-11 RX ADMIN — ATORVASTATIN CALCIUM 80 MG: 40 TABLET, FILM COATED ORAL at 20:59

## 2022-08-11 RX ADMIN — Medication 10 ML: at 09:20

## 2022-08-11 RX ADMIN — METOPROLOL TARTRATE 25 MG: 25 TABLET, FILM COATED ORAL at 20:59

## 2022-08-11 RX ADMIN — Medication 10 ML: at 20:59

## 2022-08-11 RX ADMIN — MAGNESIUM SULFATE HEPTAHYDRATE 4 G: 40 INJECTION, SOLUTION INTRAVENOUS at 11:51

## 2022-08-11 RX ADMIN — METOPROLOL TARTRATE 25 MG: 25 TABLET, FILM COATED ORAL at 09:20

## 2022-08-11 RX ADMIN — LISINOPRIL 40 MG: 40 TABLET ORAL at 09:20

## 2022-08-11 RX ADMIN — POTASSIUM CHLORIDE, DEXTROSE MONOHYDRATE AND SODIUM CHLORIDE 150 ML/HR: 150; 5; 450 INJECTION, SOLUTION INTRAVENOUS at 01:59

## 2022-08-11 RX ADMIN — POTASSIUM CHLORIDE, DEXTROSE MONOHYDRATE AND SODIUM CHLORIDE 150 ML/HR: 150; 5; 450 INJECTION, SOLUTION INTRAVENOUS at 11:52

## 2022-08-11 RX ADMIN — QUETIAPINE FUMARATE 100 MG: 100 TABLET ORAL at 09:20

## 2022-08-11 RX ADMIN — SODIUM CHLORIDE 250 ML/HR: 4.5 INJECTION, SOLUTION INTRAVENOUS at 10:44

## 2022-08-11 RX ADMIN — SODIUM CHLORIDE 500 ML: 9 INJECTION, SOLUTION INTRAVENOUS at 14:25

## 2022-08-11 RX ADMIN — INSULIN HUMAN 2.6 UNITS/HR: 1 INJECTION, SOLUTION INTRAVENOUS at 15:35

## 2022-08-11 RX ADMIN — BUSPIRONE HYDROCHLORIDE 10 MG: 10 TABLET ORAL at 09:20

## 2022-08-11 RX ADMIN — ASPIRIN 81 MG CHEWABLE TABLET 81 MG: 81 TABLET CHEWABLE at 09:20

## 2022-08-11 RX ADMIN — NICARDIPINE HYDROCHLORIDE 2.5 MG/HR: 25 INJECTION, SOLUTION INTRAVENOUS at 09:40

## 2022-08-11 RX ADMIN — SODIUM CHLORIDE 100 ML/HR: 9 INJECTION, SOLUTION INTRAVENOUS at 17:34

## 2022-08-11 RX ADMIN — MORPHINE SULFATE 2 MG: 2 INJECTION, SOLUTION INTRAMUSCULAR; INTRAVENOUS at 09:20

## 2022-08-11 RX ADMIN — ENOXAPARIN SODIUM 40 MG: 40 INJECTION SUBCUTANEOUS at 09:21

## 2022-08-11 RX ADMIN — Medication 1 PATCH: at 13:29

## 2022-08-11 RX ADMIN — INSULIN DETEMIR 20 UNITS: 100 INJECTION, SOLUTION SUBCUTANEOUS at 15:50

## 2022-08-11 NOTE — PLAN OF CARE
Goal Outcome Evaluation:    SLP evaluation completed. Will continue to address dysarthria. Please see note for further details and recommendations.

## 2022-08-11 NOTE — SIGNIFICANT NOTE
08/11/22 1413   SLP Deferred Reason   SLP Deferred Reason Unable to evaluate, medical status change  (Unable to complete clinical swallow evaluation as patient is currently NPO d/t DKA. Will evaluate as appropriate.)

## 2022-08-11 NOTE — PROGRESS NOTES
Neurology       Patient Care Team:  Provider, No Known as PCP - General    Chief complaint: Altered mental status    History: Noncompliant woman with DKA moderate medical noncompliance.      Past Medical History:   Diagnosis Date   • Arthritis    • Bipolar disorder (HCC)    • Chronic bronchitis (HCC)    • Depression    • Diabetes mellitus (HCC)     DIAGNOSED 2016   • GERD (gastroesophageal reflux disease)    • Hepatitis-C    • History of blood transfusion    • Hyperlipidemia    • Hypertension    • Infectious viral hepatitis     HEPATITIS C   • Migraine    • Sleep apnea     PATIENT UNSURE OF SETTINGS       Vital Signs   Vitals:    08/11/22 0430 08/11/22 0638 08/11/22 0755 08/11/22 1130   BP: (!) 158/108 (!) 150/120 143/86 92/64   BP Location:   Right arm Right arm   Patient Position:   Lying Lying   Pulse: 107 109 99 82   Resp:   16 18   Temp:   98.1 °F (36.7 °C) 98.3 °F (36.8 °C)   TempSrc:   Oral Oral   SpO2:  97% 97% 94%   Weight:       Height:           Physical Exam:   General: Sleepy              Neuro: Calm and cooperative.    Oriented to person and place.    Speech is soft and articulate.    Cranial nerves show full eye movements no ptosis.  Facial movement sensation are normal.    Palate elevates normally tongue protrudes normally.    Reflexes are 1+ and equal bilaterally.    Motor testing shows equal  no pronator drift.    Fine finger movements are done normally.        Results Review:  CT shows no significant abnormality  Results from last 7 days   Lab Units 08/11/22  0727   WBC 10*3/mm3 5.81   HEMOGLOBIN g/dL 14.7   HEMATOCRIT % 41.6   PLATELETS 10*3/mm3 269     Results from last 7 days   Lab Units 08/11/22  0727 08/11/22  0013 08/10/22  2048 08/10/22  1611 08/10/22  1016   SODIUM mmol/L 136 136 139 139  139 132*   POTASSIUM mmol/L 3.7 3.4* 3.7 3.5  3.5 3.4*   CHLORIDE mmol/L 99 98 102 98  98 90*   CO2 mmol/L 23.0 23.0 20.0* 19.0*  19.0* 23.0   BUN mg/dL 4* 4* 5* 4*  4* 5*   CREATININE mg/dL  0.45* 0.48* 0.47* 0.47*  0.47* 0.75   CALCIUM mg/dL 9.2 8.7 8.7 9.2  9.2 9.6   BILIRUBIN mg/dL  --   --   --  0.6 0.5   ALK PHOS U/L  --   --   --  205* 197*   ALT (SGPT) U/L  --   --   --  80* 78*   AST (SGOT) U/L  --   --   --  139* 89*   GLUCOSE mg/dL 117* 260* 175* 272*  272* 274*       Imaging Results (Last 24 Hours)     Procedure Component Value Units Date/Time    XR Chest 2 View [[id number]] Collected: 08/10/22 1755     Updated: 08/10/22 1844    Narrative:      DATE OF EXAM: 8/10/2022 5:43 PM     PROCEDURE: XR CHEST 2 VW-     INDICATIONS: DKA; E86.0-Dehydration; R73.9-Hyperglycemia, unspecified;  K86.1-Other chronic pancreatitis; I16.0-Hypertensive urgency;  R03.0-Elevated blood-pressure reading, without diagnosis of  hypertension; Z91.14-Patient's other noncompliance with medication  regimen; R29.810-Facial weakness     COMPARISON: 6/16/2022     TECHNIQUE: Two radiologic views of the chest, PA and lateral, were  obtained.     FINDINGS: No focal airspace opacity. No pleural effusion or  pneumothorax. Normal heart and mediastinal contours.       Impression:      No evidence of acute disease in the chest.      This report was finalized on 8/10/2022 5:55 PM by Panda Laird.       XR Toe 2+ View Left [[id number]] Collected: 08/10/22 1649     Updated: 08/10/22 1653    Narrative:      DATE OF EXAM: 8/10/2022 4:45 PM     PROCEDURE: XR TOE 2+ VW LEFT-     INDICATIONS: Trauma, left great toe pain.     COMPARISON: No comparisons available.     TECHNIQUE: A minimum of two routine standard radiographic views were  obtained of the left toes.     FINDINGS:  There is no acute fracture or dislocation. There is mild narrowing and  spurring of the first MTP joint consistent with arthritis. The IP joint  is normal. The soft tissues are unremarkable.        Impression:      No acute findings.     This report was finalized on 8/10/2022 4:50 PM by Corey Mendoza MD.             Assessment:  Hypertensive crisis,  resolved    Plan:  DC morphine.    Decrease Seroquel to 100 mg at bedtime.    Change trazodone to 50 mg as needed for sleep.    Follow-up MRI.        Comment:  No significant neurologic residual.         I discussed the patients findings and my recommendations with patient and primary care team    Tera Og MD  08/11/22  12:53 EDT         Yes

## 2022-08-11 NOTE — CASE MANAGEMENT/SOCIAL WORK
Discharge Planning Assessment  [facility]     Patient Name: Bernadette Paris  MRN: [id number]  Today's Date: 8/11/2022    Admit Date: 8/10/2022     Discharge Needs Assessment     Row Name 08/11/22 1127       Living Environment    People in Home significant other    Name(s) of People in Home [place]    Current Living Arrangements home    Primary Care Provided by self    Able to Return to Prior Arrangements yes       Transition Planning    Transportation Anticipated health plan transportation       Discharge Needs Assessment    Readmission Within the Last 30 Days no previous admission in last 30 days    Equipment Currently Used at Home glucometer;cane, straight    Concerns to be Addressed substance/tobacco abuse/use  non compliance    Current Discharge Risk non-alliance;substance use/abuse;chronically ill               Discharge Plan     Row Name 08/11/22 113       Plan    Plan home    Patient/Family in Agreement with Plan yes    Plan Comments I met with Ms. Paris at the bedside. She lives in [place] with her significant other. She is independent with mobility and activities of daily living. She utilizes [employer] for transportation. Last hospitalization, case management scheduled an appointment for her to establish care with a PCP in [place]. She was a no call no show. She has a history of cocaine use and non compliance.  We discussed the importance of following up with a PCP and utilizing [employer] transport to take her to these appointments. She plans on returning home after this admission.    Final Discharge Disposition Code 01 - home or self-care              Continued Care and Services - Admitted Since 8/10/2022    Coordination has not been started for this encounter.     Selected Continued Care - Prior Encounters Includes selections from prior encounters from 5/12/2022 to 8/11/2022    Discharged on 5/13/2022 Admission date: 5/9/2022 - Discharge disposition: Home or Self Care     Therapy     Service Provider Selected Services Address Phone Fax Patient Preferred    [facility] - PHYSICAL THERAPY  Outpatient Physical Therapy [address] [facility] [address] [phone] -- --                       Demographic Summary    No documentation.                Functional Status    No documentation.                Psychosocial    No documentation.                Abuse/Neglect    No documentation.                Legal    No documentation.                Substance Abuse    No documentation.                Patient Forms    No documentation.                   Raúl Miles RN

## 2022-08-11 NOTE — THERAPY EVALUATION
Acute Care - Speech Language Pathology Initial Evaluation  [facility]     Cognitive-Communication Evaluation       Patient Name: Bernadette Paris  : 1970  MRN: [id number]  Today's Date: 2022               Admit Date: 8/10/2022     Visit Dx:    ICD-10-CM ICD-9-CM   1. Dehydration  E86.0 276.51   2. Hyperglycemia  R73.9 790.29   3. Chronic pancreatitis, unspecified pancreatitis type (HCC)  K86.1 577.1   4. Hypertensive urgency  I16.0 401.9   5. Elevated blood pressure reading  R03.0 796.2   6. Noncompliance with medications  Z91.14 V15.81   7. Facial droop  R29.810 781.94   8. Dysarthria  R47.1 784.51     Patient Active Problem List   Diagnosis   • Spinal stenosis, lumbar region, with neurogenic claudication   • Degenerative disc disease, lumbar   • Facet arthritis of lumbar region   • Lumbar stenosis with neurogenic claudication   • BMI 40.0-44.9, adult (HCC)   • Encounter for smoking cessation counseling   • S/P lumbar laminectomy   • Nausea & vomiting   • Acidosis   • Bipolar affective disorder (HCC)   • Hepatitis C   • Accelerated hypertension   • Medically noncompliant   • Gastritis   • Hyperlipidemia   • Severe malnutrition (HCC)   • Intractable abdominal pain   • Type 2 diabetes mellitus with hyperglycemia, with long-term current use of insulin (HCC)   • Gastroparesis   • Dehydration   • Right sided weakness   • Hypertensive crisis   • Acute on chronic pancreatitis (HCC)     Past Medical History:   Diagnosis Date   • Arthritis    • Bipolar disorder (HCC)    • Chronic bronchitis (HCC)    • Depression    • Diabetes mellitus (HCC)     DIAGNOSED 2016   • GERD (gastroesophageal reflux disease)    • Hepatitis-C    • History of blood transfusion    • Hyperlipidemia    • Hypertension    • Infectious viral hepatitis     HEPATITIS C   • Migraine    • Sleep apnea     PATIENT UNSURE OF SETTINGS     Past Surgical History:   Procedure Laterality Date   • CHOLECYSTECTOMY     • ENDOSCOPY N/A 2022    Procedure:  ESOPHAGOGASTRODUODENOSCOPY;  Surgeon: Brunner, Mark I, MD;  Location:  [facility];  Service: Gastroenterology;  Laterality: N/A;  with antrum biopsy   • HYSTERECTOMY     • KNEE SURGERY     • LUMBAR LAMINECTOMY DISCECTOMY DECOMPRESSION N/A 8/6/2018    Procedure: LUMBAR LAMINECTOMIES L4-S1;  Surgeon: Chip Smith MD;  Location: [facility];  Service: Neurosurgery       SLP Recommendation and Plan  SLP Diagnosis: Mild dysarthria (08/11/22 1300)        [facility] Criteria for Skilled Therapy Interventions Met: yes (08/11/22 1300)  Anticipated Discharge Disposition (SLP): unknown, anticipate therapy at next level of care (08/11/22 1300)        Predicted Duration Therapy Intervention (Days): until discharge (08/11/22 1300)                           SLP EVALUATION (last 72 hours)     SLP SLC Evaluation     Row Name 08/11/22 1300                   Communication Assessment/Intervention    Document Type evaluation  -CH        Subjective Information complains of;fatigue  -CH        Patient Observations lethargic;cooperative;agree to therapy  -CH        Patient/Family/Caregiver Comments/Observations none present  -CH        Patient Effort good  -CH        Symptoms Noted During/After Treatment none  -CH                  General Information    Patient Profile Reviewed yes  -CH        Pertinent History Of Current Problem adm w L great toe numbness/psin, facial droop. Hx: hepatitis C, hyperlipidemia, diabetes, hypertension, bipolar, pancreatitis, and medical noncompliance. unable to complete clinical swallow evaluation this date as patient NPO d/t DKA. SLC evaluation completed per stroke protocol.  -CH        Precautions/Limitations, Vision WFL;for purposes of eval  -CH        Precautions/Limitations, Hearing WFL;for purposes of eval  -CH        Prior Level of Function-Communication WFL  -        Plans/Goals Discussed with patient;agreed upon  -        Barriers to Rehab none identified  -        Patient's Goals for Discharge  return to home;return to all previous roles/activities  -                  Pain    Additional Documentation Pain Scale: FACES Pre/Post-Treatment (Group)  -                  Pain Scale: FACES Pre/Post-Treatment    Pain: FACES Scale, Pretreatment 0-->no hurt  -        Posttreatment Pain Rating 0-->no hurt  -CH                  Comprehension Assessment/Intervention    Comprehension Assessment/Intervention Auditory Comprehension;Reading Comprehension  -                  Auditory Comprehension Assessment/Intervention    Auditory Comprehension (Communication) WNL  -        Able to Identify Objects/Pictures (Communication) body part;familiar objects;WNL  -CH        Answers Questions (Communication) yes/no;wh questions;personal;simple;abstract;WFL  -CH        Able to Follow Commands (Communication) 3-step;WFL  -        Narrative Discourse conversational level;WFL  -        Successful Auditory Strategies (Communication) repetition  -                  Reading Comprehension Assessment/Intervention    Reading Comprehension (Communication) WNL  -        Functional Reading Tasks WNL  -                  Expression Assessment/Intervention    Expression Assessment/Intervention verbal expression  -                  Verbal Expression Assessment/Intervention    Verbal Expression WNL  -        Automatic Speech (Communication) response to greeting;counting 1-20;WNL  -        Repetition sentences;WNL  -CH        Phrase Completion unpredictable;WNL  -CH        Responsive Naming complex;WNL  -CH        Confrontational Naming low frequency;WNL  -        Spontaneous/Functional Words complex;WNL  -        Sentence Formulation simple;WNL  -        Conversational Discourse/Fluency WFL  -                  Oral Motor Structure and Function    Oral Motor Structure and Function WNL  -        Dentition Assessment missing teeth;other (see comments)  no upper dentition  -        Mucosal Quality moist, healthy  -                   Oral Musculature and Cranial Nerve Assessment    Oral Motor General Assessment generalized oral motor weakness  -                  Motor Speech Assessment/Intervention    Motor Speech Function mild impairment  -        Characteristics Consistent with Dysarthria slow rate;decreased articulation;slurred speech  -                  Cursory Voice Assessment/Intervention    Quality and Resonance (Voice) WNL  -                  Cognitive Assessment Intervention- SLP    Cognitive Function (Cognition) WF  -        Orientation Status (Cognition) awareness of basic personal information;person;place;time;situation;WNL  -        Memory (Cognitive) simple;immediate;short-term;long-term;[facility]  -        Attention (Cognitive) selective;sustained;attention to detail;L  -        Thought Organization (Cognitive) concrete divergent;abstract divergent;verbal sequencing;[facility]  -        Reasoning (Cognitive) simple;deductive;[facility]  -        Problem Solving (Cognitive) simple;temporal;[facility]  -        Functional Math (Cognitive) simple;word problems;[facility]  -        Pragmatics (Communication) WNL  -                  SLP Evaluation Clinical Impressions    SLP Diagnosis Mild dysarthria  -        Rehab Potential/Prognosis good  -[facility] Criteria for Skilled Therapy Interventions Met yes  -        Functional Impact functional impact in social situations;difficulty communicating wants, needs;difficulty communicating in an emergency;difficulty in expressing complex messages;restrictions in personal and social life  -                  Recommendations    Therapy Frequency (SLP SLC) 5 days per week  -        Predicted Duration Therapy Intervention (Days) until discharge  -        Anticipated Discharge Disposition (SLP) unknown;anticipate therapy at next level of care  -              User Key  (r) = Recorded By, (t) = Taken By, (c) = Cosigned By    Initials Name Effective Dates     Marlon,  MS ESPINOZA Noriega-SLP 06/16/21 -                    EDUCATION  The patient has been educated in the following areas:     Cognitive Impairment Communication Impairment.           SLP GOALS     Row Name 08/11/22 1300             Articulation Goal 1 (SLP)    Improve Articulation Goal 1 (SLP) by over-articulating in connected speech;by over-articulating at phrase level;80%;with minimal cues (75-90%)  -CH      Time Frame (Articulation Goal 1, SLP) by discharge  -CH              Additional Goal 1 (SLP)    Additional Goal 1, SLP LTG: Patient will improve speech intelligibility to WFL w/o cues to improve communication with others.  -CH      Time Frame (Additional Goal 1, SLP) by discharge  -CH              Additional Goal 2 (SLP)    Additional Goal 2, SLP STG: Patient will participate in cognitive and language dx tx to further assess.  -CH      Time Frame (Additional Goal 2, SLP) by discharge  -            User Key  (r) = Recorded By, (t) = Taken By, (c) = Cosigned By    Initials Name Provider Type    Leann Shearer MS CCC-SLP Speech and Language Pathologist                        Time Calculation:      Time Calculation- SLP     Row Name 08/11/22 1407             Time Calculation- SLP    SLP Start Time 1300  -CH      SLP Received On 08/11/22  -CH              Untimed Charges    SLP Eval/Re-eval  ST Eval Speech and Production w/ Language - 37934  -CH      [id number] Eval Speech and Production w/ Language Minutes 45  -CH              Total Minutes    Untimed Charges Total Minutes 45  -CH       Total Minutes 45  -CH            User Key  (r) = Recorded By, (t) = Taken By, (c) = Cosigned By    Initials Name Provider Type    Leann Shearer MS CCC-SLP Speech and Language Pathologist                Therapy Charges for Today     Code Description Service Date Service Provider Modifiers Qty    39403111730 HC ST EVAL SPEECH AND PROD W LANG  3 8/11/2022 Leann Mason MS CCC-SLP GN 1                     Leann Mason  MS CCC-SLP  8/11/2022       Patient was not wearing a face mask and did not exhibit coughing during this therapy encounter.  Procedure performed was aerosolizing, involved close contact (within 6 feet for at least 15 minutes or longer), and did not involve contact with infectious secretions or specimens.  Therapist used appropriate personal protective equipment including gloves, standard procedure mask and eye protection.  Appropriate PPE was worn during the entire therapy session.  Hand hygiene was completed before and after therapy session.

## 2022-08-11 NOTE — PAYOR COMM NOTE
"Bernadette Mcqueen (52 y.o. Female)     [facility]   phone: [phone]  fax: [phone]              Date of Birth   1970    Social Security Number       Address   [facility] [address]  [place] [address]    Home Phone   [phone]    MRN   [id number]       T[employer]   [facility]    Marital Status   Single                            Admission Date   8/10/22    Admission Type   Emergency    Admitting Provider   Sis Nguyen MD    Attending Provider   Sis Nguyen MD    Department, Room/Bed   [facility] 3E, S336/1       Discharge Date       Discharge Disposition       Discharge Destination                               Attending Provider: Sis Nguyen MD    Allergies: Tylenol [Acetaminophen]    Isolation: None   Infection: MRSA (18)   Code Status: Prior   Advance Care Planning Activity    Ht: 167.6 cm (66\")   Wt: 68.5 kg (151 lb)    Admission Cmt: None   Principal Problem: Hypertensive crisis [I16.9]                 Active Insurance as of 8/10/2022     Primary Coverage     Payor Plan Insurance Group Employer/Plan Group    [facility] BY [facility] [id number]     Payor Plan Address Payor Plan Phone Number Payor Plan Fax Number Effective Dates    [address] [address]   2021 - None Entered    [facility] [address]       Subscriber Name Subscriber Birth Date Member ID       BERNADETTE MCQUEEN 1970 [id number]                 Emergency Contacts      (Rel.) Home Phone Work Phone Mobile Phone    CHARISSE ELI (Significant Other) [phone] -- [phone]               History & Physical      Cintia Cordova MD at 08/10/22 [facility]              [facility] Medicine Services  HISTORY AND PHYSICAL    Patient Name: Bernadette Mqcueen  : 1970  MRN: [id number]  Primary Care Physician: Provider, No Known  Date of admission: 8/10/2022      Subjective   Subjective     Chief Complaint:  abd pain    HPI:  Bernadette Mcqueen is a 52 y.o. female with " a history of hypertension, diabetes, history of drug use, and chronic pancreatitis presents with a multitude of complaints.  The first being abdominal pain which she states started 2 or 3 days ago and has progressively gotten worse.  She has had some nausea and vomiting associated with it, but denies any blood.  She also states she has had some intermittent diarrhea as well where she is describes some incontinence.  She also is complaining of some weakness and inability to lift up her left foot which she describes going on for the past 2 weeks.  She also states that her left arm feels a little weak and numb.  She describes difficulty holding a cup.  She has had some recent falls due to the lower extremity weakness and injury to her left foot.  She is also noted some difficulty with her speech as well.  She does visit the emergency room frequently, and has difficulty with her medication compliance.  She states that her PCP recently moved and she does not have anyone prescribing her medications regularly.  She says that she has been not able to take them over the past week or so.  She denies any any fevers or weight loss.  She has had some polyuria and polydipsia.  Also describes some urine and bowel incontinence.  At home she does use a cane for mobility.      Review of Systems   Constitutional: Positive for appetite change.   HENT: Negative.    Eyes: Negative.    Respiratory: Negative.    Cardiovascular: Negative.    Gastrointestinal: Positive for abdominal pain, diarrhea, nausea and vomiting.   Endocrine: Positive for polydipsia and polyuria.   Genitourinary: Positive for dysuria and urgency.   Musculoskeletal: Positive for gait problem.   Skin: Negative.    Allergic/Immunologic: Negative.    Neurological: Positive for facial asymmetry, weakness and numbness.   Hematological: Negative.    Psychiatric/Behavioral: Negative.         All other systems reviewed and are negative.     Personal History     Past Medical  History:   Diagnosis Date   • Arthritis    • Bipolar disorder (HCC)    • Chronic bronchitis (HCC)    • Depression    • Diabetes mellitus (HCC)     DIAGNOSED 2016   • GERD (gastroesophageal reflux disease)    • Hepatitis-C    • History of blood transfusion    • Hyperlipidemia    • Hypertension    • Infectious viral hepatitis     HEPATITIS C   • Migraine    • Sleep apnea     PATIENT UNSURE OF SETTINGS             Past Surgical History:   Procedure Laterality Date   • CHOLECYSTECTOMY     • ENDOSCOPY N/A 4/9/2022    Procedure: ESOPHAGOGASTRODUODENOSCOPY;  Surgeon: Brunner, Mark I, MD;  Location: [facility] ENDOSCOPY;  Service: Gastroenterology;  Laterality: N/A;  with antrum biopsy   • HYSTERECTOMY     • KNEE SURGERY     • LUMBAR LAMINECTOMY DISCECTOMY DECOMPRESSION N/A 8/6/2018    Procedure: LUMBAR LAMINECTOMIES L4-S1;  Surgeon: Chip Smith MD;  Location: [facility] OR;  Service: Neurosurgery       Family History:  family history includes Diabetes in her mother; Hypertension in her mother. Otherwise pertinent FHx was reviewed and unremarkable.     Social History:  reports that she has been smoking. She has been smoking about 1.00 pack per day. She has never used smokeless tobacco. She reports current alcohol use. She reports current drug use. Drug: Cocaine(coke).  Social History     Social History Narrative   • Not on file   Patient states that she has not used any alcohol in the past 4 to 5 months and has not used any cocaine over the past 4 to 5 months.  She does admit to tobacco use about a half a pack per day.  She lives in [place] with her boyfriend    Medications:  Available home medication information reviewed.  (Not in a hospital admission)      Allergies   Allergen Reactions   • Tylenol [Acetaminophen] Other (See Comments)     SEVERE LIVER DISEASE-CONTRAINDICATED       Objective   Objective     Vital Signs:   Temp:  [98.2 °F (36.8 °C)] 98.2 °F (36.8 °C)  Heart Rate:  [103-117] 117  Resp:  [18] 18  BP:  (166-218)/() 210/133       Physical Exam  Constitutional:       General: She is in acute distress.      Appearance: She is not toxic-appearing.   HENT:      Head: Normocephalic and atraumatic.      Right Ear: External ear normal.      Left Ear: External ear normal.      Nose: Nose normal.      Mouth/Throat:      Mouth: Mucous membranes are moist.   Eyes:      Extraocular Movements: Extraocular movements intact.      Pupils: Pupils are equal, round, and reactive to light.   Cardiovascular:      Rate and Rhythm: Tachycardia present.      Pulses: Normal pulses.      Heart sounds: Murmur heard.      Comments: Systolic murmur at LUSB  Pulmonary:      Effort: Pulmonary effort is normal.      Breath sounds: Normal breath sounds.   Abdominal:      Palpations: Abdomen is soft.      Tenderness: There is abdominal tenderness.      Comments: Diffuse tenderness dinesh epigastric   Musculoskeletal:         General: Normal range of motion.      Cervical back: Normal range of motion and neck supple.   Skin:     General: Skin is warm.   Neurological:      Mental Status: She is alert and oriented to person, place, and time.      Cranial Nerves: No cranial nerve deficit.      Comments: She has 4 out of 5 strength in her left upper extremity including distal extremity strength.  5 out of 5 on the right upper extremity.  She has 3+ out of 5 in her left lower extremity with 3-5 with dorsiflexion and positive clonus in her left lower extremity.  5 out of 5 in her right lower extremity.  No cerebellar ataxia.  She has decrease in her nasolabial fold on her left side with a slight facial droop  on the left   Psychiatric:         Mood and Affect: Mood normal.         Behavior: Behavior normal.            Result Review:  I have personally reviewed the results from the time of this admission to 8/10/2022 13:13 EDT and agree with these findings:  [x]  Laboratory list / accordion  []  Microbiology  [x]  Radiology  [x]  EKG/Telemetry   [x]   Cardiology/Vascular   []  Pathology  [x]  Old records          LAB RESULTS:      Lab 08/10/22  1016   WBC 8.89   HEMOGLOBIN 15.2   HEMATOCRIT 43.9   PLATELETS 310   NEUTROS ABS 3.84   IMMATURE GRANS (ABS) 0.02   LYMPHS ABS 4.48*   MONOS ABS 0.47   EOS ABS 0.04   MCV 94.0   LACTATE 4.0*         Lab 08/10/22  1016   SODIUM 132*   POTASSIUM 3.4*   CHLORIDE 90*   CO2 23.0   ANION GAP 19.0*   BUN 5*   CREATININE 0.75   EGFR 96.5   GLUCOSE 274*   CALCIUM 9.6         Lab 08/10/22  1016   TOTAL PROTEIN 7.9   ALBUMIN 4.60   GLOBULIN 3.3   ALT (SGPT) 78*   AST (SGOT) 89*   BILIRUBIN 0.5   ALK PHOS 197*   LIPASE 231*                     UA    Urinalysis 5/31/22 5/31/22 5/31/22 6/16/22 8/10/22    1220 1220 2138     Squamous Epithelial Cells, UA  7-12 (A)      Specific Gravity, UA 1.057 (A)  1.058 (A) 1.029 1.011   Ketones, UA Trace (A)  Negative 15 mg/dL (1+) (A) 15 mg/dL (1+) (A)   Blood, UA Negative  Negative Negative Negative   Leukocytes, UA Trace (A)  Negative Negative Negative   Nitrite, UA Negative  Negative Negative Negative   RBC, UA  0-2      WBC, UA  6-12 (A)      Bacteria, UA  Trace      (A) Abnormal value              Microbiology Results (last 10 days)     ** No results found for the last 240 hours. **          CT Head Without Contrast    Result Date: 8/10/2022  CT HEAD WO CONTRAST-  Date of Exam: 8/10/2022 10:36 AM  Indication: weak.  Comparison: June 16, 2022  Technique: CT scan of the head without IV contrast.  Automated exposure control and iterative reconstruction methods were used.  FINDINGS No acute intracranial hemorrhage or extra-axial collection is identified. The ventricles appear normal in caliber, with no evidence of mass effect or midline shift. The basal cisterns appear patent. The gray-white differentiation appears preserved.  The calvarium appears intact. The visualized paranasal sinuses are clear. The mastoid air cells are well-aerated.      Impression: No acute intracranial process identified.   This report was finalized on 8/10/2022 11:04 AM by Ino Oliva MD.            Assessment & Plan   Assessment & Plan     Active Hospital Problems    Diagnosis  POA   • **Hypertensive crisis [I16.9]  Yes   • Dehydration [E86.0]  Unknown   • Right sided weakness [R53.1]  Yes   • Acute on chronic pancreatitis (HCC) [K85.90, K86.1]  Yes   • Type 2 diabetes mellitus with hyperglycemia, with long-term current use of insulin (HCC) [E11.65, Z79.4]  Not Applicable   • Gastroparesis [K31.84]  Yes       52-year-old female with a history of hypertension and diabetes who presents with hypertensive emergency with signs and symptoms consistent with a subacute stroke, as well as concern for possible DKA and pancreatitis    DKA: She has anion gap of 19 with hyperglycemia, although her bicarb is 23.  Her beta hydroxybutyrate is positive so we will start her on an insulin drip.  Continue IV fluids with every 4 hours BMP checks.  Once she has closed her anion gap will be able to transition her to subcutaneous insulin.    Hypertensive emergency: We will prescribe her labetalol IV as needed.  We will see if we can control this without requiring a drip.    Pancreatitis: Acute on chronic.  We will treat her with IV fluids, and treat her pain with morphine as needed.    Subacute stroke: The stroke navigator has been notified.  The MRI is ordered.  The CT scan does not show any evidence of stroke.  Will order PT/OT/ST. hemoglobin A1c and lipid panel.  Also order echocardiogram.  Neurochecks    R toe pain: obtain xray    Substance abuse: Monitor for any signs of withdrawal    DVT prophylaxis:  lovenox        CODE STATUS:  full  There are no questions and answers to display.         Cintia Cordova MD  08/10/22    Electronically signed by Cintia Cordova MD at 08/10/22 9932       Lab Results (last 48 hours)     Procedure Component Value Units Date/Time    POC Glucose Once [[id number]]  (Abnormal) Collected: 08/11/22 1034     Specimen: Blood Updated: 08/11/22 1035     Glucose 256 mg/dL      Comment: Meter: [id number] : [id number] Andrew Garibay       POC Glucose Once [[id number]]  (Abnormal) Collected: 08/11/22 0916    Specimen: Blood Updated: 08/11/22 0921     Glucose 152 mg/dL      Comment: Meter: [id number] : [id number] Kathy Small       Magnesium [[id number]]  (Abnormal) Collected: 08/11/22 0727    Specimen: Blood Updated: 08/11/22 0904     Magnesium 1.4 mg/dL     Basic Metabolic Panel [[id number]]  (Abnormal) Collected: 08/11/22 0727    Specimen: Blood Updated: 08/11/22 0844     Glucose 117 mg/dL      BUN 4 mg/dL      Creatinine 0.45 mg/dL      Sodium 136 mmol/L      Potassium 3.7 mmol/L      Comment: Slight hemolysis detected by analyzer. Results may be affected.        Chloride 99 mmol/L      CO2 23.0 mmol/L      Calcium 9.2 mg/dL      BUN/Creatinine Ratio 8.9     Anion Gap 14.0 mmol/L      eGFR 116.6 mL/min/1.73      Comment: National Kidney Foundation and American Society of Nephrology (ASN) Task Force recommended calculation based on the Chronic Kidney Disease Epidemiology Collaboration (CKD-EPI) equation refit without adjustment for race.       Narrative:      GFR Normal >60  Chronic Kidney Disease <60  Kidney Failure <15      Phosphorus [[id number]]  (Normal) Collected: 08/11/22 0727    Specimen: Blood Updated: 08/11/22 0844     Phosphorus 2.5 mg/dL     CBC Auto Differential [[id number]]  (Abnormal) Collected: 08/11/22 0727    Specimen: Blood Updated: 08/11/22 0825     WBC 5.81 10*3/mm3      RBC 4.46 10*6/mm3      Hemoglobin 14.7 g/dL      Hematocrit 41.6 %      MCV 93.3 fL      MCH 33.0 pg      MCHC 35.3 g/dL      RDW 12.8 %      RDW-SD 43.7 fl      MPV 10.5 fL      Platelets 269 10*3/mm3      Neutrophil % 36.7 %      Lymphocyte % 50.4 %      Monocyte % 11.4 %      Eosinophil % 1.0 %      Basophil % 0.3 %      Immature Grans % 0.2 %      Neutrophils, Absolute 2.13 10*3/mm3      Lymphocytes, Absolute 2.93 10*3/mm3       Monocytes, Absolute 0.66 10*3/mm3      Eosinophils, Absolute 0.06 10*3/mm3      Basophils, Absolute 0.02 10*3/mm3      Immature Grans, Absolute 0.01 10*3/mm3      nRBC 0.0 /100 WBC     POC Glucose Once [[id number]]  (Abnormal) Collected: 08/11/22 0754    Specimen: Blood Updated: 08/11/22 0755     Glucose 131 mg/dL      Comment: Meter: [id number] : [id number] Andrew Garibay       POC Glucose Once [[id number]]  (Normal) Collected: 08/11/22 0659    Specimen: Blood Updated: 08/11/22 0700     Glucose 88 mg/dL      Comment: Meter: [id number] : [id number] Luis Manuel Gill       POC Glucose Once [[id number]]  (Abnormal) Collected: 08/11/22 0539    Specimen: Blood Updated: 08/11/22 0540     Glucose 131 mg/dL      Comment: Meter: [id number] : [id number] Charlotte Joslyn       POC Glucose Once [[id number]]  (Abnormal) Collected: 08/11/22 0417    Specimen: Blood Updated: 08/11/22 0419     Glucose 177 mg/dL      Comment: Meter: [id number] : [id number] Mone Joslyn       POC Glucose Once [[id number]]  (Normal) Collected: 08/11/22 0310    Specimen: Blood Updated: 08/11/22 0312     Glucose 129 mg/dL      Comment: Meter: [id number] : [id number] Luis Manuel Gill       POC Glucose Once [[id number]]  (Abnormal) Collected: 08/11/22 0134    Specimen: Blood Updated: 08/11/22 0135     Glucose 182 mg/dL      Comment: Meter: [id number] : [id number] Luis Manuel Gill       Basic Metabolic Panel [[id number]]  (Abnormal) Collected: 08/11/22 0013    Specimen: Blood Updated: 08/11/22 0046     Glucose 260 mg/dL      BUN 4 mg/dL      Creatinine 0.48 mg/dL      Sodium 136 mmol/L      Potassium 3.4 mmol/L      Chloride 98 mmol/L      CO2 23.0 mmol/L      Calcium 8.7 mg/dL      BUN/Creatinine Ratio 8.3     Anion Gap 15.0 mmol/L      eGFR 114.8 mL/min/1.73      Comment: National Kidney Foundation and American Society of Nephrology (ASN) Task Force recommended calculation based on the Chronic Kidney Disease Epidemiology Collaboration  (CKD-EPI) equation refit without adjustment for race.       Narrative:      GFR Normal >60  Chronic Kidney Disease <60  Kidney Failure <15      Lipid Panel [[id number]]  (Abnormal) Collected: 08/11/22 0013    Specimen: Blood Updated: 08/11/22 0046     Total Cholesterol 162 mg/dL      Triglycerides 61 mg/dL      HDL Cholesterol 113 mg/dL      LDL Cholesterol  37 mg/dL      VLDL Cholesterol 12 mg/dL      LDL/HDL Ratio 0.33    Narrative:      Cholesterol Reference Ranges  (U.S. Department of Health and Human Services ATP III Classifications)    Desirable          <200 mg/dL  Borderline High    200-239 mg/dL  High Risk          >240 mg/dL      Triglyceride Reference Ranges  (U.S. Department of Health and Human Services ATP III Classifications)    Normal           <150 mg/dL  Borderline High  150-199 mg/dL  High             200-499 mg/dL  Very High        >500 mg/dL    HDL Reference Ranges  (U.S. Department of Health and Human Services ATP III Classifications)    Low     <40 mg/dl (major risk factor for CHD)  High    >60 mg/dl ('negative' risk factor for CHD)        LDL Reference Ranges  (U.S. Department of Health and Human Services ATP III Classifications)    Optimal          <100 mg/dL  Near Optimal     100-129 mg/dL  Borderline High  130-159 mg/dL  High             160-189 mg/dL  Very High        >189 mg/dL    Phosphorus [[id number]]  (Normal) Collected: 08/11/22 0013    Specimen: Blood Updated: 08/11/22 0046     Phosphorus 2.8 mg/dL     Magnesium [[id number]]  (Normal) Collected: 08/11/22 0013    Specimen: Blood Updated: 08/11/22 0046     Magnesium 1.9 mg/dL     POC Glucose Once [[id number]]  (Abnormal) Collected: 08/11/22 0026    Specimen: Blood Updated: 08/11/22 0027     Glucose 233 mg/dL      Comment: Meter: [id number] : [id number] Monelaura Jayine       POC Glucose Once [[id number]]  (Abnormal) Collected: 08/10/22 2330    Specimen: Blood Updated: 08/10/22 2332     Glucose 169 mg/dL      Comment: Meter: [id number]  : [id number] Luis Manuel Garland       POC Glucose Once [[id number]]  (Abnormal) Collected: 08/10/22 2208    Specimen: Blood Updated: 08/10/22 2210     Glucose 179 mg/dL      Comment: Meter: [id number] : [id number] Jacobo Deidre       POC Glucose Once [[id number]]  (Abnormal) Collected: 08/10/22 2157    Specimen: Blood Updated: 08/10/22 2158     Glucose 147 mg/dL      Comment: Meter: [id number] : [id number] Mone Joslyn       Basic Metabolic Panel [[id number]]  (Abnormal) Collected: 08/10/22 2048    Specimen: Blood Updated: 08/10/22 2138     Glucose 175 mg/dL      BUN 5 mg/dL      Creatinine 0.47 mg/dL      Sodium 139 mmol/L      Potassium 3.7 mmol/L      Comment: Slight hemolysis detected by analyzer. Results may be affected.        Chloride 102 mmol/L      CO2 20.0 mmol/L      Calcium 8.7 mg/dL      BUN/Creatinine Ratio 10.6     Anion Gap 17.0 mmol/L      eGFR 115.4 mL/min/1.73      Comment: National Kidney Foundation and American Society of Nephrology (ASN) Task Force recommended calculation based on the Chronic Kidney Disease Epidemiology Collaboration (CKD-EPI) equation refit without adjustment for race.       Narrative:      GFR Normal >60  Chronic Kidney Disease <60  Kidney Failure <15      Phosphorus [[id number]]  (Normal) Collected: 08/10/22 2048    Specimen: Blood Updated: 08/10/22 2138     Phosphorus 3.2 mg/dL     Magnesium [[id number]]  (Normal) Collected: 08/10/22 2048    Specimen: Blood Updated: 08/10/22 2138     Magnesium 1.6 mg/dL     POC Glucose Once [[id number]]  (Abnormal) Collected: 08/10/22 2117    Specimen: Blood Updated: 08/10/22 2119     Glucose 145 mg/dL      Comment: Meter: [id number] : [id number] Mone Joslyn       POC Glucose Once [[id number]]  (Abnormal) Collected: 08/10/22 2024    Specimen: Blood Updated: 08/10/22 2025     Glucose 266 mg/dL      Comment: Meter: [id number] : [id number] Centeno Deidre       POC Glucose Once [[id number]]  (Abnormal) Collected: 08/10/22 1904     Specimen: Blood Updated: 08/10/22 1905     Glucose 178 mg/dL      Comment: Meter: [id number] : [id number] Mone Jorgensen       POC Glucose Once [[id number]]  (Abnormal) Collected: 08/10/22 1812    Specimen: Blood Updated: 08/10/22 1817     Glucose 232 mg/dL      Comment: Meter: [id number] : [id number] Kathy Small       COVID PRE-OP / PRE-PROCEDURE SCREENING ORDER (NO ISOLATION) - Swab, Nasopharynx [[id number]]  (Normal) Collected: 08/10/22 1648    Specimen: Swab from Nasopharynx Updated: 08/10/22 1721    Narrative:      The following orders were created for panel order COVID PRE-OP / PRE-PROCEDURE SCREENING ORDER (NO ISOLATION) - Swab, Nasopharynx.  Procedure                               Abnormality         Status                     ---------                               -----------         ------                     COVID-19, ABBOTT IN-HOUS...[[id number]]  Normal              Final result                 Please view results for these tests on the individual orders.    COVID-19, ABBOTT IN-HOUSE,NASAL Swab (NO TRANSPORT MEDIA) 2 HR TAT - Swab, Nasopharynx [[id number]]  (Normal) Collected: 08/10/22 1648    Specimen: Swab from Nasopharynx Updated: 08/10/22 1721     COVID19 Presumptive Negative    Narrative:      Fact sheet for providers: https://www.fda.gov/media/050126/download     Fact sheet for patients: https://www.fda.gov/media/758560/download    Test performed by PCR.  If inconsistent with clinical signs and symptoms patient should be tested with different authorized molecular test.    POC Glucose Once [[id number]]  (Abnormal) Collected: 08/10/22 1711    Specimen: Blood Updated: 08/10/22 1718     Glucose 210 mg/dL      Comment: Meter: [id number] : [id number] Kathy Small       Osmolality, Serum [[id number]]  (Normal) Collected: 08/10/22 1611    Specimen: Blood Updated: 08/10/22 1711     Osmolality 289 mOsm/kg     Troponin [[id number]]  (Normal) Collected: 08/10/22 1611    Specimen: Blood Updated:  08/10/22 1656     Troponin T <0.010 ng/mL     Narrative:      Troponin T Reference Range:  <= 0.03 ng/mL-   Negative for AMI  >0.03 ng/mL-     Abnormal for myocardial necrosis.  Clinicians would have to utilize clinical acumen, EKG, Troponin and serial changes to determine if it is an Acute Myocardial Infarction or myocardial injury due to an underlying chronic condition.       Results may be falsely decreased if patient taking Biotin.      Hemoglobin A1c [[id number]]  (Abnormal) Collected: 08/10/22 1611    Specimen: Blood Updated: 08/10/22 1655     Hemoglobin A1C 11.50 %     Narrative:      Hemoglobin A1C Ranges:    Increased Risk for Diabetes  5.7% to 6.4%  Diabetes                     >= 6.5%  Diabetic Goal                < 7.0%    Phosphorus [[id number]]  (Normal) Collected: 08/10/22 1611    Specimen: Blood Updated: 08/10/22 1655     Phosphorus 3.3 mg/dL     Magnesium [[id number]]  (Abnormal) Collected: 08/10/22 1611    Specimen: Blood Updated: 08/10/22 1655     Magnesium 1.5 mg/dL     STAT Lactic Acid, Reflex [[id number]]  (Normal) Collected: 08/10/22 1611    Specimen: Blood Updated: 08/10/22 1653     Lactate 1.7 mmol/L      Comment: Falsely depressed results may occur on samples drawn from patients receiving N-Acetylcysteine (NAC) or Metamizole.       Basic Metabolic Panel [[id number]]  (Abnormal) Collected: 08/10/22 1611    Specimen: Blood Updated: 08/10/22 1652     Glucose 272 mg/dL      BUN 4 mg/dL      Creatinine 0.47 mg/dL      Sodium 139 mmol/L      Potassium 3.5 mmol/L      Chloride 98 mmol/L      CO2 19.0 mmol/L      Calcium 9.2 mg/dL      BUN/Creatinine Ratio 8.5     Anion Gap 22.0 mmol/L      eGFR 115.4 mL/min/1.73      Comment: National Kidney Foundation and American Society of Nephrology (ASN) Task Force recommended calculation based on the Chronic Kidney Disease Epidemiology Collaboration (CKD-EPI) equation refit without adjustment for race.       Narrative:      GFR Normal >60  Chronic Kidney Disease  <60  Kidney Failure <15      Comprehensive Metabolic Panel [[id number]]  (Abnormal) Collected: 08/10/22 1611    Specimen: Blood Updated: 08/10/22 1652     Glucose 272 mg/dL      BUN 4 mg/dL      Creatinine 0.47 mg/dL      Sodium 139 mmol/L      Potassium 3.5 mmol/L      Chloride 98 mmol/L      CO2 19.0 mmol/L      Calcium 9.2 mg/dL      Total Protein 7.4 g/dL      Albumin 4.20 g/dL      ALT (SGPT) 80 U/L      AST (SGOT) 139 U/L      Alkaline Phosphatase 205 U/L      Total Bilirubin 0.6 mg/dL      Globulin 3.2 gm/dL      Comment: Calculated Result        A/G Ratio 1.3 g/dL      BUN/Creatinine Ratio 8.5     Anion Gap 22.0 mmol/L      eGFR 115.4 mL/min/1.73      Comment: National Kidney Foundation and American Society of Nephrology (ASN) Task Force recommended calculation based on the Chronic Kidney Disease Epidemiology Collaboration (CKD-EPI) equation refit without adjustment for race.       Narrative:      GFR Normal >60  Chronic Kidney Disease <60  Kidney Failure <15      Phosphorus [[id number]]  (Normal) Collected: 08/10/22 1611    Specimen: Blood Updated: 08/10/22 1652     Phosphorus 3.3 mg/dL     Magnesium [[id number]]  (Abnormal) Collected: 08/10/22 1611    Specimen: Blood Updated: 08/10/22 1652     Magnesium 1.5 mg/dL     CBC & Differential [[id number]]  (Abnormal) Collected: 08/10/22 1611    Specimen: Blood Updated: 08/10/22 1633    Narrative:      The following orders were created for panel order CBC & Differential.  Procedure                               Abnormality         Status                     ---------                               -----------         ------                     CBC Auto Differential[[id number]]        Abnormal            Final result                 Please view results for these tests on the individual orders.    CBC Auto Differential [[id number]]  (Abnormal) Collected: 08/10/22 1611    Specimen: Blood Updated: 08/10/22 1633     WBC 5.82 10*3/mm3      RBC 4.43 10*6/mm3      Hemoglobin  14.6 g/dL      Hematocrit 41.1 %      MCV 92.8 fL      MCH 33.0 pg      MCHC 35.5 g/dL      RDW 12.7 %      RDW-SD 43.5 fl      MPV 9.7 fL      Platelets 306 10*3/mm3      Neutrophil % 64.2 %      Lymphocyte % 29.6 %      Monocyte % 5.5 %      Eosinophil % 0.2 %      Basophil % 0.3 %      Immature Grans % 0.2 %      Neutrophils, Absolute 3.74 10*3/mm3      Lymphocytes, Absolute 1.72 10*3/mm3      Monocytes, Absolute 0.32 10*3/mm3      Eosinophils, Absolute 0.01 10*3/mm3      Basophils, Absolute 0.02 10*3/mm3      Immature Grans, Absolute 0.01 10*3/mm3      nRBC 0.0 /100 WBC     POC Glucose Once [[id number]]  (Abnormal) Collected: 08/10/22 1521    Specimen: Blood Updated: 08/10/22 1522     Glucose 221 mg/dL      Comment: Meter: [id number] : [id number] Brown Meme       [facility] Draw [[id number]] Collected: 08/10/22 1016    Specimen: Blood Updated: 08/10/22 1433    Narrative:      The following orders were created for panel order [facility] Draw.  Procedure                               Abnormality         Status                     ---------                               -----------         ------                     Green Top (Gel)[[id number]]                                  Final result               Lavender Top[[id number]]                                     Final result               Gold Top - SST[[id number]]                                   Final result               Mckoy Top[[id number]]                                         Final result               Light Blue Top[[id number]]                                   Final result                 Please view results for these tests on the individual orders.    Gray Top [[id number]] Collected: 08/10/22 1016    Specimen: Blood Updated: 08/10/22 1433     Extra Tube Hold for add-ons.     Comment: Auto resulted.       STAT Lactic Acid, Reflex [[id number]]  (Abnormal) Collected: 08/10/22 1358    Specimen: Blood Updated: 08/10/22 1429     Lactate 3.6 mmol/L      Comment: Falsely  depressed results may occur on samples drawn from patients receiving N-Acetylcysteine (NAC) or Metamizole.       Beta Hydroxybutyrate Quantitative [[id number]]  (Abnormal) Collected: 08/10/22 1016    Specimen: Blood Updated: 08/10/22 1300     Beta-Hydroxybutyrate Quant 1.639 mmol/L     Narrative:      In the assessment of possible diabetic ketoacidosis, the test should be interpreted along with other clinical and laboratory findings.  A level greater than 1 mmol/L should require further evaluation and levels of more than 3 mmol/L require immediate medical review.    Green Top (Gel) [[id number]] Collected: 08/10/22 1016    Specimen: Blood Updated: 08/10/22 1118     Extra Tube Hold for add-ons.     Comment: Auto resulted.       Lavender Top [[id number]] Collected: 08/10/22 1016    Specimen: Blood Updated: 08/10/22 1118     Extra Tube hold for add-on     Comment: Auto resulted       Gold Top - SST [[id number]] Collected: 08/10/22 1016    Specimen: Blood Updated: 08/10/22 1118     Extra Tube Hold for add-ons.     Comment: Auto resulted.       Light Blue Top [[id number]] Collected: 08/10/22 1016    Specimen: Blood Updated: 08/10/22 1118     Extra Tube Hold for add-ons.     Comment: Auto resulted       Urinalysis With Microscopic If Indicated (No Culture) - Urine, Clean Catch [[id number]]  (Abnormal) Collected: 08/10/22 1035    Specimen: Urine, Clean Catch Updated: 08/10/22 1057     Color, UA Yellow     Appearance, UA Clear     pH, UA 6.5     Specific Gravity, UA 1.011     Glucose, UA >=1000 mg/dL (3+)     Ketones, UA 15 mg/dL (1+)     Bilirubin, UA Negative     Blood, UA Negative     Protein, UA Negative     Leuk Esterase, UA Negative     Nitrite, UA Negative     Urobilinogen, UA 0.2 E.U./dL    Narrative:      Urine microscopic not indicated.    Comprehensive Metabolic Panel [[id number]]  (Abnormal) Collected: 08/10/22 1016    Specimen: Blood Updated: 08/10/22 1056     Glucose 274 mg/dL      BUN 5 mg/dL      Creatinine 0.75  mg/dL      Sodium 132 mmol/L      Potassium 3.4 mmol/L      Comment: Slight hemolysis detected by analyzer. Results may be affected.        Chloride 90 mmol/L      CO2 23.0 mmol/L      Calcium 9.6 mg/dL      Total Protein 7.9 g/dL      Albumin 4.60 g/dL      ALT (SGPT) 78 U/L      AST (SGOT) 89 U/L      Alkaline Phosphatase 197 U/L      Total Bilirubin 0.5 mg/dL      Globulin 3.3 gm/dL      Comment: Calculated Result        A/G Ratio 1.4 g/dL      BUN/Creatinine Ratio 6.7     Anion Gap 19.0 mmol/L      eGFR 96.5 mL/min/1.73      Comment: National Kidney Foundation and American Society of Nephrology (ASN) Task Force recommended calculation based on the Chronic Kidney Disease Epidemiology Collaboration (CKD-EPI) equation refit without adjustment for race.       Narrative:      GFR Normal >60  Chronic Kidney Disease <60  Kidney Failure <15      Lipase [[id number]]  (Abnormal) Collected: 08/10/22 1016    Specimen: Blood Updated: 08/10/22 1056     Lipase 231 U/L     Lactic Acid, Plasma [[id number]]  (Abnormal) Collected: 08/10/22 1016    Specimen: Blood Updated: 08/10/22 1054     Lactate 4.0 mmol/L      Comment: Falsely depressed results may occur on samples drawn from patients receiving N-Acetylcysteine (NAC) or Metamizole.       CBC & Differential [[id number]]  (Abnormal) Collected: 08/10/22 1016    Specimen: Blood Updated: 08/10/22 1027    Narrative:      The following orders were created for panel order CBC & Differential.  Procedure                               Abnormality         Status                     ---------                               -----------         ------                     CBC Auto Differential[[id number]]        Abnormal            Final result                 Please view results for these tests on the individual orders.    CBC Auto Differential [[id number]]  (Abnormal) Collected: 08/10/22 1016    Specimen: Blood Updated: 08/10/22 1027     WBC 8.89 10*3/mm3      RBC 4.67 10*6/mm3      Hemoglobin  15.2 g/dL      Hematocrit 43.9 %      MCV 94.0 fL      MCH 32.5 pg      MCHC 34.6 g/dL      RDW 13.2 %      RDW-SD 45.3 fl      MPV 9.7 fL      Platelets 310 10*3/mm3      Neutrophil % 43.3 %      Lymphocyte % 50.4 %      Monocyte % 5.3 %      Eosinophil % 0.4 %      Basophil % 0.4 %      Immature Grans % 0.2 %      Neutrophils, Absolute 3.84 10*3/mm3      Lymphocytes, Absolute 4.48 10*3/mm3      Monocytes, Absolute 0.47 10*3/mm3      Eosinophils, Absolute 0.04 10*3/mm3      Basophils, Absolute 0.04 10*3/mm3      Immature Grans, Absolute 0.02 10*3/mm3      nRBC 0.0 /100 WBC           Imaging Results (Last 48 Hours)     Procedure Component Value Units Date/Time    XR Chest 2 View [[id number]] Collected: 08/10/22 1755     Updated: 08/10/22 1844    Narrative:      DATE OF EXAM: 8/10/2022 5:43 PM     PROCEDURE: XR CHEST 2 VW-     INDICATIONS: DKA; E86.0-Dehydration; R73.9-Hyperglycemia, unspecified;  K86.1-Other chronic pancreatitis; I16.0-Hypertensive urgency;  R03.0-Elevated blood-pressure reading, without diagnosis of  hypertension; Z91.14-Patient's other noncompliance with medication  regimen; R29.810-Facial weakness     COMPARISON: 6/16/2022     TECHNIQUE: Two radiologic views of the chest, PA and lateral, were  obtained.     FINDINGS: No focal airspace opacity. No pleural effusion or  pneumothorax. Normal heart and mediastinal contours.       Impression:      No evidence of acute disease in the chest.      This report was finalized on 8/10/2022 5:55 PM by Panda Laird.       XR Toe 2+ View Left [[id number]] Collected: 08/10/22 1649     Updated: 08/10/22 1653    Narrative:      DATE OF EXAM: 8/10/2022 4:45 PM     PROCEDURE: XR TOE 2+ VW LEFT-     INDICATIONS: Trauma, left great toe pain.     COMPARISON: No comparisons available.     TECHNIQUE: A minimum of two routine standard radiographic views were  obtained of the left toes.     FINDINGS:  There is no acute fracture or dislocation. There is mild narrowing  and  spurring of the first MTP joint consistent with arthritis. The IP joint  is normal. The soft tissues are unremarkable.        Impression:      No acute findings.     This report was finalized on 8/10/2022 4:50 PM by Corey Mendoza MD.       CT Head Without Contrast [[id number]] Collected: 08/10/22 1100     Updated: 08/10/22 110    Narrative:      CT HEAD WO CONTRAST-     Date of Exam: 8/10/2022 10:36 AM     Indication: weak.     Comparison: 2022     Technique: CT scan of the head without IV contrast.  Automated exposure  control and iterative reconstruction methods were used.     FINDINGS  No acute intracranial hemorrhage or extra-axial collection is  identified. The ventricles appear normal in caliber, with no evidence of  mass effect or midline shift. The basal cisterns appear patent. The  gray-white differentiation appears preserved.     The calvarium appears intact. The visualized paranasal sinuses are  clear. The mastoid air cells are well-aerated.       Impression:      No acute intracranial process identified.     This report was finalized on 8/10/2022 11:04 AM by Ino Oliva MD.             Physician Progress Notes (last 48 hours)  Notes from 22 1037 through 22 1037   No notes of this type exist for this encounter.          Consult Notes (last 48 hours)      Amrita Chavez, APRN at 08/10/22 1457          Stroke Consult Note    Patient Name: Bernadette Paris   MRN: [id number]  Age: 52 y.o.  Sex: female  : 1970    Primary Care Physician: Provider, No Known  Referring Physician:  No ref. provider found        Handedness: Left  Race: -American    Chief Complaint/Reason for Consultation: ED referral    HPI: 52-year-old left-handed black female with known hepatitis C, hyperlipidemia, diabetes, hypertension, bipolar, pancreatitis, and medical noncompliance who presented to the emergency department with left great toe pain and numbness.  Apparently staff in the ED who noted  the patient feel like she had a facial droop and therefore Dr. Cruz ED physician called and asked for their opinion.  I did go assess the patient, I did not appreciate any facial droop, no unilateral weakness, no speech disturbance, she does verbalize she has decreased sensation in the left face all over, upper and lower extremity.  She tells me she cannot move her left toe, however she is able to wiggle her toes.  She apparently had not previously mentioned any weakness, however during assessment she appears to have slight weakness in her left lower extremity, however there is no drift and there is a positive Anton sign.  Therefore her NIH is a 1.  Of note when she came in her blood pressure was 193/117 and has gotten as high as 220/137.  Again, patient did not have any complaints of stroke related symptoms until ED staff thought they noticed a change in the patient who they just saw a couple weeks ago in the ED.    Last Known Normal Date/Time: Unknown EST     Review of Systems   Constitutional: Positive for fatigue.   HENT: Negative.    Respiratory: Positive for shortness of breath.    Cardiovascular: Positive for palpitations.   Gastrointestinal: Positive for abdominal pain.   Genitourinary: Negative.    Musculoskeletal: Negative.    Neurological: Positive for numbness.   Psychiatric/Behavioral: Negative.       Past Medical History:   Diagnosis Date   • Arthritis    • Bipolar disorder (HCC)    • Chronic bronchitis (HCC)    • Depression    • Diabetes mellitus (HCC)     DIAGNOSED 2016   • GERD (gastroesophageal reflux disease)    • Hepatitis-C    • History of blood transfusion    • Hyperlipidemia    • Hypertension    • Infectious viral hepatitis     HEPATITIS C   • Migraine    • Sleep apnea     PATIENT UNSURE OF SETTINGS     Past Surgical History:   Procedure Laterality Date   • CHOLECYSTECTOMY     • ENDOSCOPY N/A 4/9/2022    Procedure: ESOPHAGOGASTRODUODENOSCOPY;  Surgeon: Brunner, Mark I, MD;  Location:   [facility];  Service: Gastroenterology;  Laterality: N/A;  with antrum biopsy   • HYSTERECTOMY     • KNEE SURGERY     • LUMBAR LAMINECTOMY DISCECTOMY DECOMPRESSION N/A 8/6/2018    Procedure: LUMBAR LAMINECTOMIES L4-S1;  Surgeon: Chip Smith MD;  [facility];  Service: Neurosurgery     Family History   Problem Relation Age of Onset   • Diabetes Mother    • Hypertension Mother      Social History     Socioeconomic History   • Marital status: Single   Tobacco Use   • Smoking status: Current Every Day Smoker     Packs/day: 1.00   • Smokeless tobacco: Never Used   Substance and Sexual Activity   • Alcohol use: Yes     Comment: hx etoh abuse per ems   • Drug use: Yes     Types: Cocaine(coke)   • Sexual activity: Defer     Allergies   Allergen Reactions   • Tylenol [Acetaminophen] Other (See Comments)     SEVERE LIVER DISEASE-CONTRAINDICATED     Prior to Admission medications    Medication Sig Start Date End Date Taking? Authorizing Provider   Accu-Chek FastClix Lancets misc 1 each by Other route 4 (Four) Times a Day Before Meals & at Bedtime. 5/13/22   Светлана Navarro MD   acetaminophen (TYLENOL) 325 MG tablet Take 1 tablet by mouth Every 6 (Six) Hours As Needed for Mild Pain  or Moderate Pain . 5/13/22   Светлана Navarro MD   Alcohol Swabs (Alcohol Pads) 70 % pads 1 each 4 (Four) Times a Day. 5/13/22   Светлана Navarro MD   amLODIPine (NORVASC) 5 MG tablet Take 1 tablet by mouth Daily. 5/13/22   Светлана Navarro MD   bisacodyl (DULCOLAX) 10 MG suppository Insert 1 suppository into the rectum Daily As Needed for Constipation (Use if bisacodyl oral is ineffective). 5/13/22   Светлана Navarro MD   busPIRone (BUSPAR) 10 MG tablet Take 1 tablet by mouth 2 (Two) Times a Day. 5/13/22   Светлана Navarro MD   dicyclomine (BENTYL) 20 MG tablet Take 1 tablet by mouth Every 6 (Six) Hours As Needed (pain). 5/29/22   Adrian Perez MD   Ginger, Zingiber officinalis, (Ginger Root) 500 MG capsule Take 500 mg by mouth 3  (Three) Times a Day Before Meals. 5/13/22   Светлана Navarro MD   glucose blood (Accu-Chek Guide) test strip 1 each by Other route 4 (Four) Times a Day Before Meals & at Bedtime. Use as instructed 5/13/22   Светлана Navarro MD   hydrOXYzine (ATARAX) 25 MG tablet Take 1 tablet by mouth Every 8 (Eight) Hours As Needed for Anxiety. 5/13/22   Светлана Navarro MD   ibuprofen (ADVIL,MOTRIN) 600 MG tablet Take 1 tablet by mouth 3 (Three) Times a Day. 6/2/22   Jamel Swift DO   insulin detemir (LEVEMIR) 100 UNIT/ML injection Inject 50 Units under the skin into the appropriate area as directed 2 (Two) Times a Day. 5/13/22   Светлана Navarro MD   Insulin Pen Needle (Pen Needles) 32G X 4 MM misc Inject 1 each under the skin into the appropriate area as directed 2 (Two) Times a Day. 5/13/22   Светлана Navarro MD   lisinopril (PRINIVIL,ZESTRIL) 40 MG tablet Take 1 tablet by mouth Daily. 5/13/22   Светлана Navarro MD   metFORMIN (GLUCOPHAGE) 1000 MG tablet Take 1 tablet by mouth 2 (Two) Times a Day With Meals. 5/13/22   Светлана Navarro MD   metoprolol tartrate (LOPRESSOR) 25 MG tablet Take 1 tablet by mouth Every 12 (Twelve) Hours. 5/13/22   Светлана Navarro MD   ondansetron ODT (Zofran ODT) 4 MG disintegrating tablet Place 1 tablet on the tongue Every 8 (Eight) Hours As Needed for Nausea or Vomiting. 5/13/22   Светлана Navarro MD   pantoprazole (PROTONIX) 40 MG EC tablet Take 1 tablet by mouth Daily. 6/2/22   Jamel Swift DO   polyethylene glycol (MIRALAX) 17 GM/SCOOP powder Mix 1 capful (17g) in water and drink by mouth Daily. 5/13/22   Светлана Navarro MD   QUEtiapine (SEROquel) 100 MG tablet Take 1 tablet by mouth 2 (Two) Times a Day. PT states she was on this at home.. 5/13/22   Светлана Navarro MD   RA ALLERGY RELIEF 10 MG tablet take 1 tablet by mouth once daily 10/11/17   Provider, MD Sheree   sennosides-docusate (PERICOLACE) 8.6-50 MG per tablet Take 2 tablets by mouth 2 (Two) Times a Day As Needed for Constipation.  5/13/22   Светлана Navarro MD   sertraline (ZOLOFT) 100 MG tablet Take 1 tablet by mouth Daily. 5/13/22   Светлана Navarro MD   traZODone (DESYREL) 50 MG tablet Take 1 tablet by mouth Every Night. 5/13/22   Светлана Navarro MD   Ventolin  (90 Base) MCG/ACT inhaler 2 puffs every 4-6 hours as needed for shortness of breath 5/13/22   Светлана Navarro MD       Temp:  [98.2 °F (36.8 °C)] 98.2 °F (36.8 °C)  Heart Rate:  [103-135] 112  Resp:  [18] 18  BP: (166-220)/() 188/101  Neurological Exam  Mental Status  Awake, alert and oriented to person, place and time.Alert. Speech is normal. Language is fluent with no aphasia.    Cranial Nerves  CN II: Visual fields full to confrontation.  CN III, IV, VI: Extraocular movements intact bilaterally.  CN V:  Right: Diminished sensation of the entire right side of the face.  Left: Diminished sensation of the entire left side of the face. Verbalizes decreased sensation to light touch in the left face and all of her forehead.    Motor   Strength is 5/5 throughout all four extremities.    Sensory  Light touch abnormality: Sensation: Verbalizes decreased sensation in the left upper and lower extremity to light touch.     Coordination    No obvious dysmetria.    Gait    Not observed.      Physical Exam  Vitals reviewed.   Constitutional:       Appearance: She is ill-appearing.   HENT:      Head: Normocephalic.      Mouth/Throat:      Mouth: Mucous membranes are dry.   Eyes:      Extraocular Movements: Extraocular movements intact.   Pulmonary:      Effort: Pulmonary effort is normal.   Skin:     General: Skin is warm.   Neurological:      Mental Status: She is alert.      Sensory: Sensory deficit present.      Deep Tendon Reflexes: Strength normal.   Psychiatric:         Mood and Affect: Mood normal.         Speech: Speech normal.         Acute Stroke Data    IV Thrombolytic (TPA/Tenecteplase) Inclusion / Exclusion Criteria    Time: 14:58 EDT  Person Administering Scale: Amrita GÓMEZ   DMITRY Chavez    Inclusion Criteria  [x]   18 years of age or greater   []   Onset of symptoms < 4.5 hours before beginning treatment (stroke onset = time patient was last seen well or without symptoms).   []   Diagnosis of acute ischemic stroke causing measurable disabling deficit (Complete Hemianopia, Any Aphasia, Visual or Sensory Extinction, Any weakness limiting sustained effort against gravity)   []   Any remaining deficit considered potentially disabling in view of patient and practitioner   Exclusion criteria (Do not proceed with Alteplase if any are checked under exclusion criteria)  [x]   Onset unknown or GREATER than 4.5 hours   []   ICH on CT/MRI   []   CT demonstrates hypodensity representing acute or subacute infarct   []   Significant head trauma or prior stroke in the previous 3 months   []   Symptoms suggestive of subarachnoid hemorrhage   []   History of un-ruptured intracranial aneurysm GREATER than 10 mm   []   Recent intracranial or intraspinal surgery within the last 3 months   []   Arterial puncture at a non-compressible site in the previous 7 days   []   Active internal bleeding   []   Acute bleeding tendency   []   Platelet count LESS than 100,000 for known hematological diseases such as leukemia, thrombocytopenia or chronic cirrhosis   []   Current use of anticoagulant with INR GREATER than 1.7 or PT GREATER than 15 seconds, aPTT GREATER than 40 seconds   []   Heparin received within 48 hours, resulting in abnormally elevated aPTT GREATER than upper limit of normal   []   Current use of direct thrombin inhibitors or direct factor Xa inhibitors in the past 48 hours   []   Elevated blood pressure refractory to treatment (systolic GREATER than 185 mm/Hg or diastolic  GREATER than 110 mm/Hg   []   Suspected infective endocarditis and aortic arch dissection   []   Current use of therapeutic treatment dose of low-molecular-weight heparin (LMWH) within the previous 24 hours   []   Structural GI  malignancy or bleed   Relative exclusion for all patients  []   Only minor non-disabling symptoms   []   Pregnancy   []   Seizure at onset with postictal residual neurological impairments   []   Major surgery or previous trauma within past 14 days   []   History of previous spontaneous ICH, intracranial neoplasm, or AV malformation   []   Postpartum (within previous 14 days)   []   Recent GI or urinary tract hemorrhage (within previous 21 days)   []   Recent acute MI (within previous 3 months)   []   History of un-ruptured intracranial aneurysm LESS than 10 mm   []   History of ruptured intracranial aneurysm   []   Blood glucose LESS than 50 mg/dL (2.7 mmol/L)   []   Dural puncture within the last 7 days   []   Known GREATER than 10 cerebral microbleeds   Additional exclusions for patients with symptoms onset between 3 and 4.5 hours.  []   Age > 80.   []   On any anticoagulants regardless of INR  >>> Warfarin (Coumadin), Heparin, Enoxaparin (Lovenox), fondaparinux (Arixtra), bivalirudin (Angiomax), Argatroban, dabigatran (Pradaxa), rivaroxaban (Xarelto), or apixaban (Eliquis)   []   Severe stroke (NIHSS > 25).   []   History of BOTH diabetes and previous ischemic stroke.   []   The risks and benefits have been discussed with the patient or family related to the administration of IV Alteplase for stroke symptoms.   []   I have discussed and reviewed the patient's case and imaging with the attending prior to IV Thrombolytic (TPA/Tenecteplase).   n/a Time Thrombolytic administered       Hospital Meds:  Scheduled- enoxaparin, 40 mg, Subcutaneous, Daily  insulin lispro, 0-9 Units, Subcutaneous, TID AC  nicotine, 1 patch, Transdermal, Q24H  sodium chloride, 10 mL, Intravenous, Q12H      Infusions-     PRNs- •  dextrose  •  dextrose  •  glucagon (human recombinant)  •  magnesium sulfate **OR** magnesium sulfate **OR** magnesium sulfate  •  Morphine  •  ondansetron  •  potassium chloride **OR** potassium chloride **OR**  potassium chloride  •  Sodium Chloride (PF)  •  sodium chloride    Functional Status Prior to Current Stroke/[place] Score: 0    NIH Stroke Scale  Time: 14:58 EDT  Person Administering Scale: DMITRY Bobo    Interval: baseline  1a. Level of Consciousness: 0-->Alert, keenly responsive  1b. LOC Questions: 0-->Answers both questions correctly  1c. LOC Commands: 0-->Performs both tasks correctly  2. Best Gaze: 0-->Normal  3. Visual: 0-->No visual loss  4. Facial Palsy: 0-->Normal symmetrical movements  5a. Motor Arm, Left: 0-->No drift, limb holds 90 (or 45) degrees for full 10 secs  5b. Motor Arm, Right: 0-->No drift, limb holds 90 (or 45) degrees for full 10 secs  6a. Motor Leg, Left: 0-->No drift, leg holds 30 degree position for full 5 secs  6b. Motor Leg, Right: 0-->No drift, leg holds 30 degree position for full 5 secs  7. Limb Ataxia: 0-->Absent  8. Sensory: 1-->Mild-to-moderate sensory loss, patient feels pinprick is less sharp or is dull on the affected side, or there is a loss of superficial pain with pinprick, but patient is aware of being touched  9. Best Language: 0-->No aphasia, normal  10. Dysarthria: 0-->Normal  11. Extinction and Inattention (formerly Neglect): 0-->No abnormality    Total (NIH Stroke Scale): 1    Results Reviewed:  I have personally reviewed current lab, radiology, and data and agree with results.        Assessment/Plan:      52-year-old with hepatitis, hyperlipidemia, pancreatitis, diabetes, hypertension who was admitted from the emergency department after original complaint of left toe pain and numbness.  Patient did not have any complaints of strokelike symptoms upon arrival.    I do not appreciate a facial droop, she does verbalize decreased sensation in the left upper and lower extremity however she also has decreased sensation to light touch in her whole forehead, and left face.  This does not appear to be a vascular event, however will get MRI brain.  If that is negative  there is no further stroke or general neurology work-up needed.          Amrita Chavez DNP, DMITRY, [facility]  August 10, 2022  14:58 EDT    Electronically signed by Amrita Chavez APRN at 08/10/22 6591

## 2022-08-11 NOTE — PLAN OF CARE
Problem: Adult Inpatient Plan of Care  Goal: Plan of Care Review  Recent Flowsheet Documentation  Taken 8/11/2022 1400 by Nicolas Briggs OT  Progress: (OT eval) no change  Plan of Care Reviewed With: patient  Outcome Evaluation: OT eval completed. Pt presents w/ balance, strength, coordination, and activity tolerance deficits warranting skilled IP OT services to promote return to PLOF. CGA for bed mobility, Betsy w/2 for in-room distance to recliner, SBA for donning socks in bed, OSPINA for seated grooming. Rec d/c to IP rehab.

## 2022-08-11 NOTE — CONSULTS
Diabetes Education    Patient Name:  Bernadette Paris  YOB: 1970  MRN: [id number]  Admit Date:  8/10/2022        Saw pt at bedside for diabetes education consult. Pt was seen in May 2022 by diabetes educator during admission. Pt is up in chair and states she is very sleepy due to seroquel. She states she has working glucose meter at home and insulin. She does not answer when asked if she misses any doses of her insulin. She states she is tired and wants to be left alone. Will reattempt at a later time.       Electronically signed by:  Norma Aranda RN, [facility]  08/11/22 14:50 EDT

## 2022-08-11 NOTE — THERAPY EVALUATION
Patient Name: Bernadette Paris  : 1970    MRN: [id number]                              Today's Date: 2022       Admit Date: 8/10/2022    Visit Dx:     ICD-10-CM ICD-9-CM   1. Dehydration  E86.0 276.51   2. Hyperglycemia  R73.9 790.29   3. Chronic pancreatitis, unspecified pancreatitis type (HCC)  K86.1 577.1   4. Hypertensive urgency  I16.0 401.9   5. Elevated blood pressure reading  R03.0 796.2   6. Noncompliance with medications  Z91.14 V15.81   7. Facial droop  R29.810 781.94   8. Dysarthria  R47.1 784.51     Patient Active Problem List   Diagnosis   • Spinal stenosis, lumbar region, with neurogenic claudication   • Degenerative disc disease, lumbar   • Facet arthritis of lumbar region   • Lumbar stenosis with neurogenic claudication   • BMI 40.0-44.9, adult (HCC)   • Encounter for smoking cessation counseling   • S/P lumbar laminectomy   • Nausea & vomiting   • Acidosis   • Bipolar affective disorder (HCC)   • Hepatitis C   • Accelerated hypertension   • Medically noncompliant   • Gastritis   • Hyperlipidemia   • Severe malnutrition (HCC)   • Intractable abdominal pain   • Type 2 diabetes mellitus with hyperglycemia, with long-term current use of insulin (HCC)   • Gastroparesis   • Dehydration   • Right sided weakness   • Hypertensive crisis   • Acute on chronic pancreatitis (HCC)     Past Medical History:   Diagnosis Date   • Arthritis    • Bipolar disorder (HCC)    • Chronic bronchitis (HCC)    • Depression    • Diabetes mellitus (HCC)     DIAGNOSED 2016   • GERD (gastroesophageal reflux disease)    • Hepatitis-C    • History of blood transfusion    • Hyperlipidemia    • Hypertension    • Infectious viral hepatitis     HEPATITIS C   • Migraine    • Sleep apnea     PATIENT UNSURE OF SETTINGS     Past Surgical History:   Procedure Laterality Date   • CHOLECYSTECTOMY     • ENDOSCOPY N/A 2022    Procedure: ESOPHAGOGASTRODUODENOSCOPY;  Surgeon: Brunner, Mark I, MD;  Location: [facility] ENDOSCOPY;   Service: Gastroenterology;  Laterality: N/A;  with antrum biopsy   • HYSTERECTOMY     • KNEE SURGERY     • LUMBAR LAMINECTOMY DISCECTOMY DECOMPRESSION N/A 8/6/2018    Procedure: LUMBAR LAMINECTOMIES L4-S1;  Surgeon: Chip Smith MD;  Location: [facility];  Service: Neurosurgery      General Information     Row Name 08/11/22 1531          OT Time and Intention    Document Type evaluation  -CS     Mode of Treatment occupational therapy  -CS     Row Name 08/11/22 1531          General Information    Patient Profile Reviewed yes  -CS     Prior Level of Function independent:;all household mobility;ADL's;dependent:;driving  Per chart review: Pt reports independence at baseline for ADL completion, no AD/DME use.  -CS     Existing Precautions/Restrictions fall  -CS     Barriers to Rehab medically complex  -CS     Row Name 08/11/22 1531          Living Environment    People in Home significant other  -CS     Row Name 08/11/22 1531          Stairs Within Home, Primary    Stairs, Within Home, Primary Pt limited historian this date, home layout/safety requires further inquiry   -CS     Row Name 08/11/22 1531          Cognition    Orientation Status (Cognition) oriented to;person;place  -CS     Row Name 08/11/22 1531          Safety Issues, Functional Mobility    Safety Issues Affecting Function (Mobility) insight into deficits/self-awareness;awareness of need for assistance;safety precaution awareness;safety precautions follow-through/compliance;sequencing abilities  -CS     Impairments Affecting Function (Mobility) balance;coordination;strength;motor control;motor planning;pain  -CS     Comment, Safety Issues/Impairments (Mobility) continues to complain of toe pain, ataxic gait observed  -CS           User Key  (r) = Recorded By, (t) = Taken By, (c) = Cosigned By    Initials Name Provider Type    CS Nicolas Briggs OT Occupational Therapist                 Mobility/ADL's     Row Name 08/11/22 1534          Bed Mobility     Bed Mobility supine-sit;scooting/bridging  -CS     Scooting/Bridging [place] (Bed Mobility) contact guard  -CS     Supine-Sit [place] (Bed Mobility) contact guard  -CS     Comment, (Bed Mobility) Pt sat up in bed w/ no assist, required tactile/verbal cues to reach EOB, mild dizziness however BP remained low but stable  -     Row Name 08/11/22 1534          Transfers    Transfers bed-chair transfer  -CS     Bed-Chair [place] (Transfers) minimum assist (75% patient effort);nonverbal cues (demo/gesture);verbal cues;2 person assist;1 person to manage equipment;1 person assist  -     Assistive Device (Bed-Chair Transfers) other (see comments)  -CS     Row Name 08/11/22 1534          Bed-Chair Transfer    Comment, (Bed-Chair Transfer) LUE support, short distance around foot of bed to recliner, instability and ataxic gait observed  -     Row Name 08/11/22 1534          Functional Mobility    Functional Mobility- Comment defer to PT for specifics  -     Row Name 08/11/22 1534          Activities of Daily Living    BADL Assessment/Intervention lower body dressing;grooming  -     Row Name 08/11/22 1534          Lower Body Dressing Assessment/Training    [place] Level (Lower Body Dressing) don;socks;standby assist  -CS     Position (Lower Body Dressing) sitting up in bed  -CS     Comment, (Lower Body Dressing) pulled on socks in long sitting  -CS     Row Name 08/11/22 1534          Grooming Assessment/Training    [place] Level (Grooming) wash face, hands;verbal cues;set up  -CS     Position (Grooming) sitting up in bed  -           User Key  (r) = Recorded By, (t) = Taken By, (c) = Cosigned By    Initials Name Provider Type     Nicolas Briggs, OT Occupational Therapist               Obj/Interventions     Row Name 08/11/22 1536          Sensory Assessment (Somatosensory)    Sensory Assessment (Somatosensory) UE sensation intact;unable/difficult to assess  -     Sensory Assessment  full assessment limited 2/2 Pt cooperation, responded to all lt touch tactile cues  -CS     Row Name 08/11/22 1536          Vision Assessment/Intervention    Visual Impairment/Limitations unable/difficult to assess;other (see comments)  due to Pt cooperation  -CS     Row Name 08/11/22 1536          Range of Motion Comprehensive    General Range of Motion bilateral upper extremity ROM WFL  -CS     Row Name 08/11/22 1536          Strength Comprehensive (MMT)    General Manual Muscle Testing (MMT) Assessment upper extremity strength deficits identified  -CS     Comment, General Manual Muscle Testing (MMT) Assessment formal MMT deferred 2/2 Pt willingness to cooperate, generalized weakness observed  -CS     Row Name 08/11/22 1536          Motor Skills    Motor Skills coordination;functional endurance  -CS     Coordination right;upper extremity;gross motor deficit;moderate impairment  -CS     Functional Endurance lethargic, easily fatigued  -CS     Row Name 08/11/22 1536          Balance    Balance Assessment sitting static balance;sitting dynamic balance;standing static balance;standing dynamic balance  -     Static Sitting Balance standby assist  -CS     Dynamic Sitting Balance contact guard  -CS     Position, Sitting Balance unsupported;sitting edge of bed  -CS     Static Standing Balance minimal assist;2-person assist  -CS     Dynamic Standing Balance moderate assist;2-person assist  -CS     Position/Device Used, Standing Balance supported;other (see comments)  RUE support  -CS           User Key  (r) = Recorded By, (t) = Taken By, (c) = Cosigned By    Initials Name Provider Type    CS Nicolas Briggs, OT Occupational Therapist               Goals/Plan     Row Name 08/11/22 1547          Bed Mobility Goal 1 (OT)    Activity/Assistive Device (Bed Mobility Goal 1, OT) supine to sit;scooting;sit to supine  -CS     [place] Level/Cues Needed (Bed Mobility Goal 1, OT) supervision required  -CS     Time Frame (Bed  Mobility Goal 1, OT) long term goal (LTG);10 days  -CS     Progress/Outcomes (Bed Mobility Goal 1, OT) goal ongoing  -CS     Row Name 08/11/22 1543          Transfer Goal 1 (OT)    Activity/Assistive Device (Transfer Goal 1, OT) bed-to-chair/chair-to-bed;toilet  -CS     [place] Level/Cues Needed (Transfer Goal 1, OT) standby assist  -CS     Time Frame (Transfer Goal 1, OT) long term goal (LTG);10 days  -CS     Strategies/Barriers (Transfers Goal 1, OT) AD recs per PT  -CS     Progress/Outcome (Transfer Goal 1, OT) goal ongoing  -CS     Row Name 08/11/22 1543          Dressing Goal 1 (OT)    Activity/Device (Dressing Goal 1, OT) lower body dressing  -CS     [place]/Cues Needed (Dressing Goal 1, OT) supervision required  -CS     Time Frame (Dressing Goal 1, OT) long term goal (LTG);10 days  -CS     Progress/Outcome (Dressing Goal 1, OT) goal ongoing  -CS     Row Name 08/11/22 1543          Strength Goal 1 (OT)    Strength Goal 1 (OT) Pt will increase BUE strength by 1/2 muscle grade by discharge to promote safe transfers and ADL completion  -CS     Time Frame (Strength Goal 1, OT) long term goal (LTG);by discharge  -CS     Progress/Outcome (Strength Goal 1, OT) goal ongoing  -CS     Row Name 08/11/22 1543          Therapy Assessment/Plan (OT)    Planned Therapy Interventions (OT) activity tolerance training;functional balance retraining;occupation/activity based interventions;ROM/therapeutic exercise;strengthening exercise;transfer/mobility retraining;patient/caregiver education/training;adaptive equipment training  -CS           User Key  (r) = Recorded By, (t) = Taken By, (c) = Cosigned By    Initials Name Provider Type    CS Nicolas Briggs, OT Occupational Therapist               Clinical Impression     Row Name 08/11/22 1400          Pain Assessment    Pretreatment Pain Rating 0/10 - no pain  -CS     Posttreatment Pain Rating 0/10 - no pain  -CS     Pre/Posttreatment Pain Comment no visual/verbal  indications of pain during eval  -CS     Pain Intervention(s) Repositioned;Ambulation/increased activity  -CS     Row Name 08/11/22 1400          Plan of Care Review    Plan of Care Reviewed With patient  -CS     Progress no change  OT eval  -CS     Outcome Evaluation OT eval completed. Pt presents w/ balance, strength, coordination, and activity tolerance deficits warranting skilled IP OT services to promote return to PLOF. CGA for bed mobility, Betsy w/2 for in-room distance to recliner, SBA for donning socks in bed, OSPINA for seated grooming. Rec d/c to IP rehab.  -CS     Row Name 08/11/22 1400          Therapy Assessment/Plan (OT)    Rehab Potential (OT) good, to achieve stated therapy goals  -CS     Criteria for Skilled Therapeutic Interventions Met (OT) yes;meets criteria;skilled treatment is necessary  -CS     Therapy Frequency (OT) daily  -CS     Row Name 08/11/22 1400          Therapy Plan Review/Discharge Plan (OT)    Anticipated Discharge Disposition (OT) inpatient rehabilitation facility  -CS     Row Name 08/11/22 1400          Vital Signs    Pre Systolic BP Rehab 78  RN cleared for eval  -CS     Pre Treatment Diastolic BP 59  -CS     Intra Systolic BP Rehab 87  -CS     Intra Treatment Diastolic BP 60  -CS     Post Systolic BP Rehab 79  -CS     Post Treatment Diastolic BP 60  -CS     O2 Delivery Pre Treatment room air  -CS     O2 Delivery Intra Treatment room air  -CS     O2 Delivery Post Treatment room air  -CS     Pre Patient Position Supine  -CS     Intra Patient Position Standing  -CS     Post Patient Position Sitting  -CS     Row Name 08/11/22 1400          Positioning and Restraints    Pre-Treatment Position in bed  -CS     Post Treatment Position chair  -CS     In Chair notified nsg;reclined;sitting;call light within reach;encouraged to call for assist;exit alarm on;waffle cushion;legs elevated;heels elevated  -CS           User Key  (r) = Recorded By, (t) = Taken By, (c) = Cosigned By    Initials  Name Provider Type    Nicolas Wilson OT Occupational Therapist               Outcome Measures     Row Name 08/11/22 1544          How much help from another is currently needed...    Putting on and taking off regular lower body clothing? 3  -CS     Bathing (including washing, rinsing, and drying) 2  -CS     Toileting (which includes using toilet bed pan or urinal) 3  -CS     Putting on and taking off regular upper body clothing 3  -CS     Taking care of personal grooming (such as brushing teeth) 3  -CS     Eating meals 3  -CS     AM-PAC 6 Clicks Score (OT) 17  -CS     Row Name 08/11/22 1544          Functional Assessment    Outcome Measure Options AM-PAC 6 Clicks Daily Activity (OT)  -CS           User Key  (r) = Recorded By, (t) = Taken By, (c) = Cosigned By    Initials Name Provider Type    Nicolas Wilson OT Occupational Therapist                Occupational Therapy Education                 Title: PT OT SLP Therapies (In Progress)     Topic: Occupational Therapy (In Progress)     Point: ADL training (In Progress)     Description:   Instruct learner(s) on proper safety adaptation and remediation techniques during self care or transfers.   Instruct in proper use of assistive devices.              Learning Progress Summary           Patient Acceptance, D,E, NL by  at 8/11/2022 1545                   Point: Home exercise program (Not Started)     Description:   Instruct learner(s) on appropriate technique for monitoring, assisting and/or progressing therapeutic exercises/activities.              Learner Progress:  Not documented in this visit.          Point: Precautions (In Progress)     Description:   Instruct learner(s) on prescribed precautions during self-care and functional transfers.              Learning Progress Summary           Patient Acceptance, D,E, NL by  at 8/11/2022 1545                   Point: Body mechanics (In Progress)     Description:   Instruct learner(s) on proper positioning  and spine alignment during self-care, functional mobility activities and/or exercises.              Learning Progress Summary           Patient Acceptance, D,E, NL by  at 8/11/2022 1545                               User Key     Initials Effective Dates Name Provider Type Discipline     06/16/21 -  Nicolas Briggs OT Occupational Therapist OT              OT Recommendation and Plan  Planned Therapy Interventions (OT): activity tolerance training, functional balance retraining, occupation/activity based interventions, ROM/therapeutic exercise, strengthening exercise, transfer/mobility retraining, patient/caregiver education/training, adaptive equipment training  Therapy Frequency (OT): daily  Plan of Care Review  Plan of Care Reviewed With: patient  Progress: no change (OT eval)  Outcome Evaluation: OT eval completed. Pt presents w/ balance, strength, coordination, and activity tolerance deficits warranting skilled IP OT services to promote return to PLOF. CGA for bed mobility, Betsy w/2 for in-room distance to recliner, SBA for donning socks in bed, OSPINA for seated grooming. Rec d/c to IP rehab.     Time Calculation:    Time Calculation- OT     Row Name 08/11/22 1359             Time Calculation- OT    OT Start Time 1300  -CS      OT Received On 08/11/22  -CS      OT Goal Re-Cert Due Date 08/21/22  -CS              Untimed Charges    OT Eval/Re-eval Minutes 40  -CS              Total Minutes    Untimed Charges Total Minutes 40  -CS       Total Minutes 40  -CS            User Key  (r) = Recorded By, (t) = Taken By, (c) = Cosigned By    Initials Name Provider Type     Nicolas Briggs OT Occupational Therapist              Therapy Charges for Today     Code Description Service Date Service Provider Modifiers Qty    64614152430 HC OT EVAL MOD COMPLEXITY 3 8/11/2022 Nicolas Briggs, OT GO 1    [id number]  OT THER SUPP EA 15 MIN 8/11/2022 Nicolas Briggs, OT GO 2               Nicolas Briggs OT  8/11/2022

## 2022-08-11 NOTE — PROGRESS NOTES
[facility] Medicine Services  PROGRESS NOTE    Patient Name: Bernadette Paris  : 1970  MRN: [id number]    Date of Admission: 8/10/2022  Primary Care Physician: Provider, No Known    Subjective   Subjective     CC:  Abdominal pain    HPI:  Drowsy today, states she thinks bc of her seroquel dose. Denying any abdominal complaints at this time    ROS:  Gen- No fevers, chills  CV- No chest pain, palpitations  Resp- No cough, dyspnea  GI- No N/V/D, abd pain         Objective   Objective     Vital Signs:   Temp:  [98.1 °F (36.7 °C)-98.6 °F (37 °C)] 98.3 °F (36.8 °C)  Heart Rate:  [] 82  Resp:  [14-18] 18  BP: ()/() 92/64     Physical Exam:  Constitutional: No acute distress, awake, alert, chronically ill appearing  HENT: NCAT, mucous membranes moist  Respiratory: Clear to auscultation bilaterally, respiratory effort normal   Cardiovascular: RRR, no murmurs, rubs, or gallops  Gastrointestinal: Positive bowel sounds, soft, nontender, nondistended  Musculoskeletal: No bilateral ankle edema  Psychiatric: Appropriate affect, cooperative  Neurologic: Oriented x 3, no focal deficits  Skin: No rashes    Results Reviewed:  LAB RESULTS:      Lab 22  0727 08/10/22  1611 08/10/22  1358 08/10/22  1016   WBC 5.81 5.82  --  8.89   HEMOGLOBIN 14.7 14.6  --  15.2   HEMATOCRIT 41.6 41.1  --  43.9   PLATELETS 269 306  --  310   NEUTROS ABS 2.13 3.74  --  3.84   IMMATURE GRANS (ABS) 0.01 0.01  --  0.02   LYMPHS ABS 2.93 1.72  --  4.48*   MONOS ABS 0.66 0.32  --  0.47   EOS ABS 0.06 0.01  --  0.04   MCV 93.3 92.8  --  94.0   LACTATE  --  1.7 3.6* 4.0*         Lab 22  1440 22  0727 22  0013 08/10/22  2048 08/10/22  1611   SODIUM 137 136 136 139 139  139   POTASSIUM 3.3* 3.7 3.4* 3.7 3.5  3.5   CHLORIDE 103 99 98 102 98  98   CO2 22.0 23.0 23.0 20.0* 19.0*  19.0*   ANION GAP 12.0 14.0 15.0 17.0* 22.0*  22.0*   BUN 6 4* 4* 5* 4*  4*   CREATININE 0.65 0.45* 0.48* 0.47*  0.47*  0.47*   EGFR 106.7 116.6 114.8 115.4 115.4  115.4   GLUCOSE 147* 117* 260* 175* 272*  272*   CALCIUM 9.1 9.2 8.7 8.7 9.2  9.2   MAGNESIUM 2.9* 1.4* 1.9 1.6 1.5*  1.5*   PHOSPHORUS 3.0 2.5 2.8 3.2 3.3  3.3   HEMOGLOBIN A1C  --   --   --   --  11.50*         Lab 08/10/22  1611 08/10/22  1016   TOTAL PROTEIN 7.4 7.9   ALBUMIN 4.20 4.60   GLOBULIN 3.2 3.3   ALT (SGPT) 80* 78*   AST (SGOT) 139* 89*   BILIRUBIN 0.6 0.5   ALK PHOS 205* 197*   LIPASE  --  231*         Lab 08/10/22  1611   TROPONIN T <0.010         Lab 08/11/22  0013   CHOLESTEROL 162   LDL CHOL 37   HDL CHOL 113*   TRIGLYCERIDES 61             Brief Urine Lab Results  (Last result in the past 365 days)      Color   Clarity   Blood   Leuk Est   Nitrite   Protein   CREAT   Urine HCG        08/10/22 1035 Yellow   Clear   Negative   Negative   Negative   Negative                 Microbiology Results Abnormal     Procedure Component Value - Date/Time    COVID PRE-OP / PRE-PROCEDURE SCREENING ORDER (NO ISOLATION) - Swab, Nasopharynx [[id number]]  (Normal) Collected: 08/10/22 1648    Lab Status: Final result Specimen: Swab from Nasopharynx Updated: 08/10/22 1721    Narrative:      The following orders were created for panel order COVID PRE-OP / PRE-PROCEDURE SCREENING ORDER (NO ISOLATION) - Swab, Nasopharynx.  Procedure                               Abnormality         Status                     ---------                               -----------         ------                     COVID-19, ABBOTT IN-HOUS...[[id number]]  Normal              Final result                 Please view results for these tests on the individual orders.    COVID-19, ABBOTT IN-HOUSE,NASAL Swab (NO TRANSPORT MEDIA) 2 HR TAT - Swab, Nasopharynx [[id number]]  (Normal) Collected: 08/10/22 1648    Lab Status: Final result Specimen: Swab from Nasopharynx Updated: 08/10/22 1721     COVID19 Presumptive Negative    Narrative:      Fact sheet for providers:  https://www.fda.gov/media/810635/download     Fact sheet for patients: https://www.fda.gov/media/169712/download    Test performed by PCR.  If inconsistent with clinical signs and symptoms patient should be tested with different authorized molecular test.          Adult Transthoracic Echo Complete W/ Cont if Necessary Per Protocol    Result Date: 8/11/2022  · Left ventricular ejection fraction appears to be 66 - 70%. Left ventricular systolic function is normal. · Left ventricular wall thickness is consistent with mild to moderate concentric hypertrophy. · Saline test results are negative. · Mild tricuspid valve regurgitation is present.      XR Chest 2 View    Result Date: 8/10/2022  DATE OF EXAM: 8/10/2022 5:43 PM  PROCEDURE: XR CHEST 2 VW-  INDICATIONS: DKA; E86.0-Dehydration; R73.9-Hyperglycemia, unspecified; K86.1-Other chronic pancreatitis; I16.0-Hypertensive urgency; R03.0-Elevated blood-pressure reading, without diagnosis of hypertension; Z91.14-Patient's other noncompliance with medication regimen; R29.810-Facial weakness  COMPARISON: 6/16/2022  TECHNIQUE: Two radiologic views of the chest, PA and lateral, were obtained.  FINDINGS: No focal airspace opacity. No pleural effusion or pneumothorax. Normal heart and mediastinal contours.      Impression: No evidence of acute disease in the chest.  This report was finalized on 8/10/2022 5:55 PM by Panda Laird.      CT Head Without Contrast    Result Date: 8/10/2022  CT HEAD WO CONTRAST-  Date of Exam: 8/10/2022 10:36 AM  Indication: weak.  Comparison: June 16, 2022  Technique: CT scan of the head without IV contrast.  Automated exposure control and iterative reconstruction methods were used.  FINDINGS No acute intracranial hemorrhage or extra-axial collection is identified. The ventricles appear normal in caliber, with no evidence of mass effect or midline shift. The basal cisterns appear patent. The gray-white differentiation appears preserved.  The  calvarium appears intact. The visualized paranasal sinuses are clear. The mastoid air cells are well-aerated.      Impression: No acute intracranial process identified.  This report was finalized on 8/10/2022 11:04 AM by Ino Oliva MD.      XR Toe 2+ View Left    Result Date: 8/10/2022  DATE OF EXAM: 8/10/2022 4:45 PM  PROCEDURE: XR TOE 2+ VW LEFT-  INDICATIONS: Trauma, left great toe pain.  COMPARISON: No comparisons available.  TECHNIQUE: A minimum of two routine standard radiographic views were obtained of the left toes.  FINDINGS: There is no acute fracture or dislocation. There is mild narrowing and spurring of the first MTP joint consistent with arthritis. The IP joint is normal. The soft tissues are unremarkable.      Impression: No acute findings.  This report was finalized on 8/10/2022 4:50 PM by Corey Mendoza MD.        Results for orders placed during the hospital encounter of 08/10/22    Adult Transthoracic Echo Complete W/ Cont if Necessary Per Protocol    Interpretation Summary  · Left ventricular ejection fraction appears to be 66 - 70%. Left ventricular systolic function is normal.  · Left ventricular wall thickness is consistent with mild to moderate concentric hypertrophy.  · Saline test results are negative.  · Mild tricuspid valve regurgitation is present.      I have reviewed the medications:  Scheduled Meds:amLODIPine, 5 mg, Oral, Q24H  aspirin, 81 mg, Oral, Daily   Or  aspirin, 300 mg, Rectal, Daily  atorvastatin, 80 mg, Oral, Nightly  busPIRone, 10 mg, Oral, BID  enoxaparin, 40 mg, Subcutaneous, Daily  lisinopril, 40 mg, Oral, Daily  metoprolol tartrate, 25 mg, Oral, Q12H  nicotine, 1 patch, Transdermal, Q24H  QUEtiapine, 100 mg, Oral, Nightly  sodium chloride, 10 mL, Intravenous, Q12H  sodium chloride, 10 mL, Intravenous, Q12H  sodium chloride, 10 mL, Intravenous, Q12H      Continuous Infusions:dextrose 5 % and sodium chloride 0.45 %, 150 mL/hr  dextrose 5 % and sodium chloride 0.45 %  with KCl 20 mEq/L, 150 mL/hr, Last Rate: 150 mL/hr (08/11/22 1152)  dextrose 5 % and sodium chloride 0.45 % with KCl 40 mEq/L, 150 mL/hr  dextrose 5 % and sodium chloride 0.9 %, 150 mL/hr  dextrose 5 % and sodium chloride 0.9 % with KCl 20 mEq, 150 mL/hr  dextrose 5% and sodium chloride 0.9% with KCl 40 mEq/L, 150 mL/hr  insulin, 0-100 Units/hr, Last Rate: 1.3 Units/hr (08/11/22 1422)  niCARdipine, 5-15 mg/hr, Last Rate: Stopped (08/11/22 1007)  custom IV KCl infusion builder, 250 mL/hr  sodium chloride, 250 mL/hr, Last Rate: 250 mL/hr (08/11/22 1044)  sodium chloride 0.45 % with KCl 20 mEq, 250 mL/hr  sodium chloride, 250 mL/hr  sodium chloride 0.9 % with KCl 20 mEq, 250 mL/hr  sodium chloride 0.9 % with KCl 40 mEq/L, 250 mL/hr      PRN Meds:.•  albuterol sulfate HFA  •  dextrose  •  dextrose  •  dextrose  •  dextrose 5 % and sodium chloride 0.45 %  •  dextrose 5 % and sodium chloride 0.45 % with KCl 20 mEq/L  •  dextrose 5 % and sodium chloride 0.45 % with KCl 40 mEq/L  •  dextrose 5 % and sodium chloride 0.9 %  •  dextrose 5 % and sodium chloride 0.9 % with KCl 20 mEq  •  dextrose 5% and sodium chloride 0.9% with KCl 40 mEq/L  •  glucagon (human recombinant)  •  hydrOXYzine  •  labetalol  •  magnesium sulfate **OR** magnesium sulfate **OR** magnesium sulfate  •  ondansetron  •  potassium chloride **OR** potassium chloride **OR** potassium chloride  •  Sodium Chloride (PF)  •  custom IV KCl infusion builder  •  sodium chloride  •  sodium chloride 0.45 % with KCl 20 mEq  •  sodium chloride  •  sodium chloride  •  sodium chloride  •  sodium chloride  •  sodium chloride 0.9 % with KCl 20 mEq  •  sodium chloride 0.9 % with KCl 40 mEq/L  •  traZODone    Assessment & Plan   Assessment & Plan     Active Hospital Problems    Diagnosis  POA   • **Hypertensive crisis [I16.9]  Yes   • Dehydration [E86.0]  Unknown   • Right sided weakness [R53.1]  Yes   • Acute on chronic pancreatitis (HCC) [K85.90, K86.1]  Yes   • Type 2  diabetes mellitus with hyperglycemia, with long-term current use of insulin (HCC) [E11.65, Z79.4]  Not Applicable   • Gastroparesis [K31.84]  Yes      Resolved Hospital Problems   No resolved problems to display.        Brief Hospital Course to date:  Bernadette Paris is a 52 y.o. female with a history of hypertension and diabetes who presents with hypertensive emergency with signs and symptoms consistent with a subacute stroke, as well as concern for possible DKA and pancreatitis. Started on DKA protocol    DKA  Pancreatitis?  DM  -DKA protocol w glucomander  -most recent BMP shows closed AG, plan to transition to subcutaneous and allow diet  -diabetes educator consult  -A1c is 11.5  -lipase elevated, CT abd/pelvis  -fluids, supportive care      HTN  -s/p cardene drip and home meds restarted. However now hypotensive so giving fluid bolus    L toe numbness  Parasthesias  Increased somnolence  -neuro was consulted for concern of stroke, although this is less likely  - MRI brain pending  - dc morphine, decrease seroquel, change trazadone to PRN    Anxiety  -continue home buspar    Hx of drug abuse  -obtain UDS  -addiction team consult      Expected Discharge Location and Transportation: home  Expected Discharge Date: 8/13    DVT prophylaxis:  Medical and mechanical DVT prophylaxis orders are present.     AM-PAC 6 Clicks Score (PT): 20 (08/10/22 2009)    CODE STATUS:   There are no questions and answers to display.       Sis Nguyen MD  08/11/22

## 2022-08-12 PROBLEM — F14.10 COCAINE ABUSE (HCC): Status: ACTIVE | Noted: 2022-08-12

## 2022-08-12 PROBLEM — K86.1 CHRONIC CALCIFIC PANCREATITIS (HCC): Chronic | Status: ACTIVE | Noted: 2022-08-10

## 2022-08-12 LAB
ALBUMIN SERPL-MCNC: 4.6 G/DL (ref 3.5–5.2)
ALBUMIN/GLOB SERPL: 1.6 G/DL
ALP SERPL-CCNC: 286 U/L (ref 39–117)
ALT SERPL W P-5'-P-CCNC: 107 U/L (ref 1–33)
ANION GAP SERPL CALCULATED.3IONS-SCNC: 10 MMOL/L (ref 5–15)
ANION GAP SERPL CALCULATED.3IONS-SCNC: 10 MMOL/L (ref 5–15)
ANION GAP SERPL CALCULATED.3IONS-SCNC: 16 MMOL/L (ref 5–15)
ANION GAP SERPL CALCULATED.3IONS-SCNC: 17 MMOL/L (ref 5–15)
AST SERPL-CCNC: 134 U/L (ref 1–32)
BILIRUB SERPL-MCNC: 0.7 MG/DL (ref 0–1.2)
BUN SERPL-MCNC: 6 MG/DL (ref 6–20)
BUN SERPL-MCNC: 7 MG/DL (ref 6–20)
BUN/CREAT SERPL: 12.3 (ref 7–25)
BUN/CREAT SERPL: 9.7 (ref 7–25)
BUN/CREAT SERPL: 9.8 (ref 7–25)
BUN/CREAT SERPL: 9.8 (ref 7–25)
CALCIUM SPEC-SCNC: 10.1 MG/DL (ref 8.6–10.5)
CALCIUM SPEC-SCNC: 10.3 MG/DL (ref 8.6–10.5)
CALCIUM SPEC-SCNC: 9 MG/DL (ref 8.6–10.5)
CALCIUM SPEC-SCNC: 9.1 MG/DL (ref 8.6–10.5)
CHLORIDE SERPL-SCNC: 104 MMOL/L (ref 98–107)
CHLORIDE SERPL-SCNC: 107 MMOL/L (ref 98–107)
CHLORIDE SERPL-SCNC: 98 MMOL/L (ref 98–107)
CHLORIDE SERPL-SCNC: 98 MMOL/L (ref 98–107)
CO2 SERPL-SCNC: 21 MMOL/L (ref 22–29)
CO2 SERPL-SCNC: 22 MMOL/L (ref 22–29)
CO2 SERPL-SCNC: 23 MMOL/L (ref 22–29)
CO2 SERPL-SCNC: 23 MMOL/L (ref 22–29)
CREAT SERPL-MCNC: 0.57 MG/DL (ref 0.57–1)
CREAT SERPL-MCNC: 0.61 MG/DL (ref 0.57–1)
CREAT SERPL-MCNC: 0.61 MG/DL (ref 0.57–1)
CREAT SERPL-MCNC: 0.62 MG/DL (ref 0.57–1)
EGFRCR SERPLBLD CKD-EPI 2021: 108 ML/MIN/1.73
EGFRCR SERPLBLD CKD-EPI 2021: 108.4 ML/MIN/1.73
EGFRCR SERPLBLD CKD-EPI 2021: 108.4 ML/MIN/1.73
EGFRCR SERPLBLD CKD-EPI 2021: 110.2 ML/MIN/1.73
GLOBULIN UR ELPH-MCNC: 2.8 GM/DL
GLUCOSE BLDC GLUCOMTR-MCNC: 223 MG/DL (ref 70–130)
GLUCOSE BLDC GLUCOMTR-MCNC: 269 MG/DL (ref 70–130)
GLUCOSE BLDC GLUCOMTR-MCNC: 289 MG/DL (ref 70–130)
GLUCOSE BLDC GLUCOMTR-MCNC: 304 MG/DL (ref 70–130)
GLUCOSE SERPL-MCNC: 325 MG/DL (ref 65–99)
GLUCOSE SERPL-MCNC: 346 MG/DL (ref 65–99)
GLUCOSE SERPL-MCNC: 353 MG/DL (ref 65–99)
GLUCOSE SERPL-MCNC: 361 MG/DL (ref 65–99)
LIPASE SERPL-CCNC: 46 U/L (ref 13–60)
MAGNESIUM SERPL-MCNC: 1.9 MG/DL (ref 1.6–2.6)
MAGNESIUM SERPL-MCNC: 2.2 MG/DL (ref 1.6–2.6)
MAGNESIUM SERPL-MCNC: 2.2 MG/DL (ref 1.6–2.6)
PHOSPHATE SERPL-MCNC: 3.6 MG/DL (ref 2.5–4.5)
PHOSPHATE SERPL-MCNC: 3.6 MG/DL (ref 2.5–4.5)
PHOSPHATE SERPL-MCNC: 3.7 MG/DL (ref 2.5–4.5)
POTASSIUM SERPL-SCNC: 3.7 MMOL/L (ref 3.5–5.2)
POTASSIUM SERPL-SCNC: 3.7 MMOL/L (ref 3.5–5.2)
POTASSIUM SERPL-SCNC: 3.8 MMOL/L (ref 3.5–5.2)
POTASSIUM SERPL-SCNC: 4 MMOL/L (ref 3.5–5.2)
PROT SERPL-MCNC: 7.4 G/DL (ref 6–8.5)
SODIUM SERPL-SCNC: 137 MMOL/L (ref 136–145)
SODIUM SERPL-SCNC: 138 MMOL/L (ref 136–145)

## 2022-08-12 PROCEDURE — 99233 SBSQ HOSP IP/OBS HIGH 50: CPT | Performed by: INTERNAL MEDICINE

## 2022-08-12 PROCEDURE — 25010000002 KETOROLAC TROMETHAMINE PER 15 MG: Performed by: NURSE PRACTITIONER

## 2022-08-12 PROCEDURE — G0108 DIAB MANAGE TRN  PER INDIV: HCPCS

## 2022-08-12 PROCEDURE — 80053 COMPREHEN METABOLIC PANEL: CPT | Performed by: PEDIATRICS

## 2022-08-12 PROCEDURE — 63710000001 INSULIN DETEMIR PER 5 UNITS: Performed by: STUDENT IN AN ORGANIZED HEALTH CARE EDUCATION/TRAINING PROGRAM

## 2022-08-12 PROCEDURE — 25010000002 PROMETHAZINE PER 50 MG: Performed by: INTERNAL MEDICINE

## 2022-08-12 PROCEDURE — 25010000002 METOCLOPRAMIDE PER 10 MG: Performed by: INTERNAL MEDICINE

## 2022-08-12 PROCEDURE — 97161 PT EVAL LOW COMPLEX 20 MIN: CPT

## 2022-08-12 PROCEDURE — 83735 ASSAY OF MAGNESIUM: CPT | Performed by: PEDIATRICS

## 2022-08-12 PROCEDURE — 99231 SBSQ HOSP IP/OBS SF/LOW 25: CPT | Performed by: PSYCHIATRY & NEUROLOGY

## 2022-08-12 PROCEDURE — 97116 GAIT TRAINING THERAPY: CPT

## 2022-08-12 PROCEDURE — 83690 ASSAY OF LIPASE: CPT | Performed by: INTERNAL MEDICINE

## 2022-08-12 PROCEDURE — 63710000001 INSULIN LISPRO (HUMAN) PER 5 UNITS: Performed by: STUDENT IN AN ORGANIZED HEALTH CARE EDUCATION/TRAINING PROGRAM

## 2022-08-12 PROCEDURE — 84100 ASSAY OF PHOSPHORUS: CPT | Performed by: PEDIATRICS

## 2022-08-12 PROCEDURE — 25010000002 ONDANSETRON PER 1 MG: Performed by: PEDIATRICS

## 2022-08-12 PROCEDURE — 82962 GLUCOSE BLOOD TEST: CPT

## 2022-08-12 PROCEDURE — 25010000002 MORPHINE PER 10 MG: Performed by: INTERNAL MEDICINE

## 2022-08-12 PROCEDURE — 25010000002 ENOXAPARIN PER 10 MG: Performed by: PEDIATRICS

## 2022-08-12 RX ORDER — PANTOPRAZOLE SODIUM 40 MG/10ML
40 INJECTION, POWDER, LYOPHILIZED, FOR SOLUTION INTRAVENOUS ONCE
Status: COMPLETED | OUTPATIENT
Start: 2022-08-12 | End: 2022-08-12

## 2022-08-12 RX ORDER — AMOXICILLIN 250 MG
2 CAPSULE ORAL 2 TIMES DAILY
Status: DISCONTINUED | OUTPATIENT
Start: 2022-08-12 | End: 2022-08-15 | Stop reason: HOSPADM

## 2022-08-12 RX ORDER — BISACODYL 10 MG
10 SUPPOSITORY, RECTAL RECTAL DAILY PRN
Status: DISCONTINUED | OUTPATIENT
Start: 2022-08-12 | End: 2022-08-15 | Stop reason: HOSPADM

## 2022-08-12 RX ORDER — MORPHINE SULFATE 2 MG/ML
2 INJECTION, SOLUTION INTRAMUSCULAR; INTRAVENOUS EVERY 4 HOURS PRN
Status: DISCONTINUED | OUTPATIENT
Start: 2022-08-12 | End: 2022-08-13

## 2022-08-12 RX ORDER — POLYETHYLENE GLYCOL 3350 17 G/17G
17 POWDER, FOR SOLUTION ORAL DAILY PRN
Status: DISCONTINUED | OUTPATIENT
Start: 2022-08-12 | End: 2022-08-15 | Stop reason: HOSPADM

## 2022-08-12 RX ORDER — PANTOPRAZOLE SODIUM 40 MG/1
40 TABLET, DELAYED RELEASE ORAL
Status: DISCONTINUED | OUTPATIENT
Start: 2022-08-13 | End: 2022-08-15 | Stop reason: HOSPADM

## 2022-08-12 RX ORDER — CAPSAICIN 0.75 MG/G
1 CREAM TOPICAL EVERY 8 HOURS SCHEDULED
Status: DISCONTINUED | OUTPATIENT
Start: 2022-08-13 | End: 2022-08-15 | Stop reason: HOSPADM

## 2022-08-12 RX ORDER — BISACODYL 5 MG/1
5 TABLET, DELAYED RELEASE ORAL DAILY PRN
Status: DISCONTINUED | OUTPATIENT
Start: 2022-08-12 | End: 2022-08-15 | Stop reason: HOSPADM

## 2022-08-12 RX ORDER — METOCLOPRAMIDE HYDROCHLORIDE 5 MG/ML
10 INJECTION INTRAMUSCULAR; INTRAVENOUS ONCE
Status: COMPLETED | OUTPATIENT
Start: 2022-08-12 | End: 2022-08-12

## 2022-08-12 RX ADMIN — Medication 10 ML: at 20:42

## 2022-08-12 RX ADMIN — INSULIN LISPRO 6 UNITS: 100 INJECTION, SOLUTION INTRAVENOUS; SUBCUTANEOUS at 09:05

## 2022-08-12 RX ADMIN — INSULIN LISPRO 7 UNITS: 100 INJECTION, SOLUTION INTRAVENOUS; SUBCUTANEOUS at 17:37

## 2022-08-12 RX ADMIN — ENOXAPARIN SODIUM 40 MG: 40 INJECTION SUBCUTANEOUS at 09:05

## 2022-08-12 RX ADMIN — QUETIAPINE FUMARATE 100 MG: 100 TABLET ORAL at 20:39

## 2022-08-12 RX ADMIN — SENNOSIDES AND DOCUSATE SODIUM 2 TABLET: 50; 8.6 TABLET ORAL at 20:39

## 2022-08-12 RX ADMIN — Medication 1 PATCH: at 13:44

## 2022-08-12 RX ADMIN — BUSPIRONE HYDROCHLORIDE 10 MG: 10 TABLET ORAL at 20:39

## 2022-08-12 RX ADMIN — Medication 10 ML: at 09:05

## 2022-08-12 RX ADMIN — ATORVASTATIN CALCIUM 80 MG: 40 TABLET, FILM COATED ORAL at 20:39

## 2022-08-12 RX ADMIN — ONDANSETRON 4 MG: 2 INJECTION INTRAMUSCULAR; INTRAVENOUS at 04:45

## 2022-08-12 RX ADMIN — SENNOSIDES AND DOCUSATE SODIUM 2 TABLET: 50; 8.6 TABLET ORAL at 13:43

## 2022-08-12 RX ADMIN — METOCLOPRAMIDE 10 MG: 5 INJECTION, SOLUTION INTRAMUSCULAR; INTRAVENOUS at 05:51

## 2022-08-12 RX ADMIN — AMLODIPINE BESYLATE 5 MG: 5 TABLET ORAL at 09:05

## 2022-08-12 RX ADMIN — ASPIRIN 81 MG CHEWABLE TABLET 81 MG: 81 TABLET CHEWABLE at 09:05

## 2022-08-12 RX ADMIN — INSULIN DETEMIR 20 UNITS: 100 INJECTION, SOLUTION SUBCUTANEOUS at 20:49

## 2022-08-12 RX ADMIN — POLYETHYLENE GLYCOL 3350 17 G: 17 POWDER, FOR SOLUTION ORAL at 22:03

## 2022-08-12 RX ADMIN — MORPHINE SULFATE 2 MG: 2 INJECTION, SOLUTION INTRAMUSCULAR; INTRAVENOUS at 14:09

## 2022-08-12 RX ADMIN — PANTOPRAZOLE SODIUM 40 MG: 40 INJECTION, POWDER, FOR SOLUTION INTRAVENOUS at 17:37

## 2022-08-12 RX ADMIN — BUSPIRONE HYDROCHLORIDE 10 MG: 10 TABLET ORAL at 09:05

## 2022-08-12 RX ADMIN — LISINOPRIL 40 MG: 40 TABLET ORAL at 09:04

## 2022-08-12 RX ADMIN — ONDANSETRON 4 MG: 2 INJECTION INTRAMUSCULAR; INTRAVENOUS at 14:09

## 2022-08-12 RX ADMIN — LABETALOL HYDROCHLORIDE 10 MG: 5 INJECTION, SOLUTION INTRAVENOUS at 10:40

## 2022-08-12 RX ADMIN — PROMETHAZINE HYDROCHLORIDE 12.5 MG: 25 INJECTION INTRAMUSCULAR; INTRAVENOUS at 10:30

## 2022-08-12 RX ADMIN — MORPHINE SULFATE 2 MG: 2 INJECTION, SOLUTION INTRAMUSCULAR; INTRAVENOUS at 20:39

## 2022-08-12 RX ADMIN — METOPROLOL TARTRATE 25 MG: 25 TABLET, FILM COATED ORAL at 09:05

## 2022-08-12 RX ADMIN — CAPSAICIN 1 APPLICATION: 0.75 CREAM TOPICAL at 23:50

## 2022-08-12 RX ADMIN — INSULIN LISPRO 4 UNITS: 100 INJECTION, SOLUTION INTRAVENOUS; SUBCUTANEOUS at 12:08

## 2022-08-12 RX ADMIN — METOPROLOL TARTRATE 25 MG: 25 TABLET, FILM COATED ORAL at 20:39

## 2022-08-12 RX ADMIN — ONDANSETRON 4 MG: 2 INJECTION INTRAMUSCULAR; INTRAVENOUS at 23:01

## 2022-08-12 RX ADMIN — MORPHINE SULFATE 2 MG: 2 INJECTION, SOLUTION INTRAMUSCULAR; INTRAVENOUS at 05:51

## 2022-08-12 RX ADMIN — HYDROXYZINE HYDROCHLORIDE 25 MG: 25 TABLET, FILM COATED ORAL at 04:16

## 2022-08-12 RX ADMIN — KETOROLAC TROMETHAMINE 30 MG: 30 INJECTION, SOLUTION INTRAMUSCULAR; INTRAVENOUS at 00:27

## 2022-08-12 NOTE — CONSULTS
"Diabetes Education    Patient Name:  Bernadette Paris  YOB: 1970  MRN: [id number]  Admit Date:  8/10/2022        Follow up with pt for diabetes education consult. Today, pt states she does not have any insulin at home; yesterday pt stated she did have insulin at home but was very drowsy. Pt will need prescription for insulin at d/c and would benefit from etrt5telc so that she can leave hospital w/ insulin in hand.   Pt also states her \"meter got wet\" and she does not have a way to check her blood sugar. Pt will also need prescription for meter and testing supplies at discharge--we do not have appropriate brand meter kit, based on pt's insurance coverage, to give to pt. Pt was seen during admission in 5/2022.  Pt is not sure how long she has been out of insulin. Reinforced importance of taking insulin as prescribed, not missing doses.   Per neuro note and MRI report no acute stroke; pt does not meet criteria for outpatient diabetes/stroke follow up class. Offered outpatient diabetes education, pt declines. Thank you for the consult.       Electronically signed by:  Norma Aranda RN, [facility]  08/12/22 14:01 EDT  "

## 2022-08-12 NOTE — PLAN OF CARE
Goal Outcome Evaluation:  Plan of Care Reviewed With: patient           Outcome Evaluation: PT PRESENTS WITH L SIDED WEAKNESS, IMPAIRED BALANCE, DECREASED ENDURANCE AND DECLINE IN FUNCTIONAL MOBILITY. PT DENIED PAIN. COMPLETED BED MOBILITY INDEPENDENTLY, TRANSFERS AND GAIT WITH R WALKER X 200 FEET WITH CGA. NEEDS CUES FOR SAFETY WITH SEQUENCING WITH R WALKER. NO OVERT LOB WITH USE OF WALKER. CONSIDER TRIAL OF GAIT WITH CANE NEXT VIST. WOULD BENEFIT FROM IRF AT D/C. IF GOES HOME, RECOMMEND HHPT.

## 2022-08-12 NOTE — CASE MANAGEMENT/SOCIAL WORK
Continued Stay Note  [facility]     Patient Name: Bernadette Paris  MRN: [id number]  Today's Date: 8/12/2022    Admit Date: 8/10/2022     Discharge Plan     Row Name 08/12/22 1550       Plan    Plan rehab    Plan Comments I met with Ms. Paris at the bedside to discuss her discharge plan. Therapy is recommending that she go to inpatient rehab after this admission. She verbalizes agreement that she would like to do this rather than discharge home. I have called a referral to April with [facility].    Final Discharge Disposition Code 30 - still a patient                Discharge Codes    No documentation.                     Raúl Miles RN

## 2022-08-12 NOTE — SIGNIFICANT NOTE
08/12/22 1044   SLP Deferred Reason   SLP Deferred Reason Patient/family declined treatment  (Hypertensive crisis (now resolved), no CVA per neuro. Hospitalist ordered regular tray & pt/RN denied noting swallowing concerns/issues. Pt reported speech back to baseline & declined further SLP intervention. Please re-consult if swallowing or cognitive-communication concerns arise.)

## 2022-08-12 NOTE — PROGRESS NOTES
Neurology       Patient Care Team:  Provider, No Known as PCP - General    Chief complaint: Altered mental status    History: Patient is a bright and alert today.  She feels bad however.          Past Medical History:   Diagnosis Date   • Arthritis    • Bipolar disorder (HCC)    • Chronic bronchitis (HCC)    • Depression    • Diabetes mellitus (HCC)     DIAGNOSED 2016   • GERD (gastroesophageal reflux disease)    • Hepatitis-C    • History of blood transfusion    • Hyperlipidemia    • Hypertension    • Infectious viral hepatitis     HEPATITIS C   • Migraine    • Sleep apnea     PATIENT UNSURE OF SETTINGS       Vital Signs   Vitals:    08/11/22 2037 08/11/22 2300 08/12/22 0325 08/12/22 0500   BP: 126/96 130/89 134/99    BP Location:  Right arm Right arm    Patient Position:  Lying Lying    Pulse: 88 79 78    Resp:  18 18    Temp:  98.2 °F (36.8 °C) 98.3 °F (36.8 °C)    TempSrc:  Oral Oral    SpO2: 98% 97% 100%    Weight:    68 kg (150 lb)   Height:           Physical Exam:   General: Pleasant and alert.  Blood pressure is 212 systolic              Neuro: Oriented to person place and time    Results Review:  MRI brain is unremarkable  Results from last 7 days   Lab Units 08/11/22  0727   WBC 10*3/mm3 5.81   HEMOGLOBIN g/dL 14.7   HEMATOCRIT % 41.6   PLATELETS 10*3/mm3 269     Results from last 7 days   Lab Units 08/12/22  0735 08/12/22  0333 08/12/22  0016 08/10/22  2048 08/10/22  1611 08/10/22  1016   SODIUM mmol/L 137 137 138   < > 139  139 132*   POTASSIUM mmol/L 3.7 4.0 3.8   < > 3.5  3.5 3.4*   CHLORIDE mmol/L 98 104 107   < > 98  98 90*   CO2 mmol/L 23.0 23.0 21.0*   < > 19.0*  19.0* 23.0   BUN mg/dL 6 6 7   < > 4*  4* 5*   CREATININE mg/dL 0.61 0.61 0.57   < > 0.47*  0.47* 0.75   CALCIUM mg/dL 10.1 9.0 9.1   < > 9.2  9.2 9.6   BILIRUBIN mg/dL  --   --   --   --  0.6 0.5   ALK PHOS U/L  --   --   --   --  205* 197*   ALT (SGPT) U/L  --   --   --   --  80* 78*   AST (SGOT) U/L  --   --   --   --  139* 89*    GLUCOSE mg/dL 361* 325* 353*   < > 272*  272* 274*    < > = values in this interval not displayed.       Imaging Results (Last 24 Hours)     Procedure Component Value Units Date/Time    CT Abdomen Pelvis Without Contrast [[id number]] Collected: 08/12/22 0913     Updated: 08/12/22 0921    Narrative:      DATE OF EXAM: 8/11/2022 7:59 PM     PROCEDURE: CT ABDOMEN PELVIS WO CONTRAST-     INDICATIONS: Pancreatitis, acute, severe; E86.0-Dehydration;  R73.9-Hyperglycemia, unspecified; K86.1-Other chronic pancreatitis;  I16.0-Hypertensive urgency; R03.0-Elevated blood-pressure reading,  without diagnosis of hypertension; Z91.14-Patient's other noncompliance  with medication regimen; R29.810-Facial weakness; R47.1-Dysarthria and  anarthria     COMPARISON: 6/21/2022     TECHNIQUE: Routine transaxial slices were obtained through the abdomen  and pelvis without the administration of intravenous contrast.  Reconstructed coronal and sagittal images were also obtained. Automated  exposure control and iterative construction methods were used.     The radiation dose reduction device was turned on for each scan per the  ALARA (As Low as Reasonably Achievable) protocol.     FINDINGS:  Lung bases appear clear. There is focal fat deposition in segment four  of the liver. The gallbladder is surgically absent. The spleen is of  normal size. There is thickening of both adrenal glands, stable compared  with the last study. There are focal calcifications within the body of  the pancreas raising the question of chronic calcific pancreatitis.  These calcifications are present on the patient's last study. The common  bile duct measures 1.4 cm in the head of the pancreas and appears  increased in diameter compared with the most recent CT. There is no  evidence of significant peripancreatic edema. There is perinephric soft  tissue stranding which is increased compared with the last study. No  dilated thickened loops of small or large bowel are  identified. The  uterus is absent. The bladder is normal. Redemonstrated is a lipoma  within the right abductor wilmer muscle.        Impression:         1. Calcifications are present in the body of the pancreas suggestive of  chronic calcific pancreatitis. There is no convincing evidence of acute  pancreatitis on the current study.  2. Extrahepatic biliary dilatation now measuring 1.4 cm in diameter.  This patient is status post cholecystectomy.  3. Increase in perinephric edema compared with the patient's recent scan  of June 2022 of questionable significance.     This report was finalized on 8/12/2022 9:18 AM by Donald Jose MD.       MRI Brain Without Contrast [[id number]] Collected: 08/11/22 2027     Updated: 08/11/22 2250    Narrative:      DATE OF EXAM: 8/11/2022 7:33 PM     PROCEDURE: MRI BRAIN WO CONTRAST-     INDICATIONS: Neuro deficit, acute, stroke suspected; E86.0-Dehydration;  R73.9-Hyperglycemia, unspecified; K86.1-Other chronic pancreatitis;  I16.0-Hypertensive urgency; R03.0-Elevated blood-pressure reading,  without diagnosis of hypertension; Z91.14-Patient's other noncompliance  with medication regimen; R29.810-Facial weakness; R47.1-Dysarthria and  anarthria     COMPARISON: No comparisons available.     TECHNIQUE: Multiplanar multisequence images of the brain were performed  without contrast according to routine brain MRI protocol.      FINDINGS:   No acute infarct is present on diffusion weighted sequences. Midline  structures are unremarkable and the craniocervical junction is  satisfactory in appearance. Gray-white differentiation is maintained and  there is no evidence of intracranial hemorrhage, mass or mass effect.  The ventricles are normal in size and configuration. The orbits are  unremarkable and the paranasal sinuses are grossly clear. There is no  significant mastoid effusion. Intracranial arterial flow voids are  maintained.       Impression:      Normal noncontrast MRI of the brain.  There is no evidence of acute  ischemia, hemorrhage, mass or mass effect.     This report was finalized on 8/11/2022 10:46 PM by Panda Laird.             Assessment:  Altered mental status resolved.    Labile hypertension    Plan:  No further testing required    Comment:  Okay to discharge anytime from my standpoint       Nausea request.      I discussed the patients findings and my recommendations with patient    Tera Og MD  08/12/22  10:17 EDT

## 2022-08-12 NOTE — THERAPY EVALUATION
Patient Name: Bernadette Paris  : 1970    MRN: [id number]                              Today's Date: 2022       Admit Date: 8/10/2022    Visit Dx:     ICD-10-CM ICD-9-CM   1. Dehydration  E86.0 276.51   2. Hyperglycemia  R73.9 790.29   3. Chronic pancreatitis, unspecified pancreatitis type (HCC)  K86.1 577.1   4. Hypertensive urgency  I16.0 401.9   5. Elevated blood pressure reading  R03.0 796.2   6. Noncompliance with medications  Z91.14 V15.81   7. Facial droop  R29.810 781.94   8. Dysarthria  R47.1 784.51     Patient Active Problem List   Diagnosis   • Spinal stenosis, lumbar region, with neurogenic claudication   • Degenerative disc disease, lumbar   • Facet arthritis of lumbar region   • Lumbar stenosis with neurogenic claudication   • BMI 40.0-44.9, adult (HCC)   • Encounter for smoking cessation counseling   • S/P lumbar laminectomy   • Nausea & vomiting   • Acidosis   • Bipolar affective disorder (HCC)   • Hepatitis C   • Accelerated hypertension   • Medically noncompliant   • Gastritis   • Hyperlipidemia   • Severe malnutrition (HCC)   • Intractable abdominal pain   • Type 2 diabetes mellitus with hyperglycemia, with long-term current use of insulin (HCC)   • Gastroparesis   • Dehydration   • Right sided weakness   • Hypertensive crisis   • Acute on chronic pancreatitis (HCC)     Past Medical History:   Diagnosis Date   • Arthritis    • Bipolar disorder (HCC)    • Chronic bronchitis (HCC)    • Depression    • Diabetes mellitus (HCC)     DIAGNOSED 2016   • GERD (gastroesophageal reflux disease)    • Hepatitis-C    • History of blood transfusion    • Hyperlipidemia    • Hypertension    • Infectious viral hepatitis     HEPATITIS C   • Migraine    • Sleep apnea     PATIENT UNSURE OF SETTINGS     Past Surgical History:   Procedure Laterality Date   • CHOLECYSTECTOMY     • ENDOSCOPY N/A 2022    Procedure: ESOPHAGOGASTRODUODENOSCOPY;  Surgeon: Brunner, Mark I, MD;  Location: [facility] ENDOSCOPY;   Service: Gastroenterology;  Laterality: N/A;  with antrum biopsy   • HYSTERECTOMY     • KNEE SURGERY     • LUMBAR LAMINECTOMY DISCECTOMY DECOMPRESSION N/A 8/6/2018    Procedure: LUMBAR LAMINECTOMIES L4-S1;  Surgeon: Chip Smith MD;  Location: [facility];  Service: Neurosurgery      General Information     Row Name 08/12/22 1549          Physical Therapy Time and Intention    Document Type evaluation  -CD     Mode of Treatment physical therapy  -CD     Row Name 08/12/22 1549          General Information    Patient Profile Reviewed yes  -CD     Prior Level of Function independent:;all household mobility;community mobility;gait;transfer;bed mobility;ADL's;dependent:;driving  PT DOES NOT OWN ANY DME. USES PUBLIC TRANSPORTATION.  -CD     Existing Precautions/Restrictions fall  -CD     Barriers to Rehab medically complex  -CD     Row Name 08/12/22 1549          Living Environment    People in Home significant other  PT REPORTS S.O. IS HOME 24/7.  -CD     Row Name 08/12/22 1549          Home Main Entrance    Number of Stairs, Main Entrance three  -CD     Stair Railings, Main Entrance none  -CD     Row Name 08/12/22 1549          Stairs Within Home, Primary    Number of Stairs, Within Home, Primary none  -CD     Row Name 08/12/22 1549          Cognition    Orientation Status (Cognition) oriented x 3  -CD     Row Name 08/12/22 1549          Safety Issues, Functional Mobility    Safety Issues Affecting Function (Mobility) safety precaution awareness;safety precautions follow-through/compliance;sequencing abilities  -CD     Impairments Affecting Function (Mobility) balance;coordination;endurance/activity tolerance;motor control;motor planning  -CD     Cognitive Impairments, Mobility Safety/Performance insight into deficits/self-awareness;safety precaution awareness;safety precaution follow-through;sequencing abilities  -CD     Comment, Safety Issues/Impairments (Mobility) C/O L GREAT TOE PAIN AND INABILITY TO DF FOOT.   -CD           User Key  (r) = Recorded By, (t) = Taken By, (c) = Cosigned By    Initials Name Provider Type    CD Fe Bourgeois PT Physical Therapist               Mobility     Row Name 08/12/22 1556          Bed Mobility    Bed Mobility supine-sit  -CD     Supine-Sit Sears (Bed Mobility) independent  -CD     Comment, (Bed Mobility) SAT UP IN LONG SITTING TO [place] SOCKS INDEPENDENTLY. C/O MILD DIZZINESS ONCE SITTING EOB BUT VITALS STABLE AND RESOLVED QUICKLY.  -CD     Row Name 08/12/22 1556          Transfers    Comment, (Transfers) STS FROM EOB AND COMMODE IN BR WITH CUES FOR HAND PLACEMENT USING R WALKER. PT INDEPENDENT WITH HYGIENE AFTER TOILETING.  -CD     Row Name 08/12/22 1556          Sit-Stand Transfer    Sit-Stand Sears (Transfers) contact guard;verbal cues  -CD     Assistive Device (Sit-Stand Transfers) walker, front-wheeled  -CD     Row Name 08/12/22 1556          Gait/Stairs (Locomotion)    Sears Level (Gait) contact guard;verbal cues  -CD     Assistive Device (Gait) walker, front-wheeled  -CD     Distance in Feet (Gait) 200 FEET  -CD     Deviations/Abnormal Patterns (Gait) stride length decreased;steppage;gait speed decreased;padma decreased  -CD     Left Sided Gait Deviations --  STEPPAGE GAIT ON L.  -CD     Comment, (Gait/Stairs) INTERMITTENT ASSIST TO GUIDE R WALKER BUT NO OVERT LOB. ABLE TO IMPROVE QUALITY OF GAIT WITH CUES. WORKED ON HEEL TO TOE L.  -CD           User Key  (r) = Recorded By, (t) = Taken By, (c) = Cosigned By    Initials Name Provider Type    Fe Sneed PT Physical Therapist               Obj/Interventions     Row Name 08/12/22 1559          Range of Motion Comprehensive    General Range of Motion bilateral lower extremity ROM WFL  -CD     Comment, General Range of Motion AAROM L DF.  -CD     Row Name 08/12/22 1559          Strength Comprehensive (MMT)    General Manual Muscle Testing (MMT) Assessment lower extremity strength deficits identified  -CD      Comment, General Manual Muscle Testing (MMT) Assessment L LE GROSSLY 2/5 DF, 4/5 KNEE EXT AND HIP FLEX. R LE 5/5  -CD     Row Name 08/12/22 1551          Motor Skills    Motor Skills functional endurance  -CD     Functional Endurance LIMITED BY FATIGUE. DENIED PAIN.  -CD     Row Name 08/12/22 1559          Balance    Static Sitting Balance independent  -CD     Dynamic Sitting Balance independent  -CD     Position, Sitting Balance unsupported;sitting edge of bed  -CD     Static Standing Balance contact guard;verbal cues  -CD     Dynamic Standing Balance contact guard;verbal cues  -CD     Position/Device Used, Standing Balance walker, front-wheeled  -CD     Comment, Balance NO OVERT LOB WITH USE OF R WALKER FOR SUPPORT. CONSIDER TRIAL OF CANE NEXT VISIT.  -CD     Row Name 08/12/22 1552          Sensory Assessment (Somatosensory)    Sensory Assessment (Somatosensory) --  REPORTS DECREASED LT TOUCH L LE.  -CD           User Key  (r) = Recorded By, (t) = Taken By, (c) = Cosigned By    Initials Name Provider Type    CD Fe Bourgeois, PT Physical Therapist               Goals/Plan     Row Name 08/12/22 7522          Transfer Goal 1 (PT)    Activity/Assistive Device (Transfer Goal 1, PT) bed-to-chair/chair-to-bed;sit-to-stand/stand-to-sit  -CD     [place] Level/Cues Needed (Transfer Goal 1, PT) independent  -CD     Time Frame (Transfer Goal 1, PT) long term goal (LTG);2 weeks  -CD     Row Name 08/12/22 6209          Gait Training Goal 1 (PT)    Activity/Assistive Device (Gait Training Goal 1, PT) gait (walking locomotion);assistive device use  -CD     [place] Level (Gait Training Goal 1, PT) independent  -CD     Distance (Gait Training Goal 1, PT) 400 FEET  -CD     Time Frame (Gait Training Goal 1, PT) long term goal (LTG);2 weeks  -CD     Row Name 08/12/22 6646          Stairs Goal 1 (PT)    Activity/Assistive Device (Stairs Goal 1, PT) ascending stairs;descending stairs;cane, straight  -CD     [place]  Level/Cues Needed (Stairs Goal 1, PT) contact guard required  -CD     Number of Stairs (Stairs Goal 1, PT) 3  -CD     Time Frame (Stairs Goal 1, PT) long term goal (LTG);3 weeks  -CD     Row Name 08/12/22 1607          Therapy Assessment/Plan (PT)    Planned Therapy Interventions (PT) balance training;bed mobility training;gait training;transfer training;strengthening;stair training;patient/family education;home exercise program  -CD           User Key  (r) = Recorded By, (t) = Taken By, (c) = Cosigned By    Initials Name Provider Type    CD Fe Bourgeois, PT Physical Therapist               Clinical Impression     Row Name 08/12/22 1602          Pain    Pretreatment Pain Rating 0/10 - no pain  -CD     Posttreatment Pain Rating 0/10 - no pain  -CD     Row Name 08/12/22 1602          Plan of Care Review    Plan of Care Reviewed With patient  -CD     Outcome Evaluation PT PRESENTS WITH L SIDED WEAKNESS, IMPAIRED BALANCE, DECREASED ENDURANCE AND DECLINE IN FUNCTIONAL MOBILITY. PT DENIED PAIN. COMPLETED BED MOBILITY INDEPENDENTLY, TRANSFERS AND GAIT WITH R WALKER X 200 FEET WITH CGA. NEEDS CUES FOR SAFETY WITH SEQUENCING WITH R WALKER. NO OVERT LOB WITH USE OF WALKER. CONSIDER TRIAL OF GAIT WITH CANE NEXT VIST. WOULD BENEFIT FROM IRF AT D/C. IF GOES HOME, RECOMMEND HHPT.  -CD     Row Name 08/12/22 1602          Therapy Assessment/Plan (PT)    Patient/Family Therapy Goals Statement (PT) TO GO HOME.  -CD     Rehab Potential (PT) good, to achieve stated therapy goals  -CD     Criteria for Skilled Interventions Met (PT) yes  -CD     Therapy Frequency (PT) daily  -CD     Predicted Duration of Therapy Intervention (PT) 2 WEEKS,  -CD     Row Name 08/12/22 1602          Vital Signs    Post Systolic BP Rehab 137  -CD     Post Treatment Diastolic   -CD     Posttreatment Heart Rate (beats/min) 84  -CD     Pre SpO2 (%) 98  -CD     O2 Delivery Pre Treatment room air  -CD     O2 Delivery Intra Treatment room air  -CD     Post  SpO2 (%) 98  -CD     O2 Delivery Post Treatment room air  -CD     Pre Patient Position Supine  -CD     Intra Patient Position Standing  -CD     Post Patient Position Supine  -CD     Row Name 08/12/22 1602          Positioning and Restraints    Pre-Treatment Position in bed  -CD     Post Treatment Position bed  PT DECLINED UP TO CHAIR.  -CD     In Bed supine;call light within reach;encouraged to call for assist;notified nsg;exit alarm on  -CD           User Key  (r) = Recorded By, (t) = Taken By, (c) = Cosigned By    Initials Name Provider Type    CD Fe Bourgeois PT Physical Therapist               Outcome Measures     Row Name 08/12/22 1609          How much help from another person do you currently need...    Turning from your back to your side while in flat bed without using bedrails? 4  -CD     Moving from lying on back to sitting on the side of a flat bed without bedrails? 4  -CD     Moving to and from a bed to a chair (including a wheelchair)? 3  -CD     Standing up from a chair using your arms (e.g., wheelchair, bedside chair)? 3  -CD     Climbing 3-5 steps with a railing? 3  -CD     To walk in hospital room? 3  -CD     AM-PAC 6 Clicks Score (PT) 20  -CD     Highest level of mobility 6 --> Walked 10 steps or more  -CD     Row Name 08/12/22 1609          Functional Assessment    Outcome Measure Options AM-PAC 6 Clicks Basic Mobility (PT)  -CD           User Key  (r) = Recorded By, (t) = Taken By, (c) = Cosigned By    Initials Name Provider Type    Fe Sneed PT Physical Therapist                             Physical Therapy Education                 Title: PT OT SLP Therapies (In Progress)     Topic: Physical Therapy (Done)     Point: Mobility training (Done)     Learning Progress Summary           Patient Acceptance, E, VU,NR by CD at 8/12/2022 1609    Comment: BENEFITS OF OOB ACTIVITY, SAFETY WITH MOBILITY, PROGRESSION OF POC, D/C PLANNING,                   Point: Home exercise program (Done)      Learning Progress Summary           Patient Acceptance, E, VU,NR by CD at 8/12/2022 1609    Comment: BENEFITS OF OOB ACTIVITY, SAFETY WITH MOBILITY, PROGRESSION OF POC, D/C PLANNING,                   Point: Body mechanics (Done)     Learning Progress Summary           Patient Acceptance, E, VU,NR by CD at 8/12/2022 1609    Comment: BENEFITS OF OOB ACTIVITY, SAFETY WITH MOBILITY, PROGRESSION OF POC, D/C PLANNING,                   Point: Precautions (Done)     Learning Progress Summary           Patient Acceptance, E, VU,NR by CD at 8/12/2022 1609    Comment: BENEFITS OF OOB ACTIVITY, SAFETY WITH MOBILITY, PROGRESSION OF POC, D/C PLANNING,                               User Key     Initials Effective Dates Name Provider Type Discipline    CD 06/16/21 -  Fe Bourgeois, PT Physical Therapist PT              PT Recommendation and Plan  Planned Therapy Interventions (PT): balance training, bed mobility training, gait training, transfer training, strengthening, stair training, patient/family education, home exercise program  Plan of Care Reviewed With: patient  Outcome Evaluation: PT PRESENTS WITH L SIDED WEAKNESS, IMPAIRED BALANCE, DECREASED ENDURANCE AND DECLINE IN FUNCTIONAL MOBILITY. PT DENIED PAIN. COMPLETED BED MOBILITY INDEPENDENTLY, TRANSFERS AND GAIT WITH R WALKER X 200 FEET WITH CGA. NEEDS CUES FOR SAFETY WITH SEQUENCING WITH R WALKER. NO OVERT LOB WITH USE OF WALKER. CONSIDER TRIAL OF GAIT WITH CANE NEXT VIST. WOULD BENEFIT FROM IRF AT D/C. IF GOES HOME, RECOMMEND HHPT.     Time Calculation:    PT Charges     Row Name 08/12/22 1612             Time Calculation    Start Time 1505  -CD      PT Received On 08/12/22  -CD      PT Goal Re-Cert Due Date 08/22/22  -CD              Time Calculation- PT    Total Timed Code Minutes- PT 10 minute(s)  -CD              Timed Charges    61591 - Gait Training Minutes  10  -CD              Untimed Charges    PT Eval/Re-eval Minutes 60  -CD              Total Minutes    Timed  Charges Total Minutes 10  -CD      Untimed Charges Total Minutes 60  -CD       Total Minutes 70  -CD            User Key  (r) = Recorded By, (t) = Taken By, (c) = Cosigned By    Initials Name Provider Type    CD Fe Bourgeois, PT Physical Therapist              Therapy Charges for Today     Code Description Service Date Service Provider Modifiers Qty    [id number]  GAIT TRAINING EA 15 MIN 8/12/2022 Fe Bourgeois, PT GP 1    [id number] HC PT EVAL LOW COMPLEXITY 4 8/12/2022 Fe Bourgeois, PT GP 1          PT G-Codes  Outcome Measure Options: AM-PAC 6 Clicks Basic Mobility (PT)  AM-PAC 6 Clicks Score (PT): 20  AM-PAC 6 Clicks Score (OT): 17    Fe Bourgeois, HAROLDO  8/12/2022

## 2022-08-12 NOTE — CASE MANAGEMENT/SOCIAL WORK
Continued Stay Note  [facility]     Patient Name: Bernadette Paris  MRN: [id number]  Today's Date: 8/12/2022    Admit Date: 8/10/2022     Discharge Plan     Row Name 08/12/22 1514       Plan    Plan Comments MSW notified of addiction team consult MSW attempted to speak with pt at bedside and was unable to wake pt. MSW left addiction resources for pt at bedside. MSW remains available.               Discharge Codes    No documentation.                     VINCENZO Gibson

## 2022-08-12 NOTE — PROGRESS NOTES
[facility]  PROGRESS NOTE    Patient Name: Bernadette Paris  : 1970  MRN: [id number]    Date of Admission: 8/10/2022  Primary Care Physician: Provider, No Known    Subjective   Subjective     CC:  Nausea, vomiting    HPI:  Complaining of nausea with vomiting this morning, also complaining of severe abdominal pain.  Specifically she is asking for Dilaudid and Phenergan.    ROS:  Gen- No fevers, chills  CV- No chest pain, palpitations  Resp- No cough, dyspnea  GI-yes abdominal pain, N/V, no diarrhea    Objective   Objective     Vital Signs:   Temp:  [98.1 °F (36.7 °C)-98.3 °F (36.8 °C)] 98.3 °F (36.8 °C)  Heart Rate:  [77-99] 78  Resp:  [16-18] 18  BP: ()/(64-99) 134/99     Physical Exam:  Constitutional: Awake, alert, chronically ill-appearing female laying in bed, appears older than stated age  HENT: NCAT, mucous membranes moist  Respiratory: Clear to auscultation bilaterally, respiratory effort normal   Cardiovascular: Regular tachycardia, palpable radial pulses  Gastrointestinal: Positive bowel sounds, soft, nontender, nondistended  Musculoskeletal: No bilateral ankle edema  Psychiatric: Appropriate affect, cooperative  Neurologic: Difficult to understand speech, moving all extremities spontaneously    Results Reviewed:  LAB RESULTS:      Lab 22  0727 08/10/22  1611 08/10/22  1358 08/10/22  1016   WBC 5.81 5.82  --  8.89   HEMOGLOBIN 14.7 14.6  --  15.2   HEMATOCRIT 41.6 41.1  --  43.9   PLATELETS 269 306  --  310   NEUTROS ABS 2.13 3.74  --  3.84   IMMATURE GRANS (ABS) 0.01 0.01  --  0.02   LYMPHS ABS 2.93 1.72  --  4.48*   MONOS ABS 0.66 0.32  --  0.47   EOS ABS 0.06 0.01  --  0.04   MCV 93.3 92.8  --  94.0   LACTATE  --  1.7 3.6* 4.0*         Lab 22  0333 22  0016 22  2114 22  1440 22  0727 08/10/22  2048 08/10/22  1611   SODIUM 137 138 138 137 136   < > 139  139   POTASSIUM 4.0 3.8 4.0 3.3* 3.7   < > 3.5  3.5    CHLORIDE 104 107 105 103 99   < > 98  98   CO2 23.0 21.0* 20.0* 22.0 23.0   < > 19.0*  19.0*   ANION GAP 10.0 10.0 13.0 12.0 14.0   < > 22.0*  22.0*   BUN 6 7 7 6 4*   < > 4*  4*   CREATININE 0.61 0.57 0.65 0.65 0.45*   < > 0.47*  0.47*   EGFR 108.4 110.2 106.7 106.7 116.6   < > 115.4  115.4   GLUCOSE 325* 353* 296* 147* 117*   < > 272*  272*   CALCIUM 9.0 9.1 9.0 9.1 9.2   < > 9.2  9.2   MAGNESIUM 2.2 2.2 2.2 2.9* 1.4*   < > 1.5*  1.5*   PHOSPHORUS 3.6 3.7 3.8 3.0 2.5   < > 3.3  3.3   HEMOGLOBIN A1C  --   --   --   --   --   --  11.50*    < > = values in this interval not displayed.         Lab 08/10/22  1611 08/10/22  1016   TOTAL PROTEIN 7.4 7.9   ALBUMIN 4.20 4.60   GLOBULIN 3.2 3.3   ALT (SGPT) 80* 78*   AST (SGOT) 139* 89*   BILIRUBIN 0.6 0.5   ALK PHOS 205* 197*   LIPASE  --  231*         Lab 08/10/22  1611   TROPONIN T <0.010         Lab 08/11/22  0013   CHOLESTEROL 162   LDL CHOL 37   HDL CHOL 113*   TRIGLYCERIDES 61             Brief Urine Lab Results  (Last result in the past 365 days)      Color   Clarity   Blood   Leuk Est   Nitrite   Protein   CREAT   Urine HCG        08/10/22 1035 Yellow   Clear   Negative   Negative   Negative   Negative                 Microbiology Results Abnormal     Procedure Component Value - Date/Time    COVID PRE-OP / PRE-PROCEDURE SCREENING ORDER (NO ISOLATION) - Swab, Nasopharynx [[id number]]  (Normal) Collected: 08/10/22 1648    Lab Status: Final result Specimen: Swab from Nasopharynx Updated: 08/10/22 1721    Narrative:      The following orders were created for panel order COVID PRE-OP / PRE-PROCEDURE SCREENING ORDER (NO ISOLATION) - Swab, Nasopharynx.  Procedure                               Abnormality         Status                     ---------                               -----------         ------                     COVID-19, ABBOTT IN-HOUS...[[id number]]  Normal              Final result                 Please view results for these tests on the  individual orders.    COVID-19, ABBOTT IN-HOUSE,NASAL Swab (NO TRANSPORT MEDIA) 2 HR TAT - Swab, Nasopharynx [[id number]]  (Normal) Collected: 08/10/22 1648    Lab Status: Final result Specimen: Swab from Nasopharynx Updated: 08/10/22 1721     COVID19 Presumptive Negative    Narrative:      Fact sheet for providers: https://www.fda.gov/media/654893/download     Fact sheet for patients: https://www.fda.gov/media/415930/download    Test performed by PCR.  If inconsistent with clinical signs and symptoms patient should be tested with different authorized molecular test.          Adult Transthoracic Echo Complete W/ Cont if Necessary Per Protocol    Result Date: 8/11/2022  · Left ventricular ejection fraction appears to be 66 - 70%. Left ventricular systolic function is normal. · Left ventricular wall thickness is consistent with mild to moderate concentric hypertrophy. · Saline test results are negative. · Mild tricuspid valve regurgitation is present.      XR Chest 2 View    Result Date: 8/10/2022  DATE OF EXAM: 8/10/2022 5:43 PM  PROCEDURE: XR CHEST 2 VW-  INDICATIONS: DKA; E86.0-Dehydration; R73.9-Hyperglycemia, unspecified; K86.1-Other chronic pancreatitis; I16.0-Hypertensive urgency; R03.0-Elevated blood-pressure reading, without diagnosis of hypertension; Z91.14-Patient's other noncompliance with medication regimen; R29.810-Facial weakness  COMPARISON: 6/16/2022  TECHNIQUE: Two radiologic views of the chest, PA and lateral, were obtained.  FINDINGS: No focal airspace opacity. No pleural effusion or pneumothorax. Normal heart and mediastinal contours.      Impression: No evidence of acute disease in the chest.  This report was finalized on 8/10/2022 5:55 PM by Panda Laird.      CT Head Without Contrast    Result Date: 8/10/2022  CT HEAD WO CONTRAST-  Date of Exam: 8/10/2022 10:36 AM  Indication: weak.  Comparison: June 16, 2022  Technique: CT scan of the head without IV contrast.  Automated exposure control  and iterative reconstruction methods were used.  FINDINGS No acute intracranial hemorrhage or extra-axial collection is identified. The ventricles appear normal in caliber, with no evidence of mass effect or midline shift. The basal cisterns appear patent. The gray-white differentiation appears preserved.  The calvarium appears intact. The visualized paranasal sinuses are clear. The mastoid air cells are well-aerated.      Impression: No acute intracranial process identified.  This report was finalized on 8/10/2022 11:04 AM by Ino Oliva MD.      MRI Brain Without Contrast    Result Date: 8/11/2022  DATE OF EXAM: 8/11/2022 7:33 PM  PROCEDURE: MRI BRAIN WO CONTRAST-  INDICATIONS: Neuro deficit, acute, stroke suspected; E86.0-Dehydration; R73.9-Hyperglycemia, unspecified; K86.1-Other chronic pancreatitis; I16.0-Hypertensive urgency; R03.0-Elevated blood-pressure reading, without diagnosis of hypertension; Z91.14-Patient's other noncompliance with medication regimen; R29.810-Facial weakness; R47.1-Dysarthria and anarthria  COMPARISON: No comparisons available.  TECHNIQUE: Multiplanar multisequence images of the brain were performed without contrast according to routine brain MRI protocol.  FINDINGS: No acute infarct is present on diffusion weighted sequences. Midline structures are unremarkable and the craniocervical junction is satisfactory in appearance. Gray-white differentiation is maintained and there is no evidence of intracranial hemorrhage, mass or mass effect. The ventricles are normal in size and configuration. The orbits are unremarkable and the paranasal sinuses are grossly clear. There is no significant mastoid effusion. Intracranial arterial flow voids are maintained.      Impression: Normal noncontrast MRI of the brain. There is no evidence of acute ischemia, hemorrhage, mass or mass effect.  This report was finalized on 8/11/2022 10:46 PM by Panda Laird.      XR Toe 2+ View Left    Result  Date: 8/10/2022  DATE OF EXAM: 8/10/2022 4:45 PM  PROCEDURE: XR TOE 2+ VW LEFT-  INDICATIONS: Trauma, left great toe pain.  COMPARISON: No comparisons available.  TECHNIQUE: A minimum of two routine standard radiographic views were obtained of the left toes.  FINDINGS: There is no acute fracture or dislocation. There is mild narrowing and spurring of the first MTP joint consistent with arthritis. The IP joint is normal. The soft tissues are unremarkable.      Impression: No acute findings.  This report was finalized on 8/10/2022 4:50 PM by Corey Mendoza MD.        Results for orders placed during the hospital encounter of 08/10/22    Adult Transthoracic Echo Complete W/ Cont if Necessary Per Protocol    Interpretation Summary  · Left ventricular ejection fraction appears to be 66 - 70%. Left ventricular systolic function is normal.  · Left ventricular wall thickness is consistent with mild to moderate concentric hypertrophy.  · Saline test results are negative.  · Mild tricuspid valve regurgitation is present.      I have reviewed the medications:  Scheduled Meds:amLODIPine, 5 mg, Oral, Q24H  aspirin, 81 mg, Oral, Daily   Or  aspirin, 300 mg, Rectal, Daily  atorvastatin, 80 mg, Oral, Nightly  busPIRone, 10 mg, Oral, BID  enoxaparin, 40 mg, Subcutaneous, Daily  insulin detemir, 20 Units, Subcutaneous, Nightly  insulin lispro, 0-9 Units, Subcutaneous, TID AC  lisinopril, 40 mg, Oral, Daily  metoprolol tartrate, 25 mg, Oral, Q12H  nicotine, 1 patch, Transdermal, Q24H  QUEtiapine, 100 mg, Oral, Nightly  sodium chloride, 10 mL, Intravenous, Q12H  sodium chloride, 10 mL, Intravenous, Q12H      Continuous Infusions:niCARdipine, 5-15 mg/hr, Last Rate: Stopped (08/11/22 1007)  sodium chloride, 100 mL/hr, Last Rate: 100 mL/hr (08/11/22 1734)      PRN Meds:.•  albuterol sulfate HFA  •  dextrose  •  dextrose  •  hydrOXYzine  •  labetalol  •  magnesium sulfate **OR** magnesium sulfate **OR** magnesium sulfate  •  Morphine  •   ondansetron  •  potassium chloride **OR** potassium chloride **OR** potassium chloride  •  sodium chloride  •  sodium chloride  •  traZODone    Assessment & Plan   Assessment & Plan     Active Hospital Problems    Diagnosis  POA   • **Hypertensive crisis [I16.9]  Yes   • Cocaine abuse (HCC) [F14.10]  Yes   • Dehydration [E86.0]  Unknown   • Right sided weakness [R53.1]  Yes   • Chronic calcific pancreatitis (HCC) [K86.1]  Yes   • Type 2 diabetes mellitus with hyperglycemia, with long-term current use of insulin (HCC) [E11.65, Z79.4]  Not Applicable   • Gastroparesis [K31.84]  Yes   • Bipolar affective disorder (HCC) [F31.9]  Yes   • Hepatitis C [B19.20]  Yes   • Medically noncompliant [Z91.19]  Not Applicable   • Nausea & vomiting [R11.2]  Yes      Resolved Hospital Problems   No resolved problems to display.        Brief Hospital Course to date:  Bernadette Paris is a 52 y.o. female with chronically uncontrolled diabetes, diabetic gastroparesis, prior admissions for DKA, chronic abdominal pain previously seen by GI and felt to be related to cocaine induced vasospasm, ongoing cocaine abuse; case management is on multiple occasions establish care with a new PCP for which she does not follow through; she presented with acute onset abdominal pain with nausea and vomiting, admitted for uncontrolled diabetes, hypertensive urgency, concern for stroke    The following problems new to me today    Assessment/plan    Intractable abdominal pain w/ nausea/vomiting  Chronic calcific pancreatitis  GERD  -CT a/p w/ pancreatic calcifications, suggesting chronic calcific pancreatitis, no evidence for acute process  - Recently seen for the same 2 months ago, at that time reported PPI was her only mitigating factor; IV Protonix x1 today and restart oral tomorrow  - Per GI last hospitalization suspect cocaine induced intra-abdominal vasospasm, has been encouraged to quit, continues to use cocaine  -Single dose Phenergan; continue Zofran  prn  - Gradually weaning narcotics  -FLD today, will advance as tolerated    Hypertensive crisis  -BP labile, documented 206/142 this a.m. however that was during active vomiting, subsequently improved spontaneously  - Continue amlodipine, lisinopril, Lopressor    Paresthesia, somnolence  Hyperlipidemia  - S/p stroke eval, MRI brain negative, echo w/ preserved EF and negative saline study  -Aspirin, atorvastatin    DM type 2, A1c 11.5%, with long-term use of insulin, with gastroparesis, with hyperglycemia  - Continue Levemir, Accu-Cheks with SSI    Ongoing cocaine abuse  Hep C, previously reported untreated  - Likely contributing to BP control issues and abdominal pain    Bipolar disorder  Depression  - Buspirone, quetiapine    Medical noncompliance  -Patient with frequent/recurrent ER visits, related to uncontrolled diabetes and pain  - Per CM she has been established with a new PCP on numerous occasions and does not follow-up      Expected Discharge Location and Transportation: Patient considering short-term rehab, will likely need transport  Expected Discharge Date: 8/14    DVT prophylaxis:  Medical and mechanical DVT prophylaxis orders are present.     AM-PAC 6 Clicks Score (PT): 18 (08/11/22 2000)    CODE STATUS:   Code Status and Medical Interventions:   Ordered at: 08/11/22 1531     Level Of Support Discussed With:    Patient     Code Status (Patient has no pulse and is not breathing):    CPR (Attempt to Resuscitate)     Medical Interventions (Patient has pulse or is breathing):    Full Support     Release to patient:    Routine Release       Jamel Swift, DO  08/12/22

## 2022-08-13 LAB
ANION GAP SERPL CALCULATED.3IONS-SCNC: 12 MMOL/L (ref 5–15)
BUN SERPL-MCNC: 8 MG/DL (ref 6–20)
BUN/CREAT SERPL: 10.7 (ref 7–25)
CALCIUM SPEC-SCNC: 9.4 MG/DL (ref 8.6–10.5)
CHLORIDE SERPL-SCNC: 101 MMOL/L (ref 98–107)
CO2 SERPL-SCNC: 24 MMOL/L (ref 22–29)
CREAT SERPL-MCNC: 0.75 MG/DL (ref 0.57–1)
EGFRCR SERPLBLD CKD-EPI 2021: 96.5 ML/MIN/1.73
GLUCOSE BLDC GLUCOMTR-MCNC: 101 MG/DL (ref 70–130)
GLUCOSE BLDC GLUCOMTR-MCNC: 158 MG/DL (ref 70–130)
GLUCOSE BLDC GLUCOMTR-MCNC: 214 MG/DL (ref 70–130)
GLUCOSE BLDC GLUCOMTR-MCNC: 285 MG/DL (ref 70–130)
GLUCOSE SERPL-MCNC: 362 MG/DL (ref 65–99)
MAGNESIUM SERPL-MCNC: 1.7 MG/DL (ref 1.6–2.6)
POTASSIUM SERPL-SCNC: 3.3 MMOL/L (ref 3.5–5.2)
SODIUM SERPL-SCNC: 137 MMOL/L (ref 136–145)

## 2022-08-13 PROCEDURE — 63710000001 INSULIN LISPRO (HUMAN) PER 5 UNITS: Performed by: INTERNAL MEDICINE

## 2022-08-13 PROCEDURE — 63710000001 INSULIN LISPRO (HUMAN) PER 5 UNITS: Performed by: STUDENT IN AN ORGANIZED HEALTH CARE EDUCATION/TRAINING PROGRAM

## 2022-08-13 PROCEDURE — 63710000001 INSULIN DETEMIR PER 5 UNITS: Performed by: STUDENT IN AN ORGANIZED HEALTH CARE EDUCATION/TRAINING PROGRAM

## 2022-08-13 PROCEDURE — 25010000002 ENOXAPARIN PER 10 MG: Performed by: PEDIATRICS

## 2022-08-13 PROCEDURE — 99232 SBSQ HOSP IP/OBS MODERATE 35: CPT | Performed by: INTERNAL MEDICINE

## 2022-08-13 PROCEDURE — 25010000002 MORPHINE PER 10 MG: Performed by: INTERNAL MEDICINE

## 2022-08-13 PROCEDURE — 83735 ASSAY OF MAGNESIUM: CPT | Performed by: INTERNAL MEDICINE

## 2022-08-13 PROCEDURE — 82962 GLUCOSE BLOOD TEST: CPT

## 2022-08-13 PROCEDURE — 25010000002 ONDANSETRON PER 1 MG: Performed by: PEDIATRICS

## 2022-08-13 PROCEDURE — 80048 BASIC METABOLIC PNL TOTAL CA: CPT | Performed by: INTERNAL MEDICINE

## 2022-08-13 RX ORDER — MORPHINE SULFATE 2 MG/ML
2 INJECTION, SOLUTION INTRAMUSCULAR; INTRAVENOUS EVERY 8 HOURS PRN
Status: DISCONTINUED | OUTPATIENT
Start: 2022-08-13 | End: 2022-08-14

## 2022-08-13 RX ORDER — METOPROLOL TARTRATE 50 MG/1
50 TABLET, FILM COATED ORAL EVERY 12 HOURS SCHEDULED
Status: DISCONTINUED | OUTPATIENT
Start: 2022-08-13 | End: 2022-08-15 | Stop reason: HOSPADM

## 2022-08-13 RX ORDER — HYDROCODONE BITARTRATE AND ACETAMINOPHEN 5; 325 MG/1; MG/1
1 TABLET ORAL EVERY 6 HOURS PRN
Status: DISCONTINUED | OUTPATIENT
Start: 2022-08-13 | End: 2022-08-15 | Stop reason: HOSPADM

## 2022-08-13 RX ORDER — INSULIN LISPRO 100 [IU]/ML
3 INJECTION, SOLUTION INTRAVENOUS; SUBCUTANEOUS
Status: DISCONTINUED | OUTPATIENT
Start: 2022-08-13 | End: 2022-08-15 | Stop reason: HOSPADM

## 2022-08-13 RX ADMIN — INSULIN LISPRO 2 UNITS: 100 INJECTION, SOLUTION INTRAVENOUS; SUBCUTANEOUS at 13:08

## 2022-08-13 RX ADMIN — QUETIAPINE FUMARATE 100 MG: 100 TABLET ORAL at 20:56

## 2022-08-13 RX ADMIN — Medication 10 ML: at 20:56

## 2022-08-13 RX ADMIN — PANTOPRAZOLE SODIUM 40 MG: 40 TABLET, DELAYED RELEASE ORAL at 05:10

## 2022-08-13 RX ADMIN — ONDANSETRON 4 MG: 2 INJECTION INTRAMUSCULAR; INTRAVENOUS at 07:35

## 2022-08-13 RX ADMIN — POTASSIUM CHLORIDE 40 MEQ: 750 CAPSULE, EXTENDED RELEASE ORAL at 20:56

## 2022-08-13 RX ADMIN — HYDROCODONE BITARTRATE AND ACETAMINOPHEN 1 TABLET: 5; 325 TABLET ORAL at 08:54

## 2022-08-13 RX ADMIN — METOPROLOL TARTRATE 50 MG: 50 TABLET, FILM COATED ORAL at 20:56

## 2022-08-13 RX ADMIN — INSULIN LISPRO 3 UNITS: 100 INJECTION, SOLUTION INTRAVENOUS; SUBCUTANEOUS at 08:56

## 2022-08-13 RX ADMIN — SODIUM CHLORIDE 100 ML/HR: 9 INJECTION, SOLUTION INTRAVENOUS at 03:26

## 2022-08-13 RX ADMIN — INSULIN LISPRO 3 UNITS: 100 INJECTION, SOLUTION INTRAVENOUS; SUBCUTANEOUS at 13:08

## 2022-08-13 RX ADMIN — Medication 10 ML: at 08:56

## 2022-08-13 RX ADMIN — CAPSAICIN 1 APPLICATION: 0.75 CREAM TOPICAL at 05:10

## 2022-08-13 RX ADMIN — MORPHINE SULFATE 2 MG: 2 INJECTION, SOLUTION INTRAMUSCULAR; INTRAVENOUS at 03:22

## 2022-08-13 RX ADMIN — CAPSAICIN 1 APPLICATION: 0.75 CREAM TOPICAL at 21:00

## 2022-08-13 RX ADMIN — SENNOSIDES AND DOCUSATE SODIUM 2 TABLET: 50; 8.6 TABLET ORAL at 08:55

## 2022-08-13 RX ADMIN — ENOXAPARIN SODIUM 40 MG: 40 INJECTION SUBCUTANEOUS at 08:55

## 2022-08-13 RX ADMIN — ONDANSETRON 4 MG: 2 INJECTION INTRAMUSCULAR; INTRAVENOUS at 22:06

## 2022-08-13 RX ADMIN — MORPHINE SULFATE 2 MG: 2 INJECTION, SOLUTION INTRAMUSCULAR; INTRAVENOUS at 13:15

## 2022-08-13 RX ADMIN — CAPSAICIN 1 APPLICATION: 0.75 CREAM TOPICAL at 13:15

## 2022-08-13 RX ADMIN — HYDROCODONE BITARTRATE AND ACETAMINOPHEN 1 TABLET: 5; 325 TABLET ORAL at 14:59

## 2022-08-13 RX ADMIN — BISACODYL 5 MG: 5 TABLET, COATED ORAL at 08:55

## 2022-08-13 RX ADMIN — ASPIRIN 81 MG CHEWABLE TABLET 81 MG: 81 TABLET CHEWABLE at 08:55

## 2022-08-13 RX ADMIN — AMLODIPINE BESYLATE 5 MG: 5 TABLET ORAL at 08:55

## 2022-08-13 RX ADMIN — BUSPIRONE HYDROCHLORIDE 10 MG: 10 TABLET ORAL at 08:55

## 2022-08-13 RX ADMIN — MORPHINE SULFATE 2 MG: 2 INJECTION, SOLUTION INTRAMUSCULAR; INTRAVENOUS at 22:06

## 2022-08-13 RX ADMIN — METOPROLOL TARTRATE 25 MG: 25 TABLET, FILM COATED ORAL at 08:55

## 2022-08-13 RX ADMIN — INSULIN DETEMIR 20 UNITS: 100 INJECTION, SOLUTION SUBCUTANEOUS at 20:56

## 2022-08-13 RX ADMIN — BUSPIRONE HYDROCHLORIDE 10 MG: 10 TABLET ORAL at 20:56

## 2022-08-13 RX ADMIN — Medication 1 PATCH: at 13:48

## 2022-08-13 RX ADMIN — LISINOPRIL 40 MG: 40 TABLET ORAL at 08:55

## 2022-08-13 RX ADMIN — ATORVASTATIN CALCIUM 80 MG: 40 TABLET, FILM COATED ORAL at 20:56

## 2022-08-13 RX ADMIN — INSULIN LISPRO 6 UNITS: 100 INJECTION, SOLUTION INTRAVENOUS; SUBCUTANEOUS at 08:56
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
## 2022-08-14 ENCOUNTER — APPOINTMENT (OUTPATIENT)
Dept: MRI IMAGING | Facility: HOSPITAL | Age: 52
End: 2022-08-14

## 2022-08-14 LAB
ANION GAP SERPL CALCULATED.3IONS-SCNC: 9 MMOL/L (ref 5–15)
BUN SERPL-MCNC: 11 MG/DL (ref 6–20)
BUN/CREAT SERPL: 13.1 (ref 7–25)
CALCIUM SPEC-SCNC: 9.7 MG/DL (ref 8.6–10.5)
CHLORIDE SERPL-SCNC: 98 MMOL/L (ref 98–107)
CO2 SERPL-SCNC: 24 MMOL/L (ref 22–29)
CREAT SERPL-MCNC: 0.84 MG/DL (ref 0.57–1)
EGFRCR SERPLBLD CKD-EPI 2021: 84.3 ML/MIN/1.73
GLUCOSE BLDC GLUCOMTR-MCNC: 102 MG/DL (ref 70–130)
GLUCOSE BLDC GLUCOMTR-MCNC: 262 MG/DL (ref 70–130)
GLUCOSE BLDC GLUCOMTR-MCNC: 295 MG/DL (ref 70–130)
GLUCOSE BLDC GLUCOMTR-MCNC: 318 MG/DL (ref 70–130)
GLUCOSE BLDC GLUCOMTR-MCNC: 478 MG/DL (ref 70–130)
GLUCOSE BLDC GLUCOMTR-MCNC: 549 MG/DL (ref 70–130)
GLUCOSE SERPL-MCNC: 568 MG/DL (ref 65–99)
MAGNESIUM SERPL-MCNC: 1.6 MG/DL (ref 1.6–2.6)
POTASSIUM SERPL-SCNC: 5 MMOL/L (ref 3.5–5.2)
SODIUM SERPL-SCNC: 131 MMOL/L (ref 136–145)
TROPONIN T SERPL-MCNC: <0.01 NG/ML (ref 0–0.03)

## 2022-08-14 PROCEDURE — 93005 ELECTROCARDIOGRAM TRACING: CPT | Performed by: INTERNAL MEDICINE

## 2022-08-14 PROCEDURE — 63710000001 INSULIN LISPRO (HUMAN) PER 5 UNITS: Performed by: NURSE PRACTITIONER

## 2022-08-14 PROCEDURE — 25010000002 MORPHINE PER 10 MG: Performed by: INTERNAL MEDICINE

## 2022-08-14 PROCEDURE — 99232 SBSQ HOSP IP/OBS MODERATE 35: CPT | Performed by: INTERNAL MEDICINE

## 2022-08-14 PROCEDURE — 63710000001 INSULIN LISPRO (HUMAN) PER 5 UNITS: Performed by: STUDENT IN AN ORGANIZED HEALTH CARE EDUCATION/TRAINING PROGRAM

## 2022-08-14 PROCEDURE — 0 MAGNESIUM SULFATE 4 GM/100ML SOLUTION: Performed by: PEDIATRICS

## 2022-08-14 PROCEDURE — 83735 ASSAY OF MAGNESIUM: CPT | Performed by: NURSE PRACTITIONER

## 2022-08-14 PROCEDURE — 25010000002 ENOXAPARIN PER 10 MG: Performed by: PEDIATRICS

## 2022-08-14 PROCEDURE — 72148 MRI LUMBAR SPINE W/O DYE: CPT

## 2022-08-14 PROCEDURE — 93010 ELECTROCARDIOGRAM REPORT: CPT | Performed by: INTERNAL MEDICINE

## 2022-08-14 PROCEDURE — 80048 BASIC METABOLIC PNL TOTAL CA: CPT | Performed by: NURSE PRACTITIONER

## 2022-08-14 PROCEDURE — 63710000001 INSULIN LISPRO (HUMAN) PER 5 UNITS: Performed by: INTERNAL MEDICINE

## 2022-08-14 PROCEDURE — 84484 ASSAY OF TROPONIN QUANT: CPT | Performed by: NURSE PRACTITIONER

## 2022-08-14 PROCEDURE — 82962 GLUCOSE BLOOD TEST: CPT

## 2022-08-14 PROCEDURE — 63710000001 INSULIN DETEMIR PER 5 UNITS: Performed by: INTERNAL MEDICINE

## 2022-08-14 RX ORDER — SODIUM PHOSPHATE,MONO-DIBASIC 19G-7G/118
1 ENEMA (ML) RECTAL ONCE
Status: COMPLETED | OUTPATIENT
Start: 2022-08-14 | End: 2022-08-14

## 2022-08-14 RX ORDER — INSULIN LISPRO 100 [IU]/ML
8 INJECTION, SOLUTION INTRAVENOUS; SUBCUTANEOUS ONCE
Status: COMPLETED | OUTPATIENT
Start: 2022-08-14 | End: 2022-08-14

## 2022-08-14 RX ORDER — SIMETHICONE 80 MG
80 TABLET,CHEWABLE ORAL 4 TIMES DAILY PRN
Status: DISCONTINUED | OUTPATIENT
Start: 2022-08-14 | End: 2022-08-15 | Stop reason: HOSPADM

## 2022-08-14 RX ADMIN — INSULIN DETEMIR 15 UNITS: 100 INJECTION, SOLUTION SUBCUTANEOUS at 20:29

## 2022-08-14 RX ADMIN — ENOXAPARIN SODIUM 40 MG: 40 INJECTION SUBCUTANEOUS at 09:34

## 2022-08-14 RX ADMIN — HYDROCODONE BITARTRATE AND ACETAMINOPHEN 1 TABLET: 5; 325 TABLET ORAL at 12:57

## 2022-08-14 RX ADMIN — HYDROXYZINE HYDROCHLORIDE 25 MG: 25 TABLET, FILM COATED ORAL at 17:48

## 2022-08-14 RX ADMIN — AMLODIPINE BESYLATE 5 MG: 5 TABLET ORAL at 09:31

## 2022-08-14 RX ADMIN — SODIUM PHOSPHATE 1 ENEMA: 7; 19 ENEMA RECTAL at 18:16

## 2022-08-14 RX ADMIN — INSULIN DETEMIR 15 UNITS: 100 INJECTION, SOLUTION SUBCUTANEOUS at 10:42

## 2022-08-14 RX ADMIN — SENNOSIDES AND DOCUSATE SODIUM 2 TABLET: 50; 8.6 TABLET ORAL at 09:31

## 2022-08-14 RX ADMIN — INSULIN LISPRO 3 UNITS: 100 INJECTION, SOLUTION INTRAVENOUS; SUBCUTANEOUS at 12:57

## 2022-08-14 RX ADMIN — LISINOPRIL 40 MG: 40 TABLET ORAL at 09:30

## 2022-08-14 RX ADMIN — SIMETHICONE 80 MG: 80 TABLET, CHEWABLE ORAL at 03:32

## 2022-08-14 RX ADMIN — INSULIN LISPRO 7 UNITS: 100 INJECTION, SOLUTION INTRAVENOUS; SUBCUTANEOUS at 12:58

## 2022-08-14 RX ADMIN — BUSPIRONE HYDROCHLORIDE 10 MG: 10 TABLET ORAL at 20:25

## 2022-08-14 RX ADMIN — METOPROLOL TARTRATE 50 MG: 50 TABLET, FILM COATED ORAL at 20:26

## 2022-08-14 RX ADMIN — MORPHINE SULFATE 2 MG: 2 INJECTION, SOLUTION INTRAMUSCULAR; INTRAVENOUS at 09:31

## 2022-08-14 RX ADMIN — INSULIN LISPRO 8 UNITS: 100 INJECTION, SOLUTION INTRAVENOUS; SUBCUTANEOUS at 04:58

## 2022-08-14 RX ADMIN — MAGNESIUM SULFATE HEPTAHYDRATE 4 G: 40 INJECTION, SOLUTION INTRAVENOUS at 06:34

## 2022-08-14 RX ADMIN — ATORVASTATIN CALCIUM 80 MG: 40 TABLET, FILM COATED ORAL at 20:25

## 2022-08-14 RX ADMIN — HYDROCODONE BITARTRATE AND ACETAMINOPHEN 1 TABLET: 5; 325 TABLET ORAL at 19:02

## 2022-08-14 RX ADMIN — INSULIN LISPRO 3 UNITS: 100 INJECTION, SOLUTION INTRAVENOUS; SUBCUTANEOUS at 09:31

## 2022-08-14 RX ADMIN — HYDROCODONE BITARTRATE AND ACETAMINOPHEN 1 TABLET: 5; 325 TABLET ORAL at 03:26

## 2022-08-14 RX ADMIN — ASPIRIN 81 MG CHEWABLE TABLET 81 MG: 81 TABLET CHEWABLE at 09:30

## 2022-08-14 RX ADMIN — POTASSIUM CHLORIDE 40 MEQ: 750 CAPSULE, EXTENDED RELEASE ORAL at 01:04

## 2022-08-14 RX ADMIN — PANTOPRAZOLE SODIUM 40 MG: 40 TABLET, DELAYED RELEASE ORAL at 05:02

## 2022-08-14 RX ADMIN — BUSPIRONE HYDROCHLORIDE 10 MG: 10 TABLET ORAL at 09:31

## 2022-08-14 RX ADMIN — SENNOSIDES AND DOCUSATE SODIUM 2 TABLET: 50; 8.6 TABLET ORAL at 20:25

## 2022-08-14 RX ADMIN — HYDROXYZINE HYDROCHLORIDE 25 MG: 25 TABLET, FILM COATED ORAL at 03:26

## 2022-08-14 RX ADMIN — METOPROLOL TARTRATE 50 MG: 50 TABLET, FILM COATED ORAL at 09:31

## 2022-08-14 RX ADMIN — Medication 10 ML: at 09:31

## 2022-08-14 RX ADMIN — CAPSAICIN 1 APPLICATION: 0.75 CREAM TOPICAL at 05:02

## 2022-08-14 RX ADMIN — TRAZODONE HYDROCHLORIDE 50 MG: 50 TABLET ORAL at 21:13

## 2022-08-14 RX ADMIN — Medication 1 PATCH: at 12:59

## 2022-08-14 RX ADMIN — INSULIN LISPRO 6 UNITS: 100 INJECTION, SOLUTION INTRAVENOUS; SUBCUTANEOUS at 09:32

## 2022-08-14 RX ADMIN — QUETIAPINE FUMARATE 100 MG: 100 TABLET ORAL at 20:25

## 2022-08-14 RX ADMIN — INSULIN LISPRO 3 UNITS: 100 INJECTION, SOLUTION INTRAVENOUS; SUBCUTANEOUS at 18:17

## 2022-08-14 RX ADMIN — Medication 10 ML: at 20:25

## 2022-08-14 NOTE — PROGRESS NOTES
"                    Clinical Nutrition     Patient Name: Bernadette Paris  YOB: 1970  MRN: [id number]  Date of Encounter: 22 11:13 EDT  Admission date: 8/10/2022    Reason for Visit     MST=3 (24-33# wt loss), 10# wt loss within 2 months    EMR Reviewed: Yes     Diet Nutrition Related History:      Pt admitted with abdominal pain with n/v. Per EMR hx, pt has previously been dx for severe/chronic malnutrition. Pt reports a UBW of 200# in April this year. Per EMR hx pt was 158# (22). Diet tech observed 139# on bed scale. Diet tech unsure if pt was confused on years. Pt reports having a  loss of appetite and eating 20% of usual intakes ongoing for four months. NKFA were reported. Pt requested Boost glucose to be added to trays. Diet tech provided menu and took food preferences down.    Anthropometrics   Height: Height: 167.6 cm (66\")  Admission Weight:   Flowsheet Rows    Flowsheet Row First Filed Value   Admission Height 167.6 cm (66\") Documented at 08/10/2022 0855   Admission Weight 68.5 kg (151 lb) Documented at 08/10/2022 0855        Last Filed Weight:Weight: 67.5 kg (148 lb 14.4 oz) (22 0600)  Weight Method: Bed scale  BMI: BMI (Calculated): 24  BMI classification: Normal: 18.5-24.9kg/m2   IBW: Ideal Body Weight (IBW) (kg): 59.58  EMR: 143# (22-bed), 151# (22-standing scale), 158# (22), 204# (21-MDOV), 220 # (3/3/21), 207# (2/3/21)   UBW: 205# per pt  Weight change: 56#    % change: 27.4%  Time frame:  13 months     Current Nutrition Prescription     Diet Regular; GI Soft, Consistent Carbohydrate    Orders Placed This Encounter      Dietary Nutrition Supplements Boost Glucose Control; strawberry    Average Intake from [place]% x 1 meal    Intake History:     Intake Category  <=50%    >= 1 MONTH    Actions   Appropriateness for MSA:  Malnutrition exam warranted based on observation, RDN to assess/evaluate per protocol     Interventions:   Follow treatment " progress, Care plan reviewed, Interview for preferences, Menu provided, Encourage intake, Supplement provided    Genet Perkins,   Time Spent: 25 min
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
## 2022-08-15 ENCOUNTER — APPOINTMENT (OUTPATIENT)
Dept: NEUROLOGY | Facility: HOSPITAL | Age: 52
End: 2022-08-15

## 2022-08-15 VITALS
WEIGHT: 136.24 LBS | RESPIRATION RATE: 16 BRPM | HEART RATE: 76 BPM | TEMPERATURE: 98.1 F | DIASTOLIC BLOOD PRESSURE: 92 MMHG | OXYGEN SATURATION: 95 % | SYSTOLIC BLOOD PRESSURE: 131 MMHG | BODY MASS INDEX: 21.9 KG/M2 | HEIGHT: 66 IN

## 2022-08-15 PROBLEM — M21.372 LEFT FOOT DROP: Status: ACTIVE | Noted: 2022-08-15

## 2022-08-15 LAB
GLUCOSE BLDC GLUCOMTR-MCNC: 555 MG/DL (ref 70–130)
GLUCOSE BLDC GLUCOMTR-MCNC: >599 MG/DL (ref 70–130)
GLUCOSE SERPL-MCNC: 753 MG/DL (ref 65–99)
MAGNESIUM SERPL-MCNC: 1.8 MG/DL (ref 1.6–2.6)
QT INTERVAL: 352 MS
QT INTERVAL: 374 MS
QTC INTERVAL: 439 MS
QTC INTERVAL: 485 MS

## 2022-08-15 PROCEDURE — 99239 HOSP IP/OBS DSCHRG MGMT >30: CPT | Performed by: INTERNAL MEDICINE

## 2022-08-15 PROCEDURE — 83735 ASSAY OF MAGNESIUM: CPT | Performed by: INTERNAL MEDICINE

## 2022-08-15 PROCEDURE — 95910 NRV CNDJ TEST 7-8 STUDIES: CPT

## 2022-08-15 PROCEDURE — 63710000001 INSULIN LISPRO (HUMAN) PER 5 UNITS: Performed by: INTERNAL MEDICINE

## 2022-08-15 PROCEDURE — 97116 GAIT TRAINING THERAPY: CPT

## 2022-08-15 PROCEDURE — 82947 ASSAY GLUCOSE BLOOD QUANT: CPT | Performed by: INTERNAL MEDICINE

## 2022-08-15 PROCEDURE — 63710000001 INSULIN DETEMIR PER 5 UNITS: Performed by: INTERNAL MEDICINE

## 2022-08-15 PROCEDURE — 63710000001 INSULIN LISPRO (HUMAN) PER 5 UNITS: Performed by: STUDENT IN AN ORGANIZED HEALTH CARE EDUCATION/TRAINING PROGRAM

## 2022-08-15 PROCEDURE — 95886 MUSC TEST DONE W/N TEST COMP: CPT

## 2022-08-15 PROCEDURE — 63710000001 INSULIN REGULAR HUMAN PER 5 UNITS: Performed by: INTERNAL MEDICINE

## 2022-08-15 PROCEDURE — G0108 DIAB MANAGE TRN  PER INDIV: HCPCS | Performed by: REGISTERED NURSE

## 2022-08-15 PROCEDURE — 25010000002 ENOXAPARIN PER 10 MG: Performed by: PEDIATRICS

## 2022-08-15 RX ORDER — IBUPROFEN 200 MG
200 TABLET ORAL EVERY 8 HOURS PRN
Qty: 30 TABLET | Refills: 0 | Status: SHIPPED | OUTPATIENT
Start: 2022-08-15

## 2022-08-15 RX ORDER — HYDROXYZINE HYDROCHLORIDE 25 MG/1
25 TABLET, FILM COATED ORAL EVERY 8 HOURS PRN
Qty: 15 TABLET | Refills: 0 | Status: SHIPPED | OUTPATIENT
Start: 2022-08-15

## 2022-08-15 RX ORDER — NICOTINE 21 MG/24HR
1 PATCH, TRANSDERMAL 24 HOURS TRANSDERMAL EVERY 24 HOURS
Qty: 28 EACH | Refills: 0 | Status: SHIPPED | OUTPATIENT
Start: 2022-08-15

## 2022-08-15 RX ORDER — LANCETS 33 GAUGE
1 EACH MISCELLANEOUS
Qty: 100 EACH | Refills: 0 | Status: SHIPPED | OUTPATIENT
Start: 2022-08-15

## 2022-08-15 RX ORDER — PEN NEEDLE, DIABETIC 30 GX3/16"
1 NEEDLE, DISPOSABLE MISCELLANEOUS 2 TIMES DAILY
Qty: 100 EACH | Refills: 0 | Status: SHIPPED | OUTPATIENT
Start: 2022-08-15

## 2022-08-15 RX ORDER — QUETIAPINE FUMARATE 100 MG/1
100 TABLET, FILM COATED ORAL NIGHTLY
Qty: 30 TABLET | Refills: 0 | Status: SHIPPED | OUTPATIENT
Start: 2022-08-15

## 2022-08-15 RX ORDER — LISINOPRIL 40 MG/1
40 TABLET ORAL DAILY
Qty: 30 TABLET | Refills: 0 | Status: SHIPPED | OUTPATIENT
Start: 2022-08-15

## 2022-08-15 RX ORDER — BLOOD-GLUCOSE METER
1 KIT MISCELLANEOUS 4 TIMES DAILY
Qty: 1 KIT | Refills: 0 | Status: SHIPPED | OUTPATIENT
Start: 2022-08-15

## 2022-08-15 RX ORDER — POLYETHYLENE GLYCOL 3350 17 G/17G
17 POWDER, FOR SOLUTION ORAL DAILY
Qty: 510 G | Refills: 0 | Status: SHIPPED | OUTPATIENT
Start: 2022-08-15

## 2022-08-15 RX ORDER — BISACODYL 10 MG
10 SUPPOSITORY, RECTAL RECTAL DAILY PRN
Qty: 10 EACH | Refills: 0 | Status: SHIPPED | OUTPATIENT
Start: 2022-08-15

## 2022-08-15 RX ORDER — GABAPENTIN 100 MG/1
100 CAPSULE ORAL NIGHTLY
Qty: 3 CAPSULE | Refills: 0 | Status: SHIPPED | OUTPATIENT
Start: 2022-08-15

## 2022-08-15 RX ORDER — AMOXICILLIN 250 MG
2 CAPSULE ORAL 2 TIMES DAILY PRN
Qty: 10 TABLET | Refills: 0 | Status: SHIPPED | OUTPATIENT
Start: 2022-08-15

## 2022-08-15 RX ORDER — ONDANSETRON 4 MG/1
4 TABLET, ORALLY DISINTEGRATING ORAL EVERY 8 HOURS PRN
Qty: 15 TABLET | Refills: 0 | Status: SHIPPED | OUTPATIENT
Start: 2022-08-15

## 2022-08-15 RX ORDER — TRAZODONE HYDROCHLORIDE 50 MG/1
50 TABLET ORAL NIGHTLY
Qty: 30 TABLET | Refills: 0 | Status: SHIPPED | OUTPATIENT
Start: 2022-08-15

## 2022-08-15 RX ORDER — BUSPIRONE HYDROCHLORIDE 10 MG/1
10 TABLET ORAL 2 TIMES DAILY
Qty: 60 TABLET | Refills: 0 | Status: SHIPPED | OUTPATIENT
Start: 2022-08-15

## 2022-08-15 RX ORDER — ASCORBIC ACID 1000 MG
500 TABLET ORAL
Qty: 90 EACH | Refills: 0 | Status: SHIPPED | OUTPATIENT
Start: 2022-08-15

## 2022-08-15 RX ORDER — ASPIRIN 81 MG/1
81 TABLET, CHEWABLE ORAL DAILY
Qty: 30 TABLET | Refills: 0 | Status: SHIPPED | OUTPATIENT
Start: 2022-08-16

## 2022-08-15 RX ORDER — PANTOPRAZOLE SODIUM 40 MG/1
40 TABLET, DELAYED RELEASE ORAL DAILY
Qty: 60 TABLET | Refills: 0 | Status: SHIPPED | OUTPATIENT
Start: 2022-08-15

## 2022-08-15 RX ORDER — METOPROLOL TARTRATE 50 MG/1
50 TABLET, FILM COATED ORAL EVERY 12 HOURS SCHEDULED
Qty: 60 TABLET | Refills: 0 | Status: SHIPPED | OUTPATIENT
Start: 2022-08-15

## 2022-08-15 RX ORDER — LANCETS
1 EACH MISCELLANEOUS 4 TIMES DAILY
Qty: 102 EACH | Refills: 0 | Status: SHIPPED | OUTPATIENT
Start: 2022-08-15

## 2022-08-15 RX ORDER — AMLODIPINE BESYLATE 5 MG/1
5 TABLET ORAL
Qty: 30 TABLET | Refills: 0 | Status: SHIPPED | OUTPATIENT
Start: 2022-08-15

## 2022-08-15 RX ORDER — BLOOD SUGAR DIAGNOSTIC
1 STRIP MISCELLANEOUS 4 TIMES DAILY
Qty: 100 EACH | Refills: 0 | Status: SHIPPED | OUTPATIENT
Start: 2022-08-15

## 2022-08-15 RX ORDER — CAPSAICIN 0.75 MG/G
1 CREAM TOPICAL EVERY 8 HOURS SCHEDULED
Qty: 57 G | Refills: 0 | Status: SHIPPED | OUTPATIENT
Start: 2022-08-15

## 2022-08-15 RX ORDER — ATORVASTATIN CALCIUM 80 MG/1
80 TABLET, FILM COATED ORAL NIGHTLY
Qty: 30 TABLET | Refills: 0 | Status: SHIPPED | OUTPATIENT
Start: 2022-08-15

## 2022-08-15 RX ADMIN — INSULIN LISPRO 3 UNITS: 100 INJECTION, SOLUTION INTRAVENOUS; SUBCUTANEOUS at 12:36

## 2022-08-15 RX ADMIN — INSULIN LISPRO 9 UNITS: 100 INJECTION, SOLUTION INTRAVENOUS; SUBCUTANEOUS at 09:16

## 2022-08-15 RX ADMIN — METOPROLOL TARTRATE 50 MG: 50 TABLET, FILM COATED ORAL at 09:16

## 2022-08-15 RX ADMIN — Medication 1 PATCH: at 13:31

## 2022-08-15 RX ADMIN — INSULIN HUMAN 3 UNITS: 100 INJECTION, SOLUTION PARENTERAL at 10:42

## 2022-08-15 RX ADMIN — INSULIN DETEMIR 15 UNITS: 100 INJECTION, SOLUTION SUBCUTANEOUS at 09:18

## 2022-08-15 RX ADMIN — HYDROCODONE BITARTRATE AND ACETAMINOPHEN 1 TABLET: 5; 325 TABLET ORAL at 10:42

## 2022-08-15 RX ADMIN — HYDROXYZINE HYDROCHLORIDE 25 MG: 25 TABLET, FILM COATED ORAL at 14:53

## 2022-08-15 RX ADMIN — AMLODIPINE BESYLATE 5 MG: 5 TABLET ORAL at 09:16

## 2022-08-15 RX ADMIN — INSULIN LISPRO 3 UNITS: 100 INJECTION, SOLUTION INTRAVENOUS; SUBCUTANEOUS at 09:17

## 2022-08-15 RX ADMIN — ENOXAPARIN SODIUM 40 MG: 40 INJECTION SUBCUTANEOUS at 09:16

## 2022-08-15 RX ADMIN — BUSPIRONE HYDROCHLORIDE 10 MG: 10 TABLET ORAL at 09:16

## 2022-08-15 RX ADMIN — HYDROCODONE BITARTRATE AND ACETAMINOPHEN 1 TABLET: 5; 325 TABLET ORAL at 01:10

## 2022-08-15 RX ADMIN — INSULIN LISPRO 9 UNITS: 100 INJECTION, SOLUTION INTRAVENOUS; SUBCUTANEOUS at 12:36

## 2022-08-15 RX ADMIN — LISINOPRIL 40 MG: 40 TABLET ORAL at 09:17

## 2022-08-15 RX ADMIN — Medication 10 ML: at 09:17

## 2022-08-15 RX ADMIN — SENNOSIDES AND DOCUSATE SODIUM 2 TABLET: 50; 8.6 TABLET ORAL at 09:16

## 2022-08-15 RX ADMIN — ASPIRIN 81 MG CHEWABLE TABLET 81 MG: 81 TABLET CHEWABLE at 09:17

## 2022-08-15 RX ADMIN — PANTOPRAZOLE SODIUM 40 MG: 40 TABLET, DELAYED RELEASE ORAL at 05:01
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
## 2022-08-15 NOTE — PROGRESS NOTES
Malnutrition Severity Assessment    Patient Name:  Bernadette Paris  YOB: 1970  MRN: [id number]  Admit Date:  8/10/2022    Patient meets criteria for : Severe Malnutrition    Comments:  Based on patient recent wt loss and poor PO intake, patient meets criteria for severe malnutrition r/t acute illness/injury.  Nutrition Focused Physical Exam completed to determine fat and muscle wasting.  MD please attest and include in pt diagnosis as you deem appropriate.        Malnutrition Severity Assessment  Malnutrition Type: Acute Disease or Injury - Related Malnutrition  Malnutrition Type (last 8 hours)     Malnutrition Severity Assessment     Row Name 08/15/22 1218       Malnutrition Severity Assessment    Malnutrition Type Acute Disease or Injury - Related Malnutrition    Row Name 08/15/22 1218       Insufficient Energy Intake     Insufficient Energy Intake Findings Severe    Insufficient Energy Intake  <75% of est. energy requirement for > or equal to 3 months    Row Name 08/15/22 1218       Unintentional Weight Loss     Unintentional Weight Loss Findings Severe    Unintentional Weight Loss  Weight loss greater than 7.5% in three months    Row Name 08/15/22 1218       Muscle Loss    Loss of Muscle Mass Findings Mild    Row Name 08/15/22 1218       Fat Loss    Subcutaneous Fat Loss Findings Mild    Row Name 08/15/22 1218       Criteria Met (Must meet criteria for severity in at least 2 of these categories: M Wasting, Fat Loss, Fluid, Secondary Signs, Wt. Status, Intake)    Patient meets criteria for  Severe Malnutrition                Electronically signed by:  Barbi Doan RD  08/15/22 12:19 EDT
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
## 2022-08-15 NOTE — PLAN OF CARE
Goal Outcome Evaluation:  Plan of Care Reviewed With: patient        Progress: improving  Outcome Evaluation: GOALS SET AT IE WERE PARTIALLY MET DUE TO LOS AT THIS FACILITY. PT IS ANXIOUS TO GET HOME. ISSUED CANE AND INSTRUCTED IN USE. NO OVERT LOB WITH USE OF CANE AND DEMONSRATED CORRECT SEQUENCING. PT IS GOING HOME TODAY.D/C P.T.

## 2022-08-15 NOTE — PROGRESS NOTES
"                  Clinical Nutrition     Reason for Visit: Follow-up protocol, Malnutrition Severity Assessment    Patient Name: Bernadette Paris  YOB: 1970  MRN: [id number]  Date of Encounter: 08/15/22 12:05 EDT  Admission date: 8/10/2022    Nutrition Assessment   Chief Complaint:  Dehydration [E86.0]    Problem List:    Hypertensive crisis    Nausea & vomiting    Bipolar affective disorder (HCC)    Hepatitis C    Medically noncompliant    Type 2 diabetes mellitus with hyperglycemia, with long-term current use of insulin (HCC)    Gastroparesis    Dehydration    Right sided weakness    Chronic calcific pancreatitis (HCC)    Cocaine abuse (HCC)    Left foot drop      PMH:   She  has a past medical history of Arthritis, Bipolar disorder (HCC), Chronic bronchitis (HCC), Depression, Diabetes mellitus (HCC), GERD (gastroesophageal reflux disease), Hepatitis-C, History of blood transfusion, Hyperlipidemia, Hypertension, Infectious viral hepatitis, Migraine, and Sleep apnea.    PSxH:  She  has a past surgical history that includes Hysterectomy; Cholecystectomy; Knee surgery; lumbar laminectomy discectomy decompression (N/A, 8/6/2018); and Esophagogastroduodenoscopy (N/A, 4/9/2022).    Physical Findings/other applicable:    Applicable Interval History:       Reported/Observed/Food/Nutrition Related History:   8/15  Patient standing next to bed at time of visit. RD able to zero bed and obtain weight, weight 136lb. This reflects 16lb weight loss x 3 months per EMR (10.6%). patient reports she used to weight ~ 180lb at some point. RD not able to find any documented weight that go back to mor than April of this year. Per EMR patient weight 158lb of 4/11/22.  Patient reports weight loss due to on going bouts of pancreatitis and vomiting.  She admits that she only likes \"greasy food\" and that bothers her stomach. She does not like \"lean food\". Has been tolerating intake this admission and is drinking Boost G.C.  " "Patient reports plan to go home today. We dicussed need to continue with nutrition supplement, avoid greasy food and take medication in attempt to keep BG under control and avoid another flare up of her pancrease.       8/14 - per Nutrition Tech report   Pt admitted with abdominal pain with n/v. Per EMR hx, pt has previously been dx for severe/chronic malnutrition. Pt reports a UBW of 200# in April this year. Per EMR hx pt was 158# (4/11/22). Diet tech observed 139# on bed scale. Diet tech unsure if pt was confused on years. Pt reports having a  loss of appetite and eating 20% of usual intakes ongoing for four months. NKFA were reported. Pt requested Boost glucose to be added to trays. Diet tech provided menu and took food preferences down.     Anthropometrics   Height: Height: 167.6 cm (66\")  Admission Weight:   Flowsheet Rows    Flowsheet Row First Filed Value   Admission Height 167.6 cm (66\") Documented at 08/10/2022 0855   Admission Weight 68.5 kg (151 lb) Documented at 08/10/2022 0855        Last Filed Weight: Weight: 67.5 kg (148 lb 14.4 oz) (08/13/22 0600)  BMI: BMI (Calculated): 24  BMI classification: Normal: 18.5-24.9kg/m2   IBW: 130lb    UBW: 180lb per patient - RD not able to find this in EMR   Weight change: -16lb x 3 months, 10.6%     Labs reviewed     Results from last 7 days   Lab Units 08/15/22  0818 08/14/22  0338 08/13/22  0643 08/12/22  0735   SODIUM mmol/L  --  131*   < > 137  137   POTASSIUM mmol/L  --  5.0   < > 3.7  3.7   CHLORIDE mmol/L  --  98   < > 98  98   CO2 mmol/L  --  24.0   < > 22.0  23.0   BUN mg/dL  --  11   < > 6  6   CREATININE mg/dL  --  0.84   < > 0.62  0.61   CALCIUM mg/dL  --  9.7   < > 10.3  10.1   BILIRUBIN mg/dL  --   --   --  0.7   ALK PHOS U/L  --   --   --  286*   ALT (SGPT) U/L  --   --   --  107*   AST (SGOT) U/L  --   --   --  134*   GLUCOSE mg/dL 753* 568*   < > 346*  361*    < > = values in this interval not displayed.       Results from last 7 days   Lab " Units 08/14/22  1955 08/14/22  1557 08/14/22  1200 08/14/22  0740 08/14/22  0439 08/14/22  0438   GLUCOSE mg/dL 262* 102 318* 295* 478* 549*     Lab Results   Lab Value Date/Time    HGBA1C 11.50 (H) 08/10/2022 1611    HGBA1C 12.70 (H) 06/16/2022 1822    HGBA1C 11.5 (H) 03/21/2022 0508    HGBA1C 12.2 (H) 07/09/2021 0219       Medications reviewed   Pertinent:  Lipitor, Levemir, insulin, protonix, pericolace,     Intake/Ouptut 24 hrs (7:00AM - 6:59 AM)     Intake & Output (last day)       08/14 0701  08/15 0700 08/15 0701 08/16 0700    P.O.      Total Intake(mL/kg)      Net            Urine Unmeasured Occurrence 3 x 1 x    Stool Unmeasured Occurrence 1 x           Nutrition Focused Physical Exam Findings     Malnutrition Severity Assessment       see flow sheet- no clear deficits noted.          Current Nutrition Prescription     PO: Diet Regular; GI Soft, Consistent Carbohydrate  Oral Nutrition Supplement: Boost G.C> TID.     Average Intake per Charting:  Insufficient data  75% x 1 meal      Nutrition Diagnosis   Date: 8/15 Updated:   Problem Malnutrition   Severe due to acute illness    Etiology Pancreatitis; poor BG control    Signs/Symptoms 16lb weight loss x 3 months, PO intolerance limiting intake    Status:     Goal:   General: Nutrition support treatment  PO: Tolerate PO, Meet estimated needs    Additional goals:    Nutrition Intervention   1.  Follow treatment progress, Care plan reviewed, Interview for preferences, Menu provided, Supplement provided, Education provided re home nutrition and BG management.       Monitoring/Evaluation:   Per protocol, PO intake, Supplement intake, Pertinent labs    Will Continue to follow per protocol      Barbi Doan RD  Time Spent: 30min

## 2022-08-15 NOTE — THERAPY DISCHARGE NOTE
Patient Name: Bernadette Paris  : 1970    MRN: [id number]                              Today's Date: 8/15/2022       Admit Date: 8/10/2022    Visit Dx:     ICD-10-CM ICD-9-CM   1. Dehydration  E86.0 276.51   2. Hyperglycemia  R73.9 790.29   3. Chronic pancreatitis, unspecified pancreatitis type (HCC)  K86.1 577.1   4. Hypertensive urgency  I16.0 401.9   5. Elevated blood pressure reading  R03.0 796.2   6. Noncompliance with medications  Z91.14 V15.81   7. Facial droop  R29.810 781.94   8. Dysarthria  R47.1 784.51   9. Left foot drop  M21.372 736.79   10. Lumbar stenosis with neurogenic claudication  M48.062 724.03   11. Type 2 diabetes mellitus with diabetic neuropathy, with long-term current use of insulin (HCC)  E11.40 250.60    Z79.4 357.2     V58.67   12. Chronic calcific pancreatitis (HCC)  K86.1 577.1   13. Chronic hepatitis C without hepatic coma (HCC)  B18.2 070.54   14. Elevated LFTs  R79.89 790.6     Patient Active Problem List   Diagnosis   • Spinal stenosis, lumbar region, with neurogenic claudication   • Degenerative disc disease, lumbar   • Facet arthritis of lumbar region   • Lumbar stenosis with neurogenic claudication   • BMI 40.0-44.9, adult (HCC)   • Encounter for smoking cessation counseling   • S/P lumbar laminectomy   • Nausea & vomiting   • Acidosis   • Bipolar affective disorder (HCC)   • Hepatitis C   • Accelerated hypertension   • Medically noncompliant   • Gastritis   • Hyperlipidemia   • Severe malnutrition (HCC)   • Intractable abdominal pain   • Type 2 diabetes mellitus with hyperglycemia, with long-term current use of insulin (HCC)   • Gastroparesis   • Dehydration   • Right sided weakness   • Hypertensive crisis   • Chronic calcific pancreatitis (HCC)   • Cocaine abuse (HCC)   • Left foot drop     Past Medical History:   Diagnosis Date   • Arthritis    • Bipolar disorder (HCC)    • Chronic bronchitis (HCC)    • Depression    • Diabetes mellitus (HCC)     DIAGNOSED 2016   • GERD  (gastroesophageal reflux disease)    • Hepatitis-C    • History of blood transfusion    • Hyperlipidemia    • Hypertension    • Infectious viral hepatitis     HEPATITIS C   • Migraine    • Sleep apnea     PATIENT UNSURE OF SETTINGS     Past Surgical History:   Procedure Laterality Date   • CHOLECYSTECTOMY     • ENDOSCOPY N/A 4/9/2022    Procedure: ESOPHAGOGASTRODUODENOSCOPY;  Surgeon: Brunner, Mark I, MD;  Location: [facility] ENDOSCOPY;  Service: Gastroenterology;  Laterality: N/A;  with antrum biopsy   • HYSTERECTOMY     • KNEE SURGERY     • LUMBAR LAMINECTOMY DISCECTOMY DECOMPRESSION N/A 8/6/2018    Procedure: LUMBAR LAMINECTOMIES L4-S1;  Surgeon: Chip Smith MD;  Location: [facility] OR;  Service: Neurosurgery      General Information     Row Name 08/15/22 1332          Physical Therapy Time and Intention    Document Type discharge evaluation/summary  -CD     Mode of Treatment physical therapy  -CD     Row Name 08/15/22 1332          General Information    Patient Profile Reviewed yes  -CD     Existing Precautions/Restrictions fall  -CD     Barriers to Rehab medically complex  -CD     Row Name 08/15/22 1332          Cognition    Orientation Status (Cognition) oriented x 3  -CD     Row Name 08/15/22 1332          Safety Issues, Functional Mobility    Safety Issues Affecting Function (Mobility) safety precaution awareness;safety precautions follow-through/compliance  -CD     Impairments Affecting Function (Mobility) balance;coordination;endurance/activity tolerance;strength;motor control  -CD     Cognitive Impairments, Mobility Safety/Performance insight into deficits/self-awareness;safety precaution follow-through  -CD     Comment, Safety Issues/Impairments (Mobility) PT DENIED PAIN. NSG ALLOWING UP IN ROOM AD JOSE E. PT IN BATHROOM UPON ARRIVAL.  -CD           User Key  (r) = Recorded By, (t) = Taken By, (c) = Cosigned By    Initials Name Provider Type    CD Fe Bourgeois PT Physical Therapist                Mobility     Row Name 08/15/22 1333          Bed Mobility    Comment, (Bed Mobility) PT UP IN BATHROOM UPON ARRIVAL.  -CD     Row Name 08/15/22 1333          Transfers    Comment, (Transfers) STAND TO SIT IN RECLINER, INDEPENDENT.  -CD     Row Name 08/15/22 1333          Sit-Stand Transfer    Sit-Stand [place] (Transfers) independent  -CD     Assistive Device (Sit-Stand Transfers) cane, straight  -CD     Row Name 08/15/22 1333          Gait/Stairs (Locomotion)    [place] Level (Gait) supervision  -CD     Assistive Device (Gait) cane, straight  -CD     Distance in Feet (Gait) 400 FEET  -CD     Deviations/Abnormal Patterns (Gait) steppage;gait speed decreased;padma decreased  -CD     Comment, (Gait/Stairs) INSTRUCTED IN SEQUENCING WITH CANE IN R HAND. NO OVERT LOB. IMPROVED CANDENCE/SPEED AFTER APPROX 25 FEET. ISSUED CANE FOR HOME.  -CD           User Key  (r) = Recorded By, (t) = Taken By, (c) = Cosigned By    Initials Name Provider Type    Fe Sneed PT Physical Therapist               Obj/Interventions     Row Name 08/15/22 1336          Balance    Static Sitting Balance independent  -CD     Dynamic Sitting Balance independent  -CD     Position, Sitting Balance unsupported  -CD     Static Standing Balance independent  -CD     Dynamic Standing Balance supervision  -CD     Position/Device Used, Standing Balance cane, straight  -CD     Balance Interventions sitting;standing;sit to stand;supported;static;dynamic  -CD     Comment, Balance SEE GAIT NOTE. DECLINED STEPS AS PT IS GOING HOME SOON. APPRECIATIVE OF CANE.  -CD           User Key  (r) = Recorded By, (t) = Taken By, (c) = Cosigned By    Initials Name Provider Type    Fe Sneed PT Physical Therapist               Goals/Plan     Row Name 08/15/22 1342          Transfer Goal 1 (PT)    Activity/Assistive Device (Transfer Goal 1, PT) bed-to-chair/chair-to-bed;sit-to-stand/stand-to-sit  -CD     [place] Level/Cues Needed (Transfer Goal  1, PT) independent  -CD     Time Frame (Transfer Goal 1, PT) long term goal (LTG);2 weeks  -CD     Progress/Outcome (Transfer Goal 1, PT) goal met  -CD     Row Name 08/15/22 1342          Gait Training Goal 1 (PT)    Activity/Assistive Device (Gait Training Goal 1, PT) gait (walking locomotion);assistive device use  -CD     [place] Level (Gait Training Goal 1, PT) independent  -CD     Distance (Gait Training Goal 1, PT) 400 FEET  -CD     Time Frame (Gait Training Goal 1, PT) long term goal (LTG);2 weeks  -CD     Progress/Outcome (Gait Training Goal 1, PT) goal partially met  MET FOR DISTANCE WITH SUPERVISION- FIRST TIME ON CANE.  -CD     Row Name 08/15/22 3400          Stairs Goal 1 (PT)    Activity/Assistive Device (Stairs Goal 1, PT) ascending stairs;descending stairs;cane, straight  -CD     [place] Level/Cues Needed (Stairs Goal 1, PT) contact guard required  -CD     Number of Stairs (Stairs Goal 1, PT) 3  -CD     Time Frame (Stairs Goal 1, PT) long term goal (LTG);3 weeks  -CD     Progress/Outcome (Stairs Goal 1, PT) goal not met;other (see comments)  ANXIOUS TO GO HOME. ANTICIPATE SUPERVISION OR CGA REQUIRED.  -CD           User Key  (r) = Recorded By, (t) = Taken By, (c) = Cosigned By    Initials Name Provider Type    CD Fe Bourgeois, PT Physical Therapist               Clinical Impression     Row Name 08/15/22 3597          Pain    Pretreatment Pain Rating 0/10 - no pain  -CD     Posttreatment Pain Rating 0/10 - no pain  -CD     Row Name 08/15/22 7191          Plan of Care Review    Plan of Care Reviewed With patient  -CD     Progress improving  -CD     Outcome Evaluation GOALS SET AT IE WERE PARTIALLY MET DUE TO LOS AT THIS FACILITY. PT IS ANXIOUS TO GET HOME. ISSUED CANE AND INSTRUCTED IN USE. NO OVERT LOB WITH USE OF CANE AND DEMONSRATED CORRECT SEQUENCING. PT IS GOING HOME TODAY.  -CD     Row Name 08/15/22 5084          Therapy Assessment/Plan (PT)    Patient/Family Therapy Goals Statement  (PT) TO GO HOME.  -CD     Rehab Potential (PT) good, to achieve stated therapy goals  -CD     Criteria for Skilled Interventions Met (PT) yes;meets criteria;skilled treatment is necessary  -CD     Therapy Frequency (PT) daily  D/C TREATMENT TODAY.  -CD     Row Name 08/15/22 1337          Vital Signs    Post Systolic BP Rehab 140  -CD     Pretreatment Heart Rate (beats/min) 98  -CD     Pre Patient Position Standing  -CD     Intra Patient Position Standing  -CD     Post Patient Position Sitting  -CD     Row Name 08/15/22 1337          Positioning and Restraints    Pre-Treatment Position bathroom  -CD     Post Treatment Position chair  -CD     In Chair sitting;call light within reach;encouraged to call for assist;with nsg  NSG IN ROOM AND CONFIRMS UP AD JOSE E. PT GOING HOME.  -CD           User Key  (r) = Recorded By, (t) = Taken By, (c) = Cosigned By    Initials Name Provider Type    Fe Sneed PT Physical Therapist               Outcome Measures     Row Name 08/15/22 1343          How much help from another person do you currently need...    Turning from your back to your side while in flat bed without using bedrails? 4  -CD     Moving from lying on back to sitting on the side of a flat bed without bedrails? 4  -CD     Moving to and from a bed to a chair (including a wheelchair)? 4  -CD     Standing up from a chair using your arms (e.g., wheelchair, bedside chair)? 4  -CD     Climbing 3-5 steps with a railing? 3  -CD     To walk in hospital room? 4  -CD     AM-PAC 6 Clicks Score (PT) 23  -CD     Highest level of mobility 7 --> Walked 25 feet or more  -CD     Row Name 08/15/22 1343          Functional Assessment    Outcome Measure Options AM-PAC 6 Clicks Basic Mobility (PT)  -CD           User Key  (r) = Recorded By, (t) = Taken By, (c) = Cosigned By    Initials Name Provider Type    Fe Sneed PT Physical Therapist              Physical Therapy Education                 Title: PT OT SLP Therapies  (Resolved)     Topic: Physical Therapy (Resolved)     Point: Mobility training (Resolved)     Learning Progress Summary           Patient Acceptance, E, VU,NR by CD at 8/12/2022 1609    Comment: BENEFITS OF OOB ACTIVITY, SAFETY WITH MOBILITY, PROGRESSION OF POC, D/C PLANNING,                   Point: Home exercise program (Resolved)     Learning Progress Summary           Patient Acceptance, E, VU,NR by CD at 8/12/2022 1609    Comment: BENEFITS OF OOB ACTIVITY, SAFETY WITH MOBILITY, PROGRESSION OF POC, D/C PLANNING,                   Point: Body mechanics (Resolved)     Learning Progress Summary           Patient Acceptance, E, VU,NR by CD at 8/12/2022 1609    Comment: BENEFITS OF OOB ACTIVITY, SAFETY WITH MOBILITY, PROGRESSION OF POC, D/C PLANNING,                   Point: Precautions (Resolved)     Learning Progress Summary           Patient Acceptance, E, VU,NR by CD at 8/12/2022 1609    Comment: BENEFITS OF OOB ACTIVITY, SAFETY WITH MOBILITY, PROGRESSION OF POC, D/C PLANNING,                               User Key     Initials Effective Dates Name Provider Type Discipline    CD 06/16/21 -  Fe Bourgeois, PT Physical Therapist PT              PT Recommendation and Plan  Planned Therapy Interventions (PT): balance training, bed mobility training, gait training, transfer training, strengthening, stair training, patient/family education, home exercise program  Plan of Care Reviewed With: patient  Progress: improving  Outcome Evaluation: GOALS SET AT IE WERE PARTIALLY MET DUE TO LOS AT THIS FACILITY. PT IS ANXIOUS TO GET HOME. ISSUED CANE AND INSTRUCTED IN USE. NO OVERT LOB WITH USE OF CANE AND DEMONSRATED CORRECT SEQUENCING. PT IS GOING HOME TODAY.     Time Calculation:    PT Charges     Row Name 08/15/22 8885             Time Calculation    Start Time 1315  -      PT Received On 08/15/22  -CD              Time Calculation- PT    Total Timed Code Minutes- PT 15 minute(s)  -CD              Timed Charges    19627 -  Gait Training Minutes  15  -CD              Total Minutes    Timed Charges Total Minutes 15  -CD       Total Minutes 15  -CD            User Key  (r) = Recorded By, (t) = Taken By, (c) = Cosigned By    Initials Name Provider Type    CD Fe Bourgeois, PT Physical Therapist              Therapy Charges for Today     Code Description Service Date Service Provider Modifiers Qty    [id number] HC GAIT TRAINING EA 15 MIN 8/15/2022 Fe Bourgeois, PT GP 1          PT G-Codes  Outcome Measure Options: AM-PAC 6 Clicks Basic Mobility (PT)  AM-PAC 6 Clicks Score (PT): 23  AM-PAC 6 Clicks Score (OT): 17    PT Discharge Summary  Anticipated Discharge Disposition (PT): home with home health  Reason for Discharge: Discharge from facility  Outcomes Achieved: Patient able to partially acheive established goals  Discharge Destination: Home (PER CM, PT IS UNABLE TO HAVE HHPT DUE TO NOT HAVING A PCP. (HAS NOT FOLLOWED UP WITH PCP APPOINTMENTS IN THE PAST))    Fe Bourgeois, PT  8/15/2022

## 2022-08-15 NOTE — CASE MANAGEMENT/SOCIAL WORK
Continued Stay Note  [facility]     Patient Name: Bernadette Paris  MRN: [id number]  Today's Date: 8/15/2022    Admit Date: 8/10/2022     Discharge Plan     Row Name 08/15/22 1258       Plan    Plan Comments MSW received consult for opioid addiction. MSW spoke with pt at bedside. Pt still had the resources provided by MSW on Friday at Bedside. Pt reports she is not interested in pursuing any resources at this time. Pt has resources if she will ever need in the future. Pt had no other concerns for MSW at this time.                                          Discharge Codes    No documentation.               Expected Discharge Date and Time     Expected Discharge Date Expected Discharge Time    Aug 15, 2022             VINCENZO Gibson

## 2022-08-15 NOTE — DISCHARGE SUMMARY
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 with PCP   As directed       Currently Documented PCP:    Provider, No Known    PCP Phone Number:    None     Follow Up Details: 1 week - CM working on referral to new PCP                     Jamel Swift DO  08/15/22      Time Spent on Discharge:  I spent  35  minutes on this discharge activity which included: face-to-face encounter with the patient, reviewing the data in the system, coordination of the care with the nursing staff as well as consultants, documentation, and entering orders.

## 2022-08-16 ENCOUNTER — READMISSION MANAGEMENT (OUTPATIENT)
Dept: CALL CENTER | Facility: HOSPITAL | Age: 52
End: 2022-08-16

## 2022-08-16 ENCOUNTER — TRANSITIONAL CARE MANAGEMENT TELEPHONE ENCOUNTER (OUTPATIENT)
Dept: CALL CENTER | Facility: HOSPITAL | Age: 52
End: 2022-08-16

## 2022-08-16 NOTE — OUTREACH NOTE
Prep Survey    Flowsheet Row Responses   [facility] patient discharged from? [place]   Is LACE score < 7 ? No   Emergency Room discharge w/ pulse ox? No   Eligibility [facility]   Date of Admission 08/10/22   Date of Discharge 08/15/22   Discharge diagnosis Hypertensive crisis   Does the patient have one of the following disease processes/diagnoses(primary or secondary)? Other   Does the patient have Home health ordered? No   Is there a DME ordered? No   Prep survey completed? Yes          SUKHDEV FRANCOIS - Registered Nurse

## 2022-08-16 NOTE — OUTREACH NOTE
Call Center TCM Note    Flowsheet Row Responses   [facility] [place] patient discharged from? [place]   Does the patient have one of the following disease processes/diagnoses(primary or secondary)? Other   TCM attempt successful? No   Unsuccessful attempts Attempt 2          Pamela Herrera LPN    8/16/2022, 14:09 EDT

## 2022-08-16 NOTE — OUTREACH NOTE
Call Center TCM Note    Flowsheet Row Responses   [facility] patient discharged from? [place]   Does the patient have one of the following disease processes/diagnoses(primary or secondary)? Other   TCM attempt successful? No   Unsuccessful attempts Attempt 1          Pamela Herrera LPN    8/16/2022, 12:04 EDT

## 2022-08-17 ENCOUNTER — TRANSITIONAL CARE MANAGEMENT TELEPHONE ENCOUNTER (OUTPATIENT)
Dept: CALL CENTER | Facility: HOSPITAL | Age: 52
End: 2022-08-17

## 2022-08-17 NOTE — OUTREACH NOTE
Call Center TCM Note    Flowsheet Row Responses   [facility] patient discharged from? [place]   Does the patient have one of the following disease processes/diagnoses(primary or secondary)? Other   TCM attempt successful? No   Unsuccessful attempts Attempt 3   Does the patient have a primary care provider?  Yes   Comments regarding PCP HOSP DC FU appt 8/24/22 @ 11:15 am          Darcie Mariee RN    8/17/2022, 14:36 EDT       SUSANA (acute kidney injury)

## 2022-08-24 PROBLEM — K59.09 OTHER CONSTIPATION: Status: ACTIVE | Noted: 2022-08-24

## 2022-08-25 ENCOUNTER — READMISSION MANAGEMENT (OUTPATIENT)
Dept: CALL CENTER | Facility: HOSPITAL | Age: 52
End: 2022-08-25

## 2022-08-25 NOTE — OUTREACH NOTE
Medical Week 2 Survey    Flowsheet Row Responses   [facility] patient discharged from? [place]   Does the patient have one of the following disease processes/diagnoses(primary or secondary)? Other   Week 2 attempt successful? No   Unsuccessful attempts Attempt 1          FAIZAN SIDHU - Registered Nurse

## 2022-08-30 ENCOUNTER — READMISSION MANAGEMENT (OUTPATIENT)
Dept: CALL CENTER | Facility: HOSPITAL | Age: 52
End: 2022-08-30

## 2022-08-30 NOTE — OUTREACH NOTE
Medical Week 3 Survey    Flowsheet Row Responses   [facility] patient discharged from? [place]   Does the patient have one of the following disease processes/diagnoses(primary or secondary)? Other   Week 3 attempt successful? No   Unsuccessful attempts Attempt 1   Revoke Decline to participate  [all numbers listed are the same]          JOSEFINA RIDLEY - Registered Nurse

## 2022-09-10 ENCOUNTER — HOSPITAL ENCOUNTER (INPATIENT)
Facility: HOSPITAL | Age: 52
LOS: 2 days | Discharge: LEFT BEFORE DISCHARGE | End: 2022-09-12
Attending: EMERGENCY MEDICINE | Admitting: INTERNAL MEDICINE

## 2022-09-10 ENCOUNTER — APPOINTMENT (OUTPATIENT)
Dept: GENERAL RADIOLOGY | Facility: HOSPITAL | Age: 52
End: 2022-09-10

## 2022-09-10 ENCOUNTER — APPOINTMENT (OUTPATIENT)
Dept: CT IMAGING | Facility: HOSPITAL | Age: 52
End: 2022-09-10

## 2022-09-10 DIAGNOSIS — N28.9 RENAL INSUFFICIENCY: ICD-10-CM

## 2022-09-10 DIAGNOSIS — E10.10 DIABETIC KETOACIDOSIS WITHOUT COMA ASSOCIATED WITH TYPE 1 DIABETES MELLITUS: Primary | ICD-10-CM

## 2022-09-10 DIAGNOSIS — R52 DIFFUSE PAIN: ICD-10-CM

## 2022-09-10 DIAGNOSIS — I10 ELEVATED BLOOD PRESSURE READING WITH DIAGNOSIS OF HYPERTENSION: ICD-10-CM

## 2022-09-10 PROBLEM — N17.9 AKI (ACUTE KIDNEY INJURY) (HCC): Status: ACTIVE | Noted: 2022-09-10

## 2022-09-10 PROBLEM — R74.8 ELEVATED LIVER ENZYMES: Status: ACTIVE | Noted: 2022-09-10

## 2022-09-10 PROBLEM — E11.10 DKA (DIABETIC KETOACIDOSIS): Status: ACTIVE | Noted: 2022-09-10

## 2022-09-10 LAB
ALBUMIN SERPL-MCNC: 4.9 G/DL (ref 3.5–5.2)
ALBUMIN/GLOB SERPL: 1.3 G/DL
ALP SERPL-CCNC: 321 U/L (ref 39–117)
ALT SERPL W P-5'-P-CCNC: 110 U/L (ref 1–33)
AMPHET+METHAMPHET UR QL: NEGATIVE
AMPHETAMINES UR QL: NEGATIVE
ANION GAP SERPL CALCULATED.3IONS-SCNC: 16 MMOL/L (ref 5–15)
ANION GAP SERPL CALCULATED.3IONS-SCNC: 30 MMOL/L (ref 5–15)
AST SERPL-CCNC: 109 U/L (ref 1–32)
ATMOSPHERIC PRESS: ABNORMAL MM[HG]
B-OH-BUTYR SERPL-SCNC: >9 MMOL/L (ref 0.02–0.27)
BACTERIA UR QL AUTO: ABNORMAL /HPF
BARBITURATES UR QL SCN: NEGATIVE
BASE EXCESS BLDV CALC-SCNC: <-20 MMOL/L (ref -2–2)
BASOPHILS # BLD AUTO: 0.03 10*3/MM3 (ref 0–0.2)
BASOPHILS NFR BLD AUTO: 0.6 % (ref 0–1.5)
BDY SITE: ABNORMAL
BENZODIAZ UR QL SCN: NEGATIVE
BILIRUB SERPL-MCNC: 0.5 MG/DL (ref 0–1.2)
BILIRUB UR QL STRIP: NEGATIVE
BODY TEMPERATURE: 37 C
BUN SERPL-MCNC: 14 MG/DL (ref 6–20)
BUN SERPL-MCNC: 17 MG/DL (ref 6–20)
BUN/CREAT SERPL: 13.6 (ref 7–25)
BUN/CREAT SERPL: 15.9 (ref 7–25)
BUPRENORPHINE SERPL-MCNC: NEGATIVE NG/ML
CALCIUM SPEC-SCNC: 10.3 MG/DL (ref 8.6–10.5)
CALCIUM SPEC-SCNC: 8.8 MG/DL (ref 8.6–10.5)
CANNABINOIDS SERPL QL: NEGATIVE
CHLORIDE SERPL-SCNC: 110 MMOL/L (ref 98–107)
CHLORIDE SERPL-SCNC: 94 MMOL/L (ref 98–107)
CLARITY UR: CLEAR
CO2 BLDA-SCNC: 6.5 MMOL/L (ref 22–33)
CO2 SERPL-SCNC: 11 MMOL/L (ref 22–29)
CO2 SERPL-SCNC: 14 MMOL/L (ref 22–29)
COCAINE UR QL: POSITIVE
COHGB MFR BLD: 1.1 %
COLOR UR: YELLOW
CREAT SERPL-MCNC: 0.88 MG/DL (ref 0.57–1)
CREAT SERPL-MCNC: 1.25 MG/DL (ref 0.57–1)
D-LACTATE SERPL-SCNC: 1.7 MMOL/L (ref 0.5–2)
DEPRECATED RDW RBC AUTO: 50.4 FL (ref 37–54)
EGFRCR SERPLBLD CKD-EPI 2021: 52.3 ML/MIN/1.73
EGFRCR SERPLBLD CKD-EPI 2021: 79.7 ML/MIN/1.73
EOSINOPHIL # BLD AUTO: 0.02 10*3/MM3 (ref 0–0.4)
EOSINOPHIL NFR BLD AUTO: 0.4 % (ref 0.3–6.2)
EPAP: 0
ERYTHROCYTE [DISTWIDTH] IN BLOOD BY AUTOMATED COUNT: 13.9 % (ref 12.3–15.4)
FLUAV SUBTYP SPEC NAA+PROBE: NOT DETECTED
FLUBV RNA ISLT QL NAA+PROBE: NOT DETECTED
GLOBULIN UR ELPH-MCNC: 3.9 GM/DL
GLUCOSE BLDC GLUCOMTR-MCNC: 134 MG/DL (ref 70–130)
GLUCOSE BLDC GLUCOMTR-MCNC: 134 MG/DL (ref 70–130)
GLUCOSE BLDC GLUCOMTR-MCNC: 182 MG/DL (ref 70–130)
GLUCOSE BLDC GLUCOMTR-MCNC: 240 MG/DL (ref 70–130)
GLUCOSE BLDC GLUCOMTR-MCNC: 257 MG/DL (ref 70–130)
GLUCOSE BLDC GLUCOMTR-MCNC: 282 MG/DL (ref 70–130)
GLUCOSE BLDC GLUCOMTR-MCNC: 330 MG/DL (ref 70–130)
GLUCOSE SERPL-MCNC: 173 MG/DL (ref 65–99)
GLUCOSE SERPL-MCNC: 441 MG/DL (ref 65–99)
GLUCOSE UR STRIP-MCNC: ABNORMAL MG/DL
HBA1C MFR BLD: 12.2 % (ref 4.8–5.6)
HCO3 BLDV-SCNC: 5.9 MMOL/L (ref 22–28)
HCT VFR BLD AUTO: 48.3 % (ref 34–46.6)
HGB BLD-MCNC: 16.3 G/DL (ref 12–15.9)
HGB BLDA-MCNC: 8.3 G/DL (ref 14–18)
HGB UR QL STRIP.AUTO: NEGATIVE
HOLD SPECIMEN: NORMAL
HYALINE CASTS UR QL AUTO: ABNORMAL /LPF
IMM GRANULOCYTES # BLD AUTO: 0 10*3/MM3 (ref 0–0.05)
IMM GRANULOCYTES NFR BLD AUTO: 0 % (ref 0–0.5)
INHALED O2 CONCENTRATION: 21 %
INR PPP: 1.01 (ref 0.84–1.13)
IPAP: 0
KETONES UR QL STRIP: ABNORMAL
LEUKOCYTE ESTERASE UR QL STRIP.AUTO: NEGATIVE
LYMPHOCYTES # BLD AUTO: 1.44 10*3/MM3 (ref 0.7–3.1)
LYMPHOCYTES NFR BLD AUTO: 29 % (ref 19.6–45.3)
Lab: ABNORMAL
MAGNESIUM SERPL-MCNC: 1.9 MG/DL (ref 1.6–2.6)
MAGNESIUM SERPL-MCNC: 2 MG/DL (ref 1.6–2.6)
MCH RBC QN AUTO: 33.1 PG (ref 26.6–33)
MCHC RBC AUTO-ENTMCNC: 33.7 G/DL (ref 31.5–35.7)
MCV RBC AUTO: 98 FL (ref 79–97)
METHADONE UR QL SCN: NEGATIVE
METHGB BLD QL: 0.8 %
MODALITY: ABNORMAL
MONOCYTES # BLD AUTO: 0.27 10*3/MM3 (ref 0.1–0.9)
MONOCYTES NFR BLD AUTO: 5.4 % (ref 5–12)
NEUTROPHILS NFR BLD AUTO: 3.21 10*3/MM3 (ref 1.7–7)
NEUTROPHILS NFR BLD AUTO: 64.6 % (ref 42.7–76)
NITRITE UR QL STRIP: NEGATIVE
NOTE: ABNORMAL
NOTIFIED BY: ABNORMAL
NOTIFIED WHO: ABNORMAL
NRBC BLD AUTO-RTO: 0 /100 WBC (ref 0–0.2)
OPIATES UR QL: POSITIVE
OSMOLALITY SERPL: 318 MOSM/KG (ref 275–295)
OXYCODONE UR QL SCN: NEGATIVE
OXYHGB MFR BLDV: 51.4 %
PAW @ PEAK INSP FLOW SETTING VENT: 0 CMH2O
PCO2 BLDV: 19.5 MM HG (ref 41–51)
PCP UR QL SCN: NEGATIVE
PH BLDV: 7.09 PH UNITS (ref 7.31–7.41)
PH UR STRIP.AUTO: 5.5 [PH] (ref 5–8)
PHOSPHATE SERPL-MCNC: 2.3 MG/DL (ref 2.5–4.5)
PHOSPHATE SERPL-MCNC: 4.1 MG/DL (ref 2.5–4.5)
PLATELET # BLD AUTO: 457 10*3/MM3 (ref 140–450)
PMV BLD AUTO: 9.5 FL (ref 6–12)
PO2 BLDV: 34.7 MM HG (ref 27–53)
POTASSIUM SERPL-SCNC: 3.9 MMOL/L (ref 3.5–5.2)
POTASSIUM SERPL-SCNC: 5 MMOL/L (ref 3.5–5.2)
PROPOXYPH UR QL: NEGATIVE
PROT SERPL-MCNC: 8.8 G/DL (ref 6–8.5)
PROT UR QL STRIP: ABNORMAL
PROTHROMBIN TIME: 13.2 SECONDS (ref 11.4–14.4)
RBC # BLD AUTO: 4.93 10*6/MM3 (ref 3.77–5.28)
RBC # UR STRIP: ABNORMAL /HPF
REF LAB TEST METHOD: ABNORMAL
SARS-COV-2 RNA PNL SPEC NAA+PROBE: NOT DETECTED
SODIUM SERPL-SCNC: 135 MMOL/L (ref 136–145)
SODIUM SERPL-SCNC: 140 MMOL/L (ref 136–145)
SP GR UR STRIP: 1.02 (ref 1–1.03)
SQUAMOUS #/AREA URNS HPF: ABNORMAL /HPF
TOTAL RATE: 0 BREATHS/MINUTE
TRICYCLICS UR QL SCN: NEGATIVE
TROPONIN T SERPL-MCNC: <0.01 NG/ML (ref 0–0.03)
UROBILINOGEN UR QL STRIP: ABNORMAL
WBC # UR STRIP: ABNORMAL /HPF
WBC NRBC COR # BLD: 4.97 10*3/MM3 (ref 3.4–10.8)
WHOLE BLOOD HOLD COAG: NORMAL
WHOLE BLOOD HOLD SPECIMEN: NORMAL

## 2022-09-10 PROCEDURE — 70450 CT HEAD/BRAIN W/O DYE: CPT

## 2022-09-10 PROCEDURE — 83036 HEMOGLOBIN GLYCOSYLATED A1C: CPT | Performed by: EMERGENCY MEDICINE

## 2022-09-10 PROCEDURE — 99223 1ST HOSP IP/OBS HIGH 75: CPT | Performed by: INTERNAL MEDICINE

## 2022-09-10 PROCEDURE — 82962 GLUCOSE BLOOD TEST: CPT

## 2022-09-10 PROCEDURE — 80306 DRUG TEST PRSMV INSTRMNT: CPT | Performed by: NURSE PRACTITIONER

## 2022-09-10 PROCEDURE — 85610 PROTHROMBIN TIME: CPT | Performed by: NURSE PRACTITIONER

## 2022-09-10 PROCEDURE — 96368 THER/DIAG CONCURRENT INF: CPT

## 2022-09-10 PROCEDURE — 25010000002 MORPHINE PER 10 MG: Performed by: EMERGENCY MEDICINE

## 2022-09-10 PROCEDURE — 83605 ASSAY OF LACTIC ACID: CPT | Performed by: NURSE PRACTITIONER

## 2022-09-10 PROCEDURE — 82010 KETONE BODYS QUAN: CPT | Performed by: EMERGENCY MEDICINE

## 2022-09-10 PROCEDURE — 93005 ELECTROCARDIOGRAM TRACING: CPT | Performed by: EMERGENCY MEDICINE

## 2022-09-10 PROCEDURE — 71045 X-RAY EXAM CHEST 1 VIEW: CPT

## 2022-09-10 PROCEDURE — 74176 CT ABD & PELVIS W/O CONTRAST: CPT

## 2022-09-10 PROCEDURE — 96365 THER/PROPH/DIAG IV INF INIT: CPT

## 2022-09-10 PROCEDURE — 96366 THER/PROPH/DIAG IV INF ADDON: CPT

## 2022-09-10 PROCEDURE — 25010000002 DROPERIDOL PER 5 MG: Performed by: EMERGENCY MEDICINE

## 2022-09-10 PROCEDURE — 83930 ASSAY OF BLOOD OSMOLALITY: CPT | Performed by: EMERGENCY MEDICINE

## 2022-09-10 PROCEDURE — 87636 SARSCOV2 & INF A&B AMP PRB: CPT | Performed by: EMERGENCY MEDICINE

## 2022-09-10 PROCEDURE — 80053 COMPREHEN METABOLIC PANEL: CPT | Performed by: EMERGENCY MEDICINE

## 2022-09-10 PROCEDURE — 84100 ASSAY OF PHOSPHORUS: CPT | Performed by: EMERGENCY MEDICINE

## 2022-09-10 PROCEDURE — 96375 TX/PRO/DX INJ NEW DRUG ADDON: CPT

## 2022-09-10 PROCEDURE — 87040 BLOOD CULTURE FOR BACTERIA: CPT | Performed by: NURSE PRACTITIONER

## 2022-09-10 PROCEDURE — 82805 BLOOD GASES W/O2 SATURATION: CPT

## 2022-09-10 PROCEDURE — 84484 ASSAY OF TROPONIN QUANT: CPT | Performed by: EMERGENCY MEDICINE

## 2022-09-10 PROCEDURE — 25010000002 HEPARIN (PORCINE) PER 1000 UNITS: Performed by: NURSE PRACTITIONER

## 2022-09-10 PROCEDURE — 83735 ASSAY OF MAGNESIUM: CPT | Performed by: EMERGENCY MEDICINE

## 2022-09-10 PROCEDURE — 85025 COMPLETE CBC W/AUTO DIFF WBC: CPT | Performed by: EMERGENCY MEDICINE

## 2022-09-10 PROCEDURE — 81001 URINALYSIS AUTO W/SCOPE: CPT | Performed by: EMERGENCY MEDICINE

## 2022-09-10 PROCEDURE — 99285 EMERGENCY DEPT VISIT HI MDM: CPT

## 2022-09-10 PROCEDURE — 36415 COLL VENOUS BLD VENIPUNCTURE: CPT

## 2022-09-10 RX ORDER — DICYCLOMINE HCL 20 MG
20 TABLET ORAL EVERY 6 HOURS PRN
Status: DISCONTINUED | OUTPATIENT
Start: 2022-09-10 | End: 2022-09-12 | Stop reason: HOSPADM

## 2022-09-10 RX ORDER — ALBUTEROL SULFATE 90 UG/1
2 AEROSOL, METERED RESPIRATORY (INHALATION) EVERY 6 HOURS PRN
Status: DISCONTINUED | OUTPATIENT
Start: 2022-09-10 | End: 2022-09-12 | Stop reason: HOSPADM

## 2022-09-10 RX ORDER — SODIUM CHLORIDE AND POTASSIUM CHLORIDE 150; 450 MG/100ML; MG/100ML
250 INJECTION, SOLUTION INTRAVENOUS CONTINUOUS PRN
Status: DISCONTINUED | OUTPATIENT
Start: 2022-09-10 | End: 2022-09-11

## 2022-09-10 RX ORDER — PANTOPRAZOLE SODIUM 40 MG/10ML
40 INJECTION, POWDER, LYOPHILIZED, FOR SOLUTION INTRAVENOUS ONCE
Status: COMPLETED | OUTPATIENT
Start: 2022-09-10 | End: 2022-09-10

## 2022-09-10 RX ORDER — SODIUM CHLORIDE 0.9 % (FLUSH) 0.9 %
10 SYRINGE (ML) INJECTION EVERY 12 HOURS SCHEDULED
Status: DISCONTINUED | OUTPATIENT
Start: 2022-09-10 | End: 2022-09-11

## 2022-09-10 RX ORDER — ASPIRIN 81 MG/1
81 TABLET, CHEWABLE ORAL DAILY
Status: DISCONTINUED | OUTPATIENT
Start: 2022-09-11 | End: 2022-09-12 | Stop reason: HOSPADM

## 2022-09-10 RX ORDER — SODIUM CHLORIDE AND POTASSIUM CHLORIDE 150; 900 MG/100ML; MG/100ML
250 INJECTION, SOLUTION INTRAVENOUS CONTINUOUS PRN
Status: DISCONTINUED | OUTPATIENT
Start: 2022-09-10 | End: 2022-09-11

## 2022-09-10 RX ORDER — SODIUM CHLORIDE 450 MG/100ML
250 INJECTION, SOLUTION INTRAVENOUS CONTINUOUS PRN
Status: DISCONTINUED | OUTPATIENT
Start: 2022-09-10 | End: 2022-09-11

## 2022-09-10 RX ORDER — GABAPENTIN 100 MG/1
100 CAPSULE ORAL NIGHTLY
Status: DISCONTINUED | OUTPATIENT
Start: 2022-09-10 | End: 2022-09-12 | Stop reason: HOSPADM

## 2022-09-10 RX ORDER — DEXTROSE, SODIUM CHLORIDE, AND POTASSIUM CHLORIDE 5; .45; .15 G/100ML; G/100ML; G/100ML
150 INJECTION INTRAVENOUS CONTINUOUS PRN
Status: DISCONTINUED | OUTPATIENT
Start: 2022-09-10 | End: 2022-09-11

## 2022-09-10 RX ORDER — DEXTROSE, SODIUM CHLORIDE, AND POTASSIUM CHLORIDE 5; .9; .15 G/100ML; G/100ML; G/100ML
150 INJECTION INTRAVENOUS CONTINUOUS PRN
Status: DISCONTINUED | OUTPATIENT
Start: 2022-09-10 | End: 2022-09-11

## 2022-09-10 RX ORDER — AMOXICILLIN 250 MG
2 CAPSULE ORAL 2 TIMES DAILY PRN
Status: DISCONTINUED | OUTPATIENT
Start: 2022-09-10 | End: 2022-09-12 | Stop reason: HOSPADM

## 2022-09-10 RX ORDER — MORPHINE SULFATE 4 MG/ML
4 INJECTION, SOLUTION INTRAMUSCULAR; INTRAVENOUS ONCE
Status: COMPLETED | OUTPATIENT
Start: 2022-09-10 | End: 2022-09-10

## 2022-09-10 RX ORDER — DEXTROSE, SODIUM CHLORIDE, AND POTASSIUM CHLORIDE 5; .45; .3 G/100ML; G/100ML; G/100ML
150 INJECTION INTRAVENOUS CONTINUOUS PRN
Status: DISCONTINUED | OUTPATIENT
Start: 2022-09-10 | End: 2022-09-11

## 2022-09-10 RX ORDER — DEXTROSE AND SODIUM CHLORIDE 5; .9 G/100ML; G/100ML
150 INJECTION, SOLUTION INTRAVENOUS CONTINUOUS PRN
Status: DISCONTINUED | OUTPATIENT
Start: 2022-09-10 | End: 2022-09-11

## 2022-09-10 RX ORDER — METOPROLOL TARTRATE 50 MG/1
50 TABLET, FILM COATED ORAL EVERY 12 HOURS SCHEDULED
Status: DISCONTINUED | OUTPATIENT
Start: 2022-09-10 | End: 2022-09-12 | Stop reason: HOSPADM

## 2022-09-10 RX ORDER — ATORVASTATIN CALCIUM 40 MG/1
80 TABLET, FILM COATED ORAL NIGHTLY
Status: DISCONTINUED | OUTPATIENT
Start: 2022-09-10 | End: 2022-09-12 | Stop reason: HOSPADM

## 2022-09-10 RX ORDER — BUSPIRONE HYDROCHLORIDE 10 MG/1
10 TABLET ORAL 2 TIMES DAILY
Status: DISCONTINUED | OUTPATIENT
Start: 2022-09-10 | End: 2022-09-12 | Stop reason: HOSPADM

## 2022-09-10 RX ORDER — NICOTINE POLACRILEX 4 MG
15 LOZENGE BUCCAL
Status: DISCONTINUED | OUTPATIENT
Start: 2022-09-10 | End: 2022-09-11

## 2022-09-10 RX ORDER — HEPARIN SODIUM 5000 [USP'U]/ML
5000 INJECTION, SOLUTION INTRAVENOUS; SUBCUTANEOUS EVERY 8 HOURS SCHEDULED
Status: DISCONTINUED | OUTPATIENT
Start: 2022-09-10 | End: 2022-09-12 | Stop reason: HOSPADM

## 2022-09-10 RX ORDER — DEXTROSE MONOHYDRATE 25 G/50ML
10-50 INJECTION, SOLUTION INTRAVENOUS
Status: DISCONTINUED | OUTPATIENT
Start: 2022-09-10 | End: 2022-09-11

## 2022-09-10 RX ORDER — HYDROXYZINE HYDROCHLORIDE 25 MG/1
25 TABLET, FILM COATED ORAL EVERY 8 HOURS PRN
Status: DISCONTINUED | OUTPATIENT
Start: 2022-09-10 | End: 2022-09-12 | Stop reason: HOSPADM

## 2022-09-10 RX ORDER — BISACODYL 10 MG
10 SUPPOSITORY, RECTAL RECTAL DAILY PRN
Status: DISCONTINUED | OUTPATIENT
Start: 2022-09-10 | End: 2022-09-12 | Stop reason: HOSPADM

## 2022-09-10 RX ORDER — POLYETHYLENE GLYCOL 3350 17 G/17G
17 POWDER, FOR SOLUTION ORAL DAILY
Refills: 0 | Status: DISCONTINUED | OUTPATIENT
Start: 2022-09-10 | End: 2022-09-12 | Stop reason: HOSPADM

## 2022-09-10 RX ORDER — SODIUM CHLORIDE 0.9 % (FLUSH) 0.9 %
10 SYRINGE (ML) INJECTION AS NEEDED
Status: DISCONTINUED | OUTPATIENT
Start: 2022-09-10 | End: 2022-09-12 | Stop reason: HOSPADM

## 2022-09-10 RX ORDER — POTASSIUM CHLORIDE, DEXTROSE MONOHYDRATE AND SODIUM CHLORIDE 300; 5; 900 MG/100ML; G/100ML; MG/100ML
150 INJECTION, SOLUTION INTRAVENOUS CONTINUOUS PRN
Status: DISCONTINUED | OUTPATIENT
Start: 2022-09-10 | End: 2022-09-11

## 2022-09-10 RX ORDER — SODIUM CHLORIDE 0.9 % (FLUSH) 0.9 %
10 SYRINGE (ML) INJECTION EVERY 12 HOURS SCHEDULED
Status: DISCONTINUED | OUTPATIENT
Start: 2022-09-10 | End: 2022-09-12 | Stop reason: HOSPADM

## 2022-09-10 RX ORDER — SODIUM CHLORIDE 9 MG/ML
250 INJECTION, SOLUTION INTRAVENOUS CONTINUOUS PRN
Status: DISCONTINUED | OUTPATIENT
Start: 2022-09-10 | End: 2022-09-11

## 2022-09-10 RX ORDER — SODIUM CHLORIDE 0.9 % (FLUSH) 0.9 %
10 SYRINGE (ML) INJECTION AS NEEDED
Status: DISCONTINUED | OUTPATIENT
Start: 2022-09-10 | End: 2022-09-11

## 2022-09-10 RX ORDER — PANTOPRAZOLE SODIUM 40 MG/10ML
40 INJECTION, POWDER, LYOPHILIZED, FOR SOLUTION INTRAVENOUS
Status: DISCONTINUED | OUTPATIENT
Start: 2022-09-11 | End: 2022-09-12

## 2022-09-10 RX ORDER — TRAZODONE HYDROCHLORIDE 50 MG/1
50 TABLET ORAL NIGHTLY
Status: DISCONTINUED | OUTPATIENT
Start: 2022-09-10 | End: 2022-09-12 | Stop reason: HOSPADM

## 2022-09-10 RX ORDER — SODIUM CHLORIDE AND POTASSIUM CHLORIDE 300; 900 MG/100ML; MG/100ML
250 INJECTION, SOLUTION INTRAVENOUS CONTINUOUS PRN
Status: DISCONTINUED | OUTPATIENT
Start: 2022-09-10 | End: 2022-09-11

## 2022-09-10 RX ORDER — DROPERIDOL 2.5 MG/ML
2.5 INJECTION, SOLUTION INTRAMUSCULAR; INTRAVENOUS ONCE
Status: COMPLETED | OUTPATIENT
Start: 2022-09-10 | End: 2022-09-10

## 2022-09-10 RX ORDER — QUETIAPINE FUMARATE 100 MG/1
100 TABLET, FILM COATED ORAL NIGHTLY
Status: DISCONTINUED | OUTPATIENT
Start: 2022-09-10 | End: 2022-09-12 | Stop reason: HOSPADM

## 2022-09-10 RX ORDER — DEXTROSE AND SODIUM CHLORIDE 5; .45 G/100ML; G/100ML
150 INJECTION, SOLUTION INTRAVENOUS CONTINUOUS PRN
Status: DISCONTINUED | OUTPATIENT
Start: 2022-09-10 | End: 2022-09-11

## 2022-09-10 RX ADMIN — SODIUM CHLORIDE 1000 ML/HR: 9 INJECTION, SOLUTION INTRAVENOUS at 16:49

## 2022-09-10 RX ADMIN — INSULIN HUMAN 1.1 UNITS/HR: 1 INJECTION, SOLUTION INTRAVENOUS at 22:21

## 2022-09-10 RX ADMIN — POTASSIUM CHLORIDE, DEXTROSE MONOHYDRATE AND SODIUM CHLORIDE 150 ML/HR: 150; 5; 450 INJECTION, SOLUTION INTRAVENOUS at 23:09

## 2022-09-10 RX ADMIN — INSULIN HUMAN 1.9 UNITS/HR: 1 INJECTION, SOLUTION INTRAVENOUS at 21:28

## 2022-09-10 RX ADMIN — PANTOPRAZOLE SODIUM 40 MG: 40 INJECTION, POWDER, FOR SOLUTION INTRAVENOUS at 15:48

## 2022-09-10 RX ADMIN — DROPERIDOL 2.5 MG: 2.5 INJECTION, SOLUTION INTRAMUSCULAR; INTRAVENOUS at 15:51

## 2022-09-10 RX ADMIN — MORPHINE SULFATE 4 MG: 4 INJECTION, SOLUTION INTRAMUSCULAR; INTRAVENOUS at 15:49

## 2022-09-10 RX ADMIN — HEPARIN SODIUM 5000 UNITS: 5000 INJECTION INTRAVENOUS; SUBCUTANEOUS at 17:38

## 2022-09-10 RX ADMIN — SODIUM CHLORIDE 2000 ML: 9 INJECTION, SOLUTION INTRAVENOUS at 15:45

## 2022-09-10 RX ADMIN — INSULIN HUMAN 2.7 UNITS/HR: 1 INJECTION, SOLUTION INTRAVENOUS at 16:45

## 2022-09-10 NOTE — H&P
INTENSIVIST   INITIAL HOSPITAL VISIT NOTE     Hospital:  LOS: 0 days     Ms. Bernadette Paris, 52 y.o. female is seen for a Chief Complaint of:  Chief Complaint   Patient presents with   • Abdominal Pain   • Hyperglycemia       DKA (diabetic ketoacidosis) (HCC)    Spinal stenosis, lumbar region, with neurogenic claudication    Epigastric pain    Hepatitis C    H/O noncompliance with medical treatment, presenting hazards to health    Other constipation    BROCK (acute kidney injury) (HCC)    Elevated liver enzymes    Diabetic ketoacidosis without coma associated with type 1 diabetes mellitus (HCC)      Subjective   S     Ms. Paris is a 51 yo female with PMH hypertension, diabetes with previous DKA episodes, cocaine use and chronic pancreatitis who presented to the ED on 9/10/2022 with multiple complaints.  According documentation patient developed pain that radiated to her back yesterday.  She also had associated vomiting, hematemesis, diarrhea, chest pain, headache, and left-sided weakness all of which had started in the last week.  For those reasons she called EMS and she was brought to the emergency department for evaluation.    Vital signs on arrival temp 98.4, heart rate 112, respiratory rate 24, blood pressure 183/122, pulse ox 90%.  Significant labs include:ABG 7.092 / 19.5 / 34.7 / 5.9, glucose 441, BUN 17, creatinine 1.25, sodium 135, potassium 5, , , alk phos 321, total bili 0.5, albumin 4.9, Phos 4.1, mag 2.0, troponin<0.010, COVID, flu AMB all negative, white count 4.9, hemoglobin 16.3, platelets 457.    Chest x-ray no evidence of active disease    CTA abdomen and pelvis with no acute intra-abdominal or intrapelvic abnormalities noted.  Status postcholecystectomy.  Heavy stool burden.    CT head without intracranial abnormalities.    While in the ED she received 2 L normal saline bolus, 4 mg IV morphine, 2-1/2 mg of droperidol, and initiated on insulin drip for DKA protocol.  She is being  admitted to the ICU service for management.    Of note she was just admitted to our hospital from 8/10/2022 - 8/15/2022 for intractable abdominal pain with nausea and vomiting, left foot drop, hypertensive crisis, type 2 diabetes, and ongoing cocaine abuse.  She has known medical noncompliance and frequently visits the ER for refills on medications.  She frequently misses her appointments with PCPs and follow-up services having required multiple referrals to new PCPs for failure to follow up.  She has known chronic uncontrolled diabetes, diabetic gastroparesis and multiple prior admissions for DKA often complaining of abdominal pain.  GI has been consulted multiple times and ultimately feels her abdominal pain is due to cocaine induced vasospasms of the abdominal arteries with ongoing cocaine abuse.  She apparently tries to use cocaine to treat her abdominal pain.  On arrival to the hospital during that admission she also complained of difficulty walking and weakness in her left foot.  She underwent stroke evaluation for her left foot weakness which was ultimately unrevealing.  Later during the hospitalization she was able to better describe that she had left foot drop.  L-spine MRI demonstrates multilevel disease however notably concern for impingement of the left nerve root at the L4-5 region.  EMG/NCV also demonstrated severe peroneal neuropathy at the left knee.  On the day of discharge this was discussed however she was adamant to leave the hospital.  Follow-up appointment with neurosurgery was made however it is unclear if she will follow through with appointment.  For her abdominal pain she ultimately received 2 enemas which produced good bowel movements followed by significant improvement in her abdominal pain.    I spent 15 minutes of time on this.  ALEX Mondragon, AGACNP-BC, APRN    Electronically signed by DMITRY Fitzgerald, 09/10/22, 5:05 PM EDT.         PMH: She  has a past medical history of  Arthritis, Bipolar disorder (HCC), Chronic bronchitis (HCC), Depression, Diabetes mellitus (HCC), GERD (gastroesophageal reflux disease), Hepatitis-C, History of blood transfusion, Hyperlipidemia, Hypertension, Infectious viral hepatitis, Migraine, and Sleep apnea.   PSxH: She  has a past surgical history that includes Hysterectomy; Cholecystectomy; Knee surgery; lumbar laminectomy discectomy decompression (N/A, 8/6/2018); and Esophagogastroduodenoscopy (N/A, 4/9/2022).      Medications:  No current facility-administered medications on file prior to encounter.     Current Outpatient Medications on File Prior to Encounter   Medication Sig   • Accu-Chek FastClix Lancets misc Use 1 each by Other route 4 (Four) Times a Day.   • acetaminophen (TYLENOL) 325 MG tablet Take 1 tablet by mouth Every 6 (Six) Hours As Needed for Mild Pain  or Moderate Pain .   • Alcohol Swabs (Alcohol Pads) 70 % pads 1 each 4 (Four) Times a Day. Dx E11.9   • amLODIPine (NORVASC) 5 MG tablet Take 1 tablet by mouth Daily.   • aspirin 81 MG chewable tablet Chew 1 tablet Daily.   • atorvastatin (LIPITOR) 80 MG tablet Take 1 tablet by mouth Every Night.   • bisacodyl (DULCOLAX) 10 MG suppository Insert 1 suppository into the rectum Daily As Needed for Constipation (Use if bisacodyl oral is ineffective).   • Blood Glucose Monitoring Suppl (OneTouch Verio Reflect) w/Device kit 1 each 4 (Four) Times a Day.   • busPIRone (BUSPAR) 10 MG tablet Take 1 tablet by mouth 2 (Two) Times a Day.   • capsaicin (ZOSTRIX) 0.075 % topical cream Apply 1 application topically to the appropriate area as directed Every 8 (Eight) Hours.   • dicyclomine (BENTYL) 20 MG tablet Take 1 tablet by mouth Every 6 (Six) Hours As Needed (pain).   • gabapentin (NEURONTIN) 100 MG capsule Take 1 capsule by mouth Every Night.   • Ginger, Zingiber officinalis, (Ginger Root) 500 MG capsule Take 1 capsule (500 mg) by mouth 3 (Three) Times a Day Before Meals.   • glucose blood (OneTouch  Verio) test strip 1 each by Other route 4 (Four) Times a Day Before Meals & at Bedtime. Use as instructed   • hydrOXYzine (ATARAX) 25 MG tablet Take 1 tablet by mouth Every 8 (Eight) Hours As Needed for Anxiety.   • ibuprofen (ADVIL,MOTRIN) 200 MG tablet Take 1 tablet by mouth Every 8 (Eight) Hours As Needed for Mild Pain .   • insulin detemir (LEVEMIR) 100 UNIT/ML injection Inject 40 Units under the skin into the appropriate area as directed 2 (Two) Times a Day.   • Insulin Pen Needle (Pen Needles) 32G X 4 MM misc Inject 1 each under the skin into the appropriate area as directed 2 (Two) Times a Day.   • Lancets (OneTouch Delica Plus [id number]) misc 1 each by Other route 4 (Four) Times a Day Before Meals & at Bedtime.   • lisinopril (PRINIVIL,ZESTRIL) 40 MG tablet Take 1 tablet by mouth Daily.   • metFORMIN (GLUCOPHAGE) 1000 MG tablet Take 1 tablet by mouth 2 (Two) Times a Day With Meals.   • metoprolol tartrate (LOPRESSOR) 50 MG tablet Take 1 tablet by mouth Every 12 (Twelve) Hours.   • nicotine (NICODERM CQ) 21 MG/24HR patch Place 1 patch on the skin as directed by provider Daily.   • ondansetron ODT (Zofran ODT) 4 MG disintegrating tablet Place 1 tablet on the tongue Every 8 (Eight) Hours As Needed for Nausea or Vomiting.   • pantoprazole (PROTONIX) 40 MG EC tablet Take 1 tablet by mouth Daily.   • polyethylene glycol (MIRALAX) 17 GM/SCOOP powder Mix 1 capful (17g) in water and drink by mouth Daily.   • QUEtiapine (SEROquel) 100 MG tablet Take 1 tablet by mouth Every Night.   • RA ALLERGY RELIEF 10 MG tablet take 1 tablet by mouth once daily   • sennosides-docusate (PERICOLACE) 8.6-50 MG per tablet Take 2 tablets by mouth 2 (Two) Times a Day As Needed for Constipation.   • traZODone (DESYREL) 50 MG tablet Take 1 tablet by mouth Every Night.   • Ventolin  (90 Base) MCG/ACT inhaler 2 puffs every 4-6 hours as needed for shortness of breath       Allergies: She is allergic to tylenol [acetaminophen].   FH:  Her family history includes Diabetes in her mother; Hypertension in her mother.   SH: She  reports that she has been smoking. She has been smoking about 1.00 pack per day. She has never used smokeless tobacco. She reports current alcohol use. She reports current drug use. Drug: Cocaine(coke).     The patient's relevant past medical, surgical and social history were reviewed and updated in Epic as appropriate.     ROS (14):   Review of Systems   Constitutional: Positive for activity change and fatigue.   Eyes: Negative.    Respiratory: Negative.    Cardiovascular: Positive for chest pain.   Gastrointestinal: Positive for abdominal pain, constipation, nausea and vomiting.   Endocrine: Negative.    Genitourinary: Negative.    Musculoskeletal: Positive for back pain.   Skin: Negative.    Allergic/Immunologic: Negative.    Neurological: Positive for headaches.   Hematological: Negative.    Psychiatric/Behavioral: The patient is nervous/anxious.        Objective   O     Vitals    Temp  Min: 98.4 °F (36.9 °C)  Max: 98.4 °F (36.9 °C)  BP  Min: 126/69  Max: 187/105  Pulse  Min: 103  Max: 117  Resp  Min: 24  Max: 24  SpO2  Min: 98 %  Max: 100 % No data recorded     I/O 24 hrs (7:00AM - 6:59 AM)  Intake/Output       09/10/22 0700 - 09/11/22 0659    Intake (ml) 2000    Output (ml) --    Net (ml) 2000    Last Weight 59 kg (130 lb)          Medications (drips):  dextrose 5 % and sodium chloride 0.45 %  dextrose 5 % and sodium chloride 0.45 % with KCl 20 mEq/L  dextrose 5 % and sodium chloride 0.45 % with KCl 40 mEq/L  dextrose 5 % and sodium chloride 0.9 %  dextrose 5 % and sodium chloride 0.9 % with KCl 20 mEq  dextrose 5% and sodium chloride 0.9% with KCl 40 mEq/L  insulin, Last Rate: 2.7 Units/hr (09/10/22 1645)  custom IV KCl infusion builder  sodium chloride  sodium chloride 0.45 % with KCl 20 mEq  sodium chloride, Last Rate: 1,000 mL/hr (09/10/22 1649)  sodium chloride  sodium chloride 0.9 % with KCl 20 mEq  sodium chloride  0.9 % with KCl 40 mEq/L        Physical Examination    Telemetry: Sinus tachycardia.    Constitutional:  No acute distress.  Resting in bed.    HEENT: Normocephalic and atraumatic.   Neck:  Normal range of motion. Neck supple.   No JVD present.   No thyromegaly.    Cardiovascular: Regular rate and rhythm.  No murmurs, rub or gallop.  No peripheral edema.   Respiratory: Normal respiratory effort.  Clear to ascultation.   Abdominal:  Soft. No masses.   Mild tenderness. No distension.   No hepatosplenomegaly.   MSK: Normal muscle strength and tone.   Extremities: No digital cyanosis or clubbing.   Skin: Skin is warm and dry.  No rashes, lesions or ulcers noted.    Neurological:   Awakens to stimulation.    Moves all extremities.   Psychiatric:  Normal affect.   Normal judgment.            Results from last 7 days   Lab Units 09/10/22  1528   WBC 10*3/mm3 4.97   HEMOGLOBIN g/dL 16.3*   MCV fL 98.0*   PLATELETS 10*3/mm3 457*     Results from last 7 days   Lab Units 09/10/22  1528   SODIUM mmol/L 135*   POTASSIUM mmol/L 5.0   CO2 mmol/L 11.0*   CREATININE mg/dL 1.25*   MAGNESIUM mg/dL 2.0   PHOSPHORUS mg/dL 4.1     Estimated Creatinine Clearance: 49.6 mL/min (A) (by C-G formula based on SCr of 1.25 mg/dL (H)).  Results from last 7 days   Lab Units 09/10/22  1528   ALK PHOS U/L 321*   BILIRUBIN mg/dL 0.5   ALT (SGPT) U/L 110*   AST (SGOT) U/L 109*       Results from last 7 days   Lab Units 09/10/22  1644   FIO2 % 21       Images:    Imaging Results (Last 24 Hours)     Procedure Component Value Units Date/Time    CT Abdomen Pelvis Without Contrast [[id number]] Collected: 09/10/22 1533     Updated: 09/10/22 1549    Narrative:      CT ABDOMEN PELVIS WO CONTRAST-     Date of Exam: 9/10/2022 3:07 PM     Indication: epigastric abd pain.     Comparison: 11 2022 and prior     Technique: Contiguous axial CT images were obtained from the lung bases  to the to the pubic symphysis without contrast.  Sagittal and  coronal  reconstructions were performed.  Automated exposure control and  iterative reconstruction methods were used.          FINDINGS:     Lower Chest: Lung bases are clear.     No free air identified below the diaphragm.     Organs: Patient is status post cholecystectomy     Limited noncontrast imaging of liver and spleen are grossly unremarkable  in appearance. Enlargement of the adrenal glands with diffuse  low-attenuation again appreciated compatible adrenal hyperplasia.  Calcifications within the pancreas suggesting chronic pancreatitis. No  definite findings to suggest acute pancreatitis noted on limited  noncontrast imaging. Kidneys demonstrate perinephric stranding similar  to the prior study. No evidence of hydronephrosis noted      GI/Bowel: Limited noncontrast imaging of the stomach and small bowel  demonstrate no acute abnormality. There is a heavy stool burden. Linear  density along the descending colon may represent a small ingested linear  7 mm foreign body. There is no evidence of acute abnormality of the  colon. Appendix is not visualized. No inflammatory changes at the base  of the cecum     Pelvis: Urinary bladder is distended and grossly unremarkable.     Patient appears to be status post hysterectomy.     Peritoneum/Retroperitoneum: The aorta is normal in caliber. No  suspicious retroperitoneal adenopathy     Bones/Soft Tissues: No destructive bone lesion.     Remote left-sided rib fractures noted.     Remote transverse process fractures on the right of L3 and possibly L4.  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  Multilevel degenerative changes of the spine.       Impression:         1. No acute intra-abdominal or intrapelvic abnormality noted.  2. Status post cholecystectomy.  3. Heavy stool burden.        This report was finalized on 9/10/2022 3:46 PM by Angel Perez.       CT Head Without Contrast [[id number]] Collected: 09/10/22 1529     Updated: 09/10/22 1546     Narrative:      CT HEAD WO CONTRAST-     Date of Exam: 9/10/2022 3:07 PM     Indication: left side weakness x1 week.     Comparison: 8/10/2022 and prior     Technique:  Contiguous axial CT images of the head were obtained without  contrast from skull base to vertex.  Coronal and sagittal  reconstructions were performed.  Automated exposure control and  iterative reconstruction methods were used.       FINDINGS:  Brain/Ventricles: There is no acute intracranial hemorrhage, mass effect  or midline shift. No abnormal extra-axial fluid collection.  The  gray-white differentiation is maintained without evidence of an acute  infarct.  There is no evidence of hydrocephalus     Orbits: The visualized portion of the orbits demonstrate no acute  abnormality.     Sinuses:The visualized paranasal sinuses and mastoid air cells  demonstrate no acute abnormality.     Soft Tissues/Skull: No acute abnormality of the visualized skull or soft  tissues.       Impression:      No acute intracranial abnormality.     This report was finalized on 9/10/2022 3:33 PM by Angel Perez.       XR Chest 1 View [[id number]] Collected: 09/10/22 1514     Updated: 09/10/22 1518    Narrative:      DATE OF EXAM: 9/10/2022 2:59 PM     PROCEDURE: XR CHEST 1 VW-     INDICATIONS: Upper Abdominal Pain Triage Protocol     COMPARISON: 8/10/2022     TECHNIQUE: Single radiographic AP view of the chest was obtained.     FINDINGS:  The heart, mediastinum, and pulmonary vasculature appear to be within  normal limits. The lungs appear well-expanded and clear. No evidence of  effusion or pneumothorax is seen. Bony structures appear to be intact.        Impression:      No evidence of active chest disease     This report was finalized on 9/10/2022 3:15 PM by Dr. Blue Alberto MD.           ECG/EMG Results (last 24 hours)     Procedure Component Value Units Date/Time    ECG 12 Lead [[id number]] Collected: 09/10/22 1425     Updated: 09/10/22 1425     QT Interval 344  ms      QTC Interval 474 ms     Narrative:      Test Reason : Upper Abdominal Pain Triage Protocol  Blood Pressure :   */*   mmHG  Vent. Rate : 114 BPM     Atrial Rate : 114 BPM     P-R Int : 146 ms          QRS Dur :  80 ms      QT Int : 344 ms       P-R-T Axes :  77  43  88 degrees     QTc Int : 474 ms    Sinus tachycardia  Right atrial enlargement  Possible Anterior infarct (cited on or before 01-JUN-2022)  Abnormal ECG  When compared with ECG of 14-AUG-2022 03:14,  No significant change was found    Referred By: ED MD           Confirmed By:           I reviewed the patient's new laboratory and imaging results.  I independently reviewed the patient's new images.    Assessment & Plan   A / P     Ms. Paris is a 53yo F with a history of T2DM, cocaine use and medical noncompliance who presented to [facility] on 9/10 with multiple complaints and was ultimately found to be in DKA. CTA of the abdomen/pelvis was notable for constipation. Labs were notable for BROCK and an anion gap metabolic acidosis. She has been given 2L of IV fluids and started on an insulin drip.       Nutrition:   NPO Diet NPO Type: Strict NPO  Advance Directives:   Code Status and Medical Interventions:   Ordered at: 09/10/22 8948     Code Status (Patient has no pulse and is not breathing):    CPR (Attempt to Resuscitate)     Medical Interventions (Patient has pulse or is breathing):    Full Support         DKA (diabetic ketoacidosis) (HCC)    Spinal stenosis, lumbar region, with neurogenic claudication    Epigastric pain    Hepatitis C    H/O noncompliance with medical treatment, presenting hazards to health    Other constipation    BROCK (acute kidney injury) (HCC)    Elevated liver enzymes    Diabetic ketoacidosis without coma associated with type 1 diabetes mellitus (HCC)      Assessment / Plan:    1. Admit to ICU  2. DKA protocol  3. IV fluids  4. NPO  5. Serial labs  6. Bowel regimen for constipation  7. Home medications have been reviewed. Hold  Lisinopril secondary to BROCK  8. UDS  9. AM labs    High risk secondary to management of severe metabolic acidosis and DKA.     Plan of care and goals reviewed during interdisciplinary rounds.  I discussed the patient's findings and my recommendations with patient and nursing staff      Sherley Forde, DO  Pulmonary and Critical Care Medicine    Electronically signed by DMITRY Fitzgerald, 09/10/22, 4:40 PM EDT.

## 2022-09-10 NOTE — ED PROVIDER NOTES
Subjective   PIT    Patient 52-year-old female with a history of diabetes mellitus, hepatitis C, hypertension who presents to the ED via EMS today with complaints of severe epigastric abdominal pain since yesterday.  She states the pain is constant radiates to her back.  She does also admit to vomiting, and states she had an episode yesterday where she vomited bright red blood, and diarrhea.  She also notes some chest pain which is severe, stabbing, sharp, aching, dull which started yesterday as well.  States the pain is 10/10.  She also complains of headache and left-sided weakness which she states began 1 week ago.  She states she was walking and fell, but did not seek medical attention at that time.  She has complaints of dysuria.  History of chronic kidney disease.  Blood sugar per EMS was 454.  Denies any shortness of breath, leg swelling.      History provided by:  Patient and EMS personnel      Review of Systems   Constitutional: Positive for appetite change. Negative for chills and fever.   Respiratory: Negative for cough and shortness of breath.    Cardiovascular: Positive for chest pain. Negative for palpitations and leg swelling.   Gastrointestinal: Positive for abdominal pain, diarrhea, nausea and vomiting. Negative for constipation.        Reports single episode of hematemesis yesterday with BRB   Genitourinary: Positive for dysuria.   Skin: Negative.    Neurological: Positive for weakness, numbness and headaches.   Hematological: Negative.    Psychiatric/Behavioral: Negative.    All other systems reviewed and are negative.      Past Medical History:   Diagnosis Date   • Arthritis    • Bipolar disorder (HCC)    • Chronic bronchitis (HCC)    • Depression    • Diabetes mellitus (HCC)     DIAGNOSED 2016   • GERD (gastroesophageal reflux disease)    • Hepatitis-C    • History of blood transfusion    • Hyperlipidemia    • Hypertension    • Infectious viral hepatitis     HEPATITIS C   • Migraine    • Sleep  apnea     PATIENT UNSURE OF SETTINGS       Allergies   Allergen Reactions   • Tylenol [Acetaminophen] Other (See Comments)     SEVERE LIVER DISEASE-CONTRAINDICATED       Past Surgical History:   Procedure Laterality Date   • CHOLECYSTECTOMY     • ENDOSCOPY N/A 4/9/2022    Procedure: ESOPHAGOGASTRODUODENOSCOPY;  Surgeon: Brunner, Mark I, MD;  Location:  [facility];  Service: Gastroenterology;  Laterality: N/A;  with antrum biopsy   • HYSTERECTOMY     • KNEE SURGERY     • LUMBAR LAMINECTOMY DISCECTOMY DECOMPRESSION N/A 8/6/2018    Procedure: LUMBAR LAMINECTOMIES L4-S1;  Surgeon: Chip Smith MD;  Location:  [facility];  Service: Neurosurgery       Family History   Problem Relation Age of Onset   • Diabetes Mother    • Hypertension Mother        Social History     Socioeconomic History   • Marital status: Single   Tobacco Use   • Smoking status: Current Every Day Smoker     Packs/day: 1.00   • Smokeless tobacco: Never Used   Substance and Sexual Activity   • Alcohol use: Yes     Comment: hx etoh abuse per ems   • Drug use: Yes     Types: Cocaine(coke)   • Sexual activity: Defer           Objective   Physical Exam  Vitals and nursing note reviewed.   Constitutional:       General: She is in acute distress.      Appearance: She is underweight. She is ill-appearing.   HENT:      Head: Normocephalic and atraumatic.   Eyes:      Extraocular Movements: Extraocular movements intact.      Pupils: Pupils are equal, round, and reactive to light.   Cardiovascular:      Rate and Rhythm: Regular rhythm. Tachycardia present.      Pulses:           Radial pulses are 2+ on the right side and 2+ on the left side.        Dorsalis pedis pulses are 2+ on the right side and 2+ on the left side.        Posterior tibial pulses are 2+ on the right side and 2+ on the left side.      Heart sounds: Normal heart sounds. No murmur heard.    No friction rub. No gallop.   Pulmonary:      Effort: Pulmonary effort is normal.      Breath  sounds: Normal breath sounds. No wheezing, rhonchi or rales.   Chest:      Chest wall: Tenderness present. No deformity or swelling.   Abdominal:      General: Abdomen is scaphoid. Bowel sounds are normal.      Palpations: Abdomen is soft. There is no mass.      Tenderness: There is abdominal tenderness in the epigastric area. There is guarding. There is no rebound. Negative signs include Unger's sign and Rovsing's sign.      Hernia: No hernia is present.   Skin:     General: Skin is warm and dry.      Capillary Refill: Capillary refill takes less than 2 seconds.   Neurological:      Mental Status: She is alert and oriented to person, place, and time.      Cranial Nerves: No facial asymmetry.      Sensory: Sensory deficit present.      Motor: Weakness present.   Psychiatric:         Mood and Affect: Mood normal.         Behavior: Behavior normal.         Critical Care  Performed by: Amor Parsons MD  Authorized by: Amor Parsons MD     Critical care provider statement:     Critical care time (minutes):  60    Critical care was necessary to treat or prevent imminent or life-threatening deterioration of the following conditions:  Endocrine crisis and metabolic crisis    Critical care was time spent personally by me on the following activities:  Development of treatment plan with patient or surrogate, discussions with consultants, examination of patient, obtaining history from patient or surrogate, ordering and performing treatments and interventions, ordering and review of laboratory studies, ordering and review of radiographic studies, pulse oximetry, re-evaluation of patient's condition and review of old charts    Care discussed with: admitting provider                 ED Course  ED Course as of 09/10/22 1911   Sat Sep 10, 2022   1527 XR Chest 1 View [RS]   1619 CO2(!): 11.0 [RS]   1620 Glucose(!!): 441 [RS]   1620 Beta-Hydroxybutyrate Quant(!): >9.000 [RS]   1620 Creatinine(!): 1.25 [RS]   1626  Patient with evidence of DKA.  We will plan admission to the ICU for further evaluation management.  Intensivist paged. [RS]   1627 Case discussed with Dr. Forde who is agreeable with the plan for admission [RS]   7602 pH, Venous(!!): 7.092 [RS]      ED Course User Index  [RS] Amor Parsons MD                                           MDM  Number of Diagnoses or Management Options  Diabetic ketoacidosis without coma associated with type 1 diabetes mellitus (HCC)  Diffuse pain  Elevated blood pressure reading with diagnosis of hypertension  Renal insufficiency  Diagnosis management comments: Recent Results (from the past 24 hour(s))  -ECG 12 Lead:   Collection Time: 09/10/22  2:25 PM       Result                      Value             Ref Range           QT Interval                 344               ms                  QTC Interval                474               ms             -COVID-19 and FLU A/B PCR - Swab, Nasopharynx:   Collection Time: 09/10/22  2:59 PM  Specimen: Nasopharynx; Swab       Result                      Value             Ref Range           COVID19                     Not Detected      Not Detected*       Influenza A PCR             Not Detected      Not Detected        Influenza B PCR             Not Detected      Not Detected   -Comprehensive Metabolic Panel:   Collection Time: 09/10/22  3:28 PM  Specimen: Blood       Result                      Value             Ref Range           Glucose                     441 (C)           65 - 99 mg/dL       BUN                         17                6 - 20 mg/dL        Creatinine                  1.25 (H)          0.57 - 1.00 *       Sodium                      135 (L)           136 - 145 mm*       Potassium                   5.0               3.5 - 5.2 mm*       Chloride                    94 (L)            98 - 107 mmo*       CO2                         11.0 (L)          22.0 - 29.0 *       Calcium                     10.3              8.6 -  10.5 m*       Total Protein               8.8 (H)           6.0 - 8.5 g/*       Albumin                     4.90              3.50 - 5.20 *       ALT (SGPT)                  110 (H)           1 - 33 U/L          AST (SGOT)                  109 (H)           1 - 32 U/L          Alkaline Phosphatase        321 (H)           39 - 117 U/L        Total Bilirubin             0.5               0.0 - 1.2 mg*       Globulin                    3.9               gm/dL               A/G Ratio                   1.3               g/dL                BUN/Creatinine Ratio        13.6              7.0 - 25.0          Anion Gap                   30.0 (H)          5.0 - 15.0 m*       eGFR                        52.3 (L)          >60.0 mL/min*  -Troponin:   Collection Time: 09/10/22  3:28 PM  Specimen: Blood       Result                      Value             Ref Range           Troponin T                  <0.010            0.000 - 0.03*  -Green Top (Gel):   Collection Time: 09/10/22  3:28 PM       Result                      Value             Ref Range           Extra Tube                                                    Hold for add-ons.  -Lavender Top:   Collection Time: 09/10/22  3:28 PM       Result                      Value             Ref Range           Extra Tube                                                    hold for add-on  -Gold Top - SST:   Collection Time: 09/10/22  3:28 PM       Result                      Value             Ref Range           Extra Tube                                                    Hold for add-ons.  -Light Blue Top:   Collection Time: 09/10/22  3:28 PM       Result                      Value             Ref Range           Extra Tube                                                    Hold for add-ons.  -CBC Auto Differential:   Collection Time: 09/10/22  3:28 PM  Specimen: Blood       Result                      Value             Ref Range           WBC                         4.97               3.40 - 10.80*       RBC                         4.93              3.77 - 5.28 *       Hemoglobin                  16.3 (H)          12.0 - 15.9 *       Hematocrit                  48.3 (H)          34.0 - 46.6 %       MCV                         98.0 (H)          79.0 - 97.0 *       MCH                         33.1 (H)          26.6 - 33.0 *       MCHC                        33.7              31.5 - 35.7 *       RDW                         13.9              12.3 - 15.4 %       RDW-SD                      50.4              37.0 - 54.0 *       MPV                         9.5               6.0 - 12.0 fL       Platelets                   457 (H)           140 - 450 10*       Neutrophil %                64.6              42.7 - 76.0 %       Lymphocyte %                29.0              19.6 - 45.3 %       Monocyte %                  5.4               5.0 - 12.0 %        Eosinophil %                0.4               0.3 - 6.2 %         Basophil %                  0.6               0.0 - 1.5 %         Immature Grans %            0.0               0.0 - 0.5 %         Neutrophils, Absolute       3.21              1.70 - 7.00 *       Lymphocytes, Absolute       1.44              0.70 - 3.10 *       Monocytes, Absolute         0.27              0.10 - 0.90 *       Eosinophils, Absolute       0.02              0.00 - 0.40 *       Basophils, Absolute         0.03              0.00 - 0.20 *       Immature Grans, Absolu*     0.00              0.00 - 0.05 *       nRBC                        0.0               0.0 - 0.2 /1*  -Phosphorus:   Collection Time: 09/10/22  3:28 PM  Specimen: Blood       Result                      Value             Ref Range           Phosphorus                  4.1               2.5 - 4.5 mg*  -Magnesium:   Collection Time: 09/10/22  3:28 PM  Specimen: Blood       Result                      Value             Ref Range           Magnesium                   2.0               1.6 - 2.6 mg*  -Beta  Hydroxybutyrate Quantitative:   Collection Time: 09/10/22  3:28 PM  Specimen: Blood       Result                      Value             Ref Range           Beta-Hydroxybutyrate Q*     >9.000 (H)        0.020 - 0.27*  -Osmolality, Serum:   Collection Time: 09/10/22  3:28 PM  Specimen: Blood       Result                      Value             Ref Range           Osmolality                  318 (H)           275 - 295 mO*  -Hemoglobin A1c:   Collection Time: 09/10/22  3:28 PM  Specimen: Blood       Result                      Value             Ref Range           Hemoglobin A1C              12.20 (H)         4.80 - 5.60 %  -Protime-INR:   Collection Time: 09/10/22  3:28 PM  Specimen: Blood       Result                      Value             Ref Range           Protime                     13.2              11.4 - 14.4 *       INR                         1.01              0.84 - 1.13    -Lactic Acid, Plasma:   Collection Time: 09/10/22  3:28 PM  Specimen: Blood       Result                      Value             Ref Range           Lactate                     1.7               0.5 - 2.0 mm*  -POC Glucose Once:   Collection Time: 09/10/22  4:37 PM  Specimen: Blood       Result                      Value             Ref Range           Glucose                     330 (H)           70 - 130 mg/*  -Blood Gas, Venous With Co-Ox:   Collection Time: 09/10/22  4:44 PM  Specimen: Venous Blood       Result                      Value             Ref Range           Site                        Nurse/Dr Draw                         pH, Venous                  7.092 (C)         7.310 - 7.41*       pCO2, Venous                19.5 (L)          41.0 - 51.0 *       pO2, Venous                 34.7              27.0 - 53.0 *       HCO3, Venous                5.9 (L)           22.0 - 28.0 *       Base Excess, Venous         <-20.0 (L)        -2.0 - 2.0 m*       Hemoglobin, Blood Gas       8.3 (L)           14 - 18 g/dL        Oxyhemoglobin  Venous        51.4              %                   Methemoglobin Venous        0.8               %                   Carboxyhemoglobin Veno*     1.1               %                   CO2 Content                 6.5 (L)           22 - 33 mmol*       Temperature                 37.0              C                   Barometric Pressure fo*                                           Modality                    Room Air                              FIO2                        21                %                   Rate                        0                 Breaths/phuong*       PIP                         0                 cmH2O               IPAP                        0                                     EPAP                        0                                     Note                                                              Notified Who                                                  HILARIO VALENZUELA RN       Notified By                 [id number]                                Notified Time                                                 09/10/2022 16:42  -POC Glucose Once:   Collection Time: 09/10/22  5:38 PM  Specimen: Blood       Result                      Value             Ref Range           Glucose                     282 (H)           70 - 130 mg/*  -POC Glucose Once:   Collection Time: 09/10/22  6:39 PM  Specimen: Blood       Result                      Value             Ref Range           Glucose                     257 (H)           70 - 130 mg/*  Note: In addition to lab results from this visit, the labs listed above may include labs taken at another facility or during a different encounter within the last 24 hours. Please correlate lab times with ED admission and discharge times for further clarification of the services performed during this visit.    CT Head Without Contrast   Final Result    No acute intracranial abnormality.         This report was finalized on 9/10/2022 3:33 PM by Angel  Chris.          CT Abdomen Pelvis Without Contrast   Final Result         1. No acute intra-abdominal or intrapelvic abnormality noted.    2. Status post cholecystectomy.    3. Heavy stool burden.              This report was finalized on 9/10/2022 3:46 PM by Angel Perez.          XR Chest 1 View   Final Result    No evidence of active chest disease         This report was finalized on 9/10/2022 3:15 PM by Dr. Blue Alberto MD.          -----------------------------------------------------            09/10/22  09/10/22  09/10/22  09/10/22               1800      1820      1900      1901     -----------------------------------------------------   BP:       129/81    143/85    150/83               BP Location:                                           Patient Position:                                           Pulse:     101       101       105       103       Resp:                                              Temp:                                              TempSrc:                                           SpO2:                98%       96%       97%       Weight:                                            Height:                                           -----------------------------------------------------  Medications  sodium chloride 0.9 % flush 10 mL (has no administration in time range)  sodium chloride 0.9 % flush 10 mL (has no administration in time range)  sodium chloride 0.9 % flush 10 mL (has no administration in time range)  sodium chloride 0.9 % flush 10 mL (has no administration in time range)  dextrose (GLUTOSE) oral gel 15 g (has no administration in time range)  dextrose (D50W) (25 g/50 mL) IV injection 10-50 mL (has no administration in time range)  glucagon (human recombinant) (GLUCAGEN DIAGNOSTIC) injection 1 mg (has no administration in time range)  sodium chloride 0.9 % bolus (0 mL/hr Intravenous Stopped 9/10/22  1749)  sodium chloride 0.9 % infusion (has no administration in time range)  sodium chloride 0.9 % with KCl 20 mEq/L infusion (has no administration in time range)  sodium chloride 0.9 % with KCl 40 mEq/L infusion (has no administration in time range)  dextrose 5 % and sodium chloride 0.9 % infusion (has no administration in time range)  dextrose 5 % and sodium chloride 0.9 % with KCl 20 mEq/L infusion (has no administration in time range)  dextrose 5 % and sodium chloride 0.9 % with KCl 40 mEq/L infusion (has no administration in time range)  sodium chloride 0.45 % infusion (has no administration in time range)  sodium chloride 0.45 % with KCl 20 mEq/L infusion (has no administration in time range)  sodium chloride 0.45 % 1,000 mL with potassium chloride 40 mEq infusion (has no administration in time range)  dextrose 5 % and sodium chloride 0.45 % infusion (has no administration in time range)  dextrose 5 % and sodium chloride 0.45 % with KCl 20 mEq/L infusion (has no administration in time range)  dextrose 5 % and sodium chloride 0.45 % with KCl 40 mEq/L infusion (has no administration in time range)  insulin regular 1 unit/mL in 0.9% sodium chloride (3.1 Units/hr Intravenous Rate Change (DUAL SIGN) 9/10/22 1843)  pantoprazole (PROTONIX) injection 40 mg (has no administration in time range)  sodium chloride 0.9 % flush 10 mL (has no administration in time range)  sodium chloride 0.9 % flush 10 mL (has no administration in time range)  heparin (porcine) 5000 UNIT/ML injection 5,000 Units (5,000 Units Subcutaneous Given 9/10/22 1738)  sodium chloride 0.9 % flush 10 mL (has no administration in time range)  sodium chloride 0.9 % flush 10 mL (has no administration in time range)  aspirin chewable tablet 81 mg (has no administration in time range)  atorvastatin (LIPITOR) tablet 80 mg (has no administration in time range)  bisacodyl (DULCOLAX) suppository 10 mg (has no administration in time range)   busPIRone (BUSPAR)  tablet 10 mg (has no administration in time range)  dicyclomine (BENTYL) tablet 20 mg (has no administration in time range)  gabapentin (NEURONTIN) capsule 100 mg (has no administration in time range)  hydrOXYzine (ATARAX) tablet 25 mg (has no administration in time range)  metoprolol tartrate (LOPRESSOR) tablet 50 mg (has no administration in time range)  polyethylene glycol (MIRALAX) packet 17 g (has no administration in time range)  QUEtiapine (SEROquel) tablet 100 mg (has no administration in time range)  sennosides-docusate (PERICOLACE) 8.6-50 MG per tablet 2 tablet (has no administration in time range)  traZODone (DESYREL) tablet 50 mg (has no administration in time range)  albuterol sulfate HFA (PROVENTIL HFA;VENTOLIN HFA;PROAIR HFA) inhaler 2 puff (has no administration in time range)  sodium chloride 0.9 % bolus 2,000 mL (0 mL Intravenous Stopped 9/10/22 1708)   droperidol (INAPSINE) injection 2.5 mg (2.5 mg Intravenous Given 9/10/22 1551)  Morphine sulfate (PF) injection 4 mg (4 mg Intravenous Given 9/10/22 1549)  pantoprazole (PROTONIX) injection 40 mg (40 mg Intravenous Given 9/10/22 1548)  ECG/EMG Results (last 24 hours)     Procedure Component Value Units Date/Time    ECG 12 Lead ([id number]) Collected: 09/10/22 1425     Updated: 09/10/22 1425     QT Interval 344 ms      QTC Interval 474 ms     Narrative:      Test Reason : Upper Abdominal Pain Triage Protocol  Blood Pressure :   */*   mmHG  Vent. Rate : 114 BPM     Atrial Rate : 114 BPM     P-R Int : 146 ms          QRS Dur :  80 ms      QT Int : 344 ms       P-R-T Axes :  77  43  88 degrees     QTc Int : 474 ms    Sinus tachycardia  Right atrial enlargement  Possible Anterior infarct (cited on or before 01-JUN-2022)  Abnormal ECG  When compared with ECG of 14-AUG-2022 03:14,  No significant change was found    Referred By: ED MD           Confirmed By:       ECG 12 Lead   Preliminary Result    Test Reason : Upper Abdominal Pain Triage Protocol     Blood Pressure :   */*   mmHG    Vent. Rate : 114 BPM     Atrial Rate : 114 BPM       P-R Int : 146 ms          QRS Dur :  80 ms        QT Int : 344 ms       P-R-T Axes :  77  43  88 degrees       QTc Int : 474 ms        Sinus tachycardia    Right atrial enlargement    Possible Anterior infarct (cited on or before 01-JUN-2022)    Abnormal ECG    When compared with ECG of 14-AUG-2022 03:14,    No significant change was found        Referred By: ED MD           Confirmed By:             Amount and/or Complexity of Data Reviewed  Clinical lab tests: reviewed  Tests in the radiology section of CPT®: reviewed  Decide to obtain previous medical records or to obtain history from someone other than the patient: yes  Discuss the patient with other providers: yes  Independent visualization of images, tracings, or specimens: yes    Critical Care  Total time providing critical care: 30-74 minutes      Final diagnoses:   Diabetic ketoacidosis without coma associated with type 1 diabetes mellitus (HCC)   Elevated blood pressure reading with diagnosis of hypertension   Diffuse pain   Renal insufficiency       ED Disposition  ED Disposition     ED Disposition   Decision to Admit    Condition   --    Comment   Level of Care: Critical Care [6]   Diagnosis: Diabetic ketoacidosis without coma associated with type 1 diabetes mellitus (HCC) [6428223]   Admitting Physician: PEGGY SAUNDERS [[id number]]   Certification: I Certify That Inpatient Hospital Services Are Medically Necessary For Greater Than 2 Midnights               No follow-up provider specified.       Medication List      No changes were made to your prescriptions during this visit.          Amor Parsons MD  09/10/22 1911

## 2022-09-11 LAB
ANION GAP SERPL CALCULATED.3IONS-SCNC: 8 MMOL/L (ref 5–15)
ARTERIAL PATENCY WRIST A: ABNORMAL
ATMOSPHERIC PRESS: ABNORMAL MM[HG]
BASE EXCESS BLDA CALC-SCNC: -6.5 MMOL/L (ref 0–2)
BDY SITE: ABNORMAL
BODY TEMPERATURE: 37 C
BUN SERPL-MCNC: 14 MG/DL (ref 6–20)
BUN/CREAT SERPL: 18.2 (ref 7–25)
CA-I SERPL ISE-MCNC: 1.38 MMOL/L (ref 1.12–1.32)
CALCIUM SPEC-SCNC: 8.5 MG/DL (ref 8.6–10.5)
CHLORIDE SERPL-SCNC: 113 MMOL/L (ref 98–107)
CO2 BLDA-SCNC: 19.5 MMOL/L (ref 22–33)
CO2 SERPL-SCNC: 19 MMOL/L (ref 22–29)
COHGB MFR BLD: 1.3 % (ref 0–2)
CREAT SERPL-MCNC: 0.77 MG/DL (ref 0.57–1)
DEPRECATED RDW RBC AUTO: 47.7 FL (ref 37–54)
EGFRCR SERPLBLD CKD-EPI 2021: 93.5 ML/MIN/1.73
EPAP: 0
ERYTHROCYTE [DISTWIDTH] IN BLOOD BY AUTOMATED COUNT: 13.5 % (ref 12.3–15.4)
GLUCOSE BLDC GLUCOMTR-MCNC: 111 MG/DL (ref 70–130)
GLUCOSE BLDC GLUCOMTR-MCNC: 128 MG/DL (ref 70–130)
GLUCOSE BLDC GLUCOMTR-MCNC: 128 MG/DL (ref 70–130)
GLUCOSE BLDC GLUCOMTR-MCNC: 129 MG/DL (ref 70–130)
GLUCOSE BLDC GLUCOMTR-MCNC: 154 MG/DL (ref 70–130)
GLUCOSE BLDC GLUCOMTR-MCNC: 178 MG/DL (ref 70–130)
GLUCOSE BLDC GLUCOMTR-MCNC: 179 MG/DL (ref 70–130)
GLUCOSE BLDC GLUCOMTR-MCNC: 186 MG/DL (ref 70–130)
GLUCOSE BLDC GLUCOMTR-MCNC: 233 MG/DL (ref 70–130)
GLUCOSE BLDC GLUCOMTR-MCNC: 260 MG/DL (ref 70–130)
GLUCOSE BLDC GLUCOMTR-MCNC: 272 MG/DL (ref 70–130)
GLUCOSE BLDC GLUCOMTR-MCNC: 321 MG/DL (ref 70–130)
GLUCOSE BLDC GLUCOMTR-MCNC: 444 MG/DL (ref 70–130)
GLUCOSE BLDC GLUCOMTR-MCNC: 447 MG/DL (ref 70–130)
GLUCOSE SERPL-MCNC: 227 MG/DL (ref 65–99)
HCO3 BLDA-SCNC: 18.5 MMOL/L (ref 20–26)
HCT VFR BLD AUTO: 35.1 % (ref 34–46.6)
HCT VFR BLD CALC: 38.2 % (ref 38–51)
HGB BLD-MCNC: 12 G/DL (ref 12–15.9)
HGB BLDA-MCNC: 12.5 G/DL (ref 14–18)
INHALED O2 CONCENTRATION: 21 %
INR PPP: 0.98 (ref 0.84–1.13)
IPAP: 0
MAGNESIUM SERPL-MCNC: 1.7 MG/DL (ref 1.6–2.6)
MCH RBC QN AUTO: 33.2 PG (ref 26.6–33)
MCHC RBC AUTO-ENTMCNC: 34.2 G/DL (ref 31.5–35.7)
MCV RBC AUTO: 97.2 FL (ref 79–97)
METHGB BLD QL: 0.4 % (ref 0–1.5)
NOTE: ABNORMAL
OXYHGB MFR BLDV: 96.2 % (ref 94–99)
PAW @ PEAK INSP FLOW SETTING VENT: 0 CMH2O
PCO2 BLDA: 34.3 MM HG (ref 35–45)
PCO2 TEMP ADJ BLD: 34.3 MM HG (ref 35–45)
PH BLDA: 7.34 PH UNITS (ref 7.35–7.45)
PH, TEMP CORRECTED: 7.34 PH UNITS
PHOSPHATE SERPL-MCNC: 1.9 MG/DL (ref 2.5–4.5)
PHOSPHATE SERPL-MCNC: 2.3 MG/DL (ref 2.5–4.5)
PLATELET # BLD AUTO: 310 10*3/MM3 (ref 140–450)
PMV BLD AUTO: 9.4 FL (ref 6–12)
PO2 BLDA: 94 MM HG (ref 83–108)
PO2 TEMP ADJ BLD: 94 MM HG (ref 83–108)
POTASSIUM SERPL-SCNC: 4.1 MMOL/L (ref 3.5–5.2)
PROTHROMBIN TIME: 12.9 SECONDS (ref 11.4–14.4)
QT INTERVAL: 344 MS
QTC INTERVAL: 474 MS
RBC # BLD AUTO: 3.61 10*6/MM3 (ref 3.77–5.28)
SODIUM SERPL-SCNC: 140 MMOL/L (ref 136–145)
TOTAL RATE: 0 BREATHS/MINUTE
WBC NRBC COR # BLD: 5.23 10*3/MM3 (ref 3.4–10.8)

## 2022-09-11 PROCEDURE — 99232 SBSQ HOSP IP/OBS MODERATE 35: CPT | Performed by: INTERNAL MEDICINE

## 2022-09-11 PROCEDURE — 84100 ASSAY OF PHOSPHORUS: CPT | Performed by: EMERGENCY MEDICINE

## 2022-09-11 PROCEDURE — 83050 HGB METHEMOGLOBIN QUAN: CPT

## 2022-09-11 PROCEDURE — 63710000001 INSULIN DETEMIR PER 5 UNITS: Performed by: NURSE PRACTITIONER

## 2022-09-11 PROCEDURE — 82962 GLUCOSE BLOOD TEST: CPT

## 2022-09-11 PROCEDURE — 0 MAGNESIUM SULFATE 4 GM/100ML SOLUTION: Performed by: NURSE PRACTITIONER

## 2022-09-11 PROCEDURE — 63710000001 INSULIN DETEMIR PER 5 UNITS: Performed by: INTERNAL MEDICINE

## 2022-09-11 PROCEDURE — 63710000001 INSULIN REGULAR HUMAN PER 5 UNITS: Performed by: NURSE PRACTITIONER

## 2022-09-11 PROCEDURE — 96366 THER/PROPH/DIAG IV INF ADDON: CPT

## 2022-09-11 PROCEDURE — 63710000001 INSULIN LISPRO (HUMAN) PER 5 UNITS: Performed by: INTERNAL MEDICINE

## 2022-09-11 PROCEDURE — 63710000001 INSULIN LISPRO (HUMAN) PER 5 UNITS: Performed by: NURSE PRACTITIONER

## 2022-09-11 PROCEDURE — 25010000002 HEPARIN (PORCINE) PER 1000 UNITS: Performed by: NURSE PRACTITIONER

## 2022-09-11 PROCEDURE — 82805 BLOOD GASES W/O2 SATURATION: CPT

## 2022-09-11 PROCEDURE — 82375 ASSAY CARBOXYHB QUANT: CPT

## 2022-09-11 PROCEDURE — 82330 ASSAY OF CALCIUM: CPT | Performed by: NURSE PRACTITIONER

## 2022-09-11 PROCEDURE — 85027 COMPLETE CBC AUTOMATED: CPT | Performed by: NURSE PRACTITIONER

## 2022-09-11 PROCEDURE — 36600 WITHDRAWAL OF ARTERIAL BLOOD: CPT

## 2022-09-11 PROCEDURE — 84100 ASSAY OF PHOSPHORUS: CPT | Performed by: INTERNAL MEDICINE

## 2022-09-11 PROCEDURE — 80048 BASIC METABOLIC PNL TOTAL CA: CPT | Performed by: EMERGENCY MEDICINE

## 2022-09-11 PROCEDURE — 83735 ASSAY OF MAGNESIUM: CPT | Performed by: EMERGENCY MEDICINE

## 2022-09-11 PROCEDURE — 85610 PROTHROMBIN TIME: CPT | Performed by: NURSE PRACTITIONER

## 2022-09-11 RX ORDER — OXYCODONE HYDROCHLORIDE 5 MG/1
5 TABLET ORAL EVERY 6 HOURS PRN
Status: DISCONTINUED | OUTPATIENT
Start: 2022-09-11 | End: 2022-09-12 | Stop reason: HOSPADM

## 2022-09-11 RX ORDER — INSULIN LISPRO 100 [IU]/ML
7 INJECTION, SOLUTION INTRAVENOUS; SUBCUTANEOUS
Status: DISCONTINUED | OUTPATIENT
Start: 2022-09-12 | End: 2022-09-12

## 2022-09-11 RX ORDER — NICOTINE POLACRILEX 4 MG
15 LOZENGE BUCCAL
Status: DISCONTINUED | OUTPATIENT
Start: 2022-09-11 | End: 2022-09-12 | Stop reason: HOSPADM

## 2022-09-11 RX ORDER — DEXTROSE MONOHYDRATE 25 G/50ML
25 INJECTION, SOLUTION INTRAVENOUS
Status: DISCONTINUED | OUTPATIENT
Start: 2022-09-11 | End: 2022-09-12 | Stop reason: HOSPADM

## 2022-09-11 RX ORDER — MAGNESIUM SULFATE HEPTAHYDRATE 40 MG/ML
4 INJECTION, SOLUTION INTRAVENOUS AS NEEDED
Status: DISCONTINUED | OUTPATIENT
Start: 2022-09-11 | End: 2022-09-12 | Stop reason: HOSPADM

## 2022-09-11 RX ORDER — INSULIN LISPRO 100 [IU]/ML
0-24 INJECTION, SOLUTION INTRAVENOUS; SUBCUTANEOUS
Status: DISCONTINUED | OUTPATIENT
Start: 2022-09-11 | End: 2022-09-12 | Stop reason: HOSPADM

## 2022-09-11 RX ORDER — INSULIN LISPRO 100 [IU]/ML
0-14 INJECTION, SOLUTION INTRAVENOUS; SUBCUTANEOUS
Status: DISCONTINUED | OUTPATIENT
Start: 2022-09-11 | End: 2022-09-11

## 2022-09-11 RX ORDER — MAGNESIUM SULFATE HEPTAHYDRATE 40 MG/ML
2 INJECTION, SOLUTION INTRAVENOUS AS NEEDED
Status: DISCONTINUED | OUTPATIENT
Start: 2022-09-11 | End: 2022-09-12 | Stop reason: HOSPADM

## 2022-09-11 RX ADMIN — INSULIN LISPRO 24 UNITS: 100 INJECTION, SOLUTION INTRAVENOUS; SUBCUTANEOUS at 21:18

## 2022-09-11 RX ADMIN — INSULIN HUMAN 2.3 UNITS/HR: 1 INJECTION, SOLUTION INTRAVENOUS at 06:19

## 2022-09-11 RX ADMIN — DOCUSATE SODIUM 50 MG AND SENNOSIDES 8.6 MG 2 TABLET: 8.6; 5 TABLET, FILM COATED ORAL at 09:43

## 2022-09-11 RX ADMIN — POTASSIUM & SODIUM PHOSPHATES POWDER PACK 280-160-250 MG 2 PACKET: 280-160-250 PACK at 06:39

## 2022-09-11 RX ADMIN — INSULIN HUMAN 11 UNITS: 100 INJECTION, SOLUTION PARENTERAL at 13:09

## 2022-09-11 RX ADMIN — HYDROXYZINE HYDROCHLORIDE 25 MG: 25 TABLET, FILM COATED ORAL at 09:41

## 2022-09-11 RX ADMIN — OXYCODONE 5 MG: 5 TABLET ORAL at 21:18

## 2022-09-11 RX ADMIN — OXYCODONE 5 MG: 5 TABLET ORAL at 15:31

## 2022-09-11 RX ADMIN — TRAZODONE HYDROCHLORIDE 50 MG: 50 TABLET ORAL at 00:10

## 2022-09-11 RX ADMIN — MAGNESIUM SULFATE HEPTAHYDRATE 4 G: 40 INJECTION, SOLUTION INTRAVENOUS at 15:31

## 2022-09-11 RX ADMIN — BUSPIRONE HYDROCHLORIDE 10 MG: 10 TABLET ORAL at 21:18

## 2022-09-11 RX ADMIN — GABAPENTIN 100 MG: 100 CAPSULE ORAL at 21:18

## 2022-09-11 RX ADMIN — INSULIN HUMAN 2.4 UNITS/HR: 1 INJECTION, SOLUTION INTRAVENOUS at 00:20

## 2022-09-11 RX ADMIN — POTASSIUM CHLORIDE, DEXTROSE MONOHYDRATE AND SODIUM CHLORIDE 150 ML/HR: 150; 5; 450 INJECTION, SOLUTION INTRAVENOUS at 05:30

## 2022-09-11 RX ADMIN — QUETIAPINE FUMARATE 100 MG: 100 TABLET ORAL at 21:18

## 2022-09-11 RX ADMIN — SODIUM CHLORIDE, POTASSIUM CHLORIDE, SODIUM LACTATE AND CALCIUM CHLORIDE 1000 ML: 600; 310; 30; 20 INJECTION, SOLUTION INTRAVENOUS at 04:14

## 2022-09-11 RX ADMIN — INSULIN LISPRO 5 UNITS: 100 INJECTION, SOLUTION INTRAVENOUS; SUBCUTANEOUS at 11:24

## 2022-09-11 RX ADMIN — BUSPIRONE HYDROCHLORIDE 10 MG: 10 TABLET ORAL at 09:43

## 2022-09-11 RX ADMIN — ATORVASTATIN CALCIUM 80 MG: 40 TABLET, FILM COATED ORAL at 00:08

## 2022-09-11 RX ADMIN — Medication 10 ML: at 09:45

## 2022-09-11 RX ADMIN — METOPROLOL TARTRATE 50 MG: 50 TABLET, FILM COATED ORAL at 21:18

## 2022-09-11 RX ADMIN — INSULIN HUMAN 2.9 UNITS/HR: 1 INJECTION, SOLUTION INTRAVENOUS at 04:13

## 2022-09-11 RX ADMIN — Medication 10 ML: at 21:18

## 2022-09-11 RX ADMIN — INSULIN HUMAN 5.2 UNITS/HR: 1 INJECTION, SOLUTION INTRAVENOUS at 03:02

## 2022-09-11 RX ADMIN — QUETIAPINE FUMARATE 100 MG: 100 TABLET ORAL at 00:11

## 2022-09-11 RX ADMIN — TRAZODONE HYDROCHLORIDE 50 MG: 50 TABLET ORAL at 21:18

## 2022-09-11 RX ADMIN — BUSPIRONE HYDROCHLORIDE 10 MG: 10 TABLET ORAL at 00:09

## 2022-09-11 RX ADMIN — INSULIN DETEMIR 20 UNITS: 100 INJECTION, SOLUTION SUBCUTANEOUS at 21:17

## 2022-09-11 RX ADMIN — POLYETHYLENE GLYCOL 3350 17 G: 17 POWDER, FOR SOLUTION ORAL at 09:43

## 2022-09-11 RX ADMIN — INSULIN DETEMIR 5 UNITS: 100 INJECTION, SOLUTION SUBCUTANEOUS at 13:09

## 2022-09-11 RX ADMIN — HEPARIN SODIUM 5000 UNITS: 5000 INJECTION INTRAVENOUS; SUBCUTANEOUS at 21:19

## 2022-09-11 RX ADMIN — INSULIN HUMAN 1.7 UNITS/HR: 1 INJECTION, SOLUTION INTRAVENOUS at 08:11

## 2022-09-11 RX ADMIN — ASPIRIN 81 MG CHEWABLE TABLET 81 MG: 81 TABLET CHEWABLE at 09:45

## 2022-09-11 RX ADMIN — HEPARIN SODIUM 5000 UNITS: 5000 INJECTION INTRAVENOUS; SUBCUTANEOUS at 13:09

## 2022-09-11 RX ADMIN — PANTOPRAZOLE SODIUM 40 MG: 40 INJECTION, POWDER, FOR SOLUTION INTRAVENOUS at 09:41

## 2022-09-11 RX ADMIN — ATORVASTATIN CALCIUM 80 MG: 40 TABLET, FILM COATED ORAL at 21:18

## 2022-09-11 RX ADMIN — METOPROLOL TARTRATE 50 MG: 50 TABLET, FILM COATED ORAL at 00:08

## 2022-09-11 RX ADMIN — INSULIN DETEMIR 15 UNITS: 100 INJECTION, SOLUTION SUBCUTANEOUS at 09:46

## 2022-09-11 NOTE — PROGRESS NOTES
INTENSIVIST   PROGRESS NOTE     Hospital:  LOS: 1 day     Ms. Bernadette Paris, 52 y.o. female is followed for a Chief Complaint of: Back Pain      Subjective   S     Interval History:  Anion gap closed this AM. Ready to eat. Complaining of pain and requesting IV morphine.        The patient's relevant past medical, surgical and social history were reviewed and updated in Epic as appropriate.      ROS:   Constitutional: Negative for fever.   Respiratory: Negative for dyspnea.   Cardiovascular: Negative for chest pain.   Gastrointestinal: Negative for  nausea, vomiting and diarrhea.     Objective   O     Vitals:  Temp  Min: 97.9 °F (36.6 °C)  Max: 98.4 °F (36.9 °C)  BP  Min: 72/57  Max: 187/105  Pulse  Min: 67  Max: 117  Resp  Min: 24  Max: 24  SpO2  Min: 88 %  Max: 100 % No data recorded    Intake/Ouptut 24 hrs (7:00AM - 6:59 AM)  Intake & Output (last 3 days)       09/08 0701 09/09 0700 09/09 0701  09/10 0700 09/10 0701 09/11 0700 09/11 0701 09/12 0700    IV Piggyback   3000     Total Intake(mL/kg)   3000 (50.8)     Urine (mL/kg/hr)   900     Total Output   900     Net   +2100                     Physical Examination  Telemetry:  Normal sinus rhythm.    Constitutional:  No acute distress.  Sitting up in the bed talking on the phone.    Eyes: No scleral icterus.   PERRL, EOM intact.    Neck:  Supple, FROM   Cardiovascular: Normal rate, regular and rhythm. Normal heart sounds.  No murmurs, gallop or rub.   Respiratory: No respiratory distress. Normal respiratory effort.  Normal breath sounds  Clear to auscultation   Abdominal:  Soft. No masses. Nontender. No distension. No HSM.   Extremities: No digital cyanosis. No clubbing.  No peripheral edema.   Skin: No rashes, lesions or ulcers   Neurological:   Alert and Oriented to person, place, and time.           Results from last 7 days   Lab Units 09/11/22  0636 09/10/22  1528   WBC 10*3/mm3 5.23 4.97   HEMOGLOBIN g/dL 12.0 16.3*   MCV fL 97.2* 98.0*   PLATELETS 10*3/mm3  310 457*     Results from last 7 days   Lab Units 09/11/22  0349 09/10/22  2133 09/10/22  1528   SODIUM mmol/L 140 140 135*   POTASSIUM mmol/L 4.1 3.9 5.0   CO2 mmol/L 19.0* 14.0* 11.0*   CREATININE mg/dL 0.77 0.88 1.25*   GLUCOSE mg/dL 227* 173* 441*   MAGNESIUM mg/dL 1.7 1.9 2.0   PHOSPHORUS mg/dL 1.9* 2.3* 4.1     Estimated Creatinine Clearance: 80.5 mL/min (by C-G formula based on SCr of 0.77 mg/dL).  Results from last 7 days   Lab Units 09/10/22  1528   ALK PHOS U/L 321*   BILIRUBIN mg/dL 0.5   ALT (SGPT) U/L 110*   AST (SGOT) U/L 109*       Results from last 7 days   Lab Units 09/11/22  0411 09/10/22  1644   PH, ARTERIAL pH units 7.340*  --    PCO2, ARTERIAL mm Hg 34.3*  --    PO2 ART mm Hg 94.0  --    FIO2 % 21 21       Images:  Imaging Results (Last 24 Hours)     Procedure Component Value Units Date/Time    CT Abdomen Pelvis Without Contrast [[id number]] Collected: 09/10/22 1533     Updated: 09/10/22 1549    Narrative:      CT ABDOMEN PELVIS WO CONTRAST-     Date of Exam: 9/10/2022 3:07 PM     Indication: epigastric abd pain.     Comparison: 11 2022 and prior     Technique: Contiguous axial CT images were obtained from the lung bases  to the to the pubic symphysis without contrast.  Sagittal and coronal  reconstructions were performed.  Automated exposure control and  iterative reconstruction methods were used.          FINDINGS:     Lower Chest: Lung bases are clear.     No free air identified below the diaphragm.     Organs: Patient is status post cholecystectomy     Limited noncontrast imaging of liver and spleen are grossly unremarkable  in appearance. Enlargement of the adrenal glands with diffuse  low-attenuation again appreciated compatible adrenal hyperplasia.  Calcifications within the pancreas suggesting chronic pancreatitis. No  definite findings to suggest acute pancreatitis noted on limited  noncontrast imaging. Kidneys demonstrate perinephric stranding similar  to the prior study. No evidence of  hydronephrosis noted      GI/Bowel: Limited noncontrast imaging of the stomach and small bowel  demonstrate no acute abnormality. There is a heavy stool burden. Linear  density along the descending colon may represent a small ingested linear  7 mm foreign body. There is no evidence of acute abnormality of the  colon. Appendix is not visualized. No inflammatory changes at the base  of the cecum     Pelvis: Urinary bladder is distended and grossly unremarkable.     Patient appears to be status post hysterectomy.     Peritoneum/Retroperitoneum: The aorta is normal in caliber. No  suspicious retroperitoneal adenopathy     Bones/Soft Tissues: No destructive bone lesion.     Remote left-sided rib fractures noted.     Remote transverse process fractures on the right of L3 and possibly L4.  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  Multilevel degenerative changes of the spine.       Impression:         1. No acute intra-abdominal or intrapelvic abnormality noted.  2. Status post cholecystectomy.  3. Heavy stool burden.        This report was finalized on 9/10/2022 3:46 PM by Angel Perez.       CT Head Without Contrast [[id number]] Collected: 09/10/22 1529     Updated: 09/10/22 1546    Narrative:      CT HEAD WO CONTRAST-     Date of Exam: 9/10/2022 3:07 PM     Indication: left side weakness x1 week.     Comparison: 8/10/2022 and prior     Technique:  Contiguous axial CT images of the head were obtained without  contrast from skull base to vertex.  Coronal and sagittal  reconstructions were performed.  Automated exposure control and  iterative reconstruction methods were used.       FINDINGS:  Brain/Ventricles: There is no acute intracranial hemorrhage, mass effect  or midline shift. No abnormal extra-axial fluid collection.  The  gray-white differentiation is maintained without evidence of an acute  infarct.  There is no evidence of hydrocephalus     Orbits: The visualized portion of the orbits  demonstrate no acute  abnormality.     Sinuses:The visualized paranasal sinuses and mastoid air cells  demonstrate no acute abnormality.     Soft Tissues/Skull: No acute abnormality of the visualized skull or soft  tissues.       Impression:      No acute intracranial abnormality.     This report was finalized on 9/10/2022 3:33 PM by Angel Perez.       XR Chest 1 View [[id number]] Collected: 09/10/22 1514     Updated: 09/10/22 1518    Narrative:      DATE OF EXAM: 9/10/2022 2:59 PM     PROCEDURE: XR CHEST 1 VW-     INDICATIONS: Upper Abdominal Pain Triage Protocol     COMPARISON: 8/10/2022     TECHNIQUE: Single radiographic AP view of the chest was obtained.     FINDINGS:  The heart, mediastinum, and pulmonary vasculature appear to be within  normal limits. The lungs appear well-expanded and clear. No evidence of  effusion or pneumothorax is seen. Bony structures appear to be intact.        Impression:      No evidence of active chest disease     This report was finalized on 9/10/2022 3:15 PM by Dr. Blue Alberto MD.               Results: Reviewed.  I reviewed the patient's new laboratory and imaging results.  I independently reviewed the patient's new images.    Medications: Reviewed.    Assessment & Plan   A / P     Ms. Paris is a 53yo poorly controlled noncompliant diabetic who is admitted for DKA. Anion gap is closed this AM. UDS was again positive for cocaine this admission.     Nutrition:   Diet Regular; Cardiac, Consistent Carbohydrate  Advance Directives:   Code Status and Medical Interventions:   Ordered at: 09/10/22 4575     Code Status (Patient has no pulse and is not breathing):    CPR (Attempt to Resuscitate)     Medical Interventions (Patient has pulse or is breathing):    Full Support       Active Hospital Problems    Diagnosis    • **DKA (diabetic ketoacidosis) (HCC)    • BROCK (acute kidney injury) (HCC)    • Elevated liver enzymes    • Diabetic ketoacidosis without coma associated with type 1  diabetes mellitus (HCC)    • Other constipation    • Hepatitis C    • H/O noncompliance with medical treatment, presenting hazards to health    • Epigastric pain    • Spinal stenosis, lumbar region, with neurogenic claudication        Assessment / Plan:    1. Start a PO diet  2. Transition insulin drip.   3. Replace electrolytes  4. PRN Norco if something is needed for back pain.   5. Stop IV fluids  6. Bowel regimen. Enema this afternoon if needed.   7. AM labs  8. Anticipate probable discharge tomorrow.     High level of risk due to:  severe exacerbation of chronic illness and illness with threat to life or bodily function.    Plan of care and goals reviewed during interdisciplinary rounds.  I discussed the patient's findings and my recommendations with patient and nursing staff      Sherley Forde, DO    Intensive Care Medicine and Pulmonary Medicine

## 2022-09-11 NOTE — PLAN OF CARE
Goal Outcome Evaluation:  Plan of Care Reviewed With: patient        Progress: improving   Pt came up from the ED on an insulin gtt. Was able to convert to sq insulin this morning. BG was increasing through the day and the sliding scale and levemir was increased. Pt has had two very large bowel movements today and reports improved abdominal pain but complains of increasing generalized pain. PRN Tosni was ordered. Pt has tolerated food and has been able to rest throughout the day

## 2022-09-12 VITALS
DIASTOLIC BLOOD PRESSURE: 92 MMHG | OXYGEN SATURATION: 100 % | SYSTOLIC BLOOD PRESSURE: 140 MMHG | HEART RATE: 76 BPM | RESPIRATION RATE: 14 BRPM | BODY MASS INDEX: 20.89 KG/M2 | HEIGHT: 66 IN | WEIGHT: 130 LBS | TEMPERATURE: 98.3 F

## 2022-09-12 PROBLEM — F31.9 BIPOLAR AFFECTIVE DISORDER (HCC): Chronic | Status: ACTIVE | Noted: 2022-05-09

## 2022-09-12 PROBLEM — R10.13 EPIGASTRIC PAIN: Chronic | Status: ACTIVE | Noted: 2022-04-06

## 2022-09-12 PROBLEM — I10 ACCELERATED HYPERTENSION: Chronic | Status: ACTIVE | Noted: 2022-05-09

## 2022-09-12 PROBLEM — Z91.199 H/O NONCOMPLIANCE WITH MEDICAL TREATMENT, PRESENTING HAZARDS TO HEALTH: Chronic | Status: ACTIVE | Noted: 2022-05-09

## 2022-09-12 PROBLEM — R74.8 ELEVATED LIVER ENZYMES: Chronic | Status: ACTIVE | Noted: 2022-09-10

## 2022-09-12 PROBLEM — B19.20 HEPATITIS C: Chronic | Status: ACTIVE | Noted: 2022-05-09

## 2022-09-12 PROBLEM — E78.5 HYPERLIPIDEMIA: Chronic | Status: ACTIVE | Noted: 2022-05-09

## 2022-09-12 PROBLEM — M48.062 SPINAL STENOSIS, LUMBAR REGION, WITH NEUROGENIC CLAUDICATION: Chronic | Status: ACTIVE | Noted: 2018-06-05

## 2022-09-12 LAB
ANION GAP SERPL CALCULATED.3IONS-SCNC: 11 MMOL/L (ref 5–15)
BUN SERPL-MCNC: 11 MG/DL (ref 6–20)
BUN/CREAT SERPL: 14.3 (ref 7–25)
CALCIUM SPEC-SCNC: 8.7 MG/DL (ref 8.6–10.5)
CHLORIDE SERPL-SCNC: 107 MMOL/L (ref 98–107)
CO2 SERPL-SCNC: 18 MMOL/L (ref 22–29)
CREAT SERPL-MCNC: 0.77 MG/DL (ref 0.57–1)
DEPRECATED RDW RBC AUTO: 49.2 FL (ref 37–54)
EGFRCR SERPLBLD CKD-EPI 2021: 93.5 ML/MIN/1.73
ERYTHROCYTE [DISTWIDTH] IN BLOOD BY AUTOMATED COUNT: 13.9 % (ref 12.3–15.4)
GLUCOSE BLDC GLUCOMTR-MCNC: 402 MG/DL (ref 70–130)
GLUCOSE BLDC GLUCOMTR-MCNC: 416 MG/DL (ref 70–130)
GLUCOSE BLDC GLUCOMTR-MCNC: 456 MG/DL (ref 70–130)
GLUCOSE BLDC GLUCOMTR-MCNC: 564 MG/DL (ref 70–130)
GLUCOSE SERPL-MCNC: 426 MG/DL (ref 65–99)
HCT VFR BLD AUTO: 33.6 % (ref 34–46.6)
HGB BLD-MCNC: 11.3 G/DL (ref 12–15.9)
MAGNESIUM SERPL-MCNC: 2.2 MG/DL (ref 1.6–2.6)
MCH RBC QN AUTO: 32.8 PG (ref 26.6–33)
MCHC RBC AUTO-ENTMCNC: 33.6 G/DL (ref 31.5–35.7)
MCV RBC AUTO: 97.7 FL (ref 79–97)
PHOSPHATE SERPL-MCNC: 3.4 MG/DL (ref 2.5–4.5)
PLATELET # BLD AUTO: 265 10*3/MM3 (ref 140–450)
PMV BLD AUTO: 9.9 FL (ref 6–12)
POTASSIUM SERPL-SCNC: 3.5 MMOL/L (ref 3.5–5.2)
RBC # BLD AUTO: 3.44 10*6/MM3 (ref 3.77–5.28)
SODIUM SERPL-SCNC: 136 MMOL/L (ref 136–145)
WBC NRBC COR # BLD: 4.65 10*3/MM3 (ref 3.4–10.8)

## 2022-09-12 PROCEDURE — 99233 SBSQ HOSP IP/OBS HIGH 50: CPT | Performed by: NURSE PRACTITIONER

## 2022-09-12 PROCEDURE — 82962 GLUCOSE BLOOD TEST: CPT

## 2022-09-12 PROCEDURE — 83735 ASSAY OF MAGNESIUM: CPT | Performed by: INTERNAL MEDICINE

## 2022-09-12 PROCEDURE — 85027 COMPLETE CBC AUTOMATED: CPT | Performed by: INTERNAL MEDICINE

## 2022-09-12 PROCEDURE — 63710000001 INSULIN LISPRO (HUMAN) PER 5 UNITS: Performed by: NURSE PRACTITIONER

## 2022-09-12 PROCEDURE — 63710000001 INSULIN DETEMIR PER 5 UNITS: Performed by: NURSE PRACTITIONER

## 2022-09-12 PROCEDURE — 80048 BASIC METABOLIC PNL TOTAL CA: CPT | Performed by: INTERNAL MEDICINE

## 2022-09-12 PROCEDURE — 25010000002 HEPARIN (PORCINE) PER 1000 UNITS: Performed by: NURSE PRACTITIONER

## 2022-09-12 PROCEDURE — 84100 ASSAY OF PHOSPHORUS: CPT | Performed by: INTERNAL MEDICINE

## 2022-09-12 RX ORDER — PANTOPRAZOLE SODIUM 40 MG/1
40 TABLET, DELAYED RELEASE ORAL
Status: DISCONTINUED | OUTPATIENT
Start: 2022-09-13 | End: 2022-09-12 | Stop reason: HOSPADM

## 2022-09-12 RX ORDER — INSULIN LISPRO 100 [IU]/ML
15 INJECTION, SOLUTION INTRAVENOUS; SUBCUTANEOUS
Status: DISCONTINUED | OUTPATIENT
Start: 2022-09-12 | End: 2022-09-12 | Stop reason: HOSPADM

## 2022-09-12 RX ORDER — INSULIN LISPRO 100 [IU]/ML
24 INJECTION, SOLUTION INTRAVENOUS; SUBCUTANEOUS ONCE
Status: COMPLETED | OUTPATIENT
Start: 2022-09-12 | End: 2022-09-12

## 2022-09-12 RX ORDER — INSULIN LISPRO 100 [IU]/ML
12 INJECTION, SOLUTION INTRAVENOUS; SUBCUTANEOUS
Status: DISCONTINUED | OUTPATIENT
Start: 2022-09-12 | End: 2022-09-12

## 2022-09-12 RX ORDER — AMLODIPINE BESYLATE 5 MG/1
5 TABLET ORAL
Status: DISCONTINUED | OUTPATIENT
Start: 2022-09-12 | End: 2022-09-12 | Stop reason: HOSPADM

## 2022-09-12 RX ORDER — POTASSIUM CHLORIDE 750 MG/1
40 CAPSULE, EXTENDED RELEASE ORAL AS NEEDED
Status: DISCONTINUED | OUTPATIENT
Start: 2022-09-12 | End: 2022-09-12 | Stop reason: HOSPADM

## 2022-09-12 RX ADMIN — OXYCODONE 5 MG: 5 TABLET ORAL at 11:24

## 2022-09-12 RX ADMIN — BUSPIRONE HYDROCHLORIDE 10 MG: 10 TABLET ORAL at 08:40

## 2022-09-12 RX ADMIN — INSULIN LISPRO 24 UNITS: 100 INJECTION, SOLUTION INTRAVENOUS; SUBCUTANEOUS at 13:32

## 2022-09-12 RX ADMIN — METOPROLOL TARTRATE 50 MG: 50 TABLET, FILM COATED ORAL at 08:40

## 2022-09-12 RX ADMIN — HEPARIN SODIUM 5000 UNITS: 5000 INJECTION INTRAVENOUS; SUBCUTANEOUS at 06:10

## 2022-09-12 RX ADMIN — ASPIRIN 81 MG CHEWABLE TABLET 81 MG: 81 TABLET CHEWABLE at 08:41

## 2022-09-12 RX ADMIN — POTASSIUM CHLORIDE 40 MEQ: 750 CAPSULE, EXTENDED RELEASE ORAL at 10:13

## 2022-09-12 RX ADMIN — Medication 10 ML: at 08:45

## 2022-09-12 RX ADMIN — INSULIN LISPRO 15 UNITS: 100 INJECTION, SOLUTION INTRAVENOUS; SUBCUTANEOUS at 11:33

## 2022-09-12 RX ADMIN — PANTOPRAZOLE SODIUM 40 MG: 40 INJECTION, POWDER, FOR SOLUTION INTRAVENOUS at 06:09

## 2022-09-12 RX ADMIN — INSULIN LISPRO 15 UNITS: 100 INJECTION, SOLUTION INTRAVENOUS; SUBCUTANEOUS at 11:02

## 2022-09-12 RX ADMIN — OXYCODONE 5 MG: 5 TABLET ORAL at 06:09

## 2022-09-12 RX ADMIN — INSULIN DETEMIR 30 UNITS: 100 INJECTION, SOLUTION SUBCUTANEOUS at 08:41

## 2022-09-12 RX ADMIN — INSULIN LISPRO 24 UNITS: 100 INJECTION, SOLUTION INTRAVENOUS; SUBCUTANEOUS at 08:41

## 2022-09-12 RX ADMIN — INSULIN LISPRO 24 UNITS: 100 INJECTION, SOLUTION INTRAVENOUS; SUBCUTANEOUS at 11:33

## 2022-09-12 RX ADMIN — AMLODIPINE BESYLATE 5 MG: 5 TABLET ORAL at 11:25

## 2022-09-12 NOTE — NURSING NOTE
Patient transportation setup through  will be here to  patient between 3-5pm today, will call in route. Patient updated and states that she will sign out AMA when ride is here

## 2022-09-12 NOTE — PAYOR COMM NOTE
"Bernadette Mcqueen (52 y.o. Female)             Date of Birth   1970    Social Security Number       Address   [place] [address]    Home Phone   [phone]    MRN   [id number]       [facility]    Marital Status   Single                            Admission Date   9/10/22    Admission Type   Emergency    Admitting Provider   Case, Sherley ASENCIO DO    Attending Provider   Case, Sherley ASENCIO DO    Department, Room/Bed   [facility] 2A ICU, N204/1       Discharge Date       Discharge Disposition       Discharge Destination                               Attending Provider: Maurisio, Sherley ASENCIO DO    Allergies: Tylenol [Acetaminophen]    Isolation: None   Infection: MRSA (08/02/18)   Code Status: CPR   Advance Care Planning Activity    Ht: 167.6 cm (66\")   Wt: 59 kg (130 lb)    Admission Cmt: None   Principal Problem: DKA a/w T1DM [E11.10]                 Active Insurance as of 9/10/2022     Primary Coverage     Payor Plan Insurance Group Employer/Plan Group    [employer] BY LAKISHA [employer] BY LAKISHA [id number]     Payor Plan Address Payor Plan Phone Number Payor Plan Fax Number Effective Dates    [address] [address]   1/1/2021 - None Entered    Psychiatric [id number]       Subscriber Name Subscriber Birth Date Member ID       BERNADETTE MCQUEEN 1970 [id number]                 Emergency Contacts      (Rel.) Home Phone Work Phone Mobile Phone    CHARISSE ELI (Significant Other) [phone] -- [phone]            [place]: NPI [id number] Tax ID [id number]  Insurance Information                [employer] LAKIS[employer] BY LAKISHA Phone: --    Subscriber: CharisseBernadette Subscriber#: [id number]    Group#: [id number] Precert#: --             History & Physical      Case, Sherley ASENCIO DO at 09/10/22 1735          INTENSIVIST   INITIAL HOSPITAL VISIT NOTE     Hospital:  LOS: 0 days     Ms. Bernadette Mcqueen, 52 y.o. female is seen for a Chief Complaint " of:  Chief Complaint   Patient presents with   • Abdominal Pain   • Hyperglycemia       DKA (diabetic ketoacidosis) (HCC)    Spinal stenosis, lumbar region, with neurogenic claudication    Epigastric pain    Hepatitis C    H/O noncompliance with medical treatment, presenting hazards to health    Other constipation    BROCK (acute kidney injury) (HCC)    Elevated liver enzymes    Diabetic ketoacidosis without coma associated with type 1 diabetes mellitus (HCC)      Subjective   S     Ms. Paris is a 53 yo female with PMH hypertension, diabetes with previous DKA episodes, cocaine use and chronic pancreatitis who presented to the ED on 9/10/2022 with multiple complaints.  According documentation patient developed pain that radiated to her back yesterday.  She also had associated vomiting, hematemesis, diarrhea, chest pain, headache, and left-sided weakness all of which had started in the last week.  For those reasons she called EMS and she was brought to the emergency department for evaluation.    Vital signs on arrival temp 98.4, heart rate 112, respiratory rate 24, blood pressure 183/122, pulse ox 90%.  Significant labs include:ABG 7.092 / 19.5 / 34.7 / 5.9, glucose 441, BUN 17, creatinine 1.25, sodium 135, potassium 5, , , alk phos 321, total bili 0.5, albumin 4.9, Phos 4.1, mag 2.0, troponin<0.010, COVID, flu AMB all negative, white count 4.9, hemoglobin 16.3, platelets 457.    Chest x-ray no evidence of active disease    CTA abdomen and pelvis with no acute intra-abdominal or intrapelvic abnormalities noted.  Status postcholecystectomy.  Heavy stool burden.    CT head without intracranial abnormalities.    While in the ED she received 2 L normal saline bolus, 4 mg IV morphine, 2-1/2 mg of droperidol, and initiated on insulin drip for DKA protocol.  She is being admitted to the ICU service for management.    Of note she was just admitted to our hospital from 8/10/2022 - 8/15/2022 for intractable  abdominal pain with nausea and vomiting, left foot drop, hypertensive crisis, type 2 diabetes, and ongoing cocaine abuse.  She has known medical noncompliance and frequently visits the ER for refills on medications.  She frequently misses her appointments with PCPs and follow-up services having required multiple referrals to new PCPs for failure to follow up.  She has known chronic uncontrolled diabetes, diabetic gastroparesis and multiple prior admissions for DKA often complaining of abdominal pain.  GI has been consulted multiple times and ultimately feels her abdominal pain is due to cocaine induced vasospasms of the abdominal arteries with ongoing cocaine abuse.  She apparently tries to use cocaine to treat her abdominal pain.  On arrival to the hospital during that admission she also complained of difficulty walking and weakness in her left foot.  She underwent stroke evaluation for her left foot weakness which was ultimately unrevealing.  Later during the hospitalization she was able to better describe that she had left foot drop.  L-spine MRI demonstrates multilevel disease however notably concern for impingement of the left nerve root at the L4-5 region.  EMG/NCV also demonstrated severe peroneal neuropathy at the left knee.  On the day of discharge this was discussed however she was adamant to leave the hospital.  Follow-up appointment with neurosurgery was made however it is unclear if she will follow through with appointment.  For her abdominal pain she ultimately received 2 enemas which produced good bowel movements followed by significant improvement in her abdominal pain.    I spent 15 minutes of time on this.  ALEX Mondragon, AGACNP-BC, APRN    Electronically signed by DMITRY Fitzgerald, 09/10/22, 5:05 PM EDT.         PMH: She  has a past medical history of Arthritis, Bipolar disorder ([facility]), Chronic bronchitis ([facility]), Depression, Diabetes mellitus ([facility]), GERD (gastroesophageal reflux disease),  Hepatitis-C, History of blood transfusion, Hyperlipidemia, Hypertension, Infectious viral hepatitis, Migraine, and Sleep apnea.   PSxH: She  has a past surgical history that includes Hysterectomy; Cholecystectomy; Knee surgery; lumbar laminectomy discectomy decompression (N/A, 8/6/2018); and Esophagogastroduodenoscopy (N/A, 4/9/2022).      Medications:  No current facility-administered medications on file prior to encounter.     Current Outpatient Medications on File Prior to Encounter   Medication Sig   • Accu-Chek FastClix Lancets misc Use 1 each by Other route 4 (Four) Times a Day.   • acetaminophen (TYLENOL) 325 MG tablet Take 1 tablet by mouth Every 6 (Six) Hours As Needed for Mild Pain  or Moderate Pain .   • Alcohol Swabs (Alcohol Pads) 70 % pads 1 each 4 (Four) Times a Day. Dx E11.9   • amLODIPine (NORVASC) 5 MG tablet Take 1 tablet by mouth Daily.   • aspirin 81 MG chewable tablet Chew 1 tablet Daily.   • atorvastatin (LIPITOR) 80 MG tablet Take 1 tablet by mouth Every Night.   • bisacodyl (DULCOLAX) 10 MG suppository Insert 1 suppository into the rectum Daily As Needed for Constipation (Use if bisacodyl oral is ineffective).   • Blood Glucose Monitoring Suppl (OneTouch Verio Reflect) w/Device kit 1 each 4 (Four) Times a Day.   • busPIRone (BUSPAR) 10 MG tablet Take 1 tablet by mouth 2 (Two) Times a Day.   • capsaicin (ZOSTRIX) 0.075 % topical cream Apply 1 application topically to the appropriate area as directed Every 8 (Eight) Hours.   • dicyclomine (BENTYL) 20 MG tablet Take 1 tablet by mouth Every 6 (Six) Hours As Needed (pain).   • gabapentin (NEURONTIN) 100 MG capsule Take 1 capsule by mouth Every Night.   • Ginger, Zingiber officinalis, (Ginger Root) 500 MG capsule Take 1 capsule (500 mg) by mouth 3 (Three) Times a Day Before Meals.   • glucose blood (OneTouch Verio) test strip 1 each by Other route 4 (Four) Times a Day Before Meals & at Bedtime. Use as instructed   • hydrOXYzine (ATARAX) 25 MG  tablet Take 1 tablet by mouth Every 8 (Eight) Hours As Needed for Anxiety.   • ibuprofen (ADVIL,MOTRIN) 200 MG tablet Take 1 tablet by mouth Every 8 (Eight) Hours As Needed for Mild Pain .   • insulin detemir (LEVEMIR) 100 UNIT/ML injection Inject 40 Units under the skin into the appropriate area as directed 2 (Two) Times a Day.   • Insulin Pen Needle (Pen Needles) 32G X 4 MM misc Inject 1 each under the skin into the appropriate area as directed 2 (Two) Times a Day.   • Lancets (OneTouch Delica Plus [id number]) misc 1 each by Other route 4 (Four) Times a Day Before Meals & at Bedtime.   • lisinopril (PRINIVIL,ZESTRIL) 40 MG tablet Take 1 tablet by mouth Daily.   • metFORMIN (GLUCOPHAGE) 1000 MG tablet Take 1 tablet by mouth 2 (Two) Times a Day With Meals.   • metoprolol tartrate (LOPRESSOR) 50 MG tablet Take 1 tablet by mouth Every 12 (Twelve) Hours.   • nicotine (NICODERM CQ) 21 MG/24HR patch Place 1 patch on the skin as directed by provider Daily.   • ondansetron ODT (Zofran ODT) 4 MG disintegrating tablet Place 1 tablet on the tongue Every 8 (Eight) Hours As Needed for Nausea or Vomiting.   • pantoprazole (PROTONIX) 40 MG EC tablet Take 1 tablet by mouth Daily.   • polyethylene glycol (MIRALAX) 17 GM/SCOOP powder Mix 1 capful (17g) in water and drink by mouth Daily.   • QUEtiapine (SEROquel) 100 MG tablet Take 1 tablet by mouth Every Night.   • RA ALLERGY RELIEF 10 MG tablet take 1 tablet by mouth once daily   • sennosides-docusate (PERICOLACE) 8.6-50 MG per tablet Take 2 tablets by mouth 2 (Two) Times a Day As Needed for Constipation.   • traZODone (DESYREL) 50 MG tablet Take 1 tablet by mouth Every Night.   • Ventolin  (90 Base) MCG/ACT inhaler 2 puffs every 4-6 hours as needed for shortness of breath       Allergies: She is allergic to tylenol [acetaminophen].   FH: Her family history includes Diabetes in her mother; Hypertension in her mother.   SH: She  reports that she has been smoking. She has been  smoking about 1.00 pack per day. She has never used smokeless tobacco. She reports current alcohol use. She reports current drug use. Drug: Cocaine(coke).     The patient's relevant past medical, surgical and social history were reviewed and updated in Epic as appropriate.     ROS (14):   Review of Systems   Constitutional: Positive for activity change and fatigue.   Eyes: Negative.    Respiratory: Negative.    Cardiovascular: Positive for chest pain.   Gastrointestinal: Positive for abdominal pain, constipation, nausea and vomiting.   Endocrine: Negative.    Genitourinary: Negative.    Musculoskeletal: Positive for back pain.   Skin: Negative.    Allergic/Immunologic: Negative.    Neurological: Positive for headaches.   Hematological: Negative.    Psychiatric/Behavioral: The patient is nervous/anxious.        Objective   O     Vitals    Temp  Min: 98.4 °F (36.9 °C)  Max: 98.4 °F (36.9 °C)  BP  Min: 126/69  Max: 187/105  Pulse  Min: 103  Max: 117  Resp  Min: 24  Max: 24  SpO2  Min: 98 %  Max: 100 % No data recorded     I/O 24 hrs (7:00AM - 6:59 AM)  Intake/Output       09/10/22 0700 - 09/11/22 0659    Intake (ml) 2000    Output (ml) --    Net (ml) 2000    Last Weight 59 kg (130 lb)          Medications (drips):  dextrose 5 % and sodium chloride 0.45 %  dextrose 5 % and sodium chloride 0.45 % with KCl 20 mEq/L  dextrose 5 % and sodium chloride 0.45 % with KCl 40 mEq/L  dextrose 5 % and sodium chloride 0.9 %  dextrose 5 % and sodium chloride 0.9 % with KCl 20 mEq  dextrose 5% and sodium chloride 0.9% with KCl 40 mEq/L  insulin, Last Rate: 2.7 Units/hr (09/10/22 3075)  custom IV KCl infusion builder  sodium chloride  sodium chloride 0.45 % with KCl 20 mEq  sodium chloride, Last Rate: 1,000 mL/hr (09/10/22 8169)  sodium chloride  sodium chloride 0.9 % with KCl 20 mEq  sodium chloride 0.9 % with KCl 40 mEq/L        Physical Examination    Telemetry: Sinus tachycardia.    Constitutional:  No acute distress.  Resting in  bed.    HEENT: Normocephalic and atraumatic.   Neck:  Normal range of motion. Neck supple.   No JVD present.   No thyromegaly.    Cardiovascular: Regular rate and rhythm.  No murmurs, rub or gallop.  No peripheral edema.   Respiratory: Normal respiratory effort.  Clear to ascultation.   Abdominal:  Soft. No masses.   Mild tenderness. No distension.   No hepatosplenomegaly.   MSK: Normal muscle strength and tone.   Extremities: No digital cyanosis or clubbing.   Skin: Skin is warm and dry.  No rashes, lesions or ulcers noted.    Neurological:   Awakens to stimulation.    Moves all extremities.   Psychiatric:  Normal affect.   Normal judgment.            Results from last 7 days   Lab Units 09/10/22  1528   WBC 10*3/mm3 4.97   HEMOGLOBIN g/dL 16.3*   MCV fL 98.0*   PLATELETS 10*3/mm3 457*     Results from last 7 days   Lab Units 09/10/22  1528   SODIUM mmol/L 135*   POTASSIUM mmol/L 5.0   CO2 mmol/L 11.0*   CREATININE mg/dL 1.25*   MAGNESIUM mg/dL 2.0   PHOSPHORUS mg/dL 4.1     Estimated Creatinine Clearance: 49.6 mL/min (A) (by C-G formula based on SCr of 1.25 mg/dL (H)).  Results from last 7 days   Lab Units 09/10/22  1528   ALK PHOS U/L 321*   BILIRUBIN mg/dL 0.5   ALT (SGPT) U/L 110*   AST (SGOT) U/L 109*       Results from last 7 days   Lab Units 09/10/22  1644   FIO2 % 21       Images:    Imaging Results (Last 24 Hours)     Procedure Component Value Units Date/Time    CT Abdomen Pelvis Without Contrast [[id number]] Collected: 09/10/22 1533     Updated: 09/10/22 1549    Narrative:      CT ABDOMEN PELVIS WO CONTRAST-     Date of Exam: 9/10/2022 3:07 PM     Indication: epigastric abd pain.     Comparison: 11 2022 and prior     Technique: Contiguous axial CT images were obtained from the lung bases  to the to the pubic symphysis without contrast.  Sagittal and coronal  reconstructions were performed.  Automated exposure control and  iterative reconstruction methods were used.          FINDINGS:     Lower Chest: Lung  bases are clear.     No free air identified below the diaphragm.     Organs: Patient is status post cholecystectomy     Limited noncontrast imaging of liver and spleen are grossly unremarkable  in appearance. Enlargement of the adrenal glands with diffuse  low-attenuation again appreciated compatible adrenal hyperplasia.  Calcifications within the pancreas suggesting chronic pancreatitis. No  definite findings to suggest acute pancreatitis noted on limited  noncontrast imaging. Kidneys demonstrate perinephric stranding similar  to the prior study. No evidence of hydronephrosis noted      GI/Bowel: Limited noncontrast imaging of the stomach and small bowel  demonstrate no acute abnormality. There is a heavy stool burden. Linear  density along the descending colon may represent a small ingested linear  7 mm foreign body. There is no evidence of acute abnormality of the  colon. Appendix is not visualized. No inflammatory changes at the base  of the cecum     Pelvis: Urinary bladder is distended and grossly unremarkable.     Patient appears to be status post hysterectomy.     Peritoneum/Retroperitoneum: The aorta is normal in caliber. No  suspicious retroperitoneal adenopathy     Bones/Soft Tissues: No destructive bone lesion.     Remote left-sided rib fractures noted.     Remote transverse process fractures on the right of L3 and possibly L4.  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  Multilevel degenerative changes of the spine.       Impression:         1. No acute intra-abdominal or intrapelvic abnormality noted.  2. Status post cholecystectomy.  3. Heavy stool burden.        This report was finalized on 9/10/2022 3:46 PM by Angel Perez.       CT Head Without Contrast [[id number]] Collected: 09/10/22 1529     Updated: 09/10/22 1546    Narrative:      CT HEAD WO CONTRAST-     Date of Exam: 9/10/2022 3:07 PM     Indication: left side weakness x1 week.     Comparison: 8/10/2022 and prior      Technique:  Contiguous axial CT images of the head were obtained without  contrast from skull base to vertex.  Coronal and sagittal  reconstructions were performed.  Automated exposure control and  iterative reconstruction methods were used.       FINDINGS:  Brain/Ventricles: There is no acute intracranial hemorrhage, mass effect  or midline shift. No abnormal extra-axial fluid collection.  The  gray-white differentiation is maintained without evidence of an acute  infarct.  There is no evidence of hydrocephalus     Orbits: The visualized portion of the orbits demonstrate no acute  abnormality.     Sinuses:The visualized paranasal sinuses and mastoid air cells  demonstrate no acute abnormality.     Soft Tissues/Skull: No acute abnormality of the visualized skull or soft  tissues.       Impression:      No acute intracranial abnormality.     This report was finalized on 9/10/2022 3:33 PM by Angel Perez.       XR Chest 1 View [[id number]] Collected: 09/10/22 1514     Updated: 09/10/22 1518    Narrative:      DATE OF EXAM: 9/10/2022 2:59 PM     PROCEDURE: XR CHEST 1 VW-     INDICATIONS: Upper Abdominal Pain Triage Protocol     COMPARISON: 8/10/2022     TECHNIQUE: Single radiographic AP view of the chest was obtained.     FINDINGS:  The heart, mediastinum, and pulmonary vasculature appear to be within  normal limits. The lungs appear well-expanded and clear. No evidence of  effusion or pneumothorax is seen. Bony structures appear to be intact.        Impression:      No evidence of active chest disease     This report was finalized on 9/10/2022 3:15 PM by Dr. Blue Alberto MD.           ECG/EMG Results (last 24 hours)     Procedure Component Value Units Date/Time    ECG 12 Lead [[id number]] Collected: 09/10/22 1425     Updated: 09/10/22 1425     QT Interval 344 ms      QTC Interval 474 ms     Narrative:      Test Reason : Upper Abdominal Pain Triage Protocol  Blood Pressure :   */*   mmHG  Vent. Rate : 114 BPM      Atrial Rate : 114 BPM     P-R Int : 146 ms          QRS Dur :  80 ms      QT Int : 344 ms       P-R-T Axes :  77  43  88 degrees     QTc Int : 474 ms    Sinus tachycardia  Right atrial enlargement  Possible Anterior infarct (cited on or before 01-JUN-2022)  Abnormal ECG  When compared with ECG of 14-AUG-2022 03:14,  No significant change was found    Referred By: ED MD           Confirmed By:           I reviewed the patient's new laboratory and imaging results.  I independently reviewed the patient's new images.    Assessment & Plan   A / P     Ms. Paris is a 51yo F with a history of T2DM, cocaine use and medical noncompliance who presented to [facility] on 9/10 with multiple complaints and was ultimately found to be in DKA. CTA of the abdomen/pelvis was notable for constipation. Labs were notable for BROCK and an anion gap metabolic acidosis. She has been given 2L of IV fluids and started on an insulin drip.       Nutrition:   NPO Diet NPO Type: Strict NPO  Advance Directives:   Code Status and Medical Interventions:   Ordered at: 09/10/22 1701     Code Status (Patient has no pulse and is not breathing):    CPR (Attempt to Resuscitate)     Medical Interventions (Patient has pulse or is breathing):    Full Support         DKA (diabetic ketoacidosis) (HCC)    Spinal stenosis, lumbar region, with neurogenic claudication    Epigastric pain    Hepatitis C    H/O noncompliance with medical treatment, presenting hazards to health    Other constipation    BROCK (acute kidney injury) (HCC)    Elevated liver enzymes    Diabetic ketoacidosis without coma associated with type 1 diabetes mellitus (HCC)      Assessment / Plan:    1. Admit to ICU  2. DKA protocol  3. IV fluids  4. NPO  5. Serial labs  6. Bowel regimen for constipation  7. Home medications have been reviewed. Hold Lisinopril secondary to BROCK  8. UDS  9. AM labs    High risk secondary to management of severe metabolic acidosis and DKA.     Plan of care and goals reviewed  during interdisciplinary rounds.  I discussed the patient's findings and my recommendations with patient and nursing staff      Sherley Forde DO  Pulmonary and Critical Care Medicine    Electronically signed by DMITRY Fitzgerald, 09/10/22, 4:40 PM EDT.      Electronically signed by Sherley Forde DO at 09/10/22 5678       Vital Signs (last day)     Date/Time Temp Temp src Pulse Resp BP Patient Position SpO2    09/12/22 1125 -- -- 73 -- 117/74 -- --    09/12/22 1100 -- -- 73 -- 117/74 -- 98    09/12/22 1000 -- -- 70 16 141/95 Lying 99    09/12/22 0900 -- -- 80 -- 167/120 -- 98    09/12/22 0840 -- -- 69 -- 129/87 -- 99    09/12/22 0800 98.1 (36.7) Oral 73 16 123/84 Lying 100    09/12/22 0700 -- -- 67 -- 137/93 -- 99    09/12/22 0630 -- -- 71 -- -- -- 99    09/12/22 0600 97.8 (36.6) Oral 71 16 91/62 -- 99    09/12/22 0530 -- -- 77 -- -- -- 99    09/12/22 0500 -- -- 73 16 128/86 -- 100    09/12/22 0430 -- -- 78 -- -- -- 100    09/12/22 0400 -- -- 80 14 122/84 -- 100    09/12/22 0330 -- -- 80 -- -- -- 100    09/12/22 0300 -- -- 76 18 105/78 -- 99    09/12/22 0230 -- -- 74 -- -- -- 99    09/12/22 0200 -- -- 71 16 125/81 -- 100    09/12/22 0130 -- -- 76 -- -- -- 99    09/12/22 0100 -- -- 74 16 107/91 -- 100    09/12/22 0030 -- -- 74 -- -- -- 100    09/12/22 0000 97.7 (36.5) Axillary 71 14 114/83 -- 99    09/11/22 2330 -- -- 77 -- -- -- 100    09/11/22 2300 -- -- 70 16 122/89 -- 99    09/11/22 2230 -- -- 70 -- -- -- 100    09/11/22 2200 -- -- 78 14 -- -- 95    09/11/22 2130 -- -- 81 -- -- -- 98    09/11/22 2100 -- -- 80 16 154/88 -- 100    09/11/22 2030 -- -- 80 -- -- -- 100    09/11/22 2000 98.1 (36.7) Oral 85 16 133/103 -- 97    09/11/22 1930 -- -- 85 -- -- -- 99    09/11/22 1900 -- -- 81 -- -- -- 99    09/11/22 1800 -- -- 82 -- -- -- 98    09/11/22 1700 -- -- 80 -- 131/88 -- 98    09/11/22 1600 97.7 (36.5) Oral 89 16 123/80 Lying 96    09/11/22 1500 -- -- 87 20 109/86 Lying 98    09/11/22 1400 -- -- 86 -- 133/81  Lying 97    09/11/22 1300 98.1 (36.7) Oral 94 18 -- -- 91    09/11/22 1200 -- -- 91 -- 120/70 Lying 98    09/11/22 1132 -- -- 83 -- 139/79 Lying 99    09/11/22 1100 -- -- 87 -- -- -- 98    09/11/22 1000 -- -- 84 -- -- -- 95    09/11/22 0945 -- -- 70 -- 138/101 -- 95    09/11/22 0900 -- -- 73 -- -- -- 98    09/11/22 0815 -- -- 67 -- 125/88 -- 100    09/11/22 0800 97.9 (36.6) Oral 73 -- 122/95 -- 88    09/11/22 0747 -- -- 74 -- -- -- 99    09/11/22 0715 -- -- -- -- -- -- 96    09/11/22 0701 -- -- 70 -- 90/61 -- 96    09/11/22 0643 -- -- 72 -- 88/63 -- 95    09/11/22 0628 -- -- 71 -- 96/62 -- 96    09/11/22 0617 -- -- 72 -- 94/67 -- 95    09/11/22 0602 -- -- 77 -- 86/67 -- 95    09/11/22 0530 -- -- 72 -- 100/65 -- 95    09/11/22 0516 -- -- 73 -- 102/66 -- 98    09/11/22 0431 -- -- 74 -- 111/79 -- 98    09/11/22 0415 -- -- 73 -- 95/70 -- 98    09/11/22 0400 -- -- 73 -- 89/65 -- 94    09/11/22 0345 -- -- 76 -- 89/67 -- 96    09/11/22 0336 -- -- 74 -- 72/57 -- 96    09/11/22 0300 -- -- 75 -- 88/65 -- --    09/11/22 0230 -- -- 80 -- 116/92 -- 97    09/11/22 0130 -- -- 90 -- 104/72 -- 93    09/11/22 0008 -- -- 98 -- 137/88 -- --    09/11/22 0002 -- -- 99 -- 137/88 -- 96            Current Facility-Administered Medications   Medication Dose Route Frequency Provider Last Rate Last Admin   • albuterol sulfate HFA (PROVENTIL HFA;VENTOLIN HFA;PROAIR HFA) inhaler 2 puff  2 puff Inhalation Q6H PRN Case, Sherley V., DO       • amLODIPine (NORVASC) tablet 5 mg  5 mg Oral Q24H Kiley Cole APRN   5 mg at 09/12/22 1125   • aspirin chewable tablet 81 mg  81 mg Oral Daily Case, Sherley VRusty, DO   81 mg at 09/12/22 0841   • atorvastatin (LIPITOR) tablet 80 mg  80 mg Oral Nightly Case, Sherley V., DO   80 mg at 09/11/22 2118   • bisacodyl (DULCOLAX) suppository 10 mg  10 mg Rectal Daily PRN Case, Sherley V., DO       • busPIRone (BUSPAR) tablet 10 mg  10 mg Oral BID Case, Sherley V., DO   10 mg at 09/12/22 0840   • dextrose (D50W) (25  g/50 mL) IV injection 25 g  25 g Intravenous Q15 Min PRN Case, Sherley V., DO       • dextrose (GLUTOSE) oral gel 15 g  15 g Oral Q15 Min PRN Case, Sherley V., DO       • dicyclomine (BENTYL) tablet 20 mg  20 mg Oral Q6H PRN Case, Sherley V., DO       • gabapentin (NEURONTIN) capsule 100 mg  100 mg Oral Nightly Case, Sherley V., DO   100 mg at 09/11/22 2118   • glucagon (human recombinant) (GLUCAGEN DIAGNOSTIC) injection 1 mg  1 mg Intramuscular Q15 Min PRN Case, Sherley V., DO       • heparin (porcine) 5000 UNIT/ML injection 5,000 Units  5,000 Units Subcutaneous Q8H Larissa Monge APRN   5,000 Units at 09/12/22 0610   • hydrOXYzine (ATARAX) tablet 25 mg  25 mg Oral Q8H PRN Case, Sherley V., DO   25 mg at 09/11/22 0941   • insulin detemir (LEVEMIR) injection 30 Units  30 Units Subcutaneous Q12H Kiley Cole APRN   30 Units at 09/12/22 0841   • Insulin Lispro (humaLOG) injection 0-24 Units  0-24 Units Subcutaneous 4x Daily With Meals & Nightly Larissa Monge APRN   24 Units at 09/12/22 1133   • Insulin Lispro (humaLOG) injection 15 Units  15 Units Subcutaneous TID With Meals Kiley Cole APRN   15 Units at 09/12/22 1133   • Magnesium Sulfate 2 gram Bolus, followed by 8 gram infusion (total Mg dose 10 grams)- Mg less than or equal to 1mg/dL  2 g Intravenous PRN Dorian Butler APRN        Or   • Magnesium Sulfate 2 gram / 50mL Infusion (GIVE X 3 BAGS TO EQUAL 6GM TOTAL DOSE) - Mg 1.1 - 1.5 mg/dl  2 g Intravenous PRN Dorian Butler APRN        Or   • Magnesium Sulfate 4 gram infusion- Mg 1.6-1.9 mg/dL  4 g Intravenous PRN Dorian Butler APRN 25 mL/hr at 09/11/22 1531 4 g at 09/11/22 1531   • metoprolol tartrate (LOPRESSOR) tablet 50 mg  50 mg Oral Q12H Case, Sherleysa ELIF DO   50 mg at 09/12/22 0840   • oxyCODONE (ROXICODONE) immediate release tablet 5 mg  5 mg Oral Q6H PRN Larissa Monge APRN   5 mg at 09/12/22 1124   • [START ON 9/13/2022] pantoprazole (PROTONIX) EC tablet 40 mg  40 mg Oral Q AM  Larissa Noguera, PharmD       • polyethylene glycol (MIRALAX) packet 17 g  17 g Oral Daily Case, Sherley V., DO   17 g at 09/11/22 0943   • potassium & sodium phosphates (PHOS-NAK) 280-160-250 MG packet - for Phosphorus less than 1.25 mg/dL  2 packet Oral Q6H PRN Dorian Butler, APRN        Or   • potassium & sodium phosphates (PHOS-NAK) 280-160-250 MG packet - for Phosphorus 1.25 - 2.5 mg/dL  2 packet Oral Q6H PRN Dorian Butler, APRN   2 packet at 09/11/22 0639   • potassium chloride (MICRO-K) CR capsule 40 mEq  40 mEq Oral PRN Álvaro Escoto MD   40 mEq at 09/12/22 1013   • QUEtiapine (SEROquel) tablet 100 mg  100 mg Oral Nightly Case, Sherley V., DO   100 mg at 09/11/22 2118   • sennosides-docusate (PERICOLACE) 8.6-50 MG per tablet 2 tablet  2 tablet Oral BID PRN Case, Sherley V., DO   2 tablet at 09/11/22 0943   • sodium chloride 0.9 % flush 10 mL  10 mL Intravenous Q12H Case, Sherley V., DO   10 mL at 09/12/22 0845   • sodium chloride 0.9 % flush 10 mL  10 mL Intravenous PRN Case, Sherley V., DO       • traZODone (DESYREL) tablet 50 mg  50 mg Oral Nightly Case, Sherley V., DO   50 mg at 09/11/22 2118       Lab Results (last 24 hours)     Procedure Component Value Units Date/Time    POC Glucose Once [[id number]]  (Abnormal) Collected: 09/12/22 1127    Specimen: Blood Updated: 09/12/22 1136     Glucose 564 mg/dL      Comment: Confirmed by Repeat Meter: [id number] : [id number] Layne STAPLES       POC Glucose Once [[id number]]  (Abnormal) Collected: 09/12/22 0720    Specimen: Blood Updated: 09/12/22 0734     Glucose 416 mg/dL      Comment: Confirmed by Repeat Repeat  Test Meter: [id number] : [id number] Layne STAPLES       Basic Metabolic Panel [[id number]]  (Abnormal) Collected: 09/12/22 0515    Specimen: Blood Updated: 09/12/22 0647     Glucose 426 mg/dL      BUN 11 mg/dL      Creatinine 0.77 mg/dL      Sodium 136 mmol/L      Potassium 3.5 mmol/L      Comment: Slight hemolysis detected by  analyzer. Results may be affected.        Chloride 107 mmol/L      CO2 18.0 mmol/L      Calcium 8.7 mg/dL      BUN/Creatinine Ratio 14.3     Anion Gap 11.0 mmol/L      eGFR 93.5 mL/min/1.73      Comment: National Kidney Foundation and American Society of Nephrology (ASN) Task Force recommended calculation based on the Chronic Kidney Disease Epidemiology Collaboration (CKD-EPI) equation refit without adjustment for race.       Narrative:      GFR Normal >60  Chronic Kidney Disease <60  Kidney Failure <15      Phosphorus [[id number]]  (Normal) Collected: 09/12/22 0515    Specimen: Blood Updated: 09/12/22 0642     Phosphorus 3.4 mg/dL     Magnesium [[id number]]  (Normal) Collected: 09/12/22 0515    Specimen: Blood Updated: 09/12/22 0642     Magnesium 2.2 mg/dL     CBC (No Diff) [[id number]]  (Abnormal) Collected: 09/12/22 0515    Specimen: Blood Updated: 09/12/22 0612     WBC 4.65 10*3/mm3      RBC 3.44 10*6/mm3      Hemoglobin 11.3 g/dL      Hematocrit 33.6 %      MCV 97.7 fL      MCH 32.8 pg      MCHC 33.6 g/dL      RDW 13.9 %      RDW-SD 49.2 fl      MPV 9.9 fL      Platelets 265 10*3/mm3     POC Glucose Once [[id number]]  (Abnormal) Collected: 09/11/22 2326    Specimen: Blood Updated: 09/11/22 2329     Glucose 260 mg/dL      Comment: Meter: [id number] : [id number] Edu Small       Blood Culture - Blood, Arm, Right [[id number]]  (Normal) Collected: 09/10/22 2130    Specimen: Blood from Arm, Right Updated: 09/11/22 2202     Blood Culture No growth at 24 hours    Narrative:      Aerobic bottle only      Blood Culture - Blood, Arm, Right [[id number]]  (Normal) Collected: 09/10/22 2123    Specimen: Blood from Arm, Right Updated: 09/11/22 2202     Blood Culture No growth at 24 hours    POC Glucose Once [[id number]]  (Abnormal) Collected: 09/11/22 2100    Specimen: Blood Updated: 09/11/22 2106     Glucose 444 mg/dL      Comment: Confirmed by Repeat Meter: [id number] : [id number] Edu Small       POC Glucose Once  [[id number]]  (Abnormal) Collected: 09/11/22 2058    Specimen: Blood Updated: 09/11/22 2106     Glucose 447 mg/dL      Comment: Repeat  Test Meter: [id number] : [id number] Edu Small       Phosphorus [[id number]]  (Abnormal) Collected: 09/11/22 2005    Specimen: Blood Updated: 09/11/22 2051     Phosphorus 2.3 mg/dL     POC Glucose Once [[id number]]  (Normal) Collected: 09/11/22 1635    Specimen: Blood Updated: 09/11/22 1637     Glucose 129 mg/dL      Comment: Meter: [id number] : [id number] Angel [place]       POC Glucose Once [[id number]]  (Abnormal) Collected: 09/11/22 1246    Specimen: Blood Updated: 09/11/22 1247     Glucose 321 mg/dL      Comment: Meter: [id number] : [id number] Angel [place]           Imaging Results (Last 24 Hours)     ** No results found for the last 24 hours. **        Orders (last 24 hrs)      Start     Ordered    09/13/22 0600  pantoprazole (PROTONIX) EC tablet 40 mg  Every Early Morning         09/12/22 0734    09/13/22 0600  Hepatitis Panel, Acute  Morning Draw         09/12/22 0813    09/13/22 0600  CBC (No Diff)  Morning Draw         09/12/22 0813    09/13/22 0600  Phosphorus  Morning Draw         09/12/22 0813    09/13/22 0600  Magnesium  Morning Draw         09/12/22 0814    09/13/22 0600  Comprehensive Metabolic Panel  Morning Draw         09/12/22 0814    09/12/22 1215  amLODIPine (NORVASC) tablet 5 mg  Every 24 Hours Scheduled         09/12/22 1117    09/12/22 1137  POC Glucose Once  PROCEDURE ONCE         09/12/22 1127    09/12/22 0921  Transfer Patient  Once         09/12/22 0920 09/12/22 0920  potassium chloride (MICRO-K) CR capsule 40 mEq  As Needed         09/12/22 0920    09/12/22 0900  insulin detemir (LEVEMIR) injection 25 Units  Every 12 Hours Scheduled,   Status:  Discontinued         09/12/22 0654    09/12/22 0900  insulin detemir (LEVEMIR) injection 30 Units  Every 12 Hours Scheduled         09/12/22 0745    09/12/22 0845  Insulin Lispro (humaLOG) injection  15 Units  3 Times Daily With Meals         09/12/22 0745    09/12/22 0800  Insulin Lispro (humaLOG) injection 7 Units  3 Times Daily With Meals,   Status:  Discontinued         09/11/22 2110    09/12/22 0800  Insulin Lispro (humaLOG) injection 12 Units  3 Times Daily With Meals,   Status:  Discontinued         09/12/22 0654    09/12/22 0748  Diabetes Educator Consult  Once        Provider:  (Not yet assigned)    09/12/22 0747    09/12/22 0735  POC Glucose Once  PROCEDURE ONCE         09/12/22 0720    09/12/22 0600  Basic Metabolic Panel  Morning Draw         09/11/22 1104    09/12/22 0600  CBC (No Diff)  Morning Draw         09/11/22 1104    09/12/22 0600  Magnesium  Morning Draw         09/11/22 1104    09/12/22 0600  Phosphorus  Morning Draw         09/11/22 1104    09/11/22 2330  POC Glucose Once  PROCEDURE ONCE         09/11/22 2326    09/11/22 2107  POC Glucose Once  PROCEDURE ONCE         09/11/22 2058    09/11/22 2107  POC Glucose Once  PROCEDURE ONCE         09/11/22 2100    09/11/22 2100  insulin detemir (LEVEMIR) injection 20 Units  Every 12 Hours Scheduled,   Status:  Discontinued         09/11/22 1312    09/11/22 1757  DIET MESSAGE Please send a cheese burger and fries. Thank you  Once,   Status:  Canceled        Comments: Please send a cheese burger and fries. Thank you    09/11/22 1757    09/11/22 1638  POC Glucose Once  PROCEDURE ONCE         09/11/22 1635    09/11/22 1523  oxyCODONE (ROXICODONE) immediate release tablet 5 mg  Every 6 Hours PRN         09/11/22 1524    09/11/22 1345  Insulin Lispro (humaLOG) injection 0-24 Units  4 Times Daily With Meals & Nightly         09/11/22 1258    09/11/22 1345  insulin regular (humuLIN R,novoLIN R) injection 11 Units  Once         09/11/22 1259    09/11/22 1345  insulin detemir (LEVEMIR) injection 5 Units  Once         09/11/22 1259    09/11/22 1248  POC Glucose Once  PROCEDURE ONCE         09/11/22 1246    09/11/22 1200  Insulin Lispro (humaLOG) injection  "0-14 Units  4 Times Daily With Meals & Nightly,   Status:  Discontinued         09/11/22 0930    09/11/22 1100  POC Glucose 4x Daily AC & at Bedtime  4 Times Daily Before Meals & at Bedtime       09/11/22 0930 09/11/22 1030  insulin detemir (LEVEMIR) injection 15 Units  Every 12 Hours Scheduled,   Status:  Discontinued         09/11/22 0930    09/11/22 0929  dextrose (GLUTOSE) oral gel 15 g  Every 15 Minutes PRN         09/11/22 0930    09/11/22 0929  dextrose (D50W) (25 g/50 mL) IV injection 25 g  Every 15 Minutes PRN         09/11/22 0930    09/11/22 0929  glucagon (human recombinant) (GLUCAGEN DIAGNOSTIC) injection 1 mg  Every 15 Minutes PRN         09/11/22 0930 09/11/22 0900  aspirin chewable tablet 81 mg  Daily         09/10/22 1711    09/11/22 0600  pantoprazole (PROTONIX) injection 40 mg  Every Early Morning,   Status:  Discontinued         09/10/22 1654    09/11/22 0448  Patient Currently On Electrolyte Replacement Protocol - Please Refer to MAR for Protocol Details  Misc Nursing Order (Specify)  Daily      Comments: Patient Currently On Electrolyte Replacement Protocol - Please Refer to MAR for Protocol Details    09/11/22 0447 09/11/22 0447  potassium & sodium phosphates (PHOS-NAK) 280-160-250 MG packet - for Phosphorus less than 1.25 mg/dL  Every 6 Hours PRN        \"Or\" Linked Group Details    09/11/22 0447 09/11/22 0447  potassium & sodium phosphates (PHOS-NAK) 280-160-250 MG packet - for Phosphorus 1.25 - 2.5 mg/dL  Every 6 Hours PRN        \"Or\" Linked Group Details    09/11/22 0447 09/11/22 0447  Magnesium Sulfate 2 gram Bolus, followed by 8 gram infusion (total Mg dose 10 grams)- Mg less than or equal to 1mg/dL  As Needed        \"Or\" Linked Group Details    09/11/22 0447 09/11/22 0447  Magnesium Sulfate 2 gram / 50mL Infusion (GIVE X 3 BAGS TO EQUAL 6GM TOTAL DOSE) - Mg 1.1 - 1.5 mg/dl  As Needed        \"Or\" Linked Group Details    09/11/22 0447    09/11/22 0447  Magnesium Sulfate 4 " "gram infusion- Mg 1.6-1.9 mg/dL  As Needed        \"Or\" Linked Group Details    09/11/22 0447    09/10/22 2345  gabapentin (NEURONTIN) capsule 100 mg  Nightly         09/10/22 1711    09/10/22 2345  polyethylene glycol (MIRALAX) packet 17 g  Daily         09/10/22 1711    09/10/22 2345  QUEtiapine (SEROquel) tablet 100 mg  Nightly         09/10/22 1711    09/10/22 2345  traZODone (DESYREL) tablet 50 mg  Nightly         09/10/22 1711    09/10/22 2100  sodium chloride 0.9 % flush 10 mL  Every 12 Hours Scheduled         09/10/22 1707    09/10/22 2100  atorvastatin (LIPITOR) tablet 80 mg  Nightly         09/10/22 1711    09/10/22 2100  busPIRone (BUSPAR) tablet 10 mg  2 Times Daily         09/10/22 1711    09/10/22 2100  metoprolol tartrate (LOPRESSOR) tablet 50 mg  Every 12 Hours Scheduled         09/10/22 1711    09/10/22 2000  Basic Metabolic Panel  Every 4 Hours,   Status:  Canceled       09/10/22 1622    09/10/22 2000  Magnesium  Every 4 Hours,   Status:  Canceled       09/10/22 1622    09/10/22 2000  Phosphorus  Every 4 Hours,   Status:  Canceled       09/10/22 1622    09/10/22 1800  Vital Signs Every Hour and Per Hospital Policy Based on Patient Condition  Every Hour       09/10/22 1707    09/10/22 1800  Intake and Output  Every Hour       09/10/22 1707    09/10/22 1711  albuterol sulfate HFA (PROVENTIL HFA;VENTOLIN HFA;PROAIR HFA) inhaler 2 puff  Every 6 Hours PRN         09/10/22 1711    09/10/22 1710  sennosides-docusate (PERICOLACE) 8.6-50 MG per tablet 2 tablet  2 Times Daily PRN         09/10/22 1711    09/10/22 1709  hydrOXYzine (ATARAX) tablet 25 mg  Every 8 Hours PRN         09/10/22 1711    09/10/22 1709  dicyclomine (BENTYL) tablet 20 mg  Every 6 Hours PRN         09/10/22 1711    09/10/22 1708  bisacodyl (DULCOLAX) suppository 10 mg  Daily PRN         09/10/22 1711    09/10/22 1708  Daily Weights  Daily       09/10/22 1707    09/10/22 1708  Oral care for patients not on NPPV or intubated  Every Shift   "    Comments: Oral care for patients not on NPPV or intubated    09/10/22 1707    09/10/22 1707  sodium chloride 0.9 % flush 10 mL  As Needed         09/10/22 1707    09/10/22 1700  Vital Signs  Every Hour,   Status:  Canceled       09/10/22 1622    09/10/22 1700  Strict Intake & Output  Every Hour,   Status:  Canceled      Comments: While on insulin infusion.    09/10/22 1622    09/10/22 1700  Vital Signs Every Hour and Per Hospital Policy Based on Patient Condition  Every Hour,   Status:  Canceled       09/10/22 1654    09/10/22 1700  Intake and Output  Every Hour,   Status:  Canceled       09/10/22 1654    09/10/22 1656  heparin (porcine) 5000 UNIT/ML injection 5,000 Units  Every 8 Hours Scheduled         09/10/22 1654    09/10/22 1655  Daily Weights  Daily,   Status:  Canceled       09/10/22 1654    09/10/22 1653  Oral care for patients not on NPPV or intubated  Every Shift,   Status:  Canceled      Comments: Oral care for patients not on NPPV or intubated    09/10/22 1654    09/10/22 1622  Treat for hypoglycemia as recommended by the Glucommander™.  Every Shift,   Status:  Canceled      Comments: Treat for hypoglycemia as recommended by the Glucommander™. Follow Glucommander recommendations for oral or IV hypoglycemia treatment, if IV access is not available administer glucagon or oral treatment per hypoglycemia protocol.    09/10/22 1622    09/10/22 1622  If insulin infusion is paused, follow Glucommander instructions.  Every Shift,   Status:  Canceled      Comments: If insulin infusion is paused, follow Glucommander instructions.    09/10/22 1622    09/10/22 1500  POC Glucose Q1H  Every Hour,   Status:  Canceled       09/10/22 1419    09/10/22 1417  Oxygen Therapy- Nasal Cannula; 2 LPM; Titrate for SPO2: 92%, Greater Than or Equal To  Continuous PRN,   Status:  Canceled       09/10/22 1419    Unscheduled  Oxygen Therapy- Nasal Cannula; 2 LPM; Titrate for SPO2: 92%, Greater Than or Equal To  Continuous PRN     "   09/10/22 1419    Unscheduled  Magnesium  As Needed       09/11/22 0447    Unscheduled  Potassium  As Needed       09/11/22 0447    Unscheduled  Follow Hypoglycemia Standing Orders (Blood Glucose <70)  As Needed      Comments: Follow Protocol As Outlined in Process Instructions (Open Order Report to View Full Instructions)    09/11/22 0930                   Physician Progress Notes (last 24 hours)      Kiley Cole, APRN at 09/12/22 1118          INTENSIVIST   PROGRESS NOTE     Hospital:  LOS: 2 days     Ms. Bernadette Paris, 52 y.o. female is followed for a Chief Complaint of: Back Pain      Subjective   S     Interval History:  Transitioned to SSI and oral diet yesterday. Significant rebound hyperglycemia. Has requested numerous meal trays overnight and this morning. AG remains closed. VS stable. Does not require O2.     Patient states she is unable to attend per appointments because she \"just forgets\". Declines assistance with transportation. Lives with her boyfriend.        The patient's relevant past medical, surgical and social history were reviewed and updated in Epic as appropriate.      ROS:   Constitutional: Negative for fever.   Respiratory: Negative for dyspnea.   Cardiovascular: Negative for chest pain.   Gastrointestinal: Negative for nausea, vomiting and diarrhea.     Objective   O     Vitals:  Temp  Min: 97.7 °F (36.5 °C)  Max: 98.1 °F (36.7 °C)  BP  Min: 91/62  Max: 167/120  Pulse  Min: 67  Max: 94  Resp  Min: 14  Max: 20  SpO2  Min: 91 %  Max: 100 % No data recorded    Intake/Ouptut 24 hrs (7:00AM - 6:59 AM)  Intake & Output (last 3 days)       09/09 0701  09/10 0700 09/10 0701  09/11 0700 09/11 0701 09/12 0700 09/12 0701  09/13 0700    P.O.   2500     I.V. (mL/kg)   1841 (31.2)     IV Piggyback  3000      Total Intake(mL/kg)  3000 (50.8) 4341 (73.6)     Urine (mL/kg/hr)  900 1050 (0.7) 450 (1.8)    Total Output  900 1050 450    Net  +2100 +3291 -450            Urine Unmeasured Occurrence   2 x  "        Physical Examination  Telemetry:  Normal sinus rhythm.    Constitutional:  No acute distress.  Sitting up in the bed talking on the phone.    Eyes: No scleral icterus.   PERRL, EOM intact.    Neck:  Supple, FROM   Cardiovascular: Normal rate, regular and rhythm. Normal heart sounds.  No murmurs, gallop or rub.   Respiratory: No respiratory distress. Normal respiratory effort.  Normal breath sounds  Clear to auscultation   Abdominal:  Soft. No masses. Nontender. No distension. No HSM.   Extremities: No digital cyanosis. No clubbing.  No peripheral edema.   Skin: No rashes, lesions or ulcers   Neurological:   Alert and Oriented to person, place, and time.           Results from last 7 days   Lab Units 09/12/22  0515 09/11/22  0636 09/10/22  1528   WBC 10*3/mm3 4.65 5.23 4.97   HEMOGLOBIN g/dL 11.3* 12.0 16.3*   MCV fL 97.7* 97.2* 98.0*   PLATELETS 10*3/mm3 265 310 457*     Results from last 7 days   Lab Units 09/12/22 0515 09/11/22 2005 09/11/22  0349 09/10/22  2133   SODIUM mmol/L 136  --  140 140   POTASSIUM mmol/L 3.5  --  4.1 3.9   CO2 mmol/L 18.0*  --  19.0* 14.0*   CREATININE mg/dL 0.77  --  0.77 0.88   GLUCOSE mg/dL 426*  --  227* 173*   MAGNESIUM mg/dL 2.2  --  1.7 1.9   PHOSPHORUS mg/dL 3.4 2.3* 1.9* 2.3*     Estimated Creatinine Clearance: 80.5 mL/min (by C-G formula based on SCr of 0.77 mg/dL).  Results from last 7 days   Lab Units 09/10/22  1528   ALK PHOS U/L 321*   BILIRUBIN mg/dL 0.5   ALT (SGPT) U/L 110*   AST (SGOT) U/L 109*       Results from last 7 days   Lab Units 09/11/22  0411 09/10/22  1644   PH, ARTERIAL pH units 7.340*  --    PCO2, ARTERIAL mm Hg 34.3*  --    PO2 ART mm Hg 94.0  --    FIO2 % 21 21       Images:  Imaging Results (Last 24 Hours)     ** No results found for the last 24 hours. **        Results: Reviewed.  I reviewed the patient's new laboratory and imaging results.  I independently reviewed the patient's new images.    Medications: Reviewed.    Assessment & Plan   A / P      Ms. Paris is a 51yo poorly controlled noncompliant diabetic admitted for DKA on 9/11/22. She was placed on DKA order set and transitioned to correction insulin. Has developed rebound hyperglycemia due to excessive intake, however AG remains closed. UDS was again positive for cocaine and opiates this admission.     Nutrition:   Diet Regular; Cardiac, Consistent Carbohydrate  Advance Directives:   Code Status and Medical Interventions:   Ordered at: 09/10/22 8407     Code Status (Patient has no pulse and is not breathing):    CPR (Attempt to Resuscitate)     Medical Interventions (Patient has pulse or is breathing):    Full Support     Active Hospital Problems    Diagnosis    • **DKA a/w T1DM    • BROCK- resolved     • Elevated liver enzymes    • Constipation    • Cocaine abuse    • Hepatitis C    • H/O noncompliance with medical treatment    • Bipolar affective disorder    • Hyperlipidemia    • Hypertension    • Epigastric pain    • Lumbar spinal stenosis with neurogenic claudication      Assessment / Plan:    DKA  T1DM  Diabetic gastroparesis   Medical noncompliance  Numerous admissions in the past for DKA. HA1C 12.2 this admission. AG remains closed. Rebound hyperglycemia likely 2* excessive oral intake. Increase levemir to 30 units q 12 and increase prandial insulin to 15 units with meals. Continue SSI coverage. Recommend strong adherence to a diabetic diet. Consult diabetes educator. She has been referred to numerous PCPs however does not follow-up for routine diabetic care. Case management following and may require  consult to determine if she is intelligible to make her medical decisions. Replace electrolytes as needed. Progress mobility. May possibly transfer to telemetry later this afternoon if hyperglycemia improves.     HTN  Dyslipidemia  ASA, metoprolol, and high dose atorvastatin. BP 160s this AM- resume Norvasc. Previous admission for HTN crisis with concern for possible stroke with  negative MRI. Does have L foot drop with EMG/NCV showing severe left peroneal neuropathy.      BROCK   Resolved. AM CMP.     Illicit drug use   Bipolar disorder   HCV  Elevated LFTs  Chronic pain w/opiate  Spinal stenosis with neurogenic claudication  Constipation  UDS + cocaine and opiates. LFT appear chronically elevated. Check CMP in AM. H/O HCV- will check hepatitis panel in AM. Has been seen by GI in the past for abdominal pain felt to be 2* cocaine-induced vasospasms. Abdominal CT imaging unrevealing apart from significant stool burden. Continue seroquel, trazodone, gabapentin, and buspar. Atarax and roxicodone as needed. Stool softeners to combat opioid-induced constipation.     PUD PPX PPI  VTE PPX Heparin SQ    High level of risk due to: severe exacerbation of chronic illness and illness with threat to life or bodily function.    Plan of care and goals reviewed during interdisciplinary rounds.  I discussed the patient's findings and my recommendations with patient and nursing staff    Electronically signed by DMITRY Ordonez, 09/12/22, 11:18 AM EDT.       Electronically signed by Kiley Cole APRN at 09/12/22 1118       Consult Notes (last 24 hours)  Notes from 09/11/22 1242 through 09/12/22 1242   No notes of this type exist for this encounter.

## 2022-09-12 NOTE — NURSING NOTE
"Patient off telemetey monitor, when nurse entered room patient in bathroom, explained to silvino that she must be attached to telemetry while she is in the ICU.  Silvino states she wants to leave hospital now. I explained to her that she is not ready to be discharge at this time, he BG is still elevated and this posses potential risks for detortion. Patient states \"I wanna be out, I just want you to give me something to put me out so I can sleep.\" Explained to her the medications that were ordered for pain and anxiety. Patient then states \" Im 51 yo and that ain't gonna do shit.\" Kiley Barcenas NP notified of Ms Paris's request for more medications and wanting to leave the hospital.  NP replied that she had discussed this matter at length with patient this am, an that no further pain medications or anxiety medications would be prescribed at this time, she also discussed that her BG was out of control and is not ready to leave the hospital. Patient updated on NP notification. Patient wants to leave AMA but does not have transportation, and states that she \"I refuse care, BP, BG and everything else until I get what I want.\" Kiley Cole, Cm, SW all notified.   "

## 2022-09-12 NOTE — DISCHARGE SUMMARY
AMA STATEMENT       Patient name: Bernadette Paris  CSN: [id number]  MRN: [id number]  : 1970    Date of Admission: 9/10/2022  Date Patient left AMA:  2022    Admitting Physician:  Sherley Forde DO  Primary Care Provider: Provider, No Known  Consults: None     Admission Diagnosis: DKA      Procedures: None      History of Present Illness:  Ms. Paris is a 51 yo female with PMH hypertension, diabetes with previous DKA episodes, cocaine use, and chronic pancreatitis who presented to the ED on 9/10/2022 with multiple complaints ultimately found to be in DKA.      According documentation patient developed pain that radiated to her back. She also had associated vomiting, hematemesis, diarrhea, chest pain, headache, and left-sided weakness all of which had started in the last week. For those reasons she called EMS and she was brought to the emergency department for evaluation. Labs showed a profound metabolic acidosis, BROCK, and electrolyte derangements consistent with DKA. UDS also positive for opiates and cocaine.       Chest x-ray no evidence of active disease.CTA abdomen and pelvis with no acute intra-abdominal or intrapelvic abnormalities noted.  Status postcholecystectomy.  Heavy stool burden. CT head without intracranial abnormalities.     While in the ED she received 2 L normal saline bolus, 4 mg IV morphine, 2-1/2 mg of droperidol, and initiated on insulin drip for DKA protocol.  She was admitted to the ICU service for management.     Of note she was just admitted to our hospital from 8/10/2022 - 8/15/2022 for intractable abdominal pain with nausea and vomiting, left foot drop, hypertensive crisis, type 2 diabetes, and ongoing cocaine abuse. She has known medical noncompliance and frequently visits the ER for refills on medications. She frequently misses her appointments with PCPs and follow-up services having required multiple referrals to new PCPs for failure to follow up. She has known chronic  "uncontrolled diabetes, diabetic gastroparesis and multiple prior admissions for DKA often complaining of abdominal pain. GI has been consulted multiple times and ultimately feels her abdominal pain is due to cocaine induced vasospasms of the abdominal arteries with ongoing cocaine abuse. She apparently tries to use cocaine to treat her abdominal pain. On arrival to the hospital during that admission she also complained of difficulty walking and weakness in her left foot.  She underwent stroke evaluation for her left foot weakness which was ultimately unrevealing. Later during the hospitalization she was able to better describe that she had left foot drop. L-spine MRI demonstrates multilevel disease however notably concern for impingement of the left nerve root at the L4-5 region. EMG/NCV also demonstrated severe peroneal neuropathy at the left knee.      Hospital Course:  The patient was admitted for reasons mentioned above in the HPI and placed on the DKA order set with resolution of DKA. BROCK and electrolyte derangements resolved. She was transitioned to correction insulin and implemented on an oral diet. She developed rebound hyperglycemia due to excessive oral intake and lack of compliance to a diabetic diet. Evidently the patient was calling the dietary office and receiving additional trays during her stay.     As she is medically non-compliant and frequently attends the ED and/or requires admission, she was evaluated by case management. In the past PCP appointments have been arranged and the patient has failed to follow-up. Case management attempted to contact numerous offices who refused the patient due to this. She also has access to transportation via [employer].     The evening of 9/12 the patient demanded to leave AGAINST MEDICAL ADVICE because she was demanding IV narcotics to treat her pain and \"put me out\". I explained that she is currently receiving oral narcotics and we would " not escalate this further. She also wishes for additional food despite having numerous trays and currently her blood glucose has remained >400 despite addition insulin. I explained that she must abide by a diabetic diet and she cannot have excessive amounts of snacks as these are worsening her hyperglycemia. I went into depth and did discuss the long term consequences of uncontrolled diabetes including loss of limbs, stroke, permanent disability, coma, and/or death.     Despite our efforts, Bernadette Paris has decided to leave AGAINST MEDICAL ADVICE. She was deemed to have a normal mental status and full decisional capacity.  The patient understands their condition and the risks of leaving AMA, including but not limited to permanent disability and/or death. Transportation was arranged via social work.     The patient has been informed that they may return for care at any time.     DMITRY Gerardo, AGACNP-BC, FNP-BC   Pulmonary and Critical Care

## 2022-09-12 NOTE — PROGRESS NOTES
"INTENSIVIST   PROGRESS NOTE     Hospital:  LOS: 2 days     Ms. Bernadette Paris, 52 y.o. female is followed for a Chief Complaint of: Back Pain      Subjective   S     Interval History:  Transitioned to SSI and oral diet yesterday. Significant rebound hyperglycemia. Has requested numerous meal trays overnight and this morning. AG remains closed. VS stable. Does not require O2.     Patient states she is unable to attend per appointments because she \"just forgets\". Declines assistance with transportation. Lives with her boyfriend.        The patient's relevant past medical, surgical and social history were reviewed and updated in Epic as appropriate.      ROS:   Constitutional: Negative for fever.   Respiratory: Negative for dyspnea.   Cardiovascular: Negative for chest pain.   Gastrointestinal: Negative for nausea, vomiting and diarrhea.     Objective   O     Vitals:  Temp  Min: 97.7 °F (36.5 °C)  Max: 98.1 °F (36.7 °C)  BP  Min: 91/62  Max: 167/120  Pulse  Min: 67  Max: 94  Resp  Min: 14  Max: 20  SpO2  Min: 91 %  Max: 100 % No data recorded    Intake/Ouptut 24 hrs (7:00AM - 6:59 AM)  Intake & Output (last 3 days)       09/09 0701  09/10 0700 09/10 0701  09/11 0700 09/11 0701  09/12 0700 09/12 0701  09/13 0700    P.O.   2500     I.V. (mL/kg)   1841 (31.2)     IV Piggyback  3000      Total Intake(mL/kg)  3000 (50.8) 4341 (73.6)     Urine (mL/kg/hr)  900 1050 (0.7) 450 (1.8)    Total Output  900 1050 450    Net  +2100 +3291 -450            Urine Unmeasured Occurrence   2 x         Physical Examination  Telemetry:  Normal sinus rhythm.    Constitutional:  No acute distress.  Sitting up in the bed talking on the phone.    Eyes: No scleral icterus.   PERRL, EOM intact.    Neck:  Supple, FROM   Cardiovascular: Normal rate, regular and rhythm. Normal heart sounds.  No murmurs, gallop or rub.   Respiratory: No respiratory distress. Normal respiratory effort.  Normal breath sounds  Clear to auscultation   Abdominal:  Soft. No " masses. Nontender. No distension. No HSM.   Extremities: No digital cyanosis. No clubbing.  No peripheral edema.   Skin: No rashes, lesions or ulcers   Neurological:   Alert and Oriented to person, place, and time.           Results from last 7 days   Lab Units 09/12/22  0515 09/11/22  0636 09/10/22  1528   WBC 10*3/mm3 4.65 5.23 4.97   HEMOGLOBIN g/dL 11.3* 12.0 16.3*   MCV fL 97.7* 97.2* 98.0*   PLATELETS 10*3/mm3 265 310 457*     Results from last 7 days   Lab Units 09/12/22  0515 09/11/22 2005 09/11/22  0349 09/10/22  2133   SODIUM mmol/L 136  --  140 140   POTASSIUM mmol/L 3.5  --  4.1 3.9   CO2 mmol/L 18.0*  --  19.0* 14.0*   CREATININE mg/dL 0.77  --  0.77 0.88   GLUCOSE mg/dL 426*  --  227* 173*   MAGNESIUM mg/dL 2.2  --  1.7 1.9   PHOSPHORUS mg/dL 3.4 2.3* 1.9* 2.3*     Estimated Creatinine Clearance: 80.5 mL/min (by C-G formula based on SCr of 0.77 mg/dL).  Results from last 7 days   Lab Units 09/10/22  1528   ALK PHOS U/L 321*   BILIRUBIN mg/dL 0.5   ALT (SGPT) U/L 110*   AST (SGOT) U/L 109*       Results from last 7 days   Lab Units 09/11/22  0411 09/10/22  1644   PH, ARTERIAL pH units 7.340*  --    PCO2, ARTERIAL mm Hg 34.3*  --    PO2 ART mm Hg 94.0  --    FIO2 % 21 21       Images:  Imaging Results (Last 24 Hours)     ** No results found for the last 24 hours. **        Results: Reviewed.  I reviewed the patient's new laboratory and imaging results.  I independently reviewed the patient's new images.    Medications: Reviewed.    Assessment & Plan   A / P     Ms. Paris is a 51yo poorly controlled noncompliant diabetic admitted for DKA on 9/11/22. She was placed on DKA order set and transitioned to correction insulin. Has developed rebound hyperglycemia due to excessive intake, however AG remains closed. UDS was again positive for cocaine and opiates this admission.     Nutrition:   Diet Regular; Cardiac, Consistent Carbohydrate  Advance Directives:   Code Status and Medical Interventions:   Ordered  at: 09/10/22 1707     Code Status (Patient has no pulse and is not breathing):    CPR (Attempt to Resuscitate)     Medical Interventions (Patient has pulse or is breathing):    Full Support     Active Hospital Problems    Diagnosis    • **DKA a/w T1DM    • BROCK- resolved     • Elevated liver enzymes    • Constipation    • Cocaine abuse    • Hepatitis C    • H/O noncompliance with medical treatment    • Bipolar affective disorder    • Hyperlipidemia    • Hypertension    • Epigastric pain    • Lumbar spinal stenosis with neurogenic claudication      Assessment / Plan:    DKA  T1DM  Diabetic gastroparesis   Medical noncompliance  Numerous admissions in the past for DKA. HA1C 12.2 this admission. AG remains closed. Rebound hyperglycemia likely 2* excessive oral intake. Increase levemir to 30 units q 12 and increase prandial insulin to 15 units with meals. Continue SSI coverage. Recommend strong adherence to a diabetic diet. Consult diabetes educator. She has been referred to numerous PCPs however does not follow-up for routine diabetic care. Case management following and may require  consult to determine if she is intelligible to make her medical decisions. Replace electrolytes as needed. Progress mobility. May possibly transfer to telemetry later this afternoon if hyperglycemia improves.     HTN  Dyslipidemia  ASA, metoprolol, and high dose atorvastatin. BP 160s this AM- resume Norvasc. Previous admission for HTN crisis with concern for possible stroke with negative MRI. Does have L foot drop with EMG/NCV showing severe left peroneal neuropathy.      BROCK   Resolved. AM CMP.     Illicit drug use   Bipolar disorder   HCV  Elevated LFTs  Chronic pain w/opiate  Spinal stenosis with neurogenic claudication  Constipation  UDS + cocaine and opiates. LFT appear chronically elevated. Check CMP in AM. H/O HCV- will check hepatitis panel in AM. Has been seen by GI in the past for abdominal pain felt to be 2*  cocaine-induced vasospasms. Abdominal CT imaging unrevealing apart from significant stool burden. Continue seroquel, trazodone, gabapentin, and buspar. Atarax and roxicodone as needed. Stool softeners to combat opioid-induced constipation.     PUD PPX PPI  VTE PPX Heparin SQ    High level of risk due to: severe exacerbation of chronic illness and illness with threat to life or bodily function.    Plan of care and goals reviewed during interdisciplinary rounds.  I discussed the patient's findings and my recommendations with patient and nursing staff    Electronically signed by DMITRY Ordonez, 09/12/22, 11:18 AM EDT.

## 2022-09-12 NOTE — PLAN OF CARE
Goal Outcome Evaluation:           Progress: no change  Outcome Evaluation: VS stable on RA. Good po intake. Pt requesting frequent snacks. FSBS 444 at hs. 24 units given. Recheck 260 at midnight. Pt continues to c/o epigastric/abdominal pain. PRN roxicodone given. Pt slept well between care.

## 2022-09-12 NOTE — CASE MANAGEMENT/SOCIAL WORK
Continued Stay Note  [facility]     Patient Name: Bernadette Paris  MRN: [id number]  Today's Date: 9/12/2022    Admit Date: 9/10/2022     Discharge Plan     Row Name 09/12/22 1611       Plan    Plan SW    Plan Comments SW called [employer] Transportation: [employer] [phone] to request transport for patient. Rep reports patient is eligible and has rode with them before. SW’er provided [employer] with nurse and nurse’s station number to call when in route; reported ETA before 5pm.  location at [address]                                Discharge Codes    No documentation.               Expected Discharge Date and Time     Expected Discharge Date Expected Discharge Time    Sep 17, 2022             VINCENZO James (Kay)

## 2022-09-12 NOTE — CASE MANAGEMENT/SOCIAL WORK
Discharge Planning Assessment  [facility]     Patient Name: Bernadette Paris  MRN: [id number]  Today's Date: 9/12/2022    Admit Date: 9/10/2022     Discharge Needs Assessment     Row Name 09/12/22 1136       Living Environment    People in Home significant other    Current Living Arrangements home    Primary Care Provided by spouse/significant other    Provides Primary Care For no one, unable/limited ability to care for self    Family Caregiver if Needed significant other    Quality of Family Relationships unable to assess    Able to Return to Prior Arrangements yes       Resource/Environmental Concerns    Resource/Environmental Concerns none    Transportation Concerns none  Patient uses [employer]       Transition Planning    Patient/Family Anticipates Transition to home    Patient/Family Anticipated Services at Transition     Transportation Anticipated other (see comments)  Patient uses [employer]       Discharge Needs Assessment    Readmission Within the Last 30 Days previous discharge plan unsuccessful    Equipment Currently Used at Home cane, straight;walker, rolling;glucometer    Concerns to be Addressed compliance issue;discharge planning    Anticipated Changes Related to Illness none    Equipment Needed After Discharge none               Discharge Plan     Row Name 09/12/22 1138       Plan    Plan Home    Patient/Family in Agreement with Plan yes    Plan Comments Spoke with patient at bedside.  Patient [place] [facility] and lives with her significant other.  Prior to admission patient states she was dependent upon significant other for assistance with ADL's but states she is independent with mobility using a walker or cane.  Patient has at home a cane, walker and glucometer.  Patient has had 4 admissions here prior to this admission.  They were 4/6/2022-4/11/2022, 5/9/2022-5/13/2022, 6/2/2022. and 8/10/2022-8/15/2022.  On all of these admissions  a PCP was arranged for patient and each time she came back in it was documented that she was a no call no show for the appointment set up prior.  On the admission of 8/10/-8/15 she was scheduled to see Dr. Amor Ocasio on 8/24/2022 at 1100.  CM spoke with Amrita with Dr. Ocasio office and she states that patient was a no call no show and they will not see patient in the future.  Patient uses [employer], [phone], and it is documented on prior visits that she has access to this transportation.  In speaking with patient she does confirm the no call no shows and the transportation.  It was discussed with patient that without a PCP she is not able to have home health arranged should she need it.  On the past several admissions, inpatient rehab was recommended but there have been no acute rehab that will accept patient.  Asked patient about next of kin and she states she has a daughter Norma Paris but could not provide a phone number.  Stressed importance that the number is needed.  Discharge plan is home at this time.  CM will continue to follow.              Continued Care and Services - Admitted Since 9/10/2022    Coordination has not been started for this encounter.       Expected Discharge Date and Time     Expected Discharge Date Expected Discharge Time    Sep 17, 2022          Demographic Summary     Row Name 09/12/22 1136       General Information    Admission Type inpatient    Arrived From home    Referral Source admission list    Reason for Consult discharge planning    Preferred Language English       Contact Information    Permission Granted to Share Info With                Functional Status    No documentation.                Psychosocial    No documentation.                Abuse/Neglect    No documentation.                Legal    No documentation.                Substance Abuse    No documentation.                Patient Forms    No documentation.                    Priya Allen RN

## 2022-09-13 LAB — GLUCOSE BLDC GLUCOMTR-MCNC: >599 MG/DL (ref 70–130)

## 2022-09-15 LAB
BACTERIA SPEC AEROBE CULT: NORMAL
BACTERIA SPEC AEROBE CULT: NORMAL

## 2022-09-27 ENCOUNTER — HOSPITAL ENCOUNTER (INPATIENT)
Facility: HOSPITAL | Age: 52
LOS: 3 days | Discharge: LEFT BEFORE DISCHARGE | DRG: 637 | End: 2022-09-30
Attending: EMERGENCY MEDICINE | Admitting: INTERNAL MEDICINE
Payer: COMMERCIAL

## 2022-09-27 ENCOUNTER — APPOINTMENT (OUTPATIENT)
Dept: GENERAL RADIOLOGY | Facility: HOSPITAL | Age: 52
DRG: 637 | End: 2022-09-27
Payer: COMMERCIAL

## 2022-09-27 ENCOUNTER — APPOINTMENT (OUTPATIENT)
Dept: CT IMAGING | Facility: HOSPITAL | Age: 52
DRG: 637 | End: 2022-09-27
Payer: COMMERCIAL

## 2022-09-27 DIAGNOSIS — E08.10 DIABETIC KETOACIDOSIS WITHOUT COMA ASSOCIATED WITH DIABETES MELLITUS DUE TO UNDERLYING CONDITION: Primary | ICD-10-CM

## 2022-09-27 DIAGNOSIS — R40.20 COMA, UNSPECIFIED COMA DEPTH: ICD-10-CM

## 2022-09-27 LAB
ALBUMIN SERPL-MCNC: 3.9 G/DL (ref 3.5–5.2)
ALBUMIN/GLOB SERPL: 1.3 G/DL
ALP SERPL-CCNC: 492 U/L (ref 39–117)
ALT SERPL W P-5'-P-CCNC: 128 U/L (ref 1–33)
AMPHET+METHAMPHET UR QL: NEGATIVE
AMPHETAMINES UR QL: NEGATIVE
ANION GAP SERPL CALCULATED.3IONS-SCNC: 21 MMOL/L (ref 5–15)
AST SERPL-CCNC: 271 U/L (ref 1–32)
ATMOSPHERIC PRESS: ABNORMAL MM[HG]
BARBITURATES UR QL SCN: NEGATIVE
BASE EXCESS BLDV CALC-SCNC: -5.8 MMOL/L (ref -2–2)
BASOPHILS # BLD AUTO: 0.01 10*3/MM3 (ref 0–0.2)
BASOPHILS NFR BLD AUTO: 0.1 % (ref 0–1.5)
BDY SITE: ABNORMAL
BENZODIAZ UR QL SCN: NEGATIVE
BILIRUB SERPL-MCNC: 0.5 MG/DL (ref 0–1.2)
BILIRUB UR QL STRIP: NEGATIVE
BODY TEMPERATURE: 37 C
BUN SERPL-MCNC: 19 MG/DL (ref 6–20)
BUN/CREAT SERPL: 15.3 (ref 7–25)
BUPRENORPHINE SERPL-MCNC: NEGATIVE NG/ML
CALCIUM SPEC-SCNC: 9.7 MG/DL (ref 8.6–10.5)
CANNABINOIDS SERPL QL: NEGATIVE
CHLORIDE SERPL-SCNC: 82 MMOL/L (ref 98–107)
CLARITY UR: CLEAR
CO2 BLDA-SCNC: 24.5 MMOL/L (ref 22–33)
CO2 SERPL-SCNC: 21 MMOL/L (ref 22–29)
COCAINE UR QL: POSITIVE
COHGB MFR BLD: 1.3 %
COLOR UR: YELLOW
CREAT SERPL-MCNC: 1.24 MG/DL (ref 0.57–1)
DEPRECATED RDW RBC AUTO: 60.6 FL (ref 37–54)
EGFRCR SERPLBLD CKD-EPI 2021: 52.8 ML/MIN/1.73
EOSINOPHIL # BLD AUTO: 0 10*3/MM3 (ref 0–0.4)
EOSINOPHIL NFR BLD AUTO: 0 % (ref 0.3–6.2)
EPAP: 0
ERYTHROCYTE [DISTWIDTH] IN BLOOD BY AUTOMATED COUNT: 13.9 % (ref 12.3–15.4)
ETHANOL BLD-MCNC: <10 MG/DL (ref 0–10)
GLOBULIN UR ELPH-MCNC: 3.1 GM/DL
GLUCOSE SERPL-MCNC: >1500 MG/DL (ref 65–99)
GLUCOSE UR STRIP-MCNC: ABNORMAL MG/DL
HCO3 BLDV-SCNC: 22.8 MMOL/L (ref 22–28)
HCT VFR BLD AUTO: 47 % (ref 34–46.6)
HGB BLD-MCNC: 13.6 G/DL (ref 12–15.9)
HGB BLDA-MCNC: 13.7 G/DL (ref 14–18)
HGB UR QL STRIP.AUTO: NEGATIVE
HOLD SPECIMEN: NORMAL
HOLD SPECIMEN: NORMAL
IMM GRANULOCYTES # BLD AUTO: 0.03 10*3/MM3 (ref 0–0.05)
IMM GRANULOCYTES NFR BLD AUTO: 0.4 % (ref 0–0.5)
INHALED O2 CONCENTRATION: 21 %
IPAP: 0
KETONES UR QL STRIP: ABNORMAL
LEUKOCYTE ESTERASE UR QL STRIP.AUTO: NEGATIVE
LYMPHOCYTES # BLD AUTO: 0.57 10*3/MM3 (ref 0.7–3.1)
LYMPHOCYTES NFR BLD AUTO: 8.3 % (ref 19.6–45.3)
Lab: ABNORMAL
MACROCYTES BLD QL SMEAR: NORMAL
MCH RBC QN AUTO: 33.5 PG (ref 26.6–33)
MCHC RBC AUTO-ENTMCNC: 28.9 G/DL (ref 31.5–35.7)
MCV RBC AUTO: 115.8 FL (ref 79–97)
METHADONE UR QL SCN: NEGATIVE
METHGB BLD QL: 0.5 %
MODALITY: ABNORMAL
MONOCYTES # BLD AUTO: 0.39 10*3/MM3 (ref 0.1–0.9)
MONOCYTES NFR BLD AUTO: 5.7 % (ref 5–12)
NEUTROPHILS NFR BLD AUTO: 5.88 10*3/MM3 (ref 1.7–7)
NEUTROPHILS NFR BLD AUTO: 85.5 % (ref 42.7–76)
NITRITE UR QL STRIP: NEGATIVE
NOTE: ABNORMAL
NOTIFIED BY: ABNORMAL
NOTIFIED WHO: ABNORMAL
NRBC BLD AUTO-RTO: 0 /100 WBC (ref 0–0.2)
OPIATES UR QL: NEGATIVE
OXYCODONE UR QL SCN: NEGATIVE
OXYHGB MFR BLDV: 40.5 %
PAW @ PEAK INSP FLOW SETTING VENT: 0 CMH2O
PCO2 BLDV: 56.9 MM HG (ref 41–51)
PCP UR QL SCN: NEGATIVE
PH BLDV: 7.21 PH UNITS (ref 7.31–7.41)
PH UR STRIP.AUTO: 6 [PH] (ref 5–8)
PLAT MORPH BLD: NORMAL
PLATELET # BLD AUTO: 263 10*3/MM3 (ref 140–450)
PMV BLD AUTO: 11.2 FL (ref 6–12)
PO2 BLDV: 27.8 MM HG (ref 27–53)
POTASSIUM SERPL-SCNC: 4.6 MMOL/L (ref 3.5–5.2)
PROPOXYPH UR QL: NEGATIVE
PROT SERPL-MCNC: 7 G/DL (ref 6–8.5)
PROT UR QL STRIP: NEGATIVE
RBC # BLD AUTO: 4.06 10*6/MM3 (ref 3.77–5.28)
SODIUM SERPL-SCNC: 124 MMOL/L (ref 136–145)
SP GR UR STRIP: 1.03 (ref 1–1.03)
TOTAL RATE: 0 BREATHS/MINUTE
TRICYCLICS UR QL SCN: NEGATIVE
UROBILINOGEN UR QL STRIP: ABNORMAL
WBC MORPH BLD: NORMAL
WBC NRBC COR # BLD: 6.88 10*3/MM3 (ref 3.4–10.8)
WHOLE BLOOD HOLD COAG: NORMAL
WHOLE BLOOD HOLD SPECIMEN: NORMAL

## 2022-09-27 PROCEDURE — 83735 ASSAY OF MAGNESIUM: CPT | Performed by: EMERGENCY MEDICINE

## 2022-09-27 PROCEDURE — 82077 ASSAY SPEC XCP UR&BREATH IA: CPT | Performed by: EMERGENCY MEDICINE

## 2022-09-27 PROCEDURE — 70450 CT HEAD/BRAIN W/O DYE: CPT

## 2022-09-27 PROCEDURE — 71045 X-RAY EXAM CHEST 1 VIEW: CPT

## 2022-09-27 PROCEDURE — 99291 CRITICAL CARE FIRST HOUR: CPT | Performed by: INTERNAL MEDICINE

## 2022-09-27 PROCEDURE — 83930 ASSAY OF BLOOD OSMOLALITY: CPT | Performed by: EMERGENCY MEDICINE

## 2022-09-27 PROCEDURE — 36415 COLL VENOUS BLD VENIPUNCTURE: CPT

## 2022-09-27 PROCEDURE — 85025 COMPLETE CBC W/AUTO DIFF WBC: CPT | Performed by: EMERGENCY MEDICINE

## 2022-09-27 PROCEDURE — 84100 ASSAY OF PHOSPHORUS: CPT | Performed by: EMERGENCY MEDICINE

## 2022-09-27 PROCEDURE — 83036 HEMOGLOBIN GLYCOSYLATED A1C: CPT | Performed by: EMERGENCY MEDICINE

## 2022-09-27 PROCEDURE — 96375 TX/PRO/DX INJ NEW DRUG ADDON: CPT

## 2022-09-27 PROCEDURE — 85007 BL SMEAR W/DIFF WBC COUNT: CPT | Performed by: EMERGENCY MEDICINE

## 2022-09-27 PROCEDURE — 93005 ELECTROCARDIOGRAM TRACING: CPT | Performed by: EMERGENCY MEDICINE

## 2022-09-27 PROCEDURE — 84484 ASSAY OF TROPONIN QUANT: CPT | Performed by: EMERGENCY MEDICINE

## 2022-09-27 PROCEDURE — 82805 BLOOD GASES W/O2 SATURATION: CPT

## 2022-09-27 PROCEDURE — 99284 EMERGENCY DEPT VISIT MOD MDM: CPT

## 2022-09-27 PROCEDURE — 81003 URINALYSIS AUTO W/O SCOPE: CPT | Performed by: EMERGENCY MEDICINE

## 2022-09-27 PROCEDURE — 80053 COMPREHEN METABOLIC PANEL: CPT | Performed by: EMERGENCY MEDICINE

## 2022-09-27 PROCEDURE — 25010000002 MIDAZOLAM PER 1 MG: Performed by: EMERGENCY MEDICINE

## 2022-09-27 PROCEDURE — 82962 GLUCOSE BLOOD TEST: CPT

## 2022-09-27 PROCEDURE — 87636 SARSCOV2 & INF A&B AMP PRB: CPT | Performed by: EMERGENCY MEDICINE

## 2022-09-27 PROCEDURE — 80306 DRUG TEST PRSMV INSTRMNT: CPT | Performed by: EMERGENCY MEDICINE

## 2022-09-27 RX ORDER — SODIUM CHLORIDE 0.9 % (FLUSH) 0.9 %
10 SYRINGE (ML) INJECTION AS NEEDED
Status: DISCONTINUED | OUTPATIENT
Start: 2022-09-27 | End: 2022-09-28

## 2022-09-27 RX ORDER — POTASSIUM CHLORIDE, DEXTROSE MONOHYDRATE AND SODIUM CHLORIDE 300; 5; 900 MG/100ML; G/100ML; MG/100ML
150 INJECTION, SOLUTION INTRAVENOUS CONTINUOUS PRN
Status: DISCONTINUED | OUTPATIENT
Start: 2022-09-27 | End: 2022-09-28

## 2022-09-27 RX ORDER — DEXTROSE AND SODIUM CHLORIDE 5; .9 G/100ML; G/100ML
150 INJECTION, SOLUTION INTRAVENOUS CONTINUOUS PRN
Status: DISCONTINUED | OUTPATIENT
Start: 2022-09-27 | End: 2022-09-28

## 2022-09-27 RX ORDER — MIDAZOLAM HYDROCHLORIDE 1 MG/ML
0.5 INJECTION INTRAMUSCULAR; INTRAVENOUS ONCE
Status: COMPLETED | OUTPATIENT
Start: 2022-09-27 | End: 2022-09-27

## 2022-09-27 RX ORDER — SODIUM CHLORIDE AND POTASSIUM CHLORIDE 150; 900 MG/100ML; MG/100ML
250 INJECTION, SOLUTION INTRAVENOUS CONTINUOUS PRN
Status: DISCONTINUED | OUTPATIENT
Start: 2022-09-27 | End: 2022-09-28

## 2022-09-27 RX ORDER — DEXTROSE, SODIUM CHLORIDE, AND POTASSIUM CHLORIDE 5; .45; .15 G/100ML; G/100ML; G/100ML
150 INJECTION INTRAVENOUS CONTINUOUS PRN
Status: DISCONTINUED | OUTPATIENT
Start: 2022-09-27 | End: 2022-09-28

## 2022-09-27 RX ORDER — SODIUM CHLORIDE 9 MG/ML
250 INJECTION, SOLUTION INTRAVENOUS CONTINUOUS PRN
Status: DISCONTINUED | OUTPATIENT
Start: 2022-09-27 | End: 2022-09-28

## 2022-09-27 RX ORDER — SODIUM CHLORIDE 450 MG/100ML
250 INJECTION, SOLUTION INTRAVENOUS CONTINUOUS PRN
Status: DISCONTINUED | OUTPATIENT
Start: 2022-09-27 | End: 2022-09-28

## 2022-09-27 RX ORDER — NICOTINE POLACRILEX 4 MG
15 LOZENGE BUCCAL
Status: DISCONTINUED | OUTPATIENT
Start: 2022-09-27 | End: 2022-09-28

## 2022-09-27 RX ORDER — SODIUM CHLORIDE 0.9 % (FLUSH) 0.9 %
10 SYRINGE (ML) INJECTION EVERY 12 HOURS SCHEDULED
Status: DISCONTINUED | OUTPATIENT
Start: 2022-09-27 | End: 2022-09-28

## 2022-09-27 RX ORDER — SODIUM CHLORIDE AND POTASSIUM CHLORIDE 150; 450 MG/100ML; MG/100ML
250 INJECTION, SOLUTION INTRAVENOUS CONTINUOUS PRN
Status: DISCONTINUED | OUTPATIENT
Start: 2022-09-27 | End: 2022-09-28

## 2022-09-27 RX ORDER — DEXTROSE MONOHYDRATE 25 G/50ML
10-50 INJECTION, SOLUTION INTRAVENOUS
Status: DISCONTINUED | OUTPATIENT
Start: 2022-09-27 | End: 2022-09-28

## 2022-09-27 RX ORDER — SODIUM CHLORIDE 0.9 % (FLUSH) 0.9 %
10 SYRINGE (ML) INJECTION AS NEEDED
Status: DISCONTINUED | OUTPATIENT
Start: 2022-09-27 | End: 2022-09-30 | Stop reason: HOSPADM

## 2022-09-27 RX ORDER — DEXTROSE AND SODIUM CHLORIDE 5; .45 G/100ML; G/100ML
150 INJECTION, SOLUTION INTRAVENOUS CONTINUOUS PRN
Status: DISCONTINUED | OUTPATIENT
Start: 2022-09-27 | End: 2022-09-28

## 2022-09-27 RX ORDER — SODIUM CHLORIDE AND POTASSIUM CHLORIDE 300; 900 MG/100ML; MG/100ML
250 INJECTION, SOLUTION INTRAVENOUS CONTINUOUS PRN
Status: DISCONTINUED | OUTPATIENT
Start: 2022-09-27 | End: 2022-09-28

## 2022-09-27 RX ORDER — DEXTROSE, SODIUM CHLORIDE, AND POTASSIUM CHLORIDE 5; .45; .3 G/100ML; G/100ML; G/100ML
150 INJECTION INTRAVENOUS CONTINUOUS PRN
Status: DISCONTINUED | OUTPATIENT
Start: 2022-09-27 | End: 2022-09-28

## 2022-09-27 RX ORDER — DEXTROSE, SODIUM CHLORIDE, AND POTASSIUM CHLORIDE 5; .9; .15 G/100ML; G/100ML; G/100ML
150 INJECTION INTRAVENOUS CONTINUOUS PRN
Status: DISCONTINUED | OUTPATIENT
Start: 2022-09-27 | End: 2022-09-28

## 2022-09-27 RX ADMIN — SODIUM CHLORIDE 1000 ML: 9 INJECTION, SOLUTION INTRAVENOUS at 22:36

## 2022-09-27 RX ADMIN — MIDAZOLAM 0.5 MG: 1 INJECTION INTRAMUSCULAR; INTRAVENOUS at 22:39

## 2022-09-27 RX ADMIN — SODIUM CHLORIDE 1000 ML: 9 INJECTION, SOLUTION INTRAVENOUS at 23:41

## 2022-09-27 NOTE — Clinical Note
Level of Care: Critical Care [6]   Admitting Physician: MAGALY MAO [[id number]]   Patient Class: Inpatient [101]

## 2022-09-28 PROBLEM — N17.9 AKI (ACUTE KIDNEY INJURY) (HCC): Status: RESOLVED | Noted: 2022-09-10 | Resolved: 2022-09-28

## 2022-09-28 PROBLEM — F10.10 ETOH ABUSE: Status: ACTIVE | Noted: 2022-09-28

## 2022-09-28 PROBLEM — G93.41 ENCEPHALOPATHY, METABOLIC: Status: ACTIVE | Noted: 2022-09-28

## 2022-09-28 PROBLEM — E08.10 DIABETIC KETOACIDOSIS WITHOUT COMA ASSOCIATED WITH DIABETES MELLITUS DUE TO UNDERLYING CONDITION (HCC): Status: ACTIVE | Noted: 2022-09-28

## 2022-09-28 LAB
ALBUMIN SERPL-MCNC: 3.7 G/DL (ref 3.5–5.2)
ALBUMIN/GLOB SERPL: 1.2 G/DL
ALP SERPL-CCNC: 465 U/L (ref 39–117)
ALT SERPL W P-5'-P-CCNC: 117 U/L (ref 1–33)
ANION GAP SERPL CALCULATED.3IONS-SCNC: 13 MMOL/L (ref 5–15)
ANION GAP SERPL CALCULATED.3IONS-SCNC: 16 MMOL/L (ref 5–15)
ANION GAP SERPL CALCULATED.3IONS-SCNC: 9 MMOL/L (ref 5–15)
AST SERPL-CCNC: 168 U/L (ref 1–32)
BASOPHILS # BLD AUTO: 0.01 10*3/MM3 (ref 0–0.2)
BASOPHILS NFR BLD AUTO: 0.1 % (ref 0–1.5)
BILIRUB SERPL-MCNC: 0.3 MG/DL (ref 0–1.2)
BUN SERPL-MCNC: 12 MG/DL (ref 6–20)
BUN SERPL-MCNC: 13 MG/DL (ref 6–20)
BUN SERPL-MCNC: 18 MG/DL (ref 6–20)
BUN/CREAT SERPL: 14.4 (ref 7–25)
BUN/CREAT SERPL: 17.3 (ref 7–25)
BUN/CREAT SERPL: 17.9 (ref 7–25)
CALCIUM SPEC-SCNC: 9.1 MG/DL (ref 8.6–10.5)
CALCIUM SPEC-SCNC: 9.2 MG/DL (ref 8.6–10.5)
CALCIUM SPEC-SCNC: 9.9 MG/DL (ref 8.6–10.5)
CHLORIDE SERPL-SCNC: 100 MMOL/L (ref 98–107)
CHLORIDE SERPL-SCNC: 111 MMOL/L (ref 98–107)
CHLORIDE SERPL-SCNC: 118 MMOL/L (ref 98–107)
CO2 SERPL-SCNC: 23 MMOL/L (ref 22–29)
CO2 SERPL-SCNC: 27 MMOL/L (ref 22–29)
CO2 SERPL-SCNC: 28 MMOL/L (ref 22–29)
CREAT SERPL-MCNC: 0.67 MG/DL (ref 0.57–1)
CREAT SERPL-MCNC: 0.9 MG/DL (ref 0.57–1)
CREAT SERPL-MCNC: 1.04 MG/DL (ref 0.57–1)
DEPRECATED RDW RBC AUTO: 51.8 FL (ref 37–54)
EGFRCR SERPLBLD CKD-EPI 2021: 106 ML/MIN/1.73
EGFRCR SERPLBLD CKD-EPI 2021: 65.2 ML/MIN/1.73
EGFRCR SERPLBLD CKD-EPI 2021: 77.6 ML/MIN/1.73
EOSINOPHIL # BLD AUTO: 0 10*3/MM3 (ref 0–0.4)
EOSINOPHIL NFR BLD AUTO: 0 % (ref 0.3–6.2)
ERYTHROCYTE [DISTWIDTH] IN BLOOD BY AUTOMATED COUNT: 13.4 % (ref 12.3–15.4)
FLUAV SUBTYP SPEC NAA+PROBE: NOT DETECTED
FLUBV RNA ISLT QL NAA+PROBE: NOT DETECTED
GLOBULIN UR ELPH-MCNC: 3 GM/DL
GLUCOSE BLDC GLUCOMTR-MCNC: 134 MG/DL (ref 70–130)
GLUCOSE BLDC GLUCOMTR-MCNC: 136 MG/DL (ref 70–130)
GLUCOSE BLDC GLUCOMTR-MCNC: 141 MG/DL (ref 70–130)
GLUCOSE BLDC GLUCOMTR-MCNC: 160 MG/DL (ref 70–130)
GLUCOSE BLDC GLUCOMTR-MCNC: 166 MG/DL (ref 70–130)
GLUCOSE BLDC GLUCOMTR-MCNC: 184 MG/DL (ref 70–130)
GLUCOSE BLDC GLUCOMTR-MCNC: 190 MG/DL (ref 70–130)
GLUCOSE BLDC GLUCOMTR-MCNC: 198 MG/DL (ref 70–130)
GLUCOSE BLDC GLUCOMTR-MCNC: 206 MG/DL (ref 70–130)
GLUCOSE BLDC GLUCOMTR-MCNC: 209 MG/DL (ref 70–130)
GLUCOSE BLDC GLUCOMTR-MCNC: 346 MG/DL (ref 70–130)
GLUCOSE BLDC GLUCOMTR-MCNC: 446 MG/DL (ref 70–130)
GLUCOSE BLDC GLUCOMTR-MCNC: 448 MG/DL (ref 70–130)
GLUCOSE BLDC GLUCOMTR-MCNC: >599 MG/DL (ref 70–130)
GLUCOSE SERPL-MCNC: 1228 MG/DL (ref 65–99)
GLUCOSE SERPL-MCNC: 171 MG/DL (ref 65–99)
GLUCOSE SERPL-MCNC: 306 MG/DL (ref 65–99)
GLUCOSE SERPL-MCNC: 638 MG/DL (ref 65–99)
GLUCOSE SERPL-MCNC: 811 MG/DL (ref 65–99)
HBA1C MFR BLD: 13.9 % (ref 4.8–5.6)
HCT VFR BLD AUTO: 41.3 % (ref 34–46.6)
HGB BLD-MCNC: 13.2 G/DL (ref 12–15.9)
HOLD SPECIMEN: NORMAL
IMM GRANULOCYTES # BLD AUTO: 0.02 10*3/MM3 (ref 0–0.05)
IMM GRANULOCYTES NFR BLD AUTO: 0.3 % (ref 0–0.5)
LYMPHOCYTES # BLD AUTO: 0.84 10*3/MM3 (ref 0.7–3.1)
LYMPHOCYTES NFR BLD AUTO: 10.7 % (ref 19.6–45.3)
MAGNESIUM SERPL-MCNC: 2.3 MG/DL (ref 1.6–2.6)
MAGNESIUM SERPL-MCNC: 2.4 MG/DL (ref 1.6–2.6)
MAGNESIUM SERPL-MCNC: 2.5 MG/DL (ref 1.6–2.6)
MAGNESIUM SERPL-MCNC: 2.6 MG/DL (ref 1.6–2.6)
MAGNESIUM SERPL-MCNC: 2.6 MG/DL (ref 1.6–2.6)
MCH RBC QN AUTO: 33.4 PG (ref 26.6–33)
MCHC RBC AUTO-ENTMCNC: 32 G/DL (ref 31.5–35.7)
MCV RBC AUTO: 104.6 FL (ref 79–97)
MONOCYTES # BLD AUTO: 0.38 10*3/MM3 (ref 0.1–0.9)
MONOCYTES NFR BLD AUTO: 4.8 % (ref 5–12)
NEUTROPHILS NFR BLD AUTO: 6.61 10*3/MM3 (ref 1.7–7)
NEUTROPHILS NFR BLD AUTO: 84.1 % (ref 42.7–76)
NRBC BLD AUTO-RTO: 0 /100 WBC (ref 0–0.2)
OSMOLALITY SERPL: 387 MOSM/KG (ref 275–295)
PHOSPHATE SERPL-MCNC: 1.4 MG/DL (ref 2.5–4.5)
PHOSPHATE SERPL-MCNC: 1.8 MG/DL (ref 2.5–4.5)
PHOSPHATE SERPL-MCNC: 2.1 MG/DL (ref 2.5–4.5)
PHOSPHATE SERPL-MCNC: 2.5 MG/DL (ref 2.5–4.5)
PHOSPHATE SERPL-MCNC: 5.2 MG/DL (ref 2.5–4.5)
PLATELET # BLD AUTO: 255 10*3/MM3 (ref 140–450)
PMV BLD AUTO: 11.1 FL (ref 6–12)
POTASSIUM SERPL-SCNC: 3.4 MMOL/L (ref 3.5–5.2)
POTASSIUM SERPL-SCNC: 3.6 MMOL/L (ref 3.5–5.2)
POTASSIUM SERPL-SCNC: 3.8 MMOL/L (ref 3.5–5.2)
PROT SERPL-MCNC: 6.7 G/DL (ref 6–8.5)
QT INTERVAL: 380 MS
QTC INTERVAL: 464 MS
RBC # BLD AUTO: 3.95 10*6/MM3 (ref 3.77–5.28)
SARS-COV-2 RNA PNL SPEC NAA+PROBE: NOT DETECTED
SODIUM SERPL-SCNC: 139 MMOL/L (ref 136–145)
SODIUM SERPL-SCNC: 151 MMOL/L (ref 136–145)
SODIUM SERPL-SCNC: 155 MMOL/L (ref 136–145)
TROPONIN T SERPL-MCNC: <0.01 NG/ML (ref 0–0.03)
WBC NRBC COR # BLD: 7.86 10*3/MM3 (ref 3.4–10.8)

## 2022-09-28 PROCEDURE — 25010000002 HYDRALAZINE PER 20 MG: Performed by: NURSE PRACTITIONER

## 2022-09-28 PROCEDURE — 83735 ASSAY OF MAGNESIUM: CPT | Performed by: EMERGENCY MEDICINE

## 2022-09-28 PROCEDURE — 25010000002 ENOXAPARIN PER 10 MG: Performed by: INTERNAL MEDICINE

## 2022-09-28 PROCEDURE — 99233 SBSQ HOSP IP/OBS HIGH 50: CPT | Performed by: INTERNAL MEDICINE

## 2022-09-28 PROCEDURE — 82947 ASSAY GLUCOSE BLOOD QUANT: CPT | Performed by: INTERNAL MEDICINE

## 2022-09-28 PROCEDURE — 96365 THER/PROPH/DIAG IV INF INIT: CPT

## 2022-09-28 PROCEDURE — 84100 ASSAY OF PHOSPHORUS: CPT | Performed by: EMERGENCY MEDICINE

## 2022-09-28 PROCEDURE — 63710000001 INSULIN DETEMIR PER 5 UNITS: Performed by: INTERNAL MEDICINE

## 2022-09-28 PROCEDURE — 25010000002 THIAMINE PER 100 MG: Performed by: INTERNAL MEDICINE

## 2022-09-28 PROCEDURE — 0 POTASSIUM CHLORIDE PER 2 MEQ: Performed by: EMERGENCY MEDICINE

## 2022-09-28 PROCEDURE — 25010000002 HEPARIN (PORCINE) PER 1000 UNITS: Performed by: NURSE PRACTITIONER

## 2022-09-28 PROCEDURE — 80053 COMPREHEN METABOLIC PANEL: CPT | Performed by: EMERGENCY MEDICINE

## 2022-09-28 PROCEDURE — 63710000001 INSULIN LISPRO (HUMAN) PER 5 UNITS: Performed by: INTERNAL MEDICINE

## 2022-09-28 PROCEDURE — 85025 COMPLETE CBC W/AUTO DIFF WBC: CPT | Performed by: EMERGENCY MEDICINE

## 2022-09-28 RX ORDER — ASPIRIN 81 MG/1
81 TABLET, CHEWABLE ORAL DAILY
Status: DISCONTINUED | OUTPATIENT
Start: 2022-09-29 | End: 2022-09-30 | Stop reason: HOSPADM

## 2022-09-28 RX ORDER — HYDRALAZINE HYDROCHLORIDE 20 MG/ML
10 INJECTION INTRAMUSCULAR; INTRAVENOUS EVERY 6 HOURS PRN
Status: DISCONTINUED | OUTPATIENT
Start: 2022-09-28 | End: 2022-09-30 | Stop reason: HOSPADM

## 2022-09-28 RX ORDER — LORAZEPAM 1 MG/1
2 TABLET ORAL
Status: DISCONTINUED | OUTPATIENT
Start: 2022-09-28 | End: 2022-09-30 | Stop reason: HOSPADM

## 2022-09-28 RX ORDER — MIDAZOLAM HYDROCHLORIDE 1 MG/ML
1 INJECTION INTRAMUSCULAR; INTRAVENOUS
Status: DISCONTINUED | OUTPATIENT
Start: 2022-09-28 | End: 2022-09-30 | Stop reason: HOSPADM

## 2022-09-28 RX ORDER — ENOXAPARIN SODIUM 100 MG/ML
40 INJECTION SUBCUTANEOUS NIGHTLY
Status: DISCONTINUED | OUTPATIENT
Start: 2022-09-28 | End: 2022-09-28

## 2022-09-28 RX ORDER — PANTOPRAZOLE SODIUM 40 MG/1
40 TABLET, DELAYED RELEASE ORAL
Status: DISCONTINUED | OUTPATIENT
Start: 2022-09-29 | End: 2022-09-28

## 2022-09-28 RX ORDER — DEXTROSE MONOHYDRATE 25 G/50ML
25 INJECTION, SOLUTION INTRAVENOUS
Status: DISCONTINUED | OUTPATIENT
Start: 2022-09-28 | End: 2022-09-30 | Stop reason: HOSPADM

## 2022-09-28 RX ORDER — HALOPERIDOL 5 MG/ML
5 INJECTION INTRAMUSCULAR
Status: DISCONTINUED | OUTPATIENT
Start: 2022-09-28 | End: 2022-09-30 | Stop reason: HOSPADM

## 2022-09-28 RX ORDER — METOPROLOL TARTRATE 50 MG/1
50 TABLET, FILM COATED ORAL EVERY 12 HOURS SCHEDULED
Status: DISCONTINUED | OUTPATIENT
Start: 2022-09-28 | End: 2022-09-30 | Stop reason: HOSPADM

## 2022-09-28 RX ORDER — PANTOPRAZOLE SODIUM 40 MG/10ML
40 INJECTION, POWDER, LYOPHILIZED, FOR SOLUTION INTRAVENOUS NIGHTLY
Status: DISCONTINUED | OUTPATIENT
Start: 2022-09-28 | End: 2022-09-29

## 2022-09-28 RX ORDER — FENTANYL/ROPIVACAINE/NS/PF 2-625MCG/1
15 PLASTIC BAG, INJECTION (ML) EPIDURAL AS NEEDED
Status: DISCONTINUED | OUTPATIENT
Start: 2022-09-28 | End: 2022-09-30 | Stop reason: HOSPADM

## 2022-09-28 RX ORDER — BUSPIRONE HYDROCHLORIDE 10 MG/1
10 TABLET ORAL EVERY 12 HOURS SCHEDULED
Status: DISCONTINUED | OUTPATIENT
Start: 2022-09-28 | End: 2022-09-30 | Stop reason: HOSPADM

## 2022-09-28 RX ORDER — SODIUM CHLORIDE 9 MG/ML
100 INJECTION, SOLUTION INTRAVENOUS CONTINUOUS
Status: DISCONTINUED | OUTPATIENT
Start: 2022-09-28 | End: 2022-09-29

## 2022-09-28 RX ORDER — INSULIN LISPRO 100 [IU]/ML
0-14 INJECTION, SOLUTION INTRAVENOUS; SUBCUTANEOUS
Status: DISCONTINUED | OUTPATIENT
Start: 2022-09-28 | End: 2022-09-29

## 2022-09-28 RX ORDER — MIDAZOLAM HYDROCHLORIDE 1 MG/ML
2 INJECTION INTRAMUSCULAR; INTRAVENOUS
Status: DISCONTINUED | OUTPATIENT
Start: 2022-09-28 | End: 2022-09-30 | Stop reason: HOSPADM

## 2022-09-28 RX ORDER — THIAMINE HYDROCHLORIDE 100 MG/ML
100 INJECTION, SOLUTION INTRAMUSCULAR; INTRAVENOUS DAILY
Status: DISCONTINUED | OUTPATIENT
Start: 2022-09-28 | End: 2022-09-29

## 2022-09-28 RX ORDER — ALPRAZOLAM 0.5 MG/1
0.5 TABLET ORAL ONCE
Status: COMPLETED | OUTPATIENT
Start: 2022-09-28 | End: 2022-09-28

## 2022-09-28 RX ORDER — MIDAZOLAM HYDROCHLORIDE 1 MG/ML
4 INJECTION INTRAMUSCULAR; INTRAVENOUS
Status: DISCONTINUED | OUTPATIENT
Start: 2022-09-28 | End: 2022-09-30 | Stop reason: HOSPADM

## 2022-09-28 RX ORDER — LORAZEPAM 0.5 MG/1
0.5 TABLET ORAL
Status: DISCONTINUED | OUTPATIENT
Start: 2022-09-28 | End: 2022-09-30 | Stop reason: HOSPADM

## 2022-09-28 RX ORDER — HEPARIN SODIUM 5000 [USP'U]/ML
5000 INJECTION, SOLUTION INTRAVENOUS; SUBCUTANEOUS EVERY 8 HOURS SCHEDULED
Status: DISCONTINUED | OUTPATIENT
Start: 2022-09-28 | End: 2022-09-28

## 2022-09-28 RX ORDER — LORAZEPAM 1 MG/1
1 TABLET ORAL
Status: DISCONTINUED | OUTPATIENT
Start: 2022-09-28 | End: 2022-09-30 | Stop reason: HOSPADM

## 2022-09-28 RX ORDER — ATORVASTATIN CALCIUM 40 MG/1
80 TABLET, FILM COATED ORAL NIGHTLY
Status: DISCONTINUED | OUTPATIENT
Start: 2022-09-28 | End: 2022-09-30 | Stop reason: HOSPADM

## 2022-09-28 RX ORDER — QUETIAPINE FUMARATE 100 MG/1
100 TABLET, FILM COATED ORAL NIGHTLY
Status: DISCONTINUED | OUTPATIENT
Start: 2022-09-28 | End: 2022-09-30 | Stop reason: HOSPADM

## 2022-09-28 RX ORDER — NICOTINE POLACRILEX 4 MG
15 LOZENGE BUCCAL
Status: DISCONTINUED | OUTPATIENT
Start: 2022-09-28 | End: 2022-09-30 | Stop reason: HOSPADM

## 2022-09-28 RX ORDER — ENOXAPARIN SODIUM 100 MG/ML
30 INJECTION SUBCUTANEOUS NIGHTLY
Status: DISCONTINUED | OUTPATIENT
Start: 2022-09-28 | End: 2022-09-30 | Stop reason: HOSPADM

## 2022-09-28 RX ADMIN — ATORVASTATIN CALCIUM 80 MG: 40 TABLET, FILM COATED ORAL at 23:31

## 2022-09-28 RX ADMIN — INSULIN HUMAN 5.4 UNITS/HR: 1 INJECTION, SOLUTION INTRAVENOUS at 00:35

## 2022-09-28 RX ADMIN — HYDRALAZINE HYDROCHLORIDE 10 MG: 20 INJECTION INTRAMUSCULAR; INTRAVENOUS at 20:12

## 2022-09-28 RX ADMIN — SODIUM CHLORIDE 100 ML/HR: 9 INJECTION, SOLUTION INTRAVENOUS at 16:23

## 2022-09-28 RX ADMIN — METOPROLOL TARTRATE 50 MG: 50 TABLET, FILM COATED ORAL at 23:31

## 2022-09-28 RX ADMIN — HYDRALAZINE HYDROCHLORIDE 10 MG: 20 INJECTION INTRAMUSCULAR; INTRAVENOUS at 12:00

## 2022-09-28 RX ADMIN — HEPARIN SODIUM 5000 UNITS: 5000 INJECTION INTRAVENOUS; SUBCUTANEOUS at 08:26

## 2022-09-28 RX ADMIN — POTASSIUM CHLORIDE, DEXTROSE MONOHYDRATE AND SODIUM CHLORIDE 150 ML/HR: 150; 5; 450 INJECTION, SOLUTION INTRAVENOUS at 09:19

## 2022-09-28 RX ADMIN — INSULIN DETEMIR 12 UNITS: 100 INJECTION, SOLUTION SUBCUTANEOUS at 14:55

## 2022-09-28 RX ADMIN — Medication 10 ML: at 01:43

## 2022-09-28 RX ADMIN — ALPRAZOLAM 0.5 MG: 0.5 TABLET ORAL at 14:43

## 2022-09-28 RX ADMIN — SODIUM CHLORIDE 1000 ML/HR: 9 INJECTION, SOLUTION INTRAVENOUS at 01:43

## 2022-09-28 RX ADMIN — QUETIAPINE FUMARATE 100 MG: 100 TABLET ORAL at 23:32

## 2022-09-28 RX ADMIN — HEPARIN SODIUM 5000 UNITS: 5000 INJECTION INTRAVENOUS; SUBCUTANEOUS at 16:10

## 2022-09-28 RX ADMIN — Medication 10 ML: at 08:26

## 2022-09-28 RX ADMIN — POTASSIUM CHLORIDE AND SODIUM CHLORIDE 250 ML/HR: 450; 150 INJECTION, SOLUTION INTRAVENOUS at 02:18

## 2022-09-28 RX ADMIN — HEPARIN SODIUM 5000 UNITS: 5000 INJECTION INTRAVENOUS; SUBCUTANEOUS at 01:49

## 2022-09-28 RX ADMIN — PANTOPRAZOLE SODIUM 40 MG: 40 INJECTION, POWDER, FOR SOLUTION INTRAVENOUS at 23:31

## 2022-09-28 RX ADMIN — ENOXAPARIN SODIUM 30 MG: 30 INJECTION SUBCUTANEOUS at 23:31

## 2022-09-28 RX ADMIN — INSULIN DETEMIR 12 UNITS: 100 INJECTION, SOLUTION SUBCUTANEOUS at 20:12

## 2022-09-28 RX ADMIN — POTASSIUM PHOSPHATE, MONOBASIC AND POTASSIUM PHOSPHATE, DIBASIC 30 MMOL: 224; 236 INJECTION, SOLUTION, CONCENTRATE INTRAVENOUS at 05:01

## 2022-09-28 RX ADMIN — INSULIN LISPRO 3 UNITS: 100 INJECTION, SOLUTION INTRAVENOUS; SUBCUTANEOUS at 17:32

## 2022-09-28 RX ADMIN — BUSPIRONE HYDROCHLORIDE 10 MG: 10 TABLET ORAL at 23:31

## 2022-09-28 RX ADMIN — Medication 1 SPRAY: at 14:43

## 2022-09-28 RX ADMIN — POTASSIUM CHLORIDE AND SODIUM CHLORIDE 250 ML/HR: 450; 150 INJECTION, SOLUTION INTRAVENOUS at 05:59

## 2022-09-28 RX ADMIN — THIAMINE HYDROCHLORIDE 100 MG: 100 INJECTION, SOLUTION INTRAMUSCULAR; INTRAVENOUS at 23:31

## 2022-09-28 NOTE — PROGRESS NOTES
Intensivist Note     9/28/2022  Hospital Day: 1  * No surgery found *  ICU Stays Timeline            Hospital Admission: 09/27/22 2132 - Current  ICU stays: 1      In Date/Time Event Department ICU Stay Duration     09/27/22 2132 Admission  [facility]      09/28/22 0053 Transfer In [facility] 2A ICU 15 hours 15 minutes             Ms. Bernadette Paris, 52 y.o. female is followed for:    DKA a/w T1DM    H/O noncompliance with medical treatment    Gastroparesis    History Hepatitis C    Elevated liver enzymes    Agitated encephalopathy, metabolic    Bipolar affective disorder    Hypertension    Hyperlipidemia    Cocaine abuse    ETOH abuse per significant other       SUBJECTIVE     52 y.o.  black female female with PMH HTN, HLP, T1DM, gastroparesis and GERD, EtOH abuse, cocaine use, hepatitis C, medical noncompliance and bipolar disorder who presented to the ED with altered mental status and extremely severe hypoglycemia (glucose 1,228).  In the ED, she had a temp 98, P 100, RR 16, /126 and O2 sat was 100% on room air.  CXR and CTH was unremarkable.  Significant labs:  Glucose >1500, Cr 1.24, Na 124, CO2 21, , , Alk Phos 492, AG 21, Hgb A1c 13.9.  VBG on room air had pH 7.21, pCO2 56 and pO2 27.  UA had ketones and trace glucose.  UDS was + cocaine.  She was given 2L NS bolus and 0.5 mg Versed and was admitted to the ICU per the Intensivist service.  This is 1 of many admissions for DKA and patient was last hospitalized here 9/10/2022 through 9/12/2022 for DKA but left AMA    Interval history: Has remained extremely agitated since admission and any manipulation leads to writhing in the bed, screaming at caretakers, pulling at sheets and lines etc.  At other times she will become lethargic and fall asleep without warning.  At times is oriented to self and will follow commands but speech is garbled and difficult to understand.  Hemodynamics are adequate and in fact she is hypertensive due to  "agitation.  Remains in a sinus rhythm with a heart rate of 88 bpm.  UOP adequate at 1.65 L out over 24 hours and BUN sodium however has increased to 155./creatinine are normal at 12/0.67.  Anion gap has closed and glycemic control acceptable so she is a candidate to transition to Levemir and SSI.  T-max 99.3 and WBC 7.86 off all antimicrobial coverage.         ROS: Unable to obtain due to acuity of illness and agitation.    The patient's relevant PMH, PSH, FH, and SH were reviewed and updated in Epic as appropriate. Allergies and Medications reviewed.    OBJECTIVE     /87   Pulse 103   Temp 99.3 °F (37.4 °C) (Bladder)   Resp 20   Ht 167.6 cm (66\")   Wt 49.4 kg (108 lb 14.5 oz)   SpO2 99%   BMI 17.58 kg/m²           Flowsheet Rows    Flowsheet Row First Filed Value   Admission Height 167.6 cm (66\") Documented at 09/27/2022 2146   Admission Weight 45.4 kg (100 lb) Documented at 09/27/2022 2146        Intake & Output (last day)       09/27 0701  09/28 0700 09/28 0701  09/29 0700    I.V. (mL/kg) 2717.9 (55) 1515 (30.7)    IV Piggyback 1.6 226    Total Intake(mL/kg) 2719.5 (55.1) 1741 (35.2)    Urine (mL/kg/hr) 1650 175 (0.4)    Total Output 1650 175    Net +1069.5 +1566          Urine Unmeasured Occurrence 1 x           Exam:  General Exam:  Thin chronically ill black female appearing far older than stated age.  Restless, agitated, screaming  HEENT: Pupils equal and reactive. Nose and throat clear but very dry oral mucosa.  Neck:                          Supple, no JVD, thyromegaly, or adenopathy  Lungs: Clear to auscultation and percussion anteriorly and posteriorly.  Cardiovascular: RRR without murmurs or gallops.  HR 88 bpm  Abdomen: Soft nontender without organomegaly or masses.  Scaphoid   and rectal: Santiago catheter in place  Extremities: No cyanosis clubbing edema.  Neurologic:                 Confused, agitated, restrained.  Would not answer questions or look at examiner but thrashes around in " bed.      Chest X-Ray: No film today but admission chest x-ray was normal    INFUSIONS  sodium chloride, 100 mL/hr, Last Rate: 100 mL/hr (09/28/22 1623)        Results from last 7 days   Lab Units 09/28/22  0128 09/27/22  2200   WBC 10*3/mm3 7.86 6.88   HEMOGLOBIN g/dL 13.2 13.6   HEMATOCRIT % 41.3 47.0*   PLATELETS 10*3/mm3 255 263     Results from last 7 days   Lab Units 09/28/22  1219 09/28/22  0826   SODIUM mmol/L 155* 151*   POTASSIUM mmol/L 3.8 3.4*   CHLORIDE mmol/L 118* 111*   CO2 mmol/L 28.0 27.0   BUN mg/dL 12 13   CREATININE mg/dL 0.67 0.90   GLUCOSE mg/dL 171* 306*   CALCIUM mg/dL 9.2 9.9     Results from last 7 days   Lab Units 09/28/22  1219 09/28/22  0826 09/28/22  0320   MAGNESIUM mg/dL 2.3 2.5 2.4   PHOSPHORUS mg/dL 2.5 1.8* 1.4*     Results from last 7 days   Lab Units 09/28/22  0128 09/27/22  2240   ALK PHOS U/L 465* 492*   BILIRUBIN mg/dL 0.3 0.5   ALT (SGPT) U/L 117* 128*   AST (SGOT) U/L 168* 271*           Covid Tests    Common Labsle 9/27/22   COVID19 Not Detected            COVID LABS:  Results From Last 14 Days   Lab Units 09/27/22  2240   TROPONIN T ng/mL <0.010          Lab Results   Component Value Date    CKTOTAL 220 (H) 06/16/2022    TROPONINI <0.03 03/03/2021    TROPONINT <0.010 09/27/2022     No results found for: TSH  Lab Results   Component Value Date    LACTATE 1.7 09/10/2022     No results found for: CORTISOL    Results from last 7 days   Lab Units 09/27/22  2244   FIO2 % 21         I reviewed the patient's results, images and medication.    Assessment & Plan   ASSESSMENT        DKA a/w T1DM    H/O noncompliance with medical treatment    Gastroparesis    History Hepatitis C    Elevated liver enzymes    Agitated encephalopathy, metabolic    Bipolar affective disorder    Hypertension    Hyperlipidemia    Cocaine abuse    ETOH abuse per significant other      DISCUSSION: As always has cleared her acidosis with resolution of anion gap and sugars are under control.  Will transition  from insulin drip this morning.  Normally on 40 units of Levemir twice daily but will use less than half that along with sliding scale as she is eating very little and will be compliant while here.  Agitated encephalopathy makes it difficult for nursing to care for her and when she comes around her bipolar disorder may cause her to leave AMA again.  I am going to resume her home dose of Seroquel at night and will use Haldol IV as needed agitation rather than other sedating medicines.  Some of her agitation could also be related to alcohol withdrawal as her significant other indicated that her intake is significant (also had a positive cocaine screen).    PLAN     Thiamine 100 mg IV daily  Seroquel 100 mg nightly  Haldol 5 mg IV every 30 minutes for severe agitation  Follow QT interval daily while on the above  Benzodiazepines via CIWA protocol if necessary  Ulcer and DVT prophylaxis to continue  Hold off on her home doses of antihypertensives until pressure back up (amlodipine and lisinopril)  Will resume her home dose of metoprolol    Plan of care and goals reviewed with multidisciplinary team at daily rounds.    I discussed the patient's findings and my recommendations with patient and nursing staff    Patient is critically ill due to admission 24 hours ago in [facility] and has a high risk of life-threatening decline in condition due to severe agitation and inability to cooperate.  They require continuous monitoring and frequent reassessment of condition for adjustment of management in order to lessen risk.  I visited the patient's bedside and interacted with nurse numerous times today.    Time spent Critical care 30 min (It does not include procedure time).    Electronically signed by Donald Yanez MD, 09/28/22, 4:08 PM EDT.   Pulmonary / Critical care medicine

## 2022-09-28 NOTE — PLAN OF CARE
Goal Outcome Evaluation:  Plan of Care Reviewed With: patient, spouse        Progress: improving  Outcome Evaluation: Pt transitioned from insulin gtt to subcutaneous. Phos replaced. CIWA scale added d/t pts ETOH hx per sig other. Pt given 1 x dose of xanax for agitation/restlessness. Advanced to clear liquid diet.

## 2022-09-28 NOTE — ED PROVIDER NOTES
EMERGENCY DEPARTMENT ENCOUNTER    Pt Name: Bernadette Paris  MRN: [id number]  Pt :   1970  Room Number:    Date of encounter:  2022  PCP: Provider, No Known  ED Provider: Tera Copeland MD    Historian: patient      HPI:  Chief Complaint: hyperglycemia         Context: Bernadette Paris is a 52 y.o. female who presents to the ED c/o hgh blood sugar, and confusion.  He has complained of some arm pain as well, no fall, she complains of pain from her shoulder rating down the back of her arm.  Past history of diabetes.  Has been seen recently for hyperglycemia, and left AGAINST MEDICAL ADVICE.      PAST MEDICAL HISTORY  Past Medical History:   Diagnosis Date   • Arthritis    • Bipolar disorder (HCC)    • Chronic bronchitis (HCC)    • Depression    • Diabetes mellitus (HCC)     DIAGNOSED    • GERD (gastroesophageal reflux disease)    • Hepatitis-C    • History of blood transfusion    • Hyperlipidemia    • Hypertension    • Infectious viral hepatitis     HEPATITIS C   • Migraine    • Sleep apnea     PATIENT UNSURE OF SETTINGS         PAST SURGICAL HISTORY  Past Surgical History:   Procedure Laterality Date   • CHOLECYSTECTOMY     • ENDOSCOPY N/A 2022    Procedure: ESOPHAGOGASTRODUODENOSCOPY;  Surgeon: Brunner, Mark I, MD;  Location:  [facility];  Service: Gastroenterology;  Laterality: N/A;  with antrum biopsy   • HYSTERECTOMY     • KNEE SURGERY     • LUMBAR LAMINECTOMY DISCECTOMY DECOMPRESSION N/A 2018    Procedure: LUMBAR LAMINECTOMIES L4-S1;  Surgeon: Chip Smith MD;  [facility];  Service: Neurosurgery         FAMILY HISTORY  Family History   Problem Relation Age of Onset   • Diabetes Mother    • Hypertension Mother          SOCIAL HISTORY  Social History     Socioeconomic History   • Marital status: Single   Tobacco Use   • Smoking status: Current Every Day Smoker     Packs/day: 1.00   • Smokeless tobacco: Never Used   Substance and Sexual Activity   • Alcohol use: Yes      Comment: hx etoh abuse per ems   • Drug use: Yes     Types: Cocaine(coke)   • Sexual activity: Defer         ALLERGIES  Tylenol [acetaminophen]        REVIEW OF SYSTEMS  Review of Systems       Constitutional: Negative. No fever, no weakness.   HENT: Negative for sneezing and sore throat.    Respiratory: Negative for cough. Negative for shortness of breath.    Cardiovascular: Negative.  Negative for chest pain.   Gastrointestinal: Negative.  Negative for abdominal pain.   Genitourinary: Negative.  Negative for difficulty urinating.     All systems reviewwed and negative except as noted in HPI.    PHYSICAL EXAM    I have reviewed the triage vital signs and nursing notes.    ED Triage Vitals   Temp Heart Rate Resp BP SpO2   09/27/22 2146 09/27/22 2146 09/27/22 2146 09/27/22 2148 09/27/22 2146   98 °F (36.7 °C) 100 16 (!) 162/126 100 %      Temp src Heart Rate Source Patient Position BP Location FiO2 (%)   09/27/22 2146 09/27/22 2146 09/27/22 2148 09/27/22 2148 --   Oral Monitor Lying Right arm        Physical Exam  GENERAL:   Confused, does answer questions with one-word answers, she is protecting her airway.  HENT: Nares patent.  EYES: No scleral icterus.  CV: Regular rhythm, tachycardia.  RESPIRATORY: Increased respiratory rate, no wheezes.  ABDOMEN: Soft, nontender  MUSCULOSKELETAL: No deformities.   NEURO: She does follow commands, she answers questions, she is moving all 4 extremities.  SKIN: Warm, dry, no rash visualized.        LAB RESULTS  Recent Results (from the past 24 hour(s))   Lavender Top    Collection Time: 09/27/22 10:00 PM   Result Value Ref Range    Extra Tube hold for add-on    Gold Top - SST    Collection Time: 09/27/22 10:00 PM   Result Value Ref Range    Extra Tube Hold for add-ons.    Light Blue Top    Collection Time: 09/27/22 10:00 PM   Result Value Ref Range    Extra Tube Hold for add-ons.    CBC Auto Differential    Collection Time: 09/27/22 10:00 PM    Specimen: Blood   Result Value  Ref Range    WBC 6.88 3.40 - 10.80 10*3/mm3    RBC 4.06 3.77 - 5.28 10*6/mm3    Hemoglobin 13.6 12.0 - 15.9 g/dL    Hematocrit 47.0 (H) 34.0 - 46.6 %    .8 (H) 79.0 - 97.0 fL    MCH 33.5 (H) 26.6 - 33.0 pg    MCHC 28.9 (L) 31.5 - 35.7 g/dL    RDW 13.9 12.3 - 15.4 %    RDW-SD 60.6 (H) 37.0 - 54.0 fl    MPV 11.2 6.0 - 12.0 fL    Platelets 263 140 - 450 10*3/mm3    Neutrophil % 85.5 (H) 42.7 - 76.0 %    Lymphocyte % 8.3 (L) 19.6 - 45.3 %    Monocyte % 5.7 5.0 - 12.0 %    Eosinophil % 0.0 (L) 0.3 - 6.2 %    Basophil % 0.1 0.0 - 1.5 %    Immature Grans % 0.4 0.0 - 0.5 %    Neutrophils, Absolute 5.88 1.70 - 7.00 10*3/mm3    Lymphocytes, Absolute 0.57 (L) 0.70 - 3.10 10*3/mm3    Monocytes, Absolute 0.39 0.10 - 0.90 10*3/mm3    Eosinophils, Absolute 0.00 0.00 - 0.40 10*3/mm3    Basophils, Absolute 0.01 0.00 - 0.20 10*3/mm3    Immature Grans, Absolute 0.03 0.00 - 0.05 10*3/mm3    nRBC 0.0 0.0 - 0.2 /100 WBC   Scan Slide    Collection Time: 09/27/22 10:00 PM    Specimen: Blood   Result Value Ref Range    Macrocytes Mod/2+ None Seen    WBC Morphology Normal Normal    Platelet Morphology Normal Normal   Comprehensive Metabolic Panel    Collection Time: 09/27/22 10:40 PM    Specimen: Blood   Result Value Ref Range    Glucose >1,500 (C) 65 - 99 mg/dL    BUN 19 6 - 20 mg/dL    Creatinine 1.24 (H) 0.57 - 1.00 mg/dL    Sodium 124 (L) 136 - 145 mmol/L    Potassium 4.6 3.5 - 5.2 mmol/L    Chloride 82 (L) 98 - 107 mmol/L    CO2 21.0 (L) 22.0 - 29.0 mmol/L    Calcium 9.7 8.6 - 10.5 mg/dL    Total Protein 7.0 6.0 - 8.5 g/dL    Albumin 3.90 3.50 - 5.20 g/dL    ALT (SGPT) 128 (H) 1 - 33 U/L    AST (SGOT) 271 (H) 1 - 32 U/L    Alkaline Phosphatase 492 (H) 39 - 117 U/L    Total Bilirubin 0.5 0.0 - 1.2 mg/dL    Globulin 3.1 gm/dL    A/G Ratio 1.3 g/dL    BUN/Creatinine Ratio 15.3 7.0 - 25.0    Anion Gap 21.0 (H) 5.0 - 15.0 mmol/L    eGFR 52.8 (L) >60.0 mL/min/1.73   Green Top (Gel)    Collection Time: 09/27/22 10:40 PM   Result Value  Ref Range    Extra Tube Hold for add-ons.    Ethanol    Collection Time: 09/27/22 10:40 PM    Specimen: Blood   Result Value Ref Range    Ethanol <10 0 - 10 mg/dL   Blood Gas, Venous With Co-Ox    Collection Time: 09/27/22 10:44 PM    Specimen: Venous Blood   Result Value Ref Range    Site Nurse/Dr Draw     pH, Venous 7.211 (C) 7.310 - 7.410 pH Units    pCO2, Venous 56.9 (H) 41.0 - 51.0 mm Hg    pO2, Venous 27.8 27.0 - 53.0 mm Hg    HCO3, Venous 22.8 22.0 - 28.0 mmol/L    Base Excess, Venous -5.8 (L) -2.0 - 2.0 mmol/L    Hemoglobin, Blood Gas 13.7 (L) 14 - 18 g/dL    Oxyhemoglobin Venous 40.5 %    Methemoglobin Venous 0.5 %    Carboxyhemoglobin Venous 1.3 %    CO2 Content 24.5 22 - 33 mmol/L    Temperature 37.0 C    Barometric Pressure for Blood Gas      Modality Room Air     FIO2 21 %    Rate 0 Breaths/minute    PIP 0 cmH2O    IPAP 0     EPAP 0     Note      Notified Who T, MD SANJIV.     Notified By [id number]     Notified Time 09/27/2022 22:42    Urinalysis With Microscopic If Indicated (No Culture) - Urine, Clean Catch    Collection Time: 09/27/22 11:27 PM    Specimen: Urine, Clean Catch   Result Value Ref Range    Color, UA Yellow Yellow, Straw    Appearance, UA Clear Clear    pH, UA 6.0 5.0 - 8.0    Specific Gravity, UA 1.034 (H) 1.001 - 1.030    Glucose,  mg/dL (Trace) (A) Negative    Ketones, UA Trace (A) Negative    Bilirubin, UA Negative Negative    Blood, UA Negative Negative    Protein, UA Negative Negative    Leuk Esterase, UA Negative Negative    Nitrite, UA Negative Negative    Urobilinogen, UA 0.2 E.U./dL 0.2 - 1.0 E.U./dL   Urine Drug Screen - Urine, Clean Catch    Collection Time: 09/27/22 11:27 PM    Specimen: Urine, Clean Catch   Result Value Ref Range    THC, Screen, Urine Negative Negative    Phencyclidine (PCP), Urine Negative Negative    Cocaine Screen, Urine Positive (A) Negative    Methamphetamine, Ur Negative Negative    Opiate Screen Negative Negative    Amphetamine Screen, Urine  Negative Negative    Benzodiazepine Screen, Urine Negative Negative    Tricyclic Antidepressants Screen Negative Negative    Methadone Screen, Urine Negative Negative    Barbiturates Screen, Urine Negative Negative    Oxycodone Screen, Urine Negative Negative    Propoxyphene Screen Negative Negative    Buprenorphine, Screen, Urine Negative Negative       If labs were ordered, I independently reviewed the results.        RADIOLOGY  CT Head Without Contrast    Result Date: 9/27/2022  EXAMINATION: CT HEAD WITHOUT CONTRAST DATE: 9/27/2022 10:52 PM INDICATION: Altered mental status COMPARISON: CT head 9/10/2022 TECHNIQUE: Noncontrast imaging obtained from the vertex to the skull base.  CT dose lowering techniques were used, to include: automated exposure control, adjustment for patient size, and or use of iterative reconstruction.? FINDINGS: Soft Tissues: No significant soft tissue abnormality. Skull: No underlying skull fracture or radiopaque foreign body. Sinuses: Paranasal sinuses are clear. Mastoids: Mastoid air cells are clear. Globes and Orbits: Globes and orbits are intact. Brain: No acute hemorrhage.  No midline shift, masses, or mass effect.  No evidence of acute infarct by noncontrast CT. Ventricles and Cisterns: Ventricular size and configuration is within normal limits. Basal cisterns are patent. No abnormal extra-axial fluid collection. Senescent Changes: None.     No acute intracranial abnormality.  Electronically signed by:  Jaylon Wright M.D.  9/27/2022 9:28 PM [place] Time    XR Chest 1 View    Result Date: 9/27/2022  Examination: AP view of the chest Indication: Pneumonia Comparison: 9/10/2022 Findings: The cardiomediastinal silhouette is within normal size limits. There is no consolidative airspace disease, pleural effusion or pulmonary edema. There is no pneumothorax. No acute osseous abnormality is identified.     Impression: No evidence of an acute pleural or pulmonary parenchymal abnormality.  Electronically signed by:  Rex Miles M.D.  9/27/2022 9:54 PM [place] Time        PROCEDURES    Procedures    ECG 12 Lead    (Results Pending)       MEDICATIONS GIVEN IN ER    Medications   sodium chloride 0.9 % flush 10 mL (has no administration in time range)   sodium chloride 0.9 % flush 10 mL (has no administration in time range)   sodium chloride 0.9 % bolus 1,000 mL (1,000 mL Intravenous New Bag 9/27/22 7134)   sodium chloride 0.9 % flush 10 mL (has no administration in time range)   sodium chloride 0.9 % flush 10 mL (has no administration in time range)   dextrose (GLUTOSE) oral gel 15 g (has no administration in time range)   dextrose (D50W) (25 g/50 mL) IV injection 10-50 mL (has no administration in time range)   glucagon (human recombinant) (GLUCAGEN DIAGNOSTIC) injection 1 mg (has no administration in time range)   sodium chloride 0.9 % bolus (has no administration in time range)   sodium chloride 0.9 % infusion (has no administration in time range)   sodium chloride 0.9 % with KCl 20 mEq/L infusion (has no administration in time range)   sodium chloride 0.9 % with KCl 40 mEq/L infusion (has no administration in time range)   dextrose 5 % and sodium chloride 0.9 % infusion (has no administration in time range)   dextrose 5 % and sodium chloride 0.9 % with KCl 20 mEq/L infusion (has no administration in time range)   dextrose 5 % and sodium chloride 0.9 % with KCl 40 mEq/L infusion (has no administration in time range)   sodium chloride 0.45 % infusion (has no administration in time range)   sodium chloride 0.45 % with KCl 20 mEq/L infusion (has no administration in time range)   sodium chloride 0.45 % 1,000 mL with potassium chloride 40 mEq infusion (has no administration in time range)   dextrose 5 % and sodium chloride 0.45 % infusion (has no administration in time range)   dextrose 5 % and sodium chloride 0.45 % with KCl 20 mEq/L infusion (has no administration in time range)   dextrose 5 % and  sodium chloride 0.45 % with KCl 40 mEq/L infusion (has no administration in time range)   insulin regular 1 unit/mL in 0.9% sodium chloride (has no administration in time range)   sodium chloride 0.9 % bolus 1,000 mL (0 mL Intravenous Stopped 9/27/22 2339)   midazolam (VERSED) injection 0.5 mg (0.5 mg Intravenous Given 9/27/22 2239)         PROGRESS, DATA ANALYSIS, CONSULTS, AND MEDICAL DECISION MAKING    52-year-old diabetic, presenting with findings of dehydration, clinically, as well as significant hyperglycemia.  DKA suspected, have ordered 2 L of IV fluids, when her chemistry panel have returned with her potassium within normal range I have ordered the insulin drip per the Glucomander protocol.    With the altered mental status I did screen with a chest x-ray, for pneumonia, which is negative, and a CT scan of the head showed no intracranial acute abnormalities on preliminary review.  I did speak to Dr. Escoto, with critical care, for DKA.  AS OF 23:56 EDT VITALS:    BP - 116/99  HR - 98  TEMP - 98 °F (36.7 °C) (Oral)  O2 SATS - 100%                  DIAGNOSIS  Final diagnoses:   Diabetic ketoacidosis without coma associated with diabetes mellitus due to underlying condition (HCC)   Coma, unspecified coma depth (HCC)         DISPOSITION  Admission.           Tera Copeland MD  09/27/22 2398

## 2022-09-28 NOTE — PROGRESS NOTES
Clinical Nutrition     Reason for Visit: MDR, Identified at risk by screening criteria    Patient Name: Bernadette Paris  YOB: 1970  MRN: [id number]  Date of Encounter: 09/28/22 15:20 EDT  Admission date: 9/27/2022    Nutrition Assessment     Problem List:    DKA a/w T1DM    Bipolar affective disorder    Hepatitis C    Hypertension    H/O noncompliance with medical treatment    Hyperlipidemia    Gastroparesis    Cocaine abuse    BROCK (acute kidney injury) (HCC)    Elevated liver enzymes        PMH:   She  has a past medical history of Arthritis, Bipolar disorder (HCC), Chronic bronchitis (HCC), Depression, Diabetes mellitus (HCC), GERD (gastroesophageal reflux disease), Hepatitis-C, History of blood transfusion, Hyperlipidemia, Hypertension, Infectious viral hepatitis, Migraine, and Sleep apnea.Pancreatitis    PSxH:  She  has a past surgical history that includes Hysterectomy; Cholecystectomy; Knee surgery; lumbar laminectomy discectomy decompression (N/A, 8/6/2018); and Esophagogastroduodenoscopy (N/A, 4/9/2022).    Substance history, Cocaine, Tobacco    Reported/Observed/Food/Nutrition Related History:     Pt lethargic, resting in bed    Per RN: pt going through withdrawal, does not follow commands,  is able to swallow ok, boyfriend reports she does not have a glucometer at home      Anthropometrics   Height: 66in  Weight: 108lb bedscale  BMI: 17.4  BMI classification: Underweight:<18.5kg/m2     Weight change: 43lb wt loss over past 4 months    Last 15 Recorded Weights  View Complete Flowsheet  Weight Weight (kg) Weight (lbs) Weight Method   9/28/2022 49.4 kg 108 lb 14.5 oz Bed scale   9/27/2022 45.36 kg 100 lb Estimated   9/10/2022 58.968 kg 130 lb Stated   8/15/2022 61.8 kg  136 lb 3.9 oz  -   8/13/2022 67.541 kg 148 lb 14.4 oz Bed scale   8/12/2022 68.04 kg 150 lb Bed scale   8/10/2022 68.493 kg 151 lb Stated   6/21/2022 68.493 kg 151 lb Stated   6/21/2022 68.493 kg 151 lb Stated    6/16/2022 68.04 kg 150 lb -   5/31/2022 65 kg 143 lb 4.8 oz Bed scale   5/31/2022 68.493 kg 151 lb Stated   5/29/2022 68.04 kg 150 lb Stated   5/13/2022 69.219 kg 152 lb 9.6 oz -   5/12/2022 68.493 kg 151 lb Standing scale       Labs reviewed     Results from last 7 days   Lab Units 09/28/22  1219 09/28/22  0320 09/28/22  0128   SODIUM mmol/L 155*   < > 139   POTASSIUM mmol/L 3.8   < > 3.6   CHLORIDE mmol/L 118*   < > 100   CO2 mmol/L 28.0   < > 23.0   BUN mg/dL 12   < > 18   CREATININE mg/dL 0.67   < > 1.04*   CALCIUM mg/dL 9.2   < > 9.1   BILIRUBIN mg/dL  --   --  0.3   ALK PHOS U/L  --   --  465*   ALT (SGPT) U/L  --   --  117*   AST (SGOT) U/L  --   --  168*   GLUCOSE mg/dL 171*   < > 1,228*    < > = values in this interval not displayed.       Results from last 7 days   Lab Units 09/28/22  1358 09/28/22  1259 09/28/22  1157 09/28/22  1039 09/28/22  1005 09/28/22  0938   GLUCOSE mg/dL 141* 134* 166* 160* 136* 209*     Lab Results   Lab Value Date/Time    HGBA1C 13.90 (H) 09/27/2022 2200    HGBA1C 12.20 (H) 09/10/2022 1528    HGBA1C 11.5 (H) 03/21/2022 0508    HGBA1C 12.2 (H) 07/09/2021 0219       Medications reviewed   Pertinent:heparin, insulin, D5%1/6RY68EZU@150ml    Intake/Ouptut 24 hrs (7:00AM - 6:59 AM)     Intake & Output (last day)       09/27 0701 09/28 0700 09/28 0701 09/29 0700    I.V. (mL/kg) 2717.9 (55) 1515 (30.7)    IV Piggyback 1.6 226    Total Intake(mL/kg) 2719.5 (55.1) 1741 (35.2)    Urine (mL/kg/hr) 1650 175 (0.4)    Total Output 1650 175    Net +1069.5 +1566          Urine Unmeasured Occurrence 1 x           Nutrition Focused Physical Exam Findings -unable to perform as pt not appropriate to assess     GI: wnl    SKIN:wnl  Current Nutrition Prescription     PO: Diet Clear Liquid; Consistent Carbohydrate    Intake; no data    Nutrition Diagnosis   Date: 9-28-22  Problem Food and nutrition knowledge deficit   Etiology Uncontrolled DM   Signs/Symptoms 13.9     Problem Unintended weight loss    Etiology Substance Abuse   Signs/Symptoms 43lb wt loss over 4 months       Goal:   General: Nutrition support treatment  PO: Establish PO    Nutrition Intervention   1.  Follow treatment progress, Supplement provided    Boost Breeze 3x/day    Will f/u for MSA and DM Education when pt more appropriate     Will consult DM Educator as pt's boyfriend reports she does not have a glucometer at home    Consider SLP eval if any S/S of dysphagia    Monitoring/Evaluation:   Per protocol, I&O, PO intake, Pertinent labs, Weight, Skin status, GI status, Symptoms, Swallow function, Hemodynamic stability  Olivia Sherman RD  Time Spent: 45min

## 2022-09-28 NOTE — CASE MANAGEMENT/SOCIAL WORK
Discharge Planning Assessment  [facility]     Patient Name: Bernadette Paris  MRN: [id number]  Today's Date: 9/28/2022    Admit Date: 9/27/2022    Plan: Ongoing   Discharge Needs Assessment     Row Name 09/28/22 1524       Living Environment    People in Home significant other    Current Living Arrangements home    Primary Care Provided by self    Provides Primary Care For no one, unable/limited ability to care for self    Family Caregiver if Needed significant other    Quality of Family Relationships unable to assess       Resource/Environmental Concerns    Resource/Environmental Concerns none    Transportation Concerns none       Transition Planning    Patient/Family Anticipates Transition to home    Patient/Family Anticipated Services at Transition     Transportation Anticipated other (see comments)  Patient uses [employer] transportation       Discharge Needs Assessment    Readmission Within the Last 30 Days previous discharge plan unsuccessful    Equipment Currently Used at Home cane, straight;walker, rolling;glucometer    Concerns to be Addressed compliance issue;substance/tobacco abuse/use;discharge planning;adjustment to diagnosis/illness    Anticipated Changes Related to Illness none               Discharge Plan     Row Name 09/28/22 1526       Plan    Plan Ongoing    Plan Comments Patient confused and in restraints, information obtained from chart.  Patient resides in [facility] and lives with her significant other.  Patient had stated on her previous admission that she required assistance from her S.O. with ADL's and that she was independent with mobility using a cane or walker.  Patient does not have a PCP and has been set up with a new PCP on every admission but would be a no call no show. Patient uses [employer] [phone]. Patient had stated last admission she had a daughter, Norma Paris but did not have her phone number.  Will attempt when patient  oriented to see if we can get this number from her.  Patient did leave AMA on last admission.  Anticipate non compliance again with this admission.  CM will continue to follow for discharge needs.              Continued Care and Services - Admitted Since 9/27/2022    Coordination has not been started for this encounter.       Expected Discharge Date and Time     Expected Discharge Date Expected Discharge Time    Oct 3, 2022          Demographic Summary     Row Name 09/28/22 1524       General Information    Admission Type inpatient    Arrived From home    Referral Source admission list    Reason for Consult discharge planning    Preferred Language English               Functional Status    No documentation.                Psychosocial    No documentation.                Abuse/Neglect    No documentation.                Legal    No documentation.                Substance Abuse    No documentation.                Patient Forms    No documentation.                   Priya Allen RN

## 2022-09-28 NOTE — CONSULTS
Reviewed chart for diabetes education consult.  Noted diagnosis of DKA.  Noted history of diabetes and a1c of 13.9%.  Noted type 1 diabetes.  Taking Levemir 40 units BID at home according to chart.  Ms. Paris has been seen by diabetes education multiple times 8/2022, 7/2022, etc.  At her last visit she was prescribed a new blood sugar meter before she was discharged.  Attempted to see her this afternoon, but she was sound asleep.  Will continue to follow for an appropriate time to educate.  Thank you for this referral.

## 2022-09-28 NOTE — PAYOR COMM NOTE
"Bernadette Mcqueen (52 y.o. Female)             Date of Birth   1970    Social Security Number       Address   [place] [address]    Home Phone   [phone]    MRN   [id number]       [facility]    Marital Status   Single                            Admission Date   9/27/22    Admission Type   Emergency    Admitting Provider   Donald Escoto MD    Attending Provider   Donald Escoto MD    Department, Room/Bed   [facility] 2A ICU, N209/1       Discharge Date       Discharge Disposition       Discharge Destination                               Attending Provider: Donald Escoto MD    Allergies: Tylenol [Acetaminophen]    Isolation: None   Infection: MRSA (08/02/18)   Code Status: CPR   Advance Care Planning Activity    Ht: 167.6 cm (66\")   Wt: 49.4 kg (108 lb 14.5 oz)    Admission Cmt: None   Principal Problem: DKA a/w T1DM [E11.10]                 Active Insurance as of 9/27/2022     Primary Coverage     Payor Plan Insurance Group Employer/Plan Group    [employer] BY [employer] [id number]     Payor Plan Address Payor Plan Phone Number Payor Plan Fax Number Effective Dates    [address] [address]   1/1/2021 - None Entered    [facility] [id number]       Subscriber Name Subscriber Birth Date Member ID       BERNADETTE MCQUEEN 1970 [id number]                 Emergency Contacts      (Rel.) Home Phone Work Phone Mobile Phone    SREEDHARCHARISSE (Significant Other) [phone] -- [phone]            [place]: NPI [id number] Tax ID [id number]  Insurance Information                [employer] BY [employer]AK[employer]HA Phone: --    Subscriber: Bernadette Mcqueen Subscriber#: [id number]    Group#: [id number] Precert#: --             History & Physical      Donald Escoto MD at 09/27/22 2357            CRITICAL CARE ADMISSION NOTE    Chief Complaint     DKA (diabetic ketoacidosis) (HCC)    History of " Present Illness     Bernadette Paris is a 52 y.o. female with PMH HTN, HLP, T1DM, gastroparesis, cocaine use, hepatitis C, medical noncompliance and bipolar disorder who presented to the ED with high blood sugar, confusion.  In the ED, she had a temp 98, P 100, RR 16, 162/126 and 100% room air sat.  CXR and CTH was unremarkable.  Significant labs:  Glucose >1500, Cr 1.24, Na 124, CO2 21, , , Alk Phos 492, AG 21, Hgb A1c 13.9.  VBG on room air had pH 7.21, pCO2 56 and pO2 27.  UA had ketones and trace glucose.  UDS was + cocaine.  She was given 2L NS bolus and 0.5 mg Versed.  He will be admitted into the ICU per the Intensivist service.    She has had multiple admissions for DKA.  She was recently admitted here 9/10/22 - 9/12/22 for DKA and left AMA.      The patient admits to tobacco abuse but denies alcohol or drug abuse despite the UDS.    Problem List, Surgical History, Family, Social History, and ROS     Patient Active Problem List   Diagnosis   • Lumbar spinal stenosis with neurogenic claudication   • Degenerative disc disease, lumbar   • Facet arthritis of lumbar region   • Lumbar stenosis with neurogenic claudication   • BMI 40.0-44.9, adult (HCC)   • Encounter for smoking cessation counseling   • S/P lumbar laminectomy   • Epigastric pain   • Acidosis   • Bipolar affective disorder   • Hepatitis C   • Hypertension   • H/O noncompliance with medical treatment   • Gastritis   • Hyperlipidemia   • Severe malnutrition (HCC)   • Intractable abdominal pain   • Type 2 diabetes mellitus with hyperglycemia, with long-term current use of insulin (HCC)   • Gastroparesis   • Dehydration   • Right sided weakness   • Hypertensive crisis   • Chronic calcific pancreatitis (HCC)   • Cocaine abuse   • Left foot drop   • Constipation   • DKA a/w T1DM   • BROCK (acute kidney injury) (HCC)   • Elevated liver enzymes     Past Surgical History:   Procedure Laterality Date   • CHOLECYSTECTOMY     • ENDOSCOPY N/A 4/9/2022     Procedure: ESOPHAGOGASTRODUODENOSCOPY;  Surgeon: Brunner, Mark I, MD;  Location:  [facility];  Service: Gastroenterology;  Laterality: N/A;  with antrum biopsy   • HYSTERECTOMY     • KNEE SURGERY     • LUMBAR LAMINECTOMY DISCECTOMY DECOMPRESSION N/A 8/6/2018    Procedure: LUMBAR LAMINECTOMIES L4-S1;  Surgeon: Chip Smith MD;  Location: [facility] OR;  Service: Neurosurgery       Allergies   Allergen Reactions   • Tylenol [Acetaminophen] Other (See Comments)     SEVERE LIVER DISEASE-CONTRAINDICATED     No current facility-administered medications on file prior to encounter.     Current Outpatient Medications on File Prior to Encounter   Medication Sig   • Accu-Chek FastClix Lancets misc Use 1 each by Other route 4 (Four) Times a Day.   • acetaminophen (TYLENOL) 325 MG tablet Take 1 tablet by mouth Every 6 (Six) Hours As Needed for Mild Pain  or Moderate Pain .   • Alcohol Swabs (Alcohol Pads) 70 % pads 1 each 4 (Four) Times a Day. Dx E11.9   • amLODIPine (NORVASC) 5 MG tablet Take 1 tablet by mouth Daily.   • aspirin 81 MG chewable tablet Chew 1 tablet Daily.   • atorvastatin (LIPITOR) 80 MG tablet Take 1 tablet by mouth Every Night.   • bisacodyl (DULCOLAX) 10 MG suppository Insert 1 suppository into the rectum Daily As Needed for Constipation (Use if bisacodyl oral is ineffective).   • Blood Glucose Monitoring Suppl (OneTouch Verio Reflect) w/Device kit 1 each 4 (Four) Times a Day.   • busPIRone (BUSPAR) 10 MG tablet Take 1 tablet by mouth 2 (Two) Times a Day.   • capsaicin (ZOSTRIX) 0.075 % topical cream Apply 1 application topically to the appropriate area as directed Every 8 (Eight) Hours.   • dicyclomine (BENTYL) 20 MG tablet Take 1 tablet by mouth Every 6 (Six) Hours As Needed (pain).   • gabapentin (NEURONTIN) 100 MG capsule Take 1 capsule by mouth Every Night.   • Ginger, Zingiber officinalis, (Ginger Root) 500 MG capsule Take 1 capsule (500 mg) by mouth 3 (Three) Times a Day Before Meals.   •  glucose blood (OneTouch Verio) test strip 1 each by Other route 4 (Four) Times a Day Before Meals & at Bedtime. Use as instructed   • hydrOXYzine (ATARAX) 25 MG tablet Take 1 tablet by mouth Every 8 (Eight) Hours As Needed for Anxiety.   • ibuprofen (ADVIL,MOTRIN) 200 MG tablet Take 1 tablet by mouth Every 8 (Eight) Hours As Needed for Mild Pain .   • insulin detemir (LEVEMIR) 100 UNIT/ML injection Inject 40 Units under the skin into the appropriate area as directed 2 (Two) Times a Day.   • Insulin Pen Needle (Pen Needles) 32G X 4 MM misc Inject 1 each under the skin into the appropriate area as directed 2 (Two) Times a Day.   • Lancets (OneTouch Delica Plus [id number]) misc 1 each by Other route 4 (Four) Times a Day Before Meals & at Bedtime.   • lisinopril (PRINIVIL,ZESTRIL) 40 MG tablet Take 1 tablet by mouth Daily.   • metFORMIN (GLUCOPHAGE) 1000 MG tablet Take 1 tablet by mouth 2 (Two) Times a Day With Meals.   • metoprolol tartrate (LOPRESSOR) 50 MG tablet Take 1 tablet by mouth Every 12 (Twelve) Hours.   • nicotine (NICODERM CQ) 21 MG/24HR patch Place 1 patch on the skin as directed by provider Daily.   • ondansetron ODT (Zofran ODT) 4 MG disintegrating tablet Place 1 tablet on the tongue Every 8 (Eight) Hours As Needed for Nausea or Vomiting.   • pantoprazole (PROTONIX) 40 MG EC tablet Take 1 tablet by mouth Daily.   • polyethylene glycol (MIRALAX) 17 GM/SCOOP powder Mix 1 capful (17g) in water and drink by mouth Daily.   • QUEtiapine (SEROquel) 100 MG tablet Take 1 tablet by mouth Every Night.   • RA ALLERGY RELIEF 10 MG tablet take 1 tablet by mouth once daily   • sennosides-docusate (PERICOLACE) 8.6-50 MG per tablet Take 2 tablets by mouth 2 (Two) Times a Day As Needed for Constipation.   • traZODone (DESYREL) 50 MG tablet Take 1 tablet by mouth Every Night.   • Ventolin  (90 Base) MCG/ACT inhaler 2 puffs every 4-6 hours as needed for shortness of breath     MEDICATION LIST AND ALLERGIES  "REVIEWED.    Family History   Problem Relation Age of Onset   • Diabetes Mother    • Hypertension Mother      Social History     Tobacco Use   • Smoking status: Current Every Day Smoker     Packs/day: 1.00   • Smokeless tobacco: Never Used   Substance Use Topics   • Alcohol use: Yes     Comment: hx etoh abuse per ems   • Drug use: Yes     Types: Cocaine(coke)     Social History     Social History Narrative   • Not on file     FAMILY AND SOCIAL HISTORY REVIEWED.    Review of Systems  AS ABOVE OR ALL OTHER SYSTEMS REVIEWED AND ARE NEGATIVE.    Physical Exam and Clinical Information   /99   Pulse 98   Temp 98 °F (36.7 °C) (Oral)   Resp 18   Ht 167.6 cm (66\")   Wt 45.4 kg (100 lb)   SpO2 100%   BMI 16.14 kg/m²   Physical Exam:   GENERAL: Lethargic but arousable, no distress   HEENT: No adenopathy or thyromegaly   LUNGS: Clear without wheezes or rhonchi   HEART: Regular tachycardia without murmurs   GI: Soft, nontender   EXTREMITIES: No clubbing, cyanosis, or edema   NEURO/PSYCH: Lethargic but arousable.  Inconsistently responds to questions appropriately.  Moves all fours    Results from last 7 days   Lab Units 09/27/22  2200   WBC 10*3/mm3 6.88   HEMOGLOBIN g/dL 13.6   PLATELETS 10*3/mm3 263     Results from last 7 days   Lab Units 09/27/22  2240   SODIUM mmol/L 124*   POTASSIUM mmol/L 4.6   CO2 mmol/L 21.0*   BUN mg/dL 19   CREATININE mg/dL 1.24*   MAGNESIUM mg/dL 2.6   PHOSPHORUS mg/dL 5.2*   GLUCOSE mg/dL >1,500*     Estimated Creatinine Clearance: 38.5 mL/min (A) (by C-G formula based on SCr of 1.24 mg/dL (H)).  Results from last 7 days   Lab Units 09/27/22  2200   HEMOGLOBIN A1C % 13.90*         Lab Results   Component Value Date    LACTATE 1.7 09/10/2022        IMAGES: Chest x-ray clear without effusions infiltrates or consolidation    I reviewed the patient's results and images.     Assesment     Active Hospital Problems    Diagnosis    • **DKA a/w T1DM    • Hypertension    • Elevated liver enzymes  "   • BROCK (acute kidney injury) (HCC)    • Cocaine abuse    • Gastroparesis    • Hyperlipidemia    • Hepatitis C    • H/O noncompliance with medical treatment    • Bipolar affective disorder      Plan/Recommendations     Admit to the intensive care unit  IV fluids  Insulin drip  Insulin and electrolyte replacements per DKA guidelines  Monitor for evidence of alcohol or drug withdrawal  Nicotine replacement therapy    Critical Care time spent in direct patient care: 35 minutes (excluding procedure time, if applicable) including high complexity decision making to assess, manipulate, and support vital organ system failure in this individual who has impairment of one or more vital organ systems such that there is a high probability of imminent or life threatening deterioration in the patient’s condition.    STEPHANY Escoto MD  Pulmonary and Critical Care Medicine     CC: Provider, No Known    Electronically signed by Donald Escoto MD at 22 0436          Emergency Department Notes      Tera Copeland MD at 22 2210           EMERGENCY DEPARTMENT ENCOUNTER    Pt Name: Bernadette Paris  MRN: [id number]  Pt :   1970  Room Number:    Date of encounter:  2022  PCP: Provider, No Known  ED Provider: Tera Copeland MD    Historian: patient      HPI:  Chief Complaint: hyperglycemia         Context: Bernadette Paris is a 52 y.o. female who presents to the ED c/o hgh blood sugar, and confusion.  He has complained of some arm pain as well, no fall, she complains of pain from her shoulder rating down the back of her arm.  Past history of diabetes.  Has been seen recently for hyperglycemia, and left AGAINST MEDICAL ADVICE.      PAST MEDICAL HISTORY  Past Medical History:   Diagnosis Date   • Arthritis    • Bipolar disorder (HCC)    • Chronic bronchitis (HCC)    • Depression    • Diabetes mellitus (HCC)     DIAGNOSED 2016   • GERD (gastroesophageal reflux disease)    • Hepatitis-C    •  History of blood transfusion    • Hyperlipidemia    • Hypertension    • Infectious viral hepatitis     HEPATITIS C   • Migraine    • Sleep apnea     PATIENT UNSURE OF SETTINGS         PAST SURGICAL HISTORY  Past Surgical History:   Procedure Laterality Date   • CHOLECYSTECTOMY     • ENDOSCOPY N/A 4/9/2022    Procedure: ESOPHAGOGASTRODUODENOSCOPY;  Surgeon: Brunner, Mark I, MD;  Location:  [facility];  Service: Gastroenterology;  Laterality: N/A;  with antrum biopsy   • HYSTERECTOMY     • KNEE SURGERY     • LUMBAR LAMINECTOMY DISCECTOMY DECOMPRESSION N/A 8/6/2018    Procedure: LUMBAR LAMINECTOMIES L4-S1;  Surgeon: Chip Smith MD;  Location:  [facility];  Service: Neurosurgery         FAMILY HISTORY  Family History   Problem Relation Age of Onset   • Diabetes Mother    • Hypertension Mother          SOCIAL HISTORY  Social History     Socioeconomic History   • Marital status: Single   Tobacco Use   • Smoking status: Current Every Day Smoker     Packs/day: 1.00   • Smokeless tobacco: Never Used   Substance and Sexual Activity   • Alcohol use: Yes     Comment: hx etoh abuse per ems   • Drug use: Yes     Types: Cocaine(coke)   • Sexual activity: Defer         ALLERGIES  Tylenol [acetaminophen]        REVIEW OF SYSTEMS  Review of Systems       Constitutional: Negative. No fever, no weakness.   HENT: Negative for sneezing and sore throat.    Respiratory: Negative for cough. Negative for shortness of breath.    Cardiovascular: Negative.  Negative for chest pain.   Gastrointestinal: Negative.  Negative for abdominal pain.   Genitourinary: Negative.  Negative for difficulty urinating.     All systems reviewwed and negative except as noted in HPI.    PHYSICAL EXAM    I have reviewed the triage vital signs and nursing notes.    ED Triage Vitals   Temp Heart Rate Resp BP SpO2   09/27/22 2146 09/27/22 2146 09/27/22 2146 09/27/22 2148 09/27/22 2146   98 °F (36.7 °C) 100 16 (!) 162/126 100 %      Temp src Heart Rate Source  Patient Position BP Location FiO2 (%)   09/27/22 2146 09/27/22 2146 09/27/22 2148 09/27/22 2148 --   Oral Monitor Lying Right arm        Physical Exam  GENERAL:   Confused, does answer questions with one-word answers, she is protecting her airway.  HENT: Nares patent.  EYES: No scleral icterus.  CV: Regular rhythm, tachycardia.  RESPIRATORY: Increased respiratory rate, no wheezes.  ABDOMEN: Soft, nontender  MUSCULOSKELETAL: No deformities.   NEURO: She does follow commands, she answers questions, she is moving all 4 extremities.  SKIN: Warm, dry, no rash visualized.        LAB RESULTS  Recent Results (from the past 24 hour(s))   Lavender Top    Collection Time: 09/27/22 10:00 PM   Result Value Ref Range    Extra Tube hold for add-on    Gold Top - SST    Collection Time: 09/27/22 10:00 PM   Result Value Ref Range    Extra Tube Hold for add-ons.    Light Blue Top    Collection Time: 09/27/22 10:00 PM   Result Value Ref Range    Extra Tube Hold for add-ons.    CBC Auto Differential    Collection Time: 09/27/22 10:00 PM    Specimen: Blood   Result Value Ref Range    WBC 6.88 3.40 - 10.80 10*3/mm3    RBC 4.06 3.77 - 5.28 10*6/mm3    Hemoglobin 13.6 12.0 - 15.9 g/dL    Hematocrit 47.0 (H) 34.0 - 46.6 %    .8 (H) 79.0 - 97.0 fL    MCH 33.5 (H) 26.6 - 33.0 pg    MCHC 28.9 (L) 31.5 - 35.7 g/dL    RDW 13.9 12.3 - 15.4 %    RDW-SD 60.6 (H) 37.0 - 54.0 fl    MPV 11.2 6.0 - 12.0 fL    Platelets 263 140 - 450 10*3/mm3    Neutrophil % 85.5 (H) 42.7 - 76.0 %    Lymphocyte % 8.3 (L) 19.6 - 45.3 %    Monocyte % 5.7 5.0 - 12.0 %    Eosinophil % 0.0 (L) 0.3 - 6.2 %    Basophil % 0.1 0.0 - 1.5 %    Immature Grans % 0.4 0.0 - 0.5 %    Neutrophils, Absolute 5.88 1.70 - 7.00 10*3/mm3    Lymphocytes, Absolute 0.57 (L) 0.70 - 3.10 10*3/mm3    Monocytes, Absolute 0.39 0.10 - 0.90 10*3/mm3    Eosinophils, Absolute 0.00 0.00 - 0.40 10*3/mm3    Basophils, Absolute 0.01 0.00 - 0.20 10*3/mm3    Immature Grans, Absolute 0.03 0.00 - 0.05  10*3/mm3    nRBC 0.0 0.0 - 0.2 /100 WBC   Scan Slide    Collection Time: 09/27/22 10:00 PM    Specimen: Blood   Result Value Ref Range    Macrocytes Mod/2+ None Seen    WBC Morphology Normal Normal    Platelet Morphology Normal Normal   Comprehensive Metabolic Panel    Collection Time: 09/27/22 10:40 PM    Specimen: Blood   Result Value Ref Range    Glucose >1,500 (C) 65 - 99 mg/dL    BUN 19 6 - 20 mg/dL    Creatinine 1.24 (H) 0.57 - 1.00 mg/dL    Sodium 124 (L) 136 - 145 mmol/L    Potassium 4.6 3.5 - 5.2 mmol/L    Chloride 82 (L) 98 - 107 mmol/L    CO2 21.0 (L) 22.0 - 29.0 mmol/L    Calcium 9.7 8.6 - 10.5 mg/dL    Total Protein 7.0 6.0 - 8.5 g/dL    Albumin 3.90 3.50 - 5.20 g/dL    ALT (SGPT) 128 (H) 1 - 33 U/L    AST (SGOT) 271 (H) 1 - 32 U/L    Alkaline Phosphatase 492 (H) 39 - 117 U/L    Total Bilirubin 0.5 0.0 - 1.2 mg/dL    Globulin 3.1 gm/dL    A/G Ratio 1.3 g/dL    BUN/Creatinine Ratio 15.3 7.0 - 25.0    Anion Gap 21.0 (H) 5.0 - 15.0 mmol/L    eGFR 52.8 (L) >60.0 mL/min/1.73   Green Top (Gel)    Collection Time: 09/27/22 10:40 PM   Result Value Ref Range    Extra Tube Hold for add-ons.    Ethanol    Collection Time: 09/27/22 10:40 PM    Specimen: Blood   Result Value Ref Range    Ethanol <10 0 - 10 mg/dL   Blood Gas, Venous With Co-Ox    Collection Time: 09/27/22 10:44 PM    Specimen: Venous Blood   Result Value Ref Range    Site Nurse/Dr Draw     pH, Venous 7.211 (C) 7.310 - 7.410 pH Units    pCO2, Venous 56.9 (H) 41.0 - 51.0 mm Hg    pO2, Venous 27.8 27.0 - 53.0 mm Hg    HCO3, Venous 22.8 22.0 - 28.0 mmol/L    Base Excess, Venous -5.8 (L) -2.0 - 2.0 mmol/L    Hemoglobin, Blood Gas 13.7 (L) 14 - 18 g/dL    Oxyhemoglobin Venous 40.5 %    Methemoglobin Venous 0.5 %    Carboxyhemoglobin Venous 1.3 %    CO2 Content 24.5 22 - 33 mmol/L    Temperature 37.0 C    Barometric Pressure for Blood Gas      Modality Room Air     FIO2 21 %    Rate 0 Breaths/minute    PIP 0 cmH2O    IPAP 0     EPAP 0     Note      Notified  [facility] KHAI, MD SANJIV.     Notified By [id number]     Notified Time 09/27/2022 22:42    Urinalysis With Microscopic If Indicated (No Culture) - Urine, Clean Catch    Collection Time: 09/27/22 11:27 PM    Specimen: Urine, Clean Catch   Result Value Ref Range    Color, UA Yellow Yellow, Straw    Appearance, UA Clear Clear    pH, UA 6.0 5.0 - 8.0    Specific Gravity, UA 1.034 (H) 1.001 - 1.030    Glucose,  mg/dL (Trace) (A) Negative    Ketones, UA Trace (A) Negative    Bilirubin, UA Negative Negative    Blood, UA Negative Negative    Protein, UA Negative Negative    Leuk Esterase, UA Negative Negative    Nitrite, UA Negative Negative    Urobilinogen, UA 0.2 E.U./dL 0.2 - 1.0 E.U./dL   Urine Drug Screen - Urine, Clean Catch    Collection Time: 09/27/22 11:27 PM    Specimen: Urine, Clean Catch   Result Value Ref Range    THC, Screen, Urine Negative Negative    Phencyclidine (PCP), Urine Negative Negative    Cocaine Screen, Urine Positive (A) Negative    Methamphetamine, Ur Negative Negative    Opiate Screen Negative Negative    Amphetamine Screen, Urine Negative Negative    Benzodiazepine Screen, Urine Negative Negative    Tricyclic Antidepressants Screen Negative Negative    Methadone Screen, Urine Negative Negative    Barbiturates Screen, Urine Negative Negative    Oxycodone Screen, Urine Negative Negative    Propoxyphene Screen Negative Negative    Buprenorphine, Screen, Urine Negative Negative       If labs were ordered, I independently reviewed the results.        RADIOLOGY  CT Head Without Contrast    Result Date: 9/27/2022  EXAMINATION: CT HEAD WITHOUT CONTRAST DATE: 9/27/2022 10:52 PM INDICATION: Altered mental status COMPARISON: CT head 9/10/2022 TECHNIQUE: Noncontrast imaging obtained from the vertex to the skull base.  CT dose lowering techniques were used, to include: automated exposure control, adjustment for patient size, and or use of iterative reconstruction.? FINDINGS: Soft Tissues: No significant soft  tissue abnormality. Skull: No underlying skull fracture or radiopaque foreign body. Sinuses: Paranasal sinuses are clear. Mastoids: Mastoid air cells are clear. Globes and Orbits: Globes and orbits are intact. Brain: No acute hemorrhage.  No midline shift, masses, or mass effect.  No evidence of acute infarct by noncontrast CT. Ventricles and Cisterns: Ventricular size and configuration is within normal limits. Basal cisterns are patent. No abnormal extra-axial fluid collection. Senescent Changes: None.     No acute intracranial abnormality.  Electronically signed by:  Jaylon Wright M.D.  9/27/2022 9:28 PM Mountain Time    XR Chest 1 View    Result Date: 9/27/2022  Examination: AP view of the chest Indication: Pneumonia Comparison: 9/10/2022 Findings: The cardiomediastinal silhouette is within normal size limits. There is no consolidative airspace disease, pleural effusion or pulmonary edema. There is no pneumothorax. No acute osseous abnormality is identified.     Impression: No evidence of an acute pleural or pulmonary parenchymal abnormality. Electronically signed by:  Rex Miles M.D.  9/27/2022 9:54 PM Mountain Time        PROCEDURES    Procedures    ECG 12 Lead    (Results Pending)       MEDICATIONS GIVEN IN ER    Medications   sodium chloride 0.9 % flush 10 mL (has no administration in time range)   sodium chloride 0.9 % flush 10 mL (has no administration in time range)   sodium chloride 0.9 % bolus 1,000 mL (1,000 mL Intravenous New Bag 9/27/22 6932)   sodium chloride 0.9 % flush 10 mL (has no administration in time range)   sodium chloride 0.9 % flush 10 mL (has no administration in time range)   dextrose (GLUTOSE) oral gel 15 g (has no administration in time range)   dextrose (D50W) (25 g/50 mL) IV injection 10-50 mL (has no administration in time range)   glucagon (human recombinant) (GLUCAGEN DIAGNOSTIC) injection 1 mg (has no administration in time range)   sodium chloride 0.9 % bolus (has no  administration in time range)   sodium chloride 0.9 % infusion (has no administration in time range)   sodium chloride 0.9 % with KCl 20 mEq/L infusion (has no administration in time range)   sodium chloride 0.9 % with KCl 40 mEq/L infusion (has no administration in time range)   dextrose 5 % and sodium chloride 0.9 % infusion (has no administration in time range)   dextrose 5 % and sodium chloride 0.9 % with KCl 20 mEq/L infusion (has no administration in time range)   dextrose 5 % and sodium chloride 0.9 % with KCl 40 mEq/L infusion (has no administration in time range)   sodium chloride 0.45 % infusion (has no administration in time range)   sodium chloride 0.45 % with KCl 20 mEq/L infusion (has no administration in time range)   sodium chloride 0.45 % 1,000 mL with potassium chloride 40 mEq infusion (has no administration in time range)   dextrose 5 % and sodium chloride 0.45 % infusion (has no administration in time range)   dextrose 5 % and sodium chloride 0.45 % with KCl 20 mEq/L infusion (has no administration in time range)   dextrose 5 % and sodium chloride 0.45 % with KCl 40 mEq/L infusion (has no administration in time range)   insulin regular 1 unit/mL in 0.9% sodium chloride (has no administration in time range)   sodium chloride 0.9 % bolus 1,000 mL (0 mL Intravenous Stopped 9/27/22 2339)   midazolam (VERSED) injection 0.5 mg (0.5 mg Intravenous Given 9/27/22 2239)         PROGRESS, DATA ANALYSIS, CONSULTS, AND MEDICAL DECISION MAKING    52-year-old diabetic, presenting with findings of dehydration, clinically, as well as significant hyperglycemia.  DKA suspected, have ordered 2 L of IV fluids, when her chemistry panel have returned with her potassium within normal range I have ordered the insulin drip per the Glucomander protocol.    With the altered mental status I did screen with a chest x-ray, for pneumonia, which is negative, and a CT scan of the head showed no intracranial acute abnormalities on  preliminary review.  I did speak to Dr. Escoto, with critical care, for DKA.  AS OF 23:56 EDT VITALS:    BP - 116/99  HR - 98  TEMP - 98 °F (36.7 °C) (Oral)  O2 SATS - 100%                  DIAGNOSIS  Final diagnoses:   Diabetic ketoacidosis without coma associated with diabetes mellitus due to underlying condition (HCC)   Coma, unspecified coma depth (HCC)         DISPOSITION  Admission.           Tera Copeland MD  09/27/22 2356      Electronically signed by Tera Copeland MD at 09/27/22 2356       Vital Signs (last day)     Date/Time Temp Temp src Pulse Resp BP Patient Position SpO2    09/28/22 1100 -- -- 90 -- -- -- 100    09/28/22 1000 -- -- 89 -- 138/91 -- 100    09/28/22 0900 -- -- 88 -- 134/90 -- 100    09/28/22 0800 98.4 (36.9) Bladder 96 14 159/99 Lying 100    09/28/22 0723 -- -- 95 -- -- -- 65    09/28/22 0700 -- -- 90 -- 105/82 -- 100    09/28/22 0600 -- -- 96 -- 171/106 -- 91    09/28/22 0500 98.1 (36.7) Bladder 101 20 131/121 Lying 96    09/28/22 0400 97.5 (36.4) Bladder -- -- -- -- --    09/28/22 0300 -- -- 100 -- -- -- 85    09/28/22 0200 -- -- 100 -- 152/128 -- 60    09/28/22 0100 97 (36.1) Oral 95 20 167/106 Lying --    09/27/22 23:38:50 -- -- 98 18 -- -- --    09/27/22 2332 -- -- -- -- -- -- 100    09/27/22 2331 -- -- -- -- -- -- 100    09/27/22 2330 -- -- -- -- -- -- 100    09/27/22 2300 -- -- -- -- 116/99 -- --    09/27/22 2257 -- -- -- -- 121/99 -- --    09/27/22 2253 -- -- -- -- 117/72 Lying --    09/27/22 2247 -- -- -- -- -- -- 100    09/27/22 2246 -- -- -- -- -- -- 95    09/27/22 2148 -- -- -- -- 162/126 Lying --    09/27/22 2146 98 (36.7) Oral 100 16 -- -- 100            Current Facility-Administered Medications   Medication Dose Route Frequency Provider Last Rate Last Admin   • dextrose (D50W) (25 g/50 mL) IV injection 10-50 mL  10-50 mL Intravenous Q15 Min PRN Tera Copeland MD       • dextrose (GLUTOSE) oral gel 15 g  15 g Oral Q15 Min PRTera Dacosta MD        • dextrose 5 % and sodium chloride 0.45 % infusion  150 mL/hr Intravenous Continuous PRN Tera Copeland MD       • dextrose 5 % and sodium chloride 0.45 % with KCl 20 mEq/L infusion  150 mL/hr Intravenous Continuous PRN Tera Copeland  mL/hr at 09/28/22 0919 150 mL/hr at 09/28/22 0919   • dextrose 5 % and sodium chloride 0.45 % with KCl 40 mEq/L infusion  150 mL/hr Intravenous Continuous PRN Tera Copeland MD       • dextrose 5 % and sodium chloride 0.9 % infusion  150 mL/hr Intravenous Continuous PRN Tera Copeland MD       • dextrose 5 % and sodium chloride 0.9 % with KCl 20 mEq/L infusion  150 mL/hr Intravenous Continuous PRN Tera Copeland MD       • dextrose 5 % and sodium chloride 0.9 % with KCl 40 mEq/L infusion  150 mL/hr Intravenous Continuous PRN Tera Copeland MD       • glucagon (human recombinant) (GLUCAGEN DIAGNOSTIC) injection 1 mg  1 mg Intramuscular Q15 Min PRN Tera Copeland MD       • heparin (porcine) 5000 UNIT/ML injection 5,000 Units  5,000 Units Subcutaneous Q8H Deidre Melton APRN   5,000 Units at 09/28/22 0826   • hydrALAZINE (APRESOLINE) injection 10 mg  10 mg Intravenous Q6H PRN Deidre Melton APRN       • insulin regular 1 unit/mL in 0.9% sodium chloride  0-100 Units/hr Intravenous Titrated Tera Copeland MD 2 mL/hr at 09/28/22 1040 2 Units/hr at 09/28/22 1040   • potassium phosphate 45 mmol in sodium chloride 0.9 % 500 mL infusion  45 mmol Intravenous PRN Deidre Melton APRN        Or   • potassium phosphate 30 mmol in sodium chloride 0.9 % 250 mL infusion  30 mmol Intravenous PRN Deidre Melton APRN 31.3 mL/hr at 09/28/22 0501 30 mmol at 09/28/22 0501    Or   • potassium phosphate 15 mmol in 0.9% sodium chloride 100 mL IVPB  15 mmol Intravenous PRN Deidre Melton APRN        Or   • sodium phosphates 45 mmol in sodium chloride 0.9 % 250 mL IVPB  45 mmol  Intravenous PRN Deidre Melton, APRN        Or   • sodium phosphates 30 mmol in sodium chloride 0.9 % 250 mL IVPB  30 mmol Intravenous PRN Deidre Melton, APRN        Or   • sodium phosphates 15 mmol in sodium chloride 0.9 % 250 mL IVPB  15 mmol Intravenous PRN Deidre Melton, APRN       • sodium chloride 0.45 % 1,000 mL with potassium chloride 40 mEq infusion  250 mL/hr Intravenous Continuous PRN Tera Copeland MD       • sodium chloride 0.45 % infusion  250 mL/hr Intravenous Continuous PRN Tera Copeland MD       • sodium chloride 0.45 % with KCl 20 mEq/L infusion  250 mL/hr Intravenous Continuous PRN Tera Copeland  mL/hr at 09/28/22 0559 250 mL/hr at 09/28/22 0559   • sodium chloride 0.9 % flush 10 mL  10 mL Intravenous PRN Tera Copeland MD       • sodium chloride 0.9 % flush 10 mL  10 mL Intravenous PRN Tera Copeland MD       • sodium chloride 0.9 % flush 10 mL  10 mL Intravenous Q12H Tera Copeland MD   10 mL at 09/28/22 0826   • sodium chloride 0.9 % flush 10 mL  10 mL Intravenous PRN Tera Copeland MD       • sodium chloride 0.9 % infusion  250 mL/hr Intravenous Continuous PRN Tera Copeland MD       • sodium chloride 0.9 % with KCl 20 mEq/L infusion  250 mL/hr Intravenous Continuous PRTera Dacosta MD       • sodium chloride 0.9 % with KCl 40 mEq/L infusion  250 mL/hr Intravenous Continuous PRN Tera Copeland MD           Lab Results (last 24 hours)     Procedure Component Value Units Date/Time    POC Glucose Once [[id number]]  (Abnormal) Collected: 09/28/22 1039    Specimen: Blood Updated: 09/28/22 1040     Glucose 160 mg/dL      Comment: Meter: [id number] : [id number] Hanh Elliott       POC Glucose Once [[id number]]  (Abnormal) Collected: 09/28/22 1005    Specimen: Blood Updated: 09/28/22 1023     Glucose 136 mg/dL      Comment: Meter: [id number] : [id number] Layne STAPLES        POC Glucose Once [[id number]]  (Abnormal) Collected: 09/28/22 0938    Specimen: Blood Updated: 09/28/22 0940     Glucose 209 mg/dL      Comment: Meter: [id number] : [id number] Hanh Elliott       POC Glucose Once [[id number]]  (Abnormal) Collected: 09/28/22 0911    Specimen: Blood Updated: 09/28/22 0911     Glucose 198 mg/dL      Comment: Meter: [id number] : [id number] Layne Jannette A       Phosphorus [[id number]]  (Abnormal) Collected: 09/28/22 0826    Specimen: Blood from Arm, Right Updated: 09/28/22 0911     Phosphorus 1.8 mg/dL     Basic Metabolic Panel [[id number]]  (Abnormal) Collected: 09/28/22 0826    Specimen: Blood from Arm, Right Updated: 09/28/22 0909     Glucose 306 mg/dL      BUN 13 mg/dL      Creatinine 0.90 mg/dL      Sodium 151 mmol/L      Potassium 3.4 mmol/L      Chloride 111 mmol/L      CO2 27.0 mmol/L      Calcium 9.9 mg/dL      BUN/Creatinine Ratio 14.4     Anion Gap 13.0 mmol/L      eGFR 77.6 mL/min/1.73      Comment: National Kidney Foundation and American Society of Nephrology (ASN) Task Force recommended calculation based on the Chronic Kidney Disease Epidemiology Collaboration (CKD-EPI) equation refit without adjustment for race.       Narrative:      GFR Normal >60  Chronic Kidney Disease <60  Kidney Failure <15      Magnesium [[id number]]  (Normal) Collected: 09/28/22 0826    Specimen: Blood from Arm, Right Updated: 09/28/22 0908     Magnesium 2.5 mg/dL     POC Glucose Once [[id number]]  (Abnormal) Collected: 09/28/22 0754    Specimen: Blood Updated: 09/28/22 0757     Glucose 346 mg/dL      Comment: Meter: [id number] : [id number] Layne Jannette A       POC Glucose Once [[id number]]  (Abnormal) Collected: 09/28/22 0443    Specimen: Blood Updated: 09/28/22 0756     Glucose >599 mg/dL      Comment: Physician Notified Treated Patient Meter: [id number] : [id number] Ivone Whitt       POC Glucose Once [[id number]]  (Abnormal) Collected: 09/28/22 0314    Specimen: Blood Updated: 09/28/22  0756     Glucose >599 mg/dL      Comment: Physician Notified Treated Patient Meter: [id number] : [id number] Wing Sava       POC Glucose Once [[id number]]  (Abnormal) Collected: 09/28/22 0226    Specimen: Blood Updated: 09/28/22 0756     Glucose >599 mg/dL      Comment: Physician Notified Meter: [id number] : [id number] Ivone Migdalia       POC Glucose Once [[id number]]  (Abnormal) Collected: 09/28/22 0101    Specimen: Blood Updated: 09/28/22 0756     Glucose >599 mg/dL      Comment: Physician Notified Meter: [id number] : [id number] Lety Solomon       POC Glucose Once [[id number]]  (Abnormal) Collected: 09/27/22 2142    Specimen: Blood Updated: 09/28/22 0756     Glucose >599 mg/dL      Comment: Physician Notified Meter: [id number] : [id number] Kalani Arreguin       POC Glucose Once [[id number]]  (Abnormal) Collected: 09/28/22 0644    Specimen: Blood Updated: 09/28/22 0645     Glucose 448 mg/dL      Comment: Physician Notified Treated Patient Meter: [id number] : [id number] Wing Sava       POC Glucose Once [[id number]]  (Abnormal) Collected: 09/28/22 0555    Specimen: Blood Updated: 09/28/22 0557     Glucose 446 mg/dL      Comment: Physician Notified Treated Patient Meter: [id number] : [id number] Ivone Sava       Glucose, Random [[id number]]  (Abnormal) Collected: 09/28/22 0451    Specimen: Blood Updated: 09/28/22 0525     Glucose 638 mg/dL     Phosphorus [[id number]]  (Abnormal) Collected: 09/28/22 0320    Specimen: Blood Updated: 09/28/22 0411     Phosphorus 1.4 mg/dL     Glucose, Random [[id number]]  (Abnormal) Collected: 09/28/22 0320    Specimen: Blood Updated: 09/28/22 0411     Glucose 811 mg/dL     Magnesium [[id number]]  (Normal) Collected: 09/28/22 0320    Specimen: Blood Updated: 09/28/22 0355     Magnesium 2.4 mg/dL     Comprehensive Metabolic Panel [[id number]]  (Abnormal) Collected: 09/28/22 0128    Specimen: Blood Updated: 09/28/22 0307     Glucose 1,228 mg/dL      BUN 18 mg/dL       Creatinine 1.04 mg/dL      Sodium 139 mmol/L      Potassium 3.6 mmol/L      Comment: Slight hemolysis detected by analyzer. Results may be affected.        Chloride 100 mmol/L      CO2 23.0 mmol/L      Calcium 9.1 mg/dL      Total Protein 6.7 g/dL      Albumin 3.70 g/dL      ALT (SGPT) 117 U/L      AST (SGOT) 168 U/L      Alkaline Phosphatase 465 U/L      Total Bilirubin 0.3 mg/dL      Globulin 3.0 gm/dL      Comment: Calculated Result        A/G Ratio 1.2 g/dL      BUN/Creatinine Ratio 17.3     Anion Gap 16.0 mmol/L      eGFR 65.2 mL/min/1.73      Comment: National Kidney Foundation and American Society of Nephrology (ASN) Task Force recommended calculation based on the Chronic Kidney Disease Epidemiology Collaboration (CKD-EPI) equation refit without adjustment for race.       Narrative:      GFR Normal >60  Chronic Kidney Disease <60  Kidney Failure <15      Phosphorus [[id number]]  (Abnormal) Collected: 09/28/22 0128    Specimen: Blood Updated: 09/28/22 0255     Phosphorus 2.1 mg/dL     Magnesium [[id number]]  (Normal) Collected: 09/28/22 0128    Specimen: Blood Updated: 09/28/22 0254     Magnesium 2.6 mg/dL     CBC & Differential [[id number]]  (Abnormal) Collected: 09/28/22 0128    Specimen: Blood Updated: 09/28/22 0250    Narrative:      The following orders were created for panel order CBC & Differential.  Procedure                               Abnormality         Status                     ---------                               -----------         ------                     CBC Auto Differential[[id number]]        Abnormal            Final result                 Please view results for these tests on the individual orders.    CBC Auto Differential [[id number]]  (Abnormal) Collected: 09/28/22 0128    Specimen: Blood Updated: 09/28/22 0250     WBC 7.86 10*3/mm3      RBC 3.95 10*6/mm3      Hemoglobin 13.2 g/dL      Hematocrit 41.3 %      .6 fL      MCH 33.4 pg      MCHC 32.0 g/dL      RDW 13.4 %      RDW-SD  51.8 fl      MPV 11.1 fL      Platelets 255 10*3/mm3      Neutrophil % 84.1 %      Lymphocyte % 10.7 %      Monocyte % 4.8 %      Eosinophil % 0.0 %      Basophil % 0.1 %      Immature Grans % 0.3 %      Neutrophils, Absolute 6.61 10*3/mm3      Lymphocytes, Absolute 0.84 10*3/mm3      Monocytes, Absolute 0.38 10*3/mm3      Eosinophils, Absolute 0.00 10*3/mm3      Basophils, Absolute 0.01 10*3/mm3      Immature Grans, Absolute 0.02 10*3/mm3      nRBC 0.0 /100 WBC     Osmolality, Serum [[id number]]  (Abnormal) Collected: 09/27/22 2200    Specimen: Blood Updated: 09/28/22 0244     Osmolality 387 mOsm/kg     [place] Draw [[id number]] Collected: 09/27/22 2200    Specimen: Blood Updated: 09/28/22 0203    Narrative:      The following orders were created for panel order [place] Draw.  Procedure                               Abnormality         Status                     ---------                               -----------         ------                     Green Top (Gel)[[id number]]                                  Final result               Lavender Top[[id number]]                                     Final result               Gold Top - SST[[id number]]                                   Final result               Mckoy Top[[id number]]                                         Final result               Light Blue Top[[id number]]                                   Final result                 Please view results for these tests on the individual orders.    Mckoy Top [[id number]] Collected: 09/27/22 2200    Specimen: Blood Updated: 09/28/22 0203     Extra Tube Hold for add-ons.     Comment: Auto resulted.       COVID PRE-OP / PRE-PROCEDURE SCREENING ORDER (NO ISOLATION) - Swab, Nasopharynx [[id number]]  (Normal) Collected: 09/27/22 2343    Specimen: Swab from Nasopharynx Updated: 09/28/22 0010    Narrative:      The following orders were created for panel order COVID PRE-OP / PRE-PROCEDURE SCREENING ORDER (NO ISOLATION) - Swab,  Nasopharynx.  Procedure                               Abnormality         Status                     ---------                               -----------         ------                     COVID-19 and FLU A/B PCR...[[id number]]  Normal              Final result                 Please view results for these tests on the individual orders.    COVID-19 and FLU A/B PCR - Swab, Nasopharynx [[id number]]  (Normal) Collected: 09/27/22 2343    Specimen: Swab from Nasopharynx Updated: 09/28/22 0010     COVID19 Not Detected     Influenza A PCR Not Detected     Influenza B PCR Not Detected    Narrative:      Fact sheet for providers: https://www.fda.gov/media/954626/download    Fact sheet for patients: https://www.fda.gov/media/536141/download    Test performed by PCR.    Troponin [[id number]]  (Normal) Collected: 09/27/22 2240    Specimen: Blood Updated: 09/28/22 0003     Troponin T <0.010 ng/mL     Narrative:      Troponin T Reference Range:  <= 0.03 ng/mL-   Negative for AMI  >0.03 ng/mL-     Abnormal for myocardial necrosis.  Clinicians would have to utilize clinical acumen, EKG, Troponin and serial changes to determine if it is an Acute Myocardial Infarction or myocardial injury due to an underlying chronic condition.       Results may be falsely decreased if patient taking Biotin.      Hemoglobin A1c [[id number]]  (Abnormal) Collected: 09/27/22 2200    Specimen: Blood Updated: 09/28/22 0003     Hemoglobin A1C 13.90 %     Narrative:      Hemoglobin A1C Ranges:    Increased Risk for Diabetes  5.7% to 6.4%  Diabetes                     >= 6.5%  Diabetic Goal                < 7.0%    Phosphorus [[id number]]  (Abnormal) Collected: 09/27/22 2240    Specimen: Blood Updated: 09/28/22 0001     Phosphorus 5.2 mg/dL     Magnesium [[id number]]  (Normal) Collected: 09/27/22 2240    Specimen: Blood Updated: 09/28/22 0001     Magnesium 2.6 mg/dL     Urine Drug Screen - Urine, Clean Catch [[id number]]  (Abnormal) Collected: 09/27/22 5448     Specimen: Urine, Clean Catch Updated: 09/27/22 2353     THC, Screen, Urine Negative     Phencyclidine (PCP), Urine Negative     Cocaine Screen, Urine Positive     Methamphetamine, Ur Negative     Opiate Screen Negative     Amphetamine Screen, Urine Negative     Benzodiazepine Screen, Urine Negative     Tricyclic Antidepressants Screen Negative     Methadone Screen, Urine Negative     Barbiturates Screen, Urine Negative     Oxycodone Screen, Urine Negative     Propoxyphene Screen Negative     Buprenorphine, Screen, Urine Negative    Narrative:      Cutoff For Drugs Screened:    Amphetamines               500 ng/ml  Barbiturates               200 ng/ml  Benzodiazepines            150 ng/ml  Cocaine                    150 ng/ml  Methadone                  200 ng/ml  Opiates                    100 ng/ml  Phencyclidine               25 ng/ml  THC                            50 ng/ml  Methamphetamine            500 ng/ml  Tricyclic Antidepressants  300 ng/ml  Oxycodone                  100 ng/ml  Propoxyphene               300 ng/ml  Buprenorphine               10 ng/ml    The normal value for all drugs tested is negative. This report includes unconfirmed screening results, with the cutoff values listed, to be used for medical treatment purposes only.  Unconfirmed results must not be used for non-medical purposes such as employment or legal testing.  Clinical consideration should be applied to any drug of abuse test, particularly when unconfirmed results are used.      Green Top (Gel) [[id number]] Collected: 09/27/22 2240    Specimen: Blood Updated: 09/27/22 2347     Extra Tube Hold for add-ons.     Comment: Auto resulted.       Urinalysis With Microscopic If Indicated (No Culture) - Urine, Clean Catch [[id number]]  (Abnormal) Collected: 09/27/22 2327    Specimen: Urine, Clean Catch Updated: 09/27/22 2347     Color, UA Yellow     Appearance, UA Clear     pH, UA 6.0     Specific Gravity, UA 1.034     Glucose,  mg/dL  (Trace)     Ketones, UA Trace     Bilirubin, UA Negative     Blood, UA Negative     Protein, UA Negative     Leuk Esterase, UA Negative     Nitrite, UA Negative     Urobilinogen, UA 0.2 E.U./dL    Narrative:      Urine microscopic not indicated.    Comprehensive Metabolic Panel [[id number]]  (Abnormal) Collected: 09/27/22 2240    Specimen: Blood Updated: 09/27/22 2323     Glucose >1,500 mg/dL      BUN 19 mg/dL      Creatinine 1.24 mg/dL      Sodium 124 mmol/L      Potassium 4.6 mmol/L      Comment: Slight hemolysis detected by analyzer. Results may be affected.        Chloride 82 mmol/L      CO2 21.0 mmol/L      Calcium 9.7 mg/dL      Total Protein 7.0 g/dL      Albumin 3.90 g/dL      ALT (SGPT) 128 U/L      AST (SGOT) 271 U/L      Alkaline Phosphatase 492 U/L      Total Bilirubin 0.5 mg/dL      Globulin 3.1 gm/dL      Comment: Calculated Result        A/G Ratio 1.3 g/dL      BUN/Creatinine Ratio 15.3     Anion Gap 21.0 mmol/L      eGFR 52.8 mL/min/1.73      Comment: National Kidney Foundation and American Society of Nephrology (ASN) Task Force recommended calculation based on the Chronic Kidney Disease Epidemiology Collaboration (CKD-EPI) equation refit without adjustment for race.       Narrative:      GFR Normal >60  Chronic Kidney Disease <60  Kidney Failure <15      Ethanol [[id number]]  (Normal) Collected: 09/27/22 2240    Specimen: Blood Updated: 09/27/22 2305     Ethanol <10 mg/dL     Narrative:      Elevated lactic acid concentration and lactate dehydrogenase(LD) activity may falsely elevate enzymatically determined ethanol levels. Not for legal purposes.     Lavender Top [[id number]] Collected: 09/27/22 2200    Specimen: Blood Updated: 09/27/22 2302     Extra Tube hold for add-on     Comment: Auto resulted       Gold Top - SST [[id number]] Collected: 09/27/22 2200    Specimen: Blood Updated: 09/27/22 2302     Extra Tube Hold for add-ons.     Comment: Auto resulted.       Light Blue Top [[id number]] Collected:  09/27/22 2200    Specimen: Blood Updated: 09/27/22 2302     Extra Tube Hold for add-ons.     Comment: Auto resulted       Blood Gas, Venous With Co-Ox [[id number]]  (Abnormal) Collected: 09/27/22 2244    Specimen: Venous Blood Updated: 09/27/22 2244     Site Nurse/Dr Draw     pH, Venous 7.211 pH Units      Comment: 85 Value below critical limit        pCO2, Venous 56.9 mm Hg      Comment: 83 Value above reference range        pO2, Venous 27.8 mm Hg      HCO3, Venous 22.8 mmol/L      Base Excess, Venous -5.8 mmol/L      Hemoglobin, Blood Gas 13.7 g/dL      Oxyhemoglobin Venous 40.5 %      Comment: 84 Value below reference range        Methemoglobin Venous 0.5 %      Carboxyhemoglobin Venous 1.3 %      CO2 Content 24.5 mmol/L      Temperature 37.0 C      Barometric Pressure for Blood Gas --     Comment: N/A        Modality Room Air     FIO2 21 %      Rate 0 Breaths/minute      PIP 0 cmH2O      Comment: Meter: [id number]     :  [id number]        IPAP 0     EPAP 0     Note --     Notified [facility] TSANJIV MD.     Notified By [id number]     Notified Time 09/27/2022 22:42    CBC & Differential [[id number]]  (Abnormal) Collected: 09/27/22 2200    Specimen: Blood Updated: 09/27/22 2238    Narrative:      The following orders were created for panel order CBC & Differential.  Procedure                               Abnormality         Status                     ---------                               -----------         ------                     CBC Auto Differential[[id number]]        Abnormal            Final result               Scan Slide[[id number]]                                       Final result                 Please view results for these tests on the individual orders.    Scan Slide [[id number]] Collected: 09/27/22 2200    Specimen: Blood Updated: 09/27/22 2238     Macrocytes Mod/2+     WBC Morphology Normal     Platelet Morphology Normal    CBC Auto Differential [[id number]]  (Abnormal) Collected: 09/27/22  2200    Specimen: Blood Updated: 09/27/22 2238     WBC 6.88 10*3/mm3      RBC 4.06 10*6/mm3      Hemoglobin 13.6 g/dL      Hematocrit 47.0 %      .8 fL      MCH 33.5 pg      MCHC 28.9 g/dL      RDW 13.9 %      RDW-SD 60.6 fl      MPV 11.2 fL      Platelets 263 10*3/mm3      Neutrophil % 85.5 %      Lymphocyte % 8.3 %      Monocyte % 5.7 %      Eosinophil % 0.0 %      Basophil % 0.1 %      Immature Grans % 0.4 %      Neutrophils, Absolute 5.88 10*3/mm3      Lymphocytes, Absolute 0.57 10*3/mm3      Monocytes, Absolute 0.39 10*3/mm3      Eosinophils, Absolute 0.00 10*3/mm3      Basophils, Absolute 0.01 10*3/mm3      Immature Grans, Absolute 0.03 10*3/mm3      nRBC 0.0 /100 WBC         Imaging Results (Last 24 Hours)     Procedure Component Value Units Date/Time    XR Chest 1 View [[id number]] Collected: 09/27/22 2350     Updated: 09/27/22 2355    Narrative:      Examination: AP view of the chest    Indication: Pneumonia    Comparison: 9/10/2022    Findings:  The cardiomediastinal silhouette is within normal size limits.    There is no consolidative airspace disease, pleural effusion or pulmonary edema. There is no pneumothorax.     No acute osseous abnormality is identified.      Impression:      Impression:  No evidence of an acute pleural or pulmonary parenchymal abnormality.    Electronically signed by:  Rex Miles M.D.    9/27/2022 9:54 PM Mountain Time    CT Head Without Contrast [[id number]] Collected: 09/27/22 2327     Updated: 09/27/22 2329    Narrative:      EXAMINATION: CT HEAD WITHOUT CONTRAST    DATE: 9/27/2022 10:52 PM    INDICATION: Altered mental status    COMPARISON: CT head 9/10/2022    TECHNIQUE: Noncontrast imaging obtained from the vertex to the skull base.  CT dose lowering techniques were used, to include: automated exposure control, adjustment for patient size, and or use of iterative reconstruction.?    FINDINGS:    Soft Tissues: No significant soft tissue abnormality.    Skull: No  underlying skull fracture or radiopaque foreign body.    Sinuses: Paranasal sinuses are clear.    Mastoids: Mastoid air cells are clear.     Globes and Orbits: Globes and orbits are intact.    Brain: No acute hemorrhage.  No midline shift, masses, or mass effect.  No evidence of acute infarct by noncontrast CT.    Ventricles and Cisterns: Ventricular size and configuration is within normal limits. Basal cisterns are patent. No abnormal extra-axial fluid collection.    Senescent Changes: None.        Impression:        No acute intracranial abnormality.           Electronically signed by:  Jaylon Wright M.D.    9/27/2022 9:28 PM Mountain Time        Orders (last 24 hrs)      Start     Ordered    09/28/22 1041  POC Glucose Once  PROCEDURE ONCE         09/28/22 1039    09/28/22 1024  POC Glucose Once  PROCEDURE ONCE         09/28/22 1005    09/28/22 0941  POC Glucose Once  PROCEDURE ONCE         09/28/22 0938    09/28/22 0912  POC Glucose Once  PROCEDURE ONCE         09/28/22 0911    09/28/22 0758  POC Glucose Once  PROCEDURE ONCE         09/28/22 0754    09/28/22 0757  POC Glucose Once  PROCEDURE ONCE         09/28/22 0101    09/28/22 0757  POC Glucose Once  PROCEDURE ONCE         09/28/22 0226    09/28/22 0757  POC Glucose Once  PROCEDURE ONCE         09/28/22 0314    09/28/22 0757  POC Glucose Once  PROCEDURE ONCE         09/28/22 0443    09/28/22 0756  POC Glucose Once  PROCEDURE ONCE         09/27/22 2142    09/28/22 0723  Restraints Non-Violent or Non-Self Destructive  Calendar Day         09/28/22 0722    09/28/22 0646  POC Glucose Once  PROCEDURE ONCE         09/28/22 0644    09/28/22 0557  POC Glucose Once  PROCEDURE ONCE         09/28/22 0555    09/28/22 0450  Glucose, Random  STAT         09/28/22 0449    09/28/22 0415  Patient Currently On Electrolyte Replacement Protocol - Please Refer to MAR for Protocol Details  Misc Nursing Order (Specify)  Daily      Comments: Patient Currently On Electrolyte  "Replacement Protocol - Please Refer to MAR for Protocol Details    09/28/22 0414    09/28/22 0414  Initiate Electrolyte Replacement Protocol  Until Discontinued         09/28/22 0414    09/28/22 0413  potassium phosphate 45 mmol in sodium chloride 0.9 % 500 mL infusion  As Needed        \"Or\" Linked Group Details    09/28/22 0414    09/28/22 0413  potassium phosphate 30 mmol in sodium chloride 0.9 % 250 mL infusion  As Needed        \"Or\" Linked Group Details    09/28/22 0414    09/28/22 0413  potassium phosphate 15 mmol in 0.9% sodium chloride 100 mL IVPB  As Needed        \"Or\" Linked Group Details    09/28/22 0414    09/28/22 0413  sodium phosphates 45 mmol in sodium chloride 0.9 % 250 mL IVPB  As Needed        \"Or\" Linked Group Details    09/28/22 0414    09/28/22 0413  sodium phosphates 30 mmol in sodium chloride 0.9 % 250 mL IVPB  As Needed        \"Or\" Linked Group Details    09/28/22 0414    09/28/22 0413  sodium phosphates 15 mmol in sodium chloride 0.9 % 250 mL IVPB  As Needed        \"Or\" Linked Group Details    09/28/22 0414    09/28/22 0333  Insert Indwelling Urinary Catheter  Once        Comments: Follow Protocol As Outlined in Process Instructions (Open Order Report to View Full Instructions)   \"And\" Linked Group Details    09/28/22 0332 09/28/22 0333  Assess Need for Indwelling Urinary Catheter - Follow Removal Protocol  Continuous        Comments: Follow Protocol As Outlined in Process Instructions (Open Order Report to View Full Instructions)   \"And\" Linked Group Details    09/28/22 0332    09/28/22 0333  Urinary Catheter Care  Every Shift      \"And\" Linked Group Details    09/28/22 0332    09/28/22 0316  Glucose, Random  STAT         09/28/22 0315    09/28/22 0149  hydrALAZINE (APRESOLINE) injection 10 mg  Every 6 Hours PRN         09/28/22 0149    09/28/22 0040  heparin (porcine) 5000 UNIT/ML injection 5,000 Units  Every 8 Hours Scheduled         09/28/22 0038    09/28/22 0040  Inpatient Admission  " Once         09/28/22 0039    09/28/22 0039  Code Status and Medical Interventions:  Continuous         09/28/22 0038    09/28/22 0000  Vital Signs  Every Hour       09/27/22 2347    09/28/22 0000  Strict Intake & Output  Every Hour      Comments: While on insulin infusion.    09/27/22 2347    09/28/22 0000  Basic Metabolic Panel  Every 4 Hours       09/27/22 2347    09/28/22 0000  Magnesium  Every 4 Hours       09/27/22 2347    09/28/22 0000  Phosphorus  Every 4 Hours       09/27/22 2347    09/27/22 2353  ICU / CCU Bed Request ([facility] / [facility] ONLY)  Once         09/27/22 2352    09/27/22 2350  POC Glucose STAT  STAT         09/27/22 2349 09/27/22 2349  sodium chloride 0.9 % flush 10 mL  Every 12 Hours Scheduled         09/27/22 2347 09/27/22 2349  sodium chloride 0.9 % bolus  Continuous         09/27/22 2347 09/27/22 2349  insulin regular 1 unit/mL in 0.9% sodium chloride  Titrated        Note to Pharmacy: Upon initiation of Glucommander insulin drip, make sure all other insulin orders and anti-diabetic medications have been discontinued.    09/27/22 2347 09/27/22 2348  Daily Weights  Daily       09/27/22 2347 09/27/22 2347  Continuous Pulse Oximetry  Continuous         09/27/22 2347 09/27/22 2347  Formula for Corrected Serum Sodium: Corrected Na= (measured Sodium + [1.6 x ((Glucose - 100)/100)]  Per Order Details         09/27/22 2347 09/27/22 2347  Prior to initiating the Glucommander™, ensure all prior insulin orders are discontinued.  Once        Comments: Prior to initiating the Glucommander™, ensure all prior insulin orders are discontinued.    09/27/22 2347 09/27/22 2347  PRIOR to START of Intravenous Insulin Infusion, Notify Provider if K+ is Less Than 3.3, for Extra Potassium Orders, if Indicated. Do Not Start Insulin Drip if K+ Less Than 3.3.  Per Order Details         09/27/22 2347    09/27/22 2347  Use a Dedicated Line for Insulin Infusion (If Possible).  May Use a Carrier Fluid of NS  at KVO Rate if Insulin Rate is Insufficient to Maintain IV Patency.  Prime IV Line With Insulin Infusion  Continuous         09/27/22 2347 09/27/22 2347  Insert Peripheral IV x2  Once         09/27/22 2347 09/27/22 2347  Saline Lock & Maintain IV Access  Continuous         09/27/22 2347 09/27/22 2347  Once DKA meets resolution criteria, call provider for transition orders from intravenous to SQ insulin and IV fluids.  Per Order Details        Comments: RESOLUTION of DKA: Blood glucose < 200 mg/dL,AND TWO of the following criteria: Serum Bicarbonate > 15, Venous pH > 7.3, or Calculated Anion Gap </= 12 mEq/l.    09/27/22 2347 09/27/22 2347  Do not discontinue insulin infusion for 2 hours after first dose of long-acting subcutaneous insulin.  Per Order Details         09/27/22 2347 09/27/22 2347  If insulin infusion is discontinued, Glucommander must also be discontinued. If the insulin infusion is re-ordered, you must discontinue previous settings in Glucommander and start new.  Per Order Details        Comments: If insulin infusion is discontinued, Glucommander must also be discontinued. If the insulin infusion is re-ordered, you must discontinue previous settings in Glucommander and start new.    09/27/22 2347 09/27/22 2347  NPO Diet NPO Type: Strict NPO  Diet Effective Now         09/27/22 2347 09/27/22 2347  If patient is being fed a diet or bolus tube feeds, utilize the start meal feature / meal bolus feature in Glucommander.  Continuous        Comments: If patient is being fed a diet or bolus tube feeds, utilize the start meal feature / meal bolus feature in Glucommander.    09/27/22 2347 09/27/22 2347  Notify Provider  Until Discontinued         09/27/22 2347 09/27/22 2347  Inpatient Diabetes Educator Consult  Once        Provider:  (Not yet assigned)    09/27/22 2347 09/27/22 2347  CBC & Differential  STAT         09/27/22 2347 09/27/22 2347  Comprehensive Metabolic Panel   STAT         09/27/22 2347 09/27/22 2347  Phosphorus  STAT         09/27/22 2347    09/27/22 2347  Magnesium  STAT         09/27/22 2347 09/27/22 2347  Osmolality, Serum  STAT         09/27/22 2347 09/27/22 2347  Hemoglobin A1c  STAT         09/27/22 2347 09/27/22 2347  Troponin  STAT         09/27/22 2347    09/27/22 2347  ECG 12 Lead  STAT         09/27/22 2347 09/27/22 2347  RN to Release PRN POC Glucose orders as per Glucommander  Continuous        Comments: Glucommander recommended POC testing will vary between 15 minutes and 2 hours. Release PRN POC Glucose orders as needed.    09/27/22 2347 09/27/22 2347  RN to Order STAT Glucose for BG less than 10 mg/dl or greater than 600 mg/dl  Continuous        Comments: If blood glucose reading is less than 10 mg/dl or greater than 600 mg/dl, obtain STAT glucose by Lab. Do not delay treatment of unstable patient in order to obtain glucose sample from Lab. Inform physician of results.    09/27/22 2347 09/27/22 2347  Treat for hypoglycemia as recommended by the Glucommander™.  Every Shift      Comments: Treat for hypoglycemia as recommended by the Glucommander™. Follow Glucommander recommendations for oral or IV hypoglycemia treatment, if IV access is not available administer glucagon or oral treatment per hypoglycemia protocol.    09/27/22 2347 09/27/22 2347  If insulin infusion is paused, follow Glucommander instructions.  Every Shift      Comments: If insulin infusion is paused, follow Glucommander instructions.    09/27/22 2347 09/27/22 2347  CBC Auto Differential  PROCEDURE ONCE         09/27/22 2347 09/27/22 2346  dextrose (GLUTOSE) oral gel 15 g  Every 15 Minutes PRN         09/27/22 2347    09/27/22 2346  dextrose (D50W) (25 g/50 mL) IV injection 10-50 mL  Every 15 Minutes PRN         09/27/22 2347 09/27/22 2346  glucagon (human recombinant) (GLUCAGEN DIAGNOSTIC) injection 1 mg  Every 15 Minutes PRN         09/27/22 2347     09/27/22 2346  sodium chloride 0.9 % infusion  Continuous PRN         09/27/22 2347 09/27/22 2346  sodium chloride 0.9 % with KCl 20 mEq/L infusion  Continuous PRN         09/27/22 2347    09/27/22 2346  sodium chloride 0.9 % with KCl 40 mEq/L infusion  Continuous PRN         09/27/22 2347    09/27/22 2346  dextrose 5 % and sodium chloride 0.9 % infusion  Continuous PRN         09/27/22 2347 09/27/22 2346  dextrose 5 % and sodium chloride 0.9 % with KCl 20 mEq/L infusion  Continuous PRN         09/27/22 2347    09/27/22 2346  dextrose 5 % and sodium chloride 0.9 % with KCl 40 mEq/L infusion  Continuous PRN         09/27/22 2347 09/27/22 2346  sodium chloride 0.45 % infusion  Continuous PRN         09/27/22 2347 09/27/22 2346  sodium chloride 0.45 % with KCl 20 mEq/L infusion  Continuous PRN         09/27/22 2347 09/27/22 2346  sodium chloride 0.45 % 1,000 mL with potassium chloride 40 mEq infusion  Continuous PRN         09/27/22 2347    09/27/22 2346  dextrose 5 % and sodium chloride 0.45 % infusion  Continuous PRN         09/27/22 2347 09/27/22 2346  dextrose 5 % and sodium chloride 0.45 % with KCl 20 mEq/L infusion  Continuous PRN         09/27/22 2347    09/27/22 2346  dextrose 5 % and sodium chloride 0.45 % with KCl 40 mEq/L infusion  Continuous PRN         09/27/22 2347 09/27/22 2346  sodium chloride 0.9 % flush 10 mL  As Needed         09/27/22 2347 09/27/22 2327  sodium chloride 0.9 % bolus 1,000 mL  Once         09/27/22 2325 09/27/22 2245  Blood Gas, Venous With Co-Ox  PROCEDURE ONCE         09/27/22 2244 09/27/22 2238  Scan Slide  Once         09/27/22 2237 09/27/22 2221  midazolam (VERSED) injection 0.5 mg  Once         09/27/22 2219 09/27/22 2220  POC Glucose STAT  STAT         09/27/22 2219 09/27/22 2218  CT Head Without Contrast  1 Time Imaging         09/27/22 2217 09/27/22 2218  XR Chest 1 View  1 Time Imaging         09/27/22 2217 09/27/22 2218  COVID  "PRE-OP / PRE-PROCEDURE SCREENING ORDER (NO ISOLATION) - Swab, Nasopharynx  Once         09/27/22 2219 09/27/22 2218  COVID-19 and FLU A/B PCR - Swab, Nasopharynx  PROCEDURE ONCE         09/27/22 2219 09/27/22 2213  sodium chloride 0.9 % bolus 1,000 mL  Once         09/27/22 2211 09/27/22 2212  Insert peripheral IV  Once        \"And\" Linked Group Details    09/27/22 2211 09/27/22 2212  Blood Gas, Venous -With Co-Ox Panel: Yes  Once         09/27/22 2211 09/27/22 2212  Urine Drug Screen - Urine, Clean Catch  Once         09/27/22 2211 09/27/22 2212  Ethanol  Once         09/27/22 2211 09/27/22 2211  sodium chloride 0.9 % flush 10 mL  As Needed        \"And\" Linked Group Details    09/27/22 2211 09/27/22 2211  Comprehensive Metabolic Panel  Once         09/27/22 2134 09/27/22 2211  Green Top (Gel)  PROCEDURE ONCE         09/27/22 2134 09/27/22 2135  NPO Diet NPO Type: Strict NPO  Diet Effective Now,   Status:  Canceled         09/27/22 2134 09/27/22 2135  Undress & Gown  Once         09/27/22 2134 09/27/22 2135  Cardiac Monitoring  Continuous         09/27/22 2134 09/27/22 2135  Continuous Pulse Oximetry  Continuous,   Status:  Canceled         09/27/22 2134 09/27/22 2135  Vital Signs  Per Hospital Policy         09/27/22 2134 09/27/22 2135  Insert Peripheral IV  Once         09/27/22 2134 09/27/22 2135  [place] Draw  Once         09/27/22 2134 09/27/22 2135  CBC & Differential  Once         09/27/22 2134 09/27/22 2135  Urinalysis With Microscopic If Indicated (No Culture) - Urine, Clean Catch  Once         09/27/22 2134 09/27/22 2135  Lavender Top  PROCEDURE ONCE         09/27/22 2134 09/27/22 2135  Gold Top - SST  PROCEDURE ONCE         09/27/22 2134 09/27/22 2135  Gray Top  PROCEDURE ONCE         09/27/22 2134 09/27/22 2135  Light Blue Top  PROCEDURE ONCE         09/27/22 2134 09/27/22 2135  CBC Auto Differential  PROCEDURE ONCE         09/27/22 " 2134 09/27/22 2134  sodium chloride 0.9 % flush 10 mL  As Needed         09/27/22 2134    Unscheduled  Oxygen Therapy- Nasal Cannula; 2 LPM; Titrate for SPO2: 92%, Greater Than or Equal To  Continuous PRN       09/27/22 2134    Unscheduled  POC Glucose PRN  As Needed      Comments: Glucommander recommended POC testing will very between 15 minutes and 2 hours. Release PRN POC Glucose orders as needed.      09/27/22 2347    Unscheduled  Magnesium  As Needed       09/28/22 0414    Unscheduled  Potassium  As Needed       09/28/22 0414                Physician Progress Notes (last 24 hours)  Notes from 09/27/22 1128 through 09/28/22 1128   No notes of this type exist for this encounter.         Consult Notes (last 24 hours)  Notes from 09/27/22 1128 through 09/28/22 1128   No notes of this type exist for this encounter.

## 2022-09-28 NOTE — PLAN OF CARE
"Goal Outcome Evaluation:  Plan of Care Reviewed With: patient        Progress: improving  Outcome Evaluation: Pt admitted to 2AICU from ED for DKA. BG >1500 upon arrival. insulin gtt started in ED. 2 NS boluses completed. 0.45%NS +20K @250ml/hr started per orders. insulin gtt titrated per glucommander. BG continues to read \"HI\" on glucometer, so STAT glucose draws are collected and sent to lab to obtain accurate glucose reading. Pt continues to experience AMS. goes from being restless in the bed yelling and shaking to being very lethargic and falls asleep during conversation. follows commands. oriented x self and occasionally place. speech is garbled and difficult to understand at times. ST on monitor. RR 18-20, o2sats >92% on room air. no cough. clear lung sounds. tolerating ice chips but pt states she has trouble swallowing at home, speech eval ordered. unknown when last BM was. nolasco catheter anchored for strict I&Os. >1600ml UOP. afebrile.  "

## 2022-09-28 NOTE — H&P
CRITICAL CARE ADMISSION NOTE    Chief Complaint     DKA (diabetic ketoacidosis) (HCC)    History of Present Illness     Bernadette Paris is a 52 y.o. female with PMH HTN, HLP, T1DM, gastroparesis, cocaine use, hepatitis C, medical noncompliance and bipolar disorder who presented to the ED with high blood sugar, confusion.  In the ED, she had a temp 98, P 100, RR 16, 162/126 and 100% room air sat.  CXR and CTH was unremarkable.  Significant labs:  Glucose >1500, Cr 1.24, Na 124, CO2 21, , , Alk Phos 492, AG 21, Hgb A1c 13.9.  VBG on room air had pH 7.21, pCO2 56 and pO2 27.  UA had ketones and trace glucose.  UDS was + cocaine.  She was given 2L NS bolus and 0.5 mg Versed.  He will be admitted into the ICU per the Intensivist service.    She has had multiple admissions for DKA.  She was recently admitted here 9/10/22 - 9/12/22 for DKA and left AMA.      The patient admits to tobacco abuse but denies alcohol or drug abuse despite the UDS.    Problem List, Surgical History, Family, Social History, and ROS     Patient Active Problem List   Diagnosis   • Lumbar spinal stenosis with neurogenic claudication   • Degenerative disc disease, lumbar   • Facet arthritis of lumbar region   • Lumbar stenosis with neurogenic claudication   • BMI 40.0-44.9, adult (HCC)   • Encounter for smoking cessation counseling   • S/P lumbar laminectomy   • Epigastric pain   • Acidosis   • Bipolar affective disorder   • Hepatitis C   • Hypertension   • H/O noncompliance with medical treatment   • Gastritis   • Hyperlipidemia   • Severe malnutrition (HCC)   • Intractable abdominal pain   • Type 2 diabetes mellitus with hyperglycemia, with long-term current use of insulin (HCC)   • Gastroparesis   • Dehydration   • Right sided weakness   • Hypertensive crisis   • Chronic calcific pancreatitis (HCC)   • Cocaine abuse   • Left foot drop   • Constipation   • DKA a/w T1DM   • BROCK (acute kidney injury) (HCC)   • Elevated liver enzymes      Past Surgical History:   Procedure Laterality Date   • CHOLECYSTECTOMY     • ENDOSCOPY N/A 4/9/2022    Procedure: ESOPHAGOGASTRODUODENOSCOPY;  Surgeon: Brunner, Mark I, MD;  Location:  [facility];  Service: Gastroenterology;  Laterality: N/A;  with antrum biopsy   • HYSTERECTOMY     • KNEE SURGERY     • LUMBAR LAMINECTOMY DISCECTOMY DECOMPRESSION N/A 8/6/2018    Procedure: LUMBAR LAMINECTOMIES L4-S1;  Surgeon: Chip Smith MD;  Location: [facility];  Service: Neurosurgery       Allergies   Allergen Reactions   • Tylenol [Acetaminophen] Other (See Comments)     SEVERE LIVER DISEASE-CONTRAINDICATED     No current facility-administered medications on file prior to encounter.     Current Outpatient Medications on File Prior to Encounter   Medication Sig   • Accu-Chek FastClix Lancets misc Use 1 each by Other route 4 (Four) Times a Day.   • acetaminophen (TYLENOL) 325 MG tablet Take 1 tablet by mouth Every 6 (Six) Hours As Needed for Mild Pain  or Moderate Pain .   • Alcohol Swabs (Alcohol Pads) 70 % pads 1 each 4 (Four) Times a Day. Dx E11.9   • amLODIPine (NORVASC) 5 MG tablet Take 1 tablet by mouth Daily.   • aspirin 81 MG chewable tablet Chew 1 tablet Daily.   • atorvastatin (LIPITOR) 80 MG tablet Take 1 tablet by mouth Every Night.   • bisacodyl (DULCOLAX) 10 MG suppository Insert 1 suppository into the rectum Daily As Needed for Constipation (Use if bisacodyl oral is ineffective).   • Blood Glucose Monitoring Suppl (OneTouch Verio Reflect) w/Device kit 1 each 4 (Four) Times a Day.   • busPIRone (BUSPAR) 10 MG tablet Take 1 tablet by mouth 2 (Two) Times a Day.   • capsaicin (ZOSTRIX) 0.075 % topical cream Apply 1 application topically to the appropriate area as directed Every 8 (Eight) Hours.   • dicyclomine (BENTYL) 20 MG tablet Take 1 tablet by mouth Every 6 (Six) Hours As Needed (pain).   • gabapentin (NEURONTIN) 100 MG capsule Take 1 capsule by mouth Every Night.   • Ginger, Zingiber officinalis,  (Ginger Root) 500 MG capsule Take 1 capsule (500 mg) by mouth 3 (Three) Times a Day Before Meals.   • glucose blood (OneTouch Verio) test strip 1 each by Other route 4 (Four) Times a Day Before Meals & at Bedtime. Use as instructed   • hydrOXYzine (ATARAX) 25 MG tablet Take 1 tablet by mouth Every 8 (Eight) Hours As Needed for Anxiety.   • ibuprofen (ADVIL,MOTRIN) 200 MG tablet Take 1 tablet by mouth Every 8 (Eight) Hours As Needed for Mild Pain .   • insulin detemir (LEVEMIR) 100 UNIT/ML injection Inject 40 Units under the skin into the appropriate area as directed 2 (Two) Times a Day.   • Insulin Pen Needle (Pen Needles) 32G X 4 MM misc Inject 1 each under the skin into the appropriate area as directed 2 (Two) Times a Day.   • Lancets (OneTouch Delica Plus [id number]) misc 1 each by Other route 4 (Four) Times a Day Before Meals & at Bedtime.   • lisinopril (PRINIVIL,ZESTRIL) 40 MG tablet Take 1 tablet by mouth Daily.   • metFORMIN (GLUCOPHAGE) 1000 MG tablet Take 1 tablet by mouth 2 (Two) Times a Day With Meals.   • metoprolol tartrate (LOPRESSOR) 50 MG tablet Take 1 tablet by mouth Every 12 (Twelve) Hours.   • nicotine (NICODERM CQ) 21 MG/24HR patch Place 1 patch on the skin as directed by provider Daily.   • ondansetron ODT (Zofran ODT) 4 MG disintegrating tablet Place 1 tablet on the tongue Every 8 (Eight) Hours As Needed for Nausea or Vomiting.   • pantoprazole (PROTONIX) 40 MG EC tablet Take 1 tablet by mouth Daily.   • polyethylene glycol (MIRALAX) 17 GM/SCOOP powder Mix 1 capful (17g) in water and drink by mouth Daily.   • QUEtiapine (SEROquel) 100 MG tablet Take 1 tablet by mouth Every Night.   • RA ALLERGY RELIEF 10 MG tablet take 1 tablet by mouth once daily   • sennosides-docusate (PERICOLACE) 8.6-50 MG per tablet Take 2 tablets by mouth 2 (Two) Times a Day As Needed for Constipation.   • traZODone (DESYREL) 50 MG tablet Take 1 tablet by mouth Every Night.   • Ventolin  (90 Base) MCG/ACT inhaler 2  "puffs every 4-6 hours as needed for shortness of breath     MEDICATION LIST AND ALLERGIES REVIEWED.    Family History   Problem Relation Age of Onset   • Diabetes Mother    • Hypertension Mother      Social History     Tobacco Use   • Smoking status: Current Every Day Smoker     Packs/day: 1.00   • Smokeless tobacco: Never Used   Substance Use Topics   • Alcohol use: Yes     Comment: hx etoh abuse per ems   • Drug use: Yes     Types: Cocaine(coke)     Social History     Social History Narrative   • Not on file     FAMILY AND SOCIAL HISTORY REVIEWED.    Review of Systems  AS ABOVE OR ALL OTHER SYSTEMS REVIEWED AND ARE NEGATIVE.    Physical Exam and Clinical Information   /99   Pulse 98   Temp 98 °F (36.7 °C) (Oral)   Resp 18   Ht 167.6 cm (66\")   Wt 45.4 kg (100 lb)   SpO2 100%   BMI 16.14 kg/m²   Physical Exam:   GENERAL: Lethargic but arousable, no distress   HEENT: No adenopathy or thyromegaly   LUNGS: Clear without wheezes or rhonchi   HEART: Regular tachycardia without murmurs   GI: Soft, nontender   EXTREMITIES: No clubbing, cyanosis, or edema   NEURO/PSYCH: Lethargic but arousable.  Inconsistently responds to questions appropriately.  Moves all fours    Results from last 7 days   Lab Units 09/27/22  2200   WBC 10*3/mm3 6.88   HEMOGLOBIN g/dL 13.6   PLATELETS 10*3/mm3 263     Results from last 7 days   Lab Units 09/27/22  2240   SODIUM mmol/L 124*   POTASSIUM mmol/L 4.6   CO2 mmol/L 21.0*   BUN mg/dL 19   CREATININE mg/dL 1.24*   MAGNESIUM mg/dL 2.6   PHOSPHORUS mg/dL 5.2*   GLUCOSE mg/dL >1,500*     Estimated Creatinine Clearance: 38.5 mL/min (A) (by C-G formula based on SCr of 1.24 mg/dL (H)).  Results from last 7 days   Lab Units 09/27/22  2200   HEMOGLOBIN A1C % 13.90*         Lab Results   Component Value Date    LACTATE 1.7 09/10/2022        IMAGES: Chest x-ray clear without effusions infiltrates or consolidation    I reviewed the patient's results and images.     Assesment     Active " Hospital Problems    Diagnosis    • **DKA a/w T1DM    • Hypertension    • Elevated liver enzymes    • BROCK (acute kidney injury) (HCC)    • Cocaine abuse    • Gastroparesis    • Hyperlipidemia    • Hepatitis C    • H/O noncompliance with medical treatment    • Bipolar affective disorder      Plan/Recommendations     Admit to the intensive care unit  IV fluids  Insulin drip  Insulin and electrolyte replacements per DKA guidelines  Monitor for evidence of alcohol or drug withdrawal  Nicotine replacement therapy    Critical Care time spent in direct patient care: 35 minutes (excluding procedure time, if applicable) including high complexity decision making to assess, manipulate, and support vital organ system failure in this individual who has impairment of one or more vital organ systems such that there is a high probability of imminent or life threatening deterioration in the patient’s condition.    STEPHANY Escoto MD  Pulmonary and Critical Care Medicine     CC: Provider, No Known

## 2022-09-29 LAB
ALBUMIN SERPL-MCNC: 2.9 G/DL (ref 3.5–5.2)
ALP SERPL-CCNC: 303 U/L (ref 39–117)
ALT SERPL W P-5'-P-CCNC: 77 U/L (ref 1–33)
ANION GAP SERPL CALCULATED.3IONS-SCNC: 8 MMOL/L (ref 5–15)
AST SERPL-CCNC: 125 U/L (ref 1–32)
BASOPHILS # BLD AUTO: 0.01 10*3/MM3 (ref 0–0.2)
BASOPHILS NFR BLD AUTO: 0.1 % (ref 0–1.5)
BILIRUB CONJ SERPL-MCNC: 0.3 MG/DL (ref 0–0.3)
BILIRUB INDIRECT SERPL-MCNC: 0.1 MG/DL
BILIRUB SERPL-MCNC: 0.4 MG/DL (ref 0–1.2)
BUN SERPL-MCNC: 12 MG/DL (ref 6–20)
BUN/CREAT SERPL: 20.3 (ref 7–25)
CALCIUM SPEC-SCNC: 8.8 MG/DL (ref 8.6–10.5)
CHLORIDE SERPL-SCNC: 116 MMOL/L (ref 98–107)
CO2 SERPL-SCNC: 25 MMOL/L (ref 22–29)
CREAT SERPL-MCNC: 0.59 MG/DL (ref 0.57–1)
DEPRECATED RDW RBC AUTO: 48.4 FL (ref 37–54)
EGFRCR SERPLBLD CKD-EPI 2021: 109.3 ML/MIN/1.73
EOSINOPHIL # BLD AUTO: 0.06 10*3/MM3 (ref 0–0.4)
EOSINOPHIL NFR BLD AUTO: 0.8 % (ref 0.3–6.2)
ERYTHROCYTE [DISTWIDTH] IN BLOOD BY AUTOMATED COUNT: 13.6 % (ref 12.3–15.4)
GLUCOSE BLDC GLUCOMTR-MCNC: 125 MG/DL (ref 70–130)
GLUCOSE BLDC GLUCOMTR-MCNC: 154 MG/DL (ref 70–130)
GLUCOSE BLDC GLUCOMTR-MCNC: 269 MG/DL (ref 70–130)
GLUCOSE BLDC GLUCOMTR-MCNC: 345 MG/DL (ref 70–130)
GLUCOSE SERPL-MCNC: 154 MG/DL (ref 65–99)
HCT VFR BLD AUTO: 33.5 % (ref 34–46.6)
HGB BLD-MCNC: 11.6 G/DL (ref 12–15.9)
IMM GRANULOCYTES # BLD AUTO: 0.01 10*3/MM3 (ref 0–0.05)
IMM GRANULOCYTES NFR BLD AUTO: 0.1 % (ref 0–0.5)
LYMPHOCYTES # BLD AUTO: 3.19 10*3/MM3 (ref 0.7–3.1)
LYMPHOCYTES NFR BLD AUTO: 40.5 % (ref 19.6–45.3)
MAGNESIUM SERPL-MCNC: 2.1 MG/DL (ref 1.6–2.6)
MCH RBC QN AUTO: 33.6 PG (ref 26.6–33)
MCHC RBC AUTO-ENTMCNC: 34.6 G/DL (ref 31.5–35.7)
MCV RBC AUTO: 97.1 FL (ref 79–97)
MONOCYTES # BLD AUTO: 0.42 10*3/MM3 (ref 0.1–0.9)
MONOCYTES NFR BLD AUTO: 5.3 % (ref 5–12)
NEUTROPHILS NFR BLD AUTO: 4.18 10*3/MM3 (ref 1.7–7)
NEUTROPHILS NFR BLD AUTO: 53.2 % (ref 42.7–76)
NRBC BLD AUTO-RTO: 0 /100 WBC (ref 0–0.2)
PHOSPHATE SERPL-MCNC: 1.4 MG/DL (ref 2.5–4.5)
PHOSPHATE SERPL-MCNC: 2.7 MG/DL (ref 2.5–4.5)
PLATELET # BLD AUTO: 210 10*3/MM3 (ref 140–450)
PMV BLD AUTO: 10.5 FL (ref 6–12)
POTASSIUM SERPL-SCNC: 3.2 MMOL/L (ref 3.5–5.2)
POTASSIUM SERPL-SCNC: 3.9 MMOL/L (ref 3.5–5.2)
PROT SERPL-MCNC: 5.2 G/DL (ref 6–8.5)
QT INTERVAL: 414 MS
QTC INTERVAL: 465 MS
RBC # BLD AUTO: 3.45 10*6/MM3 (ref 3.77–5.28)
SODIUM SERPL-SCNC: 149 MMOL/L (ref 136–145)
WBC NRBC COR # BLD: 7.87 10*3/MM3 (ref 3.4–10.8)

## 2022-09-29 PROCEDURE — 84100 ASSAY OF PHOSPHORUS: CPT | Performed by: INTERNAL MEDICINE

## 2022-09-29 PROCEDURE — 82962 GLUCOSE BLOOD TEST: CPT

## 2022-09-29 PROCEDURE — 63710000001 INSULIN LISPRO (HUMAN) PER 5 UNITS: Performed by: INTERNAL MEDICINE

## 2022-09-29 PROCEDURE — 63710000001 INSULIN DETEMIR PER 5 UNITS: Performed by: INTERNAL MEDICINE

## 2022-09-29 PROCEDURE — 93010 ELECTROCARDIOGRAM REPORT: CPT | Performed by: INTERNAL MEDICINE

## 2022-09-29 PROCEDURE — 80048 BASIC METABOLIC PNL TOTAL CA: CPT | Performed by: INTERNAL MEDICINE

## 2022-09-29 PROCEDURE — 83735 ASSAY OF MAGNESIUM: CPT | Performed by: INTERNAL MEDICINE

## 2022-09-29 PROCEDURE — 84132 ASSAY OF SERUM POTASSIUM: CPT | Performed by: INTERNAL MEDICINE

## 2022-09-29 PROCEDURE — 80076 HEPATIC FUNCTION PANEL: CPT | Performed by: INTERNAL MEDICINE

## 2022-09-29 PROCEDURE — 93005 ELECTROCARDIOGRAM TRACING: CPT | Performed by: INTERNAL MEDICINE

## 2022-09-29 PROCEDURE — 63710000001 INSULIN LISPRO (HUMAN) PER 5 UNITS: Performed by: NURSE PRACTITIONER

## 2022-09-29 PROCEDURE — 0 POTASSIUM CHLORIDE 10 MEQ/100ML SOLUTION: Performed by: NURSE PRACTITIONER

## 2022-09-29 PROCEDURE — 85025 COMPLETE CBC W/AUTO DIFF WBC: CPT | Performed by: INTERNAL MEDICINE

## 2022-09-29 PROCEDURE — 99232 SBSQ HOSP IP/OBS MODERATE 35: CPT

## 2022-09-29 PROCEDURE — 25010000002 ENOXAPARIN PER 10 MG: Performed by: INTERNAL MEDICINE

## 2022-09-29 RX ORDER — PANTOPRAZOLE SODIUM 40 MG/1
40 TABLET, DELAYED RELEASE ORAL
Status: DISCONTINUED | OUTPATIENT
Start: 2022-09-29 | End: 2022-09-30 | Stop reason: HOSPADM

## 2022-09-29 RX ORDER — FOLIC ACID 1 MG/1
1 TABLET ORAL DAILY
Status: DISCONTINUED | OUTPATIENT
Start: 2022-09-29 | End: 2022-09-30 | Stop reason: HOSPADM

## 2022-09-29 RX ORDER — INSULIN LISPRO 100 [IU]/ML
0-24 INJECTION, SOLUTION INTRAVENOUS; SUBCUTANEOUS
Status: DISCONTINUED | OUTPATIENT
Start: 2022-09-29 | End: 2022-09-30 | Stop reason: HOSPADM

## 2022-09-29 RX ORDER — POTASSIUM CHLORIDE 1.5 G/1.77G
40 POWDER, FOR SOLUTION ORAL AS NEEDED
Status: DISCONTINUED | OUTPATIENT
Start: 2022-09-29 | End: 2022-09-29

## 2022-09-29 RX ORDER — DIPHENOXYLATE HYDROCHLORIDE AND ATROPINE SULFATE 2.5; .025 MG/1; MG/1
1 TABLET ORAL DAILY
Status: DISCONTINUED | OUTPATIENT
Start: 2022-09-29 | End: 2022-09-30 | Stop reason: HOSPADM

## 2022-09-29 RX ORDER — POTASSIUM CHLORIDE 7.45 MG/ML
10 INJECTION INTRAVENOUS
Status: DISCONTINUED | OUTPATIENT
Start: 2022-09-29 | End: 2022-09-30 | Stop reason: HOSPADM

## 2022-09-29 RX ORDER — POTASSIUM CHLORIDE 750 MG/1
40 CAPSULE, EXTENDED RELEASE ORAL AS NEEDED
Status: DISCONTINUED | OUTPATIENT
Start: 2022-09-29 | End: 2022-09-30 | Stop reason: HOSPADM

## 2022-09-29 RX ORDER — INSULIN LISPRO 100 [IU]/ML
3 INJECTION, SOLUTION INTRAVENOUS; SUBCUTANEOUS
Status: DISCONTINUED | OUTPATIENT
Start: 2022-09-29 | End: 2022-09-30

## 2022-09-29 RX ADMIN — QUETIAPINE FUMARATE 100 MG: 100 TABLET ORAL at 20:50

## 2022-09-29 RX ADMIN — INSULIN DETEMIR 12 UNITS: 100 INJECTION, SOLUTION SUBCUTANEOUS at 08:57

## 2022-09-29 RX ADMIN — ATORVASTATIN CALCIUM 80 MG: 40 TABLET, FILM COATED ORAL at 20:50

## 2022-09-29 RX ADMIN — PANTOPRAZOLE SODIUM 40 MG: 40 TABLET, DELAYED RELEASE ORAL at 08:58

## 2022-09-29 RX ADMIN — FOLIC ACID 1 MG: 1 TABLET ORAL at 17:40

## 2022-09-29 RX ADMIN — BUSPIRONE HYDROCHLORIDE 10 MG: 10 TABLET ORAL at 08:58

## 2022-09-29 RX ADMIN — MULTIVITAMIN TABLET 1 TABLET: TABLET at 17:40

## 2022-09-29 RX ADMIN — ENOXAPARIN SODIUM 30 MG: 30 INJECTION SUBCUTANEOUS at 21:16

## 2022-09-29 RX ADMIN — INSULIN LISPRO 3 UNITS: 100 INJECTION, SOLUTION INTRAVENOUS; SUBCUTANEOUS at 17:41

## 2022-09-29 RX ADMIN — LORAZEPAM 0.5 MG: 0.5 TABLET ORAL at 02:35

## 2022-09-29 RX ADMIN — BUSPIRONE HYDROCHLORIDE 10 MG: 10 TABLET ORAL at 20:50

## 2022-09-29 RX ADMIN — LORAZEPAM 0.5 MG: 0.5 TABLET ORAL at 14:57

## 2022-09-29 RX ADMIN — INSULIN LISPRO 16 UNITS: 100 INJECTION, SOLUTION INTRAVENOUS; SUBCUTANEOUS at 17:41

## 2022-09-29 RX ADMIN — METOPROLOL TARTRATE 50 MG: 50 TABLET, FILM COATED ORAL at 20:50

## 2022-09-29 RX ADMIN — ASPIRIN 81 MG 81 MG: 81 TABLET ORAL at 08:58

## 2022-09-29 RX ADMIN — POTASSIUM CHLORIDE 10 MEQ: 7.46 INJECTION, SOLUTION INTRAVENOUS at 06:01

## 2022-09-29 RX ADMIN — POTASSIUM PHOSPHATE, MONOBASIC AND POTASSIUM PHOSPHATE, DIBASIC 30 MMOL: 224; 236 INJECTION, SOLUTION, CONCENTRATE INTRAVENOUS at 06:30

## 2022-09-29 RX ADMIN — POTASSIUM CHLORIDE 10 MEQ: 7.46 INJECTION, SOLUTION INTRAVENOUS at 08:47

## 2022-09-29 RX ADMIN — SODIUM CHLORIDE 100 ML/HR: 9 INJECTION, SOLUTION INTRAVENOUS at 02:08

## 2022-09-29 RX ADMIN — THIAMINE HCL TAB 100 MG 100 MG: 100 TAB at 08:58

## 2022-09-29 RX ADMIN — INSULIN DETEMIR 12 UNITS: 100 INJECTION, SOLUTION SUBCUTANEOUS at 20:50

## 2022-09-29 RX ADMIN — INSULIN LISPRO 8 UNITS: 100 INJECTION, SOLUTION INTRAVENOUS; SUBCUTANEOUS at 11:29

## 2022-09-29 RX ADMIN — METOPROLOL TARTRATE 50 MG: 50 TABLET, FILM COATED ORAL at 09:00

## 2022-09-29 RX ADMIN — INSULIN LISPRO 4 UNITS: 100 INJECTION, SOLUTION INTRAVENOUS; SUBCUTANEOUS at 20:50

## 2022-09-29 NOTE — CONSULTS
"Diabetes Education    Patient Name:  Bernadette Paris  YOB: 1970  MRN: [id number]  Admit Date:  9/27/2022        Noted 2nd consult for diabetes education stating \"pt does not have a glucometer\". Diabetes educator assessed pt yesterday, see note. Pt has been seen multiple times by diabetes education during many admissions. Per pt's home med record, one touch verio glucose meter was dispensed by  pharmacy on 8/15/22. Diabetes education does not currently have sample glucose meters. If pt needs glucose meter at home, provider will need to write prescription at discharge for meter.  We will continue to follow for appropriate time for education. Thank you.       Electronically signed by:  Norma Aranda RN, [facility]  09/29/22 09:39 EDT  "

## 2022-09-29 NOTE — PROGRESS NOTES
Intensive Care Follow-up     Hospital:  LOS: 2 days   Ms. Bernadette Paris, 52 y.o. female is followed for:   DKA (diabetic ketoacidosis) ([facility])          Subjective   Interval History:  Chart reviewed. BUE restraints removed this morning. Confused to time, but oriented to person, place, and situation. Appears calm in the bed, but she is reporting anxiety. PO4 1.4, being replaced. Sodium increased yesterday to 155, back down to 149 on D5W. K 3.2, replaced. Chloride 116, down from 118. Glucose controlled off insulin gtt.     The patient's past medical, surgical and social history were reviewed and updated in Epic as appropriate.       Objective     Infusions:     Medications:  aspirin, 81 mg, Oral, Daily  atorvastatin, 80 mg, Oral, Nightly  busPIRone, 10 mg, Oral, Q12H  enoxaparin, 30 mg, Subcutaneous, Nightly  multivitamin, 1 tablet, Oral, Daily   And  folic acid, 1 mg, Oral, Daily  insulin detemir, 12 Units, Subcutaneous, Q12H  insulin lispro, 0-14 Units, Subcutaneous, TID AC  metoprolol tartrate, 50 mg, Oral, Q12H  pantoprazole, 40 mg, Oral, Q AM  QUEtiapine, 100 mg, Oral, Nightly  thiamine, 100 mg, Oral, Daily      I reviewed the patient's medications.    Vital Sign Min/Max for last 24 hours  Temp  Min: 98.9 °F (37.2 °C)  Max: 99.7 °F (37.6 °C)   BP  Min: 105/76  Max: 174/103   Pulse  Min: 71  Max: 103   Resp  Min: 14  Max: 20   SpO2  Min: 97 %  Max: 100 %   No data recorded       Input/Output for last 24 hour shift  09/28 0701 - 09/29 0700  In: 5215 [P.O.:1118; I.V.:3521]  Out: 640 [Urine:640]        Physical Exam:  GENERAL: Patient lying in bed and conversant. No acute distress.   HEENT: Normocephalic and atraumatic. Trachea midline. PER. EOM WNL.   LUNGS: Chest rise of normal depth and symmetric. Lungs clear to auscultation bilaterally. No wheezes, rhonchi, or rales.   HEART: S1,S2 detected. Regular rate and rhythm. No rub, murmur, or gallop.   ABDOMEN: Soft, round, nondistended, and nontender. Bowel sounds  present.   EXTREMITIES: No clubbing, edema, or cyanosis. Peripheral pulses present. Skin warm and dry.    NEURO/PSYCH: Alert and oriented except time. Follows commands. Moves all extremities. No focal deficits.      Results from last 7 days   Lab Units 09/29/22  0423 09/28/22  0128 09/27/22  2200   WBC 10*3/mm3 7.87 7.86 6.88   HEMOGLOBIN g/dL 11.6* 13.2 13.6   PLATELETS 10*3/mm3 210 255 263     Results from last 7 days   Lab Units 09/29/22  0423 09/28/22  1219 09/28/22  0826   SODIUM mmol/L 149* 155* 151*   POTASSIUM mmol/L 3.2* 3.8 3.4*   CO2 mmol/L 25.0 28.0 27.0   BUN mg/dL 12 12 13   CREATININE mg/dL 0.59 0.67 0.90   MAGNESIUM mg/dL 2.1 2.3 2.5   PHOSPHORUS mg/dL 1.4* 2.5 1.8*   GLUCOSE mg/dL 154* 171* 306*     Estimated Creatinine Clearance: 88 mL/min (by C-G formula based on SCr of 0.59 mg/dL).          I reviewed the patient's new clinical results.  I reviewed the patient's new imaging results/reports including actual images and agree with reports.     Imaging Results (Last 24 Hours)     ** No results found for the last 24 hours. **          Assessment & Plan   Impression      DKA a/w T1DM    Bipolar affective disorder    History Hepatitis C    Hypertension    H/O noncompliance with medical treatment    Hyperlipidemia    Gastroparesis    Cocaine abuse    Elevated liver enzymes    Agitated encephalopathy, metabolic    ETOH abuse per significant other      52 year-old woman with PMH T1DM, bipolar, hep C, HTN, HLD, ETOH abuse, & cocaine abuse that presented to [facility] ED on 9/29 with hyperglycemia and AMS. She was diagnosed with DKA and was sent to the ICU. UDS was positive for cocaine. Significant other reports ETOH abuse. She left AMA her last admission here from 9/10-9/12, which was also for DKA. Her DKA resolved yesterday and she was transitioned to Levemir and SSI successfully. She has the CIWA protocol ordered.     This AM, her mentation has improved greatly. She is oriented x3. Her restraints have been  removed. Electrolytes are being replaced.        Plan        DKA a/w T1DM  H/o noncompliance with medical treatment  Resolved, Insulin gtt off, Levemir, SSI  D/c IV fluids  D/c nolasco catheter  Electrolyte replacements  AM labs    Bipolar affective disorder  Agitated encephalopathy, metabolic  Cocaine abuse  ETOH abuse per significant other  Elevated liver enzymes  H/o Hep C  Agitation improved greatly  Monitor CIWA scale  Electrolyte replacements  Folic acid, thiamine, multivitamin  Buspirone, Seroquel, PRN Versed, Ativan, Haldol    HTN  Hyperlipidemia  Bps controlled with systolic < 150.  Metoprolol, PRN hydralazine  ASA, statin  Continue to monitor if home Lisinopril and Norvasc need to be restarted    Gastroparesis  AM labs  Multivitamin, electrolyte replacement    DVT Prophylaxis: Lovenox  GI Prophylaxis: PPI  Dispo: Telemetry    Time spent: 35 minutes  Plan of care and goals reviewed with multidisciplinary/antibiotic stewardship team during rounds.   I discussed the patient's findings and my recommendations with patient and nursing staff     Justa Funes, MSN, APRN, AGACNP-BC   Pulmonary and Critical Care Medicine

## 2022-09-29 NOTE — PLAN OF CARE
Goal Outcome Evaluation:  Plan of Care Reviewed With: patient, significant other        Progress: improving  Outcome Evaluation: Pt oriented x3, able to hold converstation. Pt remained cooperative throughout shift. Restraints d/c'd. Santiago catheter d/c'd. Insulin adjusted d/t increasing BG. VSS. Afebrile. Ordered to telemetry. Replaced K+ and phos.

## 2022-09-30 VITALS
SYSTOLIC BLOOD PRESSURE: 156 MMHG | OXYGEN SATURATION: 98 % | HEIGHT: 66 IN | WEIGHT: 108.91 LBS | RESPIRATION RATE: 18 BRPM | TEMPERATURE: 98.7 F | DIASTOLIC BLOOD PRESSURE: 97 MMHG | HEART RATE: 86 BPM | BODY MASS INDEX: 17.5 KG/M2

## 2022-09-30 PROBLEM — G93.41 ENCEPHALOPATHY, METABOLIC: Status: RESOLVED | Noted: 2022-09-28 | Resolved: 2022-09-30

## 2022-09-30 LAB
ALBUMIN SERPL-MCNC: 2.7 G/DL (ref 3.5–5.2)
ALBUMIN/GLOB SERPL: 1.2 G/DL
ALP SERPL-CCNC: 301 U/L (ref 39–117)
ALT SERPL W P-5'-P-CCNC: 87 U/L (ref 1–33)
ANION GAP SERPL CALCULATED.3IONS-SCNC: 9 MMOL/L (ref 5–15)
AST SERPL-CCNC: 154 U/L (ref 1–32)
BILIRUB SERPL-MCNC: 0.3 MG/DL (ref 0–1.2)
BUN SERPL-MCNC: 10 MG/DL (ref 6–20)
BUN/CREAT SERPL: 17.5 (ref 7–25)
CALCIUM SPEC-SCNC: 8.4 MG/DL (ref 8.6–10.5)
CHLORIDE SERPL-SCNC: 106 MMOL/L (ref 98–107)
CO2 SERPL-SCNC: 22 MMOL/L (ref 22–29)
CREAT SERPL-MCNC: 0.57 MG/DL (ref 0.57–1)
DEPRECATED RDW RBC AUTO: 50.1 FL (ref 37–54)
EGFRCR SERPLBLD CKD-EPI 2021: 110.2 ML/MIN/1.73
ERYTHROCYTE [DISTWIDTH] IN BLOOD BY AUTOMATED COUNT: 13.7 % (ref 12.3–15.4)
GLOBULIN UR ELPH-MCNC: 2.3 GM/DL
GLUCOSE BLDC GLUCOMTR-MCNC: 261 MG/DL (ref 70–130)
GLUCOSE BLDC GLUCOMTR-MCNC: 393 MG/DL (ref 70–130)
GLUCOSE SERPL-MCNC: 396 MG/DL (ref 65–99)
HCT VFR BLD AUTO: 32.7 % (ref 34–46.6)
HGB BLD-MCNC: 10.7 G/DL (ref 12–15.9)
MAGNESIUM SERPL-MCNC: 1.7 MG/DL (ref 1.6–2.6)
MCH RBC QN AUTO: 32.4 PG (ref 26.6–33)
MCHC RBC AUTO-ENTMCNC: 32.7 G/DL (ref 31.5–35.7)
MCV RBC AUTO: 99.1 FL (ref 79–97)
PHOSPHATE SERPL-MCNC: 2.5 MG/DL (ref 2.5–4.5)
PLATELET # BLD AUTO: 191 10*3/MM3 (ref 140–450)
PMV BLD AUTO: 11 FL (ref 6–12)
POTASSIUM SERPL-SCNC: 3.8 MMOL/L (ref 3.5–5.2)
PROT SERPL-MCNC: 5 G/DL (ref 6–8.5)
RBC # BLD AUTO: 3.3 10*6/MM3 (ref 3.77–5.28)
SODIUM SERPL-SCNC: 137 MMOL/L (ref 136–145)
WBC NRBC COR # BLD: 5.67 10*3/MM3 (ref 3.4–10.8)

## 2022-09-30 PROCEDURE — 0 MAGNESIUM SULFATE 4 GM/100ML SOLUTION: Performed by: INTERNAL MEDICINE

## 2022-09-30 PROCEDURE — 63710000001 INSULIN LISPRO (HUMAN) PER 5 UNITS: Performed by: NURSE PRACTITIONER

## 2022-09-30 PROCEDURE — 84100 ASSAY OF PHOSPHORUS: CPT

## 2022-09-30 PROCEDURE — 80053 COMPREHEN METABOLIC PANEL: CPT

## 2022-09-30 PROCEDURE — 82962 GLUCOSE BLOOD TEST: CPT

## 2022-09-30 PROCEDURE — 63710000001 INSULIN DETEMIR PER 5 UNITS: Performed by: INTERNAL MEDICINE

## 2022-09-30 PROCEDURE — 83735 ASSAY OF MAGNESIUM: CPT

## 2022-09-30 PROCEDURE — 63710000001 INSULIN LISPRO (HUMAN) PER 5 UNITS: Performed by: INTERNAL MEDICINE

## 2022-09-30 PROCEDURE — 85027 COMPLETE CBC AUTOMATED: CPT

## 2022-09-30 RX ORDER — ACETAMINOPHEN 325 MG/1
650 TABLET ORAL ONCE
Status: DISCONTINUED | OUTPATIENT
Start: 2022-09-30 | End: 2022-09-30 | Stop reason: HOSPADM

## 2022-09-30 RX ORDER — MAGNESIUM SULFATE HEPTAHYDRATE 40 MG/ML
2 INJECTION, SOLUTION INTRAVENOUS AS NEEDED
Status: DISCONTINUED | OUTPATIENT
Start: 2022-09-30 | End: 2022-09-30 | Stop reason: HOSPADM

## 2022-09-30 RX ORDER — MAGNESIUM SULFATE HEPTAHYDRATE 40 MG/ML
4 INJECTION, SOLUTION INTRAVENOUS AS NEEDED
Status: DISCONTINUED | OUTPATIENT
Start: 2022-09-30 | End: 2022-09-30 | Stop reason: HOSPADM

## 2022-09-30 RX ORDER — INSULIN LISPRO 100 [IU]/ML
10 INJECTION, SOLUTION INTRAVENOUS; SUBCUTANEOUS
Status: DISCONTINUED | OUTPATIENT
Start: 2022-09-30 | End: 2022-09-30 | Stop reason: HOSPADM

## 2022-09-30 RX ADMIN — INSULIN LISPRO 10 UNITS: 100 INJECTION, SOLUTION INTRAVENOUS; SUBCUTANEOUS at 11:56

## 2022-09-30 RX ADMIN — METOPROLOL TARTRATE 50 MG: 50 TABLET, FILM COATED ORAL at 08:32

## 2022-09-30 RX ADMIN — THIAMINE HCL TAB 100 MG 100 MG: 100 TAB at 08:33

## 2022-09-30 RX ADMIN — INSULIN DETEMIR 12 UNITS: 100 INJECTION, SOLUTION SUBCUTANEOUS at 08:33

## 2022-09-30 RX ADMIN — POTASSIUM PHOSPHATE, MONOBASIC AND POTASSIUM PHOSPHATE, DIBASIC 15 MMOL: 224; 236 INJECTION, SOLUTION, CONCENTRATE INTRAVENOUS at 10:10

## 2022-09-30 RX ADMIN — MULTIVITAMIN TABLET 1 TABLET: TABLET at 08:33

## 2022-09-30 RX ADMIN — ASPIRIN 81 MG 81 MG: 81 TABLET ORAL at 08:32

## 2022-09-30 RX ADMIN — INSULIN LISPRO 3 UNITS: 100 INJECTION, SOLUTION INTRAVENOUS; SUBCUTANEOUS at 08:33

## 2022-09-30 RX ADMIN — FOLIC ACID 1 MG: 1 TABLET ORAL at 08:32

## 2022-09-30 RX ADMIN — INSULIN LISPRO 20 UNITS: 100 INJECTION, SOLUTION INTRAVENOUS; SUBCUTANEOUS at 08:33

## 2022-09-30 RX ADMIN — BUSPIRONE HYDROCHLORIDE 10 MG: 10 TABLET ORAL at 08:33

## 2022-09-30 RX ADMIN — INSULIN DETEMIR 18 UNITS: 100 INJECTION, SOLUTION SUBCUTANEOUS at 09:58

## 2022-09-30 RX ADMIN — PANTOPRAZOLE SODIUM 40 MG: 40 TABLET, DELAYED RELEASE ORAL at 05:34

## 2022-09-30 RX ADMIN — MAGNESIUM SULFATE HEPTAHYDRATE 4 G: 40 INJECTION, SOLUTION INTRAVENOUS at 10:10

## 2022-09-30 RX ADMIN — LORAZEPAM 0.5 MG: 0.5 TABLET ORAL at 06:10

## 2022-09-30 RX ADMIN — INSULIN LISPRO 12 UNITS: 100 INJECTION, SOLUTION INTRAVENOUS; SUBCUTANEOUS at 11:56

## 2022-09-30 NOTE — PAYOR COMM NOTE
"Bernadette Mcqueen (52 y.o. Female)             Date of Birth   1970    Social Security Number       Address   [address] [address]    Home Phone   [phone]    MRN   [id number]       Confucianism   [facility]    Marital Status   Single                            Admission Date   9/27/22    Admission Type   Emergency    Admitting Provider   Donald Escoto MD    Attending Provider   Donald Escoto MD    Department, Room/Bed   [facility] 2A ICU, N209/1       Discharge Date       Discharge Disposition       Discharge Destination                               Attending Provider: Donald Escoto MD    Allergies: Tylenol [Acetaminophen]    Isolation: None   Infection: MRSA (08/02/18)   Code Status: CPR   Advance Care Planning Activity    Ht: 167.6 cm (66\")   Wt: 49.4 kg (108 lb 14.5 oz)    Admission Cmt: None   Principal Problem: DKA a/w T1DM [E11.10]                 Active Insurance as of 9/27/2022     Primary Coverage     Payor Plan Insurance Group Employer/Plan Group    RETAIL PRO BY BANUELOS [employer] BY LAKISHA [id number]     Payor Plan Address Payor Plan Phone Number Payor Plan Fax Number Effective Dates    [address] [address]   1/1/2021 - None Entered    [facility] [phone]       Subscriber Name Subscriber Birth Date Member ID       BERNADETTE MCQUEEN 1970 [id number]                 Emergency Contacts      (Rel.) Home Phone Work Phone Mobile Phone    [place]ellington (Significant Other) [phone] -- [phone]            [place]: NPI [id number] Tax ID [id number]  Insurance Information                RETAIL PRO BY LAKISHA/[employer] BY LAKISHA Phone: --    Subscriber: Bernadette Mcqueen Subscriber#: [id number]    Group#: [id number] Precert#: none          Vital Signs (last day)     Date/Time Temp Temp src Pulse Resp BP Patient Position SpO2    09/30/22 0900 98.8 (37.1) Oral 79 -- 159/94 -- 100    09/30/22 0800 -- -- 95 -- 103/72 -- " --    09/30/22 0500 -- -- 92 -- 119/77 -- 97    09/30/22 0400 98 (36.7) Axillary 92 16 154/98 Lying 98    09/30/22 0300 -- -- 82 -- 107/70 -- 98    09/30/22 0200 -- -- 80 16 117/80 Lying 98    09/30/22 0100 -- -- 81 -- 110/75 -- 98    09/30/22 0000 -- -- 79 16 131/84 Lying 99    09/29/22 2300 -- -- 80 -- 123/88 -- 95    09/29/22 2200 -- -- 77 16 101/71 Lying 100    09/29/22 2100 -- -- 86 -- 158/114 -- 100    09/29/22 2000 97.2 (36.2) Axillary 82 16 154/92 Lying 100    09/29/22 1900 -- -- 82 -- 131/82 -- 98    09/29/22 1815 -- -- 78 -- 148/97 -- 100    09/29/22 1600 99 (37.2) Oral 82 16 137/91 Lying 100    09/29/22 1545 -- -- 84 -- 151/94 -- 100    09/29/22 1501 -- -- 79 -- 146/103 -- 99    09/29/22 1400 -- -- 79 -- 130/91 -- 98    09/29/22 1300 -- -- 76 -- 113/83 -- 99    09/29/22 1255 -- -- 77 -- 118/83 -- 100    09/29/22 1130 98.9 (37.2) Oral 71 14 144/96 Lying 99    09/29/22 1000 -- -- 72 -- 147/103 -- 100    09/29/22 0900 -- -- 92 -- 166/99 -- 99    09/29/22 0800 99.1 (37.3) Bladder 84 16 150/96 Lying 97    09/29/22 0700 -- -- 80 -- 155/99 -- 97    09/29/22 0600 99 (37.2) Bladder 81 14 135/82 Lying 97    09/29/22 0500 -- -- 81 -- 153/93 -- 97    09/29/22 0400 99.3 (37.4) Bladder 78 14 130/78 Lying 98    09/29/22 0300 -- -- 78 -- 135/93 -- 99    09/29/22 0200 99.1 (37.3) Bladder 72 14 105/76 Lying 98    09/29/22 0134 -- -- 73 -- -- -- 98    09/29/22 0100 -- -- 75 -- 127/85 -- 98    09/29/22 0000 99 (37.2) Bladder 84 18 163/88 Lying 98            Current Facility-Administered Medications   Medication Dose Route Frequency Provider Last Rate Last Admin   • aspirin chewable tablet 81 mg  81 mg Oral Daily Donald Yanez MD   81 mg at 09/30/22 0832   • atorvastatin (LIPITOR) tablet 80 mg  80 mg Oral Nightly Donald Yanez MD   80 mg at 09/29/22 2050   • busPIRone (BUSPAR) tablet 10 mg  10 mg Oral Q12H Donald Yanez MD   10 mg at 09/30/22 0833   • dextrose (D50W) (25 g/50 mL) IV injection 25 g  25 g  Intravenous Q15 Min PRN Donald Yanez MD       • dextrose (GLUTOSE) oral gel 15 g  15 g Oral Q15 Min PRN Donald Yanez MD       • Enoxaparin Sodium (LOVENOX) syringe 30 mg  30 mg Subcutaneous Nightly Donald Yanez MD   30 mg at 09/29/22 2116   • multivitamin (THERAGRAN) tablet 1 tablet  1 tablet Oral Daily Justa Funes, APRN   1 tablet at 09/30/22 0833    And   • folic acid (FOLVITE) tablet 1 mg  1 mg Oral Daily Justa Funes, APRN   1 mg at 09/30/22 0832   • glucagon (human recombinant) (GLUCAGEN DIAGNOSTIC) injection 1 mg  1 mg Intramuscular Q15 Min PRN Donald Yanez MD       • haloperidol lactate (HALDOL) injection 5 mg  5 mg Intravenous Q30 Min PRN Donald Yanez MD       • hydrALAZINE (APRESOLINE) injection 10 mg  10 mg Intravenous Q6H PRN Deidre Melton APRN   10 mg at 09/28/22 2012   • insulin detemir (LEVEMIR) injection 30 Units  30 Units Subcutaneous Q12H Donald Yanez MD       • Insulin Lispro (humaLOG) injection 0-24 Units  0-24 Units Subcutaneous 4x Daily With Meals & Nightly Catarina Kidd DNP, APRN   20 Units at 09/30/22 0833   • Insulin Lispro (humaLOG) injection 10 Units  10 Units Subcutaneous TID With Meals Donald Yanez MD       • LORazepam (ATIVAN) tablet 0.5 mg  0.5 mg Oral Q2H PRN Larissa Monge APRN   0.5 mg at 09/30/22 0610    Or   • midazolam (VERSED) injection 1 mg  1 mg Intravenous Q2H PRN Larissa Monge APRN        Or   • LORazepam (ATIVAN) tablet 1 mg  1 mg Oral Q1H PRN Larissa Monge APRN        Or   • midazolam (VERSED) injection 2 mg  2 mg Intravenous Q1H PRN Larissa Monge APRN        Or   • midazolam (VERSED) injection 2 mg  2 mg Intravenous Q15 Min PRN Larissa Monge APRN        Or   • LORazepam (ATIVAN) tablet 2 mg  2 mg Oral Q1H PRN Young, [place] L, APRN        Or   • midazolam (VERSED) injection 4 mg  4 mg Intravenous Q1H PRN Larissa Monge, APRN       • Magnesium Sulfate 2 gram Bolus, followed by 8 gram infusion (total Mg  dose 10 grams)- Mg less than or equal to 1mg/dL  2 g Intravenous PRN Donald Yanez MD        Or   • Magnesium Sulfate 2 gram / 50mL Infusion (GIVE X 3 BAGS TO EQUAL 6GM TOTAL DOSE) - Mg 1.1 - 1.5 mg/dl  2 g Intravenous PRN Donald Yanez MD        Or   • Magnesium Sulfate 4 gram infusion- Mg 1.6-1.9 mg/dL  4 g Intravenous PRN Donald Yanez MD 25 mL/hr at 09/30/22 1010 4 g at 09/30/22 1010   • metoprolol tartrate (LOPRESSOR) tablet 50 mg  50 mg Oral Q12H Donald Yanez MD   50 mg at 09/30/22 0832   • pantoprazole (PROTONIX) EC tablet 40 mg  40 mg Oral Q AM Modesta Sesay PharmD   40 mg at 09/30/22 0534   • phenol (CHLORASEPTIC) 1.4 % liquid 1 spray  1 spray Mouth/Throat Q2H PRN Larissa Monge APRN   1 spray at 09/28/22 1443   • potassium chloride (MICRO-K) CR capsule 40 mEq  40 mEq Oral PRN Kiley Cole APRN        Or   • potassium chloride 10 mEq in 100 mL IVPB  10 mEq Intravenous Q1H PRN Kiley Cole APRN 100 mL/hr at 09/29/22 0847 10 mEq at 09/29/22 0847   • potassium phosphate 45 mmol in sodium chloride 0.9 % 500 mL infusion  45 mmol Intravenous PRN Deidre Melton APRN        Or   • potassium phosphate 30 mmol in sodium chloride 0.9 % 250 mL infusion  30 mmol Intravenous PRN Deidre Melton APRN 31.3 mL/hr at 09/29/22 0630 30 mmol at 09/29/22 0630    Or   • potassium phosphate 15 mmol in 0.9% sodium chloride 100 mL IVPB  15 mmol Intravenous PRN Deidre Melton APRN   15 mmol at 09/30/22 1010    Or   • sodium phosphates 45 mmol in sodium chloride 0.9 % 250 mL IVPB  45 mmol Intravenous PRN Deidre Melton APRN        Or   • sodium phosphates 30 mmol in sodium chloride 0.9 % 250 mL IVPB  30 mmol Intravenous PRN Deidre Melton, DMITRY        Or   • sodium phosphates 15 mmol in sodium chloride 0.9 % 250 mL IVPB  15 mmol Intravenous PRN Deidre Melton APRN       • QUEtiapine (SEROquel) tablet 100 mg  100 mg Oral  Mylesly Donald Yanez MD   100 mg at 09/29/22 2050   • sodium chloride 0.9 % flush 10 mL  10 mL Intravenous PRN Tera Copeland MD       • sodium chloride 0.9 % flush 10 mL  10 mL Intravenous PRN Tera Copeland MD       • thiamine (VITAMIN B-1) tablet 100 mg  100 mg Oral Daily Modesta Sesay, PharmD   100 mg at 09/30/22 0833       Lab Results (last 24 hours)     Procedure Component Value Units Date/Time    POC Glucose Once [[id number]]  (Abnormal) Collected: 09/30/22 0723    Specimen: Blood Updated: 09/30/22 0727     Glucose 393 mg/dL      Comment: Meter: [id number] : [id number] Good Pereyra       Comprehensive Metabolic Panel [[id number]]  (Abnormal) Collected: 09/30/22 0550    Specimen: Blood from Arm, Left Updated: 09/30/22 0640     Glucose 396 mg/dL      BUN 10 mg/dL      Creatinine 0.57 mg/dL      Sodium 137 mmol/L      Potassium 3.8 mmol/L      Chloride 106 mmol/L      CO2 22.0 mmol/L      Calcium 8.4 mg/dL      Total Protein 5.0 g/dL      Albumin 2.70 g/dL      ALT (SGPT) 87 U/L      AST (SGOT) 154 U/L      Alkaline Phosphatase 301 U/L      Total Bilirubin 0.3 mg/dL      Globulin 2.3 gm/dL      Comment: Calculated Result        A/G Ratio 1.2 g/dL      BUN/Creatinine Ratio 17.5     Anion Gap 9.0 mmol/L      eGFR 110.2 mL/min/1.73      Comment: National Kidney Foundation and American Society of Nephrology (ASN) Task Force recommended calculation based on the Chronic Kidney Disease Epidemiology Collaboration (CKD-EPI) equation refit without adjustment for race.       Narrative:      GFR Normal >60  Chronic Kidney Disease <60  Kidney Failure <15      Phosphorus [[id number]]  (Normal) Collected: 09/30/22 0550    Specimen: Blood from Arm, Left Updated: 09/30/22 0640     Phosphorus 2.5 mg/dL     Magnesium [[id number]]  (Normal) Collected: 09/30/22 0550    Specimen: Blood from Arm, Left Updated: 09/30/22 0640     Magnesium 1.7 mg/dL     CBC (No Diff) [[id number]]  (Abnormal) Collected: 09/30/22 0550     "Specimen: Blood from Arm, Left Updated: 09/30/22 0614     WBC 5.67 10*3/mm3      RBC 3.30 10*6/mm3      Hemoglobin 10.7 g/dL      Hematocrit 32.7 %      MCV 99.1 fL      MCH 32.4 pg      MCHC 32.7 g/dL      RDW 13.7 %      RDW-SD 50.1 fl      MPV 11.0 fL      Platelets 191 10*3/mm3     POC Glucose Once [[id number]]  (Abnormal) Collected: 09/29/22 2018    Specimen: Blood Updated: 09/29/22 2020     Glucose 154 mg/dL      Comment: Meter: [id number] : [id number] Lety Solomon       Phosphorus [[id number]]  (Normal) Collected: 09/29/22 1758    Specimen: Blood Updated: 09/29/22 1846     Phosphorus 2.7 mg/dL     Potassium [[id number]]  (Normal) Collected: 09/29/22 1758    Specimen: Blood Updated: 09/29/22 1843     Potassium 3.9 mmol/L     POC Glucose Once [[id number]]  (Abnormal) Collected: 09/29/22 1619    Specimen: Blood Updated: 09/29/22 1622     Glucose 345 mg/dL      Comment: Meter: [id number] : [id number] Dilma Sanders       POC Glucose Once [[id number]]  (Abnormal) Collected: 09/29/22 1117    Specimen: Blood Updated: 09/29/22 1123     Glucose 269 mg/dL      Comment: Meter: [id number] : [id number] Dilma Sanders           Imaging Results (Last 24 Hours)     ** No results found for the last 24 hours. **        Orders (last 24 hrs)      Start     Ordered    09/30/22 2100  insulin detemir (LEVEMIR) injection 30 Units  Every 12 Hours Scheduled         09/30/22 0943 09/30/22 2000  Phosphorus  Timed         09/30/22 1012    09/30/22 1200  Insulin Lispro (humaLOG) injection 10 Units  3 Times Daily With Meals         09/30/22 0943    09/30/22 1030  insulin detemir (LEVEMIR) injection 18 Units  Once         09/30/22 0943 09/30/22 0943  Magnesium Sulfate 2 gram Bolus, followed by 8 gram infusion (total Mg dose 10 grams)- Mg less than or equal to 1mg/dL  As Needed        \"Or\" Linked Group Details    09/30/22 0944    09/30/22 0943  Magnesium Sulfate 2 gram / 50mL Infusion (GIVE X 3 BAGS TO EQUAL 6GM TOTAL DOSE) - Mg 1.1 - 1.5 " "mg/dl  As Needed        \"Or\" Linked Group Details    09/30/22 0944    09/30/22 0943  Magnesium Sulfate 4 gram infusion- Mg 1.6-1.9 mg/dL  As Needed        \"Or\" Linked Group Details    09/30/22 0944    09/30/22 0727  POC Glucose Once  PROCEDURE ONCE         09/30/22 0723    09/30/22 0600  Comprehensive Metabolic Panel  Morning Draw         09/29/22 1543    09/30/22 0600  Magnesium  Morning Draw         09/29/22 1543    09/30/22 0600  Phosphorus  Morning Draw         09/29/22 1543    09/30/22 0600  CBC (No Diff)  Morning Draw         09/29/22 1543    09/29/22 2021  POC Glucose Once  PROCEDURE ONCE         09/29/22 2018 09/29/22 1800  Potassium  Once         09/29/22 0913    09/29/22 1800  Phosphorus  Once         09/29/22 0913    09/29/22 1800  Insulin Lispro (humaLOG) injection 3 Units  3 Times Daily With Meals,   Status:  Discontinued         09/29/22 1624    09/29/22 1800  Insulin Lispro (humaLOG) injection 0-24 Units  4 Times Daily With Meals & Nightly         09/29/22 1624    09/29/22 1630  multivitamin (THERAGRAN) tablet 1 tablet  Daily        \"And\" Linked Group Details    09/29/22 1543    09/29/22 1630  folic acid (FOLVITE) tablet 1 mg  Daily        \"And\" Linked Group Details    09/29/22 1543    09/29/22 1622  POC Glucose Once  PROCEDURE ONCE         09/29/22 1619    09/29/22 1532  Transfer Patient  Once         09/29/22 1543    09/29/22 1200  Dietary Nutrition Supplements Boost Breeze (Clear Liquid)  Daily With Breakfast, Lunch & Dinner,   Status:  Canceled       09/29/22 0834    09/29/22 1200  Dietary Nutrition Supplements Boost Glucose Control  Daily With Breakfast, Lunch & Dinner       09/29/22 0954    09/29/22 1123  POC Glucose Once  PROCEDURE ONCE         09/29/22 1117    09/29/22 0915  pantoprazole (PROTONIX) EC tablet 40 mg  Every Early Morning         09/29/22 0826    09/29/22 0915  thiamine (VITAMIN B-1) tablet 100 mg  Daily         09/29/22 0826    09/29/22 0900  aspirin chewable tablet 81 mg  " "Daily         09/28/22 2152 09/29/22 0511  potassium chloride (MICRO-K) CR capsule 40 mEq  As Needed        \"Or\" Linked Group Details    09/29/22 0512 09/29/22 0511  potassium chloride 10 mEq in 100 mL IVPB  Every 1 Hour PRN        \"Or\" Linked Group Details    09/29/22 0512 09/28/22 2245  busPIRone (BUSPAR) tablet 10 mg  Every 12 Hours Scheduled         09/28/22 2152 09/28/22 2245  atorvastatin (LIPITOR) tablet 80 mg  Nightly         09/28/22 2152 09/28/22 2245  Enoxaparin Sodium (LOVENOX) syringe 30 mg  Nightly         09/28/22 2155 09/28/22 2230  metoprolol tartrate (LOPRESSOR) tablet 50 mg  Every 12 Hours Scheduled         09/28/22 2138 09/28/22 2230  QUEtiapine (SEROquel) tablet 100 mg  Nightly         09/28/22 2138 09/28/22 2200  POC Glucose 4x Daily AC & at Bedtime  4 Times Daily Before Meals & at Bedtime       09/28/22 2138 09/28/22 2138  Incentive Spirometry  Every Hour While Awake       09/28/22 2138 09/28/22 2122  haloperidol lactate (HALDOL) injection 5 mg  Every 30 Minutes PRN         09/28/22 2122 09/28/22 2100  insulin detemir (LEVEMIR) injection 12 Units  Every 12 Hours Scheduled,   Status:  Discontinued         09/28/22 1434 09/28/22 2000  POC Glucose NIGHTLY 8PM  Nightly      Comments: Additional nightly glucose check for patients on basal insulin. If bedtime blood glucose is greater than 350 mg/dl, call MD.      09/28/22 1434 09/28/22 1730  Insulin Lispro (humaLOG) injection 0-14 Units  3 Times Daily Before Meals,   Status:  Discontinued         09/28/22 1434 09/28/22 1700  POC Glucose TID AC  3 Times Daily Before Meals,   Status:  Canceled       09/28/22 1434 09/28/22 1510  LORazepam (ATIVAN) tablet 0.5 mg  Every 2 Hours PRN        \"Or\" Linked Group Details    09/28/22 1511    09/28/22 1510  midazolam (VERSED) injection 1 mg  Every 2 Hours PRN        \"Or\" Linked Group Details    09/28/22 1511    09/28/22 1510  LORazepam (ATIVAN) tablet 1 mg  Every 1 " "Hour PRN        \"Or\" Linked Group Details    09/28/22 1511    09/28/22 1510  midazolam (VERSED) injection 2 mg  Every 1 Hour PRN        \"Or\" Linked Group Details    09/28/22 1511    09/28/22 1510  midazolam (VERSED) injection 2 mg  Every 15 Minutes PRN        \"Or\" Linked Group Details    09/28/22 1511    09/28/22 1510  LORazepam (ATIVAN) tablet 2 mg  Every 1 Hour PRN        \"Or\" Linked Group Details    09/28/22 1511    09/28/22 1510  midazolam (VERSED) injection 4 mg  Every 1 Hour PRN        \"Or\" Linked Group Details    09/28/22 1511    09/28/22 1432  dextrose (GLUTOSE) oral gel 15 g  Every 15 Minutes PRN         09/28/22 1434    09/28/22 1432  dextrose (D50W) (25 g/50 mL) IV injection 25 g  Every 15 Minutes PRN         09/28/22 1434    09/28/22 1432  glucagon (human recombinant) (GLUCAGEN DIAGNOSTIC) injection 1 mg  Every 15 Minutes PRN         09/28/22 1434    09/28/22 1417  phenol (CHLORASEPTIC) 1.4 % liquid 1 spray  Every 2 Hours PRN         09/28/22 1417    09/28/22 0415  Patient Currently On Electrolyte Replacement Protocol - Please Refer to MAR for Protocol Details  Misc Nursing Order (Specify)  Daily      Comments: Patient Currently On Electrolyte Replacement Protocol - Please Refer to MAR for Protocol Details    09/28/22 0414 09/28/22 0413  potassium phosphate 45 mmol in sodium chloride 0.9 % 500 mL infusion  As Needed        \"Or\" Linked Group Details    09/28/22 0414    09/28/22 0413  potassium phosphate 30 mmol in sodium chloride 0.9 % 250 mL infusion  As Needed        \"Or\" Linked Group Details    09/28/22 0414    09/28/22 0413  potassium phosphate 15 mmol in 0.9% sodium chloride 100 mL IVPB  As Needed        \"Or\" Linked Group Details    09/28/22 0414    09/28/22 0413  sodium phosphates 45 mmol in sodium chloride 0.9 % 250 mL IVPB  As Needed        \"Or\" Linked Group Details    09/28/22 0414    09/28/22 0413  sodium phosphates 30 mmol in sodium chloride 0.9 % 250 mL IVPB  As Needed        \"Or\" Linked " "Group Details    09/28/22 0414    09/28/22 0413  sodium phosphates 15 mmol in sodium chloride 0.9 % 250 mL IVPB  As Needed        \"Or\" Linked Group Details    09/28/22 0414    09/28/22 0333  Urinary Catheter Care  Every Shift      \"And\" Linked Group Details    09/28/22 0332    09/28/22 0149  hydrALAZINE (APRESOLINE) injection 10 mg  Every 6 Hours PRN         09/28/22 0149    09/27/22 2348  Daily Weights  Daily       09/27/22 2347    09/27/22 2347  Treat for hypoglycemia as recommended by the Glucommander™.  Every Shift,   Status:  Canceled      Comments: Treat for hypoglycemia as recommended by the Glucommander™. Follow Glucommander recommendations for oral or IV hypoglycemia treatment, if IV access is not available administer glucagon or oral treatment per hypoglycemia protocol.    09/27/22 2347 09/27/22 2347  If insulin infusion is paused, follow Glucommander instructions.  Every Shift,   Status:  Canceled      Comments: If insulin infusion is paused, follow Glucommander instructions.    09/27/22 2347 09/27/22 2211  sodium chloride 0.9 % flush 10 mL  As Needed        \"And\" Linked Group Details    09/27/22 2211 09/27/22 2134  sodium chloride 0.9 % flush 10 mL  As Needed         09/27/22 2134    Unscheduled  Oxygen Therapy- Nasal Cannula; 2 LPM; Titrate for SPO2: 92%, Greater Than or Equal To  Continuous PRN       09/27/22 2134    Unscheduled  Magnesium  As Needed       09/28/22 0414    Unscheduled  Potassium  As Needed       09/28/22 0414    Unscheduled  Follow Hypoglycemia Standing Orders (Blood Glucose <70)  As Needed      Comments: Follow Protocol As Outlined in Process Instructions (Open Order Report to View Full Instructions)    09/28/22 1434    Unscheduled  Obtain Pre & Post Sedation Scores With Every Lorazepam Dose - Hold For POSS Greater Than 2 or RASS of -3 or Less  As Needed       09/28/22 1511    --  SCANNED - TELEMETRY           09/27/22 0000                   Physician Progress Notes (last 24 " hours)      Justa Funes, APRN at 09/29/22 1500          Intensive Care Follow-up     Hospital:  LOS: 2 days   Ms. Bernadette Paris, 52 y.o. female is followed for:   DKA (diabetic ketoacidosis) ([facility])     Subjective     Subjective   Interval History:  Chart reviewed. BUE restraints removed this morning. Confused to time, but oriented to person, place, and situation. Appears calm in the bed, but she is reporting anxiety. PO4 1.4, being replaced. Sodium increased yesterday to 155, back down to 149 on D5W. K 3.2, replaced. Chloride 116, down from 118. Glucose controlled off insulin gtt.     The patient's past medical, surgical and social history were reviewed and updated in Epic as appropriate.    Objective   Objective     Infusions:     Medications:  aspirin, 81 mg, Oral, Daily  atorvastatin, 80 mg, Oral, Nightly  busPIRone, 10 mg, Oral, Q12H  enoxaparin, 30 mg, Subcutaneous, Nightly  multivitamin, 1 tablet, Oral, Daily   And  folic acid, 1 mg, Oral, Daily  insulin detemir, 12 Units, Subcutaneous, Q12H  insulin lispro, 0-14 Units, Subcutaneous, TID AC  metoprolol tartrate, 50 mg, Oral, Q12H  pantoprazole, 40 mg, Oral, Q AM  QUEtiapine, 100 mg, Oral, Nightly  thiamine, 100 mg, Oral, Daily      I reviewed the patient's medications.    Vital Sign Min/Max for last 24 hours  Temp  Min: 98.9 °F (37.2 °C)  Max: 99.7 °F (37.6 °C)   BP  Min: 105/76  Max: 174/103   Pulse  Min: 71  Max: 103   Resp  Min: 14  Max: 20   SpO2  Min: 97 %  Max: 100 %   No data recorded       Input/Output for last 24 hour shift  09/28 0701 - 09/29 0700  In: 5215 [P.O.:1118; I.V.:3521]  Out: 640 [Urine:640]        Physical Exam:  GENERAL: Patient lying in bed and conversant. No acute distress.   HEENT: Normocephalic and atraumatic. Trachea midline. PER. EOM WNL.   LUNGS: Chest rise of normal depth and symmetric. Lungs clear to auscultation bilaterally. No wheezes, rhonchi, or rales.   HEART: S1,S2 detected. Regular rate and rhythm. No rub, murmur,  or gallop.   ABDOMEN: Soft, round, nondistended, and nontender. Bowel sounds present.   EXTREMITIES: No clubbing, edema, or cyanosis. Peripheral pulses present. Skin warm and dry.    NEURO/PSYCH: Alert and oriented except time. Follows commands. Moves all extremities. No focal deficits.      Results from last 7 days   Lab Units 09/29/22  0423 09/28/22  0128 09/27/22  2200   WBC 10*3/mm3 7.87 7.86 6.88   HEMOGLOBIN g/dL 11.6* 13.2 13.6   PLATELETS 10*3/mm3 210 255 263     Results from last 7 days   Lab Units 09/29/22  0423 09/28/22  1219 09/28/22  0826   SODIUM mmol/L 149* 155* 151*   POTASSIUM mmol/L 3.2* 3.8 3.4*   CO2 mmol/L 25.0 28.0 27.0   BUN mg/dL 12 12 13   CREATININE mg/dL 0.59 0.67 0.90   MAGNESIUM mg/dL 2.1 2.3 2.5   PHOSPHORUS mg/dL 1.4* 2.5 1.8*   GLUCOSE mg/dL 154* 171* 306*     Estimated Creatinine Clearance: 88 mL/min (by C-G formula based on SCr of 0.59 mg/dL).          I reviewed the patient's new clinical results.  I reviewed the patient's new imaging results/reports including actual images and agree with reports.     Imaging Results (Last 24 Hours)     ** No results found for the last 24 hours. **          Assessment & Plan   Impression      DKA a/w T1DM    Bipolar affective disorder    History Hepatitis C    Hypertension    H/O noncompliance with medical treatment    Hyperlipidemia    Gastroparesis    Cocaine abuse    Elevated liver enzymes    Agitated encephalopathy, metabolic    ETOH abuse per significant other      52 year-old woman with PMH T1DM, bipolar, hep C, HTN, HLD, ETOH abuse, & cocaine abuse that presented to [facility] ED on 9/29 with hyperglycemia and AMS. She was diagnosed with DKA and was sent to the ICU. UDS was positive for cocaine. Significant other reports ETOH abuse. She left AMA her last admission here from 9/10-9/12, which was also for DKA. Her DKA resolved yesterday and she was transitioned to Levemir and SSI successfully. She has the CIWA protocol ordered.     This AM, her  mentation has improved greatly. She is oriented x3. Her restraints have been removed. Electrolytes are being replaced.        Plan        DKA a/w T1DM  H/o noncompliance with medical treatment  Resolved, Insulin gtt off, Levemir, SSI  D/c IV fluids  D/c nolasco catheter  Electrolyte replacements  AM labs    Bipolar affective disorder  Agitated encephalopathy, metabolic  Cocaine abuse  ETOH abuse per significant other  Elevated liver enzymes  H/o Hep C  Agitation improved greatly  Monitor CIWA scale  Electrolyte replacements  Folic acid, thiamine, multivitamin  Buspirone, Seroquel, PRN Versed, Ativan, Haldol    HTN  Hyperlipidemia  Bps controlled with systolic < 150.  Metoprolol, PRN hydralazine  ASA, statin  Continue to monitor if home Lisinopril and Norvasc need to be restarted    Gastroparesis  AM labs  Multivitamin, electrolyte replacement    DVT Prophylaxis: Lovenox  GI Prophylaxis: PPI  Dispo: Telemetry    Time spent: 35 minutes  Plan of care and goals reviewed with multidisciplinary/antibiotic stewardship team during rounds.   I discussed the patient's findings and my recommendations with patient and nursing staff     Justa Funes, MSN, APRN, [facility]   Pulmonary and Critical Care Medicine        Electronically signed by Justa Funes APRN at 09/29/22 1620       Consult Notes (last 24 hours)  Notes from 09/29/22 1013 through 09/30/22 1013   No notes of this type exist for this encounter.

## 2022-09-30 NOTE — SIGNIFICANT NOTE
"RN at bedside to assess patient. Patient began yelling at RN stating \"she was in pain and needed something besides just a tylenol, that I can take a tylenol at home\". Patient requested to speak to the provider, DMITRY notified while at bedside. Patient continued to yell and scream at RN and told RN to get out of her room unless RN had something to give her for pain.   "

## 2022-09-30 NOTE — CONSULTS
Diabetes Education    Patient Name:  Bernadette Paris  YOB: 1970  MRN: [id number]  Admit Date:  9/27/2022      Attempted contact for diabetes ed; pt appears to be sleeping soundly.  Discussed with pts RN, will plan for follow up with pt on Monday.  Please call [phone] with any urgent needs.    Thank you!    Electronically signed by:  Aster Zepeda RN  09/30/22 14:28 EDT

## 2022-09-30 NOTE — CASE MANAGEMENT/SOCIAL WORK
Continued Stay Note  [facility]     Patient Name: Bernadette Paris  MRN: [id number]  Today's Date: 9/30/2022    Admit Date: 9/27/2022    Plan: Home   Discharge Plan     Row Name 09/30/22 1424       Plan    Plan Home    Patient/Family in Agreement with Plan yes    Plan Comments Patient sleeping at time of visit, per discussion in MDR patient more alert and out of restraints.  Patient has history of non compliance and left AMA on last hospitalization.  Patient uses [employer] transportation.  Discharge plan is home at this [facility].  DCP may change.  CM will continue to follow.               Discharge Codes    No documentation.               Expected Discharge Date and Time     Expected Discharge Date Expected Discharge Time    Oct 3, 2022             Priya Allen RN

## 2022-09-30 NOTE — SIGNIFICANT NOTE
Pt remains insistent on leaving hospital after speaking with MD against medical advice. Patient signed AMA paperwork. Patient currently waiting on transportation in room, refusing to leave until ride is here.

## 2022-09-30 NOTE — CASE MANAGEMENT/SOCIAL WORK
Continued Stay Note  [facility]     Patient Name: Bernadette Paris  MRN: [id number]  Today's Date: 9/30/2022    Admit Date: 9/27/2022    Plan: SW   Discharge Plan     Row Name 09/30/22 1601       Plan    Plan SW    Plan Comments SW’er spoke with Medicaid transportation: [employer] [phone]. Patient will be picked from [address]. [employer] will call nurses station number upon arrival. If assistance is needed call [phone]                                          Discharge Codes    No documentation.               Expected Discharge Date and Time     Expected Discharge Date Expected Discharge Time    Oct 3, 2022             VINCENZO James (Kay)

## 2022-09-30 NOTE — SIGNIFICANT NOTE
Patient refused to wear telemetry monitor and was ready to leave and signed out AMA and would wait out front for transportation to arrive. Patient states she called [employer] transportation and they were close and she was ready to go. Personal belongings taken with patient.

## 2022-09-30 NOTE — PROGRESS NOTES
Clinical Nutrition     Reason for Visit: MDR, Need for education    Patient Name: Bernadette Paris  YOB: 1970  MRN: [id number]  Date of Encounter: 09/30/22 16:38 EDT  Admission date: 9/27/2022    Nutrition Assessment     Problem List:    DKA a/w T1DM    Bipolar affective disorder    History Hepatitis C    Hypertension    H/O noncompliance with medical treatment    Hyperlipidemia    Gastroparesis    Cocaine abuse    Elevated liver enzymes    ETOH abuse per significant other        PMH:   She  has a past medical history of Arthritis, Bipolar disorder (HCC), Chronic bronchitis (HCC), Depression, Diabetes mellitus (HCC), GERD (gastroesophageal reflux disease), Hepatitis-C, History of blood transfusion, Hyperlipidemia, Hypertension, Infectious viral hepatitis, Migraine, and Sleep apnea.Pancreatitis    PSxH:  She  has a past surgical history that includes Hysterectomy; Cholecystectomy; Knee surgery; lumbar laminectomy discectomy decompression (N/A, 8/6/2018); and Esophagogastroduodenoscopy (N/A, 4/9/2022).    Substance history, Cocaine, Tobacco    Reported/Observed/Food/Nutrition Related History:     Pt walking around in room is preparing to leave AMA, wants to know when her ride will be here    Brief DM Education provided, discussed importance of checking blood sugars 3-4 times per day, need for diabetic diet, focus on giving up regular soda, importance of 3 meals per days, with small snacks in between    Pt appears malnourished and has lost significant amount of weight over past 4 months, pt attributes her wt loss to diabetes, suspect wt loss primarily due to substance abuse in the setting of uncontrolled diabetes    Anthropometrics   Height: 66in  Weight: 108lb bedscale  BMI: 17.4  BMI classification: Underweight:<18.5kg/m2     Weight change: 43lb wt loss over past 4 months per EMR    Last 15 Recorded Weights  View Complete Flowsheet  Weight Weight (kg) Weight (lbs) Weight Method    9/28/2022 49.4 kg 108 lb 14.5 oz Bed scale   9/27/2022 45.36 kg 100 lb Estimated   9/10/2022 58.968 kg 130 lb Stated   8/15/2022 61.8 kg  136 lb 3.9 oz  -   8/13/2022 67.541 kg 148 lb 14.4 oz Bed scale   8/12/2022 68.04 kg 150 lb Bed scale   8/10/2022 68.493 kg 151 lb Stated   6/21/2022 68.493 kg 151 lb Stated   6/21/2022 68.493 kg 151 lb Stated   6/16/2022 68.04 kg 150 lb -   5/31/2022 65 kg 143 lb 4.8 oz Bed scale   5/31/2022 68.493 kg 151 lb Stated   5/29/2022 68.04 kg 150 lb Stated   5/13/2022 69.219 kg 152 lb 9.6 oz -   5/12/2022 68.493 kg 151 lb Standing scale       Labs reviewed     Results from last 7 days   Lab Units 09/30/22  0550   SODIUM mmol/L 137   POTASSIUM mmol/L 3.8   CHLORIDE mmol/L 106   CO2 mmol/L 22.0   BUN mg/dL 10   CREATININE mg/dL 0.57   CALCIUM mg/dL 8.4*   BILIRUBIN mg/dL 0.3   ALK PHOS U/L 301*   ALT (SGPT) U/L 87*   AST (SGOT) U/L 154*   GLUCOSE mg/dL 396*       Results from last 7 days   Lab Units 09/30/22  1139 09/30/22  0723 09/29/22 2018 09/29/22  1619 09/29/22  1117 09/29/22  0721   GLUCOSE mg/dL 261* 393* 154* 345* 269* 125     Lab Results   Lab Value Date/Time    HGBA1C 13.90 (H) 09/27/2022 2200    HGBA1C 12.20 (H) 09/10/2022 1528    HGBA1C 11.5 (H) 03/21/2022 0508    HGBA1C 12.2 (H) 07/09/2021 0219       Medications reviewed   Pertinent:lovenox, insulin, MVI, folate, thiamine, protonix    Intake/Ouptut 24 hrs (7:00AM - 6:59 AM)     Intake & Output (last day)       09/29 0701 09/30 0700 09/30 0701  10/01 0700    P.O. 720 720    I.V. (mL/kg) 499.4 (10.1)     IV Piggyback 100     Total Intake(mL/kg) 1319.4 (26.7) 720 (14.6)    Urine (mL/kg/hr) 1175 (1) 100 (0.2)    Stool 0     Total Output 1175 100    Net +144.4 +620          Urine Unmeasured Occurrence 2 x 2 x    Stool Unmeasured Occurrence 2 x           Nutrition Focused Physical Exam Findings -unable to perform as pt leaving AMA     GI: diarrhea 9-29    SKIN:wnl  Current Nutrition Prescription     PO: Diet Regular; Consistent  Carbohydrate    Boost GC 3x/day    Intake; 83% of 3 meals    Nutrition Diagnosis   Date: 9-28-22, 9-30  Problem Food and nutrition knowledge deficit   Etiology Uncontrolled DM   Signs/Symptoms Ha1c 13.9     Problem Unintended weight loss   Etiology Substance Abuse   Signs/Symptoms 43lb wt loss over 4 months       Goal:   General: Nutrition support treatment  PO: Maintain intake    Nutrition Intervention   1.  Follow treatment progress, Encourage intake, Education provided    Brief DM Education provided    Monitoring/Evaluation:   Per protocol, I&O, PO intake, Pertinent labs, Weight, Skin status, GI status, Symptoms, Swallow function, Hemodynamic stability  Olivia Sherman RD  Time Spent: 30min

## 2022-09-30 NOTE — DISCHARGE SUMMARY
AMA STATEMENT       Patient name: Bernadette Paris  CSN: [id number]  MRN: [id number]  : 1970    Date of Admission: 2022  Date Patient left AMA:  2022    Admitting Physician:  Dr. Escoto  Primary Care Provider: Provider, No Known  Consults: N/A      Admission Diagnosis: DKA T1DM     Procedures: N/A    History of Present Illness:  Bernadette Paris is a 52 y.o. female with PMH HTN, HLD, T1DM, gastroparesis, cocaine use, hepatitis C, medical noncompliance, and bipolar disorder who presented to [facility] ED on 22 with high blood glucose and confusion. Significant labs:  Glucose >1500, Cr 1.24, Na 124, CO2 21, , , Alk Phos 492, AG 21, Hgb A1c 13.9.  VBG on room air had pH 7.21, pCO2 56 and pO2 27.  UA had ketones and trace glucose.  UDS was + cocaine.  She was given 2L NS bolus and 0.5 mg Versed. She was admitted to the ICU for management of DKA. Of note, she has had multiple admissions for DKA. She was recently admitted at [facility] from 09/10/22 to 22 for DKA and left AMA.    Hospital Course:  During her current hospitalization, she was treated appropriately with an insulin drip and IV fluids and her anion gap closed 22, at which time she was transitioned to SSI. Her home medications were restarted. Patient was agitated for much of her stay, requiring nonviolent restraints, and has not been compliant with medication administration. On 22, agitation improved and patient was removed from restraints and Santiago catheter removed and she was ordered to telemetry. Throughout her stay, her glycemic control has been suboptimal as patient has been reported by nursing staff to be eating food brought in from outside. The morning of 22, she persistently complained of pain which is felt to be chronic and no narcotic medications could be given at this time. She became increasingly agitated and has decided to leave AMA.      Despite our efforts, Bernadette Paris has decided to leave AGAINST  MEDICAL ADVICE. She was deemed to have a normal mental status and full decisional capacity.  The patient understands their condition and the risks of leaving AMA, including but not limited to permanent disability and/or death.     The patient has been informed that they may return for care at any time, and was instructed to follow-up with their PCP as soon as possible.    Time spent: 20 minutes    ALEX Lyle, APRN, ACNPC-AG  Pulmonary and Critical Care Medicine    Electronically signed by DMITRY Ward, 09/30/22, 5:57 PM EDT.

## 2022-09-30 NOTE — PROGRESS NOTES
Intensivist Note     9/30/2022  Hospital Day: 1  * No surgery found *  ICU Stays Timeline            Hospital Admission: 09/27/22 2132 - Current  ICU stays: 1      In Date/Time Event Department ICU Stay Duration     09/27/22 2132 Admission  [facility]      09/28/22 0053 Transfer In [facility] 2A ICU 2 days 14 hours 39 minutes             Ms. Bernadette Paris, 52 y.o. female is followed for:    DKA a/w T1DM    H/O noncompliance with medical treatment    Gastroparesis    History Hepatitis C    Elevated liver enzymes    Agitated encephalopathy, metabolic    Bipolar affective disorder    Hypertension    Hyperlipidemia    Cocaine abuse    ETOH abuse per significant other       SUBJECTIVE     52 y.o.  black female female with PMH HTN, HLP, T1DM, gastroparesis and GERD, EtOH abuse, cocaine use, hepatitis C, medical noncompliance and bipolar disorder who presented to the ED with altered mental status and extremely severe hypoglycemia (glucose 1,228).  In the ED, she had a temp 98, P 100, RR 16, /126 and O2 sat was 100% on room air.  CXR and CTH was unremarkable.  Significant labs:  Glucose >1500, Cr 1.24, Na 124, CO2 21, , , Alk Phos 492, AG 21, Hgb A1c 13.9.  VBG on room air had pH 7.21, pCO2 56 and pO2 27.  UA had ketones and trace glucose.  UDS was + cocaine.  She was given 2L NS bolus and 0.5 mg Versed and was admitted to the ICU per the Intensivist service.  This is one of many admissions for DKA and patient was last hospitalized here 9/10/2022 through 9/12/2022 for DKA but left AMA.  Was treated appropriately with insulin drip and fluids and anion gap closed.  Agitation improved and as of 9/29/2022 was cooperative enough that restraints could be removed and Santiago discontinued.  Ordered to telemetry 9/29/2022.     Interval history:  No bed was available on telemetry so remains in the ICU.  Rested well last evening but anxious as always.  Describes abdominal discomfort but exam unremarkable and  "vitals all stable.  T-max 99 and WBC 5.67.  Glycemic control suboptimal since transitioned off insulin drip the patient has been found eating food brought in from outside with pasta spilled in her bed.  Glucose this morning has ranged from 261, to 396 and Levemir increased to 30 units every 12 hours as well as prandial insulin (Humalog) increased to 10 units as well as SSI.  Unfortunately patient is again insisting on leaving AMA.  Cannot be convinced to stay despite advising her she will be right back in DKA in a similar situation and could possibly die.  I told her I felt her pain was chronic and we could not give her any additional narcotics for her discomfort at this time unless we had a more definitive etiology.  At this point patient ignored me and started dialing her phone indicating that she had to call for her ride.    ROS: Patient unable to cooperate    The patient's relevant PMH, PSH, FH, and SH were reviewed and updated in Epic as appropriate. Allergies and Medications reviewed.    OBJECTIVE     /97   Pulse 86   Temp 98.7 °F (37.1 °C) (Oral)   Resp 18   Ht 167.6 cm (66\")   Wt 49.4 kg (108 lb 14.5 oz)   SpO2 98%   BMI 17.58 kg/m²           Flowsheet Rows    Flowsheet Row First Filed Value   Admission Height 167.6 cm (66\") Documented at 09/27/2022 2146   Admission Weight 45.4 kg (100 lb) Documented at 09/27/2022 2146        Intake & Output (last day)       09/29 0701  09/30 0700 09/30 0701  10/01 0700    P.O. 720 720    I.V. (mL/kg) 499.4 (10.1)     IV Piggyback 100     Total Intake(mL/kg) 1319.4 (26.7) 720 (14.6)    Urine (mL/kg/hr) 1175 (1) 100 (0.2)    Stool 0     Total Output 1175 100    Net +144.4 +620          Urine Unmeasured Occurrence 2 x 2 x    Stool Unmeasured Occurrence 2 x           Exam:  General Exam:  Chronically ill pillion black female sitting up in bed dialing her phone.  Does not appear in distress  HEENT: Pupils equal and reactive. Nose and throat clear.  Neck:             "              Supple, no JVD, thyromegaly, or adenopathy  Lungs: Clear anteriorly and laterally  Cardiovascular: RRR with HR 88 bpm without murmurs or gallops.  Abdomen: Soft nontender without organomegaly or masses.   and rectal: Deferred.  Extremities: No cyanosis clubbing edema.  Neurologic:                 Symmetric strength. No focal deficits.  Appears to have some tardive dyskinesia involving lips and face      Chest X-Ray: No film today      Results from last 7 days   Lab Units 09/30/22  0550 09/29/22  0423 09/28/22  0128   WBC 10*3/mm3 5.67 7.87 7.86   HEMOGLOBIN g/dL 10.7* 11.6* 13.2   HEMATOCRIT % 32.7* 33.5* 41.3   PLATELETS 10*3/mm3 191 210 255     Results from last 7 days   Lab Units 09/30/22  0550 09/29/22  1758 09/29/22  0423   SODIUM mmol/L 137  --  149*   POTASSIUM mmol/L 3.8 3.9 3.2*   CHLORIDE mmol/L 106  --  116*   CO2 mmol/L 22.0  --  25.0   BUN mg/dL 10  --  12   CREATININE mg/dL 0.57  --  0.59   GLUCOSE mg/dL 396*  --  154*   CALCIUM mg/dL 8.4*  --  8.8     Results from last 7 days   Lab Units 09/30/22  0550 09/29/22  1758 09/29/22  0423 09/28/22  1219   MAGNESIUM mg/dL 1.7  --  2.1 2.3   PHOSPHORUS mg/dL 2.5 2.7 1.4* 2.5     Results from last 7 days   Lab Units 09/30/22  0550 09/29/22  0423 09/28/22  0128   ALK PHOS U/L 301* 303* 465*   BILIRUBIN mg/dL 0.3 0.4 0.3   BILIRUBIN DIRECT mg/dL  --  0.3  --    ALT (SGPT) U/L 87* 77* 117*   AST (SGOT) U/L 154* 125* 168*       No results found for: SEDRATE    No results found for: PROBNP      Procalitonin Results:          Covid Tests    Common Labsle 9/27/22   COVID19 Not Detected            COVID LABS:  Results From Last 14 Days   Lab Units 09/27/22  2240   TROPONIN T ng/mL <0.010          Lab Results   Component Value Date    CKTOTAL 220 (H) 06/16/2022    TROPONINI <0.03 03/03/2021    TROPONINT <0.010 09/27/2022     No results found for: TSH  Lab Results   Component Value Date    LACTATE 1.7 09/10/2022     No results found for:  CORTISOL    Results from last 7 days   Lab Units 09/27/22  2244   FIO2 % 21         I reviewed the patient's results, images and medication.    Assessment & Plan   ASSESSMENT        DKA a/w T1DM    H/O noncompliance with medical treatment    Gastroparesis    History Hepatitis C    Elevated liver enzymes    Agitated encephalopathy, metabolic    Bipolar affective disorder    Hypertension    Hyperlipidemia    Cocaine abuse    ETOH abuse per significant other      DISCUSSION: Unfortunate situation.  Patient has been verbally abusive of nursing and now insists on leaving AM.  She needs some psychiatric intervention and suspect would do better in a [facility] house, but she is not going to allow any delay in her plan to leave the hospital AMA.  I note on her chest x-ray although was read as normal there is some fullness in the AP window that could suggest some underlying adenopathy.    PLAN     Leaving AMA  Will be back in the emergency room shortly in my opinion  If returns during the course of her evaluation would recommend a CT scan of the chest to rule out occult adenopathy    Plan of care and goals reviewed with multidisciplinary team at daily rounds.    I discussed the patient's findings and my recommendations with patient and nursing staff    Time spent Critical care 25 min (It does not include procedure time).    Electronically signed by Donald Yanez MD, 09/30/22, 3:32 PM EDT.   Pulmonary / Critical care medicine

## 2022-10-01 NOTE — CASE MANAGEMENT/SOCIAL WORK
Continued Stay Note  [facility]     Patient Name: Bernadette Paris  MRN: [id number]  Today's Date: 9/30/2022    Admit Date: 9/27/2022    Plan:  follow up   Discharge Plan     Row Name 09/30/22 2245       Plan    Plan  follow up    Plan Comments MSW received a phone call from RN to check and see about pt.’s transportation through [employer]. [facility] called [employer] and dispatch reports that pt. is still being picked up but it will be after 5pm and bus [id number] will be picking her up. MSW called and updated RN. MSW is available.    Final Discharge Disposition Code 07 - left AMA               Discharge Codes    No documentation.               Expected Discharge Date and Time     Expected Discharge Date Expected Discharge Time    Oct 3, 2022             VINCENZO Rowe

## (undated) DEVICE — JP PERF DRN SIL FLT 7MM FULL: Brand: CARDINAL HEALTH

## (undated) DEVICE — NEURO SPONGES: Brand: DEROYAL

## (undated) DEVICE — JACKSON-PRATT 100CC BULB RESERVOIR: Brand: CARDINAL HEALTH

## (undated) DEVICE — THE BITE BLOCK MAXI, LATEX FREE STRAP IS USED TO PROTECT THE ENDOSCOPE INSERTION TUBE FROM BEING BITTEN BY THE PATIENT.

## (undated) DEVICE — TOOL 14MH30 LEGEND 14CM 3MM: Brand: MIDAS REX ™

## (undated) DEVICE — CONTN GRAD MEAS TRIANG 32OZ BLK

## (undated) DEVICE — ENCORE® LATEX MICRO SIZE 7, STERILE LATEX POWDER-FREE SURGICAL GLOVE: Brand: ENCORE

## (undated) DEVICE — ADHS LIQ MASTISOL 2/3ML

## (undated) DEVICE — HYBRID CO2 TUBING/CAP SET FOR OLYMPUS® SCOPES & CO2 SOURCE: Brand: ERBE

## (undated) DEVICE — ELECTRD BLD EXT EDGE/INSUL 1P 4IN

## (undated) DEVICE — DRSNG WND GZ PAD BORDERED 4X8IN STRL

## (undated) DEVICE — HDRST INTUB GENTLETOUCH SLOT 7IN RT

## (undated) DEVICE — INTRO ACCSR BLNT TP

## (undated) DEVICE — DRSNG WND BORDR/ADHS NONADHR/GZ LF 4X4IN STRL

## (undated) DEVICE — ANTIBACTERIAL UNDYED BRAIDED (POLYGLACTIN 910), SYNTHETIC ABSORBABLE SUTURE: Brand: COATED VICRYL

## (undated) DEVICE — PK NEURO DISC 10

## (undated) DEVICE — ENCORE® LATEX MICRO SIZE 7.5, STERILE LATEX POWDER-FREE SURGICAL GLOVE: Brand: ENCORE

## (undated) DEVICE — C-ARM DRAPE: Brand: DEROYAL

## (undated) DEVICE — ENCORE® LATEX MICRO SIZE 6.5, STERILE LATEX POWDER-FREE SURGICAL GLOVE: Brand: ENCORE

## (undated) DEVICE — TUBING, SUCTION, 1/4" X 10', STRAIGHT: Brand: MEDLINE

## (undated) DEVICE — SUT VIC PLS CTD ANTIB BR 3/0 8/18IN 45CM

## (undated) DEVICE — ACCY PA700 LUBRICANT DIFFUSER MR7 4 PACK: Brand: MIDAS REX

## (undated) DEVICE — SUT ETHLN 3/0 FS1 30IN 669H

## (undated) DEVICE — LUBE GEL ENDOGLIDE 1.1OZ

## (undated) DEVICE — 3M™ STERI-STRIP™ REINFORCED ADHESIVE SKIN CLOSURES, R1547, 1/2 IN X 4 IN (12 MM X 100 MM), 6 STRIPS/ENVELOPE: Brand: 3M™ STERI-STRIP™

## (undated) DEVICE — SOL IRR H2O BTL 1000ML STRL

## (undated) DEVICE — SYR LUERLOK 50ML

## (undated) DEVICE — KT ORCA ORCAPOD DISP STRL

## (undated) DEVICE — SPNG ENDO BEDSIDE TUB ENZYM